# Patient Record
Sex: MALE | Race: WHITE | NOT HISPANIC OR LATINO | Employment: OTHER | URBAN - METROPOLITAN AREA
[De-identification: names, ages, dates, MRNs, and addresses within clinical notes are randomized per-mention and may not be internally consistent; named-entity substitution may affect disease eponyms.]

---

## 2017-05-29 ENCOUNTER — APPOINTMENT (OUTPATIENT)
Dept: GENERAL RADIOLOGY | Facility: HOSPITAL | Age: 52
End: 2017-05-29

## 2017-05-29 ENCOUNTER — APPOINTMENT (OUTPATIENT)
Dept: CT IMAGING | Facility: HOSPITAL | Age: 52
End: 2017-05-29

## 2017-05-29 ENCOUNTER — HOSPITAL ENCOUNTER (OUTPATIENT)
Facility: HOSPITAL | Age: 52
Setting detail: OBSERVATION
Discharge: HOME | End: 2017-05-31
Attending: EMERGENCY MEDICINE | Admitting: HOSPITALIST

## 2017-05-29 DIAGNOSIS — N39.0 ACUTE UTI: ICD-10-CM

## 2017-05-29 DIAGNOSIS — F31.9 BIPOLAR 1 DISORDER (HCC): ICD-10-CM

## 2017-05-29 DIAGNOSIS — R41.82 ALTERED MENTAL STATUS, UNSPECIFIED: Primary | ICD-10-CM

## 2017-05-29 DIAGNOSIS — I63.9 CEREBROVASCULAR ACCIDENT (CVA), UNSPECIFIED MECHANISM (HCC): ICD-10-CM

## 2017-05-29 LAB
ALBUMIN SERPL-MCNC: 4.3 G/DL (ref 3.5–5.2)
ALBUMIN/GLOB SERPL: 1.5 G/DL
ALP SERPL-CCNC: 75 U/L (ref 40–129)
ALT SERPL W P-5'-P-CCNC: 23 U/L (ref 5–41)
AMPHET+METHAMPHET UR QL: NEGATIVE
AMPHETAMINES UR QL: NEGATIVE
ANION GAP SERPL CALCULATED.3IONS-SCNC: 19.3 MMOL/L
AST SERPL-CCNC: 22 U/L (ref 5–40)
BACTERIA UR QL AUTO: ABNORMAL /HPF
BARBITURATES UR QL SCN: POSITIVE
BASOPHILS # BLD AUTO: 0.07 10*3/MM3 (ref 0–0.2)
BASOPHILS NFR BLD AUTO: 0.6 % (ref 0–2)
BENZODIAZ UR QL SCN: POSITIVE
BILIRUB SERPL-MCNC: 0.6 MG/DL (ref 0.2–1.2)
BILIRUB UR QL STRIP: NEGATIVE
BUN BLD-MCNC: 10 MG/DL (ref 6–20)
BUN/CREAT SERPL: 10 (ref 7–25)
BUPRENORPHINE SERPL-MCNC: NEGATIVE NG/ML
CALCIUM SPEC-SCNC: 9.4 MG/DL (ref 8.6–10.5)
CANNABINOIDS SERPL QL: NEGATIVE
CARBAMAZEPINE SERPL-MCNC: <0.5 MCG/ML (ref 8–12)
CHLORIDE SERPL-SCNC: 94 MMOL/L (ref 98–107)
CLARITY UR: CLEAR
CO2 SERPL-SCNC: 22.7 MMOL/L (ref 22–29)
COCAINE UR QL: NEGATIVE
COLOR UR: YELLOW
CREAT BLD-MCNC: 1 MG/DL (ref 0.76–1.27)
DEPRECATED RDW RBC AUTO: 41.4 FL (ref 37–54)
EOSINOPHIL # BLD AUTO: 0.49 10*3/MM3 (ref 0.1–0.3)
EOSINOPHIL NFR BLD AUTO: 4.5 % (ref 0–4)
ERYTHROCYTE [DISTWIDTH] IN BLOOD BY AUTOMATED COUNT: 12 % (ref 11.5–14.5)
ETHANOL BLD-MCNC: <10 MG/DL
ETHANOL UR QL: <0.01 %
GFR SERPL CREATININE-BSD FRML MDRD: 78 ML/MIN/1.73
GLOBULIN UR ELPH-MCNC: 2.9 GM/DL
GLUCOSE BLD-MCNC: 87 MG/DL (ref 65–99)
GLUCOSE UR STRIP-MCNC: NEGATIVE MG/DL
HCT VFR BLD AUTO: 48.4 % (ref 42–52)
HGB BLD-MCNC: 16.8 G/DL (ref 14–18)
HGB UR QL STRIP.AUTO: ABNORMAL
HYALINE CASTS UR QL AUTO: ABNORMAL /LPF
IMM GRANULOCYTES # BLD: 0.02 10*3/MM3 (ref 0–0.03)
IMM GRANULOCYTES NFR BLD: 0.2 % (ref 0–0.5)
KETONES UR QL STRIP: ABNORMAL
LEUKOCYTE ESTERASE UR QL STRIP.AUTO: NEGATIVE
LITHIUM SERPL-SCNC: 1.1 MMOL/L (ref 0.6–1.2)
LYMPHOCYTES # BLD AUTO: 2.18 10*3/MM3 (ref 0.6–4.8)
LYMPHOCYTES NFR BLD AUTO: 19.9 % (ref 20–45)
MCH RBC QN AUTO: 32.3 PG (ref 27–31)
MCHC RBC AUTO-ENTMCNC: 34.7 G/DL (ref 31–37)
MCV RBC AUTO: 93.1 FL (ref 80–94)
METHADONE UR QL SCN: NEGATIVE
MONOCYTES # BLD AUTO: 1 10*3/MM3 (ref 0–1)
MONOCYTES NFR BLD AUTO: 9.1 % (ref 3–8)
NEUTROPHILS # BLD AUTO: 7.22 10*3/MM3 (ref 1.5–8.3)
NEUTROPHILS NFR BLD AUTO: 65.7 % (ref 45–70)
NITRITE UR QL STRIP: NEGATIVE
NRBC BLD MANUAL-RTO: 0 /100 WBC (ref 0–0)
OPIATES UR QL: NEGATIVE
OXYCODONE UR QL SCN: NEGATIVE
PCP UR QL SCN: NEGATIVE
PH UR STRIP.AUTO: 6 [PH] (ref 4.5–8)
PLATELET # BLD AUTO: 292 10*3/MM3 (ref 140–500)
PMV BLD AUTO: 8.9 FL (ref 7.4–10.4)
POTASSIUM BLD-SCNC: 3.1 MMOL/L (ref 3.5–5.2)
PROPOXYPH UR QL: NEGATIVE
PROT SERPL-MCNC: 7.2 G/DL (ref 6–8.5)
PROT UR QL STRIP: ABNORMAL
RBC # BLD AUTO: 5.2 10*6/MM3 (ref 4.7–6.1)
RBC # UR: ABNORMAL /HPF
REF LAB TEST METHOD: ABNORMAL
SODIUM BLD-SCNC: 136 MMOL/L (ref 136–145)
SP GR UR STRIP: 1.01 (ref 1–1.03)
SQUAMOUS #/AREA URNS HPF: ABNORMAL /HPF
TRICYCLICS UR QL SCN: NEGATIVE
TROPONIN T SERPL-MCNC: <0.01 NG/ML (ref 0–0.03)
TSH SERPL DL<=0.05 MIU/L-ACNC: 1.29 MIU/ML (ref 0.27–4.2)
UROBILINOGEN UR QL STRIP: ABNORMAL
WBC NRBC COR # BLD: 10.98 10*3/MM3 (ref 4.8–10.8)
WBC UR QL AUTO: ABNORMAL /HPF

## 2017-05-29 PROCEDURE — 99285 EMERGENCY DEPT VISIT HI MDM: CPT | Performed by: EMERGENCY MEDICINE

## 2017-05-29 PROCEDURE — 80307 DRUG TEST PRSMV CHEM ANLYZR: CPT | Performed by: EMERGENCY MEDICINE

## 2017-05-29 PROCEDURE — 73562 X-RAY EXAM OF KNEE 3: CPT

## 2017-05-29 PROCEDURE — 93005 ELECTROCARDIOGRAM TRACING: CPT | Performed by: EMERGENCY MEDICINE

## 2017-05-29 PROCEDURE — 81001 URINALYSIS AUTO W/SCOPE: CPT | Performed by: EMERGENCY MEDICINE

## 2017-05-29 PROCEDURE — 99284 EMERGENCY DEPT VISIT MOD MDM: CPT

## 2017-05-29 PROCEDURE — 70450 CT HEAD/BRAIN W/O DYE: CPT

## 2017-05-29 PROCEDURE — 80178 ASSAY OF LITHIUM: CPT | Performed by: EMERGENCY MEDICINE

## 2017-05-29 PROCEDURE — 93010 ELECTROCARDIOGRAM REPORT: CPT | Performed by: INTERNAL MEDICINE

## 2017-05-29 PROCEDURE — 84443 ASSAY THYROID STIM HORMONE: CPT | Performed by: EMERGENCY MEDICINE

## 2017-05-29 PROCEDURE — 80156 ASSAY CARBAMAZEPINE TOTAL: CPT | Performed by: EMERGENCY MEDICINE

## 2017-05-29 PROCEDURE — 85025 COMPLETE CBC W/AUTO DIFF WBC: CPT | Performed by: EMERGENCY MEDICINE

## 2017-05-29 PROCEDURE — 84484 ASSAY OF TROPONIN QUANT: CPT | Performed by: EMERGENCY MEDICINE

## 2017-05-29 PROCEDURE — 80053 COMPREHEN METABOLIC PANEL: CPT | Performed by: EMERGENCY MEDICINE

## 2017-05-29 PROCEDURE — 71020 HC CHEST PA AND LATERAL: CPT

## 2017-05-29 PROCEDURE — 80306 DRUG TEST PRSMV INSTRMNT: CPT | Performed by: EMERGENCY MEDICINE

## 2017-05-29 RX ORDER — ASPIRIN 300 MG/1
300 SUPPOSITORY RECTAL ONCE
Status: COMPLETED | OUTPATIENT
Start: 2017-05-29 | End: 2017-05-30

## 2017-05-29 RX ORDER — SODIUM CHLORIDE 0.9 % (FLUSH) 0.9 %
10 SYRINGE (ML) INJECTION AS NEEDED
Status: DISCONTINUED | OUTPATIENT
Start: 2017-05-29 | End: 2017-05-31 | Stop reason: HOSPADM

## 2017-05-30 ENCOUNTER — APPOINTMENT (OUTPATIENT)
Dept: MRI IMAGING | Facility: HOSPITAL | Age: 52
End: 2017-05-30

## 2017-05-30 LAB
AMMONIA BLD-SCNC: 69 UMOL/L (ref 16–60)
CHOLEST SERPL-MCNC: 137 MG/DL (ref 0–200)
GLUCOSE BLDC GLUCOMTR-MCNC: 110 MG/DL (ref 70–130)
GLUCOSE BLDC GLUCOMTR-MCNC: 90 MG/DL (ref 70–130)
GLUCOSE BLDC GLUCOMTR-MCNC: 93 MG/DL (ref 70–130)
HBA1C MFR BLD: 4.9 % (ref 4.8–5.6)
HDLC SERPL-MCNC: 34 MG/DL (ref 40–60)
LDLC SERPL CALC-MCNC: 71 MG/DL (ref 0–100)
LDLC/HDLC SERPL: 2.09 {RATIO}
LITHIUM SERPL-SCNC: 1 MMOL/L (ref 0.6–1.2)
TRIGL SERPL-MCNC: 159 MG/DL (ref 0–150)
VLDLC SERPL-MCNC: 31.8 MG/DL (ref 8–32)

## 2017-05-30 PROCEDURE — 80061 LIPID PANEL: CPT | Performed by: HOSPITALIST

## 2017-05-30 PROCEDURE — 80178 ASSAY OF LITHIUM: CPT | Performed by: HOSPITALIST

## 2017-05-30 PROCEDURE — 96365 THER/PROPH/DIAG IV INF INIT: CPT

## 2017-05-30 PROCEDURE — 96372 THER/PROPH/DIAG INJ SC/IM: CPT

## 2017-05-30 PROCEDURE — G0378 HOSPITAL OBSERVATION PER HR: HCPCS

## 2017-05-30 PROCEDURE — 82962 GLUCOSE BLOOD TEST: CPT

## 2017-05-30 PROCEDURE — 82140 ASSAY OF AMMONIA: CPT | Performed by: NURSE PRACTITIONER

## 2017-05-30 PROCEDURE — 25010000002 DIAZEPAM PER 5 MG: Performed by: HOSPITALIST

## 2017-05-30 PROCEDURE — 94799 UNLISTED PULMONARY SVC/PX: CPT

## 2017-05-30 PROCEDURE — 83036 HEMOGLOBIN GLYCOSYLATED A1C: CPT | Performed by: HOSPITALIST

## 2017-05-30 PROCEDURE — 99219 PR INITIAL OBSERVATION CARE/DAY 50 MINUTES: CPT | Performed by: NURSE PRACTITIONER

## 2017-05-30 PROCEDURE — 25010000002 ENOXAPARIN PER 10 MG: Performed by: HOSPITALIST

## 2017-05-30 PROCEDURE — G8996 SWALLOW CURRENT STATUS: HCPCS

## 2017-05-30 PROCEDURE — 92610 EVALUATE SWALLOWING FUNCTION: CPT

## 2017-05-30 PROCEDURE — 70551 MRI BRAIN STEM W/O DYE: CPT

## 2017-05-30 PROCEDURE — G8997 SWALLOW GOAL STATUS: HCPCS

## 2017-05-30 PROCEDURE — G8998 SWALLOW D/C STATUS: HCPCS

## 2017-05-30 PROCEDURE — 25010000002 CEFTRIAXONE PER 250 MG: Performed by: HOSPITALIST

## 2017-05-30 PROCEDURE — 96375 TX/PRO/DX INJ NEW DRUG ADDON: CPT

## 2017-05-30 RX ORDER — ACETAMINOPHEN 650 MG/1
650 SUPPOSITORY RECTAL EVERY 4 HOURS PRN
Status: DISCONTINUED | OUTPATIENT
Start: 2017-05-30 | End: 2017-05-31 | Stop reason: HOSPADM

## 2017-05-30 RX ORDER — ASPIRIN 81 MG/1
81 TABLET, CHEWABLE ORAL DAILY
Status: DISCONTINUED | OUTPATIENT
Start: 2017-05-30 | End: 2017-05-31 | Stop reason: HOSPADM

## 2017-05-30 RX ORDER — SODIUM CHLORIDE 0.9 % (FLUSH) 0.9 %
1-10 SYRINGE (ML) INJECTION AS NEEDED
Status: DISCONTINUED | OUTPATIENT
Start: 2017-05-30 | End: 2017-05-31 | Stop reason: HOSPADM

## 2017-05-30 RX ORDER — METOPROLOL TARTRATE 50 MG/1
100 TABLET, FILM COATED ORAL 2 TIMES DAILY
Status: DISCONTINUED | OUTPATIENT
Start: 2017-05-30 | End: 2017-05-31 | Stop reason: HOSPADM

## 2017-05-30 RX ORDER — LACTULOSE 20 G/30ML
30 SOLUTION ORAL 3 TIMES DAILY
Status: DISCONTINUED | OUTPATIENT
Start: 2017-05-30 | End: 2017-05-31 | Stop reason: HOSPADM

## 2017-05-30 RX ORDER — LITHIUM CARBONATE 300 MG
300 TABLET ORAL 2 TIMES DAILY WITH MEALS
Status: DISCONTINUED | OUTPATIENT
Start: 2017-05-30 | End: 2017-05-31 | Stop reason: HOSPADM

## 2017-05-30 RX ORDER — CEFTRIAXONE 1 G/1
INJECTION, POWDER, FOR SOLUTION INTRAMUSCULAR; INTRAVENOUS
Status: DISPENSED
Start: 2017-05-30 | End: 2017-05-30

## 2017-05-30 RX ORDER — POTASSIUM CHLORIDE 20 MEQ/1
20 TABLET, EXTENDED RELEASE ORAL ONCE
Status: DISCONTINUED | OUTPATIENT
Start: 2017-05-30 | End: 2017-05-31 | Stop reason: HOSPADM

## 2017-05-30 RX ORDER — LOSARTAN POTASSIUM 50 MG/1
100 TABLET ORAL
Status: DISCONTINUED | OUTPATIENT
Start: 2017-05-30 | End: 2017-05-31 | Stop reason: HOSPADM

## 2017-05-30 RX ORDER — ACETAMINOPHEN 325 MG/1
650 TABLET ORAL EVERY 4 HOURS PRN
Status: DISCONTINUED | OUTPATIENT
Start: 2017-05-30 | End: 2017-05-31 | Stop reason: HOSPADM

## 2017-05-30 RX ORDER — DIAZEPAM 5 MG/ML
1 INJECTION, SOLUTION INTRAMUSCULAR; INTRAVENOUS ONCE
Status: COMPLETED | OUTPATIENT
Start: 2017-05-30 | End: 2017-05-30

## 2017-05-30 RX ORDER — ONDANSETRON 2 MG/ML
4 INJECTION INTRAMUSCULAR; INTRAVENOUS EVERY 6 HOURS PRN
Status: DISCONTINUED | OUTPATIENT
Start: 2017-05-30 | End: 2017-05-31 | Stop reason: HOSPADM

## 2017-05-30 RX ORDER — CARBAMAZEPINE 200 MG/1
400 TABLET ORAL 2 TIMES DAILY
Status: DISCONTINUED | OUTPATIENT
Start: 2017-05-30 | End: 2017-05-31 | Stop reason: HOSPADM

## 2017-05-30 RX ORDER — FLUOXETINE HYDROCHLORIDE 20 MG/1
20 CAPSULE ORAL DAILY
Status: DISCONTINUED | OUTPATIENT
Start: 2017-05-30 | End: 2017-05-31 | Stop reason: HOSPADM

## 2017-05-30 RX ORDER — ATORVASTATIN CALCIUM 40 MG/1
80 TABLET, FILM COATED ORAL NIGHTLY
Status: DISCONTINUED | OUTPATIENT
Start: 2017-05-30 | End: 2017-05-31 | Stop reason: HOSPADM

## 2017-05-30 RX ORDER — ASPIRIN 300 MG/1
300 SUPPOSITORY RECTAL DAILY
Status: DISCONTINUED | OUTPATIENT
Start: 2017-05-30 | End: 2017-05-31 | Stop reason: HOSPADM

## 2017-05-30 RX ADMIN — LITHIUM CARBONATE 300 MG: 300 TABLET ORAL at 18:10

## 2017-05-30 RX ADMIN — CARBAMAZEPINE 400 MG: 200 TABLET ORAL at 08:56

## 2017-05-30 RX ADMIN — ASPIRIN 81 MG 81 MG: 81 TABLET ORAL at 15:55

## 2017-05-30 RX ADMIN — LACTULOSE 30 G: 10 SOLUTION ORAL at 20:12

## 2017-05-30 RX ADMIN — METOPROLOL TARTRATE 100 MG: 50 TABLET ORAL at 08:55

## 2017-05-30 RX ADMIN — METOPROLOL TARTRATE 100 MG: 50 TABLET ORAL at 18:10

## 2017-05-30 RX ADMIN — CARBAMAZEPINE 400 MG: 200 TABLET ORAL at 18:10

## 2017-05-30 RX ADMIN — DIAZEPAM 1 MG: 5 INJECTION, SOLUTION INTRAMUSCULAR; INTRAVENOUS at 14:47

## 2017-05-30 RX ADMIN — FLUOXETINE 20 MG: 20 CAPSULE ORAL at 08:54

## 2017-05-30 RX ADMIN — CEFTRIAXONE 1 G: 1 INJECTION, SOLUTION INTRAVENOUS at 02:29

## 2017-05-30 RX ADMIN — LOSARTAN POTASSIUM 100 MG: 50 TABLET ORAL at 08:54

## 2017-05-30 RX ADMIN — LACTULOSE 30 G: 10 SOLUTION ORAL at 15:54

## 2017-05-30 RX ADMIN — ASPIRIN 300 MG: 300 SUPPOSITORY RECTAL at 00:32

## 2017-05-30 RX ADMIN — ENOXAPARIN SODIUM 30 MG: 30 INJECTION SUBCUTANEOUS at 18:11

## 2017-05-30 RX ADMIN — ATORVASTATIN CALCIUM 80 MG: 40 TABLET, FILM COATED ORAL at 20:11

## 2017-05-31 VITALS
BODY MASS INDEX: 38.73 KG/M2 | HEIGHT: 70 IN | DIASTOLIC BLOOD PRESSURE: 74 MMHG | OXYGEN SATURATION: 95 % | HEART RATE: 71 BPM | WEIGHT: 270.56 LBS | TEMPERATURE: 98.3 F | RESPIRATION RATE: 20 BRPM | SYSTOLIC BLOOD PRESSURE: 119 MMHG

## 2017-05-31 LAB
ALBUMIN SERPL-MCNC: 3.8 G/DL (ref 3.5–5.2)
ALBUMIN/GLOB SERPL: 1.4 G/DL
ALP SERPL-CCNC: 68 U/L (ref 40–129)
ALT SERPL W P-5'-P-CCNC: 21 U/L (ref 5–41)
AMMONIA BLD-SCNC: 52 UMOL/L (ref 16–60)
ANION GAP SERPL CALCULATED.3IONS-SCNC: 22 MMOL/L
AST SERPL-CCNC: 18 U/L (ref 5–40)
BASOPHILS # BLD AUTO: 0.03 10*3/MM3 (ref 0–0.2)
BASOPHILS NFR BLD AUTO: 0.4 % (ref 0–2)
BILIRUB SERPL-MCNC: 0.6 MG/DL (ref 0.2–1.2)
BUN BLD-MCNC: 6 MG/DL (ref 6–20)
BUN/CREAT SERPL: 7.4 (ref 7–25)
CALCIUM SPEC-SCNC: 8.8 MG/DL (ref 8.6–10.5)
CHLORIDE SERPL-SCNC: 96 MMOL/L (ref 98–107)
CO2 SERPL-SCNC: 18 MMOL/L (ref 22–29)
CREAT BLD-MCNC: 0.81 MG/DL (ref 0.76–1.27)
DEPRECATED RDW RBC AUTO: 41.4 FL (ref 37–54)
EOSINOPHIL # BLD AUTO: 0.4 10*3/MM3 (ref 0.1–0.3)
EOSINOPHIL NFR BLD AUTO: 5.4 % (ref 0–4)
ERYTHROCYTE [DISTWIDTH] IN BLOOD BY AUTOMATED COUNT: 12 % (ref 11.5–14.5)
GFR SERPL CREATININE-BSD FRML MDRD: 100 ML/MIN/1.73
GLOBULIN UR ELPH-MCNC: 2.8 GM/DL
GLUCOSE BLD-MCNC: 89 MG/DL (ref 65–99)
GLUCOSE BLDC GLUCOMTR-MCNC: 108 MG/DL (ref 70–130)
GLUCOSE BLDC GLUCOMTR-MCNC: 85 MG/DL (ref 70–130)
GLUCOSE BLDC GLUCOMTR-MCNC: 88 MG/DL (ref 70–130)
HCT VFR BLD AUTO: 48.6 % (ref 42–52)
HGB BLD-MCNC: 16.8 G/DL (ref 14–18)
IMM GRANULOCYTES # BLD: 0.02 10*3/MM3 (ref 0–0.03)
IMM GRANULOCYTES NFR BLD: 0.3 % (ref 0–0.5)
LYMPHOCYTES # BLD AUTO: 1.35 10*3/MM3 (ref 0.6–4.8)
LYMPHOCYTES NFR BLD AUTO: 18.2 % (ref 20–45)
MCH RBC QN AUTO: 32.2 PG (ref 27–31)
MCHC RBC AUTO-ENTMCNC: 34.6 G/DL (ref 31–37)
MCV RBC AUTO: 93.3 FL (ref 80–94)
MONOCYTES # BLD AUTO: 0.7 10*3/MM3 (ref 0–1)
MONOCYTES NFR BLD AUTO: 9.5 % (ref 3–8)
NEUTROPHILS # BLD AUTO: 4.9 10*3/MM3 (ref 1.5–8.3)
NEUTROPHILS NFR BLD AUTO: 66.2 % (ref 45–70)
NRBC BLD MANUAL-RTO: 0 /100 WBC (ref 0–0)
PLATELET # BLD AUTO: 279 10*3/MM3 (ref 140–500)
PMV BLD AUTO: 8.8 FL (ref 7.4–10.4)
POTASSIUM BLD-SCNC: 3.2 MMOL/L (ref 3.5–5.2)
PROT SERPL-MCNC: 6.6 G/DL (ref 6–8.5)
RBC # BLD AUTO: 5.21 10*6/MM3 (ref 4.7–6.1)
SODIUM BLD-SCNC: 136 MMOL/L (ref 136–145)
WBC NRBC COR # BLD: 7.4 10*3/MM3 (ref 4.8–10.8)

## 2017-05-31 PROCEDURE — G0378 HOSPITAL OBSERVATION PER HR: HCPCS

## 2017-05-31 PROCEDURE — 85025 COMPLETE CBC W/AUTO DIFF WBC: CPT | Performed by: NURSE PRACTITIONER

## 2017-05-31 PROCEDURE — 92523 SPEECH SOUND LANG COMPREHEN: CPT

## 2017-05-31 PROCEDURE — G9186 MOTOR SPEECH GOAL STATUS: HCPCS

## 2017-05-31 PROCEDURE — 25010000002 ENOXAPARIN PER 10 MG: Performed by: HOSPITALIST

## 2017-05-31 PROCEDURE — 82140 ASSAY OF AMMONIA: CPT | Performed by: NURSE PRACTITIONER

## 2017-05-31 PROCEDURE — G9158 MOTOR SPEECH D/C STATUS: HCPCS

## 2017-05-31 PROCEDURE — 99217 PR OBSERVATION CARE DISCHARGE MANAGEMENT: CPT | Performed by: NURSE PRACTITIONER

## 2017-05-31 PROCEDURE — 80053 COMPREHEN METABOLIC PANEL: CPT | Performed by: NURSE PRACTITIONER

## 2017-05-31 PROCEDURE — G8999 MOTOR SPEECH CURRENT STATUS: HCPCS

## 2017-05-31 PROCEDURE — 82962 GLUCOSE BLOOD TEST: CPT

## 2017-05-31 PROCEDURE — 96372 THER/PROPH/DIAG INJ SC/IM: CPT

## 2017-05-31 RX ORDER — LOSARTAN POTASSIUM 100 MG/1
100 TABLET ORAL
Qty: 30 TABLET | Refills: 0 | Status: SHIPPED | OUTPATIENT
Start: 2017-05-31 | End: 2018-11-25 | Stop reason: HOSPADM

## 2017-05-31 RX ORDER — LACTULOSE 20 G/30ML
20 SOLUTION ORAL 2 TIMES DAILY PRN
Qty: 473 ML | Refills: 0 | Status: SHIPPED | OUTPATIENT
Start: 2017-05-31 | End: 2017-06-02

## 2017-05-31 RX ADMIN — METOPROLOL TARTRATE 100 MG: 50 TABLET ORAL at 08:45

## 2017-05-31 RX ADMIN — LOSARTAN POTASSIUM 100 MG: 50 TABLET ORAL at 08:44

## 2017-05-31 RX ADMIN — LITHIUM CARBONATE 300 MG: 300 TABLET ORAL at 08:44

## 2017-05-31 RX ADMIN — CARBAMAZEPINE 400 MG: 200 TABLET ORAL at 08:45

## 2017-05-31 RX ADMIN — FLUOXETINE 20 MG: 20 CAPSULE ORAL at 08:44

## 2017-05-31 RX ADMIN — ENOXAPARIN SODIUM 30 MG: 30 INJECTION SUBCUTANEOUS at 08:44

## 2017-05-31 RX ADMIN — LACTULOSE 30 G: 10 SOLUTION ORAL at 08:44

## 2017-05-31 RX ADMIN — ASPIRIN 81 MG 81 MG: 81 TABLET ORAL at 08:45

## 2017-07-22 ENCOUNTER — HOSPITAL ENCOUNTER (INPATIENT)
Facility: HOSPITAL | Age: 52
LOS: 11 days | Discharge: NURSING, REHAB OR LONG-TERM CARE | End: 2017-08-02
Attending: EMERGENCY MEDICINE | Admitting: HOSPITALIST

## 2017-07-22 ENCOUNTER — APPOINTMENT (OUTPATIENT)
Dept: GENERAL RADIOLOGY | Facility: HOSPITAL | Age: 52
End: 2017-07-22

## 2017-07-22 ENCOUNTER — APPOINTMENT (OUTPATIENT)
Dept: CT IMAGING | Facility: HOSPITAL | Age: 52
End: 2017-07-22
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
## 2017-07-22 DIAGNOSIS — S92.411A CLOSED DISPLACED FRACTURE OF PROXIMAL PHALANX OF RIGHT GREAT TOE, INITIAL ENCOUNTER: ICD-10-CM

## 2017-07-22 DIAGNOSIS — K76.82 HEPATIC ENCEPHALOPATHY (HCC): ICD-10-CM

## 2017-07-22 DIAGNOSIS — S92.514A CLOSED NONDISPLACED FRACTURE OF PROXIMAL PHALANX OF LESSER TOE OF RIGHT FOOT, INITIAL ENCOUNTER: ICD-10-CM

## 2017-07-22 DIAGNOSIS — R41.82 ALTERED MENTAL STATUS, UNSPECIFIED ALTERED MENTAL STATUS TYPE: Primary | ICD-10-CM

## 2017-07-22 DIAGNOSIS — T07.XXXA MULTIPLE ABRASIONS: ICD-10-CM

## 2017-07-22 DIAGNOSIS — S92.401A: ICD-10-CM

## 2017-07-22 DIAGNOSIS — S92.402A: ICD-10-CM

## 2017-07-22 DIAGNOSIS — I95.9 HYPOTENSION, UNSPECIFIED HYPOTENSION TYPE: ICD-10-CM

## 2017-07-22 DIAGNOSIS — S70.02XA CONTUSION OF LEFT HIP, INITIAL ENCOUNTER: ICD-10-CM

## 2017-07-22 LAB
ALBUMIN SERPL-MCNC: 3.8 G/DL (ref 3.5–5.2)
ALBUMIN/GLOB SERPL: 1.4 G/DL
ALP SERPL-CCNC: 68 U/L (ref 40–129)
ALT SERPL W P-5'-P-CCNC: 14 U/L (ref 5–41)
AMMONIA BLD-SCNC: 95 UMOL/L (ref 16–60)
ANION GAP SERPL CALCULATED.3IONS-SCNC: 13.9 MMOL/L
APTT PPP: 30.6 SECONDS (ref 24.3–38.1)
AST SERPL-CCNC: 16 U/L (ref 5–40)
BACTERIA UR QL AUTO: ABNORMAL /HPF
BASOPHILS # BLD AUTO: 0.08 10*3/MM3 (ref 0–0.2)
BASOPHILS NFR BLD AUTO: 0.7 % (ref 0–2)
BILIRUB SERPL-MCNC: 1 MG/DL (ref 0.2–1.2)
BILIRUB UR QL STRIP: NEGATIVE
BUN BLD-MCNC: 15 MG/DL (ref 6–20)
BUN/CREAT SERPL: 16.1 (ref 7–25)
CALCIUM SPEC-SCNC: 8.8 MG/DL (ref 8.6–10.5)
CARBAMAZEPINE SERPL-MCNC: <0.5 MCG/ML (ref 8–12)
CHLORIDE SERPL-SCNC: 97 MMOL/L (ref 98–107)
CLARITY UR: CLEAR
CO2 SERPL-SCNC: 22.1 MMOL/L (ref 22–29)
COLOR UR: YELLOW
CREAT BLD-MCNC: 0.93 MG/DL (ref 0.76–1.27)
DEPRECATED RDW RBC AUTO: 41.6 FL (ref 37–54)
EOSINOPHIL # BLD AUTO: 0.34 10*3/MM3 (ref 0.1–0.3)
EOSINOPHIL NFR BLD AUTO: 3 % (ref 0–4)
ERYTHROCYTE [DISTWIDTH] IN BLOOD BY AUTOMATED COUNT: 11.9 % (ref 11.5–14.5)
GFR SERPL CREATININE-BSD FRML MDRD: 85 ML/MIN/1.73
GLOBULIN UR ELPH-MCNC: 2.7 GM/DL
GLUCOSE BLD-MCNC: 154 MG/DL (ref 65–99)
GLUCOSE UR STRIP-MCNC: NEGATIVE MG/DL
HCT VFR BLD AUTO: 43.2 % (ref 42–52)
HGB BLD-MCNC: 15 G/DL (ref 14–18)
HGB UR QL STRIP.AUTO: ABNORMAL
HYALINE CASTS UR QL AUTO: ABNORMAL /LPF
IMM GRANULOCYTES # BLD: 0.07 10*3/MM3 (ref 0–0.03)
IMM GRANULOCYTES NFR BLD: 0.6 % (ref 0–0.5)
INR PPP: 1.09 (ref 0.9–1.1)
KETONES UR QL STRIP: NEGATIVE
LEUKOCYTE ESTERASE UR QL STRIP.AUTO: NEGATIVE
LYMPHOCYTES # BLD AUTO: 1.13 10*3/MM3 (ref 0.6–4.8)
LYMPHOCYTES NFR BLD AUTO: 9.9 % (ref 20–45)
MCH RBC QN AUTO: 33 PG (ref 27–31)
MCHC RBC AUTO-ENTMCNC: 34.7 G/DL (ref 31–37)
MCV RBC AUTO: 95.2 FL (ref 80–94)
MONOCYTES # BLD AUTO: 1.32 10*3/MM3 (ref 0–1)
MONOCYTES NFR BLD AUTO: 11.5 % (ref 3–8)
MUCOUS THREADS URNS QL MICRO: ABNORMAL /HPF
NEUTROPHILS # BLD AUTO: 8.52 10*3/MM3 (ref 1.5–8.3)
NEUTROPHILS NFR BLD AUTO: 74.3 % (ref 45–70)
NITRITE UR QL STRIP: NEGATIVE
NRBC BLD MANUAL-RTO: 0 /100 WBC (ref 0–0)
PH UR STRIP.AUTO: 6 [PH] (ref 4.5–8)
PLATELET # BLD AUTO: 229 10*3/MM3 (ref 140–500)
PMV BLD AUTO: 9.6 FL (ref 7.4–10.4)
POTASSIUM BLD-SCNC: 4.5 MMOL/L (ref 3.5–5.2)
PROT SERPL-MCNC: 6.5 G/DL (ref 6–8.5)
PROT UR QL STRIP: ABNORMAL
PROTHROMBIN TIME: 14.1 SECONDS (ref 12.1–15)
RBC # BLD AUTO: 4.54 10*6/MM3 (ref 4.7–6.1)
RBC # UR: ABNORMAL /HPF
REF LAB TEST METHOD: ABNORMAL
SODIUM BLD-SCNC: 133 MMOL/L (ref 136–145)
SP GR UR STRIP: 1.01 (ref 1–1.03)
SQUAMOUS #/AREA URNS HPF: ABNORMAL /HPF
TROPONIN T SERPL-MCNC: <0.01 NG/ML (ref 0–0.03)
UROBILINOGEN UR QL STRIP: ABNORMAL
WBC NRBC COR # BLD: 11.46 10*3/MM3 (ref 4.8–10.8)
WBC UR QL AUTO: ABNORMAL /HPF

## 2017-07-22 PROCEDURE — 25010000002 PIPERACILLIN SOD-TAZOBACTAM PER 1 G: Performed by: INTERNAL MEDICINE

## 2017-07-22 PROCEDURE — 99285 EMERGENCY DEPT VISIT HI MDM: CPT

## 2017-07-22 PROCEDURE — 73502 X-RAY EXAM HIP UNI 2-3 VIEWS: CPT

## 2017-07-22 PROCEDURE — 81001 URINALYSIS AUTO W/SCOPE: CPT | Performed by: EMERGENCY MEDICINE

## 2017-07-22 PROCEDURE — 80178 ASSAY OF LITHIUM: CPT | Performed by: EMERGENCY MEDICINE

## 2017-07-22 PROCEDURE — 85730 THROMBOPLASTIN TIME PARTIAL: CPT | Performed by: EMERGENCY MEDICINE

## 2017-07-22 PROCEDURE — 73630 X-RAY EXAM OF FOOT: CPT

## 2017-07-22 PROCEDURE — 99223 1ST HOSP IP/OBS HIGH 75: CPT | Performed by: INTERNAL MEDICINE

## 2017-07-22 PROCEDURE — 80053 COMPREHEN METABOLIC PANEL: CPT | Performed by: EMERGENCY MEDICINE

## 2017-07-22 PROCEDURE — 80156 ASSAY CARBAMAZEPINE TOTAL: CPT | Performed by: EMERGENCY MEDICINE

## 2017-07-22 PROCEDURE — 84484 ASSAY OF TROPONIN QUANT: CPT | Performed by: EMERGENCY MEDICINE

## 2017-07-22 PROCEDURE — 70450 CT HEAD/BRAIN W/O DYE: CPT

## 2017-07-22 PROCEDURE — 73562 X-RAY EXAM OF KNEE 3: CPT

## 2017-07-22 PROCEDURE — 82140 ASSAY OF AMMONIA: CPT | Performed by: EMERGENCY MEDICINE

## 2017-07-22 PROCEDURE — 93005 ELECTROCARDIOGRAM TRACING: CPT | Performed by: EMERGENCY MEDICINE

## 2017-07-22 PROCEDURE — 99285 EMERGENCY DEPT VISIT HI MDM: CPT | Performed by: EMERGENCY MEDICINE

## 2017-07-22 PROCEDURE — 85610 PROTHROMBIN TIME: CPT | Performed by: EMERGENCY MEDICINE

## 2017-07-22 PROCEDURE — 72125 CT NECK SPINE W/O DYE: CPT

## 2017-07-22 PROCEDURE — 71010 HC CHEST PA OR AP: CPT

## 2017-07-22 PROCEDURE — 93010 ELECTROCARDIOGRAM REPORT: CPT | Performed by: INTERNAL MEDICINE

## 2017-07-22 PROCEDURE — 85025 COMPLETE CBC W/AUTO DIFF WBC: CPT | Performed by: EMERGENCY MEDICINE

## 2017-07-22 RX ORDER — SODIUM CHLORIDE 0.9 % (FLUSH) 0.9 %
1-10 SYRINGE (ML) INJECTION AS NEEDED
Status: DISCONTINUED | OUTPATIENT
Start: 2017-07-22 | End: 2017-08-02 | Stop reason: HOSPADM

## 2017-07-22 RX ORDER — VANCOMYCIN HYDROCHLORIDE
1250 EVERY 12 HOURS
Status: DISCONTINUED | OUTPATIENT
Start: 2017-07-22 | End: 2017-07-23 | Stop reason: CLARIF

## 2017-07-22 RX ORDER — LACTULOSE 20 G/30ML
30 SOLUTION ORAL 3 TIMES DAILY
Status: DISCONTINUED | OUTPATIENT
Start: 2017-07-22 | End: 2017-07-24

## 2017-07-22 RX ORDER — DOPAMINE HYDROCHLORIDE 160 MG/100ML
INJECTION, SOLUTION INTRAVENOUS
Status: COMPLETED
Start: 2017-07-22 | End: 2017-07-23

## 2017-07-22 RX ORDER — DOPAMINE HYDROCHLORIDE 160 MG/100ML
2-20 INJECTION, SOLUTION INTRAVENOUS
Status: DISCONTINUED | OUTPATIENT
Start: 2017-07-22 | End: 2017-07-24

## 2017-07-22 RX ORDER — SODIUM CHLORIDE 9 MG/ML
INJECTION, SOLUTION INTRAVENOUS
Status: COMPLETED
Start: 2017-07-22 | End: 2017-07-23

## 2017-07-22 RX ORDER — TEMAZEPAM 15 MG/1
15 CAPSULE ORAL NIGHTLY PRN
Status: DISCONTINUED | OUTPATIENT
Start: 2017-07-22 | End: 2017-07-25

## 2017-07-22 RX ORDER — VANCOMYCIN HYDROCHLORIDE 500 MG/10ML
INJECTION, POWDER, LYOPHILIZED, FOR SOLUTION INTRAVENOUS
Status: DISPENSED
Start: 2017-07-22 | End: 2017-07-23

## 2017-07-22 RX ORDER — PRIMIDONE 250 MG/1
250 TABLET ORAL 2 TIMES DAILY
Status: DISCONTINUED | OUTPATIENT
Start: 2017-07-23 | End: 2017-08-02 | Stop reason: HOSPADM

## 2017-07-22 RX ORDER — PIPERACILLIN SODIUM, TAZOBACTAM SODIUM 3; .375 G/15ML; G/15ML
INJECTION, POWDER, LYOPHILIZED, FOR SOLUTION INTRAVENOUS
Status: DISPENSED
Start: 2017-07-22 | End: 2017-07-23

## 2017-07-22 RX ORDER — LITHIUM CARBONATE 300 MG
300 TABLET ORAL 2 TIMES DAILY
Status: DISCONTINUED | OUTPATIENT
Start: 2017-07-23 | End: 2017-08-02 | Stop reason: HOSPADM

## 2017-07-22 RX ORDER — SODIUM CHLORIDE 9 MG/ML
100 INJECTION, SOLUTION INTRAVENOUS CONTINUOUS
Status: DISCONTINUED | OUTPATIENT
Start: 2017-07-22 | End: 2017-07-25

## 2017-07-22 RX ORDER — DOXEPIN HYDROCHLORIDE 25 MG/1
75 CAPSULE ORAL NIGHTLY
Status: DISCONTINUED | OUTPATIENT
Start: 2017-07-23 | End: 2017-08-02 | Stop reason: HOSPADM

## 2017-07-22 RX ORDER — CARBAMAZEPINE 200 MG/1
400 TABLET ORAL 2 TIMES DAILY
Status: DISCONTINUED | OUTPATIENT
Start: 2017-07-23 | End: 2017-08-02 | Stop reason: HOSPADM

## 2017-07-22 RX ORDER — GABAPENTIN 400 MG/1
800 CAPSULE ORAL EVERY 8 HOURS SCHEDULED
Status: DISCONTINUED | OUTPATIENT
Start: 2017-07-23 | End: 2017-08-02 | Stop reason: HOSPADM

## 2017-07-22 RX ADMIN — SODIUM CHLORIDE 1000 ML: 9 INJECTION, SOLUTION INTRAVENOUS at 16:10

## 2017-07-22 RX ADMIN — PIPERACILLIN SODIUM,TAZOBACTAM SODIUM 3.38 G: 3; .375 INJECTION, POWDER, FOR SOLUTION INTRAVENOUS at 23:54

## 2017-07-23 ENCOUNTER — APPOINTMENT (OUTPATIENT)
Dept: GENERAL RADIOLOGY | Facility: HOSPITAL | Age: 52
End: 2017-07-23

## 2017-07-23 ENCOUNTER — APPOINTMENT (OUTPATIENT)
Dept: CT IMAGING | Facility: HOSPITAL | Age: 52
End: 2017-07-23

## 2017-07-23 LAB
ANION GAP SERPL CALCULATED.3IONS-SCNC: 12.5 MMOL/L
ANION GAP SERPL CALCULATED.3IONS-SCNC: 16 MMOL/L
BASOPHILS # BLD AUTO: 0.04 10*3/MM3 (ref 0–0.2)
BASOPHILS # BLD AUTO: 0.07 10*3/MM3 (ref 0–0.2)
BASOPHILS NFR BLD AUTO: 0.4 % (ref 0–2)
BASOPHILS NFR BLD AUTO: 0.6 % (ref 0–2)
BUN BLD-MCNC: 12 MG/DL (ref 6–20)
BUN BLD-MCNC: 15 MG/DL (ref 6–20)
BUN/CREAT SERPL: 17.1 (ref 7–25)
BUN/CREAT SERPL: 19.5 (ref 7–25)
CALCIUM SPEC-SCNC: 8.3 MG/DL (ref 8.6–10.5)
CALCIUM SPEC-SCNC: 8.6 MG/DL (ref 8.6–10.5)
CHLORIDE SERPL-SCNC: 101 MMOL/L (ref 98–107)
CHLORIDE SERPL-SCNC: 103 MMOL/L (ref 98–107)
CO2 SERPL-SCNC: 18 MMOL/L (ref 22–29)
CO2 SERPL-SCNC: 20.5 MMOL/L (ref 22–29)
CREAT BLD-MCNC: 0.7 MG/DL (ref 0.76–1.27)
CREAT BLD-MCNC: 0.77 MG/DL (ref 0.76–1.27)
D-LACTATE SERPL-SCNC: 1.4 MMOL/L (ref 0.5–2)
DEPRECATED RDW RBC AUTO: 42.6 FL (ref 37–54)
DEPRECATED RDW RBC AUTO: 42.9 FL (ref 37–54)
EOSINOPHIL # BLD AUTO: 0.34 10*3/MM3 (ref 0.1–0.3)
EOSINOPHIL # BLD AUTO: 0.43 10*3/MM3 (ref 0.1–0.3)
EOSINOPHIL NFR BLD AUTO: 3.4 % (ref 0–4)
EOSINOPHIL NFR BLD AUTO: 3.9 % (ref 0–4)
ERYTHROCYTE [DISTWIDTH] IN BLOOD BY AUTOMATED COUNT: 12.1 % (ref 11.5–14.5)
ERYTHROCYTE [DISTWIDTH] IN BLOOD BY AUTOMATED COUNT: 12.1 % (ref 11.5–14.5)
GFR SERPL CREATININE-BSD FRML MDRD: 106 ML/MIN/1.73
GFR SERPL CREATININE-BSD FRML MDRD: 118 ML/MIN/1.73
GLUCOSE BLD-MCNC: 146 MG/DL (ref 65–99)
GLUCOSE BLD-MCNC: 148 MG/DL (ref 65–99)
GLUCOSE BLDC GLUCOMTR-MCNC: 125 MG/DL (ref 70–130)
GLUCOSE BLDC GLUCOMTR-MCNC: 143 MG/DL (ref 70–130)
HCT VFR BLD AUTO: 40 % (ref 42–52)
HCT VFR BLD AUTO: 43.5 % (ref 42–52)
HGB BLD-MCNC: 13.9 G/DL (ref 14–18)
HGB BLD-MCNC: 14.5 G/DL (ref 14–18)
IMM GRANULOCYTES # BLD: 0.05 10*3/MM3 (ref 0–0.03)
IMM GRANULOCYTES # BLD: 0.05 10*3/MM3 (ref 0–0.03)
IMM GRANULOCYTES NFR BLD: 0.4 % (ref 0–0.5)
IMM GRANULOCYTES NFR BLD: 0.5 % (ref 0–0.5)
LITHIUM SERPL-SCNC: 0.7 MMOL/L (ref 0.6–1.2)
LYMPHOCYTES # BLD AUTO: 0.96 10*3/MM3 (ref 0.6–4.8)
LYMPHOCYTES # BLD AUTO: 1.48 10*3/MM3 (ref 0.6–4.8)
LYMPHOCYTES NFR BLD AUTO: 13.3 % (ref 20–45)
LYMPHOCYTES NFR BLD AUTO: 9.5 % (ref 20–45)
MCH RBC QN AUTO: 32.2 PG (ref 27–31)
MCH RBC QN AUTO: 33.3 PG (ref 27–31)
MCHC RBC AUTO-ENTMCNC: 33.3 G/DL (ref 31–37)
MCHC RBC AUTO-ENTMCNC: 34.8 G/DL (ref 31–37)
MCV RBC AUTO: 95.9 FL (ref 80–94)
MCV RBC AUTO: 96.5 FL (ref 80–94)
MONOCYTES # BLD AUTO: 1 10*3/MM3 (ref 0–1)
MONOCYTES # BLD AUTO: 1 10*3/MM3 (ref 0–1)
MONOCYTES NFR BLD AUTO: 9 % (ref 3–8)
MONOCYTES NFR BLD AUTO: 9.9 % (ref 3–8)
NEUTROPHILS # BLD AUTO: 7.72 10*3/MM3 (ref 1.5–8.3)
NEUTROPHILS # BLD AUTO: 8.13 10*3/MM3 (ref 1.5–8.3)
NEUTROPHILS NFR BLD AUTO: 72.8 % (ref 45–70)
NEUTROPHILS NFR BLD AUTO: 76.3 % (ref 45–70)
NRBC BLD MANUAL-RTO: 0 /100 WBC (ref 0–0)
NRBC BLD MANUAL-RTO: 0 /100 WBC (ref 0–0)
PLATELET # BLD AUTO: 206 10*3/MM3 (ref 140–500)
PLATELET # BLD AUTO: 218 10*3/MM3 (ref 140–500)
PMV BLD AUTO: 9.3 FL (ref 7.4–10.4)
PMV BLD AUTO: 9.5 FL (ref 7.4–10.4)
POTASSIUM BLD-SCNC: 3.6 MMOL/L (ref 3.5–5.2)
POTASSIUM BLD-SCNC: 4.1 MMOL/L (ref 3.5–5.2)
RBC # BLD AUTO: 4.17 10*6/MM3 (ref 4.7–6.1)
RBC # BLD AUTO: 4.51 10*6/MM3 (ref 4.7–6.1)
SODIUM BLD-SCNC: 135 MMOL/L (ref 136–145)
SODIUM BLD-SCNC: 136 MMOL/L (ref 136–145)
WBC NRBC COR # BLD: 10.11 10*3/MM3 (ref 4.8–10.8)
WBC NRBC COR # BLD: 11.16 10*3/MM3 (ref 4.8–10.8)

## 2017-07-23 PROCEDURE — 99231 SBSQ HOSP IP/OBS SF/LOW 25: CPT | Performed by: HOSPITALIST

## 2017-07-23 PROCEDURE — 25010000002 VANCOMYCIN 750 MG RECONSTITUTED SOLUTION 1 EACH VIAL: Performed by: INTERNAL MEDICINE

## 2017-07-23 PROCEDURE — 83605 ASSAY OF LACTIC ACID: CPT | Performed by: INTERNAL MEDICINE

## 2017-07-23 PROCEDURE — 94799 UNLISTED PULMONARY SVC/PX: CPT

## 2017-07-23 PROCEDURE — 25010000002 ENOXAPARIN PER 10 MG: Performed by: INTERNAL MEDICINE

## 2017-07-23 PROCEDURE — 25010000002 PIPERACILLIN SOD-TAZOBACTAM PER 1 G: Performed by: INTERNAL MEDICINE

## 2017-07-23 PROCEDURE — 80048 BASIC METABOLIC PNL TOTAL CA: CPT | Performed by: INTERNAL MEDICINE

## 2017-07-23 PROCEDURE — 25010000002 VANCOMYCIN PER 500 MG: Performed by: INTERNAL MEDICINE

## 2017-07-23 PROCEDURE — 82962 GLUCOSE BLOOD TEST: CPT

## 2017-07-23 PROCEDURE — 25010000002 DOPAMINE PER 40 MG

## 2017-07-23 PROCEDURE — 85025 COMPLETE CBC W/AUTO DIFF WBC: CPT | Performed by: INTERNAL MEDICINE

## 2017-07-23 PROCEDURE — 25010000002 VANCOMYCIN HCL IN NACL 1.25-0.9 GM/250ML-% SOLUTION: Performed by: INTERNAL MEDICINE

## 2017-07-23 RX ORDER — HYDROCODONE BITARTRATE AND ACETAMINOPHEN 5; 325 MG/1; MG/1
1 TABLET ORAL EVERY 6 HOURS PRN
Status: DISCONTINUED | OUTPATIENT
Start: 2017-07-23 | End: 2017-07-28

## 2017-07-23 RX ORDER — PIPERACILLIN SODIUM, TAZOBACTAM SODIUM 3; .375 G/15ML; G/15ML
INJECTION, POWDER, LYOPHILIZED, FOR SOLUTION INTRAVENOUS
Status: DISPENSED
Start: 2017-07-23 | End: 2017-07-23

## 2017-07-23 RX ORDER — HYDROCODONE BITARTRATE AND ACETAMINOPHEN 5; 325 MG/1; MG/1
TABLET ORAL
Status: COMPLETED
Start: 2017-07-23 | End: 2017-07-23

## 2017-07-23 RX ADMIN — CARBAMAZEPINE 400 MG: 200 TABLET ORAL at 08:34

## 2017-07-23 RX ADMIN — CARBAMAZEPINE 400 MG: 200 TABLET ORAL at 18:22

## 2017-07-23 RX ADMIN — DOXEPIN HYDROCHLORIDE 75 MG: 25 CAPSULE ORAL at 20:27

## 2017-07-23 RX ADMIN — DOPAMINE HYDROCHLORIDE 2 MCG/KG/MIN: 160 INJECTION, SOLUTION INTRAVENOUS at 00:15

## 2017-07-23 RX ADMIN — LITHIUM CARBONATE 300 MG: 300 TABLET ORAL at 08:35

## 2017-07-23 RX ADMIN — LACTULOSE 30 G: 10 SOLUTION ORAL at 16:40

## 2017-07-23 RX ADMIN — TEMAZEPAM 15 MG: 15 CAPSULE ORAL at 20:27

## 2017-07-23 RX ADMIN — LITHIUM CARBONATE 300 MG: 300 TABLET ORAL at 18:24

## 2017-07-23 RX ADMIN — GABAPENTIN 800 MG: 400 CAPSULE ORAL at 21:22

## 2017-07-23 RX ADMIN — VANCOMYCIN HYDROCHLORIDE 1250 MG: 500 INJECTION, POWDER, LYOPHILIZED, FOR SOLUTION INTRAVENOUS at 13:08

## 2017-07-23 RX ADMIN — GABAPENTIN 800 MG: 400 CAPSULE ORAL at 14:36

## 2017-07-23 RX ADMIN — PIPERACILLIN SODIUM,TAZOBACTAM SODIUM 3.38 G: 3; .375 INJECTION, POWDER, FOR SOLUTION INTRAVENOUS at 05:18

## 2017-07-23 RX ADMIN — SODIUM CHLORIDE 100 ML/HR: 9 INJECTION, SOLUTION INTRAVENOUS at 00:05

## 2017-07-23 RX ADMIN — GABAPENTIN 800 MG: 400 CAPSULE ORAL at 07:42

## 2017-07-23 RX ADMIN — SODIUM CHLORIDE 100 ML/HR: 9 INJECTION, SOLUTION INTRAVENOUS at 11:28

## 2017-07-23 RX ADMIN — PIPERACILLIN SODIUM,TAZOBACTAM SODIUM 3.38 G: 3; .375 INJECTION, POWDER, FOR SOLUTION INTRAVENOUS at 23:41

## 2017-07-23 RX ADMIN — LACTULOSE 30 G: 10 SOLUTION ORAL at 08:32

## 2017-07-23 RX ADMIN — VANCOMYCIN HYDROCHLORIDE 1250 MG: 1 INJECTION, POWDER, LYOPHILIZED, FOR SOLUTION INTRAVENOUS at 00:55

## 2017-07-23 RX ADMIN — HYDROCODONE BITARTRATE AND ACETAMINOPHEN 1 TABLET: 5; 325 TABLET ORAL at 14:27

## 2017-07-23 RX ADMIN — PIPERACILLIN SODIUM,TAZOBACTAM SODIUM 3.38 G: 3; .375 INJECTION, POWDER, FOR SOLUTION INTRAVENOUS at 18:24

## 2017-07-23 RX ADMIN — PRIMIDONE 250 MG: 250 TABLET ORAL at 08:34

## 2017-07-23 RX ADMIN — LACTULOSE 30 G: 10 SOLUTION ORAL at 00:09

## 2017-07-23 RX ADMIN — GABAPENTIN 800 MG: 400 CAPSULE ORAL at 00:09

## 2017-07-23 RX ADMIN — HYDROCODONE BITARTRATE AND ACETAMINOPHEN 1 TABLET: 5; 325 TABLET ORAL at 20:27

## 2017-07-23 RX ADMIN — PRIMIDONE 250 MG: 250 TABLET ORAL at 18:23

## 2017-07-23 RX ADMIN — ENOXAPARIN SODIUM 40 MG: 40 INJECTION SUBCUTANEOUS at 08:33

## 2017-07-23 RX ADMIN — PIPERACILLIN SODIUM,TAZOBACTAM SODIUM 3.38 G: 3; .375 INJECTION, POWDER, FOR SOLUTION INTRAVENOUS at 14:36

## 2017-07-24 ENCOUNTER — APPOINTMENT (OUTPATIENT)
Dept: GENERAL RADIOLOGY | Facility: HOSPITAL | Age: 52
End: 2017-07-24

## 2017-07-24 ENCOUNTER — APPOINTMENT (OUTPATIENT)
Dept: ULTRASOUND IMAGING | Facility: HOSPITAL | Age: 52
End: 2017-07-24

## 2017-07-24 ENCOUNTER — APPOINTMENT (OUTPATIENT)
Dept: CT IMAGING | Facility: HOSPITAL | Age: 52
End: 2017-07-24
Attending: PODIATRIST

## 2017-07-24 ENCOUNTER — APPOINTMENT (OUTPATIENT)
Dept: CT IMAGING | Facility: HOSPITAL | Age: 52
End: 2017-07-24
Attending: HOSPITALIST

## 2017-07-24 LAB
AMMONIA BLD-SCNC: 51 UMOL/L (ref 16–60)
ANION GAP SERPL CALCULATED.3IONS-SCNC: 12 MMOL/L
BASOPHILS # BLD AUTO: 0.03 10*3/MM3 (ref 0–0.2)
BASOPHILS NFR BLD AUTO: 0.5 % (ref 0–2)
BUN BLD-MCNC: 7 MG/DL (ref 6–20)
BUN/CREAT SERPL: 13.5 (ref 7–25)
CALCIUM SPEC-SCNC: 8 MG/DL (ref 8.6–10.5)
CHLORIDE SERPL-SCNC: 109 MMOL/L (ref 98–107)
CO2 SERPL-SCNC: 19 MMOL/L (ref 22–29)
CREAT BLD-MCNC: 0.52 MG/DL (ref 0.76–1.27)
CRP SERPL-MCNC: 11.63 MG/DL (ref 0–0.5)
DEPRECATED RDW RBC AUTO: 43.7 FL (ref 37–54)
EOSINOPHIL # BLD AUTO: 0.32 10*3/MM3 (ref 0.1–0.3)
EOSINOPHIL NFR BLD AUTO: 5.7 % (ref 0–4)
ERYTHROCYTE [DISTWIDTH] IN BLOOD BY AUTOMATED COUNT: 12.3 % (ref 11.5–14.5)
ERYTHROCYTE [SEDIMENTATION RATE] IN BLOOD: 30 MM/HR (ref 0–20)
FOLATE SERPL-MCNC: 14.61 NG/ML (ref 4.78–24.2)
GFR SERPL CREATININE-BSD FRML MDRD: >150 ML/MIN/1.73
GLUCOSE BLD-MCNC: 90 MG/DL (ref 65–99)
GLUCOSE BLDC GLUCOMTR-MCNC: 138 MG/DL (ref 70–130)
GLUCOSE BLDC GLUCOMTR-MCNC: 73 MG/DL (ref 70–130)
GLUCOSE BLDC GLUCOMTR-MCNC: 88 MG/DL (ref 70–130)
HCT VFR BLD AUTO: 37.7 % (ref 42–52)
HGB BLD-MCNC: 12.9 G/DL (ref 14–18)
IMM GRANULOCYTES # BLD: 0.05 10*3/MM3 (ref 0–0.03)
IMM GRANULOCYTES NFR BLD: 0.9 % (ref 0–0.5)
LYMPHOCYTES # BLD AUTO: 1.16 10*3/MM3 (ref 0.6–4.8)
LYMPHOCYTES NFR BLD AUTO: 20.8 % (ref 20–45)
MCH RBC QN AUTO: 33.2 PG (ref 27–31)
MCHC RBC AUTO-ENTMCNC: 34.2 G/DL (ref 31–37)
MCV RBC AUTO: 96.9 FL (ref 80–94)
MONOCYTES # BLD AUTO: 0.56 10*3/MM3 (ref 0–1)
MONOCYTES NFR BLD AUTO: 10.1 % (ref 3–8)
NEUTROPHILS # BLD AUTO: 3.45 10*3/MM3 (ref 1.5–8.3)
NEUTROPHILS NFR BLD AUTO: 62 % (ref 45–70)
NRBC BLD MANUAL-RTO: 0 /100 WBC (ref 0–0)
PLATELET # BLD AUTO: 181 10*3/MM3 (ref 140–500)
PMV BLD AUTO: 9.5 FL (ref 7.4–10.4)
POTASSIUM BLD-SCNC: 4 MMOL/L (ref 3.5–5.2)
PROCALCITONIN SERPL-MCNC: 0.07 NG/ML (ref 0.1–0.25)
RBC # BLD AUTO: 3.89 10*6/MM3 (ref 4.7–6.1)
SODIUM BLD-SCNC: 140 MMOL/L (ref 136–145)
URATE SERPL-MCNC: 4 MG/DL (ref 3.4–7)
VANCOMYCIN SERPL-MCNC: 9.6 MCG/ML
VIT B12 BLD-MCNC: 355 PG/ML (ref 211–946)
WBC NRBC COR # BLD: 5.57 10*3/MM3 (ref 4.8–10.8)

## 2017-07-24 PROCEDURE — 74177 CT ABD & PELVIS W/CONTRAST: CPT

## 2017-07-24 PROCEDURE — 93971 EXTREMITY STUDY: CPT

## 2017-07-24 PROCEDURE — 84550 ASSAY OF BLOOD/URIC ACID: CPT | Performed by: NURSE PRACTITIONER

## 2017-07-24 PROCEDURE — 25010000002 PIPERACILLIN SOD-TAZOBACTAM PER 1 G: Performed by: INTERNAL MEDICINE

## 2017-07-24 PROCEDURE — 73700 CT LOWER EXTREMITY W/O DYE: CPT

## 2017-07-24 PROCEDURE — 99232 SBSQ HOSP IP/OBS MODERATE 35: CPT | Performed by: NURSE PRACTITIONER

## 2017-07-24 PROCEDURE — 25010000002 ENOXAPARIN PER 10 MG: Performed by: INTERNAL MEDICINE

## 2017-07-24 PROCEDURE — 82140 ASSAY OF AMMONIA: CPT | Performed by: NURSE PRACTITIONER

## 2017-07-24 PROCEDURE — 85652 RBC SED RATE AUTOMATED: CPT | Performed by: NURSE PRACTITIONER

## 2017-07-24 PROCEDURE — 82962 GLUCOSE BLOOD TEST: CPT

## 2017-07-24 PROCEDURE — 82746 ASSAY OF FOLIC ACID SERUM: CPT | Performed by: NURSE PRACTITIONER

## 2017-07-24 PROCEDURE — 86140 C-REACTIVE PROTEIN: CPT | Performed by: NURSE PRACTITIONER

## 2017-07-24 PROCEDURE — 72100 X-RAY EXAM L-S SPINE 2/3 VWS: CPT

## 2017-07-24 PROCEDURE — 25010000002 VANCOMYCIN PER 500 MG: Performed by: INTERNAL MEDICINE

## 2017-07-24 PROCEDURE — 25010000002 VANCOMYCIN 750 MG RECONSTITUTED SOLUTION 1 EACH VIAL: Performed by: INTERNAL MEDICINE

## 2017-07-24 PROCEDURE — 73502 X-RAY EXAM HIP UNI 2-3 VIEWS: CPT

## 2017-07-24 PROCEDURE — 82607 VITAMIN B-12: CPT | Performed by: NURSE PRACTITIONER

## 2017-07-24 PROCEDURE — 80048 BASIC METABOLIC PNL TOTAL CA: CPT | Performed by: INTERNAL MEDICINE

## 2017-07-24 PROCEDURE — 0 IOPAMIDOL PER 1 ML: Performed by: INTERNAL MEDICINE

## 2017-07-24 PROCEDURE — 85025 COMPLETE CBC W/AUTO DIFF WBC: CPT | Performed by: INTERNAL MEDICINE

## 2017-07-24 PROCEDURE — 84145 PROCALCITONIN (PCT): CPT | Performed by: NURSE PRACTITIONER

## 2017-07-24 PROCEDURE — 80202 ASSAY OF VANCOMYCIN: CPT | Performed by: INTERNAL MEDICINE

## 2017-07-24 RX ORDER — METOPROLOL TARTRATE 50 MG/1
50 TABLET, FILM COATED ORAL EVERY 12 HOURS SCHEDULED
Status: DISCONTINUED | OUTPATIENT
Start: 2017-07-24 | End: 2017-07-25

## 2017-07-24 RX ORDER — LACTULOSE 20 G/30ML
30 SOLUTION ORAL 3 TIMES DAILY PRN
Status: DISCONTINUED | OUTPATIENT
Start: 2017-07-24 | End: 2017-07-29

## 2017-07-24 RX ORDER — LOSARTAN POTASSIUM 50 MG/1
50 TABLET ORAL
Status: DISCONTINUED | OUTPATIENT
Start: 2017-07-24 | End: 2017-07-25

## 2017-07-24 RX ORDER — FLUOXETINE HYDROCHLORIDE 20 MG/1
40 CAPSULE ORAL EVERY MORNING
COMMUNITY
End: 2023-02-06 | Stop reason: DRUGHIGH

## 2017-07-24 RX ADMIN — TEMAZEPAM 15 MG: 15 CAPSULE ORAL at 22:14

## 2017-07-24 RX ADMIN — METOPROLOL TARTRATE 50 MG: 50 TABLET, FILM COATED ORAL at 22:13

## 2017-07-24 RX ADMIN — PRIMIDONE 250 MG: 250 TABLET ORAL at 08:45

## 2017-07-24 RX ADMIN — GABAPENTIN 800 MG: 400 CAPSULE ORAL at 14:16

## 2017-07-24 RX ADMIN — DOXEPIN HYDROCHLORIDE 75 MG: 25 CAPSULE ORAL at 22:10

## 2017-07-24 RX ADMIN — VANCOMYCIN HYDROCHLORIDE 1250 MG: 500 INJECTION, POWDER, LYOPHILIZED, FOR SOLUTION INTRAVENOUS at 14:09

## 2017-07-24 RX ADMIN — PRIMIDONE 250 MG: 250 TABLET ORAL at 17:33

## 2017-07-24 RX ADMIN — LITHIUM CARBONATE 300 MG: 300 TABLET ORAL at 17:33

## 2017-07-24 RX ADMIN — ENOXAPARIN SODIUM 40 MG: 40 INJECTION SUBCUTANEOUS at 08:45

## 2017-07-24 RX ADMIN — SODIUM CHLORIDE 100 ML/HR: 9 INJECTION, SOLUTION INTRAVENOUS at 10:00

## 2017-07-24 RX ADMIN — PIPERACILLIN SODIUM,TAZOBACTAM SODIUM 3.38 G: 3; .375 INJECTION, POWDER, FOR SOLUTION INTRAVENOUS at 05:55

## 2017-07-24 RX ADMIN — SODIUM CHLORIDE 100 ML/HR: 9 INJECTION, SOLUTION INTRAVENOUS at 22:16

## 2017-07-24 RX ADMIN — IOPAMIDOL 100 ML: 755 INJECTION, SOLUTION INTRAVENOUS at 11:45

## 2017-07-24 RX ADMIN — PIPERACILLIN SODIUM,TAZOBACTAM SODIUM 3.38 G: 3; .375 INJECTION, POWDER, FOR SOLUTION INTRAVENOUS at 13:01

## 2017-07-24 RX ADMIN — GABAPENTIN 800 MG: 400 CAPSULE ORAL at 05:55

## 2017-07-24 RX ADMIN — VANCOMYCIN HYDROCHLORIDE 1250 MG: 500 INJECTION, POWDER, LYOPHILIZED, FOR SOLUTION INTRAVENOUS at 00:12

## 2017-07-24 RX ADMIN — HYDROCODONE BITARTRATE AND ACETAMINOPHEN 1 TABLET: 5; 325 TABLET ORAL at 08:56

## 2017-07-24 RX ADMIN — PIPERACILLIN SODIUM,TAZOBACTAM SODIUM 3.38 G: 3; .375 INJECTION, POWDER, FOR SOLUTION INTRAVENOUS at 17:32

## 2017-07-24 RX ADMIN — CARBAMAZEPINE 400 MG: 200 TABLET ORAL at 17:33

## 2017-07-24 RX ADMIN — LITHIUM CARBONATE 300 MG: 300 TABLET ORAL at 08:45

## 2017-07-24 RX ADMIN — PIPERACILLIN SODIUM,TAZOBACTAM SODIUM 3.38 G: 3; .375 INJECTION, POWDER, FOR SOLUTION INTRAVENOUS at 22:54

## 2017-07-24 RX ADMIN — HYDROCODONE BITARTRATE AND ACETAMINOPHEN 1 TABLET: 5; 325 TABLET ORAL at 22:12

## 2017-07-24 RX ADMIN — LOSARTAN POTASSIUM 50 MG: 50 TABLET ORAL at 16:22

## 2017-07-24 RX ADMIN — GABAPENTIN 800 MG: 400 CAPSULE ORAL at 22:11

## 2017-07-24 RX ADMIN — CARBAMAZEPINE 400 MG: 200 TABLET ORAL at 08:45

## 2017-07-24 RX ADMIN — HYDROCODONE BITARTRATE AND ACETAMINOPHEN 1 TABLET: 5; 325 TABLET ORAL at 02:38

## 2017-07-24 RX ADMIN — HYDROCODONE BITARTRATE AND ACETAMINOPHEN 1 TABLET: 5; 325 TABLET ORAL at 16:21

## 2017-07-24 RX ADMIN — SODIUM CHLORIDE 100 ML/HR: 9 INJECTION, SOLUTION INTRAVENOUS at 00:13

## 2017-07-25 LAB
ALBUMIN SERPL-MCNC: 3.1 G/DL (ref 3.5–5.2)
ALBUMIN/GLOB SERPL: 1.2 G/DL
ALP SERPL-CCNC: 59 U/L (ref 40–129)
ALT SERPL W P-5'-P-CCNC: 21 U/L (ref 5–41)
AMYLASE SERPL-CCNC: 45 U/L (ref 28–100)
ANION GAP SERPL CALCULATED.3IONS-SCNC: 10.4 MMOL/L
AST SERPL-CCNC: 19 U/L (ref 5–40)
BASOPHILS # BLD AUTO: 0.04 10*3/MM3 (ref 0–0.2)
BASOPHILS NFR BLD AUTO: 0.9 % (ref 0–2)
BILIRUB SERPL-MCNC: 0.4 MG/DL (ref 0.2–1.2)
BUN BLD-MCNC: 6 MG/DL (ref 6–20)
BUN/CREAT SERPL: 11.3 (ref 7–25)
CALCIUM SPEC-SCNC: 7.8 MG/DL (ref 8.6–10.5)
CHLORIDE SERPL-SCNC: 112 MMOL/L (ref 98–107)
CO2 SERPL-SCNC: 19.6 MMOL/L (ref 22–29)
CREAT BLD-MCNC: 0.53 MG/DL (ref 0.76–1.27)
DEPRECATED RDW RBC AUTO: 44.4 FL (ref 37–54)
EOSINOPHIL # BLD AUTO: 0.36 10*3/MM3 (ref 0.1–0.3)
EOSINOPHIL NFR BLD AUTO: 8 % (ref 0–4)
ERYTHROCYTE [DISTWIDTH] IN BLOOD BY AUTOMATED COUNT: 12.2 % (ref 11.5–14.5)
GFR SERPL CREATININE-BSD FRML MDRD: >150 ML/MIN/1.73
GLOBULIN UR ELPH-MCNC: 2.5 GM/DL
GLUCOSE BLD-MCNC: 85 MG/DL (ref 65–99)
HCT VFR BLD AUTO: 36.1 % (ref 42–52)
HGB BLD-MCNC: 12.1 G/DL (ref 14–18)
IMM GRANULOCYTES # BLD: 0.03 10*3/MM3 (ref 0–0.03)
IMM GRANULOCYTES NFR BLD: 0.7 % (ref 0–0.5)
LDH SERPL-CCNC: 220 U/L (ref 135–225)
LIPASE SERPL-CCNC: 38 U/L (ref 13–60)
LYMPHOCYTES # BLD AUTO: 0.96 10*3/MM3 (ref 0.6–4.8)
LYMPHOCYTES NFR BLD AUTO: 21.3 % (ref 20–45)
MCH RBC QN AUTO: 32.9 PG (ref 27–31)
MCHC RBC AUTO-ENTMCNC: 33.5 G/DL (ref 31–37)
MCV RBC AUTO: 98.1 FL (ref 80–94)
MONOCYTES # BLD AUTO: 0.38 10*3/MM3 (ref 0–1)
MONOCYTES NFR BLD AUTO: 8.4 % (ref 3–8)
NEUTROPHILS # BLD AUTO: 2.73 10*3/MM3 (ref 1.5–8.3)
NEUTROPHILS NFR BLD AUTO: 60.7 % (ref 45–70)
NRBC BLD MANUAL-RTO: 0 /100 WBC (ref 0–0)
PLATELET # BLD AUTO: 171 10*3/MM3 (ref 140–500)
PMV BLD AUTO: 9.4 FL (ref 7.4–10.4)
POTASSIUM BLD-SCNC: 4.1 MMOL/L (ref 3.5–5.2)
PROT SERPL-MCNC: 5.6 G/DL (ref 6–8.5)
RBC # BLD AUTO: 3.68 10*6/MM3 (ref 4.7–6.1)
SODIUM BLD-SCNC: 142 MMOL/L (ref 136–145)
WBC NRBC COR # BLD: 4.5 10*3/MM3 (ref 4.8–10.8)

## 2017-07-25 PROCEDURE — 83615 LACTATE (LD) (LDH) ENZYME: CPT | Performed by: NURSE PRACTITIONER

## 2017-07-25 PROCEDURE — 85025 COMPLETE CBC W/AUTO DIFF WBC: CPT | Performed by: INTERNAL MEDICINE

## 2017-07-25 PROCEDURE — 25010000002 ENOXAPARIN PER 10 MG: Performed by: INTERNAL MEDICINE

## 2017-07-25 PROCEDURE — 25010000002 VANCOMYCIN 1500 MG/250ML SOLUTION: Performed by: INTERNAL MEDICINE

## 2017-07-25 PROCEDURE — 25010000002 PIPERACILLIN SOD-TAZOBACTAM PER 1 G: Performed by: INTERNAL MEDICINE

## 2017-07-25 PROCEDURE — 80053 COMPREHEN METABOLIC PANEL: CPT | Performed by: NURSE PRACTITIONER

## 2017-07-25 PROCEDURE — 83690 ASSAY OF LIPASE: CPT | Performed by: NURSE PRACTITIONER

## 2017-07-25 PROCEDURE — 25010000002 VANCOMYCIN PER 500 MG: Performed by: INTERNAL MEDICINE

## 2017-07-25 PROCEDURE — 25010000002 VANCOMYCIN 750 MG RECONSTITUTED SOLUTION 1 EACH VIAL: Performed by: INTERNAL MEDICINE

## 2017-07-25 PROCEDURE — 82150 ASSAY OF AMYLASE: CPT | Performed by: NURSE PRACTITIONER

## 2017-07-25 PROCEDURE — 99232 SBSQ HOSP IP/OBS MODERATE 35: CPT | Performed by: NURSE PRACTITIONER

## 2017-07-25 RX ORDER — TEMAZEPAM 15 MG/1
30 CAPSULE ORAL NIGHTLY PRN
Status: DISCONTINUED | OUTPATIENT
Start: 2017-07-25 | End: 2017-08-02 | Stop reason: HOSPADM

## 2017-07-25 RX ORDER — LOSARTAN POTASSIUM 50 MG/1
100 TABLET ORAL
Status: DISCONTINUED | OUTPATIENT
Start: 2017-07-26 | End: 2017-08-02 | Stop reason: HOSPADM

## 2017-07-25 RX ORDER — FLUOXETINE HYDROCHLORIDE 20 MG/1
20 CAPSULE ORAL DAILY
Status: DISCONTINUED | OUTPATIENT
Start: 2017-07-25 | End: 2017-07-26

## 2017-07-25 RX ORDER — METOPROLOL TARTRATE 50 MG/1
100 TABLET, FILM COATED ORAL EVERY 12 HOURS SCHEDULED
Status: DISCONTINUED | OUTPATIENT
Start: 2017-07-25 | End: 2017-08-02 | Stop reason: HOSPADM

## 2017-07-25 RX ORDER — VANCOMYCIN HCL-SODIUM CHLORIDE IV SOLN 1.5 GM/250ML-0.9% 1.5-0.9/25 GM/ML-%
1500 SOLUTION INTRAVENOUS EVERY 8 HOURS
Status: DISCONTINUED | OUTPATIENT
Start: 2017-07-25 | End: 2017-07-27 | Stop reason: DRUGHIGH

## 2017-07-25 RX ADMIN — LITHIUM CARBONATE 300 MG: 300 TABLET ORAL at 17:06

## 2017-07-25 RX ADMIN — METOPROLOL TARTRATE 50 MG: 50 TABLET, FILM COATED ORAL at 08:53

## 2017-07-25 RX ADMIN — VANCOMYCIN HCL-SODIUM CHLORIDE IV SOLN 1.5 GM/250ML-0.9% 1500 MG: 1.5-0.9/25 SOLUTION at 10:31

## 2017-07-25 RX ADMIN — HYDROCODONE BITARTRATE AND ACETAMINOPHEN 1 TABLET: 5; 325 TABLET ORAL at 21:39

## 2017-07-25 RX ADMIN — LOSARTAN POTASSIUM 50 MG: 50 TABLET ORAL at 08:52

## 2017-07-25 RX ADMIN — GABAPENTIN 800 MG: 400 CAPSULE ORAL at 21:39

## 2017-07-25 RX ADMIN — GABAPENTIN 800 MG: 400 CAPSULE ORAL at 15:00

## 2017-07-25 RX ADMIN — PRIMIDONE 250 MG: 250 TABLET ORAL at 17:07

## 2017-07-25 RX ADMIN — HYDROCODONE BITARTRATE AND ACETAMINOPHEN 1 TABLET: 5; 325 TABLET ORAL at 05:28

## 2017-07-25 RX ADMIN — ENOXAPARIN SODIUM 40 MG: 40 INJECTION SUBCUTANEOUS at 08:55

## 2017-07-25 RX ADMIN — CARBAMAZEPINE 400 MG: 200 TABLET ORAL at 08:52

## 2017-07-25 RX ADMIN — LITHIUM CARBONATE 300 MG: 300 TABLET ORAL at 08:53

## 2017-07-25 RX ADMIN — METOPROLOL TARTRATE 100 MG: 50 TABLET, FILM COATED ORAL at 21:36

## 2017-07-25 RX ADMIN — PIPERACILLIN SODIUM,TAZOBACTAM SODIUM 3.38 G: 3; .375 INJECTION, POWDER, FOR SOLUTION INTRAVENOUS at 17:02

## 2017-07-25 RX ADMIN — TEMAZEPAM 30 MG: 15 CAPSULE ORAL at 21:40

## 2017-07-25 RX ADMIN — VANCOMYCIN HYDROCHLORIDE 1250 MG: 500 INJECTION, POWDER, LYOPHILIZED, FOR SOLUTION INTRAVENOUS at 00:27

## 2017-07-25 RX ADMIN — PIPERACILLIN SODIUM,TAZOBACTAM SODIUM 3.38 G: 3; .375 INJECTION, POWDER, FOR SOLUTION INTRAVENOUS at 23:55

## 2017-07-25 RX ADMIN — PIPERACILLIN SODIUM,TAZOBACTAM SODIUM 3.38 G: 3; .375 INJECTION, POWDER, FOR SOLUTION INTRAVENOUS at 13:48

## 2017-07-25 RX ADMIN — PRIMIDONE 250 MG: 250 TABLET ORAL at 08:53

## 2017-07-25 RX ADMIN — DOXEPIN HYDROCHLORIDE 75 MG: 25 CAPSULE ORAL at 21:38

## 2017-07-25 RX ADMIN — FLUOXETINE 20 MG: 20 CAPSULE ORAL at 15:50

## 2017-07-25 RX ADMIN — VANCOMYCIN HCL-SODIUM CHLORIDE IV SOLN 1.5 GM/250ML-0.9% 1500 MG: 1.5-0.9/25 SOLUTION at 16:16

## 2017-07-25 RX ADMIN — HYDROCODONE BITARTRATE AND ACETAMINOPHEN 1 TABLET: 5; 325 TABLET ORAL at 15:03

## 2017-07-25 RX ADMIN — PIPERACILLIN SODIUM,TAZOBACTAM SODIUM 3.38 G: 3; .375 INJECTION, POWDER, FOR SOLUTION INTRAVENOUS at 05:15

## 2017-07-25 RX ADMIN — CARBAMAZEPINE 400 MG: 200 TABLET ORAL at 17:06

## 2017-07-25 RX ADMIN — GABAPENTIN 800 MG: 400 CAPSULE ORAL at 05:29

## 2017-07-26 LAB
ANION GAP SERPL CALCULATED.3IONS-SCNC: 11.5 MMOL/L
BASOPHILS # BLD AUTO: 0.03 10*3/MM3 (ref 0–0.2)
BASOPHILS NFR BLD AUTO: 0.5 % (ref 0–2)
BUN BLD-MCNC: 7 MG/DL (ref 6–20)
BUN/CREAT SERPL: 10.8 (ref 7–25)
CALCIUM SPEC-SCNC: 8.2 MG/DL (ref 8.6–10.5)
CHLORIDE SERPL-SCNC: 106 MMOL/L (ref 98–107)
CO2 SERPL-SCNC: 22.5 MMOL/L (ref 22–29)
CREAT BLD-MCNC: 0.65 MG/DL (ref 0.76–1.27)
DEPRECATED RDW RBC AUTO: 43 FL (ref 37–54)
EOSINOPHIL # BLD AUTO: 0.36 10*3/MM3 (ref 0.1–0.3)
EOSINOPHIL NFR BLD AUTO: 5.7 % (ref 0–4)
ERYTHROCYTE [DISTWIDTH] IN BLOOD BY AUTOMATED COUNT: 11.9 % (ref 11.5–14.5)
GFR SERPL CREATININE-BSD FRML MDRD: 129 ML/MIN/1.73
GLUCOSE BLD-MCNC: 105 MG/DL (ref 65–99)
HCT VFR BLD AUTO: 37.2 % (ref 42–52)
HGB BLD-MCNC: 12.3 G/DL (ref 14–18)
IMM GRANULOCYTES # BLD: 0.02 10*3/MM3 (ref 0–0.03)
IMM GRANULOCYTES NFR BLD: 0.3 % (ref 0–0.5)
LYMPHOCYTES # BLD AUTO: 1.06 10*3/MM3 (ref 0.6–4.8)
LYMPHOCYTES NFR BLD AUTO: 16.9 % (ref 20–45)
MCH RBC QN AUTO: 32.3 PG (ref 27–31)
MCHC RBC AUTO-ENTMCNC: 33.1 G/DL (ref 31–37)
MCV RBC AUTO: 97.6 FL (ref 80–94)
MONOCYTES # BLD AUTO: 0.63 10*3/MM3 (ref 0–1)
MONOCYTES NFR BLD AUTO: 10 % (ref 3–8)
NEUTROPHILS # BLD AUTO: 4.17 10*3/MM3 (ref 1.5–8.3)
NEUTROPHILS NFR BLD AUTO: 66.6 % (ref 45–70)
NRBC BLD MANUAL-RTO: 0 /100 WBC (ref 0–0)
PLATELET # BLD AUTO: 237 10*3/MM3 (ref 140–500)
PMV BLD AUTO: 8.9 FL (ref 7.4–10.4)
POTASSIUM BLD-SCNC: 4 MMOL/L (ref 3.5–5.2)
RBC # BLD AUTO: 3.81 10*6/MM3 (ref 4.7–6.1)
SODIUM BLD-SCNC: 140 MMOL/L (ref 136–145)
VANCOMYCIN SERPL-MCNC: 13.9 MCG/ML
WBC NRBC COR # BLD: 6.27 10*3/MM3 (ref 4.8–10.8)

## 2017-07-26 PROCEDURE — 80048 BASIC METABOLIC PNL TOTAL CA: CPT | Performed by: INTERNAL MEDICINE

## 2017-07-26 PROCEDURE — 80202 ASSAY OF VANCOMYCIN: CPT | Performed by: INTERNAL MEDICINE

## 2017-07-26 PROCEDURE — 85025 COMPLETE CBC W/AUTO DIFF WBC: CPT | Performed by: INTERNAL MEDICINE

## 2017-07-26 PROCEDURE — 93005 ELECTROCARDIOGRAM TRACING: CPT | Performed by: NURSE PRACTITIONER

## 2017-07-26 PROCEDURE — 93010 ELECTROCARDIOGRAM REPORT: CPT | Performed by: INTERNAL MEDICINE

## 2017-07-26 PROCEDURE — 25010000002 PIPERACILLIN SOD-TAZOBACTAM PER 1 G: Performed by: INTERNAL MEDICINE

## 2017-07-26 PROCEDURE — 94799 UNLISTED PULMONARY SVC/PX: CPT

## 2017-07-26 PROCEDURE — 99231 SBSQ HOSP IP/OBS SF/LOW 25: CPT | Performed by: HOSPITALIST

## 2017-07-26 PROCEDURE — 25010000002 ENOXAPARIN PER 10 MG: Performed by: INTERNAL MEDICINE

## 2017-07-26 PROCEDURE — 25010000002 VANCOMYCIN 1500 MG/250ML SOLUTION: Performed by: INTERNAL MEDICINE

## 2017-07-26 RX ORDER — FLUOXETINE HYDROCHLORIDE 20 MG/1
40 CAPSULE ORAL DAILY
Status: DISCONTINUED | OUTPATIENT
Start: 2017-07-27 | End: 2017-07-26

## 2017-07-26 RX ORDER — FLUOXETINE HYDROCHLORIDE 20 MG/1
40 CAPSULE ORAL DAILY
Status: DISCONTINUED | OUTPATIENT
Start: 2017-07-26 | End: 2017-08-02 | Stop reason: HOSPADM

## 2017-07-26 RX ADMIN — METOPROLOL TARTRATE 100 MG: 50 TABLET, FILM COATED ORAL at 08:50

## 2017-07-26 RX ADMIN — HYDROCODONE BITARTRATE AND ACETAMINOPHEN 1 TABLET: 5; 325 TABLET ORAL at 07:42

## 2017-07-26 RX ADMIN — LOSARTAN POTASSIUM 100 MG: 50 TABLET ORAL at 08:49

## 2017-07-26 RX ADMIN — PRIMIDONE 250 MG: 250 TABLET ORAL at 17:27

## 2017-07-26 RX ADMIN — LITHIUM CARBONATE 300 MG: 300 TABLET ORAL at 08:50

## 2017-07-26 RX ADMIN — FLUOXETINE 20 MG: 20 CAPSULE ORAL at 14:45

## 2017-07-26 RX ADMIN — VANCOMYCIN HCL-SODIUM CHLORIDE IV SOLN 1.5 GM/250ML-0.9% 1500 MG: 1.5-0.9/25 SOLUTION at 01:48

## 2017-07-26 RX ADMIN — GABAPENTIN 800 MG: 400 CAPSULE ORAL at 14:36

## 2017-07-26 RX ADMIN — PIPERACILLIN SODIUM,TAZOBACTAM SODIUM 3.38 G: 3; .375 INJECTION, POWDER, FOR SOLUTION INTRAVENOUS at 06:41

## 2017-07-26 RX ADMIN — FLUOXETINE 20 MG: 20 CAPSULE ORAL at 08:50

## 2017-07-26 RX ADMIN — VANCOMYCIN HCL-SODIUM CHLORIDE IV SOLN 1.5 GM/250ML-0.9% 1500 MG: 1.5-0.9/25 SOLUTION at 08:48

## 2017-07-26 RX ADMIN — LITHIUM CARBONATE 300 MG: 300 TABLET ORAL at 17:27

## 2017-07-26 RX ADMIN — PIPERACILLIN SODIUM,TAZOBACTAM SODIUM 3.38 G: 3; .375 INJECTION, POWDER, FOR SOLUTION INTRAVENOUS at 22:47

## 2017-07-26 RX ADMIN — ENOXAPARIN SODIUM 40 MG: 40 INJECTION SUBCUTANEOUS at 08:50

## 2017-07-26 RX ADMIN — DOXEPIN HYDROCHLORIDE 75 MG: 25 CAPSULE ORAL at 20:32

## 2017-07-26 RX ADMIN — CARBAMAZEPINE 400 MG: 200 TABLET ORAL at 17:26

## 2017-07-26 RX ADMIN — CARBAMAZEPINE 400 MG: 200 TABLET ORAL at 08:49

## 2017-07-26 RX ADMIN — VANCOMYCIN HCL-SODIUM CHLORIDE IV SOLN 1.5 GM/250ML-0.9% 1500 MG: 1.5-0.9/25 SOLUTION at 16:49

## 2017-07-26 RX ADMIN — GABAPENTIN 800 MG: 400 CAPSULE ORAL at 21:08

## 2017-07-26 RX ADMIN — GABAPENTIN 800 MG: 400 CAPSULE ORAL at 06:41

## 2017-07-26 RX ADMIN — HYDROCODONE BITARTRATE AND ACETAMINOPHEN 1 TABLET: 5; 325 TABLET ORAL at 14:36

## 2017-07-26 RX ADMIN — PIPERACILLIN SODIUM,TAZOBACTAM SODIUM 3.38 G: 3; .375 INJECTION, POWDER, FOR SOLUTION INTRAVENOUS at 12:09

## 2017-07-26 RX ADMIN — HYDROCODONE BITARTRATE AND ACETAMINOPHEN 1 TABLET: 5; 325 TABLET ORAL at 20:37

## 2017-07-26 RX ADMIN — METOPROLOL TARTRATE 100 MG: 50 TABLET, FILM COATED ORAL at 20:32

## 2017-07-26 RX ADMIN — PIPERACILLIN SODIUM,TAZOBACTAM SODIUM 3.38 G: 3; .375 INJECTION, POWDER, FOR SOLUTION INTRAVENOUS at 17:25

## 2017-07-26 RX ADMIN — PRIMIDONE 250 MG: 250 TABLET ORAL at 08:50

## 2017-07-27 ENCOUNTER — APPOINTMENT (OUTPATIENT)
Dept: GENERAL RADIOLOGY | Facility: HOSPITAL | Age: 52
End: 2017-07-27

## 2017-07-27 LAB
ANION GAP SERPL CALCULATED.3IONS-SCNC: 10.2 MMOL/L
BASOPHILS # BLD AUTO: 0.03 10*3/MM3 (ref 0–0.2)
BASOPHILS NFR BLD AUTO: 0.6 % (ref 0–2)
BUN BLD-MCNC: 7 MG/DL (ref 6–20)
BUN/CREAT SERPL: 13.2 (ref 7–25)
CALCIUM SPEC-SCNC: 8.2 MG/DL (ref 8.6–10.5)
CHLORIDE SERPL-SCNC: 106 MMOL/L (ref 98–107)
CO2 SERPL-SCNC: 24.8 MMOL/L (ref 22–29)
CREAT BLD-MCNC: 0.53 MG/DL (ref 0.76–1.27)
DEPRECATED RDW RBC AUTO: 43.8 FL (ref 37–54)
EOSINOPHIL # BLD AUTO: 0.36 10*3/MM3 (ref 0.1–0.3)
EOSINOPHIL NFR BLD AUTO: 6.9 % (ref 0–4)
ERYTHROCYTE [DISTWIDTH] IN BLOOD BY AUTOMATED COUNT: 11.9 % (ref 11.5–14.5)
GFR SERPL CREATININE-BSD FRML MDRD: >150 ML/MIN/1.73
GLUCOSE BLD-MCNC: 137 MG/DL (ref 65–99)
HCT VFR BLD AUTO: 36.3 % (ref 42–52)
HGB BLD-MCNC: 11.9 G/DL (ref 14–18)
IMM GRANULOCYTES # BLD: 0.02 10*3/MM3 (ref 0–0.03)
IMM GRANULOCYTES NFR BLD: 0.4 % (ref 0–0.5)
LYMPHOCYTES # BLD AUTO: 1.01 10*3/MM3 (ref 0.6–4.8)
LYMPHOCYTES NFR BLD AUTO: 19.5 % (ref 20–45)
MCH RBC QN AUTO: 32.6 PG (ref 27–31)
MCHC RBC AUTO-ENTMCNC: 32.8 G/DL (ref 31–37)
MCV RBC AUTO: 99.5 FL (ref 80–94)
MONOCYTES # BLD AUTO: 0.49 10*3/MM3 (ref 0–1)
MONOCYTES NFR BLD AUTO: 9.4 % (ref 3–8)
NEUTROPHILS # BLD AUTO: 3.28 10*3/MM3 (ref 1.5–8.3)
NEUTROPHILS NFR BLD AUTO: 63.2 % (ref 45–70)
NRBC BLD MANUAL-RTO: 0 /100 WBC (ref 0–0)
PLATELET # BLD AUTO: 196 10*3/MM3 (ref 140–500)
PMV BLD AUTO: 9.7 FL (ref 7.4–10.4)
POTASSIUM BLD-SCNC: 3.6 MMOL/L (ref 3.5–5.2)
RBC # BLD AUTO: 3.65 10*6/MM3 (ref 4.7–6.1)
SODIUM BLD-SCNC: 141 MMOL/L (ref 136–145)
VANCOMYCIN SERPL-MCNC: 12 MCG/ML
WBC NRBC COR # BLD: 5.19 10*3/MM3 (ref 4.8–10.8)

## 2017-07-27 PROCEDURE — 85025 COMPLETE CBC W/AUTO DIFF WBC: CPT | Performed by: INTERNAL MEDICINE

## 2017-07-27 PROCEDURE — 94799 UNLISTED PULMONARY SVC/PX: CPT

## 2017-07-27 PROCEDURE — 80048 BASIC METABOLIC PNL TOTAL CA: CPT | Performed by: INTERNAL MEDICINE

## 2017-07-27 PROCEDURE — C1894 INTRO/SHEATH, NON-LASER: HCPCS

## 2017-07-27 PROCEDURE — 25010000002 VANCOMYCIN 1500 MG/250ML SOLUTION: Performed by: INTERNAL MEDICINE

## 2017-07-27 PROCEDURE — 25010000002 VANCOMYCIN PER 500 MG: Performed by: INTERNAL MEDICINE

## 2017-07-27 PROCEDURE — 25010000002 VANCOMYCIN 750 MG RECONSTITUTED SOLUTION 1 EACH VIAL: Performed by: INTERNAL MEDICINE

## 2017-07-27 PROCEDURE — 25010000002 PIPERACILLIN SOD-TAZOBACTAM PER 1 G: Performed by: INTERNAL MEDICINE

## 2017-07-27 PROCEDURE — 80202 ASSAY OF VANCOMYCIN: CPT | Performed by: INTERNAL MEDICINE

## 2017-07-27 PROCEDURE — 71010 HC CHEST PA OR AP: CPT

## 2017-07-27 PROCEDURE — 25010000002 ENOXAPARIN PER 10 MG: Performed by: INTERNAL MEDICINE

## 2017-07-27 PROCEDURE — 99231 SBSQ HOSP IP/OBS SF/LOW 25: CPT | Performed by: HOSPITALIST

## 2017-07-27 RX ORDER — LORAZEPAM 0.5 MG/1
0.5 TABLET ORAL 3 TIMES DAILY PRN
Status: DISCONTINUED | OUTPATIENT
Start: 2017-07-27 | End: 2017-08-02 | Stop reason: HOSPADM

## 2017-07-27 RX ORDER — SODIUM CHLORIDE 0.9 % (FLUSH) 0.9 %
10 SYRINGE (ML) INJECTION EVERY 12 HOURS SCHEDULED
Status: DISCONTINUED | OUTPATIENT
Start: 2017-07-27 | End: 2017-08-02 | Stop reason: HOSPADM

## 2017-07-27 RX ORDER — SODIUM CHLORIDE 0.9 % (FLUSH) 0.9 %
10 SYRINGE (ML) INJECTION AS NEEDED
Status: DISCONTINUED | OUTPATIENT
Start: 2017-07-27 | End: 2017-08-02 | Stop reason: HOSPADM

## 2017-07-27 RX ADMIN — PIPERACILLIN SODIUM,TAZOBACTAM SODIUM 3.38 G: 3; .375 INJECTION, POWDER, FOR SOLUTION INTRAVENOUS at 11:50

## 2017-07-27 RX ADMIN — FLUOXETINE 40 MG: 20 CAPSULE ORAL at 08:33

## 2017-07-27 RX ADMIN — Medication 10 ML: at 21:41

## 2017-07-27 RX ADMIN — LITHIUM CARBONATE 300 MG: 300 TABLET ORAL at 19:20

## 2017-07-27 RX ADMIN — PRIMIDONE 250 MG: 250 TABLET ORAL at 08:33

## 2017-07-27 RX ADMIN — HYDROCODONE BITARTRATE AND ACETAMINOPHEN 1 TABLET: 5; 325 TABLET ORAL at 12:42

## 2017-07-27 RX ADMIN — CARBAMAZEPINE 400 MG: 200 TABLET ORAL at 19:20

## 2017-07-27 RX ADMIN — GABAPENTIN 800 MG: 400 CAPSULE ORAL at 06:25

## 2017-07-27 RX ADMIN — CARBAMAZEPINE 400 MG: 200 TABLET ORAL at 08:33

## 2017-07-27 RX ADMIN — METOPROLOL TARTRATE 100 MG: 50 TABLET, FILM COATED ORAL at 21:40

## 2017-07-27 RX ADMIN — PRIMIDONE 250 MG: 250 TABLET ORAL at 19:20

## 2017-07-27 RX ADMIN — GABAPENTIN 800 MG: 400 CAPSULE ORAL at 21:40

## 2017-07-27 RX ADMIN — LITHIUM CARBONATE 300 MG: 300 TABLET ORAL at 08:42

## 2017-07-27 RX ADMIN — PIPERACILLIN SODIUM,TAZOBACTAM SODIUM 3.38 G: 3; .375 INJECTION, POWDER, FOR SOLUTION INTRAVENOUS at 06:25

## 2017-07-27 RX ADMIN — LORAZEPAM 0.5 MG: 0.5 TABLET ORAL at 16:24

## 2017-07-27 RX ADMIN — GABAPENTIN 800 MG: 400 CAPSULE ORAL at 14:05

## 2017-07-27 RX ADMIN — DOXEPIN HYDROCHLORIDE 75 MG: 25 CAPSULE ORAL at 21:40

## 2017-07-27 RX ADMIN — METOPROLOL TARTRATE 100 MG: 50 TABLET, FILM COATED ORAL at 08:33

## 2017-07-27 RX ADMIN — Medication 10 ML: at 11:51

## 2017-07-27 RX ADMIN — PIPERACILLIN SODIUM,TAZOBACTAM SODIUM 3.38 G: 3; .375 INJECTION, POWDER, FOR SOLUTION INTRAVENOUS at 19:18

## 2017-07-27 RX ADMIN — VANCOMYCIN HCL-SODIUM CHLORIDE IV SOLN 1.5 GM/250ML-0.9% 1500 MG: 1.5-0.9/25 SOLUTION at 01:05

## 2017-07-27 RX ADMIN — HYDROCODONE BITARTRATE AND ACETAMINOPHEN 1 TABLET: 5; 325 TABLET ORAL at 22:11

## 2017-07-27 RX ADMIN — LOSARTAN POTASSIUM 100 MG: 50 TABLET ORAL at 08:33

## 2017-07-27 RX ADMIN — VANCOMYCIN HYDROCHLORIDE 1750 MG: 1 INJECTION, POWDER, LYOPHILIZED, FOR SOLUTION INTRAVENOUS at 19:18

## 2017-07-27 RX ADMIN — TEMAZEPAM 30 MG: 15 CAPSULE ORAL at 21:40

## 2017-07-27 RX ADMIN — HYDROCODONE BITARTRATE AND ACETAMINOPHEN 1 TABLET: 5; 325 TABLET ORAL at 06:25

## 2017-07-27 RX ADMIN — VANCOMYCIN HYDROCHLORIDE 1750 MG: 1 INJECTION, POWDER, LYOPHILIZED, FOR SOLUTION INTRAVENOUS at 11:51

## 2017-07-27 RX ADMIN — ENOXAPARIN SODIUM 40 MG: 40 INJECTION SUBCUTANEOUS at 08:33

## 2017-07-28 ENCOUNTER — ANESTHESIA EVENT (OUTPATIENT)
Dept: PERIOP | Facility: HOSPITAL | Age: 52
End: 2017-07-28

## 2017-07-28 LAB
ANION GAP SERPL CALCULATED.3IONS-SCNC: 10.3 MMOL/L
BASOPHILS # BLD AUTO: 0.04 10*3/MM3 (ref 0–0.2)
BASOPHILS NFR BLD AUTO: 0.7 % (ref 0–2)
BUN BLD-MCNC: 8 MG/DL (ref 6–20)
BUN/CREAT SERPL: 14.5 (ref 7–25)
CALCIUM SPEC-SCNC: 8 MG/DL (ref 8.6–10.5)
CHLORIDE SERPL-SCNC: 106 MMOL/L (ref 98–107)
CO2 SERPL-SCNC: 24.7 MMOL/L (ref 22–29)
CREAT BLD-MCNC: 0.55 MG/DL (ref 0.76–1.27)
DEPRECATED RDW RBC AUTO: 43.6 FL (ref 37–54)
EOSINOPHIL # BLD AUTO: 0.36 10*3/MM3 (ref 0.1–0.3)
EOSINOPHIL NFR BLD AUTO: 6.7 % (ref 0–4)
ERYTHROCYTE [DISTWIDTH] IN BLOOD BY AUTOMATED COUNT: 11.9 % (ref 11.5–14.5)
GFR SERPL CREATININE-BSD FRML MDRD: >150 ML/MIN/1.73
GLUCOSE BLD-MCNC: 136 MG/DL (ref 65–99)
HCT VFR BLD AUTO: 36.4 % (ref 42–52)
HGB BLD-MCNC: 11.9 G/DL (ref 14–18)
IMM GRANULOCYTES # BLD: 0.02 10*3/MM3 (ref 0–0.03)
IMM GRANULOCYTES NFR BLD: 0.4 % (ref 0–0.5)
LYMPHOCYTES # BLD AUTO: 0.97 10*3/MM3 (ref 0.6–4.8)
LYMPHOCYTES NFR BLD AUTO: 18.2 % (ref 20–45)
MCH RBC QN AUTO: 32.7 PG (ref 27–31)
MCHC RBC AUTO-ENTMCNC: 32.7 G/DL (ref 31–37)
MCV RBC AUTO: 100 FL (ref 80–94)
MONOCYTES # BLD AUTO: 0.47 10*3/MM3 (ref 0–1)
MONOCYTES NFR BLD AUTO: 8.8 % (ref 3–8)
NEUTROPHILS # BLD AUTO: 3.48 10*3/MM3 (ref 1.5–8.3)
NEUTROPHILS NFR BLD AUTO: 65.2 % (ref 45–70)
NRBC BLD MANUAL-RTO: 0 /100 WBC (ref 0–0)
PLATELET # BLD AUTO: 177 10*3/MM3 (ref 140–500)
PMV BLD AUTO: 9.2 FL (ref 7.4–10.4)
POTASSIUM BLD-SCNC: 3.7 MMOL/L (ref 3.5–5.2)
RBC # BLD AUTO: 3.64 10*6/MM3 (ref 4.7–6.1)
SODIUM BLD-SCNC: 141 MMOL/L (ref 136–145)
VANCOMYCIN SERPL-MCNC: 16.2 MCG/ML
WBC NRBC COR # BLD: 5.34 10*3/MM3 (ref 4.8–10.8)

## 2017-07-28 PROCEDURE — 25010000002 VANCOMYCIN 750 MG RECONSTITUTED SOLUTION 1 EACH VIAL: Performed by: INTERNAL MEDICINE

## 2017-07-28 PROCEDURE — 80048 BASIC METABOLIC PNL TOTAL CA: CPT | Performed by: INTERNAL MEDICINE

## 2017-07-28 PROCEDURE — 85025 COMPLETE CBC W/AUTO DIFF WBC: CPT | Performed by: INTERNAL MEDICINE

## 2017-07-28 PROCEDURE — 94799 UNLISTED PULMONARY SVC/PX: CPT

## 2017-07-28 PROCEDURE — 80202 ASSAY OF VANCOMYCIN: CPT | Performed by: INTERNAL MEDICINE

## 2017-07-28 PROCEDURE — 99232 SBSQ HOSP IP/OBS MODERATE 35: CPT | Performed by: NURSE PRACTITIONER

## 2017-07-28 PROCEDURE — 25010000002 VANCOMYCIN PER 500 MG: Performed by: INTERNAL MEDICINE

## 2017-07-28 PROCEDURE — 25010000002 ENOXAPARIN PER 10 MG: Performed by: INTERNAL MEDICINE

## 2017-07-28 PROCEDURE — 25010000002 PIPERACILLIN SOD-TAZOBACTAM PER 1 G: Performed by: INTERNAL MEDICINE

## 2017-07-28 RX ORDER — HYDROCODONE BITARTRATE AND ACETAMINOPHEN 5; 325 MG/1; MG/1
1 TABLET ORAL EVERY 4 HOURS PRN
Status: DISCONTINUED | OUTPATIENT
Start: 2017-07-28 | End: 2017-07-30

## 2017-07-28 RX ORDER — HYDROCODONE BITARTRATE AND ACETAMINOPHEN 5; 325 MG/1; MG/1
2 TABLET ORAL EVERY 4 HOURS PRN
Status: DISCONTINUED | OUTPATIENT
Start: 2017-08-07 | End: 2017-07-30

## 2017-07-28 RX ADMIN — VANCOMYCIN HYDROCHLORIDE 1750 MG: 1 INJECTION, POWDER, LYOPHILIZED, FOR SOLUTION INTRAVENOUS at 09:26

## 2017-07-28 RX ADMIN — HYDROCODONE BITARTRATE AND ACETAMINOPHEN 1 TABLET: 5; 325 TABLET ORAL at 04:48

## 2017-07-28 RX ADMIN — HYDROCODONE BITARTRATE AND ACETAMINOPHEN 1 TABLET: 5; 325 TABLET ORAL at 22:22

## 2017-07-28 RX ADMIN — CARBAMAZEPINE 400 MG: 200 TABLET ORAL at 19:01

## 2017-07-28 RX ADMIN — LORAZEPAM 0.5 MG: 0.5 TABLET ORAL at 03:56

## 2017-07-28 RX ADMIN — HYDROCODONE BITARTRATE AND ACETAMINOPHEN 2 TABLET: 5; 325 TABLET ORAL at 16:50

## 2017-07-28 RX ADMIN — PIPERACILLIN SODIUM,TAZOBACTAM SODIUM 3.38 G: 3; .375 INJECTION, POWDER, FOR SOLUTION INTRAVENOUS at 18:58

## 2017-07-28 RX ADMIN — LITHIUM CARBONATE 300 MG: 300 TABLET ORAL at 19:01

## 2017-07-28 RX ADMIN — HYDROCODONE BITARTRATE AND ACETAMINOPHEN 1 TABLET: 5; 325 TABLET ORAL at 11:00

## 2017-07-28 RX ADMIN — PRIMIDONE 250 MG: 250 TABLET ORAL at 09:19

## 2017-07-28 RX ADMIN — ENOXAPARIN SODIUM 40 MG: 40 INJECTION SUBCUTANEOUS at 09:19

## 2017-07-28 RX ADMIN — Medication 10 ML: at 09:26

## 2017-07-28 RX ADMIN — TEMAZEPAM 30 MG: 15 CAPSULE ORAL at 23:15

## 2017-07-28 RX ADMIN — METOPROLOL TARTRATE 100 MG: 50 TABLET, FILM COATED ORAL at 21:20

## 2017-07-28 RX ADMIN — HYDROCODONE BITARTRATE AND ACETAMINOPHEN 1 TABLET: 5; 325 TABLET ORAL at 12:23

## 2017-07-28 RX ADMIN — GABAPENTIN 800 MG: 400 CAPSULE ORAL at 05:30

## 2017-07-28 RX ADMIN — VANCOMYCIN HYDROCHLORIDE 1750 MG: 1 INJECTION, POWDER, LYOPHILIZED, FOR SOLUTION INTRAVENOUS at 00:44

## 2017-07-28 RX ADMIN — LORAZEPAM 0.5 MG: 0.5 TABLET ORAL at 15:07

## 2017-07-28 RX ADMIN — CARBAMAZEPINE 400 MG: 200 TABLET ORAL at 09:19

## 2017-07-28 RX ADMIN — Medication 10 ML: at 21:20

## 2017-07-28 RX ADMIN — PIPERACILLIN SODIUM,TAZOBACTAM SODIUM 3.38 G: 3; .375 INJECTION, POWDER, FOR SOLUTION INTRAVENOUS at 05:23

## 2017-07-28 RX ADMIN — PRIMIDONE 250 MG: 250 TABLET ORAL at 19:01

## 2017-07-28 RX ADMIN — LITHIUM CARBONATE 300 MG: 300 TABLET ORAL at 09:19

## 2017-07-28 RX ADMIN — GABAPENTIN 800 MG: 400 CAPSULE ORAL at 15:07

## 2017-07-28 RX ADMIN — GABAPENTIN 800 MG: 400 CAPSULE ORAL at 22:23

## 2017-07-28 RX ADMIN — PIPERACILLIN SODIUM,TAZOBACTAM SODIUM 3.38 G: 3; .375 INJECTION, POWDER, FOR SOLUTION INTRAVENOUS at 00:44

## 2017-07-28 RX ADMIN — LOSARTAN POTASSIUM 100 MG: 50 TABLET ORAL at 09:19

## 2017-07-28 RX ADMIN — PIPERACILLIN SODIUM,TAZOBACTAM SODIUM 3.38 G: 3; .375 INJECTION, POWDER, FOR SOLUTION INTRAVENOUS at 23:15

## 2017-07-28 RX ADMIN — DOXEPIN HYDROCHLORIDE 75 MG: 25 CAPSULE ORAL at 21:20

## 2017-07-28 RX ADMIN — VANCOMYCIN HYDROCHLORIDE 1750 MG: 1 INJECTION, POWDER, LYOPHILIZED, FOR SOLUTION INTRAVENOUS at 18:59

## 2017-07-28 RX ADMIN — PIPERACILLIN SODIUM,TAZOBACTAM SODIUM 3.38 G: 3; .375 INJECTION, POWDER, FOR SOLUTION INTRAVENOUS at 11:42

## 2017-07-28 RX ADMIN — LORAZEPAM 0.5 MG: 0.5 TABLET ORAL at 23:50

## 2017-07-28 RX ADMIN — FLUOXETINE 40 MG: 20 CAPSULE ORAL at 09:19

## 2017-07-28 RX ADMIN — METOPROLOL TARTRATE 100 MG: 50 TABLET, FILM COATED ORAL at 09:19

## 2017-07-29 ENCOUNTER — APPOINTMENT (OUTPATIENT)
Dept: GENERAL RADIOLOGY | Facility: HOSPITAL | Age: 52
End: 2017-07-29

## 2017-07-29 ENCOUNTER — ANESTHESIA (OUTPATIENT)
Dept: PERIOP | Facility: HOSPITAL | Age: 52
End: 2017-07-29

## 2017-07-29 LAB
ALBUMIN SERPL-MCNC: 2.9 G/DL (ref 3.5–5.2)
ALBUMIN/GLOB SERPL: 1.1 G/DL
ALP SERPL-CCNC: 57 U/L (ref 40–129)
ALT SERPL W P-5'-P-CCNC: 16 U/L (ref 5–41)
ANION GAP SERPL CALCULATED.3IONS-SCNC: 9.9 MMOL/L
AST SERPL-CCNC: 14 U/L (ref 5–40)
BILIRUB SERPL-MCNC: 0.3 MG/DL (ref 0.2–1.2)
BUN BLD-MCNC: 8 MG/DL (ref 6–20)
BUN/CREAT SERPL: 17.8 (ref 7–25)
CALCIUM SPEC-SCNC: 8.1 MG/DL (ref 8.6–10.5)
CHLORIDE SERPL-SCNC: 108 MMOL/L (ref 98–107)
CO2 SERPL-SCNC: 25.1 MMOL/L (ref 22–29)
CREAT BLD-MCNC: 0.45 MG/DL (ref 0.76–1.27)
CRP SERPL-MCNC: 1.36 MG/DL (ref 0–0.5)
ERYTHROCYTE [SEDIMENTATION RATE] IN BLOOD: 32 MM/HR (ref 0–20)
GFR SERPL CREATININE-BSD FRML MDRD: >150 ML/MIN/1.73
GLOBULIN UR ELPH-MCNC: 2.6 GM/DL
GLUCOSE BLD-MCNC: 83 MG/DL (ref 65–99)
INR PPP: 1.07 (ref 0.9–1.1)
POTASSIUM BLD-SCNC: 3.8 MMOL/L (ref 3.5–5.2)
PROT SERPL-MCNC: 5.5 G/DL (ref 6–8.5)
PROTHROMBIN TIME: 13.9 SECONDS (ref 12.1–15)
SODIUM BLD-SCNC: 143 MMOL/L (ref 136–145)

## 2017-07-29 PROCEDURE — C1713 ANCHOR/SCREW BN/BN,TIS/BN: HCPCS | Performed by: PODIATRIST

## 2017-07-29 PROCEDURE — 25010000002 ONDANSETRON PER 1 MG: Performed by: NURSE ANESTHETIST, CERTIFIED REGISTERED

## 2017-07-29 PROCEDURE — 80053 COMPREHEN METABOLIC PANEL: CPT | Performed by: NURSE PRACTITIONER

## 2017-07-29 PROCEDURE — 25010000002 FENTANYL CITRATE (PF) 100 MCG/2ML SOLUTION: Performed by: NURSE ANESTHETIST, CERTIFIED REGISTERED

## 2017-07-29 PROCEDURE — 99231 SBSQ HOSP IP/OBS SF/LOW 25: CPT | Performed by: HOSPITALIST

## 2017-07-29 PROCEDURE — 0QSL04Z REPOSITION RIGHT TARSAL WITH INTERNAL FIXATION DEVICE, OPEN APPROACH: ICD-10-PCS | Performed by: PODIATRIST

## 2017-07-29 PROCEDURE — 86140 C-REACTIVE PROTEIN: CPT | Performed by: NURSE PRACTITIONER

## 2017-07-29 PROCEDURE — 25010000002 VANCOMYCIN PER 500 MG: Performed by: INTERNAL MEDICINE

## 2017-07-29 PROCEDURE — 25010000002 ONDANSETRON PER 1 MG

## 2017-07-29 PROCEDURE — 85652 RBC SED RATE AUTOMATED: CPT | Performed by: NURSE PRACTITIONER

## 2017-07-29 PROCEDURE — 25010000002 VANCOMYCIN 750 MG RECONSTITUTED SOLUTION 1 EACH VIAL: Performed by: INTERNAL MEDICINE

## 2017-07-29 PROCEDURE — 76000 FLUOROSCOPY <1 HR PHYS/QHP: CPT

## 2017-07-29 PROCEDURE — 94799 UNLISTED PULMONARY SVC/PX: CPT

## 2017-07-29 PROCEDURE — 25010000002 PIPERACILLIN SOD-TAZOBACTAM PER 1 G: Performed by: INTERNAL MEDICINE

## 2017-07-29 PROCEDURE — 85610 PROTHROMBIN TIME: CPT | Performed by: NURSE PRACTITIONER

## 2017-07-29 PROCEDURE — 73630 X-RAY EXAM OF FOOT: CPT

## 2017-07-29 PROCEDURE — 25010000002 HYDROMORPHONE PER 4 MG: Performed by: PODIATRIST

## 2017-07-29 DEVICE — TUBE TO ROD COUPLING COMPACT  15MM,5MM
Type: IMPLANTABLE DEVICE | Status: FUNCTIONAL
Brand: HOFFMANN

## 2017-07-29 DEVICE — ANGLED POST COMPACT  5MM
Type: IMPLANTABLE DEVICE | Status: FUNCTIONAL
Brand: HOFFMANN

## 2017-07-29 DEVICE — SELF-DRILLING HALF PIN
Type: IMPLANTABLE DEVICE | Site: FOOT | Status: FUNCTIONAL
Brand: APEX

## 2017-07-29 RX ORDER — LIDOCAINE HYDROCHLORIDE 20 MG/ML
INJECTION, SOLUTION INFILTRATION; PERINEURAL AS NEEDED
Status: DISCONTINUED | OUTPATIENT
Start: 2017-07-29 | End: 2017-07-29 | Stop reason: SURG

## 2017-07-29 RX ORDER — SODIUM CHLORIDE, SODIUM LACTATE, POTASSIUM CHLORIDE, CALCIUM CHLORIDE 600; 310; 30; 20 MG/100ML; MG/100ML; MG/100ML; MG/100ML
INJECTION, SOLUTION INTRAVENOUS CONTINUOUS PRN
Status: DISCONTINUED | OUTPATIENT
Start: 2017-07-29 | End: 2017-07-29 | Stop reason: SURG

## 2017-07-29 RX ORDER — ONDANSETRON 2 MG/ML
4 INJECTION INTRAMUSCULAR; INTRAVENOUS ONCE AS NEEDED
Status: DISCONTINUED | OUTPATIENT
Start: 2017-07-29 | End: 2017-07-29

## 2017-07-29 RX ORDER — ONDANSETRON 2 MG/ML
INJECTION INTRAMUSCULAR; INTRAVENOUS
Status: COMPLETED
Start: 2017-07-29 | End: 2017-07-29

## 2017-07-29 RX ORDER — SODIUM CHLORIDE, SODIUM LACTATE, POTASSIUM CHLORIDE, CALCIUM CHLORIDE 600; 310; 30; 20 MG/100ML; MG/100ML; MG/100ML; MG/100ML
9 INJECTION, SOLUTION INTRAVENOUS CONTINUOUS
Status: DISCONTINUED | OUTPATIENT
Start: 2017-07-29 | End: 2017-07-29

## 2017-07-29 RX ORDER — FAMOTIDINE 10 MG/ML
20 INJECTION, SOLUTION INTRAVENOUS
Status: DISCONTINUED | OUTPATIENT
Start: 2017-07-29 | End: 2017-07-29

## 2017-07-29 RX ORDER — FENTANYL CITRATE 50 UG/ML
INJECTION, SOLUTION INTRAMUSCULAR; INTRAVENOUS AS NEEDED
Status: DISCONTINUED | OUTPATIENT
Start: 2017-07-29 | End: 2017-07-29 | Stop reason: SURG

## 2017-07-29 RX ORDER — ONDANSETRON 2 MG/ML
4 INJECTION INTRAMUSCULAR; INTRAVENOUS ONCE AS NEEDED
Status: COMPLETED | OUTPATIENT
Start: 2017-07-29 | End: 2017-07-29

## 2017-07-29 RX ORDER — MAGNESIUM HYDROXIDE 1200 MG/15ML
LIQUID ORAL AS NEEDED
Status: DISCONTINUED | OUTPATIENT
Start: 2017-07-29 | End: 2017-07-29 | Stop reason: HOSPADM

## 2017-07-29 RX ORDER — ONDANSETRON 2 MG/ML
4 INJECTION INTRAMUSCULAR; INTRAVENOUS EVERY 6 HOURS PRN
Status: DISCONTINUED | OUTPATIENT
Start: 2017-07-29 | End: 2017-08-02 | Stop reason: HOSPADM

## 2017-07-29 RX ADMIN — FENTANYL CITRATE 50 MCG: 50 INJECTION, SOLUTION INTRAMUSCULAR; INTRAVENOUS at 10:55

## 2017-07-29 RX ADMIN — TEMAZEPAM 30 MG: 15 CAPSULE ORAL at 21:24

## 2017-07-29 RX ADMIN — HYDROMORPHONE HYDROCHLORIDE 1 MG: 1 INJECTION, SOLUTION INTRAMUSCULAR; INTRAVENOUS; SUBCUTANEOUS at 14:07

## 2017-07-29 RX ADMIN — Medication 10 ML: at 14:12

## 2017-07-29 RX ADMIN — DOXEPIN HYDROCHLORIDE 75 MG: 25 CAPSULE ORAL at 20:52

## 2017-07-29 RX ADMIN — GABAPENTIN 800 MG: 400 CAPSULE ORAL at 21:23

## 2017-07-29 RX ADMIN — LORAZEPAM 0.5 MG: 0.5 TABLET ORAL at 14:07

## 2017-07-29 RX ADMIN — LIDOCAINE HYDROCHLORIDE 100 MG: 20 INJECTION, SOLUTION INFILTRATION; PERINEURAL at 09:12

## 2017-07-29 RX ADMIN — ONDANSETRON 4 MG: 2 INJECTION, SOLUTION INTRAMUSCULAR; INTRAVENOUS at 15:32

## 2017-07-29 RX ADMIN — HYDROMORPHONE HYDROCHLORIDE 1 MG: 1 INJECTION, SOLUTION INTRAMUSCULAR; INTRAVENOUS; SUBCUTANEOUS at 17:36

## 2017-07-29 RX ADMIN — VANCOMYCIN HYDROCHLORIDE 1750 MG: 1 INJECTION, POWDER, LYOPHILIZED, FOR SOLUTION INTRAVENOUS at 10:35

## 2017-07-29 RX ADMIN — GABAPENTIN 800 MG: 400 CAPSULE ORAL at 14:10

## 2017-07-29 RX ADMIN — PRIMIDONE 250 MG: 250 TABLET ORAL at 19:21

## 2017-07-29 RX ADMIN — CARBAMAZEPINE 400 MG: 200 TABLET ORAL at 13:57

## 2017-07-29 RX ADMIN — HYDROCODONE BITARTRATE AND ACETAMINOPHEN 2 TABLET: 5; 325 TABLET ORAL at 15:29

## 2017-07-29 RX ADMIN — HYDROMORPHONE HYDROCHLORIDE 1 MG: 1 INJECTION, SOLUTION INTRAMUSCULAR; INTRAVENOUS; SUBCUTANEOUS at 22:19

## 2017-07-29 RX ADMIN — METOPROLOL TARTRATE 100 MG: 50 TABLET, FILM COATED ORAL at 20:52

## 2017-07-29 RX ADMIN — VANCOMYCIN HYDROCHLORIDE 1750 MG: 1 INJECTION, POWDER, LYOPHILIZED, FOR SOLUTION INTRAVENOUS at 18:28

## 2017-07-29 RX ADMIN — LIDOCAINE HYDROCHLORIDE 100 MG: 20 INJECTION, SOLUTION INFILTRATION; PERINEURAL at 12:53

## 2017-07-29 RX ADMIN — PIPERACILLIN SODIUM,TAZOBACTAM SODIUM 3.38 G: 3; .375 INJECTION, POWDER, FOR SOLUTION INTRAVENOUS at 16:37

## 2017-07-29 RX ADMIN — FAMOTIDINE 20 MG: 10 INJECTION INTRAVENOUS at 08:51

## 2017-07-29 RX ADMIN — LORAZEPAM 0.5 MG: 0.5 TABLET ORAL at 23:34

## 2017-07-29 RX ADMIN — SODIUM CHLORIDE, POTASSIUM CHLORIDE, SODIUM LACTATE AND CALCIUM CHLORIDE: 600; 310; 30; 20 INJECTION, SOLUTION INTRAVENOUS at 09:10

## 2017-07-29 RX ADMIN — METOPROLOL TARTRATE 100 MG: 50 TABLET, FILM COATED ORAL at 13:56

## 2017-07-29 RX ADMIN — HYDROCODONE BITARTRATE AND ACETAMINOPHEN 2 TABLET: 5; 325 TABLET ORAL at 20:53

## 2017-07-29 RX ADMIN — LOSARTAN POTASSIUM 100 MG: 50 TABLET ORAL at 13:58

## 2017-07-29 RX ADMIN — PIPERACILLIN SODIUM,TAZOBACTAM SODIUM 3.38 G: 3; .375 INJECTION, POWDER, FOR SOLUTION INTRAVENOUS at 23:34

## 2017-07-29 RX ADMIN — LITHIUM CARBONATE 300 MG: 300 TABLET ORAL at 17:40

## 2017-07-29 RX ADMIN — CARBAMAZEPINE 400 MG: 200 TABLET ORAL at 19:20

## 2017-07-29 RX ADMIN — PIPERACILLIN SODIUM,TAZOBACTAM SODIUM 3.38 G: 3; .375 INJECTION, POWDER, FOR SOLUTION INTRAVENOUS at 06:45

## 2017-07-29 RX ADMIN — ONDANSETRON 4 MG: 2 INJECTION, SOLUTION INTRAMUSCULAR; INTRAVENOUS at 08:51

## 2017-07-29 RX ADMIN — PRIMIDONE 250 MG: 250 TABLET ORAL at 14:02

## 2017-07-29 RX ADMIN — VANCOMYCIN HYDROCHLORIDE 1750 MG: 1 INJECTION, POWDER, LYOPHILIZED, FOR SOLUTION INTRAVENOUS at 01:06

## 2017-07-29 RX ADMIN — GABAPENTIN 800 MG: 400 CAPSULE ORAL at 06:58

## 2017-07-29 RX ADMIN — Medication 10 ML: at 20:55

## 2017-07-29 RX ADMIN — FENTANYL CITRATE 50 MCG: 50 INJECTION, SOLUTION INTRAMUSCULAR; INTRAVENOUS at 13:06

## 2017-07-29 NOTE — ANESTHESIA PREPROCEDURE EVALUATION
Anesthesia Evaluation     Patient summary reviewed and Nursing notes reviewed   no history of anesthetic complications:  NPO Solid Status: > 8 hours  NPO Liquid Status: > 8 hours     Airway   Mallampati: III  TM distance: >3 FB  Neck ROM: full  possible difficult intubation  Dental      Pulmonary     breath sounds clear to auscultation  (+) sleep apnea on CPAP,     ROS comment: EXAM: AP portable chest.      DATE:: 07/27/2017.      HISTORY: History of multiple recent falls with chronic back pain and  left hip pain. HISTORY of splenic vein thrombosis and gastric varices.  Hypertension. Diabetes. Hyperlipidemia. Coronary disease. Thyroid  disease. PICC line placement.      COMPARISON: AP portable chest 07/22/2017.      FINDINGS: No acute airspace disease is identified. Previously described  opacity in the left upper lobe is no longer evident. There is stable  mild asymmetric elevation right hemidiaphragm. Heart size is upper  limits normal but appears stable. Right arm approach PICC has been  placed with the tip extending to the lower SVC level. No pneumothorax is  visible. Right shoulder replacement changes.      IMPRESSION:  1. No acute chest findings  2. Right arm approach PICC tip extends to the lower SVC level. No  visible pneumothorax.      This report was finalized on 7/27/2017 11:01 AM by Dr. Mindy Schulz MD.  Cardiovascular   Exercise tolerance: poor (<4 METS)    ECG reviewed  Rhythm: regular  Rate: normal    (+) hypertension well controlled, past MI (?) , CAD, NOVA, DVT resolved, hyperlipidemia    ROS comment: RR Interval= 938 ms  HI Interval= 188 ms  QRSD Interval= 96 ms  QT Interval= 424 ms  QTc Interval= 438 ms  Heart Rate= 64 ms  P Axis= 41 deg  QRS Axis= 51 deg  T Wave Axis= 37 deg  I: 40 Axis= 41 deg  T: 40 Axis= 84 deg  ST Axis= 62 deg  SINUS RHYTHM  NO SIGNIFICANT CHANGE FROM PREVIOUS ECG  Electronically Signed by:  Jb Cerda 26-Jul-2017 10:47:23  Date and Time of Study: 2017-07-26 06:33:19     Neuro/Psych  (+) dizziness/light headedness, tremors, weakness, numbness (feet and hands), psychiatric history Bipolar,    GI/Hepatic/Renal/Endo    (+) obesity,  diabetes mellitus type 2 well controlled,     ROS Comment: Thyroid nodule    Musculoskeletal     (+) back pain, chronic pain, neck pain, neck stiffness,   Abdominal   (+) obese,    Substance History      OB/GYN          Other   (+) arthritis (RA, OA)                                     Anesthesia Plan    ASA 3     general     intravenous induction   Anesthetic plan and risks discussed with patient.  Use of blood products discussed with patient  Consented to blood products.

## 2017-07-29 NOTE — ANESTHESIA POSTPROCEDURE EVALUATION
Patient: Eliseo Price    Procedure Summary     Date Anesthesia Start Anesthesia Stop Room / Location    07/29/17 0910 1307 [facility] OR 3 / [facility]       Procedure Diagnosis Surgeon Provider    open reduction with percutaneous pinning of midfoot dislocation fracture (Right Foot) Lisfranc dislocation, right, initial encounter; Subluxation of tarsal joint of right foot, initial encounter  (Lisfranc dislocation, right, initial encounter [S93.324A]; Subluxation of tarsal joint of right foot, initial encounter [S93.311A]) ARTURO Daniel CRNA          Anesthesia Type: general  Last vitals  BP        Temp        Pulse       Resp        SpO2          Post Anesthesia Care and Evaluation    Patient location during evaluation: bedside  Patient participation: complete - patient participated  Level of consciousness: awake and alert  Pain management: adequate  Airway patency: patent  Anesthetic complications: No anesthetic complications  PONV Status: none  Cardiovascular status: acceptable  Respiratory status: acceptable  Hydration status: acceptable

## 2017-07-29 NOTE — ANESTHESIA PROCEDURE NOTES
Airway  Urgency: elective    Date/Time: 7/29/2017 9:15 AM  End Time:7/29/2017 9:15 AM  Airway not difficult    General Information and Staff    Patient location during procedure: OR  CRNA: JUDITH SAVAGE    Indications and Patient Condition    Preoxygenated: yes  MILS maintained throughout  Mask difficulty assessment: 0 - not attempted    Final Airway Details  Final airway type: supraglottic airway      Successful airway: unique  Size 4    Number of attempts at approach: 1    Additional Comments  BEBS, +ETCO2, VSS. TEETH AND GUMS AS PRE-OP.

## 2017-07-30 LAB
ANION GAP SERPL CALCULATED.3IONS-SCNC: 10 MMOL/L
BASOPHILS # BLD AUTO: 0.04 10*3/MM3 (ref 0–0.2)
BASOPHILS NFR BLD AUTO: 0.6 % (ref 0–2)
BUN BLD-MCNC: 12 MG/DL (ref 6–20)
BUN/CREAT SERPL: 12.9 (ref 7–25)
CALCIUM SPEC-SCNC: 7.9 MG/DL (ref 8.6–10.5)
CHLORIDE SERPL-SCNC: 105 MMOL/L (ref 98–107)
CO2 SERPL-SCNC: 25 MMOL/L (ref 22–29)
CREAT BLD-MCNC: 0.93 MG/DL (ref 0.76–1.27)
DEPRECATED RDW RBC AUTO: 44 FL (ref 37–54)
EOSINOPHIL # BLD AUTO: 0.37 10*3/MM3 (ref 0.1–0.3)
EOSINOPHIL NFR BLD AUTO: 5.2 % (ref 0–4)
ERYTHROCYTE [DISTWIDTH] IN BLOOD BY AUTOMATED COUNT: 11.9 % (ref 11.5–14.5)
GFR SERPL CREATININE-BSD FRML MDRD: 85 ML/MIN/1.73
GLUCOSE BLD-MCNC: 99 MG/DL (ref 65–99)
HCT VFR BLD AUTO: 34.7 % (ref 42–52)
HGB BLD-MCNC: 11.4 G/DL (ref 14–18)
IMM GRANULOCYTES # BLD: 0.03 10*3/MM3 (ref 0–0.03)
IMM GRANULOCYTES NFR BLD: 0.4 % (ref 0–0.5)
LYMPHOCYTES # BLD AUTO: 0.9 10*3/MM3 (ref 0.6–4.8)
LYMPHOCYTES NFR BLD AUTO: 12.7 % (ref 20–45)
MCH RBC QN AUTO: 33.1 PG (ref 27–31)
MCHC RBC AUTO-ENTMCNC: 32.9 G/DL (ref 31–37)
MCV RBC AUTO: 100.9 FL (ref 80–94)
MONOCYTES # BLD AUTO: 0.89 10*3/MM3 (ref 0–1)
MONOCYTES NFR BLD AUTO: 12.5 % (ref 3–8)
NEUTROPHILS # BLD AUTO: 4.87 10*3/MM3 (ref 1.5–8.3)
NEUTROPHILS NFR BLD AUTO: 68.6 % (ref 45–70)
NRBC BLD MANUAL-RTO: 0 /100 WBC (ref 0–0)
PLATELET # BLD AUTO: 191 10*3/MM3 (ref 140–500)
PMV BLD AUTO: 9 FL (ref 7.4–10.4)
POTASSIUM BLD-SCNC: 4.1 MMOL/L (ref 3.5–5.2)
RBC # BLD AUTO: 3.44 10*6/MM3 (ref 4.7–6.1)
SODIUM BLD-SCNC: 140 MMOL/L (ref 136–145)
WBC NRBC COR # BLD: 7.1 10*3/MM3 (ref 4.8–10.8)

## 2017-07-30 PROCEDURE — 25010000002 HYDROMORPHONE PER 4 MG: Performed by: PODIATRIST

## 2017-07-30 PROCEDURE — 80048 BASIC METABOLIC PNL TOTAL CA: CPT | Performed by: INTERNAL MEDICINE

## 2017-07-30 PROCEDURE — 85025 COMPLETE CBC W/AUTO DIFF WBC: CPT | Performed by: INTERNAL MEDICINE

## 2017-07-30 PROCEDURE — 97162 PT EVAL MOD COMPLEX 30 MIN: CPT

## 2017-07-30 PROCEDURE — 25010000002 PIPERACILLIN SOD-TAZOBACTAM PER 1 G: Performed by: INTERNAL MEDICINE

## 2017-07-30 PROCEDURE — 97110 THERAPEUTIC EXERCISES: CPT

## 2017-07-30 PROCEDURE — 25010000002 VANCOMYCIN 750 MG RECONSTITUTED SOLUTION 1 EACH VIAL: Performed by: INTERNAL MEDICINE

## 2017-07-30 PROCEDURE — 25010000002 VANCOMYCIN PER 500 MG: Performed by: INTERNAL MEDICINE

## 2017-07-30 PROCEDURE — 99232 SBSQ HOSP IP/OBS MODERATE 35: CPT | Performed by: INTERNAL MEDICINE

## 2017-07-30 RX ORDER — HYDROCODONE BITARTRATE AND ACETAMINOPHEN 5; 325 MG/1; MG/1
2 TABLET ORAL EVERY 4 HOURS PRN
Status: DISCONTINUED | OUTPATIENT
Start: 2017-07-30 | End: 2017-08-01

## 2017-07-30 RX ORDER — HYDROCODONE BITARTRATE AND ACETAMINOPHEN 5; 325 MG/1; MG/1
1 TABLET ORAL EVERY 4 HOURS PRN
Status: DISCONTINUED | OUTPATIENT
Start: 2017-07-30 | End: 2017-08-01

## 2017-07-30 RX ORDER — SULFAMETHOXAZOLE AND TRIMETHOPRIM 800; 160 MG/1; MG/1
1 TABLET ORAL EVERY 12 HOURS SCHEDULED
Status: DISCONTINUED | OUTPATIENT
Start: 2017-07-30 | End: 2017-08-02 | Stop reason: HOSPADM

## 2017-07-30 RX ADMIN — GABAPENTIN 800 MG: 400 CAPSULE ORAL at 06:23

## 2017-07-30 RX ADMIN — METOPROLOL TARTRATE 100 MG: 50 TABLET, FILM COATED ORAL at 09:35

## 2017-07-30 RX ADMIN — TEMAZEPAM 30 MG: 15 CAPSULE ORAL at 21:50

## 2017-07-30 RX ADMIN — HYDROCODONE BITARTRATE AND ACETAMINOPHEN 2 TABLET: 5; 325 TABLET ORAL at 09:35

## 2017-07-30 RX ADMIN — PIPERACILLIN SODIUM,TAZOBACTAM SODIUM 3.38 G: 3; .375 INJECTION, POWDER, FOR SOLUTION INTRAVENOUS at 06:23

## 2017-07-30 RX ADMIN — PRIMIDONE 250 MG: 250 TABLET ORAL at 09:35

## 2017-07-30 RX ADMIN — HYDROMORPHONE HYDROCHLORIDE 1 MG: 1 INJECTION, SOLUTION INTRAMUSCULAR; INTRAVENOUS; SUBCUTANEOUS at 11:48

## 2017-07-30 RX ADMIN — METOPROLOL TARTRATE 100 MG: 50 TABLET, FILM COATED ORAL at 21:00

## 2017-07-30 RX ADMIN — HYDROMORPHONE HYDROCHLORIDE 1 MG: 1 INJECTION, SOLUTION INTRAMUSCULAR; INTRAVENOUS; SUBCUTANEOUS at 01:56

## 2017-07-30 RX ADMIN — LITHIUM CARBONATE 300 MG: 300 TABLET ORAL at 09:35

## 2017-07-30 RX ADMIN — VANCOMYCIN HYDROCHLORIDE 1750 MG: 1 INJECTION, POWDER, LYOPHILIZED, FOR SOLUTION INTRAVENOUS at 01:56

## 2017-07-30 RX ADMIN — Medication 10 ML: at 09:37

## 2017-07-30 RX ADMIN — GABAPENTIN 800 MG: 400 CAPSULE ORAL at 15:23

## 2017-07-30 RX ADMIN — LORAZEPAM 0.5 MG: 0.5 TABLET ORAL at 11:40

## 2017-07-30 RX ADMIN — PIPERACILLIN SODIUM,TAZOBACTAM SODIUM 3.38 G: 3; .375 INJECTION, POWDER, FOR SOLUTION INTRAVENOUS at 11:40

## 2017-07-30 RX ADMIN — DOXEPIN HYDROCHLORIDE 75 MG: 25 CAPSULE ORAL at 21:00

## 2017-07-30 RX ADMIN — PRIMIDONE 250 MG: 250 TABLET ORAL at 17:12

## 2017-07-30 RX ADMIN — HYDROCODONE BITARTRATE AND ACETAMINOPHEN 2 TABLET: 5; 325 TABLET ORAL at 04:13

## 2017-07-30 RX ADMIN — GABAPENTIN 800 MG: 400 CAPSULE ORAL at 21:50

## 2017-07-30 RX ADMIN — Medication 10 ML: at 21:02

## 2017-07-30 RX ADMIN — CARBAMAZEPINE 400 MG: 200 TABLET ORAL at 17:12

## 2017-07-30 RX ADMIN — HYDROCODONE BITARTRATE AND ACETAMINOPHEN 2 TABLET: 5; 325 TABLET ORAL at 15:24

## 2017-07-30 RX ADMIN — HYDROCODONE BITARTRATE AND ACETAMINOPHEN 2 TABLET: 5; 325 TABLET ORAL at 20:57

## 2017-07-30 RX ADMIN — HYDROMORPHONE HYDROCHLORIDE 1 MG: 1 INJECTION, SOLUTION INTRAMUSCULAR; INTRAVENOUS; SUBCUTANEOUS at 06:23

## 2017-07-30 RX ADMIN — HYDROMORPHONE HYDROCHLORIDE 1 MG: 1 INJECTION, SOLUTION INTRAMUSCULAR; INTRAVENOUS; SUBCUTANEOUS at 17:12

## 2017-07-30 RX ADMIN — LORAZEPAM 0.5 MG: 0.5 TABLET ORAL at 23:20

## 2017-07-30 RX ADMIN — FLUOXETINE 40 MG: 20 CAPSULE ORAL at 09:35

## 2017-07-30 RX ADMIN — SULFAMETHOXAZOLE AND TRIMETHOPRIM 160 MG: 800; 160 TABLET ORAL at 21:50

## 2017-07-30 RX ADMIN — CARBAMAZEPINE 400 MG: 200 TABLET ORAL at 09:35

## 2017-07-30 RX ADMIN — VANCOMYCIN HYDROCHLORIDE 1750 MG: 1 INJECTION, POWDER, LYOPHILIZED, FOR SOLUTION INTRAVENOUS at 09:40

## 2017-07-30 RX ADMIN — LOSARTAN POTASSIUM 100 MG: 50 TABLET ORAL at 11:39

## 2017-07-30 RX ADMIN — HYDROMORPHONE HYDROCHLORIDE 1 MG: 1 INJECTION, SOLUTION INTRAMUSCULAR; INTRAVENOUS; SUBCUTANEOUS at 23:20

## 2017-07-30 RX ADMIN — LITHIUM CARBONATE 300 MG: 300 TABLET ORAL at 17:12

## 2017-07-31 PROCEDURE — 97110 THERAPEUTIC EXERCISES: CPT

## 2017-07-31 PROCEDURE — 25010000002 HYDROMORPHONE PER 4 MG: Performed by: PODIATRIST

## 2017-07-31 PROCEDURE — 99232 SBSQ HOSP IP/OBS MODERATE 35: CPT | Performed by: NURSE PRACTITIONER

## 2017-07-31 PROCEDURE — 97165 OT EVAL LOW COMPLEX 30 MIN: CPT

## 2017-07-31 PROCEDURE — 94799 UNLISTED PULMONARY SVC/PX: CPT

## 2017-07-31 RX ADMIN — CARBAMAZEPINE 400 MG: 200 TABLET ORAL at 08:05

## 2017-07-31 RX ADMIN — SULFAMETHOXAZOLE AND TRIMETHOPRIM 160 MG: 800; 160 TABLET ORAL at 21:14

## 2017-07-31 RX ADMIN — FLUOXETINE 40 MG: 20 CAPSULE ORAL at 08:03

## 2017-07-31 RX ADMIN — LORAZEPAM 0.5 MG: 0.5 TABLET ORAL at 10:18

## 2017-07-31 RX ADMIN — Medication 10 ML: at 08:06

## 2017-07-31 RX ADMIN — PRIMIDONE 250 MG: 250 TABLET ORAL at 17:03

## 2017-07-31 RX ADMIN — HYDROCODONE BITARTRATE AND ACETAMINOPHEN 2 TABLET: 5; 325 TABLET ORAL at 17:02

## 2017-07-31 RX ADMIN — Medication 10 ML: at 10:19

## 2017-07-31 RX ADMIN — Medication 10 ML: at 21:15

## 2017-07-31 RX ADMIN — GABAPENTIN 800 MG: 400 CAPSULE ORAL at 13:45

## 2017-07-31 RX ADMIN — LOSARTAN POTASSIUM 100 MG: 50 TABLET ORAL at 08:04

## 2017-07-31 RX ADMIN — HYDROCODONE BITARTRATE AND ACETAMINOPHEN 2 TABLET: 5; 325 TABLET ORAL at 11:55

## 2017-07-31 RX ADMIN — GABAPENTIN 800 MG: 400 CAPSULE ORAL at 06:59

## 2017-07-31 RX ADMIN — DOXEPIN HYDROCHLORIDE 75 MG: 25 CAPSULE ORAL at 21:14

## 2017-07-31 RX ADMIN — SULFAMETHOXAZOLE AND TRIMETHOPRIM 160 MG: 800; 160 TABLET ORAL at 10:18

## 2017-07-31 RX ADMIN — HYDROCODONE BITARTRATE AND ACETAMINOPHEN 2 TABLET: 5; 325 TABLET ORAL at 07:42

## 2017-07-31 RX ADMIN — LITHIUM CARBONATE 300 MG: 300 TABLET ORAL at 17:03

## 2017-07-31 RX ADMIN — METOPROLOL TARTRATE 100 MG: 50 TABLET, FILM COATED ORAL at 08:04

## 2017-07-31 RX ADMIN — HYDROCODONE BITARTRATE AND ACETAMINOPHEN 2 TABLET: 5; 325 TABLET ORAL at 21:14

## 2017-07-31 RX ADMIN — HYDROMORPHONE HYDROCHLORIDE 1 MG: 1 INJECTION, SOLUTION INTRAMUSCULAR; INTRAVENOUS; SUBCUTANEOUS at 10:19

## 2017-07-31 RX ADMIN — TEMAZEPAM 30 MG: 15 CAPSULE ORAL at 21:14

## 2017-07-31 RX ADMIN — CARBAMAZEPINE 400 MG: 200 TABLET ORAL at 17:03

## 2017-07-31 RX ADMIN — PRIMIDONE 250 MG: 250 TABLET ORAL at 08:04

## 2017-07-31 RX ADMIN — GABAPENTIN 800 MG: 400 CAPSULE ORAL at 21:18

## 2017-07-31 RX ADMIN — METOPROLOL TARTRATE 100 MG: 50 TABLET, FILM COATED ORAL at 21:14

## 2017-07-31 RX ADMIN — LITHIUM CARBONATE 300 MG: 300 TABLET ORAL at 08:05

## 2017-07-31 RX ADMIN — LORAZEPAM 0.5 MG: 0.5 TABLET ORAL at 15:19

## 2017-08-01 LAB
ALBUMIN SERPL-MCNC: 3 G/DL (ref 3.5–5.2)
ALBUMIN/GLOB SERPL: 1 G/DL
ALP SERPL-CCNC: 52 U/L (ref 40–129)
ALT SERPL W P-5'-P-CCNC: 13 U/L (ref 5–41)
ANION GAP SERPL CALCULATED.3IONS-SCNC: 11.6 MMOL/L
AST SERPL-CCNC: 18 U/L (ref 5–40)
BASOPHILS # BLD AUTO: 0.04 10*3/MM3 (ref 0–0.2)
BASOPHILS NFR BLD AUTO: 0.6 % (ref 0–2)
BILIRUB SERPL-MCNC: 0.3 MG/DL (ref 0.2–1.2)
BUN BLD-MCNC: 16 MG/DL (ref 6–20)
BUN/CREAT SERPL: 14 (ref 7–25)
CALCIUM SPEC-SCNC: 9.1 MG/DL (ref 8.6–10.5)
CHLORIDE SERPL-SCNC: 108 MMOL/L (ref 98–107)
CO2 SERPL-SCNC: 23.4 MMOL/L (ref 22–29)
CREAT BLD-MCNC: 1.14 MG/DL (ref 0.76–1.27)
DEPRECATED RDW RBC AUTO: 43.1 FL (ref 37–54)
EOSINOPHIL # BLD AUTO: 0.34 10*3/MM3 (ref 0.1–0.3)
EOSINOPHIL NFR BLD AUTO: 5.1 % (ref 0–4)
ERYTHROCYTE [DISTWIDTH] IN BLOOD BY AUTOMATED COUNT: 11.8 % (ref 11.5–14.5)
GFR SERPL CREATININE-BSD FRML MDRD: 67 ML/MIN/1.73
GLOBULIN UR ELPH-MCNC: 2.9 GM/DL
GLUCOSE BLD-MCNC: 135 MG/DL (ref 65–99)
HCT VFR BLD AUTO: 34.5 % (ref 42–52)
HGB BLD-MCNC: 11 G/DL (ref 14–18)
IMM GRANULOCYTES # BLD: 0.03 10*3/MM3 (ref 0–0.03)
IMM GRANULOCYTES NFR BLD: 0.4 % (ref 0–0.5)
LYMPHOCYTES # BLD AUTO: 0.87 10*3/MM3 (ref 0.6–4.8)
LYMPHOCYTES NFR BLD AUTO: 13 % (ref 20–45)
MCH RBC QN AUTO: 32.1 PG (ref 27–31)
MCHC RBC AUTO-ENTMCNC: 31.9 G/DL (ref 31–37)
MCV RBC AUTO: 100.6 FL (ref 80–94)
MONOCYTES # BLD AUTO: 0.71 10*3/MM3 (ref 0–1)
MONOCYTES NFR BLD AUTO: 10.6 % (ref 3–8)
NEUTROPHILS # BLD AUTO: 4.68 10*3/MM3 (ref 1.5–8.3)
NEUTROPHILS NFR BLD AUTO: 70.3 % (ref 45–70)
NRBC BLD MANUAL-RTO: 0 /100 WBC (ref 0–0)
PLATELET # BLD AUTO: 175 10*3/MM3 (ref 140–500)
PMV BLD AUTO: 9.6 FL (ref 7.4–10.4)
POTASSIUM BLD-SCNC: 4.1 MMOL/L (ref 3.5–5.2)
PROT SERPL-MCNC: 5.9 G/DL (ref 6–8.5)
RBC # BLD AUTO: 3.43 10*6/MM3 (ref 4.7–6.1)
SODIUM BLD-SCNC: 143 MMOL/L (ref 136–145)
WBC NRBC COR # BLD: 6.67 10*3/MM3 (ref 4.8–10.8)

## 2017-08-01 PROCEDURE — 80053 COMPREHEN METABOLIC PANEL: CPT | Performed by: INTERNAL MEDICINE

## 2017-08-01 PROCEDURE — 99231 SBSQ HOSP IP/OBS SF/LOW 25: CPT | Performed by: HOSPITALIST

## 2017-08-01 PROCEDURE — 97535 SELF CARE MNGMENT TRAINING: CPT

## 2017-08-01 PROCEDURE — 94799 UNLISTED PULMONARY SVC/PX: CPT

## 2017-08-01 PROCEDURE — 85025 COMPLETE CBC W/AUTO DIFF WBC: CPT | Performed by: INTERNAL MEDICINE

## 2017-08-01 PROCEDURE — 97110 THERAPEUTIC EXERCISES: CPT

## 2017-08-01 RX ORDER — HYDROCODONE BITARTRATE AND ACETAMINOPHEN 7.5; 325 MG/1; MG/1
1 TABLET ORAL EVERY 4 HOURS PRN
Status: DISCONTINUED | OUTPATIENT
Start: 2017-08-01 | End: 2017-08-01

## 2017-08-01 RX ORDER — OXYCODONE HYDROCHLORIDE AND ACETAMINOPHEN 5; 325 MG/1; MG/1
1 TABLET ORAL EVERY 4 HOURS PRN
Status: DISCONTINUED | OUTPATIENT
Start: 2017-08-01 | End: 2017-08-01 | Stop reason: CLARIF

## 2017-08-01 RX ORDER — OXYCODONE HYDROCHLORIDE AND ACETAMINOPHEN 5; 325 MG/1; MG/1
2 TABLET ORAL EVERY 4 HOURS PRN
Status: DISCONTINUED | OUTPATIENT
Start: 2017-08-01 | End: 2017-08-02 | Stop reason: HOSPADM

## 2017-08-01 RX ORDER — OXYCODONE HYDROCHLORIDE AND ACETAMINOPHEN 5; 325 MG/1; MG/1
1 TABLET ORAL EVERY 4 HOURS PRN
Status: DISCONTINUED | OUTPATIENT
Start: 2017-08-01 | End: 2017-08-02 | Stop reason: HOSPADM

## 2017-08-01 RX ORDER — HYDROCODONE BITARTRATE AND ACETAMINOPHEN 7.5; 325 MG/1; MG/1
2 TABLET ORAL EVERY 4 HOURS PRN
Status: DISCONTINUED | OUTPATIENT
Start: 2017-08-01 | End: 2017-08-01

## 2017-08-01 RX ORDER — OXYCODONE HYDROCHLORIDE AND ACETAMINOPHEN 5; 325 MG/1; MG/1
2 TABLET ORAL EVERY 4 HOURS PRN
Status: DISCONTINUED | OUTPATIENT
Start: 2017-08-01 | End: 2017-08-01 | Stop reason: CLARIF

## 2017-08-01 RX ORDER — HYDROCODONE BITARTRATE AND ACETAMINOPHEN 5; 325 MG/1; MG/1
1 TABLET ORAL ONCE AS NEEDED
Status: COMPLETED | OUTPATIENT
Start: 2017-08-01 | End: 2017-08-01

## 2017-08-01 RX ADMIN — PRIMIDONE 250 MG: 250 TABLET ORAL at 09:18

## 2017-08-01 RX ADMIN — Medication 10 ML: at 09:17

## 2017-08-01 RX ADMIN — OXYCODONE HYDROCHLORIDE AND ACETAMINOPHEN 2 TABLET: 5; 325 TABLET ORAL at 20:01

## 2017-08-01 RX ADMIN — GABAPENTIN 800 MG: 400 CAPSULE ORAL at 21:25

## 2017-08-01 RX ADMIN — SULFAMETHOXAZOLE AND TRIMETHOPRIM 160 MG: 800; 160 TABLET ORAL at 09:16

## 2017-08-01 RX ADMIN — Medication 10 ML: at 21:49

## 2017-08-01 RX ADMIN — PRIMIDONE 250 MG: 250 TABLET ORAL at 17:00

## 2017-08-01 RX ADMIN — HYDROCODONE BITARTRATE AND ACETAMINOPHEN 2 TABLET: 7.5; 325 TABLET ORAL at 16:52

## 2017-08-01 RX ADMIN — LITHIUM CARBONATE 300 MG: 300 TABLET ORAL at 17:00

## 2017-08-01 RX ADMIN — LITHIUM CARBONATE 300 MG: 300 TABLET ORAL at 09:18

## 2017-08-01 RX ADMIN — LOSARTAN POTASSIUM 100 MG: 50 TABLET ORAL at 09:14

## 2017-08-01 RX ADMIN — HYDROCODONE BITARTRATE AND ACETAMINOPHEN 1 TABLET: 5; 325 TABLET ORAL at 04:26

## 2017-08-01 RX ADMIN — HYDROCODONE BITARTRATE AND ACETAMINOPHEN 2 TABLET: 5; 325 TABLET ORAL at 03:10

## 2017-08-01 RX ADMIN — METOPROLOL TARTRATE 100 MG: 50 TABLET, FILM COATED ORAL at 09:15

## 2017-08-01 RX ADMIN — DOXEPIN HYDROCHLORIDE 75 MG: 25 CAPSULE ORAL at 21:25

## 2017-08-01 RX ADMIN — GABAPENTIN 800 MG: 400 CAPSULE ORAL at 13:05

## 2017-08-01 RX ADMIN — METOPROLOL TARTRATE 100 MG: 50 TABLET, FILM COATED ORAL at 21:25

## 2017-08-01 RX ADMIN — LORAZEPAM 0.5 MG: 0.5 TABLET ORAL at 03:13

## 2017-08-01 RX ADMIN — GABAPENTIN 800 MG: 400 CAPSULE ORAL at 06:36

## 2017-08-01 RX ADMIN — CARBAMAZEPINE 400 MG: 200 TABLET ORAL at 17:00

## 2017-08-01 RX ADMIN — LORAZEPAM 0.5 MG: 0.5 TABLET ORAL at 16:52

## 2017-08-01 RX ADMIN — LORAZEPAM 0.5 MG: 0.5 TABLET ORAL at 09:13

## 2017-08-01 RX ADMIN — CARBAMAZEPINE 400 MG: 200 TABLET ORAL at 09:17

## 2017-08-01 RX ADMIN — HYDROCODONE BITARTRATE AND ACETAMINOPHEN 2 TABLET: 7.5; 325 TABLET ORAL at 13:03

## 2017-08-01 RX ADMIN — SULFAMETHOXAZOLE AND TRIMETHOPRIM 160 MG: 800; 160 TABLET ORAL at 21:49

## 2017-08-01 RX ADMIN — FLUOXETINE 40 MG: 20 CAPSULE ORAL at 09:14

## 2017-08-01 RX ADMIN — TEMAZEPAM 30 MG: 15 CAPSULE ORAL at 21:25

## 2017-08-01 RX ADMIN — HYDROCODONE BITARTRATE AND ACETAMINOPHEN 2 TABLET: 7.5; 325 TABLET ORAL at 09:14

## 2017-08-02 ENCOUNTER — HOSPITAL ENCOUNTER (INPATIENT)
Facility: HOSPITAL | Age: 52
LOS: 16 days | End: 2017-08-18
Attending: HOSPITALIST | Admitting: HOSPITALIST

## 2017-08-02 ENCOUNTER — APPOINTMENT (OUTPATIENT)
Dept: GENERAL RADIOLOGY | Facility: HOSPITAL | Age: 52
End: 2017-08-02

## 2017-08-02 VITALS
SYSTOLIC BLOOD PRESSURE: 131 MMHG | RESPIRATION RATE: 18 BRPM | DIASTOLIC BLOOD PRESSURE: 81 MMHG | OXYGEN SATURATION: 96 % | HEIGHT: 71 IN | BODY MASS INDEX: 37.38 KG/M2 | WEIGHT: 267 LBS | HEART RATE: 78 BPM | TEMPERATURE: 98.4 F

## 2017-08-02 PROBLEM — Z74.09 IMMOBILITY: Status: ACTIVE | Noted: 2017-08-02

## 2017-08-02 LAB
ANION GAP SERPL CALCULATED.3IONS-SCNC: 11.3 MMOL/L
BASOPHILS # BLD AUTO: 0.05 10*3/MM3 (ref 0–0.2)
BASOPHILS NFR BLD AUTO: 0.7 % (ref 0–2)
BUN BLD-MCNC: 16 MG/DL (ref 6–20)
BUN/CREAT SERPL: 14.5 (ref 7–25)
CALCIUM SPEC-SCNC: 8.9 MG/DL (ref 8.6–10.5)
CHLORIDE SERPL-SCNC: 104 MMOL/L (ref 98–107)
CO2 SERPL-SCNC: 24.7 MMOL/L (ref 22–29)
CREAT BLD-MCNC: 1.1 MG/DL (ref 0.76–1.27)
DEPRECATED RDW RBC AUTO: 41.6 FL (ref 37–54)
EOSINOPHIL # BLD AUTO: 0.39 10*3/MM3 (ref 0.1–0.3)
EOSINOPHIL NFR BLD AUTO: 5.6 % (ref 0–4)
ERYTHROCYTE [DISTWIDTH] IN BLOOD BY AUTOMATED COUNT: 11.4 % (ref 11.5–14.5)
GFR SERPL CREATININE-BSD FRML MDRD: 70 ML/MIN/1.73
GLUCOSE BLD-MCNC: 102 MG/DL (ref 65–99)
HCT VFR BLD AUTO: 33.5 % (ref 42–52)
HGB BLD-MCNC: 10.9 G/DL (ref 14–18)
IMM GRANULOCYTES # BLD: 0.03 10*3/MM3 (ref 0–0.03)
IMM GRANULOCYTES NFR BLD: 0.4 % (ref 0–0.5)
LYMPHOCYTES # BLD AUTO: 1.07 10*3/MM3 (ref 0.6–4.8)
LYMPHOCYTES NFR BLD AUTO: 15.4 % (ref 20–45)
MCH RBC QN AUTO: 32.4 PG (ref 27–31)
MCHC RBC AUTO-ENTMCNC: 32.5 G/DL (ref 31–37)
MCV RBC AUTO: 99.7 FL (ref 80–94)
MONOCYTES # BLD AUTO: 0.81 10*3/MM3 (ref 0–1)
MONOCYTES NFR BLD AUTO: 11.6 % (ref 3–8)
NEUTROPHILS # BLD AUTO: 4.62 10*3/MM3 (ref 1.5–8.3)
NEUTROPHILS NFR BLD AUTO: 66.3 % (ref 45–70)
NRBC BLD MANUAL-RTO: 0 /100 WBC (ref 0–0)
PLATELET # BLD AUTO: 263 10*3/MM3 (ref 140–500)
PMV BLD AUTO: 9.3 FL (ref 7.4–10.4)
POTASSIUM BLD-SCNC: 4.1 MMOL/L (ref 3.5–5.2)
RBC # BLD AUTO: 3.36 10*6/MM3 (ref 4.7–6.1)
SODIUM BLD-SCNC: 140 MMOL/L (ref 136–145)
WBC NRBC COR # BLD: 6.97 10*3/MM3 (ref 4.8–10.8)

## 2017-08-02 PROCEDURE — 85025 COMPLETE CBC W/AUTO DIFF WBC: CPT | Performed by: INTERNAL MEDICINE

## 2017-08-02 PROCEDURE — 73630 X-RAY EXAM OF FOOT: CPT

## 2017-08-02 PROCEDURE — 80048 BASIC METABOLIC PNL TOTAL CA: CPT | Performed by: INTERNAL MEDICINE

## 2017-08-02 PROCEDURE — 97110 THERAPEUTIC EXERCISES: CPT

## 2017-08-02 PROCEDURE — 99239 HOSP IP/OBS DSCHRG MGMT >30: CPT | Performed by: HOSPITALIST

## 2017-08-02 PROCEDURE — 25010000002 ENOXAPARIN PER 10 MG: Performed by: HOSPITALIST

## 2017-08-02 PROCEDURE — 97535 SELF CARE MNGMENT TRAINING: CPT

## 2017-08-02 RX ORDER — LOSARTAN POTASSIUM 50 MG/1
100 TABLET ORAL
Status: CANCELLED | OUTPATIENT
Start: 2017-08-03

## 2017-08-02 RX ORDER — LORAZEPAM 0.5 MG/1
0.5 TABLET ORAL 3 TIMES DAILY PRN
Status: DISCONTINUED | OUTPATIENT
Start: 2017-08-02 | End: 2017-08-18 | Stop reason: HOSPADM

## 2017-08-02 RX ORDER — METOPROLOL TARTRATE 50 MG/1
100 TABLET, FILM COATED ORAL EVERY 12 HOURS SCHEDULED
Status: CANCELLED | OUTPATIENT
Start: 2017-08-02

## 2017-08-02 RX ORDER — CARBAMAZEPINE 200 MG/1
400 TABLET ORAL 2 TIMES DAILY
Status: DISCONTINUED | OUTPATIENT
Start: 2017-08-02 | End: 2017-08-18 | Stop reason: HOSPADM

## 2017-08-02 RX ORDER — LORAZEPAM 0.5 MG/1
0.5 TABLET ORAL 3 TIMES DAILY PRN
Status: CANCELLED | OUTPATIENT
Start: 2017-08-02 | End: 2017-08-06

## 2017-08-02 RX ORDER — DOXEPIN HYDROCHLORIDE 25 MG/1
75 CAPSULE ORAL NIGHTLY
Status: CANCELLED | OUTPATIENT
Start: 2017-08-02

## 2017-08-02 RX ORDER — PRIMIDONE 250 MG/1
250 TABLET ORAL 2 TIMES DAILY
Status: CANCELLED | OUTPATIENT
Start: 2017-08-02

## 2017-08-02 RX ORDER — SULFAMETHOXAZOLE AND TRIMETHOPRIM 800; 160 MG/1; MG/1
1 TABLET ORAL EVERY 12 HOURS SCHEDULED
Status: COMPLETED | OUTPATIENT
Start: 2017-08-02 | End: 2017-08-07

## 2017-08-02 RX ORDER — ONDANSETRON 2 MG/ML
4 INJECTION INTRAMUSCULAR; INTRAVENOUS EVERY 6 HOURS PRN
Status: DISCONTINUED | OUTPATIENT
Start: 2017-08-02 | End: 2017-08-18 | Stop reason: HOSPADM

## 2017-08-02 RX ORDER — OXYCODONE HYDROCHLORIDE AND ACETAMINOPHEN 5; 325 MG/1; MG/1
2 TABLET ORAL EVERY 4 HOURS PRN
Status: DISCONTINUED | OUTPATIENT
Start: 2017-08-02 | End: 2017-08-02 | Stop reason: DRUGHIGH

## 2017-08-02 RX ORDER — OXYCODONE HYDROCHLORIDE AND ACETAMINOPHEN 5; 325 MG/1; MG/1
1 TABLET ORAL EVERY 4 HOURS PRN
Status: DISCONTINUED | OUTPATIENT
Start: 2017-08-02 | End: 2017-08-03

## 2017-08-02 RX ORDER — OXYCODONE HYDROCHLORIDE AND ACETAMINOPHEN 5; 325 MG/1; MG/1
2 TABLET ORAL EVERY 4 HOURS PRN
Status: ON HOLD | COMMUNITY
End: 2017-09-05

## 2017-08-02 RX ORDER — FLUOXETINE HYDROCHLORIDE 20 MG/1
40 CAPSULE ORAL DAILY
Status: DISCONTINUED | OUTPATIENT
Start: 2017-08-03 | End: 2017-08-18 | Stop reason: HOSPADM

## 2017-08-02 RX ORDER — SULFAMETHOXAZOLE AND TRIMETHOPRIM 800; 160 MG/1; MG/1
1 TABLET ORAL EVERY 12 HOURS SCHEDULED
Status: CANCELLED | OUTPATIENT
Start: 2017-08-02 | End: 2017-08-07

## 2017-08-02 RX ORDER — OXYCODONE HYDROCHLORIDE AND ACETAMINOPHEN 5; 325 MG/1; MG/1
1 TABLET ORAL EVERY 4 HOURS PRN
Status: DISCONTINUED | OUTPATIENT
Start: 2017-08-02 | End: 2017-08-02 | Stop reason: DRUGHIGH

## 2017-08-02 RX ORDER — ONDANSETRON 2 MG/ML
4 INJECTION INTRAMUSCULAR; INTRAVENOUS EVERY 6 HOURS PRN
Status: CANCELLED | OUTPATIENT
Start: 2017-08-02

## 2017-08-02 RX ORDER — OXYCODONE HYDROCHLORIDE AND ACETAMINOPHEN 5; 325 MG/1; MG/1
1 TABLET ORAL EVERY 4 HOURS PRN
Status: CANCELLED | OUTPATIENT
Start: 2017-08-02 | End: 2017-08-11

## 2017-08-02 RX ORDER — FLUOXETINE HYDROCHLORIDE 20 MG/1
40 CAPSULE ORAL DAILY
Status: CANCELLED | OUTPATIENT
Start: 2017-08-03

## 2017-08-02 RX ORDER — PRIMIDONE 250 MG/1
250 TABLET ORAL 2 TIMES DAILY
Status: DISCONTINUED | OUTPATIENT
Start: 2017-08-02 | End: 2017-08-18 | Stop reason: HOSPADM

## 2017-08-02 RX ORDER — GABAPENTIN 400 MG/1
800 CAPSULE ORAL EVERY 8 HOURS SCHEDULED
Status: CANCELLED | OUTPATIENT
Start: 2017-08-02

## 2017-08-02 RX ORDER — OXYCODONE HYDROCHLORIDE AND ACETAMINOPHEN 5; 325 MG/1; MG/1
2 TABLET ORAL EVERY 4 HOURS PRN
Status: CANCELLED | OUTPATIENT
Start: 2017-08-02 | End: 2017-08-11

## 2017-08-02 RX ORDER — TEMAZEPAM 15 MG/1
30 CAPSULE ORAL NIGHTLY PRN
Status: DISCONTINUED | OUTPATIENT
Start: 2017-08-02 | End: 2017-08-18 | Stop reason: HOSPADM

## 2017-08-02 RX ORDER — LITHIUM CARBONATE 300 MG
300 TABLET ORAL 2 TIMES DAILY
Status: CANCELLED | OUTPATIENT
Start: 2017-08-02

## 2017-08-02 RX ORDER — CARBAMAZEPINE 200 MG/1
400 TABLET ORAL 2 TIMES DAILY
Status: CANCELLED | OUTPATIENT
Start: 2017-08-02

## 2017-08-02 RX ORDER — LORAZEPAM 0.5 MG/1
0.5 TABLET ORAL DAILY PRN
Status: ON HOLD | COMMUNITY
End: 2018-06-10 | Stop reason: ALTCHOICE

## 2017-08-02 RX ORDER — TEMAZEPAM 15 MG/1
30 CAPSULE ORAL NIGHTLY PRN
Status: CANCELLED | OUTPATIENT
Start: 2017-08-02

## 2017-08-02 RX ORDER — SULFAMETHOXAZOLE AND TRIMETHOPRIM 800; 160 MG/1; MG/1
1 TABLET ORAL 2 TIMES DAILY
COMMUNITY
End: 2017-08-22 | Stop reason: HOSPADM

## 2017-08-02 RX ORDER — LOSARTAN POTASSIUM 50 MG/1
100 TABLET ORAL
Status: DISCONTINUED | OUTPATIENT
Start: 2017-08-03 | End: 2017-08-18 | Stop reason: HOSPADM

## 2017-08-02 RX ORDER — METOPROLOL TARTRATE 50 MG/1
100 TABLET, FILM COATED ORAL EVERY 12 HOURS SCHEDULED
Status: DISCONTINUED | OUTPATIENT
Start: 2017-08-02 | End: 2017-08-18 | Stop reason: HOSPADM

## 2017-08-02 RX ORDER — LITHIUM CARBONATE 300 MG
300 TABLET ORAL 2 TIMES DAILY
Status: DISCONTINUED | OUTPATIENT
Start: 2017-08-02 | End: 2017-08-18 | Stop reason: HOSPADM

## 2017-08-02 RX ORDER — DOXEPIN HYDROCHLORIDE 25 MG/1
75 CAPSULE ORAL NIGHTLY
Status: DISCONTINUED | OUTPATIENT
Start: 2017-08-02 | End: 2017-08-18 | Stop reason: HOSPADM

## 2017-08-02 RX ORDER — GABAPENTIN 400 MG/1
800 CAPSULE ORAL EVERY 8 HOURS SCHEDULED
Status: DISCONTINUED | OUTPATIENT
Start: 2017-08-02 | End: 2017-08-18 | Stop reason: HOSPADM

## 2017-08-02 RX ADMIN — SULFAMETHOXAZOLE AND TRIMETHOPRIM 160 MG: 800; 160 TABLET ORAL at 21:51

## 2017-08-02 RX ADMIN — PRIMIDONE 250 MG: 250 TABLET ORAL at 09:15

## 2017-08-02 RX ADMIN — FLUOXETINE 40 MG: 20 CAPSULE ORAL at 09:14

## 2017-08-02 RX ADMIN — LITHIUM CARBONATE 300 MG: 300 TABLET ORAL at 19:04

## 2017-08-02 RX ADMIN — SULFAMETHOXAZOLE AND TRIMETHOPRIM 160 MG: 800; 160 TABLET ORAL at 09:15

## 2017-08-02 RX ADMIN — OXYCODONE HYDROCHLORIDE AND ACETAMINOPHEN 2 TABLET: 5; 325 TABLET ORAL at 15:01

## 2017-08-02 RX ADMIN — LORAZEPAM 0.5 MG: 0.5 TABLET ORAL at 15:02

## 2017-08-02 RX ADMIN — PRIMIDONE 250 MG: 250 TABLET ORAL at 19:04

## 2017-08-02 RX ADMIN — METOPROLOL TARTRATE 100 MG: 50 TABLET, FILM COATED ORAL at 09:15

## 2017-08-02 RX ADMIN — METOPROLOL TARTRATE 100 MG: 50 TABLET, FILM COATED ORAL at 21:51

## 2017-08-02 RX ADMIN — ENOXAPARIN SODIUM 40 MG: 40 INJECTION SUBCUTANEOUS at 19:04

## 2017-08-02 RX ADMIN — CARBAMAZEPINE 400 MG: 200 TABLET ORAL at 09:14

## 2017-08-02 RX ADMIN — OXYCODONE HYDROCHLORIDE AND ACETAMINOPHEN 2 TABLET: 5; 325 TABLET ORAL at 06:45

## 2017-08-02 RX ADMIN — OXYCODONE HYDROCHLORIDE AND ACETAMINOPHEN 2 TABLET: 5; 325 TABLET ORAL at 02:40

## 2017-08-02 RX ADMIN — OXYCODONE HYDROCHLORIDE AND ACETAMINOPHEN 1 TABLET: 5; 325 TABLET ORAL at 18:53

## 2017-08-02 RX ADMIN — CARBAMAZEPINE 400 MG: 200 TABLET ORAL at 19:04

## 2017-08-02 RX ADMIN — LITHIUM CARBONATE 300 MG: 300 TABLET ORAL at 09:15

## 2017-08-02 RX ADMIN — GABAPENTIN 800 MG: 400 CAPSULE ORAL at 06:46

## 2017-08-02 RX ADMIN — TUBERCULIN PURIFIED PROTEIN DERIVATIVE 5 UNITS: 5 INJECTION INTRADERMAL at 19:00

## 2017-08-02 RX ADMIN — LOSARTAN POTASSIUM 100 MG: 50 TABLET ORAL at 09:15

## 2017-08-02 RX ADMIN — OXYCODONE HYDROCHLORIDE AND ACETAMINOPHEN 2 TABLET: 5; 325 TABLET ORAL at 11:13

## 2017-08-02 RX ADMIN — GABAPENTIN 800 MG: 400 CAPSULE ORAL at 15:02

## 2017-08-02 RX ADMIN — TEMAZEPAM 30 MG: 15 CAPSULE ORAL at 23:21

## 2017-08-02 RX ADMIN — LORAZEPAM 0.5 MG: 0.5 TABLET ORAL at 23:21

## 2017-08-02 RX ADMIN — GABAPENTIN 800 MG: 400 CAPSULE ORAL at 21:55

## 2017-08-02 RX ADMIN — OXYCODONE HYDROCHLORIDE AND ACETAMINOPHEN 1 TABLET: 5; 325 TABLET ORAL at 18:49

## 2017-08-02 RX ADMIN — Medication 10 ML: at 09:15

## 2017-08-02 RX ADMIN — OXYCODONE HYDROCHLORIDE AND ACETAMINOPHEN 1 TABLET: 5; 325 TABLET ORAL at 23:21

## 2017-08-02 RX ADMIN — DOXEPIN HYDROCHLORIDE 75 MG: 25 CAPSULE ORAL at 21:52

## 2017-08-02 RX ADMIN — LORAZEPAM 0.5 MG: 0.5 TABLET ORAL at 06:46

## 2017-08-03 PROCEDURE — 25010000002 ENOXAPARIN PER 10 MG: Performed by: HOSPITALIST

## 2017-08-03 PROCEDURE — 97110 THERAPEUTIC EXERCISES: CPT

## 2017-08-03 PROCEDURE — 97530 THERAPEUTIC ACTIVITIES: CPT

## 2017-08-03 PROCEDURE — 97162 PT EVAL MOD COMPLEX 30 MIN: CPT

## 2017-08-03 PROCEDURE — 97165 OT EVAL LOW COMPLEX 30 MIN: CPT

## 2017-08-03 PROCEDURE — 99305 1ST NF CARE MODERATE MDM 35: CPT | Performed by: HOSPITALIST

## 2017-08-03 RX ORDER — AMLODIPINE BESYLATE 2.5 MG/1
2.5 TABLET ORAL
Status: DISCONTINUED | OUTPATIENT
Start: 2017-08-03 | End: 2017-08-18 | Stop reason: HOSPADM

## 2017-08-03 RX ORDER — PETROLATUM 42 G/100G
OINTMENT TOPICAL EVERY 12 HOURS SCHEDULED
Status: DISCONTINUED | OUTPATIENT
Start: 2017-08-03 | End: 2017-08-18 | Stop reason: HOSPADM

## 2017-08-03 RX ORDER — OXYCODONE HYDROCHLORIDE AND ACETAMINOPHEN 5; 325 MG/1; MG/1
2 TABLET ORAL EVERY 4 HOURS PRN
Status: DISCONTINUED | OUTPATIENT
Start: 2017-08-03 | End: 2017-08-15

## 2017-08-03 RX ADMIN — CARBAMAZEPINE 400 MG: 200 TABLET ORAL at 16:48

## 2017-08-03 RX ADMIN — METOPROLOL TARTRATE 100 MG: 50 TABLET, FILM COATED ORAL at 21:01

## 2017-08-03 RX ADMIN — GABAPENTIN 800 MG: 400 CAPSULE ORAL at 21:01

## 2017-08-03 RX ADMIN — PRIMIDONE 250 MG: 250 TABLET ORAL at 16:47

## 2017-08-03 RX ADMIN — LORAZEPAM 0.5 MG: 0.5 TABLET ORAL at 07:53

## 2017-08-03 RX ADMIN — FLUOXETINE 40 MG: 20 CAPSULE ORAL at 07:52

## 2017-08-03 RX ADMIN — PRIMIDONE 250 MG: 250 TABLET ORAL at 07:53

## 2017-08-03 RX ADMIN — SULFAMETHOXAZOLE AND TRIMETHOPRIM 160 MG: 800; 160 TABLET ORAL at 21:01

## 2017-08-03 RX ADMIN — LITHIUM CARBONATE 300 MG: 300 TABLET ORAL at 07:53

## 2017-08-03 RX ADMIN — TEMAZEPAM 30 MG: 15 CAPSULE ORAL at 21:08

## 2017-08-03 RX ADMIN — DOXEPIN HYDROCHLORIDE 75 MG: 25 CAPSULE ORAL at 21:01

## 2017-08-03 RX ADMIN — GABAPENTIN 800 MG: 400 CAPSULE ORAL at 14:11

## 2017-08-03 RX ADMIN — METOPROLOL TARTRATE 100 MG: 50 TABLET, FILM COATED ORAL at 07:53

## 2017-08-03 RX ADMIN — OXYCODONE HYDROCHLORIDE AND ACETAMINOPHEN 2 TABLET: 5; 325 TABLET ORAL at 16:48

## 2017-08-03 RX ADMIN — ENOXAPARIN SODIUM 40 MG: 40 INJECTION SUBCUTANEOUS at 07:54

## 2017-08-03 RX ADMIN — OXYCODONE HYDROCHLORIDE AND ACETAMINOPHEN 1 TABLET: 5; 325 TABLET ORAL at 04:24

## 2017-08-03 RX ADMIN — GABAPENTIN 800 MG: 400 CAPSULE ORAL at 06:58

## 2017-08-03 RX ADMIN — PETROLATUM: 42 OINTMENT TOPICAL at 14:26

## 2017-08-03 RX ADMIN — PETROLATUM: 42 OINTMENT TOPICAL at 21:02

## 2017-08-03 RX ADMIN — CARBAMAZEPINE 400 MG: 200 TABLET ORAL at 07:54

## 2017-08-03 RX ADMIN — OXYCODONE HYDROCHLORIDE AND ACETAMINOPHEN 1 TABLET: 5; 325 TABLET ORAL at 10:14

## 2017-08-03 RX ADMIN — LORAZEPAM 0.5 MG: 0.5 TABLET ORAL at 16:50

## 2017-08-03 RX ADMIN — AMLODIPINE BESYLATE 2.5 MG: 2.5 TABLET ORAL at 14:26

## 2017-08-03 RX ADMIN — SULFAMETHOXAZOLE AND TRIMETHOPRIM 160 MG: 800; 160 TABLET ORAL at 07:52

## 2017-08-03 NOTE — H&P
[facility] HOSPITALIST     Killian Scales MD    CHIEF COMPLAINT: Immobility secondary to Lis Franc fracture right foot, s/p external fixator placement     HISTORY OF PRESENT ILLNESS:    Patient is a 50 y/o male with Bipolar d/o, DM-2, hypertension, h/o hyperlipidemia, Obesity, Chronic macrocytic anemia, h/o DVT and peripheral neuropathy was admitted to skilled care and rehab secondary to immobility secondary to Lis Franc fracture right foot, s/p external fixator placement.  The patient was initially hospitalized for septic shock from a RLE cellulitis and TME which resolved with Abx's and IVFs.  He underwent surgery by Dr. Farah for his fracture and with the external fixator now in place is NWB to RLE.  Dr. Farah continues to follow and the patient will likely undergo definitive surgical repair in about 2 weeks once the swelling has improved. The patient notes some increased pain with movement.  He notes because of his neuropathy and inability to feel his feet that when he moves around especially at night the foot is getting banged around in the bed.  The fixator loosened and Dr. Farah tightened things yesterday.  Patient denies any SOA, CP, N/V/D.  He is eating well.  He is had a low grade fever this AM.      Past Medical History:   Diagnosis Date   • Arthritis    • Bipolar disorder    • Coronary artery disease    • Diabetes mellitus    • Disease of thyroid gland     nodule on thyroid   • DVT (deep venous thrombosis)    • Hyperlipidemia    • Hypertension    • Pseudogout      Past Surgical History:   Procedure Laterality Date   • JOINT REPLACEMENT     • KNEE SURGERY     • TOTAL HIP ARTHROPLASTY       Family History   Problem Relation Age of Onset   • Arthritis Mother    • Cancer Mother    • Hyperlipidemia Mother    • Arthritis Father    • Cancer Father    • Depression Father    • Hypertension Father    • Mental illness Father    • Cancer Sister    • Arthritis Sister    • Hyperlipidemia Sister     • Cancer Paternal Aunt    • Hypertension Daughter    • Depression Son    • Hyperlipidemia Paternal Grandmother    • Hearing loss Paternal Grandfather    • Hyperlipidemia Paternal Grandfather    • Hypertension Paternal Grandfather      Social History   Substance Use Topics   • Smoking status: Never Smoker   • Smokeless tobacco: Not on file   • Alcohol use No     Prescriptions Prior to Admission   Medication Sig Dispense Refill Last Dose   • carBAMazepine (TEGretol) 200 MG tablet Take 400 mg by mouth 2 (two) times a day.   8/2/2017 at 0904   • doxepin (SINEquan) 75 MG capsule Take 75 mg by mouth every night.   8/1/2017 at 2125   • enoxaparin (LOVENOX) 40 MG/0.4ML solution syringe Inject  under the skin Daily.      • FLUoxetine (PROzac) 20 MG capsule Take 40 mg by mouth Daily.   8/2/2017 at 0914   • HYDROcodone-acetaminophen (NORCO) 5-325 MG per tablet Take 1 tablet by mouth every 6 (six) hours as needed.   8/2/2017 at 0646   • lithium 300 MG tablet Take 300 mg by mouth 2 (two) times a day.   8/2/2017 at 0915   • LORazepam (ATIVAN) 0.5 MG tablet Take 0.5 mg by mouth Daily As Needed for Anxiety.      • losartan (COZAAR) 100 MG tablet Take 1 tablet by mouth Daily. 30 tablet 0 8/2/2017 at 0915   • metoprolol tartrate (LOPRESSOR) 100 MG tablet Take 100 mg by mouth 2 (two) times a day.   8/2/2017 at 0915   • oxyCODONE-acetaminophen (PERCOCET) 5-325 MG per tablet Take 2 tablets by mouth Every 4 (Four) Hours As Needed.      • primidone (MYSOLINE) 250 MG tablet Take 250 mg by mouth 2 (two) times a day.   8/2/2017 at 0915   • sulfamethoxazole-trimethoprim (BACTRIM DS,SEPTRA DS) 800-160 MG per tablet Take 1 tablet by mouth 2 (Two) Times a Day.      • temazepam (RESTORIL) 30 MG capsule Take 30 mg by mouth at night as needed.   8/1/2017 at 2125   • FLUoxetine HCl, PMDD, 20 MG capsule Take 1 tablet by mouth daily.   7/22/2017 at 0700   • gabapentin (NEURONTIN) 800 MG tablet Take 800 mg by mouth 3 (three) times a day.   7/22/2017  at 0700   • metFORMIN (GLUCOPHAGE) 500 MG tablet Take 500 mg by mouth 2 (Two) Times a Day With Meals.   7/21/2017 at Unknown time     Allergies:  Lisinopril    REVIEW OF SYSTEMS:  Please see the above history of present illness for pertinent positives and negatives.  The remainder of the patient's systems have been reviewed and are negative.     Vital Signs  Temp:  [98.4 °F (36.9 °C)-100.2 °F (37.9 °C)] 100.2 °F (37.9 °C)  Heart Rate:  [73-93] 93  Resp:  [16-18] 16  BP: (131-150)/(81-95) 150/95  Oxygen Therapy  SpO2: 98 %  Pulse Oximetry Type: Intermittent  O2 Device: room air  Body mass index is 39.75 kg/(m^2).     Flowsheet Rows         First Filed Value    Admission Height      Admission Weight  285 lb (129 kg) Documented at 08/02/2017 1718             Physical Exam:  Physical Exam   Constitutional: Patient appears well-developed, Obese and in no acute distress   HEENT:   Head: Normocephalic and atraumatic.   Eyes:  Pupils are equal, round, and reactive to light. EOM are intact. Sclera are anicteric and non-injected.  Mouth and Throat: Patient has moist mucous membranes. Oropharynx is clear of any erythema or exudate.     Neck: Neck supple. No JVD noted. No thyromegaly present. No lymphadenopathy present.  Cardiovascular: Regular rate, regular rhythm, S1 normal and S2 normal.  Exam reveals no gallop and no friction rub.  No murmur heard.  Pulmonary/Chest: Lungs are clear to auscultation bilaterally. No respiratory distress. No wheezes. No rhonchi. No rales.   Abdominal: Obese. Soft. Bowel sounds are normal. There is no tenderness.   Musculoskeletal: Normal Muscle tone  Extremities: Pulses diminished in the feet L>R. Good cap refill. 1+ edema of hands.  1+ LLE edema and 2+ RLE edema. ACE wrap and external fixator to right foot.  Neurological: Patient is alert and oriented to person, place, and time. Cranial nerves II-XII are grossly intact with no focal deficits.  Skin: Skin is warm. Nails show no clubbing.  No  cyanosis or erythema.     Results Review:    I reviewed the patient's new clinical results.  Lab Results (most recent)     None          Imaging Results (most recent)     None            ECG/EMG Results (most recent)     None            Assessment/Plan      1. Immobility secondary to Lis Franc fracture right foot, s/p external fixator placement: POD#5, podiatry following  Wound nurse following, Remains NWB RLE, pain controlled on oral percocet. Check BMP in AM to assure Bactrim is not affecting renal function.    2. Recent Cellulitis RLE: Was on Vanc/Zosyn in hospital but was de-escalated to oral Bactrim DS (total 14 days of Abx) to complete course. WBC WNL and patient with low grade fever but very immobile, will start IS and monitor. Check CBC in AM.    3. Chronic splenic vein thrombosis: Seen on recent CT abdomen, patient will need GI F/U at discharge.  No acute issues currently.      4. H/O Bipolar disorder:   No acute issues on sinequan, prozac, lithium, and tegretol and PRN  Ativan and restoril.  Last QTc normal      5. DM2: last A1C 4.9% 5/30/2017  Glucose at goal off home metformin  Continue diet control.  No further medication treatment recommended at this time      6. Hypertension: BP persistently elevated, continue home losartan 100 mg daily and metoprolol tartrate 100 mg every 12 hours. Will add low dose of norvasc. Monitor.      7. H/O Hyperlipidemia: no acute issues  On no home medications for this  F/U Killian Scales MD after discharge      8. Obesity: Body mass index is 37.24 kg/(m^2).  Nutrition monitoring      9. Chronic macrocytic anemia: B12 and folate are normal  LDH was normal, suspect anemia of chronic disease. Slightly worse post-op but overall fairly stable.      10. H/O DVT: On lovenox for DVT prophy. Pre-operative Venous duplex RLE negative for DVT      11. Peripheral neuropathy: no acute issues on neurontin  No sensation to feet. PT/OT following.      12. Benign essential tremor: On  home primidone, no acute issues here.    I discussed the patients findings and my recommendations with patient.     Jennifer Paez MD  08/03/17  12:00 PM

## 2017-08-03 NOTE — NURSING NOTE
Seen for initial WOCN skin assessment on Rehab Unit, have been following patient's (L) foot #5 toe prior laceration wound, now on Skilled Unit. All visible bony prominences intact/clear. Cannot see (R) ankle/foot as with dressing/ACE bandage/external fixator pins that are padded with foam tape to prevent trauma to LLE (has small linear scratch, dry, open to air there).     See today's photo of #5 (L) toe, remains clean, healing well, no s/s infection. Will continue daily cleansing with NS and application of Hydrogel-impregnated gauze with short gauze wrap dressing (per ongoing order Dr. Farah, had spoken with him yesterday).     See orders per provider: will add cotton stockinette 'sleeve' to LLE to add further protection to limb from fixator pins. Patient has diabetic neuropathy to lower extremities, had failed 5.07 monofilament test for loss of protective sensation (LOPS), as felt none during testing. Has literature given to him while on acute care re: skin/foot care, precautions/prevention measures. Waffle chair cushion, encourage frequent offloading of sacrum (has new thick mattress and states is very comfortable). Aquaphor ointment to relieve very dry skin areas.    Will follow weekly on Rehab.

## 2017-08-03 NOTE — PLAN OF CARE
Problem: Wound, Traumatic, Nonburn (Adult)  Goal: Signs and Symptoms of Listed Potential Problems Will be Absent or Manageable (Wound, Traumatic, Nonburn)  Outcome: Ongoing (interventions implemented as appropriate)    08/03/17 4848   Wound, Traumatic, Nonburn   Problems Assessed (Wound) all   Problems Present (Wound) other (see comments)  (healing toe laceration; surgical R foot wound)

## 2017-08-03 NOTE — PLAN OF CARE
Problem: Pain, Acute (Adult)  Goal: Acceptable Pain Control/Comfort Level  Outcome: Ongoing (interventions implemented as appropriate)    08/03/17 0323   Pain, Acute (Adult)   Acceptable Pain Control/Comfort Level making progress toward outcome

## 2017-08-03 NOTE — THERAPY EVALUATION
SNF - Physical Therapy Initial Evaluation   [place]     Patient Name: Eliseo Price  : 1965  MRN: [id number]  Today's Date: 8/3/2017   Onset of Illness/Injury or Date of Surgery Date: 17  Date of Referral to PT: 17  Referring Physician: Dr. Nj      Admit Date: 2017     Visit Dx:  No diagnosis found.  Patient Active Problem List   Diagnosis   • Disease of thyroid gland   • Cellulitis of right lower extremity   • Cellulitis   • Infected epidermoid cyst   • Altered mental status, unspecified   • Altered mental status   • Immobility     Past Medical History:   Diagnosis Date   • Arthritis    • Bipolar disorder    • Coronary artery disease    • Diabetes mellitus    • Disease of thyroid gland     nodule on thyroid   • DVT (deep venous thrombosis)    • Hyperlipidemia    • Hypertension    • Pseudogout      Past Surgical History:   Procedure Laterality Date   • JOINT REPLACEMENT     • KNEE SURGERY     • TOTAL HIP ARTHROPLASTY            PT ASSESSMENT (last 72 hours)      PT Evaluation       17 [id number] 17 2351    Rehab Evaluation    Document Type evaluation  -BP     Subjective Information agree to therapy;complains of;pain  -BP     Patient Effort, Rehab Treatment adequate  -BP     Symptoms Noted During/After Treatment increased pain  -BP     General Information    Patient Profile Review yes  -BP     Onset of Illness/Injury or Date of Surgery Date 17  -BP     Referring Physician Dr. Nj  -BP     General Observations Patient supine in bed with HOB elevated. External fixator resting on the foot rest of bed. Not offloaded.   -BP     Pertinent History Of Current Problem Patient presented to the ED with increased falls, injury to R foot and altered mental status. Patient found to have complete lisfranc joint fracture/dislocation with subacute fracture of right lateral mallelous. Patient underwent surgery with external fixator placed. Pt will have subsequent surgery in  approximately 2 weeks to internally fixate. Patient is NWB and presents to SNF for continued rehab. Per RN, MD states patient is able to participate with therapy however otherwise is to be on bed rest or chair rest with R LE elevated in attempts to minimize possibility of de stabilizing external fixator.   -BP     Precautions/Limitations non-weight bearing status   R LE, external fixator  -BP     Prior Level of Function independent:;gait;transfer;bed mobility;ADL's   without device, frequent falls   -BP     Equipment Currently Used at Home none   owns a walker, unsure if standard or rolling. BSC  -BP     Plans/Goals Discussed With patient;agreed upon  -BP     Risks Reviewed patient:;LOB;increased discomfort  -BP     Benefits Reviewed patient:;improve function;increase independence;increase strength  -BP     Barriers to Rehab medically complex   B peripheral neuropathy with ex fixator and NWB  -BP     Living Environment    Lives With alone  -BP     Living Arrangements house  -BP     Home Accessibility bed and bath on same level;stairs to enter home  -BP     Number of Stairs to Enter Home 3  -BP     Stair Railings at Home outside, present at both sides  -BP     Clinical Impression    Date of Referral to PT 08/02/17  -BP     PT Diagnosis Decreased functional mobilityh  -BP     Criteria for Skilled Therapeutic Interventions Met yes;treatment indicated  -BP     Rehab Potential good, to achieve stated therapy goals  -BP     Pain Assessment    Pain Assessment 0-10  -BP     Pain Score 8  -BP     Pain Type Acute pain;Surgical pain  -BP     Pain Location Foot  -BP     Pain Orientation Right  -BP     Pain Intervention(s) Repositioned   nursing gave pain meds  -BP     Response to Interventions unchanged  -BP     Cognitive Assessment/Intervention    Current Cognitive/Communication Assessment impaired  -BP     Orientation Status oriented to;person;place;time  -BP     Follows Commands/Answers Questions able to follow single-step  instructions;needs cueing  -BP     Personal Safety decreased insight to deficits;impulsive  -BP     Personal Safety Interventions gait belt;nonskid shoes/slippers when out of bed  -BP     ROM (Range of Motion)    General ROM Detail No UE AROM deficits noted, No L LE AROM deficits noted. R knee and hip AROM WFL. Ankle ROM deferred  -BP     MMT (Manual Muscle Testing)    General MMT Assessment Detail Bilateral UE strength WFL. L LE strength functional. R LE strength NT  -BP     Muscle Tone Assessment    Muscle Tone Assessment      Bilateral Lower Extremities Muscle Tone Assessment      Bed Mobility, Assessment/Treatment    Bed Mobility, Scoot/Bridge, Castro minimum assist (75% patient effort);verbal cues required  -BP     Bed Mob, Supine to Sit, Castro minimum assist (75% patient effort);verbal cues required  -BP     Bed Mobility, Comment Min A to manage R LE to avoid trauma to external fixator. Patient unaware of positioning of ex fix during bed mobility  -BP     Transfer Assessment/Treatment    Transfers, Sit-Stand Castro contact guard assist;verbal cues required  -BP     Transfers, Stand-Sit Castro moderate assist (50% patient effort);verbal cues required  -BP     Transfers, Sit-Stand-Sit, Assist Device standard walker  -BP     Transfer, Comment Patient requires verbal cues for hand placement. Patient required mod A x 2 to safely manage stand to sit as patient demonstrated no eccentric control to sit and did not demonstrate proper hand placement.   -BP     Gait Assessment/Treatment    Gait, Castro Level minimum assist (75% patient effort);verbal cues required  -BP     Gait, Assistive Device standard walker  -BP     Gait, Distance (Feet) --   4  -BP     Gait, Gait Deviations forward flexed posture  -BP     Gait, Maintain Weight Bearing Status able to maintain weight bearing status  -BP     Gait, Comment Patient hopped forward x 4 feet with directional change to sit in chair. Patient  requires min A for management of AD. Patient requires verbal cues for R foot positioning in space as he continues to hit external fixator on L LE. Notified nursing.   -BP     Sensory Assessment/Intervention    Sensory Impairment --   peripheral neuropathy in B feet  -BP     Positioning and Restraints    Pre-Treatment Position in bed  -BP     Post Treatment Position chair  -BP     In Chair reclined;call light within reach;encouraged to call for assist;notified nsg;with family/caregiver   R LE elevated 3 pillows with ex fix off loaded  -BP     Initials Name Provider Type    EN Reina Calderón, OTR Occupational Therapist    DENNYS Farooq, OTR Occupational Therapist    EI Umu Teresa, DAVE Registered Nurse    TL Evelyn Romano, RN Registered Nurse    BP Lois Duke, PT Physical Therapist    CR Deann Pantoja, RN Registered Nurse    ELENA Wilson, RN Registered Nurse    TOM Acosta, PT Physical Therapist    EE Eleschia Eldridge, LPN Licensed Nurse    EDWARD Dove, RN Registered Nurse          Physical Therapy Education     Title: PT OT SLP Therapies (Active)     Topic: Physical Therapy (Done)     Point: Mobility training (Done)    Learning Progress Summary    Learner Readiness Method Response Comment Documented by Status   Patient Acceptance E [facility] 08/03/17 1143 Done               Point: Precautions (Done)    Learning Progress Summary    Learner Readiness Method Response Comment Documented by Status   Patient Acceptance E [facility] 08/03/17 1143 Done                      User Key     Initials Effective Dates Name Provider Type Discipline     12/01/15 -  Lois Duke, PT Physical Therapist PT                PT Recommendation and Plan  Anticipated Equipment Needs At Discharge: walker, wheelchair  Anticipated Discharge Disposition: extended care facility (Pt will require extended stay until WB status changes)  Planned Therapy Interventions: bed mobility training, transfer training,  gait training  PT Frequency: daily, 5 times/wk  Plan of Care Review  Plan Of Care Reviewed With: patient  Outcome Summary/Follow up Plan: PT Evaluation Complete: Patient performs supine to sit transfer with min A, sit to stand transfer with CGA , stand to sit with mod A x 2 and gait x 4 feet NWB with standard walker with min A. Patient requires assist and verbal cues throughout to avoid hitting external fixator during functional mobility. Notified nursing. Nurse to speak with MD regarding continued participation in therapy. Patient would benefit from skilled PT services to address deficits in functional mobility/transfers. Plan to see patient daily 5x/week x 1 week.  Recommend extended care facility at discharge from PT until weight bearing status is changed.           IP PT Goals       08/03/17 1146 08/02/17 1425 07/30/17 1208    Bed Mobility PT STG    Bed Mobility PT STG, Date Established 08/03/17  -BP      Bed Mobility PT STG, Time to Achieve 1 wk  -BP      Bed Mobility PT STG, Activity Type supine to sit/sit to supine  -BP      Bed Mobility PT STG, Day Level supervision required  -BP      Bed Mobility PT STG, Date Goal Reviewed       Bed Mobility PT STG, Outcome       Bed Mobility PT STG, Reason Goal Not Met       Transfer Training PT STG    Transfer Training PT STG, Date Established 08/03/17  -BP      Transfer Training PT STG, Time to Achieve 1 wk  -BP      Transfer Training PT STG, Activity Type sit to stand/stand to sit  -BP      Transfer Training PT STG, Day Level supervision required  -BP      Transfer Training PT STG, Assist Device walker, standard  -BP      Transfer Training PT STG, Additional Goal while maintaining NWB   -BP      Transfer Training PT STG, Date Goal Reviewed       Transfer Training PT STG, Outcome       Transfer Training PT STG, Reason Goal Not Met       Transfer Training 2 PT STG    Transfer Training PT 2 STG, Date Established       Transfer Training PT 2 STG, Time to  Achieve 1 wk  -BP      Transfer Training PT 2 STG, Activity Type bed to chair /chair to bed  -BP      Transfer Training PT 2 STG, [place] Level supervision required  -BP      Transfer Training PT 2 STG, Assist Device walker, rolling;walker, standard  -BP      Transfer Training PT 2 STG, Additional Goal while maintaining NWB  -BP      Transfer Training PT 2 STG, Date Goal Reviewed       Transfer Training PT 2 STG, Outcome       Transfer Training 2 PT STG, Reason Goal Not Met       Gait Training PT STG    Gait Training Goal PT STG, Date Established 08/03/17  -BP      Gait Training Goal PT STG, Time to Achieve 1 wk  -BP      Gait Training Goal PT STG, [place] Level contact guard assist  -BP      Gait Training Goal PT STG, Assist Device walker, standard  -BP      Gait Training Goal PT STG, Distance to Achieve 10 feet  -BP      Gait Training Goal PT STG, Additional Goal while maintaining NWB  -BP      Gait Training Goal PT STG, Date Goal Reviewed       Gait Training Goal PT STG, Outcome       Gait Training Goal PT STG, Reason Goal Not Met       Patient Education PT STG    Patient Education PT STG, Date Established       Patient Education PT STG, Time to Achieve       Patient Education PT STG, Education Type       Patient Education PT STG, Education Understanding       Patient Education PT STG, Date Goal Reviewed       Patient Education PT STG Outcome       Patient Education PT STG, Reason Goal Not Met         User Key  (r) = Recorded By, (t) = Taken By, (c) = Cosigned By    Initials Name Provider Type    LN Concepción Estes, PT Physical Therapist    BP Lois Duke, PT Physical Therapist                Outcome Measures       08/02/17 0915 08/02/17 0902 08/01/17 0931    How much difficulty does the patient currently have...    Turning from your back to your side while in flat bed without using bedrails?  3  -JW     Standing up from a chair using your arms (e.g., wheelchair, bedside chair)?  3  -JW      Moving from lying on back to sitting on the side of a flat bed without bedrails?  3  -JW     How much help from another person do you currently need...    Moving to and from a bed to a chair (including a wheelchair)?  3  -JW     Climbing 3-5 steps with a railing?  1  -JW     To walk in hospital room?  2  -JW     AM-PAC 6 Clicks Score  15  -JW     How much help from another is currently needed...    Putting on and taking off regular lower body clothing? 3  -SD  3  -EN    Bathing (including washing, rinsing, and drying) 3  -SD  3  -EN    Toileting (which includes using toilet bed pan or urinal) 3  -SD  3  -EN    Putting on and taking off regular upper body clothing 4  -SD  4  -EN    Taking care of personal grooming (such as brushing teeth) 4  -SD  4  -EN    Eating meals 4  -SD  4  -EN    Score 21  -SD  21  -EN    Functional Assessment    Outcome Measure Options AM-PAC 6 Clicks Daily Activity (OT)  -[facility] 6 Clicks Basic Mobility (PT)  -       08/01/17 0930          How much difficulty does the patient currently have...    Turning from your back to your side while in flat bed without using bedrails? 3  -JW      Standing up from a chair using your arms (e.g., wheelchair, bedside chair)? 3  -JW      Moving from lying on back to sitting on the side of a flat bed without bedrails? 3  -JW      How much help from another person do you currently need...    Moving to and from a bed to a chair (including a wheelchair)? 3  -JW      Climbing 3-5 steps with a railing? 1  -JW      To walk in hospital room? 1  -JW      AM-PAC 6 Clicks Score 14  -      Functional Assessment    Outcome Measure Options AM-PAC 6 Clicks Basic Mobility (PT)  -        User Key  (r) = Recorded By, (t) = Taken By, (c) = Cosigned By    Initials Name Provider Type    SAIGE Calderón, OTR Occupational Therapist    DENNYS Farooq, OTR Occupational Therapist    TOM Acosta, PT Physical Therapist           Time Calculation:         PT  Charges       08/03/17 1147          Time Calculation    Start Time 0956  -BP      Stop Time 1026  -BP      Time Calculation (min) 30 min  -BP      PT Non-Billable Time (min) 15 min  -BP      PT Received On 08/03/17  -BP      PT - Next Appointment 08/04/17  -BP      Time Calculation- PT    TCU Minutes- PT 15 min  -BP        User Key  (r) = Recorded By, (t) = Taken By, (c) = Cosigned By    Initials Name Provider Type    BP Lois Duke, PT Physical Therapist          Therapy Charges for Today     Code Description Service Date Service Provider Modifiers Qty    09594915455 HC PT EVAL MOD COMPLEXITY 1 8/3/2017 Lois Duke, PT GP 1    69895239618 HC PT THER PROC EA 15 MIN 8/3/2017 Lois Duke, PT GP 1                 Lois Duke, PT  8/3/2017

## 2017-08-03 NOTE — THERAPY EVALUATION
SNF - Occupational Therapy Initial Evaluation   Rachel Barr     Patient Name: Eliseo Price  : 1965  MRN: [id number]  Today's Date: 8/3/2017  Onset of Illness/Injury or Date of Surgery Date: 17  Date of Referral to OT: 17  Referring Physician: Dr. Nj    Admit Date: 2017     No diagnosis found.  Patient Active Problem List   Diagnosis   • Disease of thyroid gland   • Cellulitis of right lower extremity   • Cellulitis   • Infected epidermoid cyst   • Altered mental status, unspecified   • Altered mental status   • Immobility     Past Medical History:   Diagnosis Date   • Arthritis    • Bipolar disorder    • Coronary artery disease    • Diabetes mellitus    • Disease of thyroid gland     nodule on thyroid   • DVT (deep venous thrombosis)    • Hyperlipidemia    • Hypertension    • Pseudogout      Past Surgical History:   Procedure Laterality Date   • JOINT REPLACEMENT     • KNEE SURGERY     • TOTAL HIP ARTHROPLASTY            OT ASSESSMENT FLOWSHEET (last 72 hours)      OT Evaluation       17 1556 17 1045 17 0900         Document Type  therapy note (daily note)  -SD evaluation  -SD    Subjective Information  agree to therapy;no complaints  -SD agree to therapy;complains of;pain  -SD    Patient Effort, Rehab Treatment  good  -SD adequate  -SD    Symptoms Noted During/After Treatment          Patient Profile Review   yes  -SD    Onset of Illness/Injury or Date of Surgery Date   17  -SD    Referring Physician   [place]  -SD    General Observations   Pt supine in bed with RLE elevated on pillows  -SD    Pertinent History Of Current Problem   Patient presented to the ED with increased falls, injury to R foot and altered mental status. Patient found to have complete lisfranc joint fracture/dislocation with subacute fracture of right lateral mallelous. Patient underwent surgery with external fixator placed. Pt will have subsequent surgery in approximately 2 weeks to  internally fixate. Patient is NWB and presents to SNF for continued rehab. Per RN, MD states patient is able to participate with therapy however otherwise is to be on bed rest or chair rest with R LE elevated in attempts to minimize possibility of de stabilizing external fixator.   Pt was independent with self care activity prior to his hospitalization.    -SD    Precautions/Limitations   non-weight bearing status;fall precautions   RLE, neuropathy in BLE's  -SD    Prior Level of Function   independent:;ADL's;home management  -SD    Equipment Currently Used at Home   --   pt has a walker and BSC at home  -SD    Plans/Goals Discussed With   patient;agreed upon  -SD    Risks Reviewed   patient:;LOB  -SD    Benefits Reviewed   patient:;increase independence  -SD    Barriers to Rehab   medically complex   NWB status of RLE and neuropathy of BLE  -SD       Lives With   alone  -SD    Living Arrangements   house  -SD    Home Accessibility   bed and bath on same level;stairs to enter home  -SD    Number of Stairs to Enter Home   3  -SD    Stair Railings at Home   outside, present at both sides  -SD       Date of Referral to OT   08/02/17  -SD    OT Diagnosis   immobility due ot RLE surgery  -SD    Prognosis   good  -SD    Functional Level At Time Of Evaluation   Pt is able to manage bed mobility with conditional independence (using  bed rails).  Pt is able to manage adl activity from bed with supervision after set up assistance (with exception of assistance for Right foot due to external fixator).  Pt is strict non-weight bearing on RLE which has been difficult to manage per patient due to his BLE neuropathy.  Therefore, the recommendation is to continue with adl performance from bed level to decrease risk of non-compliance with the NWB status.  Pt can benefit from education with use of a wheelchair to increae independence with mobility within his room.  Rec OT services 3x/week x 1 week for education with w/c  "safety/mobility and for education with BUE strengthening program.   -SD    Impairments Found (describe specific impairments)   gait, locomotion, and balance  -SD    Patient/Family Goals Statement   go home when possible.  \"keep weight off my right foot to let it heal.\"  -SD    Criteria for Skilled Therapeutic Interventions Met   yes;treatment indicated  -SD    Rehab Potential   good, to achieve stated therapy goals  -SD    Therapy Frequency   3 times/wk  -SD    Predicted Duration of Therapy Intervention (days/wks)   1 week.    -SD    Anticipated Discharge Disposition other (see comments)   initially discharge to acute care setting for another surgery.  Pt's goal is to return home after all surgeries  -SD  other (see comments)   pt to have another surgery to remove fixator  -SD       Pain Assessment  No/denies pain  -SD 0-10  -SD    Pain Score   5  -SD    Post Pain Score   5  -SD    Pain Type   Acute pain;Surgical pain  -SD    Pain Location   Foot  -SD    Pain Orientation   Right  -SD    Pain Intervention(s)   Repositioned   elevated RLE on a 3rd pillow (was under 2)  -SD    Response to Interventions          Current Cognitive/Communication Assessment       Orientation Status   oriented x 4  -SD    Follows Commands/Answers Questions   100% of the time  -SD    Personal Safety   decreased insight to deficits;decreased awareness, need for safety;decreased awareness, need for assist   Pt felt he could manage at home with NWB status  -SD    Personal Safety Interventions          General ROM Detail   BUE wfl  -SD       General MMT Assessment Detail   BUE strength functional  -SD       Muscle Tone Assessment       Bilateral Lower Extremities Muscle Tone Assessment          Bed Mobility, Assistive Device   bed rails  -SD    Bed Mobility, Scoot/Bridge, [place]       Bed Mob, Supine to Sit, [place]   conditional independence  -SD    Bed Mobility, Comment   Rec supervision when sitting up to EOB to support RLE due to " exteranal fixatory  -SD       Transfers, Sit-Stand [place]   contact guard assist   from EOB with elevated surface  -SD    Transfers, Stand-Sit [place]       Transfers, Sit-Stand-Sit, Assist Device       Transfer, Comment          Functional Mobility- Comment   Rec education with w/c safety and mobility to increase patient's functional independence within his room or on the unit while adhering to NWB status   -SD       UB Bathing Assess/Train, Position   long sitting   in bed  -SD    UB Bathing Assess/Train, [place] Level   independent;set up required  -SD    UB Bathing Assess/Train, Comment   in bed  -SD       LB Bathing Assess/Train, Position   long sitting   in bed  -SD    LB Bathing Assess/Train, [place] Level   set up required;minimum assist (75% patient effort)   superivision with exception of min assist for RLE  -SD    LB Bathing Assess/Train, Comment   in bed, assist for RLE due to external fixator  -SD       UB Dressing Assess/Train, Position   long sitting;other (see comments)   in bed  -SD    UB Dressing Assess/Train, [place]   set up required;independent  -SD       LB Dressing Assess/Train, Clothing Type   shorts  -SD    LB Dressing Assess/Train, Position   long sitting   in bed  -SD    LB Dressing Assess/Train, [place]   supervision required;set up required  -SD       Grooming Assess/Train, Position   long sitting   in bed  -SD    Grooming Assess/Train, [place] Level   independent;set up required  -SD       Bilateral Upper Extremity  15 reps;elbow flexion/extension;shoulder horizontal abd/add   3 UE exercises using theraband   -SD     BUE Resistance  theraband   blue  -SD        Sensory Impairment          Planned Therapy Interventions   home exercise program;other (see comments)   education with wheelchair safety and mobility  -SD       Pre-Treatment Position  sitting in chair/recliner  -SD in bed  -SD    Post Treatment Position  chair  -SD bed  -SD    In Bed   supine;call  light within reach;encouraged to call for assist;RLE elevated  -SD    In Chair  reclined;call light within reach;encouraged to call for assist;with nsg;RLE elevated   wound care nurse in room  -SD       User Key  (r) = Recorded By, (t) = Taken By, (c) = Cosigned By    Initials Name Effective Dates    EN Reina Calderón, OTR 06/22/16 -     SD Vamshi Farooq, OTR 06/22/16 -     EI Umu Teresa, RN 06/16/16 -     TL Evelyn Romano, RN 06/16/16 -     BP Lois Duke, PT 12/01/15 -     CR Deann Pantoja, RN 06/16/16 -     AW Harleen Wilson RN 06/16/16 -     JW Viviane Acosta, PT 12/01/15 -     EE Eleschia Eldridge, LPN 07/25/17 -     KM Shannon Dove, RN 07/25/17 -            Occupational Therapy Education     Title: PT OT SLP Therapies (Active)     Topic: Occupational Therapy (Done)     Point: ADL training (Done)    Description: Instruct learner(s) on proper safety adaptation and remediation techniques during self care or transfers.   Instruct in proper use of assistive devices.    Learning Progress Summary    Learner Readiness Method Response Comment Documented by Status   Patient Acceptance E VU education regarding OT services.  Rec continue with adl performance from bed to minimize risk of non-compliance with NWB status of RLE. SD 08/03/17 1541 Done               Point: Home exercise program (Done)    Description: Instruct learner(s) on appropriate technique for monitoring, assisting and/or progressing therapeutic exercises/activities.    Learning Progress Summary    Learner Readiness Method Response Comment Documented by Status   Patient Acceptance E,TB,D VU,DU education with BUE strengthening program with theraband (blue).  Pt demonstrated the program independently. SD 08/03/17 1547 Done                      User Key     Initials Effective Dates Name Provider Type Discipline    SD 06/22/16 -  Vamshi Farooq, OTR Occupational Therapist OT                  OT Recommendation and  Plan  Anticipated Discharge Disposition: other (see comments) (initially discharge to acute care setting for another surgery.  Pt's goal is to return home after all surgeries)  Planned Therapy Interventions: home exercise program, other (see comments) (education with wheelchair safety and mobility)  Therapy Frequency: 3 times/wk x 1 week  Plan of Care Review  Plan Of Care Reviewed With: patient  Outcome Summary/Follow up Plan: OT evaluation completed.  Pt is able to manage bed mobility with conditional independence (using  bed rails).  Pt is able to manage adl activity from bed with supervision after set up assistance (with exception of assistance for Right foot due to external fixator).  Pt is strict non-weight bearing on RLE which has been difficult to manage per patient due to his BLE neuropathy.  Therefore, the recommendation is to continue with adl performance from bed level to decrease risk of non-compliance with the NWB status.  Pt can benefit from education with use of a wheelchair to increase independence with mobility within his room and on the unit.  Rec OT services 3x/week x 1 week for education with w/c safety/mobility and for education with BUE strengthening program.           OT Goals       08/03/17 0930          Patient Education OT STG    Patient Education OT STG, Date Established 08/03/17  -SD      Patient Education OT STG, Time to Achieve 1 wk  -SD      Patient Education OT STG, Education Type HEP   theraband   -SD      Patient Education OT STG, Education Understanding demonstrates adequately;independent  -SD      Functional Mobility OT STG    Functional Mobility Goal OT STG, Date Established 08/03/17  -SD      Functional Mobility Goal OT STG, Time to Achieve 1 wk  -SD      Functional Mobility Goal OT STG, [place] Level supervision  -SD      Functional Mobility Goal OT STG, Assist Device --   wheelchair  -SD      Functional Mobility Goal OT STG, Additional Goal Pt to verbalize w/c safety and  demonstrate ability to manuever w/c within his room and on the unit.  -SD        User Key  (r) = Recorded By, (t) = Taken By, (c) = Cosigned By    Initials Name Provider Type    SD REINA Navarrete Occupational Therapist                  Time Calculation:   OT Start Time: 1027  OT Stop Time: 1051  OT Time Calculation (min): 24 min  OT Non-Billable Time (min): 26 min (evaluation)    Therapy Charges for Today     Code Description Service Date Service Provider Modifiers Qty    98147574680  OT EVAL LOW COMPLEXITY 2 8/3/2017 Vamshi Farooq OTR GO 1    [id number]  OT THERAPEUTIC ACT EA 15 MIN 8/3/2017 Vamshi Farooq OTR GO 2               Vamshi Farooq OTR  8/3/2017 and SNF - Occupational Therapy Treatment Note  EVONNE Epstein     Patient Name: Eliseo Price  : 1965  MRN: [id number]  Today's Date: 8/3/2017  Onset of Illness/Injury or Date of Surgery Date: 17  Date of Referral to OT: 17  Referring Physician: Dr. Nj      Admit Date: 2017    Visit Dx:   No diagnosis found.  Patient Active Problem List   Diagnosis   • Disease of thyroid gland   • Cellulitis of right lower extremity   • Cellulitis   • Infected epidermoid cyst   • Altered mental status, unspecified   • Altered mental status   • Immobility             Adult Rehabilitation Note       17 1045         Discipline occupational therapist  -SD    Document Type therapy note (daily note)  -SD    Subjective Information agree to therapy;no complaints  -SD    Patient Effort, Rehab Treatment good  -SD    Symptoms Noted During/After Treatment     Symptoms Noted Comment     Precautions/Limitations     Specific Treatment Considerations education with BUE strengthening program to allow patient to increase UE strength needed for functional transfers and to maintain NWB status (using walker)  -SD    Patient Response to Treatment Pt motivated to participate in BUE strengthening program.  Pt was able to independently  demonstrate the strengthening program using blue theraband   -SD    Recorded by [SD] REINA Navarrete       Pain Assessment No/denies pain  -SD    Pain Score     Post Pain Score     Pain Type     Pain Location     Pain Orientation     Pain Intervention(s)     Response to Interventions     Recorded by [SD] REINA Navarrete    Bilateral Upper Extremity 15 reps;elbow flexion/extension;shoulder horizontal abd/add   3 UE exercises using theraband   -SD    BUE Resistance theraband   blue  -SD    Recorded by [SD] Vamshi Farooq OTR       Pre-Treatment Position sitting in chair/recliner  -SD    Post Treatment Position chair  -SD    In Chair reclined;call light within reach;encouraged to call for assist;with nsg;RLE elevated   wound care nurse in room  -SD    Recorded by [SD] Vamshi Farooq OTR      User Key  (r) = Recorded By, (t) = Taken By, (c) = Cosigned By    Initials Name Effective Dates    EN Reina Calderón, OTR 06/22/16 -     SD Vamshi Farooq, OTR 06/22/16 -     JW Viviane Acosta, PT 12/01/15 -                 OT Goals       08/03/17 0930          Patient Education OT STG    Patient Education OT STG, Date Established 08/03/17  -SD      Patient Education OT STG, Time to Achieve 1 wk  -SD      Patient Education OT STG, Education Type HEP   theraband   -SD      Patient Education OT STG, Education Understanding demonstrates adequately;independent  -SD      Functional Mobility OT STG    Functional Mobility Goal OT STG, Date Established 08/03/17  -SD      Functional Mobility Goal OT STG, Time to Achieve 1 wk  -SD      Functional Mobility Goal OT STG, [place] Level supervision  -SD      Functional Mobility Goal OT STG, Assist Device --   wheelchair  -SD      Functional Mobility Goal OT STG, Additional Goal Pt to verbalize w/c safety and demonstrate ability to manuever w/c within his room and on the unit.  -SD        User Key  (r) = Recorded By, (t) = Taken By, (c) = Cosigned By    Initials Name  Provider Type    SD Vamshi Farooq OTR Occupational Therapist          Occupational Therapy Education     Title: PT OT SLP Therapies (Active)     Topic: Occupational Therapy (Done)     Point: ADL training (Done)    Description: Instruct learner(s) on proper safety adaptation and remediation techniques during self care or transfers.   Instruct in proper use of assistive devices.    Learning Progress Summary    Learner Readiness Method Response Comment Documented by Status   Patient Acceptance E MARIE education regarding OT services.  Rec continue with adl performance from bed to minimize risk of non-compliance with NWB status of RLE. SD 08/03/17 1541 Done               Point: Home exercise program (Done)    Description: Instruct learner(s) on appropriate technique for monitoring, assisting and/or progressing therapeutic exercises/activities.    Learning Progress Summary    Learner Readiness Method Response Comment Documented by Status   Patient Acceptance E,TB,D MARIE,YESICA education with BUE strengthening program with theraband (blue).  Pt demonstrated the program independently. SD 08/03/17 1547 Done                      User Key     Initials Effective Dates Name Provider Type Discipline    SD 06/22/16 -  Vamshi Farooq OTLEISA Occupational Therapist OT                  OT Recommendation and Plan  Anticipated Discharge Disposition: other (see comments) (initially discharge to acute care setting for another surgery.  Pt's goal is to return home after all surgeries)  Planned Therapy Interventions: home exercise program, other (see comments) (education with wheelchair safety and mobility)  Therapy Frequency: 3 times/wk x 1 week  Plan of Care Review  Plan Of Care Reviewed With: patient  Outcome Summary/Follow up Plan:   Education with BUE strengthening program.              Time Calculation:         Time Calculation- OT       08/03/17 1605 08/03/17 1557       Time Calculation- OT    OT Start Time 1027  -SD 0855  -SD     OT  Stop Time 1051  -SD 0921  -SD     OT Time Calculation (min) 24 min  -SD 26 min  -SD     Total Timed Code Minutes- OT 24 minute(s)  -SD      OT Non-Billable Time (min)  26 min   evaluation  -SD     TCU Minutes- OT 24 min  -SD        User Key  (r) = Recorded By, (t) = Taken By, (c) = Cosigned By    Initials Name Provider Type    SD REINA Navarrete Occupational Therapist           Therapy Charges for Today     Code Description Service Date Service Provider Modifiers Qty    55880096626  OT EVAL LOW COMPLEXITY 2 8/3/2017 REINA Navarrete GO 1    [id number]  OT THERAPEUTIC ACT EA 15 MIN 8/3/2017 REINA Navarrete GO 2               REINA Landers  8/3/2017

## 2017-08-03 NOTE — PLAN OF CARE
Problem: Inpatient Physical Therapy  Goal: Bed Mobility Goal STG- PT    08/03/17 1146   Bed Mobility PT STG   Bed Mobility PT STG, Date Established 08/03/17   Bed Mobility PT STG, Time to Achieve 1 wk   Bed Mobility PT STG, Activity Type supine to sit/sit to supine   Bed Mobility PT STG, [place] Level supervision required       Goal: Transfer Training Goal 1 STG- PT    08/03/17 1146   Transfer Training PT STG   Transfer Training PT STG, Date Established 08/03/17   Transfer Training PT STG, Time to Achieve 1 wk   Transfer Training PT STG, Activity Type sit to stand/stand to sit   Transfer Training PT STG, [place] Level supervision required   Transfer Training PT STG, Assist Device walker, standard   Transfer Training PT STG, Additional Goal while maintaining NWB        Goal: Transfer Training Goal 2 STG- PT    08/03/17 1146   Transfer Training 2 PT STG   Transfer Training PT 2 STG, Time to Achieve 1 wk   Transfer Training PT 2 STG, Activity Type bed to chair /chair to bed   Transfer Training PT 2 STG, [place] Level supervision required   Transfer Training PT 2 STG, Assist Device walker, rolling;walker, standard   Transfer Training PT 2 STG, Additional Goal while maintaining NWB       Goal: Gait Training Goal STG- PT    08/03/17 1146   Gait Training PT STG   Gait Training Goal PT STG, Date Established 08/03/17   Gait Training Goal PT STG, Time to Achieve 1 wk   Gait Training Goal PT STG, [place] Level contact guard assist   Gait Training Goal PT STG, Assist Device walker, standard   Gait Training Goal PT STG, Distance to Achieve 10 feet   Gait Training Goal PT STG, Additional Goal while maintaining NWB

## 2017-08-03 NOTE — PLAN OF CARE
Problem: Patient Care Overview (Adult)  Goal: Plan of Care Review    08/03/17 1143   Coping/Psychosocial Response Interventions   Plan Of Care Reviewed With patient   Outcome Evaluation   Outcome Summary/Follow up Plan PT Evaluation Complete: Patient performs supine to sit transfer with min A, sit to stand transfer with CGA , stand to sit with mod A x 2 and gait x 4 feet NWB with standard walker with min A. Patient requires assist and verbal cues throughout to avoid hitting external fixator during functional mobility. Notified nursing. Nurse to speak with MD regarding continued participation in therapy. Patient would benefit from skilled PT services to address deficits in functional mobility/transfers. Plan to see patient daily 5x/week x 1 week. Recommend extended care facility at discharge from PT until weight bearing status is changed.

## 2017-08-03 NOTE — PLAN OF CARE
Problem: Patient Care Overview (Adult)  Goal: Plan of Care Review  Outcome: Ongoing (interventions implemented as appropriate)    08/03/17 1139   Coping/Psychosocial Response Interventions   Plan Of Care Reviewed With patient   Patient Care Overview   Progress (healing (L) toe; new to Rehab/skin care)   Outcome Evaluation   Outcome Summary/Follow up Plan Have been following patient's (L) foot #5 toe prior laceration wound, now on Skilled Unit. All visible bony prominences intact/clear. Cannot see (R) ankle/foot as with dressing/ACE bandage/external fixator pins that are padded with foam tape to prevent trauma to LLE (has small linear scratch, dry, open to air there).

## 2017-08-04 LAB
ANION GAP SERPL CALCULATED.3IONS-SCNC: 8.7 MMOL/L
BASOPHILS # BLD AUTO: 0.05 10*3/MM3 (ref 0–0.2)
BASOPHILS NFR BLD AUTO: 0.8 % (ref 0–2)
BUN BLD-MCNC: 17 MG/DL (ref 6–20)
BUN/CREAT SERPL: 19.5 (ref 7–25)
CALCIUM SPEC-SCNC: 7.5 MG/DL (ref 8.6–10.5)
CHLORIDE SERPL-SCNC: 109 MMOL/L (ref 98–107)
CO2 SERPL-SCNC: 25.3 MMOL/L (ref 22–29)
CREAT BLD-MCNC: 0.87 MG/DL (ref 0.76–1.27)
DEPRECATED RDW RBC AUTO: 41.7 FL (ref 37–54)
EOSINOPHIL # BLD AUTO: 0.33 10*3/MM3 (ref 0.1–0.3)
EOSINOPHIL NFR BLD AUTO: 5.2 % (ref 0–4)
ERYTHROCYTE [DISTWIDTH] IN BLOOD BY AUTOMATED COUNT: 11.3 % (ref 11.5–14.5)
GFR SERPL CREATININE-BSD FRML MDRD: 92 ML/MIN/1.73
GLUCOSE BLD-MCNC: 82 MG/DL (ref 65–99)
HCT VFR BLD AUTO: 30.8 % (ref 42–52)
HGB BLD-MCNC: 10.1 G/DL (ref 14–18)
IMM GRANULOCYTES # BLD: 0.03 10*3/MM3 (ref 0–0.03)
IMM GRANULOCYTES NFR BLD: 0.5 % (ref 0–0.5)
LYMPHOCYTES # BLD AUTO: 1.13 10*3/MM3 (ref 0.6–4.8)
LYMPHOCYTES NFR BLD AUTO: 17.7 % (ref 20–45)
MCH RBC QN AUTO: 32.7 PG (ref 27–31)
MCHC RBC AUTO-ENTMCNC: 32.8 G/DL (ref 31–37)
MCV RBC AUTO: 99.7 FL (ref 80–94)
MONOCYTES # BLD AUTO: 0.71 10*3/MM3 (ref 0–1)
MONOCYTES NFR BLD AUTO: 11.1 % (ref 3–8)
NEUTROPHILS # BLD AUTO: 4.14 10*3/MM3 (ref 1.5–8.3)
NEUTROPHILS NFR BLD AUTO: 64.7 % (ref 45–70)
NRBC BLD MANUAL-RTO: 0 /100 WBC (ref 0–0)
PLATELET # BLD AUTO: 241 10*3/MM3 (ref 140–500)
PMV BLD AUTO: 9 FL (ref 7.4–10.4)
POTASSIUM BLD-SCNC: 3.9 MMOL/L (ref 3.5–5.2)
RBC # BLD AUTO: 3.09 10*6/MM3 (ref 4.7–6.1)
SODIUM BLD-SCNC: 143 MMOL/L (ref 136–145)
WBC NRBC COR # BLD: 6.39 10*3/MM3 (ref 4.8–10.8)

## 2017-08-04 PROCEDURE — 25010000002 ENOXAPARIN PER 10 MG: Performed by: HOSPITALIST

## 2017-08-04 PROCEDURE — 85025 COMPLETE CBC W/AUTO DIFF WBC: CPT | Performed by: HOSPITALIST

## 2017-08-04 PROCEDURE — 80048 BASIC METABOLIC PNL TOTAL CA: CPT | Performed by: HOSPITALIST

## 2017-08-04 PROCEDURE — 97530 THERAPEUTIC ACTIVITIES: CPT

## 2017-08-04 RX ADMIN — LORAZEPAM 0.5 MG: 0.5 TABLET ORAL at 11:31

## 2017-08-04 RX ADMIN — DOXEPIN HYDROCHLORIDE 75 MG: 25 CAPSULE ORAL at 20:54

## 2017-08-04 RX ADMIN — LITHIUM CARBONATE 300 MG: 300 TABLET ORAL at 09:05

## 2017-08-04 RX ADMIN — GABAPENTIN 800 MG: 400 CAPSULE ORAL at 13:48

## 2017-08-04 RX ADMIN — AMLODIPINE BESYLATE 2.5 MG: 2.5 TABLET ORAL at 09:03

## 2017-08-04 RX ADMIN — TEMAZEPAM 30 MG: 15 CAPSULE ORAL at 20:46

## 2017-08-04 RX ADMIN — LORAZEPAM 0.5 MG: 0.5 TABLET ORAL at 19:47

## 2017-08-04 RX ADMIN — SULFAMETHOXAZOLE AND TRIMETHOPRIM 160 MG: 800; 160 TABLET ORAL at 20:54

## 2017-08-04 RX ADMIN — METOPROLOL TARTRATE 100 MG: 50 TABLET, FILM COATED ORAL at 09:04

## 2017-08-04 RX ADMIN — CARBAMAZEPINE 400 MG: 200 TABLET ORAL at 09:06

## 2017-08-04 RX ADMIN — OXYCODONE HYDROCHLORIDE AND ACETAMINOPHEN 2 TABLET: 5; 325 TABLET ORAL at 17:34

## 2017-08-04 RX ADMIN — LITHIUM CARBONATE 300 MG: 300 TABLET ORAL at 17:08

## 2017-08-04 RX ADMIN — GABAPENTIN 800 MG: 400 CAPSULE ORAL at 06:39

## 2017-08-04 RX ADMIN — OXYCODONE HYDROCHLORIDE AND ACETAMINOPHEN 2 TABLET: 5; 325 TABLET ORAL at 02:59

## 2017-08-04 RX ADMIN — CARBAMAZEPINE 400 MG: 200 TABLET ORAL at 17:08

## 2017-08-04 RX ADMIN — ENOXAPARIN SODIUM 40 MG: 40 INJECTION SUBCUTANEOUS at 09:03

## 2017-08-04 RX ADMIN — PETROLATUM: 42 OINTMENT TOPICAL at 20:45

## 2017-08-04 RX ADMIN — LORAZEPAM 0.5 MG: 0.5 TABLET ORAL at 02:59

## 2017-08-04 RX ADMIN — SULFAMETHOXAZOLE AND TRIMETHOPRIM 160 MG: 800; 160 TABLET ORAL at 09:04

## 2017-08-04 RX ADMIN — OXYCODONE HYDROCHLORIDE AND ACETAMINOPHEN 2 TABLET: 5; 325 TABLET ORAL at 09:18

## 2017-08-04 RX ADMIN — PRIMIDONE 250 MG: 250 TABLET ORAL at 17:08

## 2017-08-04 RX ADMIN — PETROLATUM: 42 OINTMENT TOPICAL at 09:07

## 2017-08-04 RX ADMIN — OXYCODONE HYDROCHLORIDE AND ACETAMINOPHEN 2 TABLET: 5; 325 TABLET ORAL at 13:53

## 2017-08-04 RX ADMIN — FLUOXETINE 40 MG: 20 CAPSULE ORAL at 09:05

## 2017-08-04 RX ADMIN — METOPROLOL TARTRATE 100 MG: 50 TABLET, FILM COATED ORAL at 20:54

## 2017-08-04 RX ADMIN — GABAPENTIN 800 MG: 400 CAPSULE ORAL at 21:01

## 2017-08-04 RX ADMIN — LOSARTAN POTASSIUM 100 MG: 50 TABLET ORAL at 09:04

## 2017-08-04 RX ADMIN — PRIMIDONE 250 MG: 250 TABLET ORAL at 09:03

## 2017-08-04 NOTE — THERAPY TREATMENT NOTE
SNF - Occupational Therapy Treatment Note   Rachel Barr     Patient Name: Eliseo Price  : 1965  MRN: [id number]  Today's Date: 2017  Onset of Illness/Injury or Date of Surgery Date: 17  Date of Referral to OT: 17  Referring Physician: Dr. Nj      Admit Date: 2017    Visit Dx:   No diagnosis found.  Patient Active Problem List   Diagnosis   • Disease of thyroid gland   • Cellulitis of right lower extremity   • Cellulitis   • Infected epidermoid cyst   • Altered mental status, unspecified   • Altered mental status   • Immobility             Adult Rehabilitation Note       17 0915 17 1045 17 0915    Rehab Assessment/Intervention    Discipline occupational therapist  -JJ occupational therapist  -SD occupational therapist  -SD    Document Type therapy note (daily note)  -JJ therapy note (daily note)  -SD therapy note (daily note)  -SD    Subjective Information agree to therapy;no complaints  -JJ agree to therapy;no complaints  -SD agree to therapy;complains of;pain  -SD    Patient Effort, Rehab Treatment good  -JJ good  -SD good  -SD    Symptoms Noted During/After Treatment   fatigue  -SD    Symptoms Noted Comment   pt reported fatigue after tranfer/mobility  -SD    Precautions/Limitations   non-weight bearing status  -SD    Specific Treatment Considerations  education with BUE strengthening program to allow patient to increase UE strength needed for functional transfers and to maintain NWB status (using walker)  -SD     Patient Response to Treatment  Pt motivated to participate in BUE strengthening program.  Pt was able to independently demonstrate the strengthening program using blue theraband   -SD     Recorded by [JJ] REINA Ugarte [SD] REINA Navarrete [SD] Vamshi Farooq OTR    Pain Assessment    Pain Assessment No/denies pain  -JJ No/denies pain  -SD 0-10  -SD    Pain Score   7  -SD    Post Pain Score   7  -SD    Pain Type   Acute  pain;Surgical pain  -SD    Pain Location   Foot  -SD    Pain Orientation   Right  -SD    Pain Intervention(s)   Repositioned  -SD    Recorded by [JJ] Tracey Looney OTR [SD] REINA Navarrete [SD] REINA Navarrete    Cognitive Assessment/Intervention    Personal Safety Interventions   gait belt;fall prevention program maintained;nonskid shoes/slippers when out of bed;supervised activity  -SD    Recorded by   [SD] Vamshi Faroqo OTR    Bed Mobility, Assessment/Treatment    Bed Mob, Supine to Sit, [place]   supervision required  -SD    Recorded by   [SD] Vamshi Farooq OTR    Transfer Assessment/Treatment    Transfers, Sit-Stand [place]   minimum assist (75% patient effort)  -SD    Transfers, Stand-Sit [place]   contact guard assist  -SD    Transfers, Sit-Stand-Sit, Assist Device   standard walker  -SD    Transfer, Maintain Weight Bearing Status   able to maintain weight bearing status  -SD    Recorded by   [SD] REINA Navarrete    Functional Mobility    Functional Mobility- Ind. Level   minimum assist (75% patient effort)  -SD    Functional Mobility- Device   standard walker  -SD    Functional Mobility-Distance (Feet)   5  -SD    Functional Mobility-Maintain WBing   able to maintain weight bearing status  -SD    Functional Mobility- Safety Issues   balance decreased during turns  -SD    Recorded by   [SD] REINA Navarrete    Lower Body Dressing Assessment/Training    LB Dressing Assess/Train, Clothing Type   donning:   left shoe  -SD    LB Dressing Assess/Train, Position   sitting;edge of bed  -SD    LB Dressing Assess/Train, [place]   independent  -SD    Recorded by   [SD] REINA Navarrete    Therapy Exercises    Bilateral Upper Extremity AROM:;elbow flexion/extension;shoulder abduction/adduction;15 reps   provided pt w pictures of theraband ex and compl 15 rep each  -JJ 15 reps;elbow flexion/extension;shoulder horizontal abd/add   3 UE exercises using  theraband   -SD     BUE Resistance theraband   blue  -JJ theraband   blue  -SD     Recorded by [JJ] Tracey Looney, OTR [SD] Vamshi Farooq, OTR     Positioning and Restraints    Pre-Treatment Position in bed  -JJ sitting in chair/recliner  -SD in bed  -SD    Post Treatment Position bed  -JJ chair  -SD chair  -SD    In Bed supine;call light within reach;encouraged to call for assist  -JJ      In Chair  reclined;call light within reach;encouraged to call for assist;with nsg;RLE elevated   wound care nurse in room  -SD reclined;call light within reach;encouraged to call for assist;with nsg  -SD    Recorded by [JJ] Tracey Looney, OTR [SD] Vamshi Farooq, OTR [SD] Vamshi Farooq, BERNARDR      08/02/17 0902          Rehab Assessment/Intervention    Discipline physical therapist  -JW      Document Type therapy note (daily note)  -JW      Subjective Information agree to therapy;complains of;pain  -JW      Patient Effort, Rehab Treatment good  -JW      Symptoms Noted During/After Treatment fatigue  -JW      Precautions/Limitations non-weight bearing status  -JW      Recorded by [JW] Viviane Acosta PT      Pain Assessment    Pain Assessment 0-10  -JW      Pain Score 7  -JW      Post Pain Score 7  -JW      Pain Type Acute pain  -JW      Pain Location Foot  -JW      Pain Orientation Right  -JW      Pain Intervention(s) Repositioned  -JW      Response to Interventions tolerated  -JW      Recorded by [JW] Viviane Acosta PT      Bed Mobility, Assessment/Treatment    Bed Mob, Supine to Sit, [place] supervision required;verbal cues required  -JW      Recorded by [JW] Viviane Acosta PT      Transfer Assessment/Treatment    Transfers, Sit-Stand [place] minimum assist (75% patient effort);verbal cues required  -JW      Transfers, Stand-Sit [place] contact guard assist;verbal cues required  -JW      Transfers, Sit-Stand-Sit, Assist Device standard walker  -JW      Transfer, Maintain Weight Bearing  Status able to maintain weight bearing status   tennis shoe on left LE  -JW      Transfer, Comment pt requires cues for proper hand placement  -JW      Recorded by [JW] Viviane Acosta, FIFI      Gait Assessment/Treatment    Gait, [place] Level minimum assist (75% patient effort);verbal cues required  -JW      Gait, Assistive Device standard walker  -JW      Gait, Distance (Feet) 5  -JW      Gait, Maintain Weight Bearing Status able to maintain weight bearing status  -JW      Gait, Safety Issues balance decreased during turns;sequencing ability decreased  -JW      Gait, Comment pt requires assistance for proper management of walker and sequencing during gait.  patient able to use tennis shoe on left LE to allow for maintaining of weight bearing status.  -JW      Recorded by [TOM] Viviane Acosta, FIFI      Positioning and Restraints    Pre-Treatment Position in bed  -JW      Post Treatment Position chair  -JW      In Chair reclined;call light within reach;encouraged to call for assist;with nsg  -JW      Recorded by [JW] Viviane Acosta PT        User Key  (r) = Recorded By, (t) = Taken By, (c) = Cosigned By    Initials Name Effective Dates    SD Vamshi Farooq, OTR 06/22/16 -     JCE Looney, OTR 06/22/16 -     TOM Acosta, PT 12/01/15 -                 OT Goals       08/04/17 1059 08/03/17 0930       Patient Education OT STG    Patient Education OT STG, Date Established  08/03/17  -SD     Patient Education OT STG, Time to Achieve  1 wk  -SD     Patient Education OT STG, Education Type  HEP   theraband   -SD     Patient Education OT STG, Education Understanding  demonstrates adequately;independent  -SD     Patient Education OT STG, Date Goal Reviewed 08/04/17  -      Patient Education OT STG Outcome goal met  -      Functional Mobility OT STG    Functional Mobility Goal OT STG, Date Established  08/03/17  -SD     Functional Mobility Goal OT STG, Time to Achieve  1 wk  -SD     Functional  Mobility Goal OT STG, [place] Level  supervision  -SD     Functional Mobility Goal OT STG, Assist Device  --   wheelchair  -SD     Functional Mobility Goal OT STG, Additional Goal  Pt to verbalize w/c safety and demonstrate ability to manuever w/c within his room and on the unit.  -SD       User Key  (r) = Recorded By, (t) = Taken By, (c) = Cosigned By    Initials Name Provider Type    SD Vamshi Farooq OTR Occupational Therapist    DANIELITO Looney, OTR Occupational Therapist          Occupational Therapy Education     Title: PT OT SLP Therapies (Active)     Topic: Occupational Therapy (Done)     Point: ADL training (Done)    Description: Instruct learner(s) on proper safety adaptation and remediation techniques during self care or transfers.   Instruct in proper use of assistive devices.    Learning Progress Summary    Learner Readiness Method Response Comment Documented by Status   Patient Acceptance E MARIE education regarding OT services.  Rec continue with adl performance from bed to minimize risk of non-compliance with NWB status of RLE. SD 08/03/17 1541 Done               Point: Home exercise program (Done)    Description: Instruct learner(s) on appropriate technique for monitoring, assisting and/or progressing therapeutic exercises/activities.    Learning Progress Summary    Learner Readiness Method Response Comment Documented by Status   Patient Acceptance E,D,H VU pt educated on B UE HEP with theraband, provided with handout/pictures  08/04/17 1058 Done    Acceptance E,TB,D VU,DU education with BUE strengthening program with theraband (blue).  Pt demonstrated the program independently. SD 08/03/17 1547 Done                      User Key     Initials Effective Dates Name Provider Type Discipline    SD 06/22/16 -  REINA Navarrete Occupational Therapist OT    CE 06/22/16 -  Tracey Looney OTLEISA Occupational Therapist OT                  OT Recommendation and Plan  Anticipated  Discharge Disposition: other (see comments) (initially discharge to acute care setting for another surgery.  Pt's goal is to return home after all surgeries)  Planned Therapy Interventions: home exercise program, other (see comments) (education with wheelchair safety and mobility)  Therapy Frequency: 3 times/wk  Plan of Care Review  Plan Of Care Reviewed With: patient  Outcome Summary/Follow up Plan: OT: pt  educated on B UE theraband HEP(blue therabnd) and provided with writen handout/ pictures. pt completed 15 reps of each exercises and states understanding.         Outcome Measures       08/02/17 0915 08/02/17 0902       How much difficulty does the patient currently have...    Turning from your back to your side while in flat bed without using bedrails?  3  -JW     Standing up from a chair using your arms (e.g., wheelchair, bedside chair)?  3  -JW     Moving from lying on back to sitting on the side of a flat bed without bedrails?  3  -JW     How much help from another person do you currently need...    Moving to and from a bed to a chair (including a wheelchair)?  3  -JW     Climbing 3-5 steps with a railing?  1  -JW     To walk in hospital room?  2  -JW     AM-PAC 6 Clicks Score  15  -JW     How much help from another is currently needed...    Putting on and taking off regular lower body clothing? 3  -SD      Bathing (including washing, rinsing, and drying) 3  -SD      Toileting (which includes using toilet bed pan or urinal) 3  -SD      Putting on and taking off regular upper body clothing 4  -SD      Taking care of personal grooming (such as brushing teeth) 4  -SD      Eating meals 4  -SD      Score 21  -SD      Functional Assessment    Outcome Measure Options AM-PAC 6 Clicks Daily Activity (OT)  -SD AM-PAC 6 Clicks Basic Mobility (PT)  -TOM       User Key  (r) = Recorded By, (t) = Taken By, (c) = Cosigned By    Initials Name Provider Type    DENNYS Farooq, OTR Occupational Therapist    TOM Pan  FIFI Acosta Physical Therapist           Time Calculation:         Time Calculation- OT       08/04/17 1100          Time Calculation- OT    OT Start Time 0915  -J      OT Stop Time 0936  -J      OT Time Calculation (min) 21 min  -DANIELITO      TCU Minutes- OT 21 min  -DANIELITO        User Key  (r) = Recorded By, (t) = Taken By, (c) = Cosigned By    Initials Name Provider Type    CE Tracey Looney, OTR Occupational Therapist           Therapy Charges for Today     Code Description Service Date Service Provider Modifiers Qty    [id number]  OT THERAPEUTIC ACT EA 15 MIN 8/4/2017 Tracey Looney OTLEISA GO 1               Tracey Looney OTR  8/4/2017

## 2017-08-04 NOTE — CONSULTS
"Adult Nutrition  Assessment/PES    Patient Name:  Eliseo Price  YOB: 1965  MRN: [id number]  Admit Date:  8/2/2017    Assessment Date:  8/4/2017        Reason for Assessment       08/04/17 1314    Reason for Assessment    Reason For Assessment/Visit admission assessment    Cardiac HTN    Endocrine DM    Neurological Other (comment)   bipolar    Ortho Fracture;Other (comment)   R foot fx with external fixator    Skin Cellulitis            Data Eval/ Notes       08/04/17 1315     Notes    Note Pt reports was able to bring HgA1c down from 11 to 4.9 in a few months. Incorporates quinoa & spinach in [employer] cooking for himself, been off metformin.             Nutrition/Diet History       08/04/17 1316    Nutrition/Diet History    Patient Reported Diet/Restrictions/Preferences regular            Anthropometrics       08/04/17 1316    Anthropometrics    Height Method Actual   paolo arm span    Height 182.9 cm (72\")    RD Documented Current Weight  129 kg (285 lb)    Anthropometrics (Special Considerations)    RD Calculated BMI (kg/m2) 39.7    Ideal Body Weight (IBW)    Ideal Body Weight (IBW), Male (kg) 82.07    Body Mass Index (BMI)    BMI Grade 35 - 39.9 - obesity - grade II            Labs/Tests/Procedures/Meds       08/04/17 1317    Labs/Tests/Procedures/Meds    Labs/Tests Review Reviewed    Medication Review Reviewed, pertinent            Physical Findings       08/04/17 1317    Physical Appearance    Overall Physical Appearance obese    Skin cellulitis;other (see comments)   external fixator            Estimated/Assessed Needs       08/04/17 1318    Calculation Measurements    Weight Used For Calculations 129 kg (284 lb 6.3 oz)    Height Used for Calculations 1.829 m (6')    Estimated/Assessed Energy Needs    Energy Need Method Kcal/kg    kcal/kg 14    14 Kcal/Kg (kcal) 1806    Estimated Kcal Range  1806 kcal (203 gm CHO, 45% kcal )    Estimated/Assessed Protein Needs    " Weight Used for Protein Calculation 129 kg (284 lb 6.3 oz)    Protein (gm/kg) 0.8    0.8 Gm Protein (gm) 103.2    Estimated Protein Range 103 gm pro    Estimated/Assessed Fluid Needs    Fluid Need Method RDA method    RDA Method (mL)  1800            Nutrition Prescription Ordered       08/04/17 1320    Nutrition Prescription PO    Common Modifiers Cardiac;Consistent Carbohydrate            Evaluation of Received Nutrient/Fluid Intake       08/04/17 1320    Fluid Intake Evaluation    Oral Fluid (mL) 840    Total Fluid Intake (mL) 840    % Fluid Needs 47    PO Evaluation    Number of Meals 9    % PO Intake 97              Problem/Interventions:        Problem 1       08/04/17 1321    Nutrition Diagnoses Problem 1    Problem 1 Overweight/Obesity    Etiology (related to) Functional Diagnosis    Functional Diagnosis Mobility limitation    Signs/Symptoms (evidenced by) BMI    BMI 35 - 39.9                    Intervention Goal       08/04/17 1321    Intervention Goal    General Meet nutritional needs for age/condition    PO Maintain intake;PO intake (%)    PO Intake % 80 %   pt will consume at least 80% of meals/fluids            Nutrition Intervention       08/04/17 1322    Nutrition Intervention    RD/Tech Action Follow Tx progress              Education/Evaluation       08/04/17 1322    Education    Education Education offered and refused;Education topics   Pt reports he sylvia his HgA1c from 11 to 4.9, has changed his eating using spinach & quinoa & oatmeal alot. [facility] edu.    Education Topics Cardiac diabetic    Monitor/Evaluation    Monitor Per protocol;I&O;PO intake;Pertinent labs;Weight;Skin status        Comments:    Pt with good po intake.     Will cont to follow and remain available.     Electronically signed by:  Aleksandra Rowe RD  08/04/17 1:23 PM

## 2017-08-04 NOTE — PLAN OF CARE
Problem: Pain, Acute (Adult)  Goal: Identify Related Risk Factors and Signs and Symptoms  Outcome: Ongoing (interventions implemented as appropriate)  Goal: Acceptable Pain Control/Comfort Level  Outcome: Ongoing (interventions implemented as appropriate)    08/03/17 0935   Pain, Acute (Adult)   Acceptable Pain Control/Comfort Level making progress toward outcome         Problem: Wound, Traumatic, Nonburn (Adult)  Goal: Signs and Symptoms of Listed Potential Problems Will be Absent or Manageable (Wound, Traumatic, Nonburn)  Outcome: Ongoing (interventions implemented as appropriate)

## 2017-08-04 NOTE — PLAN OF CARE
Problem: Patient Care Overview (Adult)  Goal: Plan of Care Review    08/04/17 1059   Coping/Psychosocial Response Interventions   Plan Of Care Reviewed With patient   Outcome Evaluation   Outcome Summary/Follow up Plan OT: pt educated on B UE theraband HEP(blue therabnd) and provided with writen handout/ pictures. pt completed 15 reps of each exercises and states understanding.          Problem: Inpatient Occupational Therapy  Goal: Patient Education Goal STG- OT  Outcome: Outcome(s) achieved Date Met:  08/04/17 08/03/17 0930 08/04/17 1059   Patient Education OT STG   Patient Education OT STG, Date Established 08/03/17 --    Patient Education OT STG, Time to Achieve 1 wk --    Patient Education OT STG, Education Type HEP  (theraband ) --    Patient Education OT STG, Education Understanding demonstrates adequately;independent --    Patient Education OT STG, Date Goal Reviewed --  08/04/17   Patient Education OT STG Outcome --  goal met

## 2017-08-04 NOTE — SIGNIFICANT NOTE
"   08/04/17 1145   Rehab Treatment   Discipline physical therapist   Treatment Not Performed patient/family declined treatment  (Patient refused stating \"i have not had washed up in 2 days and I feel like I'm coming down with something.\" Patient pleasantly declines PT treatment today. Will continue to follow)   Recommendation   PT - Next Appointment 08/05/17     "

## 2017-08-05 PROCEDURE — 25010000002 ENOXAPARIN PER 10 MG: Performed by: HOSPITALIST

## 2017-08-05 RX ADMIN — PETROLATUM: 42 OINTMENT TOPICAL at 09:46

## 2017-08-05 RX ADMIN — METOPROLOL TARTRATE 100 MG: 50 TABLET, FILM COATED ORAL at 20:05

## 2017-08-05 RX ADMIN — OXYCODONE HYDROCHLORIDE AND ACETAMINOPHEN 2 TABLET: 5; 325 TABLET ORAL at 09:42

## 2017-08-05 RX ADMIN — GABAPENTIN 800 MG: 400 CAPSULE ORAL at 16:28

## 2017-08-05 RX ADMIN — PRIMIDONE 250 MG: 250 TABLET ORAL at 09:28

## 2017-08-05 RX ADMIN — GABAPENTIN 800 MG: 400 CAPSULE ORAL at 05:49

## 2017-08-05 RX ADMIN — CARBAMAZEPINE 400 MG: 200 TABLET ORAL at 17:57

## 2017-08-05 RX ADMIN — SULFAMETHOXAZOLE AND TRIMETHOPRIM 160 MG: 800; 160 TABLET ORAL at 09:28

## 2017-08-05 RX ADMIN — OXYCODONE HYDROCHLORIDE AND ACETAMINOPHEN 2 TABLET: 5; 325 TABLET ORAL at 16:28

## 2017-08-05 RX ADMIN — LORAZEPAM 0.5 MG: 0.5 TABLET ORAL at 05:49

## 2017-08-05 RX ADMIN — CARBAMAZEPINE 400 MG: 200 TABLET ORAL at 09:27

## 2017-08-05 RX ADMIN — OXYCODONE HYDROCHLORIDE AND ACETAMINOPHEN 2 TABLET: 5; 325 TABLET ORAL at 01:57

## 2017-08-05 RX ADMIN — LITHIUM CARBONATE 300 MG: 300 TABLET ORAL at 09:28

## 2017-08-05 RX ADMIN — AMLODIPINE BESYLATE 2.5 MG: 2.5 TABLET ORAL at 09:28

## 2017-08-05 RX ADMIN — PRIMIDONE 250 MG: 250 TABLET ORAL at 17:57

## 2017-08-05 RX ADMIN — GABAPENTIN 800 MG: 400 CAPSULE ORAL at 21:55

## 2017-08-05 RX ADMIN — OXYCODONE HYDROCHLORIDE AND ACETAMINOPHEN 2 TABLET: 5; 325 TABLET ORAL at 21:55

## 2017-08-05 RX ADMIN — ENOXAPARIN SODIUM 40 MG: 40 INJECTION SUBCUTANEOUS at 09:27

## 2017-08-05 RX ADMIN — PETROLATUM: 42 OINTMENT TOPICAL at 20:06

## 2017-08-05 RX ADMIN — DOXEPIN HYDROCHLORIDE 75 MG: 25 CAPSULE ORAL at 20:05

## 2017-08-05 RX ADMIN — TEMAZEPAM 30 MG: 15 CAPSULE ORAL at 21:54

## 2017-08-05 RX ADMIN — LORAZEPAM 0.5 MG: 0.5 TABLET ORAL at 20:05

## 2017-08-05 RX ADMIN — LITHIUM CARBONATE 300 MG: 300 TABLET ORAL at 17:57

## 2017-08-05 RX ADMIN — FLUOXETINE 40 MG: 20 CAPSULE ORAL at 09:28

## 2017-08-05 RX ADMIN — METOPROLOL TARTRATE 100 MG: 50 TABLET, FILM COATED ORAL at 09:27

## 2017-08-05 RX ADMIN — SULFAMETHOXAZOLE AND TRIMETHOPRIM 160 MG: 800; 160 TABLET ORAL at 20:05

## 2017-08-05 RX ADMIN — LOSARTAN POTASSIUM 100 MG: 50 TABLET ORAL at 09:28

## 2017-08-05 RX ADMIN — OXYCODONE HYDROCHLORIDE AND ACETAMINOPHEN 2 TABLET: 5; 325 TABLET ORAL at 05:49

## 2017-08-05 NOTE — PLAN OF CARE
Problem: Inpatient Physical Therapy  Goal: Bed Mobility Goal STG- PT  Outcome: Unable to achieve outcome(s) by discharge Date Met:  08/05/17 08/03/17 1146 08/05/17 0831   Bed Mobility PT STG   Bed Mobility PT STG, Date Established 08/03/17 --    Bed Mobility PT STG, Time to Achieve 1 wk --    Bed Mobility PT STG, Activity Type supine to sit/sit to supine --    Bed Mobility PT STG, [place] Level supervision required --    Bed Mobility PT STG, Date Goal Reviewed --  08/05/17   Bed Mobility PT STG, Outcome --  goal not met  (seen for initial evaluation only. )   Bed Mobility PT STG, Reason Goal Not Met --  (Discharge per MD)       Goal: Transfer Training Goal 1 STG- PT  Outcome: Unable to achieve outcome(s) by discharge Date Met:  08/05/17 08/03/17 1146 08/05/17 0831   Transfer Training PT STG   Transfer Training PT STG, Date Established 08/03/17 --    Transfer Training PT STG, Time to Achieve 1 wk --    Transfer Training PT STG, Activity Type sit to stand/stand to sit --    Transfer Training PT STG, [place] Level supervision required --    Transfer Training PT STG, Assist Device walker, standard --    Transfer Training PT STG, Additional Goal while maintaining NWB  --    Transfer Training PT STG, Date Goal Reviewed --  08/05/17   Transfer Training PT STG, Outcome --  (seen for initial evaluation only.)   Transfer Training PT STG, Reason Goal Not Met --  (Discharge per MD)       Goal: Transfer Training Goal 2 STG- PT  Outcome: Unable to achieve outcome(s) by discharge Date Met:  08/05/17 08/03/17 1146 08/05/17 0831   Transfer Training 2 PT STG   Transfer Training PT 2 STG, Time to Achieve 1 wk --    Transfer Training PT 2 STG, Activity Type bed to chair /chair to bed --    Transfer Training PT 2 STG, [place] Level supervision required --    Transfer Training PT 2 STG, Assist Device walker, rolling;walker, standard --    Transfer Training PT 2 STG, Additional Goal while maintaining NWB --     Transfer Training PT 2 STG, Date Goal Reviewed --  08/05/17   Transfer Training PT 2 STG, Outcome --  goal not met  (seen for initial evaluation only)   Transfer Training 2 PT STG, Reason Goal Not Met --  (discharge per MD)       Goal: Gait Training Goal STG- PT  Outcome: Unable to achieve outcome(s) by discharge Date Met:  08/05/17 08/03/17 1146 08/05/17 0831   Gait Training PT STG   Gait Training Goal PT STG, Date Established 08/03/17 --    Gait Training Goal PT STG, Time to Achieve 1 wk --    Gait Training Goal PT STG, [place] Level contact guard assist --    Gait Training Goal PT STG, Assist Device walker, standard --    Gait Training Goal PT STG, Distance to Achieve 10 feet --    Gait Training Goal PT STG, Additional Goal while maintaining NWB --    Gait Training Goal PT STG, Date Goal Reviewed --  08/05/17   Gait Training Goal PT STG, Outcome --  goal not met  (seen for initial evaluation only.)   Gait Training Goal PT STG, Reason Goal Not Met --  (discharge per MD)

## 2017-08-05 NOTE — THERAPY DISCHARGE NOTE
SNF - Physical Therapy Discharge Summary   Rachel Barr       Patient Name: Eliseo Price  : 1965  MRN: [id number]    Today's Date: 2017  Onset of Illness/Injury or Date of Surgery Date: 17    Date of Referral to PT: 17  Referring Physician: Dr. Nj      Admit Date: 2017      PT Recommendation and Plan    Visit Dx:  No diagnosis found.          Outcome Measures       17 0915 17 0902       How much difficulty does the patient currently have...    Turning from your back to your side while in flat bed without using bedrails?  3  -JW     Standing up from a chair using your arms (e.g., wheelchair, bedside chair)?  3  -JW     Moving from lying on back to sitting on the side of a flat bed without bedrails?  3  -JW     How much help from another person do you currently need...    Moving to and from a bed to a chair (including a wheelchair)?  3  -JW     Climbing 3-5 steps with a railing?  1  -JW     To walk in hospital room?  2  -JW     AM-PAC 6 Clicks Score  15  -JW     How much help from another is currently needed...    Putting on and taking off regular lower body clothing? 3  -SD      Bathing (including washing, rinsing, and drying) 3  -SD      Toileting (which includes using toilet bed pan or urinal) 3  -SD      Putting on and taking off regular upper body clothing 4  -SD      Taking care of personal grooming (such as brushing teeth) 4  -SD      Eating meals 4  -SD      Score 21  -SD      Functional Assessment    Outcome Measure Options AM-PAC 6 Clicks Daily Activity (OT)  -SD AM-PAC 6 Clicks Basic Mobility (PT)  -       User Key  (r) = Recorded By, (t) = Taken By, (c) = Cosigned By    Initials Name Provider Type    DENNYS Farooq, OTR Occupational Therapist    TOM Acosta, PT Physical Therapist                      IP PT Goals       17 0831 17 1146 17 1425    Bed Mobility PT STG    Bed Mobility PT STG, Date Established  17  -BP     Bed  Mobility PT STG, Time to Achieve  1 wk  -BP     Bed Mobility PT STG, Activity Type  supine to sit/sit to supine  -BP     Bed Mobility PT STG, [place] Level  supervision required  -BP     Bed Mobility PT STG, Date Goal Reviewed 08/05/17  -BP      Bed Mobility PT STG, Outcome goal not met   seen for initial evaluation only.   -BP      Bed Mobility PT STG, Reason Goal Not Met --   Discharge per MD  -BP      Transfer Training PT STG    Transfer Training PT STG, Date Established  08/03/17  -BP     Transfer Training PT STG, Time to Achieve  1 wk  -BP     Transfer Training PT STG, Activity Type  sit to stand/stand to sit  -BP     Transfer Training PT STG, [place] Level  supervision required  -BP     Transfer Training PT STG, Assist Device  walker, standard  -BP     Transfer Training PT STG, Additional Goal  while maintaining NWB   -BP     Transfer Training PT STG, Date Goal Reviewed 08/05/17  -BP      Transfer Training PT STG, Outcome --   seen for initial evaluation only.  -BP      Transfer Training PT STG, Reason Goal Not Met --   Discharge per MD  -BP      Transfer Training 2 PT STG    Transfer Training PT 2 STG, Time to Achieve  1 wk  -BP     Transfer Training PT 2 STG, Activity Type  bed to chair /chair to bed  -BP     Transfer Training PT 2 STG, [place] Level  supervision required  -BP     Transfer Training PT 2 STG, Assist Device  ;walker, standard  -BP     Transfer Training PT 2 STG, Additional Goal  while maintaining NWB  -BP     Transfer Training PT 2 STG, Date Goal Reviewed 08/05/17  -BP      Transfer Training PT 2 STG, Outcome goal not met   seen for initial evaluation only  -BP      Transfer Training 2 PT STG, Reason Goal Not Met --   discharge per MD  -BP      Gait Training PT STG    Gait Training Goal PT STG, Date Established  08/03/17  -BP     Gait Training Goal PT STG, Time to Achieve  1 wk  -BP     Gait Training Goal PT STG, [place] Level  contact guard assist  -BP     Gait Training  Goal PT STG, Assist Device  walker, standard  -BP     Gait Training Goal PT STG, Distance to Achieve  10 feet  -BP     Gait Training Goal PT STG, Additional Goal  while maintaining NWB  -BP     Gait Training Goal PT STG, Date Goal Reviewed 08/05/17  -BP      Gait Training Goal PT STG, Outcome goal not met   seen for initial evaluation only.  -BP      Gait Training Goal PT STG, Reason Goal Not Met --   discharge per MD  -BP                                       User Key  (r) = Recorded By, (t) = Taken By, (c) = Cosigned By    Initials Name Provider Type    LN Concepción Estes, PT Physical Therapist    BP Lois Duke, PT Physical Therapist              PT Discharge Summary  Anticipated Discharge Disposition: extended care facility  Reason for Discharge: Per MD order  Outcomes Achieved:  (seen for initial evaluation only)  Discharge Destination: Extended care facility - LTC  Per MD order, discharge PT at this time. Patient seen for initial evaluation only. No goals met or addressed. Will await re consult from MD following ORIF.     Lois Duke, PT   8/5/2017

## 2017-08-05 NOTE — SIGNIFICANT NOTE
08/05/17 0827   Rehab Treatment   Discipline physical therapist   Treatment Not Performed unable to treat, medical status change  (Spoke with Dr. Farah via phone on 8/4/17 at 530pm. Per Dr. Farah discharge therapy at this time in order to protect external fixator and alignment. Per Dr. Farah he will re order PT following ORIF. Notified Admissions/discharge planner JUICE Smith RN. Will discharge PT at this time, will await re-consultation following next surgery. )

## 2017-08-06 PROCEDURE — 25010000002 ENOXAPARIN PER 10 MG: Performed by: HOSPITALIST

## 2017-08-06 RX ORDER — GABAPENTIN 300 MG/1
300 CAPSULE ORAL ONCE
Status: COMPLETED | OUTPATIENT
Start: 2017-08-06 | End: 2017-08-06

## 2017-08-06 RX ADMIN — LOSARTAN POTASSIUM 100 MG: 50 TABLET ORAL at 08:28

## 2017-08-06 RX ADMIN — GABAPENTIN 800 MG: 400 CAPSULE ORAL at 05:55

## 2017-08-06 RX ADMIN — PETROLATUM: 42 OINTMENT TOPICAL at 08:37

## 2017-08-06 RX ADMIN — LORAZEPAM 0.5 MG: 0.5 TABLET ORAL at 23:17

## 2017-08-06 RX ADMIN — OXYCODONE HYDROCHLORIDE AND ACETAMINOPHEN 2 TABLET: 5; 325 TABLET ORAL at 10:56

## 2017-08-06 RX ADMIN — OXYCODONE HYDROCHLORIDE AND ACETAMINOPHEN 2 TABLET: 5; 325 TABLET ORAL at 20:02

## 2017-08-06 RX ADMIN — TEMAZEPAM 30 MG: 15 CAPSULE ORAL at 22:11

## 2017-08-06 RX ADMIN — PRIMIDONE 250 MG: 250 TABLET ORAL at 08:28

## 2017-08-06 RX ADMIN — METOPROLOL TARTRATE 100 MG: 50 TABLET, FILM COATED ORAL at 22:03

## 2017-08-06 RX ADMIN — CARBAMAZEPINE 400 MG: 200 TABLET ORAL at 17:57

## 2017-08-06 RX ADMIN — AMLODIPINE BESYLATE 2.5 MG: 2.5 TABLET ORAL at 08:28

## 2017-08-06 RX ADMIN — OXYCODONE HYDROCHLORIDE AND ACETAMINOPHEN 2 TABLET: 5; 325 TABLET ORAL at 05:55

## 2017-08-06 RX ADMIN — PETROLATUM: 42 OINTMENT TOPICAL at 22:05

## 2017-08-06 RX ADMIN — LITHIUM CARBONATE 300 MG: 300 TABLET ORAL at 08:28

## 2017-08-06 RX ADMIN — LITHIUM CARBONATE 300 MG: 300 TABLET ORAL at 17:57

## 2017-08-06 RX ADMIN — GABAPENTIN 800 MG: 400 CAPSULE ORAL at 22:03

## 2017-08-06 RX ADMIN — ENOXAPARIN SODIUM 40 MG: 40 INJECTION SUBCUTANEOUS at 08:29

## 2017-08-06 RX ADMIN — LORAZEPAM 0.5 MG: 0.5 TABLET ORAL at 15:13

## 2017-08-06 RX ADMIN — LORAZEPAM 0.5 MG: 0.5 TABLET ORAL at 05:55

## 2017-08-06 RX ADMIN — GABAPENTIN 300 MG: 300 CAPSULE ORAL at 22:05

## 2017-08-06 RX ADMIN — DOXEPIN HYDROCHLORIDE 75 MG: 25 CAPSULE ORAL at 22:04

## 2017-08-06 RX ADMIN — SULFAMETHOXAZOLE AND TRIMETHOPRIM 160 MG: 800; 160 TABLET ORAL at 22:04

## 2017-08-06 RX ADMIN — SULFAMETHOXAZOLE AND TRIMETHOPRIM 160 MG: 800; 160 TABLET ORAL at 08:28

## 2017-08-06 RX ADMIN — FLUOXETINE 40 MG: 20 CAPSULE ORAL at 08:28

## 2017-08-06 RX ADMIN — PRIMIDONE 250 MG: 250 TABLET ORAL at 17:57

## 2017-08-06 RX ADMIN — CARBAMAZEPINE 400 MG: 200 TABLET ORAL at 08:28

## 2017-08-06 RX ADMIN — OXYCODONE HYDROCHLORIDE AND ACETAMINOPHEN 2 TABLET: 5; 325 TABLET ORAL at 15:12

## 2017-08-06 RX ADMIN — GABAPENTIN 800 MG: 400 CAPSULE ORAL at 15:12

## 2017-08-06 RX ADMIN — OXYCODONE HYDROCHLORIDE AND ACETAMINOPHEN 2 TABLET: 5; 325 TABLET ORAL at 23:42

## 2017-08-06 RX ADMIN — METOPROLOL TARTRATE 100 MG: 50 TABLET, FILM COATED ORAL at 08:28

## 2017-08-06 NOTE — PLAN OF CARE
Problem: Pain, Acute (Adult)  Goal: Identify Related Risk Factors and Signs and Symptoms  Outcome: Ongoing (interventions implemented as appropriate)  Goal: Acceptable Pain Control/Comfort Level  Outcome: Ongoing (interventions implemented as appropriate)    Problem: Wound, Traumatic, Nonburn (Adult)  Goal: Signs and Symptoms of Listed Potential Problems Will be Absent or Manageable (Wound, Traumatic, Nonburn)  Outcome: Ongoing (interventions implemented as appropriate)    Problem: Patient Care Overview (Adult)  Goal: Plan of Care Review  Outcome: Ongoing (interventions implemented as appropriate)

## 2017-08-07 PROCEDURE — 25010000002 ENOXAPARIN PER 10 MG: Performed by: HOSPITALIST

## 2017-08-07 PROCEDURE — 97530 THERAPEUTIC ACTIVITIES: CPT

## 2017-08-07 RX ORDER — L.ACID,PARA/B.BIFIDUM/S.THERM 8B CELL
1 CAPSULE ORAL DAILY
Status: DISCONTINUED | OUTPATIENT
Start: 2017-08-07 | End: 2017-08-18 | Stop reason: HOSPADM

## 2017-08-07 RX ADMIN — OXYCODONE HYDROCHLORIDE AND ACETAMINOPHEN 2 TABLET: 5; 325 TABLET ORAL at 22:49

## 2017-08-07 RX ADMIN — PRIMIDONE 250 MG: 250 TABLET ORAL at 18:04

## 2017-08-07 RX ADMIN — SULFAMETHOXAZOLE AND TRIMETHOPRIM 160 MG: 800; 160 TABLET ORAL at 09:41

## 2017-08-07 RX ADMIN — OXYCODONE HYDROCHLORIDE AND ACETAMINOPHEN 2 TABLET: 5; 325 TABLET ORAL at 18:04

## 2017-08-07 RX ADMIN — METOPROLOL TARTRATE 100 MG: 50 TABLET, FILM COATED ORAL at 09:41

## 2017-08-07 RX ADMIN — PRIMIDONE 250 MG: 250 TABLET ORAL at 09:41

## 2017-08-07 RX ADMIN — LORAZEPAM 0.5 MG: 0.5 TABLET ORAL at 13:32

## 2017-08-07 RX ADMIN — DOXEPIN HYDROCHLORIDE 75 MG: 25 CAPSULE ORAL at 20:28

## 2017-08-07 RX ADMIN — LITHIUM CARBONATE 300 MG: 300 TABLET ORAL at 18:04

## 2017-08-07 RX ADMIN — CARBAMAZEPINE 400 MG: 200 TABLET ORAL at 09:41

## 2017-08-07 RX ADMIN — LORAZEPAM 0.5 MG: 0.5 TABLET ORAL at 05:18

## 2017-08-07 RX ADMIN — PETROLATUM: 42 OINTMENT TOPICAL at 20:29

## 2017-08-07 RX ADMIN — TEMAZEPAM 30 MG: 15 CAPSULE ORAL at 22:40

## 2017-08-07 RX ADMIN — GABAPENTIN 800 MG: 400 CAPSULE ORAL at 22:40

## 2017-08-07 RX ADMIN — OXYCODONE HYDROCHLORIDE AND ACETAMINOPHEN 2 TABLET: 5; 325 TABLET ORAL at 09:41

## 2017-08-07 RX ADMIN — FLUOXETINE 40 MG: 20 CAPSULE ORAL at 09:41

## 2017-08-07 RX ADMIN — ENOXAPARIN SODIUM 40 MG: 40 INJECTION SUBCUTANEOUS at 09:41

## 2017-08-07 RX ADMIN — GABAPENTIN 800 MG: 400 CAPSULE ORAL at 13:32

## 2017-08-07 RX ADMIN — LITHIUM CARBONATE 300 MG: 300 TABLET ORAL at 09:41

## 2017-08-07 RX ADMIN — Medication 1 CAPSULE: at 13:32

## 2017-08-07 RX ADMIN — LOSARTAN POTASSIUM 100 MG: 50 TABLET ORAL at 09:41

## 2017-08-07 RX ADMIN — MICONAZOLE NITRATE: 2 POWDER TOPICAL at 13:32

## 2017-08-07 RX ADMIN — GABAPENTIN 800 MG: 400 CAPSULE ORAL at 05:17

## 2017-08-07 RX ADMIN — OXYCODONE HYDROCHLORIDE AND ACETAMINOPHEN 2 TABLET: 5; 325 TABLET ORAL at 05:17

## 2017-08-07 RX ADMIN — OXYCODONE HYDROCHLORIDE AND ACETAMINOPHEN 2 TABLET: 5; 325 TABLET ORAL at 13:32

## 2017-08-07 RX ADMIN — LORAZEPAM 0.5 MG: 0.5 TABLET ORAL at 18:04

## 2017-08-07 RX ADMIN — AMLODIPINE BESYLATE 2.5 MG: 2.5 TABLET ORAL at 09:41

## 2017-08-07 RX ADMIN — CARBAMAZEPINE 400 MG: 200 TABLET ORAL at 18:04

## 2017-08-07 RX ADMIN — PETROLATUM: 42 OINTMENT TOPICAL at 09:42

## 2017-08-07 RX ADMIN — METOPROLOL TARTRATE 100 MG: 50 TABLET, FILM COATED ORAL at 20:28

## 2017-08-07 RX ADMIN — MICONAZOLE NITRATE: 2 POWDER TOPICAL at 20:29

## 2017-08-07 NOTE — PROGRESS NOTES
Added miconazole 2% powder per recommendation Corey RN with wound management  Probiotic added as well with antibiotic use

## 2017-08-07 NOTE — PLAN OF CARE
Problem: Pain, Acute (Adult)  Goal: Acceptable Pain Control/Comfort Level  Outcome: Ongoing (interventions implemented as appropriate)    08/07/17 0131   Pain, Acute (Adult)   Acceptable Pain Control/Comfort Level making progress toward outcome         Problem: Wound, Traumatic, Nonburn (Adult)  Goal: Signs and Symptoms of Listed Potential Problems Will be Absent or Manageable (Wound, Traumatic, Nonburn)  Outcome: Ongoing (interventions implemented as appropriate)    08/03/17 1138 08/07/17 0131   Wound, Traumatic, Nonburn   Problems Assessed (Wound) --  all   Problems Present (Wound) other (see comments)  (healing toe laceration; surgical R foot wound) --          Problem: Patient Care Overview (Adult)  Goal: Plan of Care Review  Outcome: Ongoing (interventions implemented as appropriate)    08/07/17 0131   Coping/Psychosocial Response Interventions   Plan Of Care Reviewed With patient   Patient Care Overview   Progress no change

## 2017-08-07 NOTE — PLAN OF CARE
Problem: Patient Care Overview (Adult)  Goal: Plan of Care Review  Outcome: Ongoing (interventions implemented as appropriate)    08/07/17 0940   Coping/Psychosocial Response Interventions   Plan Of Care Reviewed With patient   Patient Care Overview   Progress no change   Outcome Evaluation   Outcome Summary/Follow up Plan OT: Pt is independent with BUE strengthening program using theraband. Pt's activity is limited per physician. Rec pt continue with adl activity from bed level and request assist from staff as needed. Discharge OT services at this time. Anticipate OT referral after his surgery to remove the external fixator.          Problem: Inpatient Occupational Therapy  Goal: Functional Mobility Goal STG- OT  Outcome: Unable to achieve outcome(s) by discharge Date Met:  08/07/17 08/07/17 0940   Functional Mobility OT STG   Functional Mobility Goal OT STG, Date Goal Reviewed 08/07/17   Functional Mobility Goal OT STG, Outcome goal no longer appropriate  (activity limited per physician.)

## 2017-08-07 NOTE — NURSING NOTE
Seen for weekly WOCN skin assessment on Rehab Unit today. Except for (R) foot which has dressing only changed by Dr. MELVA Farah, all bony prominences remain intact but noted a maculo-papular rash at lower buttocks/perineum, new onset. Patient states itches slightly. Nursing has placed a waffle mattress on bed to allow increased air flow to buttocks, patient likes it (has waffle cushion in use to chair). Dr. Farah was here earlier & changed (R) foot dressing but not seen by nursing.    Dr. Farah informed me to discontinue order for treatment to (L) foot #5 toe as now healed. Spoke with ALANA NINA re: rash. See orders. Will continue to follow weekly on Rehab unit.

## 2017-08-07 NOTE — THERAPY DISCHARGE NOTE
SNF - Occupational Therapy Treatment Note/Discharge   Rachel Barr     Patient Name: Eliseo Price  : 1965  MRN: [id number]  Today's Date: 2017  Onset of Illness/Injury or Date of Surgery Date: 17  Date of Referral to OT: 17  Referring Physician: Dr. Nj      Admit Date: 2017    Visit Dx:   No diagnosis found.  Patient Active Problem List   Diagnosis   • Disease of thyroid gland   • Cellulitis of right lower extremity   • Cellulitis   • Infected epidermoid cyst   • Altered mental status, unspecified   • Altered mental status   • Immobility             Adult Rehabilitation Note       17 0900          Rehab Assessment/Intervention    Discipline occupational therapist  -SD      Document Type therapy note (daily note);discharge summary  -SD      Subjective Information agree to therapy;no complaints  -SD      Specific Treatment Considerations education with UE strengthening program.  Pt demonstrated program independently (using blue theraband).  Pt stated the doctor does not want his doing any therapy other than arm exercises in order to protect the surgical site.  discussed with patient daily HEP.  Pt does not have questions or concerns regarding the program.  Discharged the goal regarding w/c use due to doctors orders to limit activity.  will discharge in OT at this time.    -SD      Patient Response to Treatment Pt receptive to participation in BUE strengthening program using theraband.  Pt stated he will perfrom the program daily.    -SD      Recorded by [SD] REINA Navarrete      Pain Assessment    Pain Assessment No/denies pain  -SD      Recorded by [SD] Vamshi Farooq OTR      Positioning and Restraints    Pre-Treatment Position in bed  -SD      Post Treatment Position bed  -SD      In Bed RLE elevated  -SD      Recorded by [SD] Vamshi Farooq OTR        User Key  (r) = Recorded By, (t) = Taken By, (c) = Cosigned By    Initials Name Effective Dates    SD Vamshi DELAROSA  REINA Farooq 06/22/16 -                 OT Goals       08/07/17 0940 08/04/17 1059 08/03/17 0930    Patient Education OT STG    Patient Education OT STG, Date Established   08/03/17  -SD    Patient Education OT STG, Time to Achieve   1 wk  -SD    Patient Education OT STG, Education Type   HEP   theraband   -SD    Patient Education OT STG, Education Understanding   demonstrates adequately;independent  -SD    Patient Education OT STG, Date Goal Reviewed  08/04/17  -     Patient Education OT STG Outcome  goal met  -     Functional Mobility OT STG    Functional Mobility Goal OT STG, Date Established   08/03/17  -SD    Functional Mobility Goal OT STG, Time to Achieve   1 wk  -SD    Functional Mobility Goal OT STG, [place] Level   supervision  -SD    Functional Mobility Goal OT STG, Assist Device   --   wheelchair  -SD    Functional Mobility Goal OT STG, Additional Goal   Pt to verbalize w/c safety and demonstrate ability to manuever w/c within his room and on the unit.  -SD    Functional Mobility Goal OT STG, Date Goal Reviewed 08/07/17  -SD      Functional Mobility Goal OT STG, Outcome goal no longer appropriate   activity limited per physician.  -SD        User Key  (r) = Recorded By, (t) = Taken By, (c) = Cosigned By    Initials Name Provider Type    DENNYS Farooq, OTR Occupational Therapist    DANIELITO Looney, OTR Occupational Therapist          Occupational Therapy Education     Title: PT OT SLP Therapies (Active)     Topic: Occupational Therapy (Resolved)     Point: ADL training (Resolved)    Description: Instruct learner(s) on proper safety adaptation and remediation techniques during self care or transfers.   Instruct in proper use of assistive devices.    Learning Progress Summary    Learner Readiness Method Response Comment Documented by Status   Patient Acceptance E VU education regarding OT services.  Rec continue with adl performance from bed to minimize risk of non-compliance with  "NWB status of RLE. SD 08/03/17 1541 Done               Point: Home exercise program (Resolved)    Description: Instruct learner(s) on appropriate technique for monitoring, assisting and/or progressing therapeutic exercises/activities.    Learning Progress Summary    Learner Readiness Method Response Comment Documented by Status   Patient Acceptance E,TB,D,H VU,DU Pt independently performed UE strengthening program using blue theraband.  Pt has a handout to refer to as needed. Rec daily performance of HEP.  Pt verbalilzes understanding. SD 08/07/17 0938 Done    Acceptance E,D,H VU pt educated on B UE HEP with theraband, provided with handout/pictures J 08/04/17 1058 Done    Acceptance E,TB,D VU,DU education with BUE strengthening program with theraband (blue).  Pt demonstrated the program independently. SD 08/03/17 1547 Done                      User Key     Initials Effective Dates Name Provider Type Discipline    SD 06/22/16 -  Vamshi Farooq, OTR Occupational Therapist OT     06/22/16 -  Tracye Looney, OTR Occupational Therapist OT            S:  \"The doctor said I shouldn't be doing much until my next surgery to protect my foot.  He wants to make sure I don't put any weight on my foot.\"  Pt stated he has no concerns regarding performance of UE strengthening program.  O:  Pt has participated in OT 3 days for education with UE strengthening program using theraband.  Pt is able to perform the program indpendently.  Pt provided with written handout as a resources as needed.   Goal for education regarding w/c use was discontinued due to activity restrictions.  No functional change in adl status since the evaluation.    A/P:  OT Recommendation and Plan  Anticipated Discharge Disposition: skilled nursing facility  Planned Therapy Interventions: home exercise program  Therapy Frequency: 3 times/wk.  Discharged from OT services.  Rec pt continue with daily performance of UE strengthening program.  Rec pt " continue with adl performance from bed level to protect his surgical site.    Plan of Care Review  Plan Of Care Reviewed With: patient  Progress: no change  Outcome Summary/Follow up Plan: OT:    Pt is independent with BUE strengthening program using theraband.  Pt's activity is limited per physician.  Rec pt continue with adl activity from bed level and request assist from staff as needed.  Discharge OT services at this time.  Anticipate OT referral after his surgery to remove the external fixator.           Time Calculation:          Time Calculation- OT       08/07/17 0930          Time Calculation- OT    OT Start Time 0910  -SD      OT Stop Time 0931  -SD      OT Time Calculation (min) 21 min  -SD      Total Timed Code Minutes- OT 21 minute(s)  -SD      TCU Minutes- OT 21 min  -SD        User Key  (r) = Recorded By, (t) = Taken By, (c) = Cosigned By    Initials Name Provider Type    SD Vamshi Farooq, OTR Occupational Therapist          Therapy Charges for Today     Code Description Service Date Service Provider Modifiers Qty    [id number]  OT THERAPEUTIC ACT EA 15 MIN 8/7/2017 REINA Navarrete GO 1               OT Discharge Summary  Anticipated Discharge Disposition: skilled nursing facility  Reason for Discharge: Maximum functional potential achieved (Pt's activity is limited to bed level at this time.)  Outcomes Achieved: Patient able to partially acheive established goals  Discharge Destination: other (comment) (pt to be discharge to acute care for another surgery.)    REINA Landers  8/7/2017

## 2017-08-07 NOTE — PLAN OF CARE
Problem: Wound, Traumatic, Nonburn (Adult)  Goal: Signs and Symptoms of Listed Potential Problems Will be Absent or Manageable (Wound, Traumatic, Nonburn)  Outcome: Ongoing (interventions implemented as appropriate)    Problem: Patient Care Overview (Adult)  Goal: Plan of Care Review  Outcome: Ongoing (interventions implemented as appropriate)

## 2017-08-08 PROCEDURE — 25010000002 ENOXAPARIN PER 10 MG: Performed by: HOSPITALIST

## 2017-08-08 PROCEDURE — 99308 SBSQ NF CARE LOW MDM 20: CPT | Performed by: NURSE PRACTITIONER

## 2017-08-08 RX ADMIN — CARBAMAZEPINE 400 MG: 200 TABLET ORAL at 08:23

## 2017-08-08 RX ADMIN — PETROLATUM: 42 OINTMENT TOPICAL at 08:26

## 2017-08-08 RX ADMIN — OXYCODONE HYDROCHLORIDE AND ACETAMINOPHEN 2 TABLET: 5; 325 TABLET ORAL at 21:52

## 2017-08-08 RX ADMIN — DOXEPIN HYDROCHLORIDE 75 MG: 25 CAPSULE ORAL at 21:17

## 2017-08-08 RX ADMIN — PETROLATUM: 42 OINTMENT TOPICAL at 21:18

## 2017-08-08 RX ADMIN — GABAPENTIN 800 MG: 400 CAPSULE ORAL at 06:01

## 2017-08-08 RX ADMIN — GABAPENTIN 800 MG: 400 CAPSULE ORAL at 13:13

## 2017-08-08 RX ADMIN — LITHIUM CARBONATE 300 MG: 300 TABLET ORAL at 17:34

## 2017-08-08 RX ADMIN — ENOXAPARIN SODIUM 40 MG: 40 INJECTION SUBCUTANEOUS at 08:24

## 2017-08-08 RX ADMIN — LORAZEPAM 0.5 MG: 0.5 TABLET ORAL at 08:23

## 2017-08-08 RX ADMIN — AMLODIPINE BESYLATE 2.5 MG: 2.5 TABLET ORAL at 08:23

## 2017-08-08 RX ADMIN — LORAZEPAM 0.5 MG: 0.5 TABLET ORAL at 02:54

## 2017-08-08 RX ADMIN — MICONAZOLE NITRATE: 2 POWDER TOPICAL at 21:18

## 2017-08-08 RX ADMIN — OXYCODONE HYDROCHLORIDE AND ACETAMINOPHEN 2 TABLET: 5; 325 TABLET ORAL at 17:34

## 2017-08-08 RX ADMIN — CARBAMAZEPINE 400 MG: 200 TABLET ORAL at 17:34

## 2017-08-08 RX ADMIN — Medication 1 CAPSULE: at 08:23

## 2017-08-08 RX ADMIN — PRIMIDONE 250 MG: 250 TABLET ORAL at 08:23

## 2017-08-08 RX ADMIN — METOPROLOL TARTRATE 100 MG: 50 TABLET, FILM COATED ORAL at 21:17

## 2017-08-08 RX ADMIN — PRIMIDONE 250 MG: 250 TABLET ORAL at 17:34

## 2017-08-08 RX ADMIN — OXYCODONE HYDROCHLORIDE AND ACETAMINOPHEN 2 TABLET: 5; 325 TABLET ORAL at 08:23

## 2017-08-08 RX ADMIN — LOSARTAN POTASSIUM 100 MG: 50 TABLET ORAL at 08:23

## 2017-08-08 RX ADMIN — FLUOXETINE 40 MG: 20 CAPSULE ORAL at 08:23

## 2017-08-08 RX ADMIN — TEMAZEPAM 30 MG: 15 CAPSULE ORAL at 21:52

## 2017-08-08 RX ADMIN — LORAZEPAM 0.5 MG: 0.5 TABLET ORAL at 14:45

## 2017-08-08 RX ADMIN — GABAPENTIN 800 MG: 400 CAPSULE ORAL at 21:17

## 2017-08-08 RX ADMIN — OXYCODONE HYDROCHLORIDE AND ACETAMINOPHEN 2 TABLET: 5; 325 TABLET ORAL at 13:13

## 2017-08-08 RX ADMIN — LITHIUM CARBONATE 300 MG: 300 TABLET ORAL at 08:23

## 2017-08-08 RX ADMIN — MICONAZOLE NITRATE: 2 POWDER TOPICAL at 08:24

## 2017-08-08 RX ADMIN — METOPROLOL TARTRATE 100 MG: 50 TABLET, FILM COATED ORAL at 08:23

## 2017-08-08 NOTE — PROGRESS NOTES
"SERVICE: [facility] HOSPITALIST    CONSULTANTS: Podiatry    CHIEF COMPLAINT: f/u right foot lis franc fracture, s/p fixator placement, RLE cellulitis    SUBJECTIVE: The patient reports he is feeling well and \"ready for surgery\". He is anxious to go home. He reports therapies have been discontinued and that he is compliant with NWB to RLE. He denies f/c/cough/soa/chest pain/n/v/d/abdominal pain or other new concerns.    OBJECTIVE:    /70 (BP Location: Left arm, Patient Position: Sitting)  Pulse 66  Temp 99.4 °F (37.4 °C) (Oral)   Resp 18  Ht 72\" (182.9 cm)  Wt 285 lb (129 kg)  SpO2 98%  BMI 39.75 kg/m2    MEDS/LABS REVIEWED AND ORDERED    amLODIPine 2.5 mg Oral Q24H   carBAMazepine 400 mg Oral BID   doxepin 75 mg Oral Nightly   enoxaparin 40 mg Subcutaneous Daily   FLUoxetine 40 mg Oral Daily   gabapentin 800 mg Oral Q8H   hydrophor  Topical Q12H   lactobacillus acidophilus 1 capsule Oral Daily   lithium 300 mg Oral BID   losartan 100 mg Oral Q24H   metoprolol tartrate 100 mg Oral Q12H   miconazole  Topical Q12H   primidone 250 mg Oral BID     Physical Exam   Constitutional: He is oriented to person, place, and time. He appears well-developed and well-nourished.   HENT:   Head: Normocephalic and atraumatic.   Eyes: Pupils are equal, round, and reactive to light.   Cardiovascular: Normal rate, regular rhythm and normal heart sounds.    Pulmonary/Chest: Effort normal and breath sounds normal. No respiratory distress. He has no wheezes. He has no rales.   Abdominal: Soft. Bowel sounds are normal. He exhibits no distension. There is no tenderness.   obese   Musculoskeletal:   Right forefoot with mild edema, RLE otherwise without edema noted.   Neurological: He is alert and oriented to person, place, and time.   Skin: Skin is warm and dry. No erythema.   Psychiatric: He has a normal mood and affect. His behavior is normal. Judgment and thought content normal.   Vitals " reviewed.    LAB/DIAGNOSTICS:    Lab Results (last 24 hours)     ** No results found for the last 24 hours. **        ASSESSMENT/PLAN:  1. Immobility secondary to Lis Franc fracture right foot, s/p external fixator placement: Podiatry following/wound RN following  Possible second surgery 8/11/17 pending per Dr. Farah  Oral bactrim course completed, Temp 99.4 today  PT/OT discharged patient recently  NWB RLE  Renal function stable last check on 8/4/17  Recheck 8/11/17 am     2. Recent Cellulitis RLE:   As above, bactrim complete  Lab pending 8/11/17  Low grade fevers continue, IS continues     3. Chronic splenic vein thrombosis:   Noted on CT abdomen during inpatient stay, no acute issues  Plan f/u GI after d/c      4. H/O Bipolar disorder: no changes to regimen  No acute issues on sinequan, prozac, lithium, and tegretol and PRN  Ativan and restoril.  Last QTc normal      5. DM2: last A1C 4.9% 5/30/2017, recheck with lab 8/11/17  Glucose at goal off home metformin  Continue diet control.  No further medication treatment recommended at this time      6. Hypertension:   BP at goal on losartan 100 mg daily/metoprolol tartrate 100 mg every 12 hours/amlodipine 5. Daily    7. H/O Hyperlipidemia: no acute issues, diet control   On no home medications for this  F/U Killian Scales MD after discharge      8. Obesity: Body mass index is 39.75 kg/(m^2).  Nutrition continues  monitoring      9. Chronic macrocytic anemia: stable, recheck CBC pending 8/11/17  B12 and folate are normal  LDH was normal, suspect anemia of chronic disease.       10. H/O DVT: Continuing lovenox for DVT prophy.   Pre-operative Venous duplex RLE negative for DVT      11. Peripheral neuropathy: no acute issues on neurontin  No sensation to feet. PT/OT following.       12. Benign essential tremor: On home primidone, no acute issues here.

## 2017-08-08 NOTE — PLAN OF CARE
Problem: Pain, Acute (Adult)  Goal: Identify Related Risk Factors and Signs and Symptoms  Outcome: Ongoing (interventions implemented as appropriate)  Goal: Acceptable Pain Control/Comfort Level  Outcome: Ongoing (interventions implemented as appropriate)    Problem: Wound, Traumatic, Nonburn (Adult)  Goal: Signs and Symptoms of Listed Potential Problems Will be Absent or Manageable (Wound, Traumatic, Nonburn)  Outcome: Ongoing (interventions implemented as appropriate)    Problem: Patient Care Overview (Adult)  Goal: Plan of Care Review  Outcome: Ongoing (interventions implemented as appropriate)    08/08/17 1207   Outcome Evaluation   Outcome Summary/Follow up Plan Alert and oriented. External fixator to Right foot still intact. Pt. takes pills whole with water. Pain meds given as ordred. No complaints of pain at this time.        Goal: Adult Individualization and Mutuality  Outcome: Ongoing (interventions implemented as appropriate)  Goal: Discharge Needs Assessment  Outcome: Ongoing (interventions implemented as appropriate)

## 2017-08-09 PROCEDURE — 25010000002 ENOXAPARIN PER 10 MG: Performed by: HOSPITALIST

## 2017-08-09 RX ADMIN — PETROLATUM: 42 OINTMENT TOPICAL at 20:57

## 2017-08-09 RX ADMIN — OXYCODONE HYDROCHLORIDE AND ACETAMINOPHEN 2 TABLET: 5; 325 TABLET ORAL at 06:39

## 2017-08-09 RX ADMIN — DOXEPIN HYDROCHLORIDE 75 MG: 25 CAPSULE ORAL at 20:53

## 2017-08-09 RX ADMIN — LITHIUM CARBONATE 300 MG: 300 TABLET ORAL at 18:06

## 2017-08-09 RX ADMIN — ENOXAPARIN SODIUM 40 MG: 40 INJECTION SUBCUTANEOUS at 09:45

## 2017-08-09 RX ADMIN — MICONAZOLE NITRATE: 2 POWDER TOPICAL at 09:47

## 2017-08-09 RX ADMIN — LORAZEPAM 0.5 MG: 0.5 TABLET ORAL at 02:00

## 2017-08-09 RX ADMIN — FLUOXETINE 40 MG: 20 CAPSULE ORAL at 09:46

## 2017-08-09 RX ADMIN — LOSARTAN POTASSIUM 100 MG: 50 TABLET ORAL at 09:46

## 2017-08-09 RX ADMIN — OXYCODONE HYDROCHLORIDE AND ACETAMINOPHEN 2 TABLET: 5; 325 TABLET ORAL at 20:53

## 2017-08-09 RX ADMIN — GABAPENTIN 800 MG: 400 CAPSULE ORAL at 15:24

## 2017-08-09 RX ADMIN — TEMAZEPAM 30 MG: 15 CAPSULE ORAL at 21:29

## 2017-08-09 RX ADMIN — CARBAMAZEPINE 400 MG: 200 TABLET ORAL at 09:46

## 2017-08-09 RX ADMIN — OXYCODONE HYDROCHLORIDE AND ACETAMINOPHEN 2 TABLET: 5; 325 TABLET ORAL at 02:05

## 2017-08-09 RX ADMIN — MICONAZOLE NITRATE: 2 POWDER TOPICAL at 20:56

## 2017-08-09 RX ADMIN — LITHIUM CARBONATE 300 MG: 300 TABLET ORAL at 09:46

## 2017-08-09 RX ADMIN — GABAPENTIN 800 MG: 400 CAPSULE ORAL at 06:39

## 2017-08-09 RX ADMIN — PRIMIDONE 250 MG: 250 TABLET ORAL at 18:06

## 2017-08-09 RX ADMIN — LORAZEPAM 0.5 MG: 0.5 TABLET ORAL at 09:46

## 2017-08-09 RX ADMIN — Medication 1 CAPSULE: at 09:45

## 2017-08-09 RX ADMIN — CARBAMAZEPINE 400 MG: 200 TABLET ORAL at 18:06

## 2017-08-09 RX ADMIN — LORAZEPAM 0.5 MG: 0.5 TABLET ORAL at 20:52

## 2017-08-09 RX ADMIN — GABAPENTIN 800 MG: 400 CAPSULE ORAL at 20:55

## 2017-08-09 RX ADMIN — AMLODIPINE BESYLATE 2.5 MG: 2.5 TABLET ORAL at 09:45

## 2017-08-09 RX ADMIN — PRIMIDONE 250 MG: 250 TABLET ORAL at 09:46

## 2017-08-09 RX ADMIN — OXYCODONE HYDROCHLORIDE AND ACETAMINOPHEN 2 TABLET: 5; 325 TABLET ORAL at 11:26

## 2017-08-09 RX ADMIN — PETROLATUM: 42 OINTMENT TOPICAL at 09:47

## 2017-08-09 RX ADMIN — OXYCODONE HYDROCHLORIDE AND ACETAMINOPHEN 2 TABLET: 5; 325 TABLET ORAL at 15:24

## 2017-08-09 RX ADMIN — METOPROLOL TARTRATE 100 MG: 50 TABLET, FILM COATED ORAL at 20:53

## 2017-08-09 RX ADMIN — METOPROLOL TARTRATE 100 MG: 50 TABLET, FILM COATED ORAL at 09:46

## 2017-08-10 PROCEDURE — 25010000002 ENOXAPARIN PER 10 MG: Performed by: HOSPITALIST

## 2017-08-10 RX ADMIN — OXYCODONE HYDROCHLORIDE AND ACETAMINOPHEN 2 TABLET: 5; 325 TABLET ORAL at 13:37

## 2017-08-10 RX ADMIN — LOSARTAN POTASSIUM 100 MG: 50 TABLET ORAL at 09:05

## 2017-08-10 RX ADMIN — OXYCODONE HYDROCHLORIDE AND ACETAMINOPHEN 2 TABLET: 5; 325 TABLET ORAL at 09:02

## 2017-08-10 RX ADMIN — LITHIUM CARBONATE 300 MG: 300 TABLET ORAL at 09:07

## 2017-08-10 RX ADMIN — AMLODIPINE BESYLATE 2.5 MG: 2.5 TABLET ORAL at 08:59

## 2017-08-10 RX ADMIN — MICONAZOLE NITRATE: 2 POWDER TOPICAL at 09:02

## 2017-08-10 RX ADMIN — Medication 1 CAPSULE: at 09:07

## 2017-08-10 RX ADMIN — OXYCODONE HYDROCHLORIDE AND ACETAMINOPHEN 2 TABLET: 5; 325 TABLET ORAL at 02:38

## 2017-08-10 RX ADMIN — TEMAZEPAM 30 MG: 15 CAPSULE ORAL at 22:17

## 2017-08-10 RX ADMIN — GABAPENTIN 800 MG: 400 CAPSULE ORAL at 06:05

## 2017-08-10 RX ADMIN — PRIMIDONE 250 MG: 250 TABLET ORAL at 16:56

## 2017-08-10 RX ADMIN — METOPROLOL TARTRATE 100 MG: 50 TABLET, FILM COATED ORAL at 09:03

## 2017-08-10 RX ADMIN — DOXEPIN HYDROCHLORIDE 75 MG: 25 CAPSULE ORAL at 22:17

## 2017-08-10 RX ADMIN — CARBAMAZEPINE 400 MG: 200 TABLET ORAL at 16:56

## 2017-08-10 RX ADMIN — GABAPENTIN 800 MG: 400 CAPSULE ORAL at 13:36

## 2017-08-10 RX ADMIN — LITHIUM CARBONATE 300 MG: 300 TABLET ORAL at 16:56

## 2017-08-10 RX ADMIN — OXYCODONE HYDROCHLORIDE AND ACETAMINOPHEN 2 TABLET: 5; 325 TABLET ORAL at 08:58

## 2017-08-10 RX ADMIN — PETROLATUM: 42 OINTMENT TOPICAL at 09:02

## 2017-08-10 RX ADMIN — PETROLATUM 1 APPLICATION: 42 OINTMENT TOPICAL at 20:16

## 2017-08-10 RX ADMIN — GABAPENTIN 800 MG: 400 CAPSULE ORAL at 22:17

## 2017-08-10 RX ADMIN — CARBAMAZEPINE 400 MG: 200 TABLET ORAL at 09:08

## 2017-08-10 RX ADMIN — METOPROLOL TARTRATE 100 MG: 50 TABLET, FILM COATED ORAL at 22:18

## 2017-08-10 RX ADMIN — LORAZEPAM 0.5 MG: 0.5 TABLET ORAL at 09:02

## 2017-08-10 RX ADMIN — LORAZEPAM 0.5 MG: 0.5 TABLET ORAL at 08:58

## 2017-08-10 RX ADMIN — OXYCODONE HYDROCHLORIDE AND ACETAMINOPHEN 2 TABLET: 5; 325 TABLET ORAL at 20:15

## 2017-08-10 RX ADMIN — LORAZEPAM 0.5 MG: 0.5 TABLET ORAL at 20:15

## 2017-08-10 RX ADMIN — MICONAZOLE NITRATE 1 APPLICATION: 2 POWDER TOPICAL at 20:16

## 2017-08-10 RX ADMIN — PRIMIDONE 250 MG: 250 TABLET ORAL at 09:04

## 2017-08-10 RX ADMIN — FLUOXETINE 40 MG: 20 CAPSULE ORAL at 09:06

## 2017-08-10 RX ADMIN — ENOXAPARIN SODIUM 40 MG: 40 INJECTION SUBCUTANEOUS at 09:06

## 2017-08-10 NOTE — PROGRESS NOTES
Adult Nutrition  Assessment/PES    Patient Name:  Eliseo Price  YOB: 1965  MRN: [id number]  Admit Date:  8/2/2017    Assessment Date:  8/10/2017        Reason for Assessment       08/10/17 0904    Reason for Assessment    Reason For Assessment/Visit follow up protocol    Cardiac HTN    Endocrine DM    Neurological --   bipolar    Ortho Fracture    Skin Cellulitis            Data Eval/ Notes       08/10/17 0904     Notes    Note pt reports eating well  and loved roast beef.               Anthropometrics       08/10/17 0905    Anthropometrics    RD Documented Current Weight  --   no new wt noted NWB            Labs/Tests/Procedures/Meds       08/10/17 0905    Labs/Tests/Procedures/Meds    Labs/Tests Review Reviewed    Medication Review Reviewed, pertinent            Physical Findings       08/10/17 0905    Physical Appearance    Overall Physical Appearance obese    Skin other (see comments)   rash, abrasion, incision/external fixator              Nutrition Prescription Ordered       08/10/17 0906    Nutrition Prescription PO    Common Modifiers Cardiac;Consistent Carbohydrate            Evaluation of Received Nutrient/Fluid Intake       08/10/17 0906    Fluid Intake Evaluation    Oral Fluid (mL) 1560    Total Fluid Intake (mL) 1560    % Fluid Needs 87    PO Evaluation    Number of Meals 9    % PO Intake 100              Problem/Interventions:        Problem 1       08/10/17 0906    Nutrition Diagnoses Problem 1    Problem 1 Overweight/Obesity    Functional Diagnosis Mobility limitation    Signs/Symptoms (evidenced by) BMI    BMI 35 - 39.9                    Intervention Goal       08/10/17 0907    Intervention Goal    General Maintain nutrition    PO Maintain intake;PO intake (%)    PO Intake % 100 %            Nutrition Intervention       08/10/17 0907    Nutrition Intervention    RD/Tech Action Follow Tx progress              Education/Evaluation       08/10/17 0907     Education    Education Other (comment)   No needs, declines edu. Will follow.     Monitor/Evaluation    Monitor Per protocol;I&O;PO intake;Pertinent labs;Weight;Symptoms            Electronically signed by:  Aleksandra Rowe RD  08/10/17 9:07 AM

## 2017-08-11 LAB
ANION GAP SERPL CALCULATED.3IONS-SCNC: 13.4 MMOL/L
BASOPHILS # BLD AUTO: 0.1 10*3/MM3 (ref 0–0.2)
BASOPHILS NFR BLD AUTO: 1.1 % (ref 0–2)
BUN BLD-MCNC: 25 MG/DL (ref 6–20)
BUN/CREAT SERPL: 26.9 (ref 7–25)
CALCIUM SPEC-SCNC: 9 MG/DL (ref 8.6–10.5)
CHLORIDE SERPL-SCNC: 102 MMOL/L (ref 98–107)
CO2 SERPL-SCNC: 23.6 MMOL/L (ref 22–29)
CREAT BLD-MCNC: 0.93 MG/DL (ref 0.76–1.27)
DEPRECATED RDW RBC AUTO: 39.9 FL (ref 37–54)
EOSINOPHIL # BLD AUTO: 0.48 10*3/MM3 (ref 0.1–0.3)
EOSINOPHIL NFR BLD AUTO: 5.5 % (ref 0–4)
ERYTHROCYTE [DISTWIDTH] IN BLOOD BY AUTOMATED COUNT: 11 % (ref 11.5–14.5)
GFR SERPL CREATININE-BSD FRML MDRD: 85 ML/MIN/1.73
GLUCOSE BLD-MCNC: 138 MG/DL (ref 65–99)
HBA1C MFR BLD: 4.6 % (ref 4.8–5.6)
HCT VFR BLD AUTO: 38.7 % (ref 42–52)
HGB BLD-MCNC: 12.7 G/DL (ref 14–18)
IMM GRANULOCYTES # BLD: 0.09 10*3/MM3 (ref 0–0.03)
IMM GRANULOCYTES NFR BLD: 1 % (ref 0–0.5)
LYMPHOCYTES # BLD AUTO: 1.72 10*3/MM3 (ref 0.6–4.8)
LYMPHOCYTES NFR BLD AUTO: 19.6 % (ref 20–45)
MCH RBC QN AUTO: 32.1 PG (ref 27–31)
MCHC RBC AUTO-ENTMCNC: 32.8 G/DL (ref 31–37)
MCV RBC AUTO: 97.7 FL (ref 80–94)
MONOCYTES # BLD AUTO: 1 10*3/MM3 (ref 0–1)
MONOCYTES NFR BLD AUTO: 11.4 % (ref 3–8)
NEUTROPHILS # BLD AUTO: 5.4 10*3/MM3 (ref 1.5–8.3)
NEUTROPHILS NFR BLD AUTO: 61.4 % (ref 45–70)
NRBC BLD MANUAL-RTO: 0 /100 WBC (ref 0–0)
PLATELET # BLD AUTO: 360 10*3/MM3 (ref 140–500)
PMV BLD AUTO: 8.9 FL (ref 7.4–10.4)
POTASSIUM BLD-SCNC: 4.1 MMOL/L (ref 3.5–5.2)
RBC # BLD AUTO: 3.96 10*6/MM3 (ref 4.7–6.1)
SODIUM BLD-SCNC: 139 MMOL/L (ref 136–145)
WBC NRBC COR # BLD: 8.79 10*3/MM3 (ref 4.8–10.8)

## 2017-08-11 PROCEDURE — 80048 BASIC METABOLIC PNL TOTAL CA: CPT | Performed by: NURSE PRACTITIONER

## 2017-08-11 PROCEDURE — 85025 COMPLETE CBC W/AUTO DIFF WBC: CPT | Performed by: NURSE PRACTITIONER

## 2017-08-11 PROCEDURE — 83036 HEMOGLOBIN GLYCOSYLATED A1C: CPT | Performed by: NURSE PRACTITIONER

## 2017-08-11 PROCEDURE — 25010000002 ENOXAPARIN PER 10 MG: Performed by: HOSPITALIST

## 2017-08-11 RX ADMIN — OXYCODONE HYDROCHLORIDE AND ACETAMINOPHEN 2 TABLET: 5; 325 TABLET ORAL at 18:26

## 2017-08-11 RX ADMIN — LOSARTAN POTASSIUM 100 MG: 50 TABLET ORAL at 10:02

## 2017-08-11 RX ADMIN — LITHIUM CARBONATE 300 MG: 300 TABLET ORAL at 10:01

## 2017-08-11 RX ADMIN — OXYCODONE HYDROCHLORIDE AND ACETAMINOPHEN 2 TABLET: 5; 325 TABLET ORAL at 14:12

## 2017-08-11 RX ADMIN — AMLODIPINE BESYLATE 2.5 MG: 2.5 TABLET ORAL at 10:05

## 2017-08-11 RX ADMIN — MICONAZOLE NITRATE: 2 POWDER TOPICAL at 10:15

## 2017-08-11 RX ADMIN — CARBAMAZEPINE 400 MG: 200 TABLET ORAL at 10:02

## 2017-08-11 RX ADMIN — LORAZEPAM 0.5 MG: 0.5 TABLET ORAL at 14:12

## 2017-08-11 RX ADMIN — OXYCODONE HYDROCHLORIDE AND ACETAMINOPHEN 2 TABLET: 5; 325 TABLET ORAL at 04:41

## 2017-08-11 RX ADMIN — OXYCODONE HYDROCHLORIDE AND ACETAMINOPHEN 2 TABLET: 5; 325 TABLET ORAL at 22:16

## 2017-08-11 RX ADMIN — LORAZEPAM 0.5 MG: 0.5 TABLET ORAL at 04:41

## 2017-08-11 RX ADMIN — MICONAZOLE NITRATE: 2 POWDER TOPICAL at 22:00

## 2017-08-11 RX ADMIN — TEMAZEPAM 30 MG: 15 CAPSULE ORAL at 22:17

## 2017-08-11 RX ADMIN — Medication 1 CAPSULE: at 10:02

## 2017-08-11 RX ADMIN — FLUOXETINE 40 MG: 20 CAPSULE ORAL at 10:01

## 2017-08-11 RX ADMIN — PETROLATUM: 42 OINTMENT TOPICAL at 10:15

## 2017-08-11 RX ADMIN — PETROLATUM: 42 OINTMENT TOPICAL at 22:00

## 2017-08-11 RX ADMIN — GABAPENTIN 800 MG: 400 CAPSULE ORAL at 14:12

## 2017-08-11 RX ADMIN — LITHIUM CARBONATE 300 MG: 300 TABLET ORAL at 17:39

## 2017-08-11 RX ADMIN — METOPROLOL TARTRATE 100 MG: 50 TABLET, FILM COATED ORAL at 10:02

## 2017-08-11 RX ADMIN — LORAZEPAM 0.5 MG: 0.5 TABLET ORAL at 22:17

## 2017-08-11 RX ADMIN — CARBAMAZEPINE 400 MG: 200 TABLET ORAL at 17:39

## 2017-08-11 RX ADMIN — GABAPENTIN 800 MG: 400 CAPSULE ORAL at 05:04

## 2017-08-11 RX ADMIN — PRIMIDONE 250 MG: 250 TABLET ORAL at 10:01

## 2017-08-11 RX ADMIN — PRIMIDONE 250 MG: 250 TABLET ORAL at 17:39

## 2017-08-11 RX ADMIN — ENOXAPARIN SODIUM 40 MG: 40 INJECTION SUBCUTANEOUS at 10:04

## 2017-08-11 RX ADMIN — DOXEPIN HYDROCHLORIDE 75 MG: 25 CAPSULE ORAL at 22:18

## 2017-08-11 RX ADMIN — METOPROLOL TARTRATE 100 MG: 50 TABLET, FILM COATED ORAL at 22:18

## 2017-08-11 RX ADMIN — GABAPENTIN 800 MG: 400 CAPSULE ORAL at 22:17

## 2017-08-11 NOTE — PLAN OF CARE
Problem: Pain, Acute (Adult)  Goal: Acceptable Pain Control/Comfort Level  Outcome: Ongoing (interventions implemented as appropriate)    08/10/17 2352   Pain, Acute (Adult)   Acceptable Pain Control/Comfort Level making progress toward outcome         Problem: Wound, Traumatic, Nonburn (Adult)  Goal: Signs and Symptoms of Listed Potential Problems Will be Absent or Manageable (Wound, Traumatic, Nonburn)  Outcome: Ongoing (interventions implemented as appropriate)    08/10/17 2352   Wound, Traumatic, Nonburn   Problems Assessed (Wound) all   Problems Present (Wound) pain         Problem: Patient Care Overview (Adult)  Goal: Plan of Care Review  Outcome: Ongoing (interventions implemented as appropriate)    08/10/17 2352   Coping/Psychosocial Response Interventions   Plan Of Care Reviewed With patient   Patient Care Overview   Progress progress toward functional goals as expected

## 2017-08-11 NOTE — PLAN OF CARE
Problem: Pain, Acute (Adult)  Goal: Identify Related Risk Factors and Signs and Symptoms  Outcome: Ongoing (interventions implemented as appropriate)  Goal: Acceptable Pain Control/Comfort Level  Outcome: Ongoing (interventions implemented as appropriate)    Problem: Wound, Traumatic, Nonburn (Adult)  Goal: Signs and Symptoms of Listed Potential Problems Will be Absent or Manageable (Wound, Traumatic, Nonburn)  Outcome: Ongoing (interventions implemented as appropriate)    Problem: Patient Care Overview (Adult)  Goal: Plan of Care Review  Outcome: Ongoing (interventions implemented as appropriate)  Goal: Adult Individualization and Mutuality  Outcome: Ongoing (interventions implemented as appropriate)  Goal: Discharge Needs Assessment  Outcome: Ongoing (interventions implemented as appropriate)

## 2017-08-12 PROCEDURE — 25010000002 ENOXAPARIN PER 10 MG: Performed by: HOSPITALIST

## 2017-08-12 RX ADMIN — LITHIUM CARBONATE 300 MG: 300 TABLET ORAL at 17:13

## 2017-08-12 RX ADMIN — DOXEPIN HYDROCHLORIDE 75 MG: 25 CAPSULE ORAL at 20:32

## 2017-08-12 RX ADMIN — MICONAZOLE NITRATE: 2 POWDER TOPICAL at 08:38

## 2017-08-12 RX ADMIN — METOPROLOL TARTRATE 100 MG: 50 TABLET, FILM COATED ORAL at 20:32

## 2017-08-12 RX ADMIN — PETROLATUM: 42 OINTMENT TOPICAL at 20:31

## 2017-08-12 RX ADMIN — PRIMIDONE 250 MG: 250 TABLET ORAL at 17:13

## 2017-08-12 RX ADMIN — TEMAZEPAM 30 MG: 15 CAPSULE ORAL at 20:32

## 2017-08-12 RX ADMIN — GABAPENTIN 800 MG: 400 CAPSULE ORAL at 22:46

## 2017-08-12 RX ADMIN — OXYCODONE HYDROCHLORIDE AND ACETAMINOPHEN 2 TABLET: 5; 325 TABLET ORAL at 14:55

## 2017-08-12 RX ADMIN — AMLODIPINE BESYLATE 2.5 MG: 2.5 TABLET ORAL at 08:37

## 2017-08-12 RX ADMIN — Medication 1 CAPSULE: at 08:37

## 2017-08-12 RX ADMIN — METOPROLOL TARTRATE 100 MG: 50 TABLET, FILM COATED ORAL at 08:37

## 2017-08-12 RX ADMIN — CARBAMAZEPINE 400 MG: 200 TABLET ORAL at 17:13

## 2017-08-12 RX ADMIN — OXYCODONE HYDROCHLORIDE AND ACETAMINOPHEN 2 TABLET: 5; 325 TABLET ORAL at 20:32

## 2017-08-12 RX ADMIN — OXYCODONE HYDROCHLORIDE AND ACETAMINOPHEN 2 TABLET: 5; 325 TABLET ORAL at 11:13

## 2017-08-12 RX ADMIN — GABAPENTIN 800 MG: 400 CAPSULE ORAL at 06:11

## 2017-08-12 RX ADMIN — GABAPENTIN 800 MG: 400 CAPSULE ORAL at 14:55

## 2017-08-12 RX ADMIN — PRIMIDONE 250 MG: 250 TABLET ORAL at 08:38

## 2017-08-12 RX ADMIN — PETROLATUM: 42 OINTMENT TOPICAL at 08:37

## 2017-08-12 RX ADMIN — LOSARTAN POTASSIUM 100 MG: 50 TABLET ORAL at 08:36

## 2017-08-12 RX ADMIN — LORAZEPAM 0.5 MG: 0.5 TABLET ORAL at 06:11

## 2017-08-12 RX ADMIN — OXYCODONE HYDROCHLORIDE AND ACETAMINOPHEN 2 TABLET: 5; 325 TABLET ORAL at 06:10

## 2017-08-12 RX ADMIN — LORAZEPAM 0.5 MG: 0.5 TABLET ORAL at 22:46

## 2017-08-12 RX ADMIN — LORAZEPAM 0.5 MG: 0.5 TABLET ORAL at 14:55

## 2017-08-12 RX ADMIN — LITHIUM CARBONATE 300 MG: 300 TABLET ORAL at 08:37

## 2017-08-12 RX ADMIN — FLUOXETINE 40 MG: 20 CAPSULE ORAL at 08:37

## 2017-08-12 RX ADMIN — MICONAZOLE NITRATE: 2 POWDER TOPICAL at 20:31

## 2017-08-12 RX ADMIN — ENOXAPARIN SODIUM 40 MG: 40 INJECTION SUBCUTANEOUS at 08:37

## 2017-08-12 RX ADMIN — CARBAMAZEPINE 400 MG: 200 TABLET ORAL at 08:37

## 2017-08-12 NOTE — PLAN OF CARE
Problem: Patient Care Overview (Adult)  Goal: Plan of Care Review  Outcome: Ongoing (interventions implemented as appropriate)    08/12/17 0210   Coping/Psychosocial Response Interventions   Plan Of Care Reviewed With patient   Patient Care Overview   Progress progress toward functional goals as expected   Outcome Evaluation   Outcome Summary/Follow up Plan Patient cooperative. Pain med given related to R foot pain. Safety measures in check.

## 2017-08-12 NOTE — PLAN OF CARE
Problem: Pain, Acute (Adult)  Goal: Identify Related Risk Factors and Signs and Symptoms  Outcome: Ongoing (interventions implemented as appropriate)  Goal: Acceptable Pain Control/Comfort Level  Outcome: Ongoing (interventions implemented as appropriate)    Problem: Wound, Traumatic, Nonburn (Adult)  Goal: Signs and Symptoms of Listed Potential Problems Will be Absent or Manageable (Wound, Traumatic, Nonburn)  Outcome: Ongoing (interventions implemented as appropriate)    Problem: Patient Care Overview (Adult)  Goal: Plan of Care Review  Outcome: Ongoing (interventions implemented as appropriate)    08/12/17 0210 08/12/17 1041   Coping/Psychosocial Response Interventions   Plan Of Care Reviewed With --  patient   Patient Care Overview   Progress --  no change   Outcome Evaluation   Outcome Summary/Follow up Plan Patient cooperative. Pain med given related to R foot pain. Safety measures in check. --        Goal: Adult Individualization and Mutuality  Outcome: Ongoing (interventions implemented as appropriate)  Goal: Discharge Needs Assessment  Outcome: Ongoing (interventions implemented as appropriate)

## 2017-08-13 PROCEDURE — 25010000002 ENOXAPARIN PER 10 MG: Performed by: HOSPITALIST

## 2017-08-13 RX ADMIN — CARBAMAZEPINE 400 MG: 200 TABLET ORAL at 17:12

## 2017-08-13 RX ADMIN — GABAPENTIN 800 MG: 400 CAPSULE ORAL at 21:07

## 2017-08-13 RX ADMIN — Medication 1 CAPSULE: at 08:18

## 2017-08-13 RX ADMIN — LITHIUM CARBONATE 300 MG: 300 TABLET ORAL at 17:12

## 2017-08-13 RX ADMIN — LOSARTAN POTASSIUM 100 MG: 50 TABLET ORAL at 08:18

## 2017-08-13 RX ADMIN — OXYCODONE HYDROCHLORIDE AND ACETAMINOPHEN 2 TABLET: 5; 325 TABLET ORAL at 21:04

## 2017-08-13 RX ADMIN — LORAZEPAM 0.5 MG: 0.5 TABLET ORAL at 21:04

## 2017-08-13 RX ADMIN — LORAZEPAM 0.5 MG: 0.5 TABLET ORAL at 08:19

## 2017-08-13 RX ADMIN — FLUOXETINE 40 MG: 20 CAPSULE ORAL at 08:18

## 2017-08-13 RX ADMIN — OXYCODONE HYDROCHLORIDE AND ACETAMINOPHEN 2 TABLET: 5; 325 TABLET ORAL at 08:19

## 2017-08-13 RX ADMIN — PETROLATUM: 42 OINTMENT TOPICAL at 08:19

## 2017-08-13 RX ADMIN — CARBAMAZEPINE 400 MG: 200 TABLET ORAL at 08:18

## 2017-08-13 RX ADMIN — ENOXAPARIN SODIUM 40 MG: 40 INJECTION SUBCUTANEOUS at 08:19

## 2017-08-13 RX ADMIN — AMLODIPINE BESYLATE 2.5 MG: 2.5 TABLET ORAL at 08:18

## 2017-08-13 RX ADMIN — METOPROLOL TARTRATE 100 MG: 50 TABLET, FILM COATED ORAL at 21:00

## 2017-08-13 RX ADMIN — OXYCODONE HYDROCHLORIDE AND ACETAMINOPHEN 2 TABLET: 5; 325 TABLET ORAL at 13:50

## 2017-08-13 RX ADMIN — LITHIUM CARBONATE 300 MG: 300 TABLET ORAL at 08:18

## 2017-08-13 RX ADMIN — GABAPENTIN 800 MG: 400 CAPSULE ORAL at 13:52

## 2017-08-13 RX ADMIN — METOPROLOL TARTRATE 100 MG: 50 TABLET, FILM COATED ORAL at 08:18

## 2017-08-13 RX ADMIN — DOXEPIN HYDROCHLORIDE 75 MG: 25 CAPSULE ORAL at 20:59

## 2017-08-13 RX ADMIN — OXYCODONE HYDROCHLORIDE AND ACETAMINOPHEN 2 TABLET: 5; 325 TABLET ORAL at 02:55

## 2017-08-13 RX ADMIN — PRIMIDONE 250 MG: 250 TABLET ORAL at 08:18

## 2017-08-13 RX ADMIN — MICONAZOLE NITRATE: 2 POWDER TOPICAL at 21:01

## 2017-08-13 RX ADMIN — GABAPENTIN 800 MG: 400 CAPSULE ORAL at 06:13

## 2017-08-13 RX ADMIN — MICONAZOLE NITRATE: 2 POWDER TOPICAL at 08:19

## 2017-08-13 RX ADMIN — PRIMIDONE 250 MG: 250 TABLET ORAL at 17:12

## 2017-08-13 RX ADMIN — PETROLATUM: 42 OINTMENT TOPICAL at 21:01

## 2017-08-13 RX ADMIN — TEMAZEPAM 30 MG: 15 CAPSULE ORAL at 22:12

## 2017-08-13 NOTE — PLAN OF CARE
Problem: Pain, Acute (Adult)  Goal: Identify Related Risk Factors and Signs and Symptoms  Outcome: Ongoing (interventions implemented as appropriate)  Goal: Acceptable Pain Control/Comfort Level  Outcome: Ongoing (interventions implemented as appropriate)    Problem: Wound, Traumatic, Nonburn (Adult)  Goal: Signs and Symptoms of Listed Potential Problems Will be Absent or Manageable (Wound, Traumatic, Nonburn)  Outcome: Ongoing (interventions implemented as appropriate)    Problem: Patient Care Overview (Adult)  Goal: Plan of Care Review  Outcome: Ongoing (interventions implemented as appropriate)    08/13/17 1044   Coping/Psychosocial Response Interventions   Plan Of Care Reviewed With patient   Patient Care Overview   Progress no change   Outcome Evaluation   Outcome Summary/Follow up Plan Pt alert and oriented X4. Pt is concerned about when surgery will be; this nurse told this patient she would check on it. Pt was medicated this am wiht his pain pill and ativan. Pt takes pills whole with water.       Goal: Adult Individualization and Mutuality  Outcome: Ongoing (interventions implemented as appropriate)  Goal: Discharge Needs Assessment  Outcome: Ongoing (interventions implemented as appropriate)

## 2017-08-14 PROCEDURE — 25010000002 ENOXAPARIN PER 10 MG: Performed by: HOSPITALIST

## 2017-08-14 RX ADMIN — OXYCODONE HYDROCHLORIDE AND ACETAMINOPHEN 2 TABLET: 5; 325 TABLET ORAL at 17:40

## 2017-08-14 RX ADMIN — LITHIUM CARBONATE 300 MG: 300 TABLET ORAL at 08:07

## 2017-08-14 RX ADMIN — LITHIUM CARBONATE 300 MG: 300 TABLET ORAL at 17:40

## 2017-08-14 RX ADMIN — OXYCODONE HYDROCHLORIDE AND ACETAMINOPHEN 2 TABLET: 5; 325 TABLET ORAL at 02:58

## 2017-08-14 RX ADMIN — AMLODIPINE BESYLATE 2.5 MG: 2.5 TABLET ORAL at 08:07

## 2017-08-14 RX ADMIN — OXYCODONE HYDROCHLORIDE AND ACETAMINOPHEN 2 TABLET: 5; 325 TABLET ORAL at 22:40

## 2017-08-14 RX ADMIN — MICONAZOLE NITRATE: 2 POWDER TOPICAL at 08:11

## 2017-08-14 RX ADMIN — DOXEPIN HYDROCHLORIDE 75 MG: 25 CAPSULE ORAL at 21:23

## 2017-08-14 RX ADMIN — TEMAZEPAM 30 MG: 15 CAPSULE ORAL at 22:40

## 2017-08-14 RX ADMIN — METOPROLOL TARTRATE 100 MG: 50 TABLET, FILM COATED ORAL at 08:08

## 2017-08-14 RX ADMIN — Medication 1 CAPSULE: at 08:07

## 2017-08-14 RX ADMIN — FLUOXETINE 40 MG: 20 CAPSULE ORAL at 08:07

## 2017-08-14 RX ADMIN — LORAZEPAM 0.5 MG: 0.5 TABLET ORAL at 13:30

## 2017-08-14 RX ADMIN — GABAPENTIN 800 MG: 400 CAPSULE ORAL at 21:23

## 2017-08-14 RX ADMIN — PRIMIDONE 250 MG: 250 TABLET ORAL at 17:40

## 2017-08-14 RX ADMIN — PETROLATUM: 42 OINTMENT TOPICAL at 08:11

## 2017-08-14 RX ADMIN — ENOXAPARIN SODIUM 40 MG: 40 INJECTION SUBCUTANEOUS at 08:06

## 2017-08-14 RX ADMIN — LORAZEPAM 0.5 MG: 0.5 TABLET ORAL at 22:40

## 2017-08-14 RX ADMIN — CARBAMAZEPINE 400 MG: 200 TABLET ORAL at 08:07

## 2017-08-14 RX ADMIN — CARBAMAZEPINE 400 MG: 200 TABLET ORAL at 17:40

## 2017-08-14 RX ADMIN — GABAPENTIN 800 MG: 400 CAPSULE ORAL at 13:29

## 2017-08-14 RX ADMIN — OXYCODONE HYDROCHLORIDE AND ACETAMINOPHEN 2 TABLET: 5; 325 TABLET ORAL at 12:16

## 2017-08-14 RX ADMIN — LOSARTAN POTASSIUM 100 MG: 50 TABLET ORAL at 08:07

## 2017-08-14 RX ADMIN — LORAZEPAM 0.5 MG: 0.5 TABLET ORAL at 05:33

## 2017-08-14 RX ADMIN — OXYCODONE HYDROCHLORIDE AND ACETAMINOPHEN 2 TABLET: 5; 325 TABLET ORAL at 07:57

## 2017-08-14 RX ADMIN — PRIMIDONE 250 MG: 250 TABLET ORAL at 08:07

## 2017-08-14 RX ADMIN — METOPROLOL TARTRATE 100 MG: 50 TABLET, FILM COATED ORAL at 21:22

## 2017-08-14 RX ADMIN — GABAPENTIN 800 MG: 400 CAPSULE ORAL at 05:32

## 2017-08-14 NOTE — PLAN OF CARE
Problem: Pain, Acute (Adult)  Goal: Acceptable Pain Control/Comfort Level  Outcome: Ongoing (interventions implemented as appropriate)    Problem: Wound, Traumatic, Nonburn (Adult)  Goal: Signs and Symptoms of Listed Potential Problems Will be Absent or Manageable (Wound, Traumatic, Nonburn)  Outcome: Ongoing (interventions implemented as appropriate)    Problem: Patient Care Overview (Adult)  Goal: Plan of Care Review  Outcome: Ongoing (interventions implemented as appropriate)

## 2017-08-14 NOTE — PROGRESS NOTES
Received a call from [employer] making sure I received the notice of non-coverage with the last skilled day being August 13, 2017. Spoke with  who states he will be taking patient to surgery on Friday August 18, 2017. Spoke with patient and mother about his last covered day by [employer]. Mom states she will pay privately if she has to and patient states he can't afford to pay privately. Gave him the necessary information to appeal decision. States he will work on this today. On complete bedrest and NWB to right foot. Requires assistance with all ADL's and lives alone.

## 2017-08-14 NOTE — NURSING NOTE
Seen for weekly WOCN skin assessment on Rehab Unit today. RLE remains wrapped/covered & external fixator points padded, only Dr. Farah doing dressing changes. LLE with double layer cotton stockinette from toes to knee, to protect from fixator. Old linear scratch marks noted on (L) foot. All visible bony prominences intact/clear and much improved rash at lower buttocks/perineum from antifungal powder usage. (L) foot #5 toe remains healed and open to air.

## 2017-08-14 NOTE — PLAN OF CARE
Problem: Pain, Acute (Adult)  Goal: Acceptable Pain Control/Comfort Level  Outcome: Ongoing (interventions implemented as appropriate)    08/13/17 2313   Pain, Acute (Adult)   Acceptable Pain Control/Comfort Level making progress toward outcome         Problem: Patient Care Overview (Adult)  Goal: Plan of Care Review  Outcome: Ongoing (interventions implemented as appropriate)    08/13/17 2313   Coping/Psychosocial Response Interventions   Plan Of Care Reviewed With patient   Patient Care Overview   Progress no change

## 2017-08-15 PROCEDURE — 99307 SBSQ NF CARE SF MDM 10: CPT | Performed by: NURSE PRACTITIONER

## 2017-08-15 PROCEDURE — 93005 ELECTROCARDIOGRAM TRACING: CPT | Performed by: NURSE PRACTITIONER

## 2017-08-15 PROCEDURE — 25010000002 ENOXAPARIN PER 10 MG: Performed by: HOSPITALIST

## 2017-08-15 PROCEDURE — 93010 ELECTROCARDIOGRAM REPORT: CPT | Performed by: INTERNAL MEDICINE

## 2017-08-15 RX ORDER — OXYCODONE HYDROCHLORIDE AND ACETAMINOPHEN 5; 325 MG/1; MG/1
2 TABLET ORAL EVERY 4 HOURS PRN
Status: DISCONTINUED | OUTPATIENT
Start: 2017-08-15 | End: 2017-08-15

## 2017-08-15 RX ORDER — OXYCODONE HYDROCHLORIDE AND ACETAMINOPHEN 5; 325 MG/1; MG/1
2 TABLET ORAL EVERY 4 HOURS PRN
Status: DISCONTINUED | OUTPATIENT
Start: 2017-08-15 | End: 2017-08-18 | Stop reason: HOSPADM

## 2017-08-15 RX ADMIN — LITHIUM CARBONATE 300 MG: 300 TABLET ORAL at 17:26

## 2017-08-15 RX ADMIN — PRIMIDONE 250 MG: 250 TABLET ORAL at 17:26

## 2017-08-15 RX ADMIN — OXYCODONE HYDROCHLORIDE AND ACETAMINOPHEN 2 TABLET: 5; 325 TABLET ORAL at 05:47

## 2017-08-15 RX ADMIN — METOPROLOL TARTRATE 100 MG: 50 TABLET, FILM COATED ORAL at 09:54

## 2017-08-15 RX ADMIN — DOXEPIN HYDROCHLORIDE 75 MG: 25 CAPSULE ORAL at 21:21

## 2017-08-15 RX ADMIN — LORAZEPAM 0.5 MG: 0.5 TABLET ORAL at 15:04

## 2017-08-15 RX ADMIN — CARBAMAZEPINE 400 MG: 200 TABLET ORAL at 09:54

## 2017-08-15 RX ADMIN — PETROLATUM: 42 OINTMENT TOPICAL at 21:27

## 2017-08-15 RX ADMIN — GABAPENTIN 800 MG: 400 CAPSULE ORAL at 15:03

## 2017-08-15 RX ADMIN — LORAZEPAM 0.5 MG: 0.5 TABLET ORAL at 05:52

## 2017-08-15 RX ADMIN — GABAPENTIN 800 MG: 400 CAPSULE ORAL at 21:21

## 2017-08-15 RX ADMIN — OXYCODONE HYDROCHLORIDE AND ACETAMINOPHEN 2 TABLET: 5; 325 TABLET ORAL at 09:56

## 2017-08-15 RX ADMIN — PRIMIDONE 250 MG: 250 TABLET ORAL at 09:55

## 2017-08-15 RX ADMIN — MICONAZOLE NITRATE: 2 POWDER TOPICAL at 22:11

## 2017-08-15 RX ADMIN — OXYCODONE HYDROCHLORIDE AND ACETAMINOPHEN 2 TABLET: 5; 325 TABLET ORAL at 21:21

## 2017-08-15 RX ADMIN — METOPROLOL TARTRATE 100 MG: 50 TABLET, FILM COATED ORAL at 21:21

## 2017-08-15 RX ADMIN — GABAPENTIN 800 MG: 400 CAPSULE ORAL at 05:47

## 2017-08-15 RX ADMIN — FLUOXETINE 40 MG: 20 CAPSULE ORAL at 09:54

## 2017-08-15 RX ADMIN — OXYCODONE HYDROCHLORIDE AND ACETAMINOPHEN 2 TABLET: 5; 325 TABLET ORAL at 15:04

## 2017-08-15 RX ADMIN — LITHIUM CARBONATE 300 MG: 300 TABLET ORAL at 09:55

## 2017-08-15 RX ADMIN — MICONAZOLE NITRATE: 2 POWDER TOPICAL at 10:03

## 2017-08-15 RX ADMIN — TEMAZEPAM 30 MG: 15 CAPSULE ORAL at 21:21

## 2017-08-15 RX ADMIN — LOSARTAN POTASSIUM 100 MG: 50 TABLET ORAL at 09:54

## 2017-08-15 RX ADMIN — CARBAMAZEPINE 400 MG: 200 TABLET ORAL at 17:26

## 2017-08-15 RX ADMIN — Medication 1 CAPSULE: at 09:55

## 2017-08-15 RX ADMIN — PETROLATUM: 42 OINTMENT TOPICAL at 10:03

## 2017-08-15 RX ADMIN — LORAZEPAM 0.5 MG: 0.5 TABLET ORAL at 21:21

## 2017-08-15 RX ADMIN — ENOXAPARIN SODIUM 40 MG: 40 INJECTION SUBCUTANEOUS at 09:56

## 2017-08-15 RX ADMIN — AMLODIPINE BESYLATE 2.5 MG: 2.5 TABLET ORAL at 09:54

## 2017-08-15 NOTE — PROGRESS NOTES
"SERVICE: [facility] HOSPITALIST    CONSULTANTS: Podiatry     CHIEF COMPLAINT: f/u s/p fixator placement, RLE cellulitis, right foot lis franc fracture    SUBJECTIVE: The patient reports feeling well and is without new concerns. He is ready for surgery Friday. He denies f/c/cough/soa/chest pain/n/v/d/abdominal pain or other new concerns.    OBJECTIVE:    /80  Pulse 81  Temp 99.5 °F (37.5 °C) (Oral)   Resp 20  Ht 72\" (182.9 cm)  Wt 285 lb (129 kg)  SpO2 96%  BMI 39.75 kg/m2    MEDS/LABS REVIEWED AND ORDERED    amLODIPine 2.5 mg Oral Q24H   carBAMazepine 400 mg Oral BID   doxepin 75 mg Oral Nightly   enoxaparin 40 mg Subcutaneous Daily   FLUoxetine 40 mg Oral Daily   gabapentin 800 mg Oral Q8H   hydrophor  Topical Q12H   lactobacillus acidophilus 1 capsule Oral Daily   lithium 300 mg Oral BID   losartan 100 mg Oral Q24H   metoprolol tartrate 100 mg Oral Q12H   miconazole  Topical Q12H   primidone 250 mg Oral BID     Physical Exam   Constitutional: He is oriented to person, place, and time. He appears well-developed and well-nourished.   HENT:   Head: Normocephalic and atraumatic.   Eyes: EOM are normal. Pupils are equal, round, and reactive to light.   Cardiovascular: Normal rate, regular rhythm and normal heart sounds.    Pulmonary/Chest: Effort normal and breath sounds normal. No respiratory distress. He has no wheezes. He has no rales.   Abdominal: Soft. Bowel sounds are normal. He exhibits no distension. There is no tenderness.   Musculoskeletal: He exhibits no edema.   Neurological: He is alert and oriented to person, place, and time.   Skin: Skin is warm and dry. No erythema.   Psychiatric: He has a normal mood and affect. His behavior is normal. Judgment and thought content normal.   Vitals reviewed.    LAB/DIAGNOSTICS:    Lab Results (last 24 hours)     ** No results found for the last 24 hours. **        ASSESSMENT/PLAN:  1. Immobility secondary to Lis Franc fracture right foot, " s/p external fixator placement: Podiatry following/wound RN following  Oral bactrim course completed, Temps continue low grade  PT/OT discharged patient  Continues NWB RLE, anticipated surgery 8/18/17 per Dr. Farah     2. Recent Cellulitis RLE: resolved  As above, bactrim complete  WBCC normal 8/11/17  Low grade fevers continue, IS continues      3. Chronic splenic vein thrombosis:   Noted on CT abdomen during inpatient stay, no acute issues  Plan f/u GI after d/c      4. H/O Bipolar disorder: no changes to regimen  No acute issues on sinequan, prozac, lithium, and tegretol and PRN  Ativan and restoril.  Last QTc normal EKG recheck today      5. DM2: A1C 4.6%  No further treatment indicated, no metformin  No further dietary restriction at this time, glucose at goal  Monitor for need to resume restrictions      6. Hypertension:   BP at goal on losartan 100 mg daily/metoprolol tartrate 100 mg every 12 hours/amlodipine 5 mg daily  Monitor with diet change     7. H/O Hyperlipidemia: no acute issues, diet control   On no home medications for this  F/U Killian Scales MD after discharge      8. Obesity:  Body mass index is 39.75 kg/(m^2).  Nutrition continues  monitoring      9. Chronic macrocytic anemia: stable  Hemoglobin 12.7, no active blood loss, no acute issues  B12 and folate are normal      10. H/O DVT: Continuing lovenox for DVT prophy.   Pre-operative Venous duplex RLE negative for DVT      11. Peripheral neuropathy: no acute issues on neurontin  No sensation to feet.  Wound RN monitoring        12. Benign essential tremor: On home primidone, no acute issues here.

## 2017-08-16 PROCEDURE — 25010000002 ENOXAPARIN PER 10 MG: Performed by: HOSPITALIST

## 2017-08-16 RX ADMIN — METOPROLOL TARTRATE 100 MG: 50 TABLET, FILM COATED ORAL at 22:32

## 2017-08-16 RX ADMIN — OXYCODONE HYDROCHLORIDE AND ACETAMINOPHEN 2 TABLET: 5; 325 TABLET ORAL at 05:30

## 2017-08-16 RX ADMIN — LOSARTAN POTASSIUM 100 MG: 50 TABLET ORAL at 09:04

## 2017-08-16 RX ADMIN — CARBAMAZEPINE 400 MG: 200 TABLET ORAL at 17:31

## 2017-08-16 RX ADMIN — GABAPENTIN 800 MG: 400 CAPSULE ORAL at 05:33

## 2017-08-16 RX ADMIN — CARBAMAZEPINE 400 MG: 200 TABLET ORAL at 09:04

## 2017-08-16 RX ADMIN — Medication 1 CAPSULE: at 09:04

## 2017-08-16 RX ADMIN — PETROLATUM: 42 OINTMENT TOPICAL at 09:06

## 2017-08-16 RX ADMIN — OXYCODONE HYDROCHLORIDE AND ACETAMINOPHEN 2 TABLET: 5; 325 TABLET ORAL at 15:18

## 2017-08-16 RX ADMIN — METOPROLOL TARTRATE 100 MG: 50 TABLET, FILM COATED ORAL at 09:04

## 2017-08-16 RX ADMIN — LORAZEPAM 0.5 MG: 0.5 TABLET ORAL at 23:14

## 2017-08-16 RX ADMIN — DOXEPIN HYDROCHLORIDE 75 MG: 25 CAPSULE ORAL at 22:33

## 2017-08-16 RX ADMIN — ENOXAPARIN SODIUM 40 MG: 40 INJECTION SUBCUTANEOUS at 10:26

## 2017-08-16 RX ADMIN — OXYCODONE HYDROCHLORIDE AND ACETAMINOPHEN 2 TABLET: 5; 325 TABLET ORAL at 10:27

## 2017-08-16 RX ADMIN — MICONAZOLE NITRATE: 2 POWDER TOPICAL at 09:06

## 2017-08-16 RX ADMIN — LORAZEPAM 0.5 MG: 0.5 TABLET ORAL at 15:17

## 2017-08-16 RX ADMIN — OXYCODONE HYDROCHLORIDE AND ACETAMINOPHEN 2 TABLET: 5; 325 TABLET ORAL at 22:33

## 2017-08-16 RX ADMIN — GABAPENTIN 800 MG: 400 CAPSULE ORAL at 15:17

## 2017-08-16 RX ADMIN — PRIMIDONE 250 MG: 250 TABLET ORAL at 17:31

## 2017-08-16 RX ADMIN — GABAPENTIN 800 MG: 400 CAPSULE ORAL at 22:33

## 2017-08-16 RX ADMIN — FLUOXETINE 40 MG: 20 CAPSULE ORAL at 09:04

## 2017-08-16 RX ADMIN — LORAZEPAM 0.5 MG: 0.5 TABLET ORAL at 05:30

## 2017-08-16 RX ADMIN — PRIMIDONE 250 MG: 250 TABLET ORAL at 10:27

## 2017-08-16 RX ADMIN — LITHIUM CARBONATE 300 MG: 300 TABLET ORAL at 17:31

## 2017-08-16 RX ADMIN — AMLODIPINE BESYLATE 2.5 MG: 2.5 TABLET ORAL at 09:04

## 2017-08-16 RX ADMIN — TEMAZEPAM 30 MG: 15 CAPSULE ORAL at 22:33

## 2017-08-16 RX ADMIN — LITHIUM CARBONATE 300 MG: 300 TABLET ORAL at 09:04

## 2017-08-16 NOTE — PLAN OF CARE
Problem: Pain, Acute (Adult)  Goal: Identify Related Risk Factors and Signs and Symptoms  Outcome: Ongoing (interventions implemented as appropriate)  Goal: Acceptable Pain Control/Comfort Level  Outcome: Ongoing (interventions implemented as appropriate)    Problem: Wound, Traumatic, Nonburn (Adult)  Goal: Signs and Symptoms of Listed Potential Problems Will be Absent or Manageable (Wound, Traumatic, Nonburn)  Outcome: Ongoing (interventions implemented as appropriate)    Problem: Patient Care Overview (Adult)  Goal: Plan of Care Review  Outcome: Ongoing (interventions implemented as appropriate)    08/13/17 1044   Outcome Evaluation   Outcome Summary/Follow up Plan Pt alert and oriented X4. Pt is concerned about when surgery will be; this nurse told this patient she would check on it. Pt was medicated this am wiht his pain pill and ativan. Pt takes pills whole with water.       Goal: Adult Individualization and Mutuality  Outcome: Ongoing (interventions implemented as appropriate)  Goal: Discharge Needs Assessment  Outcome: Ongoing (interventions implemented as appropriate)

## 2017-08-17 PROCEDURE — 25010000002 ENOXAPARIN PER 10 MG: Performed by: HOSPITALIST

## 2017-08-17 RX ADMIN — MICONAZOLE NITRATE: 2 POWDER TOPICAL at 20:55

## 2017-08-17 RX ADMIN — Medication 1 CAPSULE: at 09:40

## 2017-08-17 RX ADMIN — LORAZEPAM 0.5 MG: 0.5 TABLET ORAL at 09:40

## 2017-08-17 RX ADMIN — MICONAZOLE NITRATE: 2 POWDER TOPICAL at 09:39

## 2017-08-17 RX ADMIN — METOPROLOL TARTRATE 100 MG: 50 TABLET, FILM COATED ORAL at 09:40

## 2017-08-17 RX ADMIN — LITHIUM CARBONATE 300 MG: 300 TABLET ORAL at 17:34

## 2017-08-17 RX ADMIN — LOSARTAN POTASSIUM 100 MG: 50 TABLET ORAL at 09:40

## 2017-08-17 RX ADMIN — OXYCODONE HYDROCHLORIDE AND ACETAMINOPHEN 2 TABLET: 5; 325 TABLET ORAL at 05:12

## 2017-08-17 RX ADMIN — TEMAZEPAM 30 MG: 15 CAPSULE ORAL at 21:42

## 2017-08-17 RX ADMIN — FLUOXETINE 40 MG: 20 CAPSULE ORAL at 09:40

## 2017-08-17 RX ADMIN — GABAPENTIN 800 MG: 400 CAPSULE ORAL at 21:42

## 2017-08-17 RX ADMIN — OXYCODONE HYDROCHLORIDE AND ACETAMINOPHEN 2 TABLET: 5; 325 TABLET ORAL at 09:40

## 2017-08-17 RX ADMIN — OXYCODONE HYDROCHLORIDE AND ACETAMINOPHEN 2 TABLET: 5; 325 TABLET ORAL at 17:35

## 2017-08-17 RX ADMIN — CARBAMAZEPINE 400 MG: 200 TABLET ORAL at 09:40

## 2017-08-17 RX ADMIN — PETROLATUM: 42 OINTMENT TOPICAL at 09:39

## 2017-08-17 RX ADMIN — AMLODIPINE BESYLATE 2.5 MG: 2.5 TABLET ORAL at 09:40

## 2017-08-17 RX ADMIN — PRIMIDONE 250 MG: 250 TABLET ORAL at 09:40

## 2017-08-17 RX ADMIN — GABAPENTIN 800 MG: 400 CAPSULE ORAL at 05:12

## 2017-08-17 RX ADMIN — PRIMIDONE 250 MG: 250 TABLET ORAL at 17:35

## 2017-08-17 RX ADMIN — OXYCODONE HYDROCHLORIDE AND ACETAMINOPHEN 2 TABLET: 5; 325 TABLET ORAL at 21:42

## 2017-08-17 RX ADMIN — GABAPENTIN 800 MG: 400 CAPSULE ORAL at 13:39

## 2017-08-17 RX ADMIN — LITHIUM CARBONATE 300 MG: 300 TABLET ORAL at 09:40

## 2017-08-17 RX ADMIN — CARBAMAZEPINE 400 MG: 200 TABLET ORAL at 17:34

## 2017-08-17 RX ADMIN — PETROLATUM: 42 OINTMENT TOPICAL at 20:55

## 2017-08-17 RX ADMIN — METOPROLOL TARTRATE 100 MG: 50 TABLET, FILM COATED ORAL at 20:58

## 2017-08-17 RX ADMIN — LORAZEPAM 0.5 MG: 0.5 TABLET ORAL at 17:35

## 2017-08-17 RX ADMIN — OXYCODONE HYDROCHLORIDE AND ACETAMINOPHEN 2 TABLET: 5; 325 TABLET ORAL at 13:39

## 2017-08-17 RX ADMIN — ENOXAPARIN SODIUM 40 MG: 40 INJECTION SUBCUTANEOUS at 09:39

## 2017-08-17 RX ADMIN — DOXEPIN HYDROCHLORIDE 75 MG: 25 CAPSULE ORAL at 20:59

## 2017-08-18 ENCOUNTER — HOSPITAL ENCOUNTER (OUTPATIENT)
Facility: HOSPITAL | Age: 52
Discharge: HOME | End: 2017-08-22
Attending: PODIATRIST | Admitting: HOSPITALIST

## 2017-08-18 ENCOUNTER — APPOINTMENT (OUTPATIENT)
Dept: GENERAL RADIOLOGY | Facility: HOSPITAL | Age: 52
End: 2017-08-18

## 2017-08-18 ENCOUNTER — ANESTHESIA EVENT (OUTPATIENT)
Dept: PERIOP | Facility: HOSPITAL | Age: 52
End: 2017-08-18

## 2017-08-18 ENCOUNTER — ANESTHESIA (OUTPATIENT)
Dept: PERIOP | Facility: HOSPITAL | Age: 52
End: 2017-08-18

## 2017-08-18 VITALS
BODY MASS INDEX: 38.6 KG/M2 | TEMPERATURE: 98 F | WEIGHT: 285 LBS | HEART RATE: 60 BPM | RESPIRATION RATE: 18 BRPM | DIASTOLIC BLOOD PRESSURE: 77 MMHG | HEIGHT: 72 IN | SYSTOLIC BLOOD PRESSURE: 123 MMHG | OXYGEN SATURATION: 99 %

## 2017-08-18 LAB — GLUCOSE BLDC GLUCOMTR-MCNC: 129 MG/DL (ref 70–130)

## 2017-08-18 PROCEDURE — C1713 ANCHOR/SCREW BN/BN,TIS/BN: HCPCS | Performed by: PODIATRIST

## 2017-08-18 PROCEDURE — 25010000002 ONDANSETRON PER 1 MG

## 2017-08-18 PROCEDURE — 73630 X-RAY EXAM OF FOOT: CPT

## 2017-08-18 PROCEDURE — 25010000002 MIDAZOLAM PER 1 MG: Performed by: NURSE ANESTHETIST, CERTIFIED REGISTERED

## 2017-08-18 PROCEDURE — 25010000002 SUCCINYLCHOLINE PER 20 MG: Performed by: NURSE ANESTHETIST, CERTIFIED REGISTERED

## 2017-08-18 PROCEDURE — 25010000002 PROPOFOL 10 MG/ML EMULSION: Performed by: NURSE ANESTHETIST, CERTIFIED REGISTERED

## 2017-08-18 PROCEDURE — 25010000002 FENTANYL CITRATE (PF) 100 MCG/2ML SOLUTION: Performed by: NURSE ANESTHETIST, CERTIFIED REGISTERED

## 2017-08-18 PROCEDURE — 25010000003 CEFAZOLIN PER 500 MG

## 2017-08-18 PROCEDURE — 82962 GLUCOSE BLOOD TEST: CPT

## 2017-08-18 PROCEDURE — 25010000002 MIDAZOLAM PER 1 MG

## 2017-08-18 PROCEDURE — 25010000002 HYDROMORPHONE PER 4 MG: Performed by: PODIATRIST

## 2017-08-18 PROCEDURE — 94799 UNLISTED PULMONARY SVC/PX: CPT

## 2017-08-18 DEVICE — LOCKING SCREW
Type: IMPLANTABLE DEVICE | Site: FOOT | Status: FUNCTIONAL
Brand: VARIAX

## 2017-08-18 DEVICE — POLYAXIAL LOCKING PLATE -  UNIVERSAL CROSS-PLATE (T8)
Type: IMPLANTABLE DEVICE | Site: FOOT | Status: FUNCTIONAL
Brand: ANCHORAGE

## 2017-08-18 DEVICE — CP LAG SCREW (T8)
Type: IMPLANTABLE DEVICE | Site: FOOT | Status: FUNCTIONAL
Brand: ANCHORAGE

## 2017-08-18 DEVICE — BONE SCREW, FULLY THREADED, T8
Type: IMPLANTABLE DEVICE | Site: FOOT | Status: FUNCTIONAL
Brand: VARIAX

## 2017-08-18 DEVICE — BONE SCREW
Type: IMPLANTABLE DEVICE | Site: FOOT | Status: FUNCTIONAL
Brand: VARIAX

## 2017-08-18 DEVICE — LOCKING SCREW, FULLY THREADED,T8
Type: IMPLANTABLE DEVICE | Site: FOOT | Status: FUNCTIONAL
Brand: VARIAX

## 2017-08-18 DEVICE — POLYAXIAL LOCKING PLATE MEDIAL COLUMN FUSION, RIGHT, LONG
Type: IMPLANTABLE DEVICE | Site: FOOT | Status: FUNCTIONAL
Brand: VARIAX

## 2017-08-18 DEVICE — BIOACTIVE BONE GRAFT SUBSTITUTE, FOAM PACK; BETA-TRICALCIUM PHOSPHATE, TYPE I BOVINE COLLAGEN, AND BIOACTIVE GLASS
Type: IMPLANTABLE DEVICE | Site: FOOT | Status: FUNCTIONAL
Brand: VITOSS BBTRAUMA

## 2017-08-18 RX ORDER — L.ACID,CASEI/B.ANIMAL/S.THERMO 16B CELL
1 CAPSULE ORAL DAILY
COMMUNITY
End: 2018-03-04

## 2017-08-18 RX ORDER — TEMAZEPAM 15 MG/1
30 CAPSULE ORAL NIGHTLY PRN
Status: DISCONTINUED | OUTPATIENT
Start: 2017-08-18 | End: 2017-08-22 | Stop reason: HOSPADM

## 2017-08-18 RX ORDER — DOXEPIN HYDROCHLORIDE 25 MG/1
75 CAPSULE ORAL NIGHTLY
Status: DISCONTINUED | OUTPATIENT
Start: 2017-08-18 | End: 2017-08-22 | Stop reason: HOSPADM

## 2017-08-18 RX ORDER — MAGNESIUM HYDROXIDE 1200 MG/15ML
LIQUID ORAL AS NEEDED
Status: DISCONTINUED | OUTPATIENT
Start: 2017-08-18 | End: 2017-08-18 | Stop reason: HOSPADM

## 2017-08-18 RX ORDER — FLUOXETINE HYDROCHLORIDE 20 MG/1
40 CAPSULE ORAL DAILY
Status: DISCONTINUED | OUTPATIENT
Start: 2017-08-19 | End: 2017-08-22 | Stop reason: HOSPADM

## 2017-08-18 RX ORDER — OXYCODONE HYDROCHLORIDE AND ACETAMINOPHEN 5; 325 MG/1; MG/1
1 TABLET ORAL EVERY 4 HOURS PRN
Status: DISCONTINUED | OUTPATIENT
Start: 2017-08-18 | End: 2017-08-22 | Stop reason: HOSPADM

## 2017-08-18 RX ORDER — FAMOTIDINE 20 MG/1
20 TABLET, FILM COATED ORAL EVERY 12 HOURS SCHEDULED
Status: DISCONTINUED | OUTPATIENT
Start: 2017-08-18 | End: 2017-08-18 | Stop reason: HOSPADM

## 2017-08-18 RX ORDER — SODIUM CHLORIDE, SODIUM LACTATE, POTASSIUM CHLORIDE, CALCIUM CHLORIDE 600; 310; 30; 20 MG/100ML; MG/100ML; MG/100ML; MG/100ML
9 INJECTION, SOLUTION INTRAVENOUS CONTINUOUS
Status: DISCONTINUED | OUTPATIENT
Start: 2017-08-18 | End: 2017-08-18

## 2017-08-18 RX ORDER — LOSARTAN POTASSIUM 50 MG/1
100 TABLET ORAL
Status: DISCONTINUED | OUTPATIENT
Start: 2017-08-18 | End: 2017-08-22 | Stop reason: HOSPADM

## 2017-08-18 RX ORDER — IPRATROPIUM BROMIDE AND ALBUTEROL SULFATE 2.5; .5 MG/3ML; MG/3ML
3 SOLUTION RESPIRATORY (INHALATION) ONCE AS NEEDED
Status: DISCONTINUED | OUTPATIENT
Start: 2017-08-18 | End: 2017-08-18 | Stop reason: HOSPADM

## 2017-08-18 RX ORDER — SODIUM CHLORIDE 0.9 % (FLUSH) 0.9 %
1-10 SYRINGE (ML) INJECTION AS NEEDED
Status: DISCONTINUED | OUTPATIENT
Start: 2017-08-18 | End: 2017-08-18 | Stop reason: HOSPADM

## 2017-08-18 RX ORDER — AMLODIPINE BESYLATE 2.5 MG/1
2.5 TABLET ORAL DAILY
Status: DISCONTINUED | OUTPATIENT
Start: 2017-08-19 | End: 2017-08-22 | Stop reason: HOSPADM

## 2017-08-18 RX ORDER — METOPROLOL TARTRATE 50 MG/1
100 TABLET, FILM COATED ORAL 2 TIMES DAILY
Status: DISCONTINUED | OUTPATIENT
Start: 2017-08-18 | End: 2017-08-22 | Stop reason: HOSPADM

## 2017-08-18 RX ORDER — LIDOCAINE HYDROCHLORIDE 20 MG/ML
INJECTION, SOLUTION INFILTRATION; PERINEURAL AS NEEDED
Status: DISCONTINUED | OUTPATIENT
Start: 2017-08-18 | End: 2017-08-18 | Stop reason: SURG

## 2017-08-18 RX ORDER — HYDROMORPHONE HCL 110MG/55ML
1 PATIENT CONTROLLED ANALGESIA SYRINGE INTRAVENOUS
Status: DISCONTINUED | OUTPATIENT
Start: 2017-08-18 | End: 2017-08-18 | Stop reason: HOSPADM

## 2017-08-18 RX ORDER — PETROLATUM 42 G/100G
1 OINTMENT TOPICAL
Status: ON HOLD | COMMUNITY
End: 2018-06-10

## 2017-08-18 RX ORDER — HYDROMORPHONE HCL 110MG/55ML
0.5 PATIENT CONTROLLED ANALGESIA SYRINGE INTRAVENOUS
Status: DISCONTINUED | OUTPATIENT
Start: 2017-08-18 | End: 2017-08-18 | Stop reason: HOSPADM

## 2017-08-18 RX ORDER — OXYCODONE HYDROCHLORIDE AND ACETAMINOPHEN 5; 325 MG/1; MG/1
1 TABLET ORAL ONCE AS NEEDED
Status: DISCONTINUED | OUTPATIENT
Start: 2017-08-18 | End: 2017-08-18 | Stop reason: HOSPADM

## 2017-08-18 RX ORDER — MIDAZOLAM HYDROCHLORIDE 1 MG/ML
2 INJECTION INTRAMUSCULAR; INTRAVENOUS
Status: DISCONTINUED | OUTPATIENT
Start: 2017-08-18 | End: 2017-08-18 | Stop reason: HOSPADM

## 2017-08-18 RX ORDER — GABAPENTIN 400 MG/1
800 CAPSULE ORAL 3 TIMES DAILY
Status: DISCONTINUED | OUTPATIENT
Start: 2017-08-18 | End: 2017-08-18

## 2017-08-18 RX ORDER — ONDANSETRON 2 MG/ML
INJECTION INTRAMUSCULAR; INTRAVENOUS
Status: COMPLETED
Start: 2017-08-18 | End: 2017-08-18

## 2017-08-18 RX ORDER — ONDANSETRON 2 MG/ML
4 INJECTION INTRAMUSCULAR; INTRAVENOUS ONCE AS NEEDED
Status: COMPLETED | OUTPATIENT
Start: 2017-08-18 | End: 2017-08-18

## 2017-08-18 RX ORDER — FENTANYL CITRATE 50 UG/ML
INJECTION, SOLUTION INTRAMUSCULAR; INTRAVENOUS AS NEEDED
Status: DISCONTINUED | OUTPATIENT
Start: 2017-08-18 | End: 2017-08-18 | Stop reason: SURG

## 2017-08-18 RX ORDER — PRIMIDONE 250 MG/1
250 TABLET ORAL 2 TIMES DAILY
Status: DISCONTINUED | OUTPATIENT
Start: 2017-08-18 | End: 2017-08-22 | Stop reason: HOSPADM

## 2017-08-18 RX ORDER — DIPHENHYDRAMINE HYDROCHLORIDE 50 MG/ML
12.5 INJECTION INTRAMUSCULAR; INTRAVENOUS
Status: DISCONTINUED | OUTPATIENT
Start: 2017-08-18 | End: 2017-08-18 | Stop reason: HOSPADM

## 2017-08-18 RX ORDER — PROPOFOL 10 MG/ML
VIAL (ML) INTRAVENOUS AS NEEDED
Status: DISCONTINUED | OUTPATIENT
Start: 2017-08-18 | End: 2017-08-18 | Stop reason: SURG

## 2017-08-18 RX ORDER — GLYCOPYRROLATE 0.2 MG/ML
INJECTION INTRAMUSCULAR; INTRAVENOUS AS NEEDED
Status: DISCONTINUED | OUTPATIENT
Start: 2017-08-18 | End: 2017-08-18 | Stop reason: SURG

## 2017-08-18 RX ORDER — CARBAMAZEPINE 200 MG/1
400 TABLET ORAL 2 TIMES DAILY
Status: DISCONTINUED | OUTPATIENT
Start: 2017-08-18 | End: 2017-08-22 | Stop reason: HOSPADM

## 2017-08-18 RX ORDER — LABETALOL HYDROCHLORIDE 5 MG/ML
INJECTION, SOLUTION INTRAVENOUS AS NEEDED
Status: DISCONTINUED | OUTPATIENT
Start: 2017-08-18 | End: 2017-08-18 | Stop reason: SURG

## 2017-08-18 RX ORDER — CEFAZOLIN SODIUM 1 G/3ML
INJECTION, POWDER, FOR SOLUTION INTRAMUSCULAR; INTRAVENOUS
Status: COMPLETED
Start: 2017-08-18 | End: 2017-08-18

## 2017-08-18 RX ORDER — MIDAZOLAM HYDROCHLORIDE 1 MG/ML
1 INJECTION INTRAMUSCULAR; INTRAVENOUS
Status: DISCONTINUED | OUTPATIENT
Start: 2017-08-18 | End: 2017-08-18 | Stop reason: HOSPADM

## 2017-08-18 RX ORDER — FAMOTIDINE 10 MG/ML
20 INJECTION, SOLUTION INTRAVENOUS EVERY 12 HOURS SCHEDULED
Status: DISCONTINUED | OUTPATIENT
Start: 2017-08-18 | End: 2017-08-18 | Stop reason: HOSPADM

## 2017-08-18 RX ORDER — L.ACID,PARA/B.BIFIDUM/S.THERM 8B CELL
1 CAPSULE ORAL DAILY
Status: DISCONTINUED | OUTPATIENT
Start: 2017-08-19 | End: 2017-08-22 | Stop reason: HOSPADM

## 2017-08-18 RX ORDER — MEPERIDINE HYDROCHLORIDE 25 MG/ML
12.5 INJECTION INTRAMUSCULAR; INTRAVENOUS; SUBCUTANEOUS
Status: DISCONTINUED | OUTPATIENT
Start: 2017-08-18 | End: 2017-08-18 | Stop reason: HOSPADM

## 2017-08-18 RX ORDER — AMLODIPINE BESYLATE 2.5 MG/1
2.5 TABLET ORAL DAILY
Status: ON HOLD | COMMUNITY
End: 2018-06-10

## 2017-08-18 RX ORDER — PETROLATUM 42 G/100G
1 OINTMENT TOPICAL
Status: DISCONTINUED | OUTPATIENT
Start: 2017-08-19 | End: 2017-08-22 | Stop reason: HOSPADM

## 2017-08-18 RX ORDER — LITHIUM CARBONATE 300 MG
300 TABLET ORAL 2 TIMES DAILY
Status: DISCONTINUED | OUTPATIENT
Start: 2017-08-18 | End: 2017-08-22 | Stop reason: HOSPADM

## 2017-08-18 RX ORDER — SODIUM CHLORIDE 9 MG/ML
INJECTION, SOLUTION INTRAVENOUS
Status: DISPENSED
Start: 2017-08-18 | End: 2017-08-19

## 2017-08-18 RX ORDER — LIDOCAINE HYDROCHLORIDE 10 MG/ML
0.5 INJECTION, SOLUTION EPIDURAL; INFILTRATION; INTRACAUDAL; PERINEURAL ONCE AS NEEDED
Status: DISCONTINUED | OUTPATIENT
Start: 2017-08-18 | End: 2017-08-18 | Stop reason: HOSPADM

## 2017-08-18 RX ORDER — ROCURONIUM BROMIDE 10 MG/ML
INJECTION, SOLUTION INTRAVENOUS AS NEEDED
Status: DISCONTINUED | OUTPATIENT
Start: 2017-08-18 | End: 2017-08-18 | Stop reason: SURG

## 2017-08-18 RX ORDER — BUPIVACAINE HYDROCHLORIDE 2.5 MG/ML
INJECTION, SOLUTION INFILTRATION; PERINEURAL AS NEEDED
Status: DISCONTINUED | OUTPATIENT
Start: 2017-08-18 | End: 2017-08-18 | Stop reason: HOSPADM

## 2017-08-18 RX ORDER — SUCCINYLCHOLINE CHLORIDE 20 MG/ML
INJECTION INTRAMUSCULAR; INTRAVENOUS AS NEEDED
Status: DISCONTINUED | OUTPATIENT
Start: 2017-08-18 | End: 2017-08-18 | Stop reason: SURG

## 2017-08-18 RX ORDER — NALOXONE HCL 0.4 MG/ML
0.4 VIAL (ML) INJECTION AS NEEDED
Status: DISCONTINUED | OUTPATIENT
Start: 2017-08-18 | End: 2017-08-18 | Stop reason: HOSPADM

## 2017-08-18 RX ORDER — FAMOTIDINE 10 MG/ML
INJECTION, SOLUTION INTRAVENOUS
Status: COMPLETED
Start: 2017-08-18 | End: 2017-08-18

## 2017-08-18 RX ORDER — LORAZEPAM 0.5 MG/1
0.5 TABLET ORAL DAILY PRN
Status: DISCONTINUED | OUTPATIENT
Start: 2017-08-18 | End: 2017-08-22 | Stop reason: HOSPADM

## 2017-08-18 RX ORDER — MIDAZOLAM HYDROCHLORIDE 1 MG/ML
INJECTION INTRAMUSCULAR; INTRAVENOUS
Status: COMPLETED
Start: 2017-08-18 | End: 2017-08-18

## 2017-08-18 RX ADMIN — MIDAZOLAM HYDROCHLORIDE 1 MG: 1 INJECTION, SOLUTION INTRAMUSCULAR; INTRAVENOUS at 13:20

## 2017-08-18 RX ADMIN — FENTANYL CITRATE 25 MCG: 50 INJECTION, SOLUTION INTRAMUSCULAR; INTRAVENOUS at 15:40

## 2017-08-18 RX ADMIN — CARBAMAZEPINE 400 MG: 200 TABLET ORAL at 08:40

## 2017-08-18 RX ADMIN — LABETALOL HYDROCHLORIDE 10 MG: 5 INJECTION, SOLUTION INTRAVENOUS at 15:54

## 2017-08-18 RX ADMIN — PRIMIDONE 250 MG: 250 TABLET ORAL at 08:40

## 2017-08-18 RX ADMIN — SODIUM CHLORIDE, POTASSIUM CHLORIDE, SODIUM LACTATE AND CALCIUM CHLORIDE: 600; 310; 30; 20 INJECTION, SOLUTION INTRAVENOUS at 13:23

## 2017-08-18 RX ADMIN — FENTANYL CITRATE 25 MCG: 50 INJECTION, SOLUTION INTRAMUSCULAR; INTRAVENOUS at 17:51

## 2017-08-18 RX ADMIN — ONDANSETRON 4 MG: 2 INJECTION, SOLUTION INTRAMUSCULAR; INTRAVENOUS at 19:12

## 2017-08-18 RX ADMIN — ONDANSETRON 4 MG: 2 INJECTION, SOLUTION INTRAMUSCULAR; INTRAVENOUS at 13:06

## 2017-08-18 RX ADMIN — FENTANYL CITRATE 25 MCG: 50 INJECTION, SOLUTION INTRAMUSCULAR; INTRAVENOUS at 16:40

## 2017-08-18 RX ADMIN — SUCCINYLCHOLINE CHLORIDE 160 MG: 20 INJECTION, SOLUTION INTRAMUSCULAR; INTRAVENOUS at 13:32

## 2017-08-18 RX ADMIN — LORAZEPAM 0.5 MG: 0.5 TABLET ORAL at 03:39

## 2017-08-18 RX ADMIN — LOSARTAN POTASSIUM 100 MG: 50 TABLET ORAL at 08:40

## 2017-08-18 RX ADMIN — TEMAZEPAM 30 MG: 15 CAPSULE ORAL at 21:42

## 2017-08-18 RX ADMIN — FENTANYL CITRATE 25 MCG: 50 INJECTION, SOLUTION INTRAMUSCULAR; INTRAVENOUS at 16:59

## 2017-08-18 RX ADMIN — SODIUM CHLORIDE, POTASSIUM CHLORIDE, SODIUM LACTATE AND CALCIUM CHLORIDE: 600; 310; 30; 20 INJECTION, SOLUTION INTRAVENOUS at 17:41

## 2017-08-18 RX ADMIN — ROCURONIUM BROMIDE 5 MG: 10 INJECTION INTRAVENOUS at 13:30

## 2017-08-18 RX ADMIN — FENTANYL CITRATE 25 MCG: 50 INJECTION, SOLUTION INTRAMUSCULAR; INTRAVENOUS at 15:47

## 2017-08-18 RX ADMIN — PROPOFOL 150 MG: 10 INJECTION, EMULSION INTRAVENOUS at 13:32

## 2017-08-18 RX ADMIN — PROPOFOL 30 MG: 10 INJECTION, EMULSION INTRAVENOUS at 13:33

## 2017-08-18 RX ADMIN — FLUOXETINE 40 MG: 20 CAPSULE ORAL at 08:40

## 2017-08-18 RX ADMIN — FAMOTIDINE 20 MG: 10 INJECTION, SOLUTION INTRAVENOUS at 13:07

## 2017-08-18 RX ADMIN — SUCCINYLCHOLINE CHLORIDE 40 MG: 20 INJECTION, SOLUTION INTRAMUSCULAR; INTRAVENOUS at 13:36

## 2017-08-18 RX ADMIN — FENTANYL CITRATE 25 MCG: 50 INJECTION, SOLUTION INTRAMUSCULAR; INTRAVENOUS at 15:15

## 2017-08-18 RX ADMIN — GLYCOPYRROLATE 0.2 MG: 0.2 INJECTION INTRAMUSCULAR; INTRAVENOUS at 13:55

## 2017-08-18 RX ADMIN — AMLODIPINE BESYLATE 2.5 MG: 2.5 TABLET ORAL at 08:40

## 2017-08-18 RX ADMIN — Medication 1 CAPSULE: at 08:40

## 2017-08-18 RX ADMIN — METFORMIN HYDROCHLORIDE 500 MG: 500 TABLET ORAL at 21:46

## 2017-08-18 RX ADMIN — ONDANSETRON 4 MG: 2 INJECTION INTRAMUSCULAR; INTRAVENOUS at 13:06

## 2017-08-18 RX ADMIN — LABETALOL HYDROCHLORIDE 10 MG: 5 INJECTION, SOLUTION INTRAVENOUS at 16:17

## 2017-08-18 RX ADMIN — LITHIUM CARBONATE 300 MG: 300 TABLET ORAL at 08:40

## 2017-08-18 RX ADMIN — PROPOFOL 20 MG: 10 INJECTION, EMULSION INTRAVENOUS at 13:36

## 2017-08-18 RX ADMIN — METOPROLOL TARTRATE 100 MG: 50 TABLET, FILM COATED ORAL at 08:41

## 2017-08-18 RX ADMIN — FENTANYL CITRATE 100 MCG: 50 INJECTION, SOLUTION INTRAMUSCULAR; INTRAVENOUS at 16:10

## 2017-08-18 RX ADMIN — FENTANYL CITRATE 75 MCG: 50 INJECTION, SOLUTION INTRAMUSCULAR; INTRAVENOUS at 13:31

## 2017-08-18 RX ADMIN — HYDROMORPHONE HYDROCHLORIDE 1 MG: 1 INJECTION, SOLUTION INTRAMUSCULAR; INTRAVENOUS; SUBCUTANEOUS at 21:37

## 2017-08-18 RX ADMIN — LIDOCAINE HYDROCHLORIDE 100 MG: 20 INJECTION, SOLUTION INFILTRATION; PERINEURAL at 13:32

## 2017-08-18 RX ADMIN — FENTANYL CITRATE 25 MCG: 50 INJECTION, SOLUTION INTRAMUSCULAR; INTRAVENOUS at 13:36

## 2017-08-18 RX ADMIN — FENTANYL CITRATE 25 MCG: 50 INJECTION, SOLUTION INTRAMUSCULAR; INTRAVENOUS at 16:00

## 2017-08-18 RX ADMIN — MIDAZOLAM HYDROCHLORIDE 1 MG: 1 INJECTION, SOLUTION INTRAMUSCULAR; INTRAVENOUS at 13:24

## 2017-08-18 RX ADMIN — OXYCODONE HYDROCHLORIDE AND ACETAMINOPHEN 2 TABLET: 5; 325 TABLET ORAL at 03:39

## 2017-08-18 RX ADMIN — FENTANYL CITRATE 25 MCG: 50 INJECTION, SOLUTION INTRAMUSCULAR; INTRAVENOUS at 18:01

## 2017-08-18 RX ADMIN — CEFAZOLIN 3000 MG: 1 INJECTION, POWDER, FOR SOLUTION INTRAVENOUS at 22:38

## 2017-08-18 RX ADMIN — OXYCODONE HYDROCHLORIDE AND ACETAMINOPHEN 2 TABLET: 5; 325 TABLET ORAL at 08:40

## 2017-08-18 RX ADMIN — ONDANSETRON 4 MG: 2 INJECTION INTRAMUSCULAR; INTRAVENOUS at 19:12

## 2017-08-18 NOTE — PLAN OF CARE
Problem: Pain, Acute (Adult)  Goal: Identify Related Risk Factors and Signs and Symptoms  Outcome: Ongoing (interventions implemented as appropriate)    08/17/17 1546   Pain, Acute   Related Risk Factors (Acute Pain) knowledge deficit       Goal: Acceptable Pain Control/Comfort Level  Outcome: Ongoing (interventions implemented as appropriate)    08/17/17 1546   Pain, Acute (Adult)   Acceptable Pain Control/Comfort Level making progress toward outcome         Problem: Wound, Traumatic, Nonburn (Adult)  Goal: Signs and Symptoms of Listed Potential Problems Will be Absent or Manageable (Wound, Traumatic, Nonburn)  Outcome: Ongoing (interventions implemented as appropriate)    08/17/17 1546   Wound, Traumatic, Nonburn   Problems Assessed (Wound) all   Problems Present (Wound) none         Problem: Patient Care Overview (Adult)  Goal: Plan of Care Review  Outcome: Ongoing (interventions implemented as appropriate)    08/13/17 1044 08/17/17 1546   Coping/Psychosocial Response Interventions   Plan Of Care Reviewed With --  patient   Patient Care Overview   Progress --  improving   Outcome Evaluation   Outcome Summary/Follow up Plan Pt alert and oriented X4. Pt is concerned about when surgery will be; this nurse told this patient she would check on it. Pt was medicated this am wiht his pain pill and ativan. Pt takes pills whole with water. --

## 2017-08-18 NOTE — INTERVAL H&P NOTE
Update:  I have reviewed the patient's H&P from 8/3/17 and I have examined the patient.  The following are changes in the patient's physical exam compared to that date, the patient's hand edema has resolved and LE edema also appears resolved, the remainder of patient's physical exam is unchanged. The patient has completed his course of po Bactrim DS. Vital signs have been reviewed and BP, HR, temp and RR in pre-op are WNL.  Patient is undergoing removal of right external fixation today with right medial column and tarsal metatarsal arthrodesis and possible calcaneal cuboid arthrodesis by Dr. Farah. The remainder of the patient's findings (SH, FH, ROS, PMH, SurgH) are all unchanged from previous H&P). Patient denies any CP, SOA or N/V.

## 2017-08-18 NOTE — PLAN OF CARE
Problem: Patient Care Overview (Adult)  Goal: Plan of Care Review  Outcome: Ongoing (interventions implemented as appropriate)    08/18/17 0849   Coping/Psychosocial Response Interventions   Plan Of Care Reviewed With patient   Patient Care Overview   Progress (for discharge today to surgery)   Outcome Evaluation   Outcome Summary/Follow up Plan All visible bony areas intact. Rash almost resolved at perineum (healed at buttocks). For surgery today. Scratch marks on LLE from prior RLE external fixator trauma healing well/scabbed, no new areas. Has doubled cotton stockinette on LLE to protect.         Problem: Skin Integrity Impairment, Risk/Actual (Adult)  Goal: Identify Related Risk Factors and Signs and Symptoms  Outcome: Ongoing (interventions implemented as appropriate)    08/18/17 0849   Skin Integrity Impairment, Risk/Actual   Skin Integrity Impairment, Risk/Actual: Related Risk Factors immobility;skin disorders;surgery/procedure   Signs and Symptoms (Skin Integrity Impairment) rash  (healing rash perineum; scratches LLE from fixator RLE)       Goal: Skin Integrity/Wound Healing  Outcome: Ongoing (interventions implemented as appropriate)    08/18/17 0849   Skin Integrity Impairment, Risk/Actual (Adult)   Skin Integrity/Wound Healing making progress toward outcome

## 2017-08-18 NOTE — PROGRESS NOTES
Foot/Ankle Progress Note    Subjective     Post-Operative Day: 19. Status post-R tarsal-metatarsal, inter-cuneiform and calcaneal cuboid joint stabilization with Ex Fix application.    Systemic or Specific Complaints: No fever, chills or nite sweats. No SOB. No chest pain. No GI/ issues. R foot pain associated with LiFrance dislocation and Ex fix. Multiple abrasions from ExFix on LLE. No local signs of infection R foot.    Objective     Vital signs in last 24 hours:  Temp:  [96.7 °F (35.9 °C)-98.1 °F (36.7 °C)] 96.7 °F (35.9 °C)  Heart Rate:  [60-75] 75  Resp:  [18-20] 18  BP: (107-118)/(71-80) 107/71     General: The patient is alert, pleasant and comfortable in appearance.   Neurovascular: Pedal sensation is absent due to peripheral neuropathy.  Capillary refill is brisk to the toes. Decreased pedal edema with return of relaxed skin tension lines.    Wound: Fracture blisters have healed fully. There is dry scab and superficial dehisence at the dorsal midfoot incision. No signs of drainage or infection at the pin sites at the heel or midfoot.   Range of Motion: Pedal joint ROM is limited due to ExFix stabilization of medial and lateral columns. Patient has active dorsiflexion and plantarflexion of ankle and toes intact.   DVT Exam: No calf pain or swelling     Data Review  CBC:  Results from last 7 days  Lab Units 08/11/17  0325   WBC 10*3/mm3 8.79   RBC 10*6/mm3 3.96*   HEMOGLOBIN g/dL 12.7*   HEMATOCRIT % 38.7*   PLATELETS 10*3/mm3 360       Assessment/Plan     Status post- R tarsal-metatarsal, inter-cuneiform and calcaneal cuboid joint stabilization with Ex Fix application; POD #19.    Pain Relief: Percocet 5/325mg.    Activity: R foot dressing change performed.    Weight Bearing: NWB R foot.    Plan: To OR tomorrow for ExFix removal and medial column, 2nd/3rd tarsal-metatarsal arthrodesis.       LOS: 15 days     Anish Farah DPM    Date: 8/17/2017  Time: 11:31 PM

## 2017-08-18 NOTE — NURSING NOTE
WOCN skin recheck as to be discharged from Rehab today for foot surgery. All visible bony areas remain intact. Small amount dried blood drainage noted on stockinette that covers (R) foot; fixator pins padded, all kept covered to prevent trauma to LLE. Rash almost resolved at perineum (Buttocks areas resolved). Likes the powder as it soothes skin.  Note LLE scratches much improved, none new: has doubled cotton stockinette on lower limb to protect from fixators RLE.    Recommend post-operatively: continue the antifungal powder to perineal skin folds healing rash, & cotton stockinette to LLE to protect limb (discontinue if no new fixators after this surgery), waffle support surfaces bed & chair, Aquaphor for moisturizing dry skin areas, & Z-guard as barrier to buttocks.

## 2017-08-19 PROBLEM — S92.901A FOOT FRACTURE, RIGHT: Status: ACTIVE | Noted: 2017-08-19

## 2017-08-19 LAB
GLUCOSE BLDC GLUCOMTR-MCNC: 94 MG/DL (ref 70–130)
GLUCOSE BLDC GLUCOMTR-MCNC: 97 MG/DL (ref 70–130)

## 2017-08-19 PROCEDURE — 25010000002 HYDROMORPHONE PER 4 MG: Performed by: PODIATRIST

## 2017-08-19 PROCEDURE — 25010000003 CEFAZOLIN PER 500 MG

## 2017-08-19 PROCEDURE — 94799 UNLISTED PULMONARY SVC/PX: CPT

## 2017-08-19 PROCEDURE — 99224 PR SBSQ OBSERVATION CARE/DAY 15 MINUTES: CPT | Performed by: HOSPITALIST

## 2017-08-19 PROCEDURE — 94770: CPT

## 2017-08-19 PROCEDURE — 25010000003 CEFAZOLIN PER 500 MG: Performed by: PODIATRIST

## 2017-08-19 PROCEDURE — G0378 HOSPITAL OBSERVATION PER HR: HCPCS

## 2017-08-19 PROCEDURE — 82962 GLUCOSE BLOOD TEST: CPT

## 2017-08-19 PROCEDURE — 25010000002 ENOXAPARIN PER 10 MG: Performed by: PODIATRIST

## 2017-08-19 RX ORDER — NICOTINE POLACRILEX 4 MG
15 LOZENGE BUCCAL
Status: DISCONTINUED | OUTPATIENT
Start: 2017-08-19 | End: 2017-08-22 | Stop reason: HOSPADM

## 2017-08-19 RX ORDER — ONDANSETRON 2 MG/ML
4 INJECTION INTRAMUSCULAR; INTRAVENOUS EVERY 6 HOURS PRN
Status: DISCONTINUED | OUTPATIENT
Start: 2017-08-19 | End: 2017-08-22 | Stop reason: HOSPADM

## 2017-08-19 RX ORDER — SODIUM CHLORIDE 9 MG/ML
INJECTION, SOLUTION INTRAVENOUS
Status: COMPLETED
Start: 2017-08-19 | End: 2017-08-19

## 2017-08-19 RX ORDER — DEXTROSE MONOHYDRATE 25 G/50ML
25 INJECTION, SOLUTION INTRAVENOUS
Status: DISCONTINUED | OUTPATIENT
Start: 2017-08-19 | End: 2017-08-22 | Stop reason: HOSPADM

## 2017-08-19 RX ORDER — GABAPENTIN 400 MG/1
800 CAPSULE ORAL EVERY 8 HOURS SCHEDULED
Status: DISCONTINUED | OUTPATIENT
Start: 2017-08-19 | End: 2017-08-22 | Stop reason: HOSPADM

## 2017-08-19 RX ORDER — CEFAZOLIN SODIUM 1 G/3ML
INJECTION, POWDER, FOR SOLUTION INTRAMUSCULAR; INTRAVENOUS
Status: COMPLETED
Start: 2017-08-19 | End: 2017-08-19

## 2017-08-19 RX ADMIN — AMLODIPINE BESYLATE 2.5 MG: 2.5 TABLET ORAL at 09:04

## 2017-08-19 RX ADMIN — LITHIUM CARBONATE 300 MG: 300 TABLET ORAL at 18:16

## 2017-08-19 RX ADMIN — HYDROMORPHONE HYDROCHLORIDE 1 MG: 1 INJECTION, SOLUTION INTRAMUSCULAR; INTRAVENOUS; SUBCUTANEOUS at 19:46

## 2017-08-19 RX ADMIN — CEFAZOLIN 3 G: 1 INJECTION, POWDER, FOR SOLUTION INTRAMUSCULAR; INTRAVENOUS at 14:09

## 2017-08-19 RX ADMIN — HYDROMORPHONE HYDROCHLORIDE 1 MG: 1 INJECTION, SOLUTION INTRAMUSCULAR; INTRAVENOUS; SUBCUTANEOUS at 12:19

## 2017-08-19 RX ADMIN — CARBAMAZEPINE 400 MG: 200 TABLET ORAL at 18:16

## 2017-08-19 RX ADMIN — Medication 1 CAPSULE: at 09:04

## 2017-08-19 RX ADMIN — LOSARTAN POTASSIUM 100 MG: 50 TABLET ORAL at 09:04

## 2017-08-19 RX ADMIN — CEFAZOLIN 3000 MG: 1 INJECTION, POWDER, FOR SOLUTION INTRAMUSCULAR; INTRAVENOUS; PARENTERAL at 21:14

## 2017-08-19 RX ADMIN — ENOXAPARIN SODIUM 40 MG: 40 INJECTION SUBCUTANEOUS at 09:03

## 2017-08-19 RX ADMIN — HYDROMORPHONE HYDROCHLORIDE 1 MG: 1 INJECTION, SOLUTION INTRAMUSCULAR; INTRAVENOUS; SUBCUTANEOUS at 08:58

## 2017-08-19 RX ADMIN — CARBAMAZEPINE 400 MG: 200 TABLET ORAL at 09:04

## 2017-08-19 RX ADMIN — OXYCODONE HYDROCHLORIDE AND ACETAMINOPHEN 1 TABLET: 5; 325 TABLET ORAL at 02:32

## 2017-08-19 RX ADMIN — PETROLATUM 1 APPLICATION: 42 OINTMENT TOPICAL at 09:04

## 2017-08-19 RX ADMIN — TEMAZEPAM 30 MG: 15 CAPSULE ORAL at 21:49

## 2017-08-19 RX ADMIN — GABAPENTIN 800 MG: 400 CAPSULE ORAL at 16:05

## 2017-08-19 RX ADMIN — HYDROMORPHONE HYDROCHLORIDE 1 MG: 1 INJECTION, SOLUTION INTRAMUSCULAR; INTRAVENOUS; SUBCUTANEOUS at 16:02

## 2017-08-19 RX ADMIN — LITHIUM CARBONATE 300 MG: 300 TABLET ORAL at 09:04

## 2017-08-19 RX ADMIN — HYDROMORPHONE HYDROCHLORIDE 1 MG: 1 INJECTION, SOLUTION INTRAMUSCULAR; INTRAVENOUS; SUBCUTANEOUS at 04:44

## 2017-08-19 RX ADMIN — MICONAZOLE NITRATE: 20 POWDER TOPICAL at 09:04

## 2017-08-19 RX ADMIN — SODIUM CHLORIDE 100 ML: 9 INJECTION, SOLUTION INTRAVENOUS at 21:15

## 2017-08-19 RX ADMIN — GABAPENTIN 800 MG: 400 CAPSULE ORAL at 21:14

## 2017-08-19 RX ADMIN — PRIMIDONE 250 MG: 250 TABLET ORAL at 18:16

## 2017-08-19 RX ADMIN — PRIMIDONE 250 MG: 250 TABLET ORAL at 09:04

## 2017-08-19 RX ADMIN — HYDROMORPHONE HYDROCHLORIDE 1 MG: 1 INJECTION, SOLUTION INTRAMUSCULAR; INTRAVENOUS; SUBCUTANEOUS at 01:06

## 2017-08-19 RX ADMIN — METOPROLOL TARTRATE 100 MG: 50 TABLET, FILM COATED ORAL at 18:17

## 2017-08-19 RX ADMIN — CEFAZOLIN 3 G: 1 INJECTION, POWDER, FOR SOLUTION INTRAMUSCULAR; INTRAVENOUS at 21:17

## 2017-08-19 RX ADMIN — CEFAZOLIN 3 G: 1 INJECTION, POWDER, FOR SOLUTION INTRAMUSCULAR; INTRAVENOUS at 08:53

## 2017-08-19 RX ADMIN — METFORMIN HYDROCHLORIDE 500 MG: 500 TABLET ORAL at 18:16

## 2017-08-19 RX ADMIN — METOPROLOL TARTRATE 100 MG: 50 TABLET, FILM COATED ORAL at 09:05

## 2017-08-19 RX ADMIN — METFORMIN HYDROCHLORIDE 500 MG: 500 TABLET ORAL at 09:04

## 2017-08-19 RX ADMIN — MICONAZOLE NITRATE: 20 POWDER TOPICAL at 21:17

## 2017-08-19 RX ADMIN — DOXEPIN HYDROCHLORIDE 75 MG: 25 CAPSULE ORAL at 21:18

## 2017-08-19 RX ADMIN — OXYCODONE HYDROCHLORIDE AND ACETAMINOPHEN 1 TABLET: 5; 325 TABLET ORAL at 21:14

## 2017-08-19 RX ADMIN — FLUOXETINE 40 MG: 20 CAPSULE ORAL at 09:04

## 2017-08-20 LAB
GLUCOSE BLDC GLUCOMTR-MCNC: 112 MG/DL (ref 70–130)
GLUCOSE BLDC GLUCOMTR-MCNC: 114 MG/DL (ref 70–130)
GLUCOSE BLDC GLUCOMTR-MCNC: 87 MG/DL (ref 70–130)
GLUCOSE BLDC GLUCOMTR-MCNC: 96 MG/DL (ref 70–130)

## 2017-08-20 PROCEDURE — 25010000002 HYDROMORPHONE PER 4 MG: Performed by: PODIATRIST

## 2017-08-20 PROCEDURE — 25010000002 ENOXAPARIN PER 10 MG: Performed by: PODIATRIST

## 2017-08-20 PROCEDURE — 25010000003 CEFAZOLIN PER 500 MG: Performed by: PODIATRIST

## 2017-08-20 PROCEDURE — 99224 PR SBSQ OBSERVATION CARE/DAY 15 MINUTES: CPT | Performed by: HOSPITALIST

## 2017-08-20 PROCEDURE — 94799 UNLISTED PULMONARY SVC/PX: CPT

## 2017-08-20 PROCEDURE — G0378 HOSPITAL OBSERVATION PER HR: HCPCS

## 2017-08-20 PROCEDURE — 82962 GLUCOSE BLOOD TEST: CPT

## 2017-08-20 RX ORDER — ACETAMINOPHEN 325 MG/1
650 TABLET ORAL EVERY 4 HOURS PRN
Status: DISCONTINUED | OUTPATIENT
Start: 2017-08-20 | End: 2017-08-22 | Stop reason: HOSPADM

## 2017-08-20 RX ADMIN — PRIMIDONE 250 MG: 250 TABLET ORAL at 08:38

## 2017-08-20 RX ADMIN — MICONAZOLE NITRATE: 20 POWDER TOPICAL at 08:39

## 2017-08-20 RX ADMIN — LOSARTAN POTASSIUM 100 MG: 50 TABLET ORAL at 08:38

## 2017-08-20 RX ADMIN — GABAPENTIN 800 MG: 400 CAPSULE ORAL at 05:06

## 2017-08-20 RX ADMIN — LITHIUM CARBONATE 300 MG: 300 TABLET ORAL at 08:38

## 2017-08-20 RX ADMIN — CEFAZOLIN 3 G: 1 INJECTION, POWDER, FOR SOLUTION INTRAMUSCULAR; INTRAVENOUS at 20:56

## 2017-08-20 RX ADMIN — GABAPENTIN 800 MG: 400 CAPSULE ORAL at 21:01

## 2017-08-20 RX ADMIN — PETROLATUM 1 APPLICATION: 42 OINTMENT TOPICAL at 08:39

## 2017-08-20 RX ADMIN — OXYCODONE HYDROCHLORIDE AND ACETAMINOPHEN 1 TABLET: 5; 325 TABLET ORAL at 20:57

## 2017-08-20 RX ADMIN — OXYCODONE HYDROCHLORIDE AND ACETAMINOPHEN 1 TABLET: 5; 325 TABLET ORAL at 11:30

## 2017-08-20 RX ADMIN — HYDROMORPHONE HYDROCHLORIDE 1 MG: 1 INJECTION, SOLUTION INTRAMUSCULAR; INTRAVENOUS; SUBCUTANEOUS at 13:48

## 2017-08-20 RX ADMIN — HYDROMORPHONE HYDROCHLORIDE 1 MG: 1 INJECTION, SOLUTION INTRAMUSCULAR; INTRAVENOUS; SUBCUTANEOUS at 00:36

## 2017-08-20 RX ADMIN — METFORMIN HYDROCHLORIDE 500 MG: 500 TABLET ORAL at 08:38

## 2017-08-20 RX ADMIN — GABAPENTIN 800 MG: 400 CAPSULE ORAL at 13:49

## 2017-08-20 RX ADMIN — HYDROMORPHONE HYDROCHLORIDE 1 MG: 1 INJECTION, SOLUTION INTRAMUSCULAR; INTRAVENOUS; SUBCUTANEOUS at 08:42

## 2017-08-20 RX ADMIN — CEFAZOLIN 3 G: 1 INJECTION, POWDER, FOR SOLUTION INTRAMUSCULAR; INTRAVENOUS at 05:06

## 2017-08-20 RX ADMIN — OXYCODONE HYDROCHLORIDE AND ACETAMINOPHEN 1 TABLET: 5; 325 TABLET ORAL at 16:22

## 2017-08-20 RX ADMIN — METOPROLOL TARTRATE 100 MG: 50 TABLET, FILM COATED ORAL at 17:32

## 2017-08-20 RX ADMIN — Medication 1 CAPSULE: at 08:38

## 2017-08-20 RX ADMIN — DOXEPIN HYDROCHLORIDE 75 MG: 25 CAPSULE ORAL at 20:56

## 2017-08-20 RX ADMIN — CARBAMAZEPINE 400 MG: 200 TABLET ORAL at 17:32

## 2017-08-20 RX ADMIN — OXYCODONE HYDROCHLORIDE AND ACETAMINOPHEN 1 TABLET: 5; 325 TABLET ORAL at 02:04

## 2017-08-20 RX ADMIN — CEFAZOLIN 3 G: 1 INJECTION, POWDER, FOR SOLUTION INTRAMUSCULAR; INTRAVENOUS at 12:47

## 2017-08-20 RX ADMIN — OXYCODONE HYDROCHLORIDE AND ACETAMINOPHEN 1 TABLET: 5; 325 TABLET ORAL at 05:43

## 2017-08-20 RX ADMIN — PRIMIDONE 250 MG: 250 TABLET ORAL at 17:32

## 2017-08-20 RX ADMIN — ENOXAPARIN SODIUM 40 MG: 40 INJECTION SUBCUTANEOUS at 08:38

## 2017-08-20 RX ADMIN — HYDROMORPHONE HYDROCHLORIDE 1 MG: 1 INJECTION, SOLUTION INTRAMUSCULAR; INTRAVENOUS; SUBCUTANEOUS at 05:06

## 2017-08-20 RX ADMIN — METOPROLOL TARTRATE 100 MG: 50 TABLET, FILM COATED ORAL at 08:38

## 2017-08-20 RX ADMIN — LITHIUM CARBONATE 300 MG: 300 TABLET ORAL at 17:32

## 2017-08-20 RX ADMIN — CARBAMAZEPINE 400 MG: 200 TABLET ORAL at 08:38

## 2017-08-20 RX ADMIN — FLUOXETINE 40 MG: 20 CAPSULE ORAL at 08:38

## 2017-08-20 RX ADMIN — AMLODIPINE BESYLATE 2.5 MG: 2.5 TABLET ORAL at 08:38

## 2017-08-20 RX ADMIN — METFORMIN HYDROCHLORIDE 500 MG: 500 TABLET ORAL at 17:32

## 2017-08-21 LAB — GLUCOSE BLDC GLUCOMTR-MCNC: 71 MG/DL (ref 70–130)

## 2017-08-21 PROCEDURE — 99224 PR SBSQ OBSERVATION CARE/DAY 15 MINUTES: CPT | Performed by: HOSPITALIST

## 2017-08-21 PROCEDURE — 25010000002 HYDROMORPHONE PER 4 MG: Performed by: PODIATRIST

## 2017-08-21 PROCEDURE — G0378 HOSPITAL OBSERVATION PER HR: HCPCS

## 2017-08-21 PROCEDURE — 82962 GLUCOSE BLOOD TEST: CPT

## 2017-08-21 PROCEDURE — 25010000002 ENOXAPARIN PER 10 MG: Performed by: PODIATRIST

## 2017-08-21 PROCEDURE — 25010000003 CEFAZOLIN PER 500 MG: Performed by: PODIATRIST

## 2017-08-21 RX ADMIN — FLUOXETINE 40 MG: 20 CAPSULE ORAL at 09:10

## 2017-08-21 RX ADMIN — DOXEPIN HYDROCHLORIDE 75 MG: 25 CAPSULE ORAL at 21:03

## 2017-08-21 RX ADMIN — Medication 1 CAPSULE: at 09:09

## 2017-08-21 RX ADMIN — METFORMIN HYDROCHLORIDE 500 MG: 500 TABLET ORAL at 17:38

## 2017-08-21 RX ADMIN — GABAPENTIN 800 MG: 400 CAPSULE ORAL at 05:46

## 2017-08-21 RX ADMIN — MICONAZOLE NITRATE: 20 POWDER TOPICAL at 09:11

## 2017-08-21 RX ADMIN — LITHIUM CARBONATE 300 MG: 300 TABLET ORAL at 09:10

## 2017-08-21 RX ADMIN — PETROLATUM 1 APPLICATION: 42 OINTMENT TOPICAL at 09:11

## 2017-08-21 RX ADMIN — METFORMIN HYDROCHLORIDE 500 MG: 500 TABLET ORAL at 09:09

## 2017-08-21 RX ADMIN — CEFAZOLIN 3 G: 1 INJECTION, POWDER, FOR SOLUTION INTRAMUSCULAR; INTRAVENOUS at 04:44

## 2017-08-21 RX ADMIN — ENOXAPARIN SODIUM 40 MG: 40 INJECTION SUBCUTANEOUS at 09:11

## 2017-08-21 RX ADMIN — ACETAMINOPHEN 650 MG: 325 TABLET, FILM COATED ORAL at 00:06

## 2017-08-21 RX ADMIN — CARBAMAZEPINE 400 MG: 200 TABLET ORAL at 17:38

## 2017-08-21 RX ADMIN — OXYCODONE HYDROCHLORIDE AND ACETAMINOPHEN 1 TABLET: 5; 325 TABLET ORAL at 05:45

## 2017-08-21 RX ADMIN — OXYCODONE HYDROCHLORIDE AND ACETAMINOPHEN 1 TABLET: 5; 325 TABLET ORAL at 11:31

## 2017-08-21 RX ADMIN — HYDROMORPHONE HYDROCHLORIDE 1 MG: 1 INJECTION, SOLUTION INTRAMUSCULAR; INTRAVENOUS; SUBCUTANEOUS at 00:05

## 2017-08-21 RX ADMIN — CARBAMAZEPINE 400 MG: 200 TABLET ORAL at 09:10

## 2017-08-21 RX ADMIN — PRIMIDONE 250 MG: 250 TABLET ORAL at 09:10

## 2017-08-21 RX ADMIN — CEFAZOLIN 3 G: 1 INJECTION, POWDER, FOR SOLUTION INTRAMUSCULAR; INTRAVENOUS at 13:34

## 2017-08-21 RX ADMIN — GABAPENTIN 800 MG: 400 CAPSULE ORAL at 13:34

## 2017-08-21 RX ADMIN — CEFAZOLIN 3 G: 1 INJECTION, POWDER, FOR SOLUTION INTRAMUSCULAR; INTRAVENOUS at 21:04

## 2017-08-21 RX ADMIN — TEMAZEPAM 30 MG: 15 CAPSULE ORAL at 21:03

## 2017-08-21 RX ADMIN — METOPROLOL TARTRATE 100 MG: 50 TABLET, FILM COATED ORAL at 17:38

## 2017-08-21 RX ADMIN — PRIMIDONE 250 MG: 250 TABLET ORAL at 17:38

## 2017-08-21 RX ADMIN — METOPROLOL TARTRATE 100 MG: 50 TABLET, FILM COATED ORAL at 09:09

## 2017-08-21 RX ADMIN — TEMAZEPAM 30 MG: 15 CAPSULE ORAL at 00:05

## 2017-08-21 RX ADMIN — LITHIUM CARBONATE 300 MG: 300 TABLET ORAL at 17:38

## 2017-08-21 RX ADMIN — LOSARTAN POTASSIUM 100 MG: 50 TABLET ORAL at 09:10

## 2017-08-21 RX ADMIN — OXYCODONE HYDROCHLORIDE AND ACETAMINOPHEN 1 TABLET: 5; 325 TABLET ORAL at 17:38

## 2017-08-21 RX ADMIN — GABAPENTIN 800 MG: 400 CAPSULE ORAL at 21:03

## 2017-08-21 RX ADMIN — AMLODIPINE BESYLATE 2.5 MG: 2.5 TABLET ORAL at 09:10

## 2017-08-22 VITALS
RESPIRATION RATE: 16 BRPM | DIASTOLIC BLOOD PRESSURE: 70 MMHG | HEART RATE: 71 BPM | BODY MASS INDEX: 38.25 KG/M2 | TEMPERATURE: 98.9 F | SYSTOLIC BLOOD PRESSURE: 116 MMHG | WEIGHT: 282.4 LBS | HEIGHT: 72 IN | OXYGEN SATURATION: 99 %

## 2017-08-22 PROCEDURE — G8979 MOBILITY GOAL STATUS: HCPCS

## 2017-08-22 PROCEDURE — 97162 PT EVAL MOD COMPLEX 30 MIN: CPT

## 2017-08-22 PROCEDURE — 99217 PR OBSERVATION CARE DISCHARGE MANAGEMENT: CPT | Performed by: HOSPITALIST

## 2017-08-22 PROCEDURE — 25010000002 ENOXAPARIN PER 10 MG: Performed by: PODIATRIST

## 2017-08-22 PROCEDURE — G0378 HOSPITAL OBSERVATION PER HR: HCPCS

## 2017-08-22 PROCEDURE — G8978 MOBILITY CURRENT STATUS: HCPCS

## 2017-08-22 PROCEDURE — 25010000003 CEFAZOLIN PER 500 MG: Performed by: PODIATRIST

## 2017-08-22 RX ADMIN — PRIMIDONE 250 MG: 250 TABLET ORAL at 09:20

## 2017-08-22 RX ADMIN — CARBAMAZEPINE 400 MG: 200 TABLET ORAL at 09:20

## 2017-08-22 RX ADMIN — METFORMIN HYDROCHLORIDE 500 MG: 500 TABLET ORAL at 08:11

## 2017-08-22 RX ADMIN — Medication 1 CAPSULE: at 09:20

## 2017-08-22 RX ADMIN — OXYCODONE HYDROCHLORIDE AND ACETAMINOPHEN 1 TABLET: 5; 325 TABLET ORAL at 05:30

## 2017-08-22 RX ADMIN — AMLODIPINE BESYLATE 2.5 MG: 2.5 TABLET ORAL at 09:19

## 2017-08-22 RX ADMIN — CEFAZOLIN 3 G: 1 INJECTION, POWDER, FOR SOLUTION INTRAMUSCULAR; INTRAVENOUS at 13:10

## 2017-08-22 RX ADMIN — METOPROLOL TARTRATE 100 MG: 50 TABLET, FILM COATED ORAL at 09:21

## 2017-08-22 RX ADMIN — FLUOXETINE 40 MG: 20 CAPSULE ORAL at 09:20

## 2017-08-22 RX ADMIN — OXYCODONE HYDROCHLORIDE AND ACETAMINOPHEN 1 TABLET: 5; 325 TABLET ORAL at 09:34

## 2017-08-22 RX ADMIN — LITHIUM CARBONATE 300 MG: 300 TABLET ORAL at 09:19

## 2017-08-22 RX ADMIN — CEFAZOLIN 3 G: 1 INJECTION, POWDER, FOR SOLUTION INTRAMUSCULAR; INTRAVENOUS at 05:30

## 2017-08-22 RX ADMIN — GABAPENTIN 800 MG: 400 CAPSULE ORAL at 14:29

## 2017-08-22 RX ADMIN — GABAPENTIN 800 MG: 400 CAPSULE ORAL at 05:30

## 2017-08-22 RX ADMIN — OXYCODONE HYDROCHLORIDE AND ACETAMINOPHEN 1 TABLET: 5; 325 TABLET ORAL at 14:29

## 2017-08-22 RX ADMIN — ENOXAPARIN SODIUM 40 MG: 40 INJECTION SUBCUTANEOUS at 09:21

## 2017-08-22 RX ADMIN — PETROLATUM 1 APPLICATION: 42 OINTMENT TOPICAL at 09:21

## 2017-08-22 RX ADMIN — LOSARTAN POTASSIUM 100 MG: 50 TABLET ORAL at 09:20

## 2017-08-24 NOTE — DISCHARGE SUMMARY
Eliseo Price  1965  [id number]        Hospitalists Discharge Summary    Date of Admission: 8/2/2017  Date of Discharge:  8/18/2017    Primary Discharge Diagnoses:   1. Immobility secondary to Lis Franc fracture right foot, s/p external fixator placement    Secondary Discharge Diagnoses:   2. Recent Cellulitis RLE    3. Chronic splenic vein thrombosis    4. H/O Bipolar disorder    5. DM2    6. Hypertension    7. H/O Hyperlipidemia    8. Obesity    9. Chronic macrocytic anemia    10. H/O DVT    11. Peripheral neuropathy    12. Benign essential tremor    PCP  Patient Care Team:  Killian Scales MD as PCP - General  Killian Scales MD as PCP - Family Medicine    Consults:   Consults     Date and Time Order Name Status Description    7/23/2017 1418 Inpatient Consult to Podiatry Completed     7/22/2017 2307 Inpatient Consult to Neurology Completed           Operations and Procedures Performed:       Xr Foot 3+ View Right    Result Date: 8/19/2017  Narrative: EXAM: Right foot 08/18/2017  HISTORY: Postop ORIF  TECHNIQUE: Single view in plaster  COMPARISON: None  FINDINGS: AP view foot following midfoot ORIF.      Impression: Status post ORIF in plaster  This report was finalized on 8/19/2017 7:15 AM by Dr. Quinton De Jesus MD.      Xr Foot 3+ View Right    Result Date: 8/19/2017  Narrative: EXAM: Right foot intraoperative views 08/18/2017  HISTORY: ORIF right foot  TECHNIQUE: 3 intraoperative spot views  COMPARISON: None  FINDINGS: 3 intraoperative spot views obtained during ORIF performed by Dr. Farah      Impression: Intraoperative views  This report was finalized on 8/19/2017 7:08 AM by Dr. Quinton De Jesus MD.      Xr Foot 3+ View Right    Result Date: 8/2/2017  Narrative: RIGHT FOOT, 08/02/2017:  HISTORY: 51-year-old male with history of Lisfranc midfoot fracture-dislocation injury. External fixator in place.  TECHNIQUE: Three-view right foot series.  FINDINGS: The examination is unchanged since 07/29/2017.  Lateral fracture dislocation injuries throughout the mid foot. No change in the position of external fixation hardware.  This report was finalized on 8/2/2017 3:47 PM by Dr. Bigg Del Angel MD.      Xr Foot 3+ View Right    Result Date: 7/30/2017  Narrative: Right foot, 2 views  INDICATION: Postop surgery for Lisfranc dislocation, external fixator placement. Compared with 07/22/2017.  FINDINGS: Status post placement of a external fixator. Stable alignment. Stable Lisfranc fracture dislocation. Calcaneal spurring. Stable soft tissue swelling.  This report was finalized on 7/30/2017 8:14 AM by Dr. Alex Hansen MD.      Xr Chest 1 View    Result Date: 7/27/2017  Narrative: EXAM: AP portable chest.  DATE:: 07/27/2017.  HISTORY: History of multiple recent falls with chronic back pain and left hip pain. HISTORY of splenic vein thrombosis and gastric varices. Hypertension. Diabetes. Hyperlipidemia. Coronary disease. Thyroid disease. PICC line placement.  COMPARISON: AP portable chest 07/22/2017.  FINDINGS: No acute airspace disease is identified. Previously described opacity in the left upper lobe is no longer evident. There is stable mild asymmetric elevation right hemidiaphragm. Heart size is upper limits normal but appears stable. Right arm approach PICC has been placed with the tip extending to the lower SVC level. No pneumothorax is visible. Right shoulder replacement changes.      Impression: 1. No acute chest findings 2. Right arm approach PICC tip extends to the lower SVC level. No visible pneumothorax.  This report was finalized on 7/27/2017 11:01 AM by Dr. Mindy Schulz MD.        Allergies:  is allergic to lisinopril.    Liborio  not reviewed    Discharge Medications:   See transfer/discharge Med/Rec    History of Present Illness: (from H&P)  Patient is a 52 y/o male with Bipolar d/o, DM-2, hypertension, h/o hyperlipidemia, Obesity, Chronic macrocytic anemia, h/o DVT and peripheral neuropathy was admitted to  skilled care and rehab secondary to immobility secondary to Lis Franc fracture right foot, s/p external fixator placement.  The patient was initially hospitalized for septic shock from a RLE cellulitis and TME which resolved with Abx's and IVFs.  He underwent surgery by Dr. Farah for his fracture and with the external fixator now in place is NWB to RLE.  Dr. Farah continues to follow and the patient will likely undergo definitive surgical repair in about 2 weeks once the swelling has improved. The patient notes some increased pain with movement.  He notes because of his neuropathy and inability to feel his feet that when he moves around especially at night the foot is getting banged around in the bed.  The fixator loosened and Dr. Farah tightened things yesterday.  Patient denies any SOA, CP, N/V/D.  He is eating well.  He is had a low grade fever this AM.    Hospital Course  1. Immobility secondary to Lis Franc fracture right foot, s/p external fixator placement: Podiatry following and wound RN following. Oral bactrim course completed. PT/OT discharged patient (he was not able to participate) Continues NWB RLE, anticipated surgery 8/18/17 (today) per Dr. Farah for repair and removal of fixator.      2. Recent Cellulitis RLE: resolved  As above, bactrim complete  WBCC normal       3. Chronic splenic vein thrombosis:   Noted on CT abdomen during inpatient stay, no acute issues  Plan f/u GI after d/c      4. H/O Bipolar disorder: no changes to home regimen  No acute issues on sinequan, prozac, lithium, and tegretol and PRN Ativan and restoril.      5. DM2: A1C 4.6%  No further treatment indicated, no metformin      6. Hypertension:   BP at goal on losartan 100 mg daily/metoprolol tartrate 100 mg every 12 hours/amlodipine 5 mg daily      7. H/O Hyperlipidemia: no acute issues, diet control   On no home medications for this  F/U Killian Scales MD after discharge      8. Obesity:  Body mass index is 39.75  kg/(m^2).  Nutrition continues  monitoring      9. Chronic macrocytic anemia: stable  Hemoglobin 12.7, no active blood loss, no acute issues  B12 and folate are normal      10. H/O DVT: Continuing lovenox for DVT prophy.   Pre-operative Venous duplex RLE negative for DVT      11. Peripheral neuropathy: no acute issues on neurontin  No sensation to feet.  Wound RN monitoring        12. Benign essential tremor: On home primidone, no acute issues here      Last Lab Results:   Lab Results (most recent)     Procedure Component Value Units Date/Time    CBC & Differential [[id number]] Collected:  08/04/17 0614    Specimen:  Blood Updated:  08/04/17 0650    Narrative:       The following orders were created for panel order CBC & Differential.  Procedure                               Abnormality         Status                     ---------                               -----------         ------                     CBC Auto Differential[[id number]]        Abnormal            Final result                 Please view results for these tests on the individual orders.    CBC Auto Differential [[id number]]  (Abnormal) Collected:  08/04/17 0614    Specimen:  Blood Updated:  08/04/17 0650     WBC 6.39 10*3/mm3      RBC 3.09 (L) 10*6/mm3      Hemoglobin 10.1 (L) g/dL      Hematocrit 30.8 (L) %      MCV 99.7 (H) fL      MCH 32.7 (H) pg      MCHC 32.8 g/dL      RDW 11.3 (L) %      RDW-SD 41.7 fl      MPV 9.0 fL      Platelets 241 10*3/mm3      Neutrophil % 64.7 %      Lymphocyte % 17.7 (L) %      Monocyte % 11.1 (H) %      Eosinophil % 5.2 (H) %      Basophil % 0.8 %      Immature Grans % 0.5 %      Neutrophils, Absolute 4.14 10*3/mm3      Lymphocytes, Absolute 1.13 10*3/mm3      Monocytes, Absolute 0.71 10*3/mm3      Eosinophils, Absolute 0.33 (H) 10*3/mm3      Basophils, Absolute 0.05 10*3/mm3      Immature Grans, Absolute 0.03 10*3/mm3      nRBC 0.0 /100 WBC     Basic Metabolic Panel [[id number]]  (Abnormal) Collected:  08/04/17 0614     Specimen:  Blood Updated:  08/04/17 0709     Glucose 82 mg/dL      BUN 17 mg/dL      Creatinine 0.87 mg/dL      Sodium 143 mmol/L      Potassium 3.9 mmol/L      Chloride 109 (H) mmol/L      CO2 25.3 mmol/L      Calcium 7.5 (L) mg/dL      eGFR Non African Amer 92 mL/min/1.73      BUN/Creatinine Ratio 19.5     Anion Gap 8.7 mmol/L     Narrative:       GFR Normal >60  Chronic Kidney Disease <60  Kidney Failure <15    Basic Metabolic Panel [[id number]]  (Abnormal) Collected:  08/11/17 0325    Specimen:  Blood Updated:  08/11/17 0414     Glucose 138 (H) mg/dL      BUN 25 (H) mg/dL      Creatinine 0.93 mg/dL      Sodium 139 mmol/L      Potassium 4.1 mmol/L      Chloride 102 mmol/L      CO2 23.6 mmol/L      Calcium 9.0 mg/dL      eGFR Non African Amer 85 mL/min/1.73      BUN/Creatinine Ratio 26.9 (H)     Anion Gap 13.4 mmol/L     Narrative:       GFR Normal >60  Chronic Kidney Disease <60  Kidney Failure <15    CBC & Differential [[id number]] Collected:  08/11/17 0325    Specimen:  Blood Updated:  08/11/17 0439    Narrative:       The following orders were created for panel order CBC & Differential.  Procedure                               Abnormality         Status                     ---------                               -----------         ------                     CBC Auto Differential[[id number]]        Abnormal            Final result                 Please view results for these tests on the individual orders.    CBC Auto Differential [[id number]]  (Abnormal) Collected:  08/11/17 0325    Specimen:  Blood Updated:  08/11/17 0439     WBC 8.79 10*3/mm3      RBC 3.96 (L) 10*6/mm3      Hemoglobin 12.7 (L) g/dL      Hematocrit 38.7 (L) %      MCV 97.7 (H) fL      MCH 32.1 (H) pg      MCHC 32.8 g/dL      RDW 11.0 (L) %      RDW-SD 39.9 fl      MPV 8.9 fL      Platelets 360 10*3/mm3      Neutrophil % 61.4 %      Lymphocyte % 19.6 (L) %      Monocyte % 11.4 (H) %      Eosinophil % 5.5 (H) %      Basophil % 1.1 %       Immature Grans % 1.0 (H) %      Neutrophils, Absolute 5.40 10*3/mm3      Lymphocytes, Absolute 1.72 10*3/mm3      Monocytes, Absolute 1.00 10*3/mm3      Eosinophils, Absolute 0.48 (H) 10*3/mm3      Basophils, Absolute 0.10 10*3/mm3      Immature Grans, Absolute 0.09 (H) 10*3/mm3      nRBC 0.0 /100 WBC     Hemoglobin A1c [[id number]]  (Abnormal) Collected:  08/11/17 0325    Specimen:  Blood Updated:  08/11/17 0505     Hemoglobin A1C 4.60 (L) %     Narrative:       Hemoglobin A1C Ranges:    Increased Risk for Diabetes  5.7% to 6.4%  Diabetes                     >= 6.5%  Diabetic Goal                < 7.0%        Imaging Results (most recent)     None          PROCEDURES      Condition on Discharge:  Stable     Discharge Disposition  To the OR at [facility] for planned surgical repair of right foot and removal of external fixator.  Planned observation stay on Med/Surg following surgery.      Discharge Diet:    Cardiac, consistent carb (following surgery)    Activity at Discharge:  NWB to RLE    Pre-discharge education  None    Follow-up Appointments  No future appointments.         Jennifer Paez MD  08/23/17  8:45 PM

## 2017-09-01 ENCOUNTER — APPOINTMENT (OUTPATIENT)
Dept: CT IMAGING | Facility: HOSPITAL | Age: 52
End: 2017-09-01

## 2017-09-01 ENCOUNTER — HOSPITAL ENCOUNTER (INPATIENT)
Facility: HOSPITAL | Age: 52
LOS: 4 days | Discharge: HOME HEALTH | End: 2017-09-05
Attending: PODIATRIST | Admitting: HOSPITALIST

## 2017-09-01 DIAGNOSIS — T50.904A OVERDOSE, UNDETERMINED INTENT, INITIAL ENCOUNTER: ICD-10-CM

## 2017-09-01 DIAGNOSIS — L08.9 LEFT FOOT INFECTION: ICD-10-CM

## 2017-09-01 DIAGNOSIS — R41.82 ALTERED MENTAL STATUS, UNSPECIFIED ALTERED MENTAL STATUS TYPE: Primary | ICD-10-CM

## 2017-09-01 LAB
ALBUMIN SERPL-MCNC: 4 G/DL (ref 3.5–5.2)
ALBUMIN/GLOB SERPL: 1 G/DL
ALP SERPL-CCNC: 86 U/L (ref 40–129)
ALT SERPL W P-5'-P-CCNC: 14 U/L (ref 5–41)
AMMONIA BLD-SCNC: 35 UMOL/L (ref 16–60)
AMPHET+METHAMPHET UR QL: NEGATIVE
AMPHETAMINES UR QL: NEGATIVE
ANION GAP SERPL CALCULATED.3IONS-SCNC: 11.2 MMOL/L
AST SERPL-CCNC: 15 U/L (ref 5–40)
BACTERIA UR QL AUTO: ABNORMAL /HPF
BARBITURATES UR QL SCN: POSITIVE
BASOPHILS # BLD AUTO: 0.03 10*3/MM3 (ref 0–0.2)
BASOPHILS NFR BLD AUTO: 0.3 % (ref 0–2)
BENZODIAZ UR QL SCN: POSITIVE
BILIRUB SERPL-MCNC: 0.3 MG/DL (ref 0.2–1.2)
BILIRUB UR QL STRIP: NEGATIVE
BUN BLD-MCNC: 15 MG/DL (ref 6–20)
BUN/CREAT SERPL: 20.8 (ref 7–25)
BUPRENORPHINE SERPL-MCNC: NEGATIVE NG/ML
CALCIUM SPEC-SCNC: 9.4 MG/DL (ref 8.6–10.5)
CANNABINOIDS SERPL QL: NEGATIVE
CHLORIDE SERPL-SCNC: 96 MMOL/L (ref 98–107)
CLARITY UR: CLEAR
CO2 SERPL-SCNC: 28.8 MMOL/L (ref 22–29)
COCAINE UR QL: NEGATIVE
COLOR UR: YELLOW
CREAT BLD-MCNC: 0.72 MG/DL (ref 0.76–1.27)
DEPRECATED RDW RBC AUTO: 42 FL (ref 37–54)
EOSINOPHIL # BLD AUTO: 0.43 10*3/MM3 (ref 0.1–0.3)
EOSINOPHIL NFR BLD AUTO: 3.7 % (ref 0–4)
ERYTHROCYTE [DISTWIDTH] IN BLOOD BY AUTOMATED COUNT: 11.8 % (ref 11.5–14.5)
GFR SERPL CREATININE-BSD FRML MDRD: 114 ML/MIN/1.73
GLOBULIN UR ELPH-MCNC: 3.9 GM/DL
GLUCOSE BLD-MCNC: 120 MG/DL (ref 65–99)
GLUCOSE BLDC GLUCOMTR-MCNC: 101 MG/DL (ref 70–130)
GLUCOSE UR STRIP-MCNC: NEGATIVE MG/DL
HCT VFR BLD AUTO: 38.5 % (ref 42–52)
HGB BLD-MCNC: 12.9 G/DL (ref 14–18)
HGB UR QL STRIP.AUTO: ABNORMAL
HYALINE CASTS UR QL AUTO: ABNORMAL /LPF
IMM GRANULOCYTES # BLD: 0.06 10*3/MM3 (ref 0–0.03)
IMM GRANULOCYTES NFR BLD: 0.5 % (ref 0–0.5)
INR PPP: 1.1 (ref 0.9–1.1)
KETONES UR QL STRIP: NEGATIVE
LEUKOCYTE ESTERASE UR QL STRIP.AUTO: NEGATIVE
LYMPHOCYTES # BLD AUTO: 1.34 10*3/MM3 (ref 0.6–4.8)
LYMPHOCYTES NFR BLD AUTO: 11.6 % (ref 20–45)
MCH RBC QN AUTO: 32.5 PG (ref 27–31)
MCHC RBC AUTO-ENTMCNC: 33.5 G/DL (ref 31–37)
MCV RBC AUTO: 97 FL (ref 80–94)
METHADONE UR QL SCN: NEGATIVE
MONOCYTES # BLD AUTO: 1.05 10*3/MM3 (ref 0–1)
MONOCYTES NFR BLD AUTO: 9.1 % (ref 3–8)
NEUTROPHILS # BLD AUTO: 8.63 10*3/MM3 (ref 1.5–8.3)
NEUTROPHILS NFR BLD AUTO: 74.8 % (ref 45–70)
NITRITE UR QL STRIP: NEGATIVE
NRBC BLD MANUAL-RTO: 0 /100 WBC (ref 0–0)
OPIATES UR QL: NEGATIVE
OXYCODONE UR QL SCN: NEGATIVE
PCP UR QL SCN: NEGATIVE
PH UR STRIP.AUTO: 7 [PH] (ref 4.5–8)
PLATELET # BLD AUTO: 319 10*3/MM3 (ref 140–500)
PMV BLD AUTO: 8.6 FL (ref 7.4–10.4)
POTASSIUM BLD-SCNC: 5.3 MMOL/L (ref 3.5–5.2)
PROPOXYPH UR QL: NEGATIVE
PROT SERPL-MCNC: 7.9 G/DL (ref 6–8.5)
PROT UR QL STRIP: NEGATIVE
PROTHROMBIN TIME: 14.2 SECONDS (ref 12.1–15)
RBC # BLD AUTO: 3.97 10*6/MM3 (ref 4.7–6.1)
RBC # UR: ABNORMAL /HPF
REF LAB TEST METHOD: ABNORMAL
SODIUM BLD-SCNC: 136 MMOL/L (ref 136–145)
SP GR UR STRIP: 1.02 (ref 1–1.03)
SQUAMOUS #/AREA URNS HPF: ABNORMAL /HPF
TRICYCLICS UR QL SCN: NEGATIVE
UROBILINOGEN UR QL STRIP: ABNORMAL
WBC NRBC COR # BLD: 11.54 10*3/MM3 (ref 4.8–10.8)
WBC UR QL AUTO: ABNORMAL /HPF

## 2017-09-01 PROCEDURE — 36556 INSERT NON-TUNNEL CV CATH: CPT | Performed by: EMERGENCY MEDICINE

## 2017-09-01 PROCEDURE — 82140 ASSAY OF AMMONIA: CPT | Performed by: EMERGENCY MEDICINE

## 2017-09-01 PROCEDURE — 82962 GLUCOSE BLOOD TEST: CPT

## 2017-09-01 PROCEDURE — 80306 DRUG TEST PRSMV INSTRMNT: CPT | Performed by: EMERGENCY MEDICINE

## 2017-09-01 PROCEDURE — 81001 URINALYSIS AUTO W/SCOPE: CPT | Performed by: PODIATRIST

## 2017-09-01 PROCEDURE — 80053 COMPREHEN METABOLIC PANEL: CPT | Performed by: PODIATRIST

## 2017-09-01 PROCEDURE — 25010000002 PIPERACILLIN-TAZOBACTAM: Performed by: EMERGENCY MEDICINE

## 2017-09-01 PROCEDURE — 70450 CT HEAD/BRAIN W/O DYE: CPT

## 2017-09-01 PROCEDURE — 85610 PROTHROMBIN TIME: CPT | Performed by: PODIATRIST

## 2017-09-01 PROCEDURE — 85025 COMPLETE CBC W/AUTO DIFF WBC: CPT | Performed by: PODIATRIST

## 2017-09-01 PROCEDURE — 99284 EMERGENCY DEPT VISIT MOD MDM: CPT

## 2017-09-01 PROCEDURE — 25010000002 VANCOMYCIN PER 500 MG: Performed by: HOSPITALIST

## 2017-09-01 RX ORDER — LITHIUM CARBONATE 300 MG
300 TABLET ORAL 2 TIMES DAILY
Status: DISCONTINUED | OUTPATIENT
Start: 2017-09-02 | End: 2017-09-05 | Stop reason: HOSPADM

## 2017-09-01 RX ORDER — METOPROLOL TARTRATE 50 MG/1
100 TABLET, FILM COATED ORAL 2 TIMES DAILY
Status: DISCONTINUED | OUTPATIENT
Start: 2017-09-02 | End: 2017-09-05 | Stop reason: HOSPADM

## 2017-09-01 RX ORDER — VANCOMYCIN HYDROCHLORIDE 500 MG/10ML
INJECTION, POWDER, LYOPHILIZED, FOR SOLUTION INTRAVENOUS
Status: DISPENSED
Start: 2017-09-01 | End: 2017-09-02

## 2017-09-01 RX ORDER — SODIUM CHLORIDE 9 MG/ML
100 INJECTION, SOLUTION INTRAVENOUS CONTINUOUS
Status: DISCONTINUED | OUTPATIENT
Start: 2017-09-01 | End: 2017-09-04

## 2017-09-01 RX ORDER — PETROLATUM 42 G/100G
1 OINTMENT TOPICAL
Status: DISCONTINUED | OUTPATIENT
Start: 2017-09-02 | End: 2017-09-05 | Stop reason: HOSPADM

## 2017-09-01 RX ORDER — L.ACID,PARA/B.BIFIDUM/S.THERM 8B CELL
1 CAPSULE ORAL DAILY
Status: DISCONTINUED | OUTPATIENT
Start: 2017-09-02 | End: 2017-09-05 | Stop reason: HOSPADM

## 2017-09-01 RX ORDER — OXYCODONE HYDROCHLORIDE AND ACETAMINOPHEN 5; 325 MG/1; MG/1
1 TABLET ORAL EVERY 4 HOURS PRN
Status: DISCONTINUED | OUTPATIENT
Start: 2017-09-01 | End: 2017-09-05 | Stop reason: HOSPADM

## 2017-09-01 RX ORDER — FLUOXETINE HYDROCHLORIDE 20 MG/1
40 CAPSULE ORAL DAILY
Status: DISCONTINUED | OUTPATIENT
Start: 2017-09-02 | End: 2017-09-05 | Stop reason: HOSPADM

## 2017-09-01 RX ORDER — ACETAMINOPHEN 325 MG/1
650 TABLET ORAL EVERY 4 HOURS PRN
Status: DISCONTINUED | OUTPATIENT
Start: 2017-09-01 | End: 2017-09-05 | Stop reason: HOSPADM

## 2017-09-01 RX ORDER — ONDANSETRON 2 MG/ML
4 INJECTION INTRAMUSCULAR; INTRAVENOUS EVERY 6 HOURS PRN
Status: DISCONTINUED | OUTPATIENT
Start: 2017-09-01 | End: 2017-09-05 | Stop reason: HOSPADM

## 2017-09-01 RX ORDER — LOSARTAN POTASSIUM 50 MG/1
100 TABLET ORAL
Status: DISCONTINUED | OUTPATIENT
Start: 2017-09-02 | End: 2017-09-05 | Stop reason: HOSPADM

## 2017-09-01 RX ORDER — ACETAMINOPHEN 650 MG/1
650 SUPPOSITORY RECTAL EVERY 4 HOURS PRN
Status: DISCONTINUED | OUTPATIENT
Start: 2017-09-01 | End: 2017-09-05 | Stop reason: HOSPADM

## 2017-09-01 RX ORDER — SODIUM CHLORIDE 0.9 % (FLUSH) 0.9 %
1-10 SYRINGE (ML) INJECTION AS NEEDED
Status: DISCONTINUED | OUTPATIENT
Start: 2017-09-01 | End: 2017-09-05 | Stop reason: HOSPADM

## 2017-09-01 RX ORDER — FAMOTIDINE 10 MG/ML
20 INJECTION, SOLUTION INTRAVENOUS EVERY 12 HOURS SCHEDULED
Status: DISCONTINUED | OUTPATIENT
Start: 2017-09-01 | End: 2017-09-05

## 2017-09-01 RX ORDER — SODIUM CHLORIDE 9 MG/ML
INJECTION, SOLUTION INTRAVENOUS
Status: COMPLETED
Start: 2017-09-01 | End: 2017-09-01

## 2017-09-01 RX ORDER — ONDANSETRON 4 MG/1
4 TABLET, ORALLY DISINTEGRATING ORAL EVERY 6 HOURS PRN
Status: DISCONTINUED | OUTPATIENT
Start: 2017-09-01 | End: 2017-09-05 | Stop reason: HOSPADM

## 2017-09-01 RX ORDER — PRIMIDONE 250 MG/1
250 TABLET ORAL 2 TIMES DAILY
Status: DISCONTINUED | OUTPATIENT
Start: 2017-09-02 | End: 2017-09-05 | Stop reason: HOSPADM

## 2017-09-01 RX ORDER — ONDANSETRON 4 MG/1
4 TABLET, FILM COATED ORAL EVERY 6 HOURS PRN
Status: DISCONTINUED | OUTPATIENT
Start: 2017-09-01 | End: 2017-09-05 | Stop reason: HOSPADM

## 2017-09-01 RX ORDER — GABAPENTIN 400 MG/1
800 CAPSULE ORAL EVERY 8 HOURS SCHEDULED
Status: DISCONTINUED | OUTPATIENT
Start: 2017-09-01 | End: 2017-09-02

## 2017-09-01 RX ADMIN — GABAPENTIN 800 MG: 400 CAPSULE ORAL at 22:54

## 2017-09-01 RX ADMIN — OXYCODONE HYDROCHLORIDE AND ACETAMINOPHEN 1 TABLET: 5; 325 TABLET ORAL at 22:55

## 2017-09-01 RX ADMIN — FAMOTIDINE 20 MG: 10 INJECTION, SOLUTION INTRAVENOUS at 22:45

## 2017-09-01 RX ADMIN — PIPERACILLIN SODIUM,TAZOBACTAM SODIUM 3.38 G: 3; .375 INJECTION, POWDER, FOR SOLUTION INTRAVENOUS at 22:19

## 2017-09-01 RX ADMIN — SODIUM CHLORIDE 100 ML/HR: 9 INJECTION, SOLUTION INTRAVENOUS at 22:45

## 2017-09-01 RX ADMIN — METFORMIN HYDROCHLORIDE 500 MG: 500 TABLET ORAL at 22:54

## 2017-09-01 RX ADMIN — VANCOMYCIN HYDROCHLORIDE 2500 MG: 1 INJECTION, POWDER, LYOPHILIZED, FOR SOLUTION INTRAVENOUS at 23:20

## 2017-09-01 NOTE — OP NOTE
FOOT OPEN REDUCTION INTERNAL FIXATION  Procedure Note  Patient Name: Eliseo Price     Surgery Date:  7/29/2017     Pre-op Diagnosis:   Lisfranc dislocation, right, initial encounter [S93.324A]  Subluxation of tarsal joint of right foot, initial encounter [S93.311A]     Post-op Diagnosis:     Post-Op Diagnosis Codes:     * Lisfranc dislocation, right, initial encounter [S93.324A]     * Subluxation of tarsal joint of right foot, initial encounter [S93.311A]     Procedure/CPT® Codes:  14597-RP. 74107-MH-76.     Procedure(s):  External fixator application, R foot.  Open reduction with percutaneous pinning of medial cuneiform and intermediate cuneiform     Surgeon(s):  Anish Farah DPM     Anesthesia: General w/ ankle block using 20ccs of a 1:1 mix of 1% Lidocaine and 0.25% Marcaine.     Staff:   Circulator: Graciela Kenney RN; Shante Carrillo RN  Radiology Technologist: Kade Lopez  Scrub Person: Nia Ortega     Estimated Blood Loss: < 30ccs     Findings: Dorsal-lateral dislocation of 1st, 2nd, 3rd metatarsal-cuneiform joints. Subluxation of medial cuneiform joint.     Complications: None     Sponge/Sharps Count: Correct.    Operative Narrative: The patient is brought to the operating room and placed in a supine position on the OR table. He secured with a safety strap. He is sedated by the anesthesia team, Intubated and general anesthesia is attained. An ankle block is performed. A tourniquet is applied at the level of his thigh over stockinette padding. The foot/ankle is prepped and draped in a standard manner. Presurgical timeout is conducted confirming that the correct patient is receiving the correct procedure on the correct extremity. All parties are in agreement. The foot/ankle/lower leg are exsanguinated with an Esmarch bandage and the tourniquet is inflated. Surgery then commenced.    Attention is directed to the tarsal metatarsal joint. Multiple manual reduction attempts at the tarsal  metatarsal dislocation were made. I was unsuccessful in reducing the dislocation. Application of half pins through the first metatarsal base and medial cuneiform are placed. The cuneiform was identified on C-arm to be unstable at the 1st metatarsal and intracuneiform joint but also at the navicular cuneiform joint. The pins were removed from the cuneiform and applied to the navicular tuberosity. A [employer] John-II distractor tube is connected and multiple attempts to reduce the 1st metatarsal cuneiform with distraction and manual pressure were made. I was unable to reduce the 1st metatarsal cuneiform joint as the 1st metatarsal base was laterally dislocated and articulated with the second cuneiform.     An incision was created along lateral margin of the 1st metatarsal. Blunt dissection was performed down to bone. Instrumentation was applied for retraction purposes and I utilized a Key elevator in an attempt to relocate the 1st metatarsal cuneiform joint. This too proved unsuccessful. After multiple attempts were made to reduce the joint I decided to stabilize the tarsal metatarsal joint and the medial cuneiform. 1/2 pins in the 1st metatarsal were left in place and I removed the pins from the navicular. A transfixion pin is placed across the calcaneus in a John-II distraction tube is applied from the medial calcaneus to the 1st metatarsal base. Note that prior to application of the fixator I placed a Steinmann pin across the medial cuneiform into the intermediate and lateral cuneiforms for stabilization. Half pins were also applied in the 5th metatarsal base extending into the 4th metatarsal base. The John-II distraction tube was placed here as well. Distraction of both medial and lateral distraction tubes was performed in an effort to distract the tarsal metatarsal joints as well as take pressure off the navicular cuneiform and calcaneocuboid joint. Imaging confirmed placement of the pins. The fixator was  stable. The calcaneal pin was cut medially and laterally as the pin was lengthy. The superior midfoot incision was closed with 3-0 Vicryl and #3-0 Prolene in a simple interrupted manner. Xeroform was placed about the skin-pin interfaces and over the incision. A dressing of 4x4's, Kerlix and Ace bandage was then secured. Tourniquet was deflated and removed.    Patient with reversed and extubated by the anesthesia team and was transferred to hospital bed and taken to the PACU for further recovery. He arrived there in satisfactory condition with stable vital signs and intact circulatory status to the foot. Following an uneventful recovery he was taken to the MedSurg unit for continued monitoring and medical management. He tolerated procedure and anesthesia well.     Anish Farah DPM      Date: 7/29/2017  Time: 1:24 PM

## 2017-09-02 ENCOUNTER — APPOINTMENT (OUTPATIENT)
Dept: GENERAL RADIOLOGY | Facility: HOSPITAL | Age: 52
End: 2017-09-02

## 2017-09-02 LAB
ANION GAP SERPL CALCULATED.3IONS-SCNC: 10.3 MMOL/L
BASOPHILS # BLD AUTO: 0.03 10*3/MM3 (ref 0–0.2)
BASOPHILS NFR BLD AUTO: 0.4 % (ref 0–2)
BUN BLD-MCNC: 10 MG/DL (ref 6–20)
BUN/CREAT SERPL: 18.2 (ref 7–25)
CALCIUM SPEC-SCNC: 8.8 MG/DL (ref 8.6–10.5)
CHLORIDE SERPL-SCNC: 101 MMOL/L (ref 98–107)
CO2 SERPL-SCNC: 25.7 MMOL/L (ref 22–29)
CREAT BLD-MCNC: 0.55 MG/DL (ref 0.76–1.27)
D-LACTATE SERPL-SCNC: 0.9 MMOL/L (ref 0.5–2)
DEPRECATED RDW RBC AUTO: 43.1 FL (ref 37–54)
EOSINOPHIL # BLD AUTO: 0.43 10*3/MM3 (ref 0.1–0.3)
EOSINOPHIL NFR BLD AUTO: 5.6 % (ref 0–4)
ERYTHROCYTE [DISTWIDTH] IN BLOOD BY AUTOMATED COUNT: 11.9 % (ref 11.5–14.5)
GFR SERPL CREATININE-BSD FRML MDRD: >150 ML/MIN/1.73
GLUCOSE BLD-MCNC: 89 MG/DL (ref 65–99)
GLUCOSE BLDC GLUCOMTR-MCNC: 89 MG/DL (ref 70–130)
GLUCOSE BLDC GLUCOMTR-MCNC: 90 MG/DL (ref 70–130)
GLUCOSE BLDC GLUCOMTR-MCNC: 94 MG/DL (ref 70–130)
GLUCOSE BLDC GLUCOMTR-MCNC: 96 MG/DL (ref 70–130)
GLUCOSE BLDC GLUCOMTR-MCNC: 97 MG/DL (ref 70–130)
HCT VFR BLD AUTO: 35.2 % (ref 42–52)
HGB BLD-MCNC: 11.5 G/DL (ref 14–18)
IMM GRANULOCYTES # BLD: 0.04 10*3/MM3 (ref 0–0.03)
IMM GRANULOCYTES NFR BLD: 0.5 % (ref 0–0.5)
LYMPHOCYTES # BLD AUTO: 1.07 10*3/MM3 (ref 0.6–4.8)
LYMPHOCYTES NFR BLD AUTO: 13.8 % (ref 20–45)
MAGNESIUM SERPL-MCNC: 1.5 MG/DL (ref 1.7–2.5)
MCH RBC QN AUTO: 32.1 PG (ref 27–31)
MCHC RBC AUTO-ENTMCNC: 32.7 G/DL (ref 31–37)
MCV RBC AUTO: 98.3 FL (ref 80–94)
MONOCYTES # BLD AUTO: 0.78 10*3/MM3 (ref 0–1)
MONOCYTES NFR BLD AUTO: 10.1 % (ref 3–8)
NEUTROPHILS # BLD AUTO: 5.38 10*3/MM3 (ref 1.5–8.3)
NEUTROPHILS NFR BLD AUTO: 69.6 % (ref 45–70)
NRBC BLD MANUAL-RTO: 0 /100 WBC (ref 0–0)
PHOSPHATE SERPL-MCNC: 4 MG/DL (ref 2.7–4.5)
PLATELET # BLD AUTO: 248 10*3/MM3 (ref 140–500)
PMV BLD AUTO: 8.5 FL (ref 7.4–10.4)
POTASSIUM BLD-SCNC: 4.4 MMOL/L (ref 3.5–5.2)
RBC # BLD AUTO: 3.58 10*6/MM3 (ref 4.7–6.1)
SODIUM BLD-SCNC: 137 MMOL/L (ref 136–145)
TROPONIN T SERPL-MCNC: <0.01 NG/ML (ref 0–0.03)
TROPONIN T SERPL-MCNC: <0.01 NG/ML (ref 0–0.03)
WBC NRBC COR # BLD: 7.73 10*3/MM3 (ref 4.8–10.8)

## 2017-09-02 PROCEDURE — 99292 CRITICAL CARE ADDL 30 MIN: CPT | Performed by: EMERGENCY MEDICINE

## 2017-09-02 PROCEDURE — 25010000002 PIPERACILLIN-TAZOBACTAM: Performed by: HOSPITALIST

## 2017-09-02 PROCEDURE — 73630 X-RAY EXAM OF FOOT: CPT

## 2017-09-02 PROCEDURE — 93005 ELECTROCARDIOGRAM TRACING: CPT | Performed by: INTERNAL MEDICINE

## 2017-09-02 PROCEDURE — 73660 X-RAY EXAM OF TOE(S): CPT

## 2017-09-02 PROCEDURE — 99222 1ST HOSP IP/OBS MODERATE 55: CPT | Performed by: INTERNAL MEDICINE

## 2017-09-02 PROCEDURE — 93010 ELECTROCARDIOGRAM REPORT: CPT | Performed by: INTERNAL MEDICINE

## 2017-09-02 PROCEDURE — 99291 CRITICAL CARE FIRST HOUR: CPT | Performed by: EMERGENCY MEDICINE

## 2017-09-02 PROCEDURE — 84100 ASSAY OF PHOSPHORUS: CPT | Performed by: HOSPITALIST

## 2017-09-02 PROCEDURE — 83605 ASSAY OF LACTIC ACID: CPT | Performed by: HOSPITALIST

## 2017-09-02 PROCEDURE — 84484 ASSAY OF TROPONIN QUANT: CPT | Performed by: INTERNAL MEDICINE

## 2017-09-02 PROCEDURE — 25010000002 VANCOMYCIN PER 500 MG: Performed by: HOSPITALIST

## 2017-09-02 PROCEDURE — 85025 COMPLETE CBC W/AUTO DIFF WBC: CPT | Performed by: HOSPITALIST

## 2017-09-02 PROCEDURE — 82962 GLUCOSE BLOOD TEST: CPT

## 2017-09-02 PROCEDURE — 83735 ASSAY OF MAGNESIUM: CPT | Performed by: HOSPITALIST

## 2017-09-02 PROCEDURE — 80048 BASIC METABOLIC PNL TOTAL CA: CPT | Performed by: HOSPITALIST

## 2017-09-02 PROCEDURE — 94799 UNLISTED PULMONARY SVC/PX: CPT

## 2017-09-02 PROCEDURE — 25010000002 PIPERACILLIN SOD-TAZOBACTAM PER 1 G

## 2017-09-02 PROCEDURE — 06HY33Z INSERTION OF INFUSION DEVICE INTO LOWER VEIN, PERCUTANEOUS APPROACH: ICD-10-PCS | Performed by: EMERGENCY MEDICINE

## 2017-09-02 PROCEDURE — 25010000002 ENOXAPARIN PER 10 MG: Performed by: HOSPITALIST

## 2017-09-02 RX ORDER — DOCUSATE SODIUM 100 MG/1
100 CAPSULE, LIQUID FILLED ORAL 2 TIMES DAILY
Status: DISCONTINUED | OUTPATIENT
Start: 2017-09-02 | End: 2017-09-05 | Stop reason: HOSPADM

## 2017-09-02 RX ORDER — PIPERACILLIN SODIUM, TAZOBACTAM SODIUM 3; .375 G/15ML; G/15ML
INJECTION, POWDER, LYOPHILIZED, FOR SOLUTION INTRAVENOUS
Status: COMPLETED
Start: 2017-09-02 | End: 2017-09-02

## 2017-09-02 RX ORDER — NICOTINE POLACRILEX 4 MG
15 LOZENGE BUCCAL
Status: DISCONTINUED | OUTPATIENT
Start: 2017-09-02 | End: 2017-09-05 | Stop reason: HOSPADM

## 2017-09-02 RX ORDER — DEXTROSE MONOHYDRATE 25 G/50ML
25 INJECTION, SOLUTION INTRAVENOUS
Status: DISCONTINUED | OUTPATIENT
Start: 2017-09-02 | End: 2017-09-05 | Stop reason: HOSPADM

## 2017-09-02 RX ORDER — GABAPENTIN 300 MG/1
600 CAPSULE ORAL EVERY 8 HOURS SCHEDULED
Status: DISCONTINUED | OUTPATIENT
Start: 2017-09-02 | End: 2017-09-05 | Stop reason: HOSPADM

## 2017-09-02 RX ADMIN — PETROLATUM 1 APPLICATION: 42 OINTMENT TOPICAL at 10:19

## 2017-09-02 RX ADMIN — METFORMIN HYDROCHLORIDE 500 MG: 500 TABLET ORAL at 17:14

## 2017-09-02 RX ADMIN — PIPERACILLIN SODIUM,TAZOBACTAM SODIUM 3375 MG: 3; .375 INJECTION, POWDER, FOR SOLUTION INTRAVENOUS at 05:09

## 2017-09-02 RX ADMIN — DOCUSATE SODIUM 100 MG: 100 CAPSULE, LIQUID FILLED ORAL at 10:20

## 2017-09-02 RX ADMIN — LOSARTAN POTASSIUM 100 MG: 50 TABLET ORAL at 09:31

## 2017-09-02 RX ADMIN — FLUOXETINE 40 MG: 20 CAPSULE ORAL at 09:31

## 2017-09-02 RX ADMIN — GABAPENTIN 600 MG: 300 CAPSULE ORAL at 16:34

## 2017-09-02 RX ADMIN — PRIMIDONE 250 MG: 250 TABLET ORAL at 10:21

## 2017-09-02 RX ADMIN — PRIMIDONE 250 MG: 250 TABLET ORAL at 17:14

## 2017-09-02 RX ADMIN — METOPROLOL TARTRATE 100 MG: 50 TABLET ORAL at 17:14

## 2017-09-02 RX ADMIN — GABAPENTIN 800 MG: 400 CAPSULE ORAL at 05:09

## 2017-09-02 RX ADMIN — DOCUSATE SODIUM 100 MG: 100 CAPSULE, LIQUID FILLED ORAL at 17:14

## 2017-09-02 RX ADMIN — PIPERACILLIN SODIUM,TAZOBACTAM SODIUM 3.38 G: 3; .375 INJECTION, POWDER, FOR SOLUTION INTRAVENOUS at 05:11

## 2017-09-02 RX ADMIN — VANCOMYCIN HYDROCHLORIDE 2000 MG: 1 INJECTION, POWDER, LYOPHILIZED, FOR SOLUTION INTRAVENOUS at 20:18

## 2017-09-02 RX ADMIN — ENOXAPARIN SODIUM 30 MG: 40 INJECTION SUBCUTANEOUS at 09:31

## 2017-09-02 RX ADMIN — OXYCODONE HYDROCHLORIDE AND ACETAMINOPHEN 1 TABLET: 5; 325 TABLET ORAL at 21:41

## 2017-09-02 RX ADMIN — GABAPENTIN 600 MG: 300 CAPSULE ORAL at 21:41

## 2017-09-02 RX ADMIN — OXYCODONE HYDROCHLORIDE AND ACETAMINOPHEN 1 TABLET: 5; 325 TABLET ORAL at 12:29

## 2017-09-02 RX ADMIN — METOPROLOL TARTRATE 100 MG: 50 TABLET ORAL at 09:31

## 2017-09-02 RX ADMIN — LITHIUM CARBONATE 300 MG: 300 TABLET ORAL at 09:31

## 2017-09-02 RX ADMIN — SODIUM CHLORIDE 100 ML/HR: 9 INJECTION, SOLUTION INTRAVENOUS at 10:19

## 2017-09-02 RX ADMIN — PIPERACILLIN SODIUM,TAZOBACTAM SODIUM 3.38 G: 3; .375 INJECTION, POWDER, FOR SOLUTION INTRAVENOUS at 10:20

## 2017-09-02 RX ADMIN — MICONAZOLE NITRATE 1 APPLICATION: 2 POWDER TOPICAL at 10:20

## 2017-09-02 RX ADMIN — METFORMIN HYDROCHLORIDE 500 MG: 500 TABLET ORAL at 09:32

## 2017-09-02 RX ADMIN — LITHIUM CARBONATE 300 MG: 300 TABLET ORAL at 17:14

## 2017-09-02 RX ADMIN — FAMOTIDINE 20 MG: 10 INJECTION, SOLUTION INTRAVENOUS at 10:19

## 2017-09-02 RX ADMIN — VANCOMYCIN HYDROCHLORIDE 2000 MG: 1 INJECTION, POWDER, LYOPHILIZED, FOR SOLUTION INTRAVENOUS at 12:20

## 2017-09-02 RX ADMIN — Medication 1 CAPSULE: at 09:32

## 2017-09-02 RX ADMIN — ACETAMINOPHEN 650 MG: 325 TABLET, FILM COATED ORAL at 10:31

## 2017-09-02 RX ADMIN — FAMOTIDINE 20 MG: 10 INJECTION, SOLUTION INTRAVENOUS at 21:41

## 2017-09-02 NOTE — CONSULTS
LOS: 1 day   Patient Care Team:  Killian Scales MD as PCP - General  Killian Scales MD as PCP - Family Medicine        Subjective       Attending MD : Karen Lizama DO    Patient Complaints: confused  Fell few days ago L foot pain         History of Present Illness  :     Patient Denies:  NVD    PMH : Active Problems:    Altered mental status      Review of Systems:    12 point ROS done now AAO3    Objective     Vital Signs  Temp:  [97.8 °F (36.6 °C)-98.2 °F (36.8 °C)] 98.1 °F (36.7 °C)  Heart Rate:  [69-85] 70  Resp:  [16-18] 18  BP: (104-131)/(62-88) 115/62    Physical Exam:     General Appearance:    Alert, cooperative, in no acute distress   Head:    Normocephalic, without obvious abnormality, atraumatic   Eyes:            Lids and lashes normal, conjunctivae and sclerae normal, no   icterus, no pallor, corneas clear, PERRLA   Ears:    Ears appear intact with no abnormalities noted   Throat:   No oral lesions, no thrush, oral mucosa moist   Neck:   No adenopathy, supple, trachea midline, no thyromegaly, no   carotid bruit, no JVD   Back:     No kyphosis present, no scoliosis present, no skin lesions,      erythema or scars, no tenderness to percussion or                   palpation,   range of motion normal   Lungs:     Clear to auscultation,respirations regular, even and                  unlabored    Heart:    Regular rhythm and normal rate, normal S1 and S2, no            murmur, no gallop, no rub, no click   Chest Wall:    No abnormalities observed   Abdomen:     Normal bowel sounds, no masses, no organomegaly, soft        non-tender, non-distended, no guarding, no rebound                tenderness   Rectal:     Deferred   Extremities:  L foot cellulitis open wound  R calf splint   Pulses:   Pulses palpable and equal bilaterally   Skin:   No bleeding, bruising or rash   Lymph nodes:   No palpable adenopathy   Neurologic:   Cranial nerves 2 - 12 grossly intact, sensation intact, DTR        present and equal bilaterally            Results Review:    Lab Results (last 72 hours)     Procedure Component Value Units Date/Time    POC Glucose Fingerstick [[id number]]  (Normal) Collected:  09/01/17 1315    Specimen:  Blood Updated:  09/01/17 1323     Glucose 101 mg/dL     Narrative:       Meter: [id number] : [id number] Reij Lemus RN    CBC & Differential [[id number]] Collected:  09/01/17 1344    Specimen:  Blood Updated:  09/01/17 1358    Narrative:       The following orders were created for panel order CBC & Differential.  Procedure                               Abnormality         Status                     ---------                               -----------         ------                     CBC Auto Differential[[id number]]        Abnormal            Final result                 Please view results for these tests on the individual orders.    CBC Auto Differential [[id number]]  (Abnormal) Collected:  09/01/17 1344    Specimen:  Blood Updated:  09/01/17 1358     WBC 11.54 (H) 10*3/mm3      RBC 3.97 (L) 10*6/mm3      Hemoglobin 12.9 (L) g/dL      Hematocrit 38.5 (L) %      MCV 97.0 (H) fL      MCH 32.5 (H) pg      MCHC 33.5 g/dL      RDW 11.8 %      RDW-SD 42.0 fl      MPV 8.6 fL      Platelets 319 10*3/mm3      Neutrophil % 74.8 (H) %      Lymphocyte % 11.6 (L) %      Monocyte % 9.1 (H) %      Eosinophil % 3.7 %      Basophil % 0.3 %      Immature Grans % 0.5 %      Neutrophils, Absolute 8.63 (H) 10*3/mm3      Lymphocytes, Absolute 1.34 10*3/mm3      Monocytes, Absolute 1.05 (H) 10*3/mm3      Eosinophils, Absolute 0.43 (H) 10*3/mm3      Basophils, Absolute 0.03 10*3/mm3      Immature Grans, Absolute 0.06 (H) 10*3/mm3      nRBC 0.0 /100 WBC     Protime-INR [[id number]]  (Normal) Collected:  09/01/17 1344    Specimen:  Blood Updated:  09/01/17 1408     Protime 14.2 Seconds      INR 1.10    Narrative:       Therapeutic Ranges for INR: 2.0-3.0 (PT 20-30)                              2.5-3.5 (PT 25-34)     Comprehensive Metabolic Panel [[id number]]  (Abnormal) Collected:  09/01/17 1344    Specimen:  Blood Updated:  09/01/17 1421     Glucose 120 (H) mg/dL      BUN 15 mg/dL      Creatinine 0.72 (L) mg/dL      Sodium 136 mmol/L      Potassium 5.3 (H) mmol/L      Chloride 96 (L) mmol/L      CO2 28.8 mmol/L      Calcium 9.4 mg/dL      Total Protein 7.9 g/dL      Albumin 4.00 g/dL      ALT (SGPT) 14 U/L      AST (SGOT) 15 U/L      Alkaline Phosphatase 86 U/L      Total Bilirubin 0.3 mg/dL      eGFR Non African Amer 114 mL/min/1.73      Globulin 3.9 gm/dL      A/G Ratio 1.0 g/dL      BUN/Creatinine Ratio 20.8     Anion Gap 11.2 mmol/L     Urinalysis With / Culture If Indicated [[id number]]  (Abnormal) Collected:  09/01/17 1558    Specimen:  Urine from Urine, Clean Catch Updated:  09/01/17 1648     Color, UA Yellow     Appearance, UA Clear     pH, UA 7.0     Specific Gravity, UA 1.020     Glucose, UA Negative     Ketones, UA Negative     Bilirubin, UA Negative     Blood, UA Small (1+) (A)     Protein, UA Negative     Leuk Esterase, UA Negative     Nitrite, UA Negative     Urobilinogen, UA 0.2 E.U./dL    Urine Drug Screen [[id number]]  (Abnormal) Collected:  09/01/17 1558    Specimen:  Urine from Urine, Clean Catch Updated:  09/01/17 1715     THC, Screen, Urine Negative     Phencyclidine (PCP), Urine Negative     Cocaine Screen, Urine Negative     Methamphetamine, Urine Negative     Opiate Screen Negative     Amphetamine Screen, Urine Negative     Benzodiazepine Screen, Urine Positive (A)     Tricyclic Antidepressants Screen Negative     Methadone Screen, Urine Negative     Barbiturates Screen, Urine Positive (A)     Oxycodone Screen, Urine Negative     Propoxyphene Screen Negative     Buprenorphine, Screen, Urine Negative    Narrative:       Urine drug screen results are to be used for medical purposes only.  They are not to be used for legal purposes such as employment testing.  Negative results do not necessarily mean the  complete absence of a subtance, but rather that the result is less than the cutoff for that substance.  Positive results are unconfirmed and considered Preliminary Positive.  [facility] does not automatically confirm Postitive Unconfirmed results.  The physician may request (order) an Unconfirmed Positive result to be sent out for confirmation.      Negative Thresholds for Drugs Screened:    THC screen, urine                          50 ng/ml  Phenycyclidine (PCP), urine                25 ng/ml  Cocaine screen, urine                     150 ng/ml  Methamphetamine, urine                    500 ng/ml  Opiate screen, urine                      100 ng/ml  Amphetamine screen, urine                 500 ng/ml  Benzodiazepine screen, urine              150 ng/ml  Tricyclic Antidepressants screen, urine   300 ng/ml  Methadone screen, urine                   200 ng/ml  Barbiturates screen, urine                200 ng/ml  Oxycodone screen, urine                   100 ng/ml  Propoxyphene screen, urine                300 ng/ml  Buprenorphine screen, urine                10 ng/ml    Urinalysis, Microscopic Only [[id number]]  (Abnormal) Collected:  09/01/17 1558    Specimen:  Urine from Urine, Clean Catch Updated:  09/01/17 1729     RBC, UA 0-2 (A) /HPF      WBC, UA None Seen /HPF      Bacteria, UA None Seen /HPF      Squamous Epithelial Cells, UA 0-2 /HPF      Hyaline Casts, UA None Seen /LPF      Methodology Manual Light Microscopy    Ammonia [[id number]]  (Normal) Collected:  09/01/17 1719    Specimen:  Blood Updated:  09/01/17 1810     Ammonia 35 umol/L     Lactic Acid, Plasma [[id number]]  (Normal) Collected:  09/02/17 0011    Specimen:  Blood Updated:  09/02/17 0031     Lactate 0.9 mmol/L     POC Glucose Fingerstick [[id number]]  (Normal) Collected:  09/02/17 0142    Specimen:  Blood Updated:  09/02/17 0159     Glucose 96 mg/dL     Narrative:       Meter: [id number] : [id number] [facility] [place]  ASSISTANT    CBC Auto Differential [[id number]]  (Abnormal) Collected:  09/02/17 0513    Specimen:  Blood Updated:  09/02/17 0535     WBC 7.73 10*3/mm3      RBC 3.58 (L) 10*6/mm3      Hemoglobin 11.5 (L) g/dL      Hematocrit 35.2 (L) %      MCV 98.3 (H) fL      MCH 32.1 (H) pg      MCHC 32.7 g/dL      RDW 11.9 %      RDW-SD 43.1 fl      MPV 8.5 fL      Platelets 248 10*3/mm3      Neutrophil % 69.6 %      Lymphocyte % 13.8 (L) %      Monocyte % 10.1 (H) %      Eosinophil % 5.6 (H) %      Basophil % 0.4 %      Immature Grans % 0.5 %      Neutrophils, Absolute 5.38 10*3/mm3      Lymphocytes, Absolute 1.07 10*3/mm3      Monocytes, Absolute 0.78 10*3/mm3      Eosinophils, Absolute 0.43 (H) 10*3/mm3      Basophils, Absolute 0.03 10*3/mm3      Immature Grans, Absolute 0.04 (H) 10*3/mm3      nRBC 0.0 /100 WBC     Basic Metabolic Panel [[id number]]  (Abnormal) Collected:  09/02/17 0513    Specimen:  Blood Updated:  09/02/17 0553     Glucose 89 mg/dL      BUN 10 mg/dL      Creatinine 0.55 (L) mg/dL      Sodium 137 mmol/L      Potassium 4.4 mmol/L      Chloride 101 mmol/L      CO2 25.7 mmol/L      Calcium 8.8 mg/dL      eGFR Non African Amer >150 mL/min/1.73      BUN/Creatinine Ratio 18.2     Anion Gap 10.3 mmol/L     Narrative:       GFR Normal >60  Chronic Kidney Disease <60  Kidney Failure <15    Magnesium [[id number]]  (Abnormal) Collected:  09/02/17 0513    Specimen:  Blood Updated:  09/02/17 0553     Magnesium 1.5 (L) mg/dL     Phosphorus [[id number]]  (Normal) Collected:  09/02/17 0513    Specimen:  Blood Updated:  09/02/17 0553     Phosphorus 4.0 mg/dL     POC Glucose Fingerstick [[id number]]  (Normal) Collected:  09/02/17 0736    Specimen:  Blood Updated:  09/02/17 0748     Glucose 89 mg/dL     Narrative:       Meter: [id number] : [id number] Nancy RANDOLPH.    POC Glucose Fingerstick [[id number]]  (Normal) Collected:  09/02/17 1114    Specimen:  Blood Updated:  09/02/17 1134     Glucose 90 mg/dL      Narrative:       Meter: [id number] : [id number] Nancy DICKSON CERT.    Troponin [[id number]]  (Normal) Collected:  09/02/17 1227    Specimen:  Blood Updated:  09/02/17 1250     Troponin T <0.010 ng/mL     Narrative:       Troponin T Reference Ranges:  Less than 0.03 ng/mL:    Negative for AMI  0.03 to 0.09 ng/mL:      Indeterminant for AMI  Greater than 0.09 ng/mL: Positive for AMI              Imaging Results (last 72 hours)     Procedure Component Value Units Date/Time    CT Head Without Contrast [[id number]] Collected:  09/01/17 1626     Updated:  09/01/17 1632    Narrative:       CT HEAD, NONCONTRAST, 09/01/2017:     HISTORY:  51-year-old male brought to the ED after home health nurse noted acute  mental status changes today.     TECHNIQUE:  CT examination of the head without IV contrast. Radiation dose reduction  techniques were utilized, including automated exposure control and  exposure modulation based on body size.     FINDINGS:  No acute intracranial abnormality is identified.     Mild generalized cerebral atrophy. Mild diffuse low-attenuation white  matter changes, nonspecific but likely related to chronic microvascular  disease. These findings are stable since 07/22/2017.     No evidence of intracranial hemorrhage, mass, mass effect, cerebral  edema or hydrocephalus.       Impression:       1. No acute intracranial abnormality.  2. Stable mild diffuse chronic changes as noted above.  3. No change since 07/22/2017.     This report was finalized on 9/1/2017 4:30 PM by Dr. Bigg Del Angel MD.       XR Toe 2+ View Left [[id number]] Updated:  09/02/17 1525    XR Foot 3+ View Right [[id number]] Updated:  09/02/17 1527            Medication Review:      Hospital Medications (active)       Dose Frequency Start End    acetaminophen (TYLENOL) suppository 650 mg 650 mg Every 4 Hours PRN 9/1/2017     Sig - Route: Insert 1 suppository into the rectum Every 4 (Four) Hours As Needed for Fever (temperature  "greater than 101.5 F or headache). - Rectal    Linked Group 1:  \"Or\" Linked Group Details        acetaminophen (TYLENOL) tablet 650 mg 650 mg Every 4 Hours PRN 9/1/2017     Sig - Route: Take 2 tablets by mouth Every 4 (Four) Hours As Needed for Headache or Fever (fever greater than 101.5 F). - Oral    Linked Group 1:  \"Or\" Linked Group Details        dextrose (D50W) solution 25 g 25 g Every 15 Minutes PRN 9/2/2017     Sig - Route: Infuse 50 mL into a venous catheter Every 15 (Fifteen) Minutes As Needed for Low Blood Sugar (Blood Sugar Less Than 70, Patient Has IV Access - Unresponsive, NPO or Unable To Safely Swallow). - Intravenous    dextrose (GLUTOSE) oral gel 15 g 15 g Every 15 Minutes PRN 9/2/2017     Sig - Route: Take 15 g by mouth Every 15 (Fifteen) Minutes As Needed for Low Blood Sugar (Blood Sugar Less Than 70, Patient Alert, Is Not NPO & Can Safely Swallow). - Oral    docusate sodium (COLACE) capsule 100 mg 100 mg 2 Times Daily 9/2/2017     Sig - Route: Take 1 capsule by mouth 2 (Two) Times a Day. - Oral    enoxaparin (LOVENOX) syringe 30 mg 30 mg Daily 9/2/2017     Sig - Route: Inject 0.3 mL under the skin Daily. - Subcutaneous    famotidine (PEPCID) injection 20 mg 20 mg Every 12 Hours Scheduled 9/1/2017     Sig - Route: Infuse 2 mL into a venous catheter Every 12 (Twelve) Hours. - Intravenous    FLUoxetine (PROzac) capsule 40 mg 40 mg Daily 9/2/2017     Sig - Route: Take 2 capsules by mouth Daily. - Oral    gabapentin (NEURONTIN) capsule 600 mg 600 mg Every 8 Hours Scheduled 9/2/2017     Sig - Route: Take 2 capsules by mouth Every 8 (Eight) Hours. - Oral    glucagon (GLUCAGEN) injection 1 mg 1 mg Every 15 Minutes PRN 9/2/2017     Sig - Route: Inject 1 mg under the skin Every 15 (Fifteen) Minutes As Needed (Blood Glucose Less Than 70 - Patient Without IV Access - Unresponsive, NPO or Unable To Safely Swallow). - Subcutaneous    hydrophor (AQUAPHOR) ointment 1 application 1 application Every 24 Hours " "Scheduled 9/2/2017     Sig - Route: Apply 1 application topically Daily. - Topical    Influenza Vac Subunit Quad (FLUCELVAX) injection 0.5 mL 0.5 mL During Hospitalization 9/1/2017     Sig - Route: Inject 0.5 mL into the shoulder, thigh, or buttocks During Hospitalization for Immunization. - Intramuscular    insulin aspart (novoLOG) injection 0-7 Units 0-7 Units 4 Times Daily Before Meals & Nightly 9/2/2017     Sig - Route: Inject 0-7 Units under the skin 4 (Four) Times a Day Before Meals & at Bedtime. - Subcutaneous    lactobacillus acidophilus (RISAQUAD) capsule 1 capsule 1 capsule Daily 9/2/2017     Sig - Route: Take 1 capsule by mouth Daily. - Oral    lithium tablet 300 mg 300 mg 2 Times Daily 9/2/2017     Sig - Route: Take 1 tablet by mouth 2 (Two) Times a Day. - Oral    losartan (COZAAR) tablet 100 mg 100 mg Every 24 Hours Scheduled 9/2/2017     Sig - Route: Take 2 tablets by mouth Daily. - Oral    metFORMIN (GLUCOPHAGE) tablet 500 mg 500 mg 2 Times Daily With Meals 9/1/2017     Sig - Route: Take 1 tablet by mouth 2 (Two) Times a Day With Meals. - Oral    metoprolol tartrate (LOPRESSOR) tablet 100 mg 100 mg 2 Times Daily 9/2/2017     Sig - Route: Take 2 tablets by mouth 2 (Two) Times a Day. - Oral    miconazole (MICOTIN) 2 % powder 1 application 1 application Every 12 Hours 9/1/2017     Sig - Route: Apply 1 application topically Every 12 (Twelve) Hours. - Topical    ondansetron (ZOFRAN) injection 4 mg 4 mg Every 6 Hours PRN 9/1/2017     Sig - Route: Infuse 2 mL into a venous catheter Every 6 (Six) Hours As Needed for Nausea or Vomiting. - Intravenous    Linked Group 2:  \"Or\" Linked Group Details        ondansetron (ZOFRAN) tablet 4 mg 4 mg Every 6 Hours PRN 9/1/2017     Sig - Route: Take 1 tablet by mouth Every 6 (Six) Hours As Needed for Nausea or Vomiting. - Oral    Linked Group 2:  \"Or\" Linked Group Details        ondansetron ODT (ZOFRAN-ODT) disintegrating tablet 4 mg 4 mg Every 6 Hours PRN 9/1/2017     " "Sig - Route: Take 1 tablet by mouth Every 6 (Six) Hours As Needed for Nausea or Vomiting. - Oral    Linked Group 2:  \"Or\" Linked Group Details        oxyCODONE-acetaminophen (PERCOCET) 5-325 MG per tablet 1 tablet 1 tablet Every 4 Hours PRN 9/1/2017     Sig - Route: Take 1 tablet by mouth Every 4 (Four) Hours As Needed for Moderate Pain . - Oral    Pharmacy to dose vancomycin  Continuous PRN 9/1/2017     Sig - Route: Continuous As Needed for Consult. - Does not apply    Linked Group 3:  \"And\" Linked Group Details        piperacillin-tazobactam (ZOSYN) 3.375 (3-0.375) g injection  - ADS Override Pull   9/2/2017 9/2/2017    Notes to Pharmacy: Created by cabinet override    piperacillin-tazobactam (ZOSYN) 3.375 g/100 mL 0.9% NS IVPB (mbp) 3.375 g Once 9/1/2017 9/1/2017    Sig - Route: Infuse 100 mL into a venous catheter 1 (One) Time. - Intravenous    primidone (MYSOLINE) tablet 250 mg 250 mg 2 Times Daily 9/2/2017     Sig - Route: Take 1 tablet by mouth 2 (Two) Times a Day. - Oral    sodium chloride 0.9 % bolus 3,630 mL 30 mL/kg × 121 kg As Needed 9/1/2017     Sig - Route: Infuse 3,630 mL into a venous catheter As Needed (MAP Less Than 65 or Lactic Acid Level 4 or Higher). - Intravenous    sodium chloride 0.9 % flush 1-10 mL 1-10 mL As Needed 9/1/2017     Sig - Route: Infuse 1-10 mL into a venous catheter As Needed for Line Care. - Intravenous    sodium chloride 0.9 % infusion 100 mL/hr Continuous 9/1/2017     Sig - Route: Infuse 100 mL/hr into a venous catheter Continuous. - Intravenous    vancomycin (VANCOCIN) 1000 MG injection  - ADS Override Pull   9/1/2017 9/2/2017    Notes to Pharmacy: Created by cabinet override    vancomycin (VANCOCIN) 2,000 mg in sodium chloride 0.9 % 500 mL IVPB 2,000 mg Every 8 Hours 9/2/2017     Sig - Route: Infuse 2,000 mg into a venous catheter Every 8 (Eight) Hours. - Intravenous    vancomycin (VANCOCIN) 2,500 mg in sodium chloride 0.9 % 500 mL IVPB 20 mg/kg × 121 kg Once 9/1/2017 " "9/1/2017    Sig - Route: Infuse 2,500 mg into a venous catheter 1 (One) Time. - Intravenous    Linked Group 3:  \"And\" Linked Group Details        vancomycin (VANCOCIN) 500 MG injection  - ADS Override Pull   9/1/2017 9/2/2017    Notes to Pharmacy: Created by cabinet override    gabapentin (NEURONTIN) capsule 800 mg (Discontinued) 800 mg Every 8 Hours Scheduled 9/1/2017 9/2/2017    Sig - Route: Take 2 capsules by mouth Every 8 (Eight) Hours. - Oral    piperacillin-tazobactam (ZOSYN) 3.375 g/100 mL 0.9% NS IVPB (mbp) (Discontinued) 3.375 g Every 6 Hours 9/1/2017 9/2/2017    Sig - Route: Infuse 100 mL into a venous catheter Every 6 (Six) Hours. - Intravenous    Linked Group 3:  \"And\" Linked Group Details        vancomycin 1 g/250 mL 0.9% NS (vial-mate) (Discontinued) 1 g Once 9/1/2017 9/1/2017    Sig - Route: Infuse 250 mL into a venous catheter 1 (One) Time. - Intravenous    Reason for Discontinue: Duplicate order        Assessment/Plan       Active Problems:    Altered mental status  DOD  L foot cellulitis  Hx R ankle Hx        Plan :    IV vanc  DC zosyn  Local care  C&S  Change to po minocyclin 100mg PO BID in 2-3 days for 10 days    Thank you      Delfino Arce MD  09/02/17  4:39 PM        "

## 2017-09-02 NOTE — CONSULTS
CC: Laceration of left fourth and fifth toes.    HPI: The patient is a 51-year-old  male who I saw in the private office on 8-31-17 for postoperative assessment of his right foot. He underwent arthrodesis of the 1st tarsal-metatarsal and cuneiform-navicular arthrodesis in addition to 2nd, 3rd tarsal metatarsal joint arthrodesis on 8/18/2017. The patient was identified to be doing well regarding the surgical site and was placed back in a bulky Thorpe compression dressing and posterior splint. He was given instruction to remain nonweightbearing. At that visit it was  identified that he sustained lacerations to the plantar aspect of the fourth and fifth toes the preceding day. The patient does not knowhow the injury occurred. He states that he noticed blood on the foot and on closer inspection by his family there were open wounds on the plantar aspect of the digits. I cleansed both wounds and applied Xeroform, gauze, Kerlix and an Ace bandage. I scheduled the patient to undergo closure of the wounds in the OR on 9-1-17.     When the patient's mother went to his house to check on him this morning she found him on the floor beside his wheelchair and he was clearly confused with some decreased responsiveness.  She and her family activated 911 at that time and he is transported via ambulance to this facility. He arrived at the facility with signs of confusion and somnolence.  It is uncertain whether the symptoms are medication related. He is subsequently admitted to the ICU for further monitoring workup. Surgery on the left foot is canceled due to the aforementioned.    I am consulted to follow up with regards to the patient's toe lacerations.    Past Medical History:   Diagnosis Date   • Arthritis    • Bipolar disorder    • Cellulitis of lower extremity     RIGHT   • Coronary artery disease    • Diabetes mellitus    • Disease of thyroid gland     nodule on thyroid   • DVT (deep venous thrombosis)    • Fracture of  right foot    • Hyperlipidemia    • Hypertension    • Pseudogout    • Septic shock 07/2017    H/O   • Toxic metabolic encephalopathy 07/2017    H/O     Past Surgical History:   Procedure Laterality Date   • JOINT REPLACEMENT     • KNEE SURGERY     • ORIF FOOT FRACTURE Right 7/29/2017    Procedure: open reduction with percutaneous pinning of midfoot dislocation fracture;  Surgeon: Anish Farah DPM;  Location: [facility];  Service:    • TOTAL HIP ARTHROPLASTY       Medications: The MAR is reviewed.    Allergies   Allergen Reactions   • Lisinopril      Social History   Substance Use Topics   • Smoking status: Never Smoker   • Smokeless tobacco: Not on file   • Alcohol use No     Family History   Problem Relation Age of Onset   • Arthritis Mother    • Cancer Mother    • Hyperlipidemia Mother    • Arthritis Father    • Cancer Father    • Depression Father    • Hypertension Father    • Mental illness Father    • Cancer Sister    • Arthritis Sister    • Hyperlipidemia Sister    • Cancer Paternal Aunt    • Hypertension Daughter    • Depression Son    • Hyperlipidemia Paternal Grandmother    • Hearing loss Paternal Grandfather    • Hyperlipidemia Paternal Grandfather    • Hypertension Paternal Grandfather      ROS:   Constitutional: No changes in body weight, no constitutional signs of infection, no generalized weakness/fatigue  HEENT: No vision or eye problems, no hearing deficit, no head or ENT complaints  Respiratory: No SOB or breathing difficulty  Cardiac: No chest pain or palpitations  GI: No abdominal pain, no acid reflux, no nausea or changes in bowel habits  : No kidney, bladder or prostate complaints  Hematologic: No bruising or bleeding tendency, no lymphadenopathy  Immunologic: No recurrent fevers or infections, no immunocompromised status  Endocrine: No polydipsia/polyphagia/polyuria, no heat/cold intolerance  Musculoskeletal: Mild right foot pain associated with recent midfoot arthrodesis  Integument: Full  thickness laceration to plantar left fourth and fifth toes  Neurologic: Pedal numbness associated diabetic neuropathy  Psychiatric: No anxiety or depression    Physical Examination  General: The patient appears awake, obese, well-developed and nourished, alert and oriented and in no acute distress. The patient is examined while he is resting in a semi-reclinined position on his hospital bed.  Vital signs: VSS. AF.  Lower extremity examination: Bilateral lower extremity exam is performed and findings are as follows.  Right: The right lower extremity is immobilized in a Thorpe compression dressing and posterior splint. The dressing is clean, dry and intact. The toes are warm and capillary refill is brisk. Pedal sensation is markedly diminished.  Left: Pedal pulses are palpable. Capillary refill is brisk in the toes. The foot is warm to palpation. Pedal sensation is markedly diminished due to diabetic neuropathy. The toe webspaces appear clear. The cellulitis present on the dorsal aspect of the foot and the anterior aspect of the lower leg appears resolved. No hyperkeratotic lesions or hotspots are identified. There is a transversely oriented 1 cm full thickness laceration to the plantar aspect of the left 4th toe. A transversely oriented 3.0 cm laceration to the 5th toe is noted at the toes sulcus. The intact FDL tendon is visible in the wound. No sign of wound infection is noted. There slight periwound maceration present at both lacerations. The toes can be actively dorsiflexed and plantarflexed. Slight 5th toe swelling is noted. No obvious digital deformity is present.    Podiatric Assessment  1. Status post right 1st metatarsal-cuneiform, navicular-cuneiform, 2nd-3rd tarsal metatarsal joint arthrodesis, POD #15..  2. Full thickness laceration to plantar left fourth, fifth toes. Date of injury = 8-30-17.  3. Diabetes w/ neuropathy.    Treatment Plan:  1. The patient is examined and the clinical findings are  reviewed.  2. The toe wounds are painted with Betadine solution followed by application of a dry gauze dressing.  3. The patient's lacerations will require primary closure. I will return in the next 1-2 days to do so at bedside.  4. Radiographs of the right foot are ordered to assess arthrodesis alignment and healing. Radiographs of the left toes are ordered to insure that no digital fracture has occurred.

## 2017-09-02 NOTE — ED PROVIDER NOTES
Subjective   History of Present Illness  History of Present Illness    Chief complaint: Altered mental status, fall, weakness, foot laceration    Location: Left foot    Quality/Severity:  Severe    Timing/Duration: Unknown onset    Modifying Factors: None known    Narrative: This patient arrives via EMS for evaluation of altered mental status after he was found down in his home.  Apparently he was scheduled to have a surgical repair of some lacerations to his left foot this afternoon by Dr. Farah.  He was seen in Dr. Farah's office yesterday for evaluation of these lacerations.  Apparently the patient himself could not remember why or how he sustained the lacerations to his foot.  Nevertheless, Dr. Farah had planned to take him to the operating room to repair these lacerations today.  When the patient's mother went to his house to check on him today she found him on the floor beside his wheelchair and he was clearly confused with some decreased responsiveness.  It looked like some furniture turned over.  She presumed that he had fallen out of his chair and hit the furniture.  He had also urinated on himself.  It is unclear exactly how long he had been on the floor.  She and her family called 911 at that time and had them pick him up to bring to our hospital.  The patient arrived at our facility with some clear signs of confusion and somnolence.  The remainder of the history is really quite limited due to his confusion.  He lives alone so there were no witnesses to what happened earlier today at home.  The patient is able to wake up and answer a few questions.  He says he has no specific areas of pain right now.  He does say that he took some medications earlier this morning and indicates that some of them were for pain.  He does not make any complaints about any particular symptoms at this time, however, again his ability to convey clear information is quite compromised.    Associated Symptoms: As above    Review of  Systems   Unable to perform ROS: Mental status change   HENT: Negative.    Skin: Positive for wound.   Psychiatric/Behavioral: Positive for confusion and decreased concentration.   All other systems reviewed and are negative.      Past Medical History:   Diagnosis Date   • Arthritis    • Bipolar disorder    • Cellulitis of lower extremity     RIGHT   • Coronary artery disease    • Diabetes mellitus    • Disease of thyroid gland     nodule on thyroid   • DVT (deep venous thrombosis)    • Fracture of right foot    • Hyperlipidemia    • Hypertension    • Pseudogout    • Septic shock 07/2017    H/O   • Toxic metabolic encephalopathy 07/2017    H/O       Allergies   Allergen Reactions   • Lisinopril        Past Surgical History:   Procedure Laterality Date   • JOINT REPLACEMENT     • KNEE SURGERY     • ORIF FOOT FRACTURE Right 7/29/2017    Procedure: open reduction with percutaneous pinning of midfoot dislocation fracture;  Surgeon: Anish Farah DPM;  Location: [facility];  Service:    • TOTAL HIP ARTHROPLASTY         Family History   Problem Relation Age of Onset   • Arthritis Mother    • Cancer Mother    • Hyperlipidemia Mother    • Arthritis Father    • Cancer Father    • Depression Father    • Hypertension Father    • Mental illness Father    • Cancer Sister    • Arthritis Sister    • Hyperlipidemia Sister    • Cancer Paternal Aunt    • Hypertension Daughter    • Depression Son    • Hyperlipidemia Paternal Grandmother    • Hearing loss Paternal Grandfather    • Hyperlipidemia Paternal Grandfather    • Hypertension Paternal Grandfather        Social History     Social History   • Marital status:      Spouse name: N/A   • Number of children: N/A   • Years of education: N/A     Social History Main Topics   • Smoking status: Never Smoker   • Smokeless tobacco: None   • Alcohol use No   • Drug use: No   • Sexual activity: Defer     Other Topics Concern   • None     Social History Narrative       ED Triage  Vitals   Temp Heart Rate Resp BP SpO2   09/01/17 1307 09/01/17 1307 09/01/17 1307 09/01/17 1307 09/01/17 1307   97.7 °F (36.5 °C) 80 18 145/130 97 %      Temp src Heart Rate Source Patient Position BP Location FiO2 (%)   09/01/17 2134 09/01/17 1307 09/01/17 1307 09/01/17 1307 --   Oral Monitor Lying Right arm          Objective   Physical Exam   Constitutional: He appears well-developed and well-nourished. He appears distressed.   HENT:   Head: Normocephalic and atraumatic.   Nose: Nose normal.   Mouth/Throat: Oropharynx is clear and moist.   Eyes: EOM are normal. Pupils are equal, round, and reactive to light. Right eye exhibits no discharge. Left eye exhibits no discharge.   Neck: Normal range of motion. Neck supple. No tracheal deviation present.   Cardiovascular: Normal rate, regular rhythm, normal heart sounds and intact distal pulses.  Exam reveals no gallop and no friction rub.    No murmur heard.  Pulmonary/Chest: Effort normal. No respiratory distress. He has no wheezes. He has no rales. He exhibits no tenderness.   Abdominal: Soft. He exhibits no mass. There is no tenderness. There is no rebound and no guarding. No hernia.   obese   Musculoskeletal: Normal range of motion. He exhibits tenderness. He exhibits no edema or deformity.   The left foot shows a few non-tender lacerations along the 3rd, 4th, & 5th metatarsals.  Moderate erythema and induration noted along the wound margins.   Neurological: He exhibits normal muscle tone.   Pt is quite somnolent but will arouse to vocal stimulation quickly and follow commands well.  He is oriented to self only.  He exhibits clear signs of confusion and difficulties in holding a normal conversation with me.    Skin: Skin is warm. No rash noted. He is not diaphoretic. No erythema. No pallor.   Nursing note and vitals reviewed.    Results for orders placed or performed during the hospital encounter of 09/01/17   Comprehensive Metabolic Panel   Result Value Ref Range     Glucose 120 (H) 65 - 99 mg/dL    BUN 15 6 - 20 mg/dL    Creatinine 0.72 (L) 0.76 - 1.27 mg/dL    Sodium 136 136 - 145 mmol/L    Potassium 5.3 (H) 3.5 - 5.2 mmol/L    Chloride 96 (L) 98 - 107 mmol/L    CO2 28.8 22.0 - 29.0 mmol/L    Calcium 9.4 8.6 - 10.5 mg/dL    Total Protein 7.9 6.0 - 8.5 g/dL    Albumin 4.00 3.50 - 5.20 g/dL    ALT (SGPT) 14 5 - 41 U/L    AST (SGOT) 15 5 - 40 U/L    Alkaline Phosphatase 86 40 - 129 U/L    Total Bilirubin 0.3 0.2 - 1.2 mg/dL    eGFR Non African Amer 114 >60 mL/min/1.73    Globulin 3.9 gm/dL    A/G Ratio 1.0 g/dL    BUN/Creatinine Ratio 20.8 7.0 - 25.0    Anion Gap 11.2 mmol/L   Protime-INR   Result Value Ref Range    Protime 14.2 12.1 - 15.0 Seconds    INR 1.10 0.90 - 1.10   Urinalysis With / Culture If Indicated   Result Value Ref Range    Color, UA Yellow Yellow, Straw    Appearance, UA Clear Clear    pH, UA 7.0 4.5 - 8.0    Specific Gravity, UA 1.020 1.003 - 1.030    Glucose, UA Negative Negative    Ketones, UA Negative Negative, 80 mg/dL (3+), >=160 mg/dL (4+)    Bilirubin, UA Negative Negative    Blood, UA Small (1+) (A) Negative    Protein, UA Negative Negative    Leuk Esterase, UA Negative Negative    Nitrite, UA Negative Negative    Urobilinogen, UA 0.2 E.U./dL 0.2 - 1.0 E.U./dL   CBC Auto Differential   Result Value Ref Range    WBC 11.54 (H) 4.80 - 10.80 10*3/mm3    RBC 3.97 (L) 4.70 - 6.10 10*6/mm3    Hemoglobin 12.9 (L) 14.0 - 18.0 g/dL    Hematocrit 38.5 (L) 42.0 - 52.0 %    MCV 97.0 (H) 80.0 - 94.0 fL    MCH 32.5 (H) 27.0 - 31.0 pg    MCHC 33.5 31.0 - 37.0 g/dL    RDW 11.8 11.5 - 14.5 %    RDW-SD 42.0 37.0 - 54.0 fl    MPV 8.6 7.4 - 10.4 fL    Platelets 319 140 - 500 10*3/mm3    Neutrophil % 74.8 (H) 45.0 - 70.0 %    Lymphocyte % 11.6 (L) 20.0 - 45.0 %    Monocyte % 9.1 (H) 3.0 - 8.0 %    Eosinophil % 3.7 0.0 - 4.0 %    Basophil % 0.3 0.0 - 2.0 %    Immature Grans % 0.5 0.0 - 0.5 %    Neutrophils, Absolute 8.63 (H) 1.50 - 8.30 10*3/mm3    Lymphocytes, Absolute  1.34 0.60 - 4.80 10*3/mm3    Monocytes, Absolute 1.05 (H) 0.00 - 1.00 10*3/mm3    Eosinophils, Absolute 0.43 (H) 0.10 - 0.30 10*3/mm3    Basophils, Absolute 0.03 0.00 - 0.20 10*3/mm3    Immature Grans, Absolute 0.06 (H) 0.00 - 0.03 10*3/mm3    nRBC 0.0 0.0 - 0.0 /100 WBC   Ammonia   Result Value Ref Range    Ammonia 35 16 - 60 umol/L   Urine Drug Screen   Result Value Ref Range    THC, Screen, Urine Negative Negative    Phencyclidine (PCP), Urine Negative Negative    Cocaine Screen, Urine Negative Negative    Methamphetamine, Urine Negative Negative    Opiate Screen Negative Negative    Amphetamine Screen, Urine Negative Negative    Benzodiazepine Screen, Urine Positive (A) Negative    Tricyclic Antidepressants Screen Negative Negative    Methadone Screen, Urine Negative Negative    Barbiturates Screen, Urine Positive (A) Negative    Oxycodone Screen, Urine Negative Negative    Propoxyphene Screen Negative Negative    Buprenorphine, Screen, Urine Negative Negative   Urinalysis, Microscopic Only   Result Value Ref Range    RBC, UA 0-2 (A) None Seen /HPF    WBC, UA None Seen None Seen /HPF    Bacteria, UA None Seen None Seen /HPF    Squamous Epithelial Cells, UA 0-2 None Seen, 0-2 /HPF    Hyaline Casts, UA None Seen None Seen /LPF    Methodology Manual Light Microscopy    Lactic Acid, Plasma   Result Value Ref Range    Lactate 0.9 0.5 - 2.0 mmol/L   POC Glucose Fingerstick   Result Value Ref Range    Glucose 101 70 - 130 mg/dL       RADIOLOGY        Study: CT head without contrast    Findings: IMPRESSION:   1. No acute intracranial abnormality.  2. Stable mild diffuse chronic changes as noted above.  3. No change since 07/22/2017.    This report was finalized on 9/1/2017 4:30 PM by Dr. Bgig Del Angel MD.     Interpreted contemporaneously with treatment by Dr. Davidson, independently viewed by me        Insert Central Line At Bedside  Date/Time: 9/1/2017 8:30 PM  Performed by: CRYSTAL LANDEROS  Authorized by:  "CARY CHARLES   Consent: Verbal consent obtained.  Risks and benefits: risks, benefits and alternatives were discussed  Consent given by: patient  Patient understanding: patient states understanding of the procedure being performed  Patient consent: the patient's understanding of the procedure matches consent given  Procedure consent: procedure consent matches procedure scheduled  Required items: required blood products, implants, devices, and special equipment available  Patient identity confirmed: verbally with patient  Time out: Immediately prior to procedure a \"time out\" was called to verify the correct patient, procedure, equipment, support staff and site/side marked as required.  Indications: vascular access  Anesthesia: local infiltration    Anesthesia:  Local Anesthetic: lidocaine 1% without epinephrine  Anesthetic total: 5 mL    Sedation:  Patient sedated: no  Preparation: skin prepped with 2% chlorhexidine  Skin prep agent dried: skin prep agent completely dried prior to procedure  Sterile barriers: all five maximum sterile barriers used - cap, mask, sterile gown, sterile gloves, and large sterile sheet  Hand hygiene: hand hygiene performed prior to central venous catheter insertion  Location details: right femoral  Site selection rationale: lowest risk for procedural complication  Patient position: flat  Catheter type: triple lumen  Pre-procedure: landmarks identified  Ultrasound guidance: no  Number of attempts: 1  Successful placement: yes  Post-procedure: line sutured  Assessment: blood return through all ports and free fluid flow  Patient tolerance: Patient tolerated the procedure well with no immediate complications               ED Course  ED Course   Comment By Time   I have reviewed the patient's record, his current labs and his head CT report.  He arrived with clear signs of altered mentation.  We did a CT scan which was reassuring for no acute intracranial findings.  His urinalysis did not " indicate a UTI.  I am suspecting that a lot of his somnolence and confusion are likely due to polypharmacy and possible overdose of pain medications earlier today while at home.  Unfortunately, he is in no condition to have his elective surgery done that was scheduled for today.  Dr. Farah did come to the emergency department to assess him today.  He put a dressing on the patient's foot wounds.  He asked me to start IV vancomycin and Zosyn for the foot since it was showing signs of infection already especially in comparison to its appearance in his office yesterday.  We had great difficulties establishing IV access in this patient, however.  Multiple nurses tried to get a peripheral IV and I tried using ultrasound guidance to secure a deep brachial vein IV.  However all these attempts were unsuccessful.  Given the fact that he remained confused and needed IV antibiotics, I elected to place a right femoral vein triple-lumen catheter in him under sterile conditions.  This procedure note was documented.  He tolerated it very well.  Once we finally established IV access I was able to administer the broad-spectrum antibiotics as Dr. Farah intended.  Will admit the patient to the ICU.  He remains hemodynamically stable and otherwise comfortable appearing when resting in the bed. Bhavesh Villalta MD 09/02 0144                  MDM  Number of Diagnoses or Management Options  Altered mental status, unspecified altered mental status type:   Left foot infection:   Overdose, undetermined intent, initial encounter:      Amount and/or Complexity of Data Reviewed  Clinical lab tests: reviewed and ordered  Tests in the radiology section of CPT®: ordered and reviewed  Decide to obtain previous medical records or to obtain history from someone other than the patient: yes  Obtain history from someone other than the patient: yes (Patient's mother provided most of the history)  Review and summarize past medical records: yes  Discuss the  patient with other providers: yes (I spoke with Dr. Farah and also Dr. hussein)  Independent visualization of images, tracings, or specimens: yes    Risk of Complications, Morbidity, and/or Mortality  Presenting problems: high  Diagnostic procedures: moderate  Management options: high    Critical Care  Total time providing critical care:  minutes      Final diagnoses:   Altered mental status, unspecified altered mental status type   Left foot infection   Overdose, undetermined intent, initial encounter            Bhavesh Villalta MD  09/02/17 0145

## 2017-09-02 NOTE — PLAN OF CARE
Problem: Patient Care Overview (Adult)  Goal: Discharge Needs Assessment  Outcome: Ongoing (interventions implemented as appropriate)    09/02/17 1258   Discharge Needs Assessment   Concerns To Be Addressed (continue to assess)   Readmission Within The Last 30 Days (inpt 8/18-22/17)   Equipment Needed After Discharge (continue to assess)   Discharge Planning Comments Spoke with patient at bedside, permission to speak with mother present given. IMM explained and signed by patients mother who states she is POA, copy provided. Patient states he is independent of ADLs and lives alone. He states he drove until recently and now his daughter, Ann-Marie , or other family provides transportation as needed. He states he has a ramp to enter his home and uses a walker, wc & scooter. He also uses cpap at night. He does not use home 02.He states his daughter is available to help as needed at home. He is current with [facility] and states they are aware he is in the hospiital. He uses [facility] and denies issues obtaining medications. He has a living will. He says he plans to return home at [place]. Will follow for [place] planning needs.    Living Environment   Transportation Available car;family or friend will provide   Self-Care   Equipment Currently Used at Home walker, standard;grab bar;ramp;raised toilet;wheelchair;other (see comments)  (scooter)

## 2017-09-02 NOTE — H&P
HOSPITALIST SERVICES     @ [facility], [place]                [facility] Hospitalist Team    ADMISSION HISTORY AND PHYSICAL    PATIENT:      Patient Care Team:  Killian Scales MD as PCP - General  Killian Scales MD as PCP - Family Medicine    CHIEF COMPLAINT:     Altered mental status, fall, weakness and lt foot laceration    HISTORY OF PRESENT ILLNESS:    As per Dr Villalta's note:  This patient arrives via EMS for evaluation of altered mental status after he was found down in his home.  Apparently he was scheduled to have a surgical repair of some lacerations to his left foot this afternoon by Dr. Farah.  He was seen in Dr. Farah's office yesterday for evaluation of these lacerations.  Apparently the patient himself could not remember why or how he sustained the lacerations to his foot.  Nevertheless, Dr. Farah had planned to take him to the operating room to repair these lacerations today.  When the patient's mother went to his house to check on him today she found him on the floor beside his wheelchair and he was clearly confused with some decreased responsiveness.  It looked like some furniture turned over.  She presumed that he had fallen out of his chair and hit the furniture.  He had also urinated on himself.  It is unclear exactly how long he had been on the floor.  She and her family called 911 at that time and had them pick him up to bring to our hospital.  The patient arrived at our facility with some clear signs of confusion and somnolence.  The remainder of the history is really quite limited due to his confusion.  He lives alone so there were no witnesses to what happened earlier today at home.  The patient is able to wake up and answer a few questions.  He says he has no specific areas of pain right now.  He does say that he took some medications earlier this morning and indicates that some of them were for pain.  He does not make any complaints about any particular  symptoms at this time, however, again his ability to convey clear information is quite compromised.    Patient is a readmission.  He was here for a Lis Franc fracture and has multiple medical problems.  He was supposed to have surgery today for lacerations. Dr. Farah saw him yesterday.  He did not show up and have surgery.  He was found by his family in the floor.  It looked as if he spilled out of his wheel chair.  In the ER it was possible to awaken him and he would follow commands.  He was very confused however.  He took extra gabapentin today. It is not known how much he took.  His drug screen came back beos and barbituates.  He has a history of metabolic encephalopathy.  CT scan of the head was done and was unremarkable .  Dr. Farah recommended that the ER put the patient on Vancomycin and Zosyn for the patient's severe cellulitis.  It is unknown how he got the lacerations.  There is a culture that can be acquired.  Ammonia was normal.  It is unknown how long he was in the floor.     Patient is alert now and denies any sx of headache, chest pain, shortness of breath, abdominal pain, n/v, dysuria or recent hx of blood in stool.              Past Medical History:   Diagnosis Date   • Arthritis    • Bipolar disorder    • Cellulitis of lower extremity     RIGHT   • Coronary artery disease    • Diabetes mellitus    • Disease of thyroid gland     nodule on thyroid   • DVT (deep venous thrombosis)    • Fracture of right foot    • Hyperlipidemia    • Hypertension    • Pseudogout    • Septic shock 07/2017    H/O   • Toxic metabolic encephalopathy 07/2017    H/O     Past Surgical History:   Procedure Laterality Date   • JOINT REPLACEMENT     • KNEE SURGERY     • ORIF FOOT FRACTURE Right 7/29/2017    Procedure: open reduction with percutaneous pinning of midfoot dislocation fracture;  Surgeon: Anish Farah DPM;  Location: [facility];  Service:    • TOTAL HIP ARTHROPLASTY       Family History   Problem Relation Age of  Onset   • Arthritis Mother    • Cancer Mother    • Hyperlipidemia Mother    • Arthritis Father    • Cancer Father    • Depression Father    • Hypertension Father    • Mental illness Father    • Cancer Sister    • Arthritis Sister    • Hyperlipidemia Sister    • Cancer Paternal Aunt    • Hypertension Daughter    • Depression Son    • Hyperlipidemia Paternal Grandmother    • Hearing loss Paternal Grandfather    • Hyperlipidemia Paternal Grandfather    • Hypertension Paternal Grandfather      Social History   Substance Use Topics   • Smoking status: Never Smoker   • Smokeless tobacco: None   • Alcohol use No     Prescriptions Prior to Admission   Medication Sig Dispense Refill Last Dose   • FLUoxetine (PROzac) 20 MG capsule Take 40 mg by mouth Daily.   9/1/2017 at Unknown time   • lithium 300 MG tablet Take 300 mg by mouth 2 (two) times a day.   9/1/2017 at Unknown time   • amLODIPine (NORVASC) 2.5 MG tablet Take 2.5 mg by mouth Daily.   8/18/2017 at 0840   • carBAMazepine (TEGretol) 200 MG tablet Take 400 mg by mouth 2 (two) times a day.   8/18/2017 at 0840   • doxepin (SINEquan) 75 MG capsule Take 75 mg by mouth every night.      • gabapentin (NEURONTIN) 800 MG tablet Take 800 mg by mouth 3 (three) times a day.   8/17/2017 at 2142   • hydrophor (AQUAPHOR) ointment ointment Apply 1 application topically Daily.   8/17/2017 at 2055   • LORazepam (ATIVAN) 0.5 MG tablet Take 0.5 mg by mouth Daily As Needed for Anxiety.   8/18/2017 at 0339   • losartan (COZAAR) 100 MG tablet Take 1 tablet by mouth Daily. 30 tablet 0 8/18/2017 at 0840   • metFORMIN (GLUCOPHAGE) 500 MG tablet Take 500 mg by mouth 2 (Two) Times a Day With Meals.   Unknown at Unknown time   • metoprolol tartrate (LOPRESSOR) 100 MG tablet Take 100 mg by mouth 2 (two) times a day.   8/18/2017 at 0841   • miconazole (MICOTIN) 2 % powder Apply 1 application topically Every 12 (Twelve) Hours.   8/17/2017 at 2055   • oxyCODONE-acetaminophen (PERCOCET) 5-325 MG per  "tablet Take 2 tablets by mouth Every 4 (Four) Hours As Needed.   8/18/2017 at 0840   • primidone (MYSOLINE) 250 MG tablet Take 250 mg by mouth 2 (two) times a day.   8/18/2017 at 0840   • Probiotic Product (RISAQUAD-2) capsule capsule Take 1 capsule by mouth Daily.   8/18/2017 at 0840     Allergies:  Lisinopril    REVIEW OF SYSTEMS:  Please see the above history of present illness for pertinent positives and negatives.  The remainder of the patient's systems have been reviewed and are negative.     Vital Signs  Temp:  [97.7 °F (36.5 °C)-98.1 °F (36.7 °C)] 98.1 °F (36.7 °C)  Heart Rate:  [80-85] 83  Resp:  [16-18] 18  BP: (108-145)/() 118/67    Flowsheet Rows         First Filed Value    Admission Height  71.5\" (181.6 cm) Documented at 09/01/2017 1307    Admission Weight  276 lb (125 kg) Documented at 09/01/2017 1307           Physical Exam:    PHYSICAL EXAMINATION:    VITAL SIGNS: As per Nurse's notes    GENERAL APPEARANCE: The patient is a well developed, well nourished, , in no acute distress without complaints of shortness of breath, dyspnea or acute pain. Lips and nail beds are pink.    HEENT: Normocephalic, atraumatic. PERRL. The sclerae anicteric and conjunctivae pink and moist. Extraocular muscle movements intact. The oral mucosa, hard and soft palate, tongue and posterior pharynx were normal.     NECK: Supple and symmetric. No masses. No thyromegaly. No tenderness. Trachea central. No carotid bruits. No evidence of JVD.    LYMPH NODES: No palpable lymphadenopathy.    SKIN: Warm and dry. Lt foot wound. No rash or patechiae.    CHEST: Normal AP diameter and normal contour without any kyphoscoliosis.    LUNGS: Clear to auscultation bilaterally. Respiratory expansion equal bilaterally. No wheezes/rales/crackles/rubs.    CARDIOVASCULAR: RRR. S1, S2 normal without murmur/gallop/rub. No S3, S4. The carotid pulses were normal and 2+ bilaterally without bruits. Peripheral pulses were 2+ and symmetric. " Capillary refill less than 3 seconds.    ABDOMEN: Soft, non-tender, non-distended. No masses. No rebound/guarding. No hepatosplenomegaly. Normal bowel sounds.     RECTAL: Not performed.     EXTREMITIES:  The left foot shows a few non-tender lacerations along the 3rd, 4th, & 5th metatarsals.  Moderate erythema and induration noted along the wound margins. No evidence of cyanosis, clubbing, but some edema present. No rash or lesions. + pedal pulses. Negative Homans sign bilaterally.     MUSCULOSKELETAL: The left foot shows a few non-tender lacerations along the 3rd, 4th, & 5th metatarsals.  Moderate erythema and induration noted along the wound margins. Muscle strength and tone normal.    PSYCHIATRY: Responds appropriately to questions. No evidence of acute anxiety, depression, panic attacks or hallucinations.    MENTAL STATUS EXAMINATION: Neatly dressed, conscious and alert. Speech normal in tone. Pleasant and cooperative. Orientation x3. Thought process, content and insight are normal. Memory without deficits.    NEUROLOGIC: Examination is grossly intact globally with no focal deficits. Cranial nerves II through XII are grossly intact. No motor weakness. No decrease in sensation. No facial asymmetry.            Results Review:    I reviewed the patient's new clinical results.  Lab Results (most recent)     Procedure Component Value Units Date/Time    POC Glucose Fingerstick [[id number]]  (Normal) Collected:  09/01/17 1315    Specimen:  Blood Updated:  09/01/17 1323     Glucose 101 mg/dL     Narrative:       Meter: [id number] : [id number] Reji Lemus RN    CBC & Differential [[id number]] Collected:  09/01/17 1344    Specimen:  Blood Updated:  09/01/17 1358    Narrative:       The following orders were created for panel order CBC & Differential.  Procedure                               Abnormality         Status                     ---------                               -----------         ------                      CBC Auto Differential[[id number]]        Abnormal            Final result                 Please view results for these tests on the individual orders.    CBC Auto Differential [[id number]]  (Abnormal) Collected:  09/01/17 1344    Specimen:  Blood Updated:  09/01/17 1358     WBC 11.54 (H) 10*3/mm3      RBC 3.97 (L) 10*6/mm3      Hemoglobin 12.9 (L) g/dL      Hematocrit 38.5 (L) %      MCV 97.0 (H) fL      MCH 32.5 (H) pg      MCHC 33.5 g/dL      RDW 11.8 %      RDW-SD 42.0 fl      MPV 8.6 fL      Platelets 319 10*3/mm3      Neutrophil % 74.8 (H) %      Lymphocyte % 11.6 (L) %      Monocyte % 9.1 (H) %      Eosinophil % 3.7 %      Basophil % 0.3 %      Immature Grans % 0.5 %      Neutrophils, Absolute 8.63 (H) 10*3/mm3      Lymphocytes, Absolute 1.34 10*3/mm3      Monocytes, Absolute 1.05 (H) 10*3/mm3      Eosinophils, Absolute 0.43 (H) 10*3/mm3      Basophils, Absolute 0.03 10*3/mm3      Immature Grans, Absolute 0.06 (H) 10*3/mm3      nRBC 0.0 /100 WBC     Protime-INR [[id number]]  (Normal) Collected:  09/01/17 1344    Specimen:  Blood Updated:  09/01/17 1408     Protime 14.2 Seconds      INR 1.10    Narrative:       Therapeutic Ranges for INR: 2.0-3.0 (PT 20-30)                              2.5-3.5 (PT 25-34)    Comprehensive Metabolic Panel [[id number]]  (Abnormal) Collected:  09/01/17 1344    Specimen:  Blood Updated:  09/01/17 1421     Glucose 120 (H) mg/dL      BUN 15 mg/dL      Creatinine 0.72 (L) mg/dL      Sodium 136 mmol/L      Potassium 5.3 (H) mmol/L      Chloride 96 (L) mmol/L      CO2 28.8 mmol/L      Calcium 9.4 mg/dL      Total Protein 7.9 g/dL      Albumin 4.00 g/dL      ALT (SGPT) 14 U/L      AST (SGOT) 15 U/L      Alkaline Phosphatase 86 U/L      Total Bilirubin 0.3 mg/dL      eGFR Non African Amer 114 mL/min/1.73      Globulin 3.9 gm/dL      A/G Ratio 1.0 g/dL      BUN/Creatinine Ratio 20.8     Anion Gap 11.2 mmol/L     Urinalysis With / Culture If Indicated [[id number]]  (Abnormal) Collected:   09/01/17 1558    Specimen:  Urine from Urine, Clean Catch Updated:  09/01/17 1648     Color, UA Yellow     Appearance, UA Clear     pH, UA 7.0     Specific Gravity, UA 1.020     Glucose, UA Negative     Ketones, UA Negative     Bilirubin, UA Negative     Blood, UA Small (1+) (A)     Protein, UA Negative     Leuk Esterase, UA Negative     Nitrite, UA Negative     Urobilinogen, UA 0.2 E.U./dL    Urine Drug Screen [[id number]]  (Abnormal) Collected:  09/01/17 1558    Specimen:  Urine from Urine, Clean Catch Updated:  09/01/17 1715     THC, Screen, Urine Negative     Phencyclidine (PCP), Urine Negative     Cocaine Screen, Urine Negative     Methamphetamine, Urine Negative     Opiate Screen Negative     Amphetamine Screen, Urine Negative     Benzodiazepine Screen, Urine Positive (A)     Tricyclic Antidepressants Screen Negative     Methadone Screen, Urine Negative     Barbiturates Screen, Urine Positive (A)     Oxycodone Screen, Urine Negative     Propoxyphene Screen Negative     Buprenorphine, Screen, Urine Negative    Narrative:       Urine drug screen results are to be used for medical purposes only.  They are not to be used for legal purposes such as employment testing.  Negative results do not necessarily mean the complete absence of a subtance, but rather that the result is less than the cutoff for that substance.  Positive results are unconfirmed and considered Preliminary Positive.  [facility] does not automatically confirm Postitive Unconfirmed results.  The physician may request (order) an Unconfirmed Positive result to be sent out for confirmation.      Negative Thresholds for Drugs Screened:    THC screen, urine                          50 ng/ml  Phenycyclidine (PCP), urine                25 ng/ml  Cocaine screen, urine                     150 ng/ml  Methamphetamine, urine                    500 ng/ml  Opiate screen, urine                      100 ng/ml  Amphetamine screen, urine                  500 ng/ml  Benzodiazepine screen, urine              150 ng/ml  Tricyclic Antidepressants screen, urine   300 ng/ml  Methadone screen, urine                   200 ng/ml  Barbiturates screen, urine                200 ng/ml  Oxycodone screen, urine                   100 ng/ml  Propoxyphene screen, urine                300 ng/ml  Buprenorphine screen, urine                10 ng/ml    Urinalysis, Microscopic Only [[id number]]  (Abnormal) Collected:  09/01/17 1558    Specimen:  Urine from Urine, Clean Catch Updated:  09/01/17 1729     RBC, UA 0-2 (A) /HPF      WBC, UA None Seen /HPF      Bacteria, UA None Seen /HPF      Squamous Epithelial Cells, UA 0-2 /HPF      Hyaline Casts, UA None Seen /LPF      Methodology Manual Light Microscopy    Ammonia [[id number]]  (Normal) Collected:  09/01/17 1719    Specimen:  Blood Updated:  09/01/17 1810     Ammonia 35 umol/L     Lactic Acid, Plasma [[id number]]  (Normal) Collected:  09/02/17 0011    Specimen:  Blood Updated:  09/02/17 0031     Lactate 0.9 mmol/L     POC Glucose Fingerstick [[id number]]  (Normal) Collected:  09/02/17 0142    Specimen:  Blood Updated:  09/02/17 0159     Glucose 96 mg/dL     Narrative:       Meter: [id number] : [id number] Ben Corbin NURSING ASSISTANT    CBC Auto Differential [[id number]]  (Abnormal) Collected:  09/02/17 0513    Specimen:  Blood Updated:  09/02/17 0535     WBC 7.73 10*3/mm3      RBC 3.58 (L) 10*6/mm3      Hemoglobin 11.5 (L) g/dL      Hematocrit 35.2 (L) %      MCV 98.3 (H) fL      MCH 32.1 (H) pg      MCHC 32.7 g/dL      RDW 11.9 %      RDW-SD 43.1 fl      MPV 8.5 fL      Platelets 248 10*3/mm3      Neutrophil % 69.6 %      Lymphocyte % 13.8 (L) %      Monocyte % 10.1 (H) %      Eosinophil % 5.6 (H) %      Basophil % 0.4 %      Immature Grans % 0.5 %      Neutrophils, Absolute 5.38 10*3/mm3      Lymphocytes, Absolute 1.07 10*3/mm3      Monocytes, Absolute 0.78 10*3/mm3      Eosinophils, Absolute 0.43 (H) 10*3/mm3      Basophils,  Absolute 0.03 10*3/mm3      Immature Grans, Absolute 0.04 (H) 10*3/mm3      nRBC 0.0 /100 WBC     Basic Metabolic Panel [[id number]]  (Abnormal) Collected:  09/02/17 0513    Specimen:  Blood Updated:  09/02/17 0553     Glucose 89 mg/dL      BUN 10 mg/dL      Creatinine 0.55 (L) mg/dL      Sodium 137 mmol/L      Potassium 4.4 mmol/L      Chloride 101 mmol/L      CO2 25.7 mmol/L      Calcium 8.8 mg/dL      eGFR Non African Amer >150 mL/min/1.73      BUN/Creatinine Ratio 18.2     Anion Gap 10.3 mmol/L     Narrative:       GFR Normal >60  Chronic Kidney Disease <60  Kidney Failure <15    Magnesium [[id number]]  (Abnormal) Collected:  09/02/17 0513    Specimen:  Blood Updated:  09/02/17 0553     Magnesium 1.5 (L) mg/dL     Phosphorus [[id number]]  (Normal) Collected:  09/02/17 0513    Specimen:  Blood Updated:  09/02/17 0553     Phosphorus 4.0 mg/dL     POC Glucose Fingerstick [[id number]]  (Normal) Collected:  09/02/17 0736    Specimen:  Blood Updated:  09/02/17 0748     Glucose 89 mg/dL     Narrative:       Meter: [id number] : [id number] Nancy RUIZ          Imaging Results (most recent)     Procedure Component Value Units Date/Time    CT Head Without Contrast [[id number]] Collected:  09/01/17 1626     Updated:  09/01/17 1632    Narrative:       CT HEAD, NONCONTRAST, 09/01/2017:     HISTORY:  51-year-old male brought to the ED after home health nurse noted acute  mental status changes today.     TECHNIQUE:  CT examination of the head without IV contrast. Radiation dose reduction  techniques were utilized, including automated exposure control and  exposure modulation based on body size.     FINDINGS:  No acute intracranial abnormality is identified.     Mild generalized cerebral atrophy. Mild diffuse low-attenuation white  matter changes, nonspecific but likely related to chronic microvascular  disease. These findings are stable since 07/22/2017.     No evidence of intracranial hemorrhage, mass, mass  effect, cerebral  edema or hydrocephalus.       Impression:       1. No acute intracranial abnormality.  2. Stable mild diffuse chronic changes as noted above.  3. No change since 07/22/2017.     This report was finalized on 9/1/2017 4:30 PM by Dr. Bigg Del Angel MD.           not reviewed    ECG/EMG Results (most recent)     None        reviewed    Assessment/Plan       DIFFERENTIAL DIAGNOSES CONSIDERED FOR CHIEF COMPLAINT:        The following clinical entities were considered in differential diagnosis. The list includes commonly encountered practical probabilities and rare esoteric diagnoses worked out through systemic diagnostic methods used to identify the presence of a disease entity where multiple diagnoses are possible. The decision is reached through either of the following methods: 1) direct confirmatory testing, or 2) a process of elimination, or 3) at least a process of obtaining information that shrinks the “probabilities” of candidate conditions to negligible levels, by using clinical evidence and thus implementing aspects of the hypothetico-deductive method.        DIFFERENTIAL DIAGNOSES OF ALTERED MENTAL STATUS:    DIFFERENTIAL DIAGNOSIS: Two categories:  1) Metabolic causes: hyper/hyponatremia; hyper/hypoglycemia; hypercalcemia ; hyper/hypothyroidism; hypoxia/hyperpnea; hepatic encephalopathy; uremic encephalopathy; drug intoxication/withdrawal; Wernicke encephalopathy  2) Structural lesions: primary or metastatic tumor; intracranial hemorrhage; infection; cerebrovascular accident; transient ischemic attack; meningitis; encephalitis; seizures; postictal state; hypertensive encephalopathy; vasculitis; arrhythmias; heart failure; endocarditis        ASSESSMENT AND PLAN:       SUMMARY:    ?   PROPHYLAXIS:   -Oxygen Saturation: As per Nurses' notes.   -DVT Prophylaxis: On Inj. Lovenox   -Gastritis Prophylaxis: Famotidine    -Constipation Prophylaxis: colace 100 mg po BID   -Immunizations:  N/A  -Intake and Output Orders: As per order sheet   -[place] Catheter: Not indicated at this time  -Smoking/ Nicotine issues: N/A.     ACTIVITIES:  -Bathroom Privileges: May use bathroom   -Activity: Encouraged to sit up in side chair for 2-4 hours BID   -PT/OT: n/A      NUTRITION AND FLUIDS:  -Diet/ Nutrition: Regular, cardiac, consistent carbohydrate diet   -Fluid Status/Electrolytes: Saline Lock       SOCIAL ISSUES:   -MRSA SCREEN: As per institutional protocol   -Behavioral/ Agitation Issues: NONE   -Social Issues: Lives at home with his family.   -Occupational Issues: Patient is Unemployed at this time.   -Code Status: FULL CODE    -Disposition: Should be able to go home once ready for discharge     THERAPEUTIC:   ALLERGIES: Lisinopril   ANTIBIOTICS: Inj Zosyn and Inj Vanco   PAIN MANAGEMENT: Percocet 5-325 mg   ANTIPYRETIC: Tylenol 650 mg 1 po q4-6 hours for headache or   temp >100 degrees   INSULIN THERAPY: Low dose insulin coverage as ordered   CHEST PAIN: N/A    NEBULIZER TREATMENT: N/A    ANXIETY: N/A    DEPRESSION: Lithium    INSOMNIA: N/A                PLAN:    Labs and diagnostic tests reviewed: Gluc 89, Na 137, K 4.4, Creat 0.55, Venous Lactate 0.9, WBC 7.73, Hb 11.5, Plt 248    Diagnostic tests reviewed:    CT SCan Head:  IMPRESSION:  1. No acute intracranial abnormality.  2. Stable mild diffuse chronic changes as noted above.  3. No change since 07/22/2017.      This report was finalized on 9/1/2017 4:30 PM by Dr. Bigg Del Angel MD.      Patient is clinically and hemodynamically stable    Will be seen by Dr Farah (Pod) and Dr Arce ID consultant this morning    Any new recommendations: As per Dr Farah and Dr Arce    New Labs ordered: CBC and CMP and Lactic acid in AM    New diagnostic tests ordered: NONE    Any changes in medications: N/A    To continue current management and supportive care    Pain management issues: On Percocet 5-325 mg    Discharge planning issues: NONE    Will follow patient  closely    Nothing new to add for right now            DIAGNOSES:      PRIMARY DIAGNOSES:      Altered Mental status: secondary to drugs including benzos and barbiturates and gabapentin    Lt foot infection: Is being followed by Dr Farah (Pod). On Inj Zosyn, Inj Vanco    Drug overdose: Benzos and Barbiturates    Bipolar disorder: On Lithium    Anxiety: On Ativan as outpatient    CAD: No issues with chest pains    HTN: Last /67. On Norvasc, Losartan and Metoprolol tartrate    HLD: Not on any statins    Hx of DVT: Hx noted    Type II DM: On Metformin and insulin coverage    DJD: On Oxycodone-acetaminophen 5-325 mg    Hx of Thyroid nodule: Hx noted    Hx of septic shock and toxic metabolic encephalopathy: Hsx noted    DVT Prophylaxis: On Inj Lovenox and SCDs    ?     SECONDARY DIAGNOSES:  ?     As above        SURGICAL DIAGNOSES:      As per Problem List            I discussed the patients findings and my recommendations with patient and patient is agreeable to current treatment and management plan.     Art Aleman MD  09/02/17  8:58 AM          Art Aleman M.D., FACP  Internal Medicine/ Hospitalist        Time:

## 2017-09-02 NOTE — NURSING NOTE
Discharge Planning Assessment  EVONNE Epstein     Patient Name: Eliseo Price  MRN: [id number]  Today's Date: 9/2/2017    Admit Date: 9/1/2017          Discharge Needs Assessment       09/02/17 1258    Living Environment    Lives With alone    Living Arrangements house    Home Accessibility ramps present at home;bed and bath on same level    Transportation Available car;family or friend will provide    Living Environment    Provides Primary Care For no one    Quality Of Family Relationships supportive    Able to Return to Prior Living Arrangements yes    Discharge Needs Assessment    Concerns To Be Addressed --   continue to assess    Readmission Within The Last 30 Days --   inpt 8/18-22/17    Equipment Currently Used at Home walker, standard;grab bar;ramp;raised toilet;wheelchair;other (see comments)   scooter    Equipment Needed After Discharge --   continue to assess    Discharge Planning Comments Spoke with patient at bedside, permission to speak with mother present given. IMM explained and signed by patients mother who states she is POA, copy provided. Patient states he is independent of ADLs and lives alone. He states he drove until  recently and now his daughter, Ann-Marie , or other family provides transportation as needed. He states he has a ramp to enter his home and uses a walker, wc & scooter. He also uses cpap at night. He does not use home 02.He states his daughter is available to help as needed at home. He is current with [facility] and states they are aware he is in the hospiital. He uses [facility] and denies issues obtaining medications. He has a living will. He says he plans to return home at [place]. Will follow for dc planning needs.             Discharge Plan       09/02/17 1308    Case Management/Social Work Plan    Plan --   plan home/ continue to assess needs    Patient/Family In Agreement With Plan yes    Additional Comments Spoke with patient at bedside, permission to speak with  mother present given. IMM explained and signed by patients mother who states she is POA, copy provided. Patient states he is independent of ADLs and lives alone. He states he drove until  recently and now his daughter, Ann-Marie , or other family provides transportation as needed. He states he has a ramp to enter his home and uses a walker, wc & scooter. He also uses cpap at night. He does not use home 02.He states his daughter is available to help as needed at home. He is current with [facility] and states they are aware he is in the hospiital. He uses [facility] and denies issues obtaining medications. He has a living will. He says he plans to return home at [place]. Will follow for dc planning needs        Discharge Placement     No information found                Demographic Summary       09/02/17 1257    Referral Information    Admission Type inpatient    Referral Source admission list    Reason For Consult discharge planning    Record Reviewed medical record    Contact Information    Permission Granted to Share Information With ;family/designee    Primary Care Physician Information    Name Killian Scales MD            Functional Status     None            Psychosocial     None            Abuse/Neglect     None            Legal     None            Substance Abuse     None            Patient Forms     None          Vance Johnson RN

## 2017-09-02 NOTE — PLAN OF CARE
Problem: Patient Care Overview (Adult)  Goal: Plan of Care Review  Outcome: Ongoing (interventions implemented as appropriate)    09/02/17 0544   Coping/Psychosocial Response Interventions   Plan Of Care Reviewed With patient   Patient Care Overview   Progress no change       Goal: Adult Individualization and Mutuality  Outcome: Ongoing (interventions implemented as appropriate)    Problem: Confusion, Acute (Adult)  Goal: Identify Related Risk Factors and Signs and Symptoms  Outcome: Outcome(s) achieved Date Met:  09/02/17  Goal: Cognitive/Functional Impairments Minimized  Outcome: Ongoing (interventions implemented as appropriate)  Goal: Safety  Outcome: Ongoing (interventions implemented as appropriate)

## 2017-09-03 LAB
ALBUMIN SERPL-MCNC: 3.1 G/DL (ref 3.5–5.2)
ALBUMIN/GLOB SERPL: 1 G/DL
ALP SERPL-CCNC: 66 U/L (ref 40–129)
ALT SERPL W P-5'-P-CCNC: 13 U/L (ref 5–41)
ANION GAP SERPL CALCULATED.3IONS-SCNC: 11.3 MMOL/L
AST SERPL-CCNC: 12 U/L (ref 5–40)
BASOPHILS # BLD AUTO: 0.04 10*3/MM3 (ref 0–0.2)
BASOPHILS NFR BLD AUTO: 0.8 % (ref 0–2)
BILIRUB SERPL-MCNC: 0.2 MG/DL (ref 0.2–1.2)
BUN BLD-MCNC: 9 MG/DL (ref 6–20)
BUN/CREAT SERPL: 17.3 (ref 7–25)
CALCIUM SPEC-SCNC: 8.2 MG/DL (ref 8.6–10.5)
CHLORIDE SERPL-SCNC: 103 MMOL/L (ref 98–107)
CO2 SERPL-SCNC: 22.7 MMOL/L (ref 22–29)
CREAT BLD-MCNC: 0.52 MG/DL (ref 0.76–1.27)
D-LACTATE SERPL-SCNC: 0.6 MMOL/L (ref 0.5–2)
DEPRECATED RDW RBC AUTO: 42.1 FL (ref 37–54)
EOSINOPHIL # BLD AUTO: 0.42 10*3/MM3 (ref 0.1–0.3)
EOSINOPHIL NFR BLD AUTO: 8 % (ref 0–4)
ERYTHROCYTE [DISTWIDTH] IN BLOOD BY AUTOMATED COUNT: 11.6 % (ref 11.5–14.5)
GFR SERPL CREATININE-BSD FRML MDRD: >150 ML/MIN/1.73
GLOBULIN UR ELPH-MCNC: 3.1 GM/DL
GLUCOSE BLD-MCNC: 89 MG/DL (ref 65–99)
GLUCOSE BLDC GLUCOMTR-MCNC: 103 MG/DL (ref 70–130)
GLUCOSE BLDC GLUCOMTR-MCNC: 107 MG/DL (ref 70–130)
GLUCOSE BLDC GLUCOMTR-MCNC: 88 MG/DL (ref 70–130)
GLUCOSE BLDC GLUCOMTR-MCNC: 93 MG/DL (ref 70–130)
HCT VFR BLD AUTO: 33.4 % (ref 42–52)
HGB BLD-MCNC: 10.7 G/DL (ref 14–18)
IMM GRANULOCYTES # BLD: 0.03 10*3/MM3 (ref 0–0.03)
IMM GRANULOCYTES NFR BLD: 0.6 % (ref 0–0.5)
LYMPHOCYTES # BLD AUTO: 1.07 10*3/MM3 (ref 0.6–4.8)
LYMPHOCYTES NFR BLD AUTO: 20.4 % (ref 20–45)
MCH RBC QN AUTO: 31.5 PG (ref 27–31)
MCHC RBC AUTO-ENTMCNC: 32 G/DL (ref 31–37)
MCV RBC AUTO: 98.2 FL (ref 80–94)
MONOCYTES # BLD AUTO: 0.58 10*3/MM3 (ref 0–1)
MONOCYTES NFR BLD AUTO: 11 % (ref 3–8)
NEUTROPHILS # BLD AUTO: 3.11 10*3/MM3 (ref 1.5–8.3)
NEUTROPHILS NFR BLD AUTO: 59.2 % (ref 45–70)
NRBC BLD MANUAL-RTO: 0 /100 WBC (ref 0–0)
PLATELET # BLD AUTO: 205 10*3/MM3 (ref 140–500)
PMV BLD AUTO: 8.5 FL (ref 7.4–10.4)
POTASSIUM BLD-SCNC: 4 MMOL/L (ref 3.5–5.2)
PROT SERPL-MCNC: 6.2 G/DL (ref 6–8.5)
RBC # BLD AUTO: 3.4 10*6/MM3 (ref 4.7–6.1)
SODIUM BLD-SCNC: 137 MMOL/L (ref 136–145)
TROPONIN T SERPL-MCNC: <0.01 NG/ML (ref 0–0.03)
VANCOMYCIN SERPL-MCNC: 20.9 MCG/ML
WBC NRBC COR # BLD: 5.25 10*3/MM3 (ref 4.8–10.8)

## 2017-09-03 PROCEDURE — 94799 UNLISTED PULMONARY SVC/PX: CPT

## 2017-09-03 PROCEDURE — 82962 GLUCOSE BLOOD TEST: CPT

## 2017-09-03 PROCEDURE — 80202 ASSAY OF VANCOMYCIN: CPT | Performed by: HOSPITALIST

## 2017-09-03 PROCEDURE — 80053 COMPREHEN METABOLIC PANEL: CPT | Performed by: INTERNAL MEDICINE

## 2017-09-03 PROCEDURE — 25010000002 ENOXAPARIN PER 10 MG: Performed by: HOSPITALIST

## 2017-09-03 PROCEDURE — 85025 COMPLETE CBC W/AUTO DIFF WBC: CPT | Performed by: INTERNAL MEDICINE

## 2017-09-03 PROCEDURE — 99232 SBSQ HOSP IP/OBS MODERATE 35: CPT | Performed by: INTERNAL MEDICINE

## 2017-09-03 PROCEDURE — 25010000002 VANCOMYCIN PER 500 MG: Performed by: HOSPITALIST

## 2017-09-03 PROCEDURE — 83735 ASSAY OF MAGNESIUM: CPT | Performed by: HOSPITALIST

## 2017-09-03 PROCEDURE — 83605 ASSAY OF LACTIC ACID: CPT | Performed by: INTERNAL MEDICINE

## 2017-09-03 RX ADMIN — GABAPENTIN 600 MG: 300 CAPSULE ORAL at 06:25

## 2017-09-03 RX ADMIN — METOPROLOL TARTRATE 100 MG: 50 TABLET ORAL at 17:06

## 2017-09-03 RX ADMIN — FAMOTIDINE 20 MG: 10 INJECTION, SOLUTION INTRAVENOUS at 08:59

## 2017-09-03 RX ADMIN — VANCOMYCIN HYDROCHLORIDE 2000 MG: 1 INJECTION, POWDER, LYOPHILIZED, FOR SOLUTION INTRAVENOUS at 20:45

## 2017-09-03 RX ADMIN — DOCUSATE SODIUM 100 MG: 100 CAPSULE, LIQUID FILLED ORAL at 17:06

## 2017-09-03 RX ADMIN — FAMOTIDINE 20 MG: 10 INJECTION, SOLUTION INTRAVENOUS at 20:45

## 2017-09-03 RX ADMIN — Medication 1 CAPSULE: at 08:52

## 2017-09-03 RX ADMIN — LITHIUM CARBONATE 300 MG: 300 TABLET ORAL at 08:52

## 2017-09-03 RX ADMIN — OXYCODONE HYDROCHLORIDE AND ACETAMINOPHEN 1 TABLET: 5; 325 TABLET ORAL at 21:00

## 2017-09-03 RX ADMIN — PETROLATUM 1 APPLICATION: 42 OINTMENT TOPICAL at 14:17

## 2017-09-03 RX ADMIN — VANCOMYCIN HYDROCHLORIDE 2000 MG: 1 INJECTION, POWDER, LYOPHILIZED, FOR SOLUTION INTRAVENOUS at 12:22

## 2017-09-03 RX ADMIN — VANCOMYCIN HYDROCHLORIDE 2000 MG: 1 INJECTION, POWDER, LYOPHILIZED, FOR SOLUTION INTRAVENOUS at 05:11

## 2017-09-03 RX ADMIN — METOPROLOL TARTRATE 100 MG: 50 TABLET ORAL at 08:53

## 2017-09-03 RX ADMIN — METFORMIN HYDROCHLORIDE 500 MG: 500 TABLET ORAL at 17:06

## 2017-09-03 RX ADMIN — SODIUM CHLORIDE 100 ML/HR: 9 INJECTION, SOLUTION INTRAVENOUS at 20:44

## 2017-09-03 RX ADMIN — FLUOXETINE 40 MG: 20 CAPSULE ORAL at 08:52

## 2017-09-03 RX ADMIN — SODIUM CHLORIDE 100 ML/HR: 9 INJECTION, SOLUTION INTRAVENOUS at 03:06

## 2017-09-03 RX ADMIN — PRIMIDONE 250 MG: 250 TABLET ORAL at 08:52

## 2017-09-03 RX ADMIN — DOCUSATE SODIUM 100 MG: 100 CAPSULE, LIQUID FILLED ORAL at 14:25

## 2017-09-03 RX ADMIN — GABAPENTIN 600 MG: 300 CAPSULE ORAL at 21:00

## 2017-09-03 RX ADMIN — GABAPENTIN 600 MG: 300 CAPSULE ORAL at 14:25

## 2017-09-03 RX ADMIN — LITHIUM CARBONATE 300 MG: 300 TABLET ORAL at 17:06

## 2017-09-03 RX ADMIN — PRIMIDONE 250 MG: 250 TABLET ORAL at 17:06

## 2017-09-03 RX ADMIN — LOSARTAN POTASSIUM 100 MG: 50 TABLET ORAL at 08:52

## 2017-09-03 RX ADMIN — METFORMIN HYDROCHLORIDE 500 MG: 500 TABLET ORAL at 08:52

## 2017-09-03 RX ADMIN — ENOXAPARIN SODIUM 30 MG: 40 INJECTION SUBCUTANEOUS at 08:56

## 2017-09-03 NOTE — PLAN OF CARE
Problem: Confusion, Acute (Adult)  Goal: Safety  Outcome: Outcome(s) achieved Date Met:  09/03/17 09/03/17 0549   Confusion, Acute (Adult)   Safety achieves outcome

## 2017-09-03 NOTE — PROGRESS NOTES
Hospitalist Team    Patient Care Team:  Killian Scales MD as PCP - General  Killian Scales MD as PCP - Family Medicine      CONSULTATION: ID/podiatry    CHIEF COMPLAINT: found down/AMS    ADMITTING DIAGNOSES: metabolic enceph    SUBJECTIVE:seen today, AMS improved. No f/c/s/n/v/d/cp/soa. Wants to go home.     REVIEW OF SYSTEMS: Some 14-point review of systems is negative except what is mentioned in the HPI relative to the current complaint.     PAST MEDICAL HISTORY:   Past Medical History:   Diagnosis Date   • Arthritis    • Bipolar disorder    • Cellulitis of lower extremity     RIGHT   • Coronary artery disease    • Diabetes mellitus    • Disease of thyroid gland     nodule on thyroid   • DVT (deep venous thrombosis)    • Fracture of right foot    • Hyperlipidemia    • Hypertension    • Pseudogout    • Septic shock 07/2017    H/O   • Toxic metabolic encephalopathy 07/2017    H/O       ALLERGIES:   Allergies   Allergen Reactions   • Lisinopril          PAST SURGICAL HISTORY:   Past Surgical History:   Procedure Laterality Date   • JOINT REPLACEMENT     • KNEE SURGERY     • ORIF FOOT FRACTURE Right 7/29/2017    Procedure: open reduction with percutaneous pinning of midfoot dislocation fracture;  Surgeon: Anish Farah DPM;  [facility];  Service:    • TOTAL HIP ARTHROPLASTY            FAMILY HISTORY:   Family History   Problem Relation Age of Onset   • Arthritis Mother    • Cancer Mother    • Hyperlipidemia Mother    • Arthritis Father    • Cancer Father    • Depression Father    • Hypertension Father    • Mental illness Father    • Cancer Sister    • Arthritis Sister    • Hyperlipidemia Sister    • Cancer Paternal Aunt    • Hypertension Daughter    • Depression Son    • Hyperlipidemia Paternal Grandmother    • Hearing loss Paternal Grandfather    • Hyperlipidemia Paternal Grandfather    • Hypertension Paternal Grandfather          SOCIAL HISTORY:   Social History     Social History   • Marital  status:      Spouse name: N/A   • Number of children: N/A   • Years of education: N/A     Occupational History   • Not on file.     Social History Main Topics   • Smoking status: Never Smoker   • Smokeless tobacco: Not on file   • Alcohol use No   • Drug use: No   • Sexual activity: Defer     Other Topics Concern   • Not on file     Social History Narrative       CURRENT MEDICATIONS:     Current Facility-Administered Medications:   •  acetaminophen (TYLENOL) tablet 650 mg, 650 mg, Oral, Q4H PRN, 650 mg at 09/02/17 1031 **OR** acetaminophen (TYLENOL) suppository 650 mg, 650 mg, Rectal, Q4H PRN, Karen Lizama DO  •  dextrose (D50W) solution 25 g, 25 g, Intravenous, Q15 Min PRN, Art Aleman MD  •  dextrose (GLUTOSE) oral gel 15 g, 15 g, Oral, Q15 Min PRN, Art Aleman MD  •  docusate sodium (COLACE) capsule 100 mg, 100 mg, Oral, BID, Art Aleman MD, 100 mg at 09/02/17 1714  •  enoxaparin (LOVENOX) syringe 30 mg, 30 mg, Subcutaneous, Daily, Karen Lizama DO, 30 mg at 09/03/17 0856  •  famotidine (PEPCID) injection 20 mg, 20 mg, Intravenous, Q12H, Karen Lizama DO, 20 mg at 09/03/17 0859  •  FLUoxetine (PROzac) capsule 40 mg, 40 mg, Oral, Daily, Karen Lizama DO, 40 mg at 09/03/17 0852  •  gabapentin (NEURONTIN) capsule 600 mg, 600 mg, Oral, Q8H, Art Aleman MD, 600 mg at 09/03/17 0625  •  glucagon (GLUCAGEN) injection 1 mg, 1 mg, Subcutaneous, Q15 Min PRN, Art Aleman MD  •  hydrophor (AQUAPHOR) ointment 1 application, 1 application, Topical, Q24H, Karen Lizama DO, 1 application at 09/02/17 1019  •  Influenza Vac Subunit Quad (FLUCELVAX) injection 0.5 mL, 0.5 mL, Intramuscular, During Hospitalization, Karen Lizama DO  •  insulin aspart (novoLOG) injection 0-7 Units, 0-7 Units, Subcutaneous, 4x Daily AC & at Bedtime, Art Aleman MD  •  lactobacillus acidophilus (RISAQUAD) capsule 1 capsule, 1 capsule, Oral, Daily, Karen Lizama DO, 1 capsule at 09/03/17  0852  •  lithium tablet 300 mg, 300 mg, Oral, BID, Karen Lizama, DO, 300 mg at 09/03/17 0852  •  losartan (COZAAR) tablet 100 mg, 100 mg, Oral, Q24H, Karen Lizama, DO, 100 mg at 09/03/17 0852  •  metFORMIN (GLUCOPHAGE) tablet 500 mg, 500 mg, Oral, BID With Meals, Karen Lizama, DO, 500 mg at 09/03/17 0852  •  metoprolol tartrate (LOPRESSOR) tablet 100 mg, 100 mg, Oral, BID, Karen Lizama, DO, 100 mg at 09/03/17 0853  •  miconazole (MICOTIN) 2 % powder 1 application, 1 application, Topical, Q12H, Karen Lizama DO, 1 application at 09/02/17 1020  •  ondansetron (ZOFRAN) tablet 4 mg, 4 mg, Oral, Q6H PRN **OR** ondansetron ODT (ZOFRAN-ODT) disintegrating tablet 4 mg, 4 mg, Oral, Q6H PRN **OR** ondansetron (ZOFRAN) injection 4 mg, 4 mg, Intravenous, Q6H PRN, Karen Lizama, DO  •  oxyCODONE-acetaminophen (PERCOCET) 5-325 MG per tablet 1 tablet, 1 tablet, Oral, Q4H PRN, Karen Lizama DO, 1 tablet at 09/02/17 2141  •  [DISCONTINUED] piperacillin-tazobactam (ZOSYN) 3.375 g/100 mL 0.9% NS IVPB (mbp), 3.375 g, Intravenous, Q6H, 3.375 g at 09/02/17 1020 **AND** [COMPLETED] vancomycin (VANCOCIN) 2,500 mg in sodium chloride 0.9 % 500 mL IVPB, 20 mg/kg, Intravenous, Once, 2,500 mg at 09/01/17 2320 **AND** Pharmacy to dose vancomycin, , Does not apply, Continuous PRN, Karen Lizama, DO  •  primidone (MYSOLINE) tablet 250 mg, 250 mg, Oral, BID, Karen Lizama, DO, 250 mg at 09/03/17 0852  •  sodium chloride 0.9 % bolus 3,630 mL, 30 mL/kg, Intravenous, PRN, Karen Lizama,   •  sodium chloride 0.9 % flush 1-10 mL, 1-10 mL, Intravenous, PRN, Karen Lizama,   •  sodium chloride 0.9 % infusion, 100 mL/hr, Intravenous, Continuous, Karen Lizama DO, Last Rate: 100 mL/hr at 09/03/17 0306, 100 mL/hr at 09/03/17 0306  •  vancomycin (VANCOCIN) 2,000 mg in sodium chloride 0.9 % 500 mL IVPB, 2,000 mg, Intravenous, Q8H, Karen Lizama DO, Last Rate: 0 mL/hr at 09/02/17 1530, 2,000 mg at  09/03/17 1222      DIAGNOSTIC DATA:     I reviewed the patient's new clinical results.    Lab Results (last 24 hours)     Procedure Component Value Units Date/Time    POC Glucose Fingerstick [[id number]]  (Normal) Collected:  09/02/17 1654    Specimen:  Blood Updated:  09/02/17 1701     Glucose 94 mg/dL     Narrative:       Meter: [id number] : [id number] [facility] ASSISTANT-POOL    Troponin [[id number]]  (Normal) Collected:  09/02/17 1802    Specimen:  Blood Updated:  09/02/17 1831     Troponin T <0.010 ng/mL     Narrative:       Troponin T Reference Ranges:  Less than 0.03 ng/mL:    Negative for AMI  0.03 to 0.09 ng/mL:      Indeterminant for AMI  Greater than 0.09 ng/mL: Positive for AMI    POC Glucose Fingerstick [[id number]]  (Normal) Collected:  09/02/17 2052    Specimen:  Blood Updated:  09/02/17 2058     Glucose 97 mg/dL     Narrative:       Meter: [id number] : [id number] Josh Ruiz RN Validator    Troponin [[id number]]  (Normal) Collected:  09/02/17 2359    Specimen:  Blood Updated:  09/03/17 0048     Troponin T <0.010 ng/mL     Narrative:       Troponin T Reference Ranges:  Less than 0.03 ng/mL:    Negative for AMI  0.03 to 0.09 ng/mL:      Indeterminant for AMI  Greater than 0.09 ng/mL: Positive for AMI    Vancomycin, Random [[id number]] Collected:  09/03/17 0259    Specimen:  Blood Updated:  09/03/17 0452     Vancomycin Random 20.90 mcg/mL     CBC & Differential [[id number]] Collected:  09/03/17 0623    Specimen:  Blood Updated:  09/03/17 0631    Narrative:       The following orders were created for panel order CBC & Differential.  Procedure                               Abnormality         Status                     ---------                               -----------         ------                     CBC Auto Differential[[id number]]        Abnormal            Final result                 Please view results for these tests on the individual orders.    CBC Auto Differential  [[id number]]  (Abnormal) Collected:  09/03/17 0623    Specimen:  Blood Updated:  09/03/17 0631     WBC 5.25 10*3/mm3      RBC 3.40 (L) 10*6/mm3      Hemoglobin 10.7 (L) g/dL      Hematocrit 33.4 (L) %      MCV 98.2 (H) fL      MCH 31.5 (H) pg      MCHC 32.0 g/dL      RDW 11.6 %      RDW-SD 42.1 fl      MPV 8.5 fL      Platelets 205 10*3/mm3      Neutrophil % 59.2 %      Lymphocyte % 20.4 %      Monocyte % 11.0 (H) %      Eosinophil % 8.0 (H) %      Basophil % 0.8 %      Immature Grans % 0.6 (H) %      Neutrophils, Absolute 3.11 10*3/mm3      Lymphocytes, Absolute 1.07 10*3/mm3      Monocytes, Absolute 0.58 10*3/mm3      Eosinophils, Absolute 0.42 (H) 10*3/mm3      Basophils, Absolute 0.04 10*3/mm3      Immature Grans, Absolute 0.03 10*3/mm3      nRBC 0.0 /100 WBC     Lactic Acid, Plasma [[id number]]  (Normal) Collected:  09/03/17 0623    Specimen:  Blood Updated:  09/03/17 0644     Lactate 0.6 mmol/L     Comprehensive Metabolic Panel [[id number]]  (Abnormal) Collected:  09/03/17 0623    Specimen:  Blood Updated:  09/03/17 0651     Glucose 89 mg/dL      BUN 9 mg/dL      Creatinine 0.52 (L) mg/dL      Sodium 137 mmol/L      Potassium 4.0 mmol/L      Chloride 103 mmol/L      CO2 22.7 mmol/L      Calcium 8.2 (L) mg/dL      Total Protein 6.2 g/dL      Albumin 3.10 (L) g/dL      ALT (SGPT) 13 U/L      AST (SGOT) 12 U/L      Alkaline Phosphatase 66 U/L      Total Bilirubin 0.2 mg/dL      eGFR Non African Amer >150 mL/min/1.73      Globulin 3.1 gm/dL      A/G Ratio 1.0 g/dL      BUN/Creatinine Ratio 17.3     Anion Gap 11.3 mmol/L     POC Glucose Fingerstick [[id number]]  (Normal) Collected:  09/03/17 0714    Specimen:  Blood Updated:  09/03/17 0720     Glucose 88 mg/dL     Narrative:       Meter: [id number] : [id number] [facility] NURSING ASSISTANT-POOL    POC Glucose Fingerstick [[id number]]  (Normal) Collected:  09/03/17 1110    Specimen:  Blood Updated:  09/03/17 1121     Glucose 103 mg/dL     Narrative:        Meter: [id number] : [id number] Liz Canales RN          Imaging Results (last 24 hours)     Procedure Component Value Units Date/Time    XR Foot 3+ View Right [[id number]] Collected:  09/03/17 0819     Updated:  09/03/17 0823    Narrative:       Right foot, 3 views     INDICATION: Follow-up right foot fracture. Compared with 08/18/2017.     FINDINGS:  Stable extensive plate and screw fixation involving the first through  third tarsals and metatarsals. Casting material somewhat obscures  osseous detail. The alignment appears stable. Stable soft tissue  swelling. No definite fracture.     This report was finalized on 9/3/2017 8:21 AM by Dr. Alex Hansen MD.       XR Toe 2+ View Left [[id number]] Collected:  09/03/17 0821     Updated:  09/03/17 0825    Narrative:       Left foot, 3 views     INDICATION: Left foot pain and laceration today. No comparisons.     FINDINGS:  There is a tiny osseous fragment adjacent to the distal end of the  proximal phalanx of the fifth toe which may represent a tiny avulsion  fracture. No other definite fracture. The alignment appears normal.  There are degenerative changes involving the midfoot. Calcaneal  spurring.     This report was finalized on 9/3/2017 8:23 AM by Dr. Alex Hansen MD.             Xray not reviewed personally by physician.      ECG reviewed personally by physician  ECG/EMG Results (most recent)     Procedure Component Value Units Date/Time    ECG 12 Lead [[id number]] Collected:  09/02/17 1255     Updated:  09/02/17 1807    Narrative:       RR Interval= 845 ms  MA Interval= 180 ms  QRSD Interval= 96 ms  QT Interval= 404 ms  QTc Interval= 439 ms  Heart Rate= 71 ms  P Axis= 45 deg  QRS Axis= 39 deg  T Wave Axis= 30 deg  I: 40 Axis= 6 deg  T: 40 Axis= 71 deg  ST Axis= 55 deg  SINUS RHYTHM  NO SIGNIFICANT CHANGE FROM PREVIOUS ECG  [facility]ron[facility]  [facility] 02-Sep-2017 18:04:42  Date and Time of Study: 2017-09-02 12:55:44     "        PHYSICAL EXAMINATION:  VITAL SIGNS: Temp:  [98.1 °F (36.7 °C)-99.1 °F (37.3 °C)] 98.5 °F (36.9 °C)  Heart Rate:  [69-80] 69  Resp:  [16-18] 16  BP: (101-132)/(56-81) 124/81   Flowsheet Rows         First Filed Value    Admission Height  71.5\" (181.6 cm) Documented at 09/01/2017 1307    Admission Weight  276 lb (125 kg) Documented at 09/01/2017 1307        GENERAL: Alert and oriented x3, afebrile. Vital signs stable, appeared comfortable, nondistressed, and appears stated age. Generalized weakness. Responds to questions appropriately.   HEENT: Normocephalic, atraumatic. Pupils equal, round and reactive to light. Extraocular movements intact bilaterally. Trachea midline.  Mucous membranes are moist.   NECK: Supple. No JVD. Trachea midline, no LAD  CHEST: Clear to auscultation bilaterally anteriorly; no rale, rhonchi, or wheezes  HEART: Regular rate and rhythm. No rubs, murmurs or gallops.   ABDOMEN: Soft, nontender, nondistended. Bowel sounds are otherwise positive.   EXTREMITIES: No clubbing, cyanosis, or edema. Moves all extremities  SKIN: Warm and dry. No ecchymoses, petechiae or rashes.   MUSCULOSKELETAL: No bony abnormalities. Range of motion normal. No crepitus. Feet exam deffered to podiatry.   NEUROLOGIC: Cranial nerves II-XII intact bilaterally. No focal neurologic deficits. Mini-Mental status exam was deferred.   LYMPHATICS: No lymphadenopathy.     ASSESSMENT AND PLAN:        Altered Mental status: secondary to drugs including benzos and barbiturates and gabapentin. Improved with time and IVF And abx for infection.     Lt foot infection: Is being followed by Dr Farah (Pod). On  Inj Vanco,   - per ID:IV vanc  DC zosyn  Local care  C&S  Change to po minocyclin 100mg PO BID in 2-3 days for 10 days        Drug overdose/intox: Benzos and Barbiturates, held, counseled today on dangers.      Bipolar disorder: On Lithium, cont     Anxiety: On Ativan as outpatient     CAD: No issues with chest pains     HTN: " Last /67. On Norvasc, Losartan and Metoprolol tartrate, controlled, cont     HLD: Not on any statins. Defer to outpt.     Hx of DVT: Hx noted     Type II DM: On Metformin and insulin coverage     DJD: On Oxycodone-acetaminophen 5-325 mg     Hx of Thyroid nodule: Hx noted     Hx of septic shock and toxic metabolic encephalopathy: Hsx noted. Resolved presently     DVT Prophylaxis: On Inj Lovenox and SCDs    Home vs rehab when able from ID standpoint with abx recs.        Olivier Diaz MD  09/03/17  2:17 PM      Time:30 min

## 2017-09-04 LAB
GLUCOSE BLDC GLUCOMTR-MCNC: 105 MG/DL (ref 70–130)
GLUCOSE BLDC GLUCOMTR-MCNC: 79 MG/DL (ref 70–130)
GLUCOSE BLDC GLUCOMTR-MCNC: 87 MG/DL (ref 70–130)
GLUCOSE BLDC GLUCOMTR-MCNC: 94 MG/DL (ref 70–130)
MAGNESIUM SERPL-MCNC: 1.6 MG/DL (ref 1.7–2.5)

## 2017-09-04 PROCEDURE — 82962 GLUCOSE BLOOD TEST: CPT

## 2017-09-04 PROCEDURE — 25010000002 VANCOMYCIN PER 500 MG: Performed by: HOSPITALIST

## 2017-09-04 PROCEDURE — 99232 SBSQ HOSP IP/OBS MODERATE 35: CPT | Performed by: HOSPITALIST

## 2017-09-04 PROCEDURE — 25010000002 ENOXAPARIN PER 10 MG: Performed by: HOSPITALIST

## 2017-09-04 RX ORDER — LIDOCAINE HYDROCHLORIDE 10 MG/ML
INJECTION, SOLUTION INFILTRATION; PERINEURAL
Status: COMPLETED
Start: 2017-09-04 | End: 2017-09-04

## 2017-09-04 RX ORDER — MINOCYCLINE HYDROCHLORIDE 50 MG/1
100 CAPSULE ORAL EVERY 12 HOURS SCHEDULED
Status: DISCONTINUED | OUTPATIENT
Start: 2017-09-04 | End: 2017-09-05 | Stop reason: HOSPADM

## 2017-09-04 RX ADMIN — LITHIUM CARBONATE 300 MG: 300 TABLET ORAL at 08:14

## 2017-09-04 RX ADMIN — GABAPENTIN 600 MG: 300 CAPSULE ORAL at 14:19

## 2017-09-04 RX ADMIN — METOPROLOL TARTRATE 100 MG: 50 TABLET ORAL at 17:34

## 2017-09-04 RX ADMIN — FLUOXETINE 40 MG: 20 CAPSULE ORAL at 08:12

## 2017-09-04 RX ADMIN — LOSARTAN POTASSIUM 100 MG: 50 TABLET ORAL at 08:13

## 2017-09-04 RX ADMIN — FAMOTIDINE 20 MG: 10 INJECTION, SOLUTION INTRAVENOUS at 08:21

## 2017-09-04 RX ADMIN — VANCOMYCIN HYDROCHLORIDE 2000 MG: 1 INJECTION, POWDER, LYOPHILIZED, FOR SOLUTION INTRAVENOUS at 03:54

## 2017-09-04 RX ADMIN — PETROLATUM 1 APPLICATION: 42 OINTMENT TOPICAL at 08:14

## 2017-09-04 RX ADMIN — METFORMIN HYDROCHLORIDE 500 MG: 500 TABLET ORAL at 08:12

## 2017-09-04 RX ADMIN — PRIMIDONE 250 MG: 250 TABLET ORAL at 17:34

## 2017-09-04 RX ADMIN — MINOCYCLINE HYDROCHLORIDE 100 MG: 50 CAPSULE ORAL at 22:13

## 2017-09-04 RX ADMIN — OXYCODONE HYDROCHLORIDE AND ACETAMINOPHEN 1 TABLET: 5; 325 TABLET ORAL at 01:04

## 2017-09-04 RX ADMIN — MINOCYCLINE HYDROCHLORIDE 100 MG: 50 CAPSULE ORAL at 11:30

## 2017-09-04 RX ADMIN — METFORMIN HYDROCHLORIDE 500 MG: 500 TABLET ORAL at 17:33

## 2017-09-04 RX ADMIN — MICONAZOLE NITRATE 1 APPLICATION: 2 POWDER TOPICAL at 22:11

## 2017-09-04 RX ADMIN — LIDOCAINE HYDROCHLORIDE: 10 INJECTION, SOLUTION INFILTRATION; PERINEURAL at 14:20

## 2017-09-04 RX ADMIN — METOPROLOL TARTRATE 100 MG: 50 TABLET ORAL at 08:15

## 2017-09-04 RX ADMIN — DOCUSATE SODIUM 100 MG: 100 CAPSULE, LIQUID FILLED ORAL at 08:13

## 2017-09-04 RX ADMIN — LITHIUM CARBONATE 300 MG: 300 TABLET ORAL at 17:33

## 2017-09-04 RX ADMIN — ENOXAPARIN SODIUM 30 MG: 40 INJECTION SUBCUTANEOUS at 08:14

## 2017-09-04 RX ADMIN — FAMOTIDINE 20 MG: 10 INJECTION, SOLUTION INTRAVENOUS at 22:17

## 2017-09-04 RX ADMIN — GABAPENTIN 600 MG: 300 CAPSULE ORAL at 22:17

## 2017-09-04 RX ADMIN — GABAPENTIN 600 MG: 300 CAPSULE ORAL at 05:58

## 2017-09-04 RX ADMIN — DOCUSATE SODIUM 100 MG: 100 CAPSULE, LIQUID FILLED ORAL at 17:33

## 2017-09-04 RX ADMIN — PRIMIDONE 250 MG: 250 TABLET ORAL at 08:13

## 2017-09-04 RX ADMIN — Medication 1 CAPSULE: at 08:13

## 2017-09-04 NOTE — PLAN OF CARE
Problem: Fall Risk (Adult)  Goal: Identify Related Risk Factors and Signs and Symptoms  Outcome: Outcome(s) achieved Date Met:  09/04/17 09/04/17 0327   Fall Risk   Fall Risk: Related Risk Factors culprit medication(s);gait/mobility problems;history of falls;environment unfamiliar   Fall Risk: Signs and Symptoms presence of risk factors       Goal: Absence of Falls  Outcome: Ongoing (interventions implemented as appropriate)    09/04/17 0327   Fall Risk (Adult)   Absence of Falls making progress toward outcome

## 2017-09-04 NOTE — NURSING NOTE
9/4/17 @ 1345- I SPOKE WITH PATIENT AT BEDSIDE- HE WAS SITTING UP IN CHAIR- HE SAID HE LIVES ALONE AND HAS WC,WALKER,BSC,AND RAMP INTO HIS HOME- HE SAID HE NORMALLY DOES DRIVE- HE PLANS ON RETURNING HOME AT DISCHARGE , AND STATES HE IS SUPPOSE TO GET A CAST TO HIS RIGHT FOOT/LEG SO HE CAN WALK AFTER THAT - HE HAS A RIDE AT DISCHARGE-HIS MOM- DENIES NEED FOR ANY PLACEMENT AT THIS TIME-

## 2017-09-04 NOTE — PLAN OF CARE
Problem: Patient Care Overview (Adult)  Goal: Plan of Care Review  Outcome: Ongoing (interventions implemented as appropriate)    09/04/17 0324   Coping/Psychosocial Response Interventions   Plan Of Care Reviewed With patient   Patient Care Overview   Progress progress toward functional goals as expected   Outcome Evaluation   Outcome Summary/Follow up Plan pt remains on IV antibiotics; VSS; pt A&O; continued tx with Dr. Farah       Goal: Adult Individualization and Mutuality  Outcome: Ongoing (interventions implemented as appropriate)  Goal: Discharge Needs Assessment  Outcome: Ongoing (interventions implemented as appropriate)    Problem: Infection, Risk/Actual (Adult)  Goal: Identify Related Risk Factors and Signs and Symptoms  Outcome: Outcome(s) achieved Date Met:  09/04/17 09/04/17 0324   Infection, Risk/Actual   Infection, Risk/Actual: Related Risk Factors skin integrity impairment   Signs and Symptoms (Infection, Risk/Actual) pain;weakness;mental/behavioral changes       Goal: Infection Prevention/Resolution  Outcome: Ongoing (interventions implemented as appropriate)    09/04/17 0324   Infection, Risk/Actual (Adult)   Infection Prevention/Resolution making progress toward outcome

## 2017-09-04 NOTE — PLAN OF CARE
Problem: Patient Care Overview (Adult)  Goal: Plan of Care Review  Outcome: Ongoing (interventions implemented as appropriate)    09/04/17 8540   Coping/Psychosocial Response Interventions   Plan Of Care Reviewed With patient;family   Patient Care Overview   Progress progress toward functional goals as expected   Outcome Evaluation   Outcome Summary/Follow up Plan feeling better, left foot toe lacerations stitched and dressed per Dr. Farah , oral antibiotics given, Non weight bearing RT. Foot        Goal: Adult Individualization and Mutuality  Outcome: Ongoing (interventions implemented as appropriate)  Goal: Discharge Needs Assessment  Outcome: Ongoing (interventions implemented as appropriate)    Problem: Infection, Risk/Actual (Adult)  Goal: Infection Prevention/Resolution  Outcome: Ongoing (interventions implemented as appropriate)    Problem: Fall Risk (Adult)  Goal: Absence of Falls  Outcome: Ongoing (interventions implemented as appropriate)

## 2017-09-05 VITALS
HEART RATE: 67 BPM | OXYGEN SATURATION: 97 % | SYSTOLIC BLOOD PRESSURE: 140 MMHG | DIASTOLIC BLOOD PRESSURE: 86 MMHG | RESPIRATION RATE: 20 BRPM | WEIGHT: 275 LBS | BODY MASS INDEX: 37.25 KG/M2 | HEIGHT: 72 IN | TEMPERATURE: 98.3 F

## 2017-09-05 LAB
GLUCOSE BLDC GLUCOMTR-MCNC: 101 MG/DL (ref 70–130)
GLUCOSE BLDC GLUCOMTR-MCNC: 118 MG/DL (ref 70–130)

## 2017-09-05 PROCEDURE — 82962 GLUCOSE BLOOD TEST: CPT

## 2017-09-05 PROCEDURE — 99239 HOSP IP/OBS DSCHRG MGMT >30: CPT | Performed by: NURSE PRACTITIONER

## 2017-09-05 PROCEDURE — 25010000002 ENOXAPARIN PER 10 MG: Performed by: HOSPITALIST

## 2017-09-05 PROCEDURE — 97161 PT EVAL LOW COMPLEX 20 MIN: CPT

## 2017-09-05 PROCEDURE — 97165 OT EVAL LOW COMPLEX 30 MIN: CPT

## 2017-09-05 RX ORDER — DOXEPIN HYDROCHLORIDE 25 MG/1
75 CAPSULE ORAL NIGHTLY
Status: DISCONTINUED | OUTPATIENT
Start: 2017-09-05 | End: 2017-09-05 | Stop reason: HOSPADM

## 2017-09-05 RX ORDER — L.ACID,PARA/B.BIFIDUM/S.THERM 8B CELL
1 CAPSULE ORAL DAILY
Qty: 30 CAPSULE | Refills: 0 | Status: ON HOLD | OUTPATIENT
Start: 2017-09-05 | End: 2018-03-04

## 2017-09-05 RX ORDER — OXYCODONE HYDROCHLORIDE AND ACETAMINOPHEN 5; 325 MG/1; MG/1
1 TABLET ORAL EVERY 6 HOURS PRN
Qty: 12 TABLET | Refills: 0 | Status: SHIPPED | OUTPATIENT
Start: 2017-09-05 | End: 2018-03-04

## 2017-09-05 RX ORDER — CARBAMAZEPINE 200 MG/1
400 TABLET ORAL EVERY 12 HOURS
Status: DISCONTINUED | OUTPATIENT
Start: 2017-09-05 | End: 2017-09-05 | Stop reason: HOSPADM

## 2017-09-05 RX ORDER — MINOCYCLINE HYDROCHLORIDE 100 MG/1
100 CAPSULE ORAL EVERY 12 HOURS SCHEDULED
Qty: 14 CAPSULE | Refills: 0 | Status: SHIPPED | OUTPATIENT
Start: 2017-09-05 | End: 2017-09-12

## 2017-09-05 RX ORDER — AMLODIPINE BESYLATE 2.5 MG/1
2.5 TABLET ORAL
Status: DISCONTINUED | OUTPATIENT
Start: 2017-09-05 | End: 2017-09-05 | Stop reason: HOSPADM

## 2017-09-05 RX ADMIN — Medication 1 CAPSULE: at 08:15

## 2017-09-05 RX ADMIN — ENOXAPARIN SODIUM 30 MG: 40 INJECTION SUBCUTANEOUS at 08:13

## 2017-09-05 RX ADMIN — GABAPENTIN 600 MG: 300 CAPSULE ORAL at 13:26

## 2017-09-05 RX ADMIN — MICONAZOLE NITRATE 1 APPLICATION: 2 POWDER TOPICAL at 13:20

## 2017-09-05 RX ADMIN — FLUOXETINE 40 MG: 20 CAPSULE ORAL at 08:14

## 2017-09-05 RX ADMIN — PETROLATUM 1 APPLICATION: 42 OINTMENT TOPICAL at 08:14

## 2017-09-05 RX ADMIN — CARBAMAZEPINE 400 MG: 200 TABLET ORAL at 13:29

## 2017-09-05 RX ADMIN — METFORMIN HYDROCHLORIDE 500 MG: 500 TABLET ORAL at 08:11

## 2017-09-05 RX ADMIN — MINOCYCLINE HYDROCHLORIDE 100 MG: 50 CAPSULE ORAL at 08:09

## 2017-09-05 RX ADMIN — METOPROLOL TARTRATE 100 MG: 50 TABLET ORAL at 08:16

## 2017-09-05 RX ADMIN — LOSARTAN POTASSIUM 100 MG: 50 TABLET ORAL at 08:09

## 2017-09-05 RX ADMIN — GABAPENTIN 600 MG: 300 CAPSULE ORAL at 06:23

## 2017-09-05 RX ADMIN — PRIMIDONE 250 MG: 250 TABLET ORAL at 08:12

## 2017-09-05 RX ADMIN — DOCUSATE SODIUM 100 MG: 100 CAPSULE, LIQUID FILLED ORAL at 08:12

## 2017-09-05 RX ADMIN — AMLODIPINE BESYLATE 2.5 MG: 2.5 TABLET ORAL at 13:29

## 2017-09-05 RX ADMIN — LITHIUM CARBONATE 300 MG: 300 TABLET ORAL at 08:15

## 2017-09-05 RX ADMIN — FAMOTIDINE 20 MG: 10 INJECTION, SOLUTION INTRAVENOUS at 08:20

## 2017-09-05 NOTE — NURSING NOTE
Received call back from [facility] and she will have a  assigned. It will be a telephonic case manager first then if meets criteria then a nurse will visit in person.

## 2017-09-05 NOTE — THERAPY DISCHARGE NOTE
Acute Care - Physical Therapy Discharge Summary   Rachel Barr       Patient Name: Eliseo Price  : 1965  MRN: [id number]    Today's Date: 2017  Onset of Illness/Injury or Date of Surgery Date: 17    Date of Referral to PT: 17  Referring Physician: Dr Paez      Admit Date: 2017      PT Recommendation and Plan    Visit Dx:    ICD-10-CM ICD-9-CM   1. Altered mental status, unspecified altered mental status type R41.82 780.97   2. Left foot infection L08.9 686.9   3. Overdose, undetermined intent, initial encounter T50.904A 977.9     E980.5                 PT Charges       17 1114          Time Calculation    Start Time 919  -      PT Received On 17  -      PT - Next Appointment 17  -        User Key  (r) = Recorded By, (t) = Taken By, (c) = Cosigned By    Initials Name Provider Type    TOM Acosta, PT Physical Therapist                  IP PT Goals       17 1453 17 1110       Bed Mobility PT STG    Bed Mobility PT STG, Date Established  17  -     Bed Mobility PT STG, Time to Achieve  3 days  -     Bed Mobility PT STG, Activity Type  all bed mobility  -     Bed Mobility PT STG, [place] Level  conditional independence  -     Bed Mobility PT STG, Date Goal Reviewed 17  -      Bed Mobility PT STG, Outcome goal not met  -      Bed Mobility PT STG, Reason Goal Not Met discharged from facility  -      Transfer Training PT STG    Transfer Training PT STG, Date Established  17  -     Transfer Training PT STG, Time to Achieve  3 days  -     Transfer Training PT STG, Activity Type  all transfers  -     Transfer Training PT STG, [place] Level  supervision required  -     Transfer Training PT STG, Assist Device  --   with appropriate device  -     Transfer Training PT STG, Date Goal Reviewed 17  -      Transfer Training PT STG, Outcome goal not met  -      Transfer Training PT STG, Reason Goal  Not Met discharged from facility  -JW      Gait Training PT STG    Gait Training Goal PT STG, Date Established  09/05/17  -     Gait Training Goal PT STG, Time to Achieve  3 days  -JW     Gait Training Goal PT STG, [place] Level  supervision required  -     Gait Training Goal PT STG, Assist Device  --   with appropriate device  -JW     Gait Training Goal PT STG, Distance to Achieve  10  -JW     Gait Training Goal PT STG, Date Goal Reviewed 09/05/17  -      Gait Training Goal PT STG, Outcome goal not met  -JW      Gait Training Goal PT STG, Reason Goal Not Met discharged from facility  -JW      Patient Education PT STG    Patient Education PT STG, Date Established  09/05/17  -JW     Patient Education PT STG, Time to Achieve  3 days  -JW     Patient Education PT STG, Education Type  precaution per surgeon  -JW     Patient Education PT STG, Education Understanding  demonstrate adequately;verbalize understanding  -JW     Patient Education PT STG, Date Goal Reviewed 09/05/17  -JW      Patient Education PT STG Outcome goal not met  -JW      Patient Education PT STG, Reason Goal Not Met discharged from facility  -        User Key  (r) = Recorded By, (t) = Taken By, (c) = Cosigned By    Initials Name Provider Type    TOM Acosta PT Physical Therapist          Therapy Charges for Today     Code Description Service Date Service Provider Modifiers Qty    78998222975 HC PT EVAL LOW COMPLEXITY 3 9/5/2017 Viviane Acosta, PT GP 1          PT Discharge Summary  Anticipated Discharge Disposition: skilled nursing facility (facility where pt can remain until WBAT)  Reason for Discharge: Discharge from facility  Outcomes Achieved: Discharge from facility occurred on same date as evaluation  Discharge Destination: Home with home health      Viviaen Acosta PT   9/5/2017

## 2017-09-05 NOTE — PLAN OF CARE
Problem: Inpatient Occupational Therapy  Goal: Transfer Training Goal 1 STG- OT  Outcome: Unable to achieve outcome(s) by discharge Date Met:  09/05/17 09/05/17 1508   Transfer Training OT STG   Transfer Training OT STG, Date Goal Reviewed 09/05/17   Transfer Training OT STG, Reason Goal Not Met discharged from facility       Goal: Dymanic Standing Balance Goal STG- OT  Outcome: Unable to achieve outcome(s) by discharge Date Met:  09/05/17 09/05/17 1508   Dynamic Standing Balance OT STG   Dynamic Standing Balance OT STG, Date Goal Reviewed 09/05/17   Dynamic Standing Balance OT STG, Reason Goal Not Met discharged from facility

## 2017-09-05 NOTE — DISCHARGE SUMMARY
Eliseomaciel Price  1965  [id number]    Hospitalists Discharge Summary    Date of Admission: 9/1/2017  Date of Discharge:  9/5/2017    Primary Discharge Diagnoses:   1. Metabolic encephalopathy   2. LLE cellulitis  Secondary Discharge Diagnoses:    3. Hypertension    4. DMII    5. Bipolar D/O   6. Recent right foot lis franc fracture with surgery x 2  PCP  Patient Care Team:  Killian Scales MD as PCP - General  Killian Scales MD as PCP - Family Medicine    Consults:   Consults     Date and Time Order Name Status Description    9/2/2017 0943 Inpatient Consult to Podiatry Completed     9/1/2017 2221 Inpatient Consult to Infectious Diseases Completed         Operations and Procedures Performed:  Procedure(s):  LACERATION REPAIR OF LEFT FOURTH & FIFTH TOES     Xr Foot 3+ View Right    Result Date: 9/3/2017  Narrative: Right foot, 3 views  INDICATION: Follow-up right foot fracture. Compared with 08/18/2017.  FINDINGS: Stable extensive plate and screw fixation involving the first through third tarsals and metatarsals. Casting material somewhat obscures osseous detail. The alignment appears stable. Stable soft tissue swelling. No definite fracture.  This report was finalized on 9/3/2017 8:21 AM by Dr. Alex Hansen MD.      Xr Foot 3+ View Right    Result Date: 8/19/2017  Narrative: EXAM: Right foot 08/18/2017  HISTORY: Postop ORIF  TECHNIQUE: Single view in plaster  COMPARISON: None  FINDINGS: AP view foot following midfoot ORIF.      Impression: Status post ORIF in plaster  This report was finalized on 8/19/2017 7:15 AM by Dr. Quinton De Jesus MD.      Xr Foot 3+ View Right    Result Date: 8/19/2017  Narrative: EXAM: Right foot intraoperative views 08/18/2017  HISTORY: ORIF right foot  TECHNIQUE: 3 intraoperative spot views  COMPARISON: None  FINDINGS: 3 intraoperative spot views obtained during ORIF performed by Dr. Farah      Impression: Intraoperative views  This report was finalized on 8/19/2017 7:08 AM by   Quinton De Jesus MD.      Ct Head Without Contrast    Result Date: 9/1/2017  Narrative: CT HEAD, NONCONTRAST, 09/01/2017:  HISTORY: 51-year-old male brought to the ED after home health nurse noted acute mental status changes today.  TECHNIQUE: CT examination of the head without IV contrast. Radiation dose reduction techniques were utilized, including automated exposure control and exposure modulation based on body size.  FINDINGS: No acute intracranial abnormality is identified.  Mild generalized cerebral atrophy. Mild diffuse low-attenuation white matter changes, nonspecific but likely related to chronic microvascular disease. These findings are stable since 07/22/2017.  No evidence of intracranial hemorrhage, mass, mass effect, cerebral edema or hydrocephalus.      Impression: 1. No acute intracranial abnormality. 2. Stable mild diffuse chronic changes as noted above. 3. No change since 07/22/2017.  This report was finalized on 9/1/2017 4:30 PM by Dr. Bigg Del Angel MD.      Xr Toe 2+ View Left    Result Date: 9/3/2017  Narrative: Left foot, 3 views  INDICATION: Left foot pain and laceration today. No comparisons.  FINDINGS: There is a tiny osseous fragment adjacent to the distal end of the proximal phalanx of the fifth toe which may represent a tiny avulsion fracture. No other definite fracture. The alignment appears normal. There are degenerative changes involving the midfoot. Calcaneal spurring.  This report was finalized on 9/3/2017 8:23 AM by Dr. Alex Hansen MD.      Allergies:  is allergic to lisinopril.    Liborio  Hydrocodone 8/24/17 per report of 9/2/17    Discharge Medications:   Eliseo Price   Home Medication Instructions LUZ MARIA:[id number]    Printed on:09/05/17 1141   Medication Information                      amLODIPine (NORVASC) 2.5 MG tablet  Take 2.5 mg by mouth Daily.             carBAMazepine (TEGretol) 200 MG tablet  Take 400 mg by mouth 2 (two) times a day.             doxepin (SINEquan)  75 MG capsule  Take 75 mg by mouth every night.             FLUoxetine (PROzac) 20 MG capsule  Take 40 mg by mouth Daily.             gabapentin (NEURONTIN) 800 MG tablet  Take 800 mg by mouth 3 (three) times a day.             hydrophor (AQUAPHOR) ointment ointment  Apply 1 application topically Daily.             lactobacillus acidophilus (RISAQUAD) capsule capsule  Take 1 capsule by mouth Daily.             lithium 300 MG tablet  Take 300 mg by mouth 2 (two) times a day.             LORazepam (ATIVAN) 0.5 MG tablet  Take 0.5 mg by mouth Daily As Needed for Anxiety.             losartan (COZAAR) 100 MG tablet  Take 1 tablet by mouth Daily.             metoprolol tartrate (LOPRESSOR) 100 MG tablet  Take 100 mg by mouth 2 (two) times a day.             miconazole (MICOTIN) 2 % powder  Apply 1 application topically Every 12 (Twelve) Hours.             minocycline (MINOCIN,DYNACIN) 100 MG capsule  Take 1 capsule by mouth Every 12 (Twelve) Hours for 7 days. Indications: Skin and Soft Tissue Infection             oxyCODONE-acetaminophen (PERCOCET) 5-325 MG per tablet  Take 1 tablet by mouth Every 6 (Six) Hours As Needed for Moderate Pain  or Severe Pain .             primidone (MYSOLINE) 250 MG tablet  Take 250 mg by mouth 2 (two) times a day.             Probiotic Product (RISAQUAD-2) capsule capsule  Take 1 capsule by mouth Daily.               History of Present Illness: Taken from [facility] on admit:   As per Dr Villalta's note:  This patient arrives via EMS for evaluation of altered mental status after he was found down in his home.  Apparently he was scheduled to have a surgical repair of some lacerations to his left foot this afternoon by Dr. Farah.  He was seen in Dr. Farah's office yesterday for evaluation of these lacerations.  Apparently the patient himself could not remember why or how he sustained the lacerations to his foot.  Nevertheless, Dr. Farah had planned to take him to the operating room to repair these  lacerations today.  When the patient's mother went to his house to check on him today she found him on the floor beside his wheelchair and he was clearly confused with some decreased responsiveness.  It looked like some furniture turned over.  She presumed that he had fallen out of his chair and hit the furniture.  He had also urinated on himself.  It is unclear exactly how long he had been on the floor.  She and her family called 911 at that time and had them pick him up to bring to our hospital.  The patient arrived at our facility with some clear signs of confusion and somnolence.  The remainder of the history is really quite limited due to his confusion.  He lives alone so there were no witnesses to what happened earlier today at home.  The patient is able to wake up and answer a few questions.  He says he has no specific areas of pain right now.  He does say that he took some medications earlier this morning and indicates that some of them were for pain.  He does not make any complaints about any particular symptoms at this time, however, again his ability to convey clear information is quite compromised.     Patient is a readmission.  He was here for a Lis Franc fracture and has multiple medical problems.  He was supposed to have surgery today for lacerations. Dr. Farah saw him yesterday.  He did not show up and have surgery.  He was found by his family in the floor.  It looked as if he spilled out of his wheel chair.  In the ER it was possible to awaken him and he would follow commands.  He was very confused however.  He took extra gabapentin today. It is not known how much he took.  His drug screen came back beos and barbituates.  He has a history of metabolic encephalopathy.  CT scan of the head was done and was unremarkable .  Dr. Farah recommended that the ER put the patient on Vancomycin and Zosyn for the patient's severe cellulitis.  It is unknown how he got the lacerations.  There is a culture that can  be acquired.  Ammonia was normal.  It is unknown how long he was in the floor.      Patient is alert now and denies any sx of headache, chest pain, shortness of breath, abdominal pain, n/v, dysuria or recent hx of blood in stool.     Hospital Course  1. Metabolic encephalopathy: resolved  Unclear whether related to medication/illness  H/O same in past that clears quickly after admission with antibiotic and management of infection. Doubtful over-medication, possible incorrect medication usage, patient unsure.     2. LLE cellulitis: Management per Dr. Farah, Dr. Arce  Previously on IV antibiotics, now near complete resolution  Continue minocycline 100 mg every 12 hours through 9/2/17 to 9/12/17  Continues to improve, erythema nearly gone     3. Hypertension: at goal on losartan 100 mg every 12 hours/metoprolol tartrate 100 mg twice daily       4. DMII: Last A1C 4.6%, glucose at goal, discontinue metformin  Continue accucheck/SSI and plan to d/c if remains at goal      5. Bipolar D/O: no acute issues, resume all home medications     6. Recent right foot lis franc fracture with surgery x 2: management per Dr. Farah  Has f/u scheduled 9/7/16    Last Lab Results:   Lab Results (most recent)     Procedure Component Value Units Date/Time    POC Glucose Fingerstick [[id number]]  (Normal) Collected:  09/01/17 1315    Specimen:  Blood Updated:  09/01/17 1323     Glucose 101 mg/dL     Narrative:       Meter: [id number] : [id number] Reji Lemus RN    CBC & Differential [[id number]] Collected:  09/01/17 1344    Specimen:  Blood Updated:  09/01/17 1358    Narrative:       The following orders were created for panel order CBC & Differential.  Procedure                               Abnormality         Status                     ---------                               -----------         ------                     CBC Auto Differential[[id number]]        Abnormal            Final result                 Please view results for  these tests on the individual orders.    CBC Auto Differential [[id number]]  (Abnormal) Collected:  09/01/17 1344    Specimen:  Blood Updated:  09/01/17 1358     WBC 11.54 (H) 10*3/mm3      RBC 3.97 (L) 10*6/mm3      Hemoglobin 12.9 (L) g/dL      Hematocrit 38.5 (L) %      MCV 97.0 (H) fL      MCH 32.5 (H) pg      MCHC 33.5 g/dL      RDW 11.8 %      RDW-SD 42.0 fl      MPV 8.6 fL      Platelets 319 10*3/mm3      Neutrophil % 74.8 (H) %      Lymphocyte % 11.6 (L) %      Monocyte % 9.1 (H) %      Eosinophil % 3.7 %      Basophil % 0.3 %      Immature Grans % 0.5 %      Neutrophils, Absolute 8.63 (H) 10*3/mm3      Lymphocytes, Absolute 1.34 10*3/mm3      Monocytes, Absolute 1.05 (H) 10*3/mm3      Eosinophils, Absolute 0.43 (H) 10*3/mm3      Basophils, Absolute 0.03 10*3/mm3      Immature Grans, Absolute 0.06 (H) 10*3/mm3      nRBC 0.0 /100 WBC     Protime-INR [[id number]]  (Normal) Collected:  09/01/17 1344    Specimen:  Blood Updated:  09/01/17 1408     Protime 14.2 Seconds      INR 1.10    Narrative:       Therapeutic Ranges for INR: 2.0-3.0 (PT 20-30)                              2.5-3.5 (PT 25-34)    Comprehensive Metabolic Panel [[id number]]  (Abnormal) Collected:  09/01/17 1344    Specimen:  Blood Updated:  09/01/17 1421     Glucose 120 (H) mg/dL      BUN 15 mg/dL      Creatinine 0.72 (L) mg/dL      Sodium 136 mmol/L      Potassium 5.3 (H) mmol/L      Chloride 96 (L) mmol/L      CO2 28.8 mmol/L      Calcium 9.4 mg/dL      Total Protein 7.9 g/dL      Albumin 4.00 g/dL      ALT (SGPT) 14 U/L      AST (SGOT) 15 U/L      Alkaline Phosphatase 86 U/L      Total Bilirubin 0.3 mg/dL      eGFR Non African Amer 114 mL/min/1.73      Globulin 3.9 gm/dL      A/G Ratio 1.0 g/dL      BUN/Creatinine Ratio 20.8     Anion Gap 11.2 mmol/L     Urinalysis With / Culture If Indicated [[id number]]  (Abnormal) Collected:  09/01/17 1558    Specimen:  Urine from Urine, Clean Catch Updated:  09/01/17 1648     Color, UA Yellow     Appearance,  UA Clear     pH, UA 7.0     Specific Gravity, UA 1.020     Glucose, UA Negative     Ketones, UA Negative     Bilirubin, UA Negative     Blood, UA Small (1+) (A)     Protein, UA Negative     Leuk Esterase, UA Negative     Nitrite, UA Negative     Urobilinogen, UA 0.2 E.U./dL    Urine Drug Screen [[id number]]  (Abnormal) Collected:  09/01/17 1558    Specimen:  Urine from Urine, Clean Catch Updated:  09/01/17 1715     THC, Screen, Urine Negative     Phencyclidine (PCP), Urine Negative     Cocaine Screen, Urine Negative     Methamphetamine, Urine Negative     Opiate Screen Negative     Amphetamine Screen, Urine Negative     Benzodiazepine Screen, Urine Positive (A)     Tricyclic Antidepressants Screen Negative     Methadone Screen, Urine Negative     Barbiturates Screen, Urine Positive (A)     Oxycodone Screen, Urine Negative     Propoxyphene Screen Negative     Buprenorphine, Screen, Urine Negative    Narrative:       Urine drug screen results are to be used for medical purposes only.  They are not to be used for legal purposes such as employment testing.  Negative results do not necessarily mean the complete absence of a subtance, but rather that the result is less than the cutoff for that substance.  Positive results are unconfirmed and considered Preliminary Positive.  [facility] does not automatically confirm Postitive Unconfirmed results.  The physician may request (order) an Unconfirmed Positive result to be sent out for confirmation.      Negative Thresholds for Drugs Screened:    THC screen, urine                          50 ng/ml  Phenycyclidine (PCP), urine                25 ng/ml  Cocaine screen, urine                     150 ng/ml  Methamphetamine, urine                    500 ng/ml  Opiate screen, urine                      100 ng/ml  Amphetamine screen, urine                 500 ng/ml  Benzodiazepine screen, urine              150 ng/ml  Tricyclic Antidepressants screen, urine   300  ng/ml  Methadone screen, urine                   200 ng/ml  Barbiturates screen, urine                200 ng/ml  Oxycodone screen, urine                   100 ng/ml  Propoxyphene screen, urine                300 ng/ml  Buprenorphine screen, urine                10 ng/ml    Urinalysis, Microscopic Only [[id number]]  (Abnormal) Collected:  09/01/17 1558    Specimen:  Urine from Urine, Clean Catch Updated:  09/01/17 1729     RBC, UA 0-2 (A) /HPF      WBC, UA None Seen /HPF      Bacteria, UA None Seen /HPF      Squamous Epithelial Cells, UA 0-2 /HPF      Hyaline Casts, UA None Seen /LPF      Methodology Manual Light Microscopy    Ammonia [[id number]]  (Normal) Collected:  09/01/17 1719    Specimen:  Blood Updated:  09/01/17 1810     Ammonia 35 umol/L     Lactic Acid, Plasma [[id number]]  (Normal) Collected:  09/02/17 0011    Specimen:  Blood Updated:  09/02/17 0031     Lactate 0.9 mmol/L     POC Glucose Fingerstick [[id number]]  (Normal) Collected:  09/02/17 0142    Specimen:  Blood Updated:  09/02/17 0159     Glucose 96 mg/dL     Narrative:       Meter: [id number] : [id number] Ben Corbin NURSING ASSISTANT    CBC Auto Differential [[id number]]  (Abnormal) Collected:  09/02/17 0513    Specimen:  Blood Updated:  09/02/17 0535     WBC 7.73 10*3/mm3      RBC 3.58 (L) 10*6/mm3      Hemoglobin 11.5 (L) g/dL      Hematocrit 35.2 (L) %      MCV 98.3 (H) fL      MCH 32.1 (H) pg      MCHC 32.7 g/dL      RDW 11.9 %      RDW-SD 43.1 fl      MPV 8.5 fL      Platelets 248 10*3/mm3      Neutrophil % 69.6 %      Lymphocyte % 13.8 (L) %      Monocyte % 10.1 (H) %      Eosinophil % 5.6 (H) %      Basophil % 0.4 %      Immature Grans % 0.5 %      Neutrophils, Absolute 5.38 10*3/mm3      Lymphocytes, Absolute 1.07 10*3/mm3      Monocytes, Absolute 0.78 10*3/mm3      Eosinophils, Absolute 0.43 (H) 10*3/mm3      Basophils, Absolute 0.03 10*3/mm3      Immature Grans, Absolute 0.04 (H) 10*3/mm3      nRBC 0.0 /100 WBC     Basic Metabolic  Panel [[id number]]  (Abnormal) Collected:  09/02/17 0513    Specimen:  Blood Updated:  09/02/17 0553     Glucose 89 mg/dL      BUN 10 mg/dL      Creatinine 0.55 (L) mg/dL      Sodium 137 mmol/L      Potassium 4.4 mmol/L      Chloride 101 mmol/L      CO2 25.7 mmol/L      Calcium 8.8 mg/dL      eGFR Non African Amer >150 mL/min/1.73      BUN/Creatinine Ratio 18.2     Anion Gap 10.3 mmol/L     Narrative:       GFR Normal >60  Chronic Kidney Disease <60  Kidney Failure <15    Magnesium [[id number]]  (Abnormal) Collected:  09/02/17 0513    Specimen:  Blood Updated:  09/02/17 0553     Magnesium 1.5 (L) mg/dL     Phosphorus [[id number]]  (Normal) Collected:  09/02/17 0513    Specimen:  Blood Updated:  09/02/17 0553     Phosphorus 4.0 mg/dL     POC Glucose Fingerstick [[id number]]  (Normal) Collected:  09/02/17 0736    Specimen:  Blood Updated:  09/02/17 0748     Glucose 89 mg/dL     Narrative:       Meter: [id number] : [id number] Nancy Vivian NA CERT.    POC Glucose Fingerstick [[id number]]  (Normal) Collected:  09/02/17 1114    Specimen:  Blood Updated:  09/02/17 1134     Glucose 90 mg/dL     Narrative:       Meter: [id number] : [id number] Nancy Vivian NA CERT.    Troponin [[id number]]  (Normal) Collected:  09/02/17 1227    Specimen:  Blood Updated:  09/02/17 1250     Troponin T <0.010 ng/mL     Narrative:       Troponin T Reference Ranges:  Less than 0.03 ng/mL:    Negative for AMI  0.03 to 0.09 ng/mL:      Indeterminant for AMI  Greater than 0.09 ng/mL: Positive for AMI    POC Glucose Fingerstick [[id number]]  (Normal) Collected:  09/02/17 1654    Specimen:  Blood Updated:  09/02/17 1701     Glucose 94 mg/dL     Narrative:       Meter: [id number] : [id number] Florian [facility] ASSISTANT-POOL    Troponin [[id number]]  (Normal) Collected:  09/02/17 1802    Specimen:  Blood Updated:  09/02/17 1831     Troponin T <0.010 ng/mL     Narrative:       Troponin T Reference Ranges:  Less than 0.03 ng/mL:     Negative for AMI  0.03 to 0.09 ng/mL:      Indeterminant for AMI  Greater than 0.09 ng/mL: Positive for AMI    POC Glucose Fingerstick [[id number]]  (Normal) Collected:  09/02/17 2052    Specimen:  Blood Updated:  09/02/17 2058     Glucose 97 mg/dL     Narrative:       Meter: [id number] : [id number] Josh Ruiz RN Validator    Troponin [[id number]]  (Normal) Collected:  09/02/17 2359    Specimen:  Blood Updated:  09/03/17 0048     Troponin T <0.010 ng/mL     Narrative:       Troponin T Reference Ranges:  Less than 0.03 ng/mL:    Negative for AMI  0.03 to 0.09 ng/mL:      Indeterminant for AMI  Greater than 0.09 ng/mL: Positive for AMI    Vancomycin, Random [[id number]] Collected:  09/03/17 0259    Specimen:  Blood Updated:  09/03/17 0452     Vancomycin Random 20.90 mcg/mL     CBC & Differential [[id number]] Collected:  09/03/17 0623    Specimen:  Blood Updated:  09/03/17 0631    Narrative:       The following orders were created for panel order CBC & Differential.  Procedure                               Abnormality         Status                     ---------                               -----------         ------                     CBC Auto Differential[[id number]]        Abnormal            Final result                 Please view results for these tests on the individual orders.    CBC Auto Differential [[id number]]  (Abnormal) Collected:  09/03/17 0623    Specimen:  Blood Updated:  09/03/17 0631     WBC 5.25 10*3/mm3      RBC 3.40 (L) 10*6/mm3      Hemoglobin 10.7 (L) g/dL      Hematocrit 33.4 (L) %      MCV 98.2 (H) fL      MCH 31.5 (H) pg      MCHC 32.0 g/dL      RDW 11.6 %      RDW-SD 42.1 fl      MPV 8.5 fL      Platelets 205 10*3/mm3      Neutrophil % 59.2 %      Lymphocyte % 20.4 %      Monocyte % 11.0 (H) %      Eosinophil % 8.0 (H) %      Basophil % 0.8 %      Immature Grans % 0.6 (H) %      Neutrophils, Absolute 3.11 10*3/mm3      Lymphocytes, Absolute 1.07 10*3/mm3      Monocytes, Absolute 0.58  10*3/mm3      Eosinophils, Absolute 0.42 (H) 10*3/mm3      Basophils, Absolute 0.04 10*3/mm3      Immature Grans, Absolute 0.03 10*3/mm3      nRBC 0.0 /100 WBC     Lactic Acid, Plasma [[id number]]  (Normal) Collected:  09/03/17 0623    Specimen:  Blood Updated:  09/03/17 0644     Lactate 0.6 mmol/L     Comprehensive Metabolic Panel [[id number]]  (Abnormal) Collected:  09/03/17 0623    Specimen:  Blood Updated:  09/03/17 0651     Glucose 89 mg/dL      BUN 9 mg/dL      Creatinine 0.52 (L) mg/dL      Sodium 137 mmol/L      Potassium 4.0 mmol/L      Chloride 103 mmol/L      CO2 22.7 mmol/L      Calcium 8.2 (L) mg/dL      Total Protein 6.2 g/dL      Albumin 3.10 (L) g/dL      ALT (SGPT) 13 U/L      AST (SGOT) 12 U/L      Alkaline Phosphatase 66 U/L      Total Bilirubin 0.2 mg/dL      eGFR Non African Amer >150 mL/min/1.73      Globulin 3.1 gm/dL      A/G Ratio 1.0 g/dL      BUN/Creatinine Ratio 17.3     Anion Gap 11.3 mmol/L     POC Glucose Fingerstick [[id number]]  (Normal) Collected:  09/03/17 0714    Specimen:  Blood Updated:  09/03/17 0720     Glucose 88 mg/dL     Narrative:       Meter: [id number] : [id number] Florian [facility] NURSING ASSISTANT-POOL    POC Glucose Fingerstick [[id number]]  (Normal) Collected:  09/03/17 1110    Specimen:  Blood Updated:  09/03/17 1121     Glucose 103 mg/dL     Narrative:       Meter: [id number] : [id number] Liz Canales RN    POC Glucose Fingerstick [[id number]]  (Normal) Collected:  09/03/17 1633    Specimen:  Blood Updated:  09/03/17 1639     Glucose 107 mg/dL     Narrative:       Meter: [id number] : [id number] Florian [facility] NURSING ASSISTANT-POOL    POC Glucose Fingerstick [[id number]]  (Normal) Collected:  09/03/17 2037    Specimen:  Blood Updated:  09/03/17 2044     Glucose 93 mg/dL     Narrative:       Meter: [id number] : [id number] Ben Corbin NURSING ASSISTANT    POC Glucose Fingerstick [[id number]]  (Normal) Collected:  09/04/17 0729    Specimen:  Blood  Updated:  09/04/17 0736     Glucose 87 mg/dL     Narrative:       Meter: [id number] : [id number] Sarah Juddie NURSING ASSISTANT    POC Glucose Fingerstick [[id number]]  (Normal) Collected:  09/04/17 1141    Specimen:  Blood Updated:  09/04/17 1150     Glucose 105 mg/dL     Narrative:       Meter: [id number] : [id number] Sarah Juddie NURSING ASSISTANT    Magnesium [[id number]]  (Abnormal) Collected:  09/03/17 0623    Specimen:  Blood Updated:  09/04/17 1410     Magnesium 1.6 (L) mg/dL     POC Glucose Fingerstick [[id number]]  (Normal) Collected:  09/04/17 1640    Specimen:  Blood Updated:  09/04/17 1646     Glucose 94 mg/dL     Narrative:       Meter: [id number] : [id number] Sarah Juddie NURSING ASSISTANT    POC Glucose Fingerstick [[id number]]  (Normal) Collected:  09/04/17 2205    Specimen:  Blood Updated:  09/04/17 2215     Glucose 79 mg/dL     Narrative:       Meter: [id number] : [id number] Pepe Portillo Staff-Nurse-Pool    POC Glucose Fingerstick [[id number]]  (Normal) Collected:  09/05/17 0722    Specimen:  Blood Updated:  09/05/17 0728     Glucose 101 mg/dL     Narrative:       Meter: [id number] : [id number] Lv Mayen Nursing Assistant        Imaging Results (most recent)     Procedure Component Value Units Date/Time    CT Head Without Contrast [[id number]] Collected:  09/01/17 1626     Updated:  09/01/17 1632    Narrative:       CT HEAD, NONCONTRAST, 09/01/2017:     HISTORY:  51-year-old male brought to the ED after home health nurse noted acute  mental status changes today.     TECHNIQUE:  CT examination of the head without IV contrast. Radiation dose reduction  techniques were utilized, including automated exposure control and  exposure modulation based on body size.     FINDINGS:  No acute intracranial abnormality is identified.     Mild generalized cerebral atrophy. Mild diffuse low-attenuation white  matter changes, nonspecific but likely related to chronic  microvascular  disease. These findings are stable since 07/22/2017.     No evidence of intracranial hemorrhage, mass, mass effect, cerebral  edema or hydrocephalus.       Impression:       1. No acute intracranial abnormality.  2. Stable mild diffuse chronic changes as noted above.  3. No change since 07/22/2017.     This report was finalized on 9/1/2017 4:30 PM by Dr. Bigg Del Angel MD.       XR Foot 3+ View Right [[id number]] Collected:  09/03/17 0819     Updated:  09/03/17 0823    Narrative:       Right foot, 3 views     INDICATION: Follow-up right foot fracture. Compared with 08/18/2017.     FINDINGS:  Stable extensive plate and screw fixation involving the first through  third tarsals and metatarsals. Casting material somewhat obscures  osseous detail. The alignment appears stable. Stable soft tissue  swelling. No definite fracture.     This report was finalized on 9/3/2017 8:21 AM by Dr. Alex Hansen MD.       XR Toe 2+ View Left [[id number]] Collected:  09/03/17 0821     Updated:  09/03/17 0825    Narrative:       Left foot, 3 views     INDICATION: Left foot pain and laceration today. No comparisons.     FINDINGS:  There is a tiny osseous fragment adjacent to the distal end of the  proximal phalanx of the fifth toe which may represent a tiny avulsion  fracture. No other definite fracture. The alignment appears normal.  There are degenerative changes involving the midfoot. Calcaneal  spurring.     This report was finalized on 9/3/2017 8:23 AM by Dr. Alex Hansen MD.           PROCEDURES  Procedure(s):  LACERATION REPAIR OF LEFT FOURTH & FIFTH TOES    Condition on Discharge:  Stable    Physical Exam at Discharge  Vital Signs  Temp:  [98 °F (36.7 °C)-98.5 °F (36.9 °C)] 98.1 °F (36.7 °C)  Heart Rate:  [65-71] 65  Resp:  [18-20] 20  BP: (135-143)/(82-93) 143/90    Physical Exam:  Physical Exam   Constitutional: Patient appears well-developed and well-nourished and in no acute distress, obese  HEENT:   Head:  Normocephalic and atraumatic.   Eyes:  Pupils are equal, round, and reactive to light. EOM are intact. Sclera are anicteric and non-injected.  Mouth and Throat: Patient has moist mucous membranes. Oropharynx is clear of any erythema or exudate.     Neck: Neck supple. No JVD present. No thyromegaly present. No lymphadenopathy present.  Cardiovascular: Regular rate, regular rhythm, S1 normal and S2 normal.  Exam reveals no gallop and no friction rub.  No murmur heard.  Pulmonary/Chest: Lungs are clear to auscultation bilaterally. No respiratory distress. No wheezes. No rhonchi. No rales.   Abdominal: Obese, Soft. Bowel sounds are normal. No distension and no mass. There is no hepatosplenomegaly. There is no tenderness.   Musculoskeletal: Normal Muscle tone  Extremities: No edema. Pulses are palpable in all 4 extremities.  Neurological: Patient is alert and oriented to person, place, and time. Cranial nerves II-XII are grossly intact with no focal deficits.  Skin: No erythema, left foot wrapped in ace-clean, dry and intact. RLE with ace/splint. Bilateral feet with toes skin fungal appearance. Skin is warm. No rash noted. Nails show no clubbing.  No cyanosis or erythema.    Discharge Disposition  Home    Visiting Nurse:    Yes     Home PT/OT:  Yes     Home Safety Evaluation:  Yes     DME  Already has    Discharge Diet:         Dietary Orders            Start     Ordered    09/02/17 0945  Diet Regular; Cardiac, Consistent Carbohydrate  Diet Effective Now     Question Answer Comment   Diet Texture / Consistency Regular    Common Modifiers Cardiac    Common Modifiers Consistent Carbohydrate        09/02/17 0945        Activity at Discharge:  As per Dr. Farah, DARBY RLE    Pre-discharge education  Diabetic, Wound Care, medications, follow up    Follow-up Appointments  No future appointments.  Additional Instructions for the Follow-ups that You Need to Schedule     Discharge Follow-Up With Specified Provider    As directed     To:  Dr. Farah   Follow Up Details:  9/7/17 as scheduled       Discharge Follow-up with PCP    As directed    Follow Up Details:  1 week                 Test Results Pending at Discharge: NONE     Sarah Gómez, MAICO  09/05/17  11:41 AM    Time: Discharge over 30 min (if over 30 minutes give explanation as to why it took greater than 30 minutes)  Secondary to:  D/W patient/case management/PT/OT  Coordination of care at home  Coordination of follow up  Medication reconciliation

## 2017-09-05 NOTE — PLAN OF CARE
Problem: Inpatient Physical Therapy  Goal: Bed Mobility Goal STG- PT  Outcome: Unable to achieve outcome(s) by discharge Date Met:  09/05/17 09/05/17 1110 09/05/17 1453   Bed Mobility PT STG   Bed Mobility PT STG, Date Established 09/05/17 --    Bed Mobility PT STG, Time to Achieve 3 days --    Bed Mobility PT STG, Activity Type all bed mobility --    Bed Mobility PT STG, [place] Level conditional independence --    Bed Mobility PT STG, Date Goal Reviewed --  09/05/17   Bed Mobility PT STG, Outcome --  goal not met   Bed Mobility PT STG, Reason Goal Not Met --  discharged from facility       Goal: Transfer Training Goal 1 STG- PT  Outcome: Unable to achieve outcome(s) by discharge Date Met:  09/05/17 09/05/17 1110 09/05/17 1453   Transfer Training PT STG   Transfer Training PT STG, Date Established 09/05/17 --    Transfer Training PT STG, Time to Achieve 3 days --    Transfer Training PT STG, Activity Type all transfers --    Transfer Training PT STG, [place] Level supervision required --    Transfer Training PT STG, Assist Device (with appropriate device) --    Transfer Training PT STG, Date Goal Reviewed --  09/05/17   Transfer Training PT STG, Outcome --  goal not met   Transfer Training PT STG, Reason Goal Not Met --  discharged from facility       Goal: Gait Training Goal STG- PT  Outcome: Unable to achieve outcome(s) by discharge Date Met:  09/05/17 09/05/17 1110 09/05/17 1453   Gait Training PT STG   Gait Training Goal PT STG, Date Established 09/05/17 --    Gait Training Goal PT STG, Time to Achieve 3 days --    Gait Training Goal PT STG, [place] Level supervision required --    Gait Training Goal PT STG, Assist Device (with appropriate device) --    Gait Training Goal PT STG, Distance to Achieve 10 --    Gait Training Goal PT STG, Date Goal Reviewed --  09/05/17   Gait Training Goal PT STG, Outcome --  goal not met   Gait Training Goal PT STG, Reason Goal Not Met --  discharged from  facility       Goal: Patient Education Goal STG- PT  Outcome: Unable to achieve outcome(s) by discharge Date Met:  09/05/17 09/05/17 1110 09/05/17 1453   Patient Education PT STG   Patient Education PT STG, Date Established 09/05/17 --    Patient Education PT STG, Time to Achieve 3 days --    Patient Education PT STG, Education Type precaution per surgeon --    Patient Education PT STG, Education Understanding demonstrate adequately;verbalize understanding --    Patient Education PT STG, Date Goal Reviewed --  09/05/17   Patient Education PT STG Outcome --  goal not met   Patient Education PT STG, Reason Goal Not Met --  discharged from facility

## 2017-09-05 NOTE — PLAN OF CARE
Problem: Inpatient Physical Therapy  Goal: Bed Mobility Goal STG- PT  Outcome: Ongoing (interventions implemented as appropriate)    09/05/17 1110   Bed Mobility PT STG   Bed Mobility PT STG, Date Established 09/05/17   Bed Mobility PT STG, Time to Achieve 3 days   Bed Mobility PT STG, Activity Type all bed mobility   Bed Mobility PT STG, [place] Level conditional independence       Goal: Transfer Training Goal 1 STG- PT  Outcome: Ongoing (interventions implemented as appropriate)    09/05/17 1110   Transfer Training PT STG   Transfer Training PT STG, Date Established 09/05/17   Transfer Training PT STG, Time to Achieve 3 days   Transfer Training PT STG, Activity Type all transfers   Transfer Training PT STG, [place] Level supervision required   Transfer Training PT STG, Assist Device (with appropriate device)       Goal: Gait Training Goal STG- PT  Outcome: Ongoing (interventions implemented as appropriate)    09/05/17 1110   Gait Training PT STG   Gait Training Goal PT STG, Date Established 09/05/17   Gait Training Goal PT STG, Time to Achieve 3 days   Gait Training Goal PT STG, [place] Level supervision required   Gait Training Goal PT STG, Assist Device (with appropriate device)   Gait Training Goal PT STG, Distance to Achieve 10       Goal: Patient Education Goal STG- PT  Outcome: Ongoing (interventions implemented as appropriate)    09/05/17 1110   Patient Education PT STG   Patient Education PT STG, Date Established 09/05/17   Patient Education PT STG, Time to Achieve 3 days   Patient Education PT STG, Education Type precaution per surgeon   Patient Education PT STG, Education Understanding demonstrate adequately;verbalize understanding

## 2017-09-05 NOTE — PLAN OF CARE
Problem: Patient Care Overview (Adult)  Goal: Plan of Care Review  Outcome: Ongoing (interventions implemented as appropriate)    09/05/17 1112   Coping/Psychosocial Response Interventions   Plan Of Care Reviewed With patient   Outcome Evaluation   Outcome Summary/Follow up Plan Physical therapy evaluation complete. Patient requires SBA for bed mobility and CGA for sit to stand transfers. Patient able to complete stand pivot transfer with CGA with initial assistance/cues to maintain NWB on right LE during mobility. At this time, recommend patient d/c to SNF where patient can remain until WBAT as patient is not safe to consistently manage NWB status at home.

## 2017-09-05 NOTE — PROGRESS NOTES
"Hospitalist Team      Patient Care Team:  Killian Scales MD as PCP - General  Killian Scales MD as PCP - Family Medicine        Chief Complaint:  Follow-up AMS and Cellultitis    Subjective    Patient seen earlier today.  Mentation clear.  Tolerating PO.  Denies chest pain and dyspnea.    Objective    Vital Signs  Temp:  [98 °F (36.7 °C)-98.7 °F (37.1 °C)] 98.5 °F (36.9 °C)  Heart Rate:  [66-71] 71  Resp:  [18-20] 18  BP: (126-142)/(79-89) 137/85  Oxygen Therapy  SpO2: 94 %  Pulse Oximetry Type: Continuous  O2 Device: room air  Flow (L/min): 1  Oximetry Probe Site Changed: Yes}    Flowsheet Rows         First Filed Value    Admission Height  71.5\" (181.6 cm) Documented at 09/01/2017 1307    Admission Weight  276 lb (125 kg) Documented at 09/01/2017 1307            Physical Exam:    General Appearance:    Alert, cooperative, in no acute distress   Lungs:     Clear to auscultation,respirations regular, even and                  unlabored    Heart:    Regular rhythm and normal rate, normal S1 and S2, no            murmur, no gallop, no rub, no click   Abdomen:     Obese, soft and NT w/ active BS.   Extremities:   Moves all extremities well, no edema, no cyanosis, no             redness   Pulses:   Radial Pulses palpable and equal bilaterally   Neurologic:   Cranial nerves 2 - 12 grossly intact         Results Review:     I reviewed the patient's new clinical results.  Lab Results (last 24 hours)     Procedure Component Value Units Date/Time    POC Glucose Fingerstick [[id number]]  (Normal) Collected:  09/04/17 0729    Specimen:  Blood Updated:  09/04/17 0736     Glucose 87 mg/dL     Narrative:       Meter: [id number] : [id number] Sarah Robledo NURSING ASSISTANT    POC Glucose Fingerstick [[id number]]  (Normal) Collected:  09/04/17 1141    Specimen:  Blood Updated:  09/04/17 1150     Glucose 105 mg/dL     Narrative:       Meter: [id number] : [id number] Sarah Robledo NURSING ASSISTANT    Magnesium [[id number]] "  (Abnormal) Collected:  09/03/17 0623    Specimen:  Blood Updated:  09/04/17 1410     Magnesium 1.6 (L) mg/dL     POC Glucose Fingerstick [[id number]]  (Normal) Collected:  09/04/17 1640    Specimen:  Blood Updated:  09/04/17 1646     Glucose 94 mg/dL     Narrative:       Meter: [id number] : [id number] Sarah Meena NURSING ASSISTANT          Imaging Results (last 24 hours)     ** No results found for the last 24 hours. **            ECG/EMG Results (most recent)     Procedure Component Value Units Date/Time    ECG 12 Lead [[id number]] Collected:  09/02/17 1255     Updated:  09/02/17 1807    Narrative:       RR Interval= 845 ms  WV Interval= 180 ms  QRSD Interval= 96 ms  QT Interval= 404 ms  QTc Interval= 439 ms  Heart Rate= 71 ms  P Axis= 45 deg  QRS Axis= 39 deg  T Wave Axis= 30 deg  I: 40 Axis= 6 deg  T: 40 Axis= 71 deg  ST Axis= 55 deg  SINUS RHYTHM  NO SIGNIFICANT CHANGE FROM PREVIOUS ECG  Electronically Signed by:  Anish GreerCRISTOBAL) ([facility]) 02-Sep-2017 18:04:42  Date and Time of Study: 2017-09-02 12:55:44          Medication Review:   I have reviewed the patient's current medication list    Current Facility-Administered Medications:   •  acetaminophen (TYLENOL) tablet 650 mg, 650 mg, Oral, Q4H PRN, 650 mg at 09/02/17 1031 **OR** acetaminophen (TYLENOL) suppository 650 mg, 650 mg, Rectal, Q4H PRN, Karen Lizama DO  •  dextrose (D50W) solution 25 g, 25 g, Intravenous, Q15 Min PRN, Art Aleman MD  •  dextrose (GLUTOSE) oral gel 15 g, 15 g, Oral, Q15 Min PRN, Art Aleman MD  •  docusate sodium (COLACE) capsule 100 mg, 100 mg, Oral, BID, Art Aleman MD, 100 mg at 09/04/17 1733  •  enoxaparin (LOVENOX) syringe 30 mg, 30 mg, Subcutaneous, Daily, Karen Lizama DO, 30 mg at 09/04/17 0814  •  famotidine (PEPCID) injection 20 mg, 20 mg, Intravenous, Q12H, Karen Lizama DO, 20 mg at 09/04/17 0821  •  FLUoxetine (PROzac) capsule 40 mg, 40 mg, Oral, Daily, Karen Lizama DO, 40 mg at  09/04/17 0812  •  gabapentin (NEURONTIN) capsule 600 mg, 600 mg, Oral, Q8H, Art Aleman MD, 600 mg at 09/04/17 1419  •  glucagon (GLUCAGEN) injection 1 mg, 1 mg, Subcutaneous, Q15 Min PRN, Art Aleman MD  •  hydrophor (AQUAPHOR) ointment 1 application, 1 application, Topical, Q24H, Karen Lizama DO, 1 application at 09/04/17 0814  •  Influenza Vac Subunit Quad (FLUCELVAX) injection 0.5 mL, 0.5 mL, Intramuscular, During Hospitalization, Karen Lizama DO  •  insulin aspart (novoLOG) injection 0-7 Units, 0-7 Units, Subcutaneous, 4x Daily AC & at Bedtime, Art Aleman MD  •  lactobacillus acidophilus (RISAQUAD) capsule 1 capsule, 1 capsule, Oral, Daily, Karen Lizama DO, 1 capsule at 09/04/17 0813  •  lithium tablet 300 mg, 300 mg, Oral, BID, Karen Lizama DO, 300 mg at 09/04/17 1733  •  losartan (COZAAR) tablet 100 mg, 100 mg, Oral, Q24H, Karen Lizama DO, 100 mg at 09/04/17 0813  •  metFORMIN (GLUCOPHAGE) tablet 500 mg, 500 mg, Oral, BID With Meals, Karen Lizama, DO, 500 mg at 09/04/17 1733  •  metoprolol tartrate (LOPRESSOR) tablet 100 mg, 100 mg, Oral, BID, Karen Lizama DO, 100 mg at 09/04/17 1734  •  miconazole (MICOTIN) 2 % powder 1 application, 1 application, Topical, Q12H, Karen Lizama DO, 1 application at 09/02/17 1020  •  minocycline (MINOCIN,DYNACIN) capsule 100 mg, 100 mg, Oral, Q12H, Mariano Brown MD, 100 mg at 09/04/17 1130  •  ondansetron (ZOFRAN) tablet 4 mg, 4 mg, Oral, Q6H PRN **OR** ondansetron ODT (ZOFRAN-ODT) disintegrating tablet 4 mg, 4 mg, Oral, Q6H PRN **OR** ondansetron (ZOFRAN) injection 4 mg, 4 mg, Intravenous, Q6H PRN, Karen Lizama DO  •  oxyCODONE-acetaminophen (PERCOCET) 5-325 MG per tablet 1 tablet, 1 tablet, Oral, Q4H PRN, Karen Lizama DO, 1 tablet at 09/04/17 0104  •  [DISCONTINUED] piperacillin-tazobactam (ZOSYN) 3.375 g/100 mL 0.9% NS IVPB (mbp), 3.375 g, Intravenous, Q6H, 3.375 g at 09/02/17 1020 **AND** [COMPLETED]  vancomycin (VANCOCIN) 2,500 mg in sodium chloride 0.9 % 500 mL IVPB, 20 mg/kg, Intravenous, Once, 2,500 mg at 09/01/17 1570 **AND** Pharmacy to dose vancomycin, , Does not apply, Continuous PRN, Madonna Ringswald, DO  •  primidone (MYSOLINE) tablet 250 mg, 250 mg, Oral, BID, Madonna Ringswald, DO, 250 mg at 09/04/17 0747  •  sodium chloride 0.9 % bolus 3,630 mL, 30 mL/kg, Intravenous, PRN, Madonna Luiswald, DO  •  sodium chloride 0.9 % flush 1-10 mL, 1-10 mL, Intravenous, PRN, Madonna Luiswald, DO      Assessment/Plan     1.  AMS:  Resolved.  Patient denies taking extra doses of any of his meds, but this is inconsistent with his prior history and discussions w/ staff.    2.  HTN:  At goal.  No change to current regimen.    3.  Cellulitis:  Skin exam benign.  No leukocytosis or fever.  Changed to po Minocycline.    4.  DMII:  On metformin and SSI.  Nothing acute.    5.  Bipolar D/O:  Nothing acute.  No change.    Plan for disposition: Unknown    Mariano Brown MD  09/04/17  9:36 PM

## 2017-09-05 NOTE — PAYOR COMM NOTE
"Charles Mujica (51 y.o. Male)     ATTN: NURSE REVIEWER  MEMBER ID:[id number]  [facility]. PLEASE REPLY TO GLYNN BOWER AT FAX#[phone] OR PHONE#[phone].       Date of Birth Social Security Number Address Home Phone MRN    1965  [address] [id number] [phone] [id number]    Orthodoxy Marital Status          Yarsanism        Admission Date Admission Type Admitting Provider Attending Provider Department, Room/Bed    9/1/17 Emergency Karen Lizama DO  [facility] ICU, ICU2/1    Discharge Date Discharge Disposition Discharge Destination        9/5/2017 Home or Self Care             Attending Provider: (none)    Allergies:  Lisinopril    Isolation:  None   Infection:  None   Code Status:  FULL    Ht:  72\" (182.9 cm)   Wt:  275 lb (125 kg)    Admission Cmt:  None   Principal Problem:  None                Active Insurance as of 9/1/2017     Primary Coverage     Payor Plan Insurance Group Employer/Plan Group    HUMANA MEDICARE REPLACEMENT HUMANA MEDICARE REPL [id number]     Payor Plan Address Payor Plan Phone Number Effective From Effective To    [address] [address] [phone] 12/1/2010     [place] [address]       Subscriber Name Subscriber Birth Date Member ID       CHARLES MUJICA 1965 [id number]                 Emergency Contacts      (Rel.) Home Phone Work Phone Mobile Phone    Liliane Mujica (Mother) [phone] -- --               History & Physical      Art Aleman MD at 9/2/2017  8:58 AM                 HOSPITALIST SERVICES     @ [facility], [facility] Hospitalist Team    ADMISSION HISTORY AND PHYSICAL    PATIENT:      Patient Care Team:  Killian Scales MD as PCP - General  Killian Scales MD as PCP - Family Medicine    CHIEF COMPLAINT:     Altered mental status, fall, weakness and lt foot laceration    HISTORY OF PRESENT ILLNESS:    As per Dr Villalta's " note:  This patient arrives via EMS for evaluation of altered mental status after he was found down in his home.  Apparently he was scheduled to have a surgical repair of some lacerations to his left foot this afternoon by Dr. Farah.  He was seen in Dr. Farah's office yesterday for evaluation of these lacerations.  Apparently the patient himself could not remember why or how he sustained the lacerations to his foot.  Nevertheless, Dr. Farah had planned to take him to the operating room to repair these lacerations today.  When the patient's mother went to his house to check on him today she found him on the floor beside his wheelchair and he was clearly confused with some decreased responsiveness.  It looked like some furniture turned over.  She presumed that he had fallen out of his chair and hit the furniture.  He had also urinated on himself.  It is unclear exactly how long he had been on the floor.  She and her family called 911 at that time and had them pick him up to bring to our hospital.  The patient arrived at our facility with some clear signs of confusion and somnolence.  The remainder of the history is really quite limited due to his confusion.  He lives alone so there were no witnesses to what happened earlier today at home.  The patient is able to wake up and answer a few questions.  He says he has no specific areas of pain right now.  He does say that he took some medications earlier this morning and indicates that some of them were for pain.  He does not make any complaints about any particular symptoms at this time, however, again his ability to convey clear information is quite compromised.    Patient is a readmission.  He was here for a Lis Franc fracture and has multiple medical problems.  He was supposed to have surgery today for lacerations. Dr. Farah saw him yesterday.  He did not show up and have surgery.  He was found by his family in the floor.  It looked as if he spilled out of his wheel  chair.  In the ER it was possible to awaken him and he would follow commands.  He was very confused however.  He took extra gabapentin today. It is not known how much he took.  His drug screen came back beos and barbituates.  He has a history of metabolic encephalopathy.  CT scan of the head was done and was unremarkable .  Dr. Farah recommended that the ER put the patient on Vancomycin and Zosyn for the patient's severe cellulitis.  It is unknown how he got the lacerations.  There is a culture that can be acquired.  Ammonia was normal.  It is unknown how long he was in the floor.     Patient is alert now and denies any sx of headache, chest pain, shortness of breath, abdominal pain, n/v, dysuria or recent hx of blood in stool.              Past Medical History:   Diagnosis Date   • Arthritis    • Bipolar disorder    • Cellulitis of lower extremity     RIGHT   • Coronary artery disease    • Diabetes mellitus    • Disease of thyroid gland     nodule on thyroid   • DVT (deep venous thrombosis)    • Fracture of right foot    • Hyperlipidemia    • Hypertension    • Pseudogout    • Septic shock 07/2017    H/O   • Toxic metabolic encephalopathy 07/2017    H/O     Past Surgical History:   Procedure Laterality Date   • JOINT REPLACEMENT     • KNEE SURGERY     • ORIF FOOT FRACTURE Right 7/29/2017    Procedure: open reduction with percutaneous pinning of midfoot dislocation fracture;  Surgeon: Anish Farah DPM;  [facility];  Service:    • TOTAL HIP ARTHROPLASTY       Family History   Problem Relation Age of Onset   • Arthritis Mother    • Cancer Mother    • Hyperlipidemia Mother    • Arthritis Father    • Cancer Father    • Depression Father    • Hypertension Father    • Mental illness Father    • Cancer Sister    • Arthritis Sister    • Hyperlipidemia Sister    • Cancer Paternal Aunt    • Hypertension Daughter    • Depression Son    • Hyperlipidemia Paternal Grandmother    • Hearing loss Paternal Grandfather     • Hyperlipidemia Paternal Grandfather    • Hypertension Paternal Grandfather      Social History   Substance Use Topics   • Smoking status: Never Smoker   • Smokeless tobacco: None   • Alcohol use No     Prescriptions Prior to Admission   Medication Sig Dispense Refill Last Dose   • FLUoxetine (PROzac) 20 MG capsule Take 40 mg by mouth Daily.   9/1/2017 at Unknown time   • lithium 300 MG tablet Take 300 mg by mouth 2 (two) times a day.   9/1/2017 at Unknown time   • amLODIPine (NORVASC) 2.5 MG tablet Take 2.5 mg by mouth Daily.   8/18/2017 at 0840   • carBAMazepine (TEGretol) 200 MG tablet Take 400 mg by mouth 2 (two) times a day.   8/18/2017 at 0840   • doxepin (SINEquan) 75 MG capsule Take 75 mg by mouth every night.      • gabapentin (NEURONTIN) 800 MG tablet Take 800 mg by mouth 3 (three) times a day.   8/17/2017 at 2142   • hydrophor (AQUAPHOR) ointment ointment Apply 1 application topically Daily.   8/17/2017 at 2055   • LORazepam (ATIVAN) 0.5 MG tablet Take 0.5 mg by mouth Daily As Needed for Anxiety.   8/18/2017 at 0339   • losartan (COZAAR) 100 MG tablet Take 1 tablet by mouth Daily. 30 tablet 0 8/18/2017 at 0840   • metFORMIN (GLUCOPHAGE) 500 MG tablet Take 500 mg by mouth 2 (Two) Times a Day With Meals.   Unknown at Unknown time   • metoprolol tartrate (LOPRESSOR) 100 MG tablet Take 100 mg by mouth 2 (two) times a day.   8/18/2017 at 0841   • miconazole (MICOTIN) 2 % powder Apply 1 application topically Every 12 (Twelve) Hours.   8/17/2017 at 2055   • oxyCODONE-acetaminophen (PERCOCET) 5-325 MG per tablet Take 2 tablets by mouth Every 4 (Four) Hours As Needed.   8/18/2017 at 0840   • primidone (MYSOLINE) 250 MG tablet Take 250 mg by mouth 2 (two) times a day.   8/18/2017 at 0840   • Probiotic Product (RISAQUAD-2) capsule capsule Take 1 capsule by mouth Daily.   8/18/2017 at 0840     Allergies:  Lisinopril    REVIEW OF SYSTEMS:  Please see the above history of present illness for pertinent positives and  "negatives.  The remainder of the patient's systems have been reviewed and are negative.     Vital Signs  Temp:  [97.7 °F (36.5 °C)-98.1 °F (36.7 °C)] 98.1 °F (36.7 °C)  Heart Rate:  [80-85] 83  Resp:  [16-18] 18  BP: (108-145)/() 118/67    Flowsheet Rows         First Filed Value    Admission Height  71.5\" (181.6 cm) Documented at 09/01/2017 1307    Admission Weight  276 lb (125 kg) Documented at 09/01/2017 1307           Physical Exam:    PHYSICAL EXAMINATION:    VITAL SIGNS: As per Nurse's notes    GENERAL APPEARANCE: The patient is a well developed, well nourished, , in no acute distress without complaints of shortness of breath, dyspnea or acute pain. Lips and nail beds are pink.    HEENT: Normocephalic, atraumatic. PERRL. The sclerae anicteric and conjunctivae pink and moist. Extraocular muscle movements intact. The oral mucosa, hard and soft palate, tongue and posterior pharynx were normal.     NECK: Supple and symmetric. No masses. No thyromegaly. No tenderness. Trachea central. No carotid bruits. No evidence of JVD.    LYMPH NODES: No palpable lymphadenopathy.    SKIN: Warm and dry. Lt foot wound. No rash or patechiae.    CHEST: Normal AP diameter and normal contour without any kyphoscoliosis.    LUNGS: Clear to auscultation bilaterally. Respiratory expansion equal bilaterally. No wheezes/rales/crackles/rubs.    CARDIOVASCULAR: RRR. S1, S2 normal without murmur/gallop/rub. No S3, S4. The carotid pulses were normal and 2+ bilaterally without bruits. Peripheral pulses were 2+ and symmetric. Capillary refill less than 3 seconds.    ABDOMEN: Soft, non-tender, non-distended. No masses. No rebound/guarding. No hepatosplenomegaly. Normal bowel sounds.     RECTAL: Not performed.     EXTREMITIES:  The left foot shows a few non-tender lacerations along the 3rd, 4th, & 5th metatarsals.  Moderate erythema and induration noted along the wound margins. No evidence of cyanosis, clubbing, but some edema " present. No rash or lesions. + pedal pulses. Negative Homans sign bilaterally.     MUSCULOSKELETAL: The left foot shows a few non-tender lacerations along the 3rd, 4th, & 5th metatarsals.  Moderate erythema and induration noted along the wound margins. Muscle strength and tone normal.    PSYCHIATRY: Responds appropriately to questions. No evidence of acute anxiety, depression, panic attacks or hallucinations.    MENTAL STATUS EXAMINATION: Neatly dressed, conscious and alert. Speech normal in tone. Pleasant and cooperative. Orientation x3. Thought process, content and insight are normal. Memory without deficits.    NEUROLOGIC: Examination is grossly intact globally with no focal deficits. Cranial nerves II through XII are grossly intact. No motor weakness. No decrease in sensation. No facial asymmetry.            Results Review:    I reviewed the patient's new clinical results.  Lab Results (most recent)     Procedure Component Value Units Date/Time    POC Glucose Fingerstick [[id number]]  (Normal) Collected:  09/01/17 1315    Specimen:  Blood Updated:  09/01/17 1323     Glucose 101 mg/dL     Narrative:       Meter: [id number] : [id number] Reji Lemus RN    CBC & Differential [[id number]] Collected:  09/01/17 1344    Specimen:  Blood Updated:  09/01/17 1358    Narrative:       The following orders were created for panel order CBC & Differential.  Procedure                               Abnormality         Status                     ---------                               -----------         ------                     CBC Auto Differential[[id number]]        Abnormal            Final result                 Please view results for these tests on the individual orders.    CBC Auto Differential [[id number]]  (Abnormal) Collected:  09/01/17 1344    Specimen:  Blood Updated:  09/01/17 1358     WBC 11.54 (H) 10*3/mm3      RBC 3.97 (L) 10*6/mm3      Hemoglobin 12.9 (L) g/dL      Hematocrit 38.5 (L) %      MCV 97.0 (H) fL       MCH 32.5 (H) pg      MCHC 33.5 g/dL      RDW 11.8 %      RDW-SD 42.0 fl      MPV 8.6 fL      Platelets 319 10*3/mm3      Neutrophil % 74.8 (H) %      Lymphocyte % 11.6 (L) %      Monocyte % 9.1 (H) %      Eosinophil % 3.7 %      Basophil % 0.3 %      Immature Grans % 0.5 %      Neutrophils, Absolute 8.63 (H) 10*3/mm3      Lymphocytes, Absolute 1.34 10*3/mm3      Monocytes, Absolute 1.05 (H) 10*3/mm3      Eosinophils, Absolute 0.43 (H) 10*3/mm3      Basophils, Absolute 0.03 10*3/mm3      Immature Grans, Absolute 0.06 (H) 10*3/mm3      nRBC 0.0 /100 WBC     Protime-INR [[id number]]  (Normal) Collected:  09/01/17 1344    Specimen:  Blood Updated:  09/01/17 1408     Protime 14.2 Seconds      INR 1.10    Narrative:       Therapeutic Ranges for INR: 2.0-3.0 (PT 20-30)                              2.5-3.5 (PT 25-34)    Comprehensive Metabolic Panel [[id number]]  (Abnormal) Collected:  09/01/17 1344    Specimen:  Blood Updated:  09/01/17 1421     Glucose 120 (H) mg/dL      BUN 15 mg/dL      Creatinine 0.72 (L) mg/dL      Sodium 136 mmol/L      Potassium 5.3 (H) mmol/L      Chloride 96 (L) mmol/L      CO2 28.8 mmol/L      Calcium 9.4 mg/dL      Total Protein 7.9 g/dL      Albumin 4.00 g/dL      ALT (SGPT) 14 U/L      AST (SGOT) 15 U/L      Alkaline Phosphatase 86 U/L      Total Bilirubin 0.3 mg/dL      eGFR Non African Amer 114 mL/min/1.73      Globulin 3.9 gm/dL      A/G Ratio 1.0 g/dL      BUN/Creatinine Ratio 20.8     Anion Gap 11.2 mmol/L     Urinalysis With / Culture If Indicated [[id number]]  (Abnormal) Collected:  09/01/17 4683    Specimen:  Urine from Urine, Clean Catch Updated:  09/01/17 1648     Color, UA Yellow     Appearance, UA Clear     pH, UA 7.0     Specific Gravity, UA 1.020     Glucose, UA Negative     Ketones, UA Negative     Bilirubin, UA Negative     Blood, UA Small (1+) (A)     Protein, UA Negative     Leuk Esterase, UA Negative     Nitrite, UA Negative     Urobilinogen, UA 0.2 E.U./dL    Urine Drug  Screen [[id number]]  (Abnormal) Collected:  09/01/17 1558    Specimen:  Urine from Urine, Clean Catch Updated:  09/01/17 1715     THC, Screen, Urine Negative     Phencyclidine (PCP), Urine Negative     Cocaine Screen, Urine Negative     Methamphetamine, Urine Negative     Opiate Screen Negative     Amphetamine Screen, Urine Negative     Benzodiazepine Screen, Urine Positive (A)     Tricyclic Antidepressants Screen Negative     Methadone Screen, Urine Negative     Barbiturates Screen, Urine Positive (A)     Oxycodone Screen, Urine Negative     Propoxyphene Screen Negative     Buprenorphine, Screen, Urine Negative    Narrative:       Urine drug screen results are to be used for medical purposes only.  They are not to be used for legal purposes such as employment testing.  Negative results do not necessarily mean the complete absence of a subtance, but rather that the result is less than the cutoff for that substance.  Positive results are unconfirmed and considered Preliminary Positive.  [facility] does not automatically confirm Postitive Unconfirmed results.  The physician may request (order) an Unconfirmed Positive result to be sent out for confirmation.      Negative Thresholds for Drugs Screened:    THC screen, urine                          50 ng/ml  Phenycyclidine (PCP), urine                25 ng/ml  Cocaine screen, urine                     150 ng/ml  Methamphetamine, urine                    500 ng/ml  Opiate screen, urine                      100 ng/ml  Amphetamine screen, urine                 500 ng/ml  Benzodiazepine screen, urine              150 ng/ml  Tricyclic Antidepressants screen, urine   300 ng/ml  Methadone screen, urine                   200 ng/ml  Barbiturates screen, urine                200 ng/ml  Oxycodone screen, urine                   100 ng/ml  Propoxyphene screen, urine                300 ng/ml  Buprenorphine screen, urine                10 ng/ml    Urinalysis, Microscopic  Only [[id number]]  (Abnormal) Collected:  09/01/17 1558    Specimen:  Urine from Urine, Clean Catch Updated:  09/01/17 1729     RBC, UA 0-2 (A) /HPF      WBC, UA None Seen /HPF      Bacteria, UA None Seen /HPF      Squamous Epithelial Cells, UA 0-2 /HPF      Hyaline Casts, UA None Seen /LPF      Methodology Manual Light Microscopy    Ammonia [[id number]]  (Normal) Collected:  09/01/17 1719    Specimen:  Blood Updated:  09/01/17 1810     Ammonia 35 umol/L     Lactic Acid, Plasma [[id number]]  (Normal) Collected:  09/02/17 0011    Specimen:  Blood Updated:  09/02/17 0031     Lactate 0.9 mmol/L     POC Glucose Fingerstick [[id number]]  (Normal) Collected:  09/02/17 0142    Specimen:  Blood Updated:  09/02/17 0159     Glucose 96 mg/dL     Narrative:       Meter: [id number] : [id number] Ben Corbin NURSING ASSISTANT    CBC Auto Differential [[id number]]  (Abnormal) Collected:  09/02/17 0513    Specimen:  Blood Updated:  09/02/17 0535     WBC 7.73 10*3/mm3      RBC 3.58 (L) 10*6/mm3      Hemoglobin 11.5 (L) g/dL      Hematocrit 35.2 (L) %      MCV 98.3 (H) fL      MCH 32.1 (H) pg      MCHC 32.7 g/dL      RDW 11.9 %      RDW-SD 43.1 fl      MPV 8.5 fL      Platelets 248 10*3/mm3      Neutrophil % 69.6 %      Lymphocyte % 13.8 (L) %      Monocyte % 10.1 (H) %      Eosinophil % 5.6 (H) %      Basophil % 0.4 %      Immature Grans % 0.5 %      Neutrophils, Absolute 5.38 10*3/mm3      Lymphocytes, Absolute 1.07 10*3/mm3      Monocytes, Absolute 0.78 10*3/mm3      Eosinophils, Absolute 0.43 (H) 10*3/mm3      Basophils, Absolute 0.03 10*3/mm3      Immature Grans, Absolute 0.04 (H) 10*3/mm3      nRBC 0.0 /100 WBC     Basic Metabolic Panel [[id number]]  (Abnormal) Collected:  09/02/17 0513    Specimen:  Blood Updated:  09/02/17 0553     Glucose 89 mg/dL      BUN 10 mg/dL      Creatinine 0.55 (L) mg/dL      Sodium 137 mmol/L      Potassium 4.4 mmol/L      Chloride 101 mmol/L      CO2 25.7 mmol/L      Calcium 8.8 mg/dL      eGFR Non   Amer >150 mL/min/1.73      BUN/Creatinine Ratio 18.2     Anion Gap 10.3 mmol/L     Narrative:       GFR Normal >60  Chronic Kidney Disease <60  Kidney Failure <15    Magnesium [[id number]]  (Abnormal) Collected:  09/02/17 0513    Specimen:  Blood Updated:  09/02/17 0553     Magnesium 1.5 (L) mg/dL     Phosphorus [[id number]]  (Normal) Collected:  09/02/17 0513    Specimen:  Blood Updated:  09/02/17 0553     Phosphorus 4.0 mg/dL     POC Glucose Fingerstick [[id number]]  (Normal) Collected:  09/02/17 0736    Specimen:  Blood Updated:  09/02/17 0748     Glucose 89 mg/dL     Narrative:       Meter: [id number] : [id number] Nancy RUIZ          Imaging Results (most recent)     Procedure Component Value Units Date/Time    CT Head Without Contrast [[id number]] Collected:  09/01/17 1626     Updated:  09/01/17 1632    Narrative:       CT HEAD, NONCONTRAST, 09/01/2017:     HISTORY:  51-year-old male brought to the ED after home health nurse noted acute  mental status changes today.     TECHNIQUE:  CT examination of the head without IV contrast. Radiation dose reduction  techniques were utilized, including automated exposure control and  exposure modulation based on body size.     FINDINGS:  No acute intracranial abnormality is identified.     Mild generalized cerebral atrophy. Mild diffuse low-attenuation white  matter changes, nonspecific but likely related to chronic microvascular  disease. These findings are stable since 07/22/2017.     No evidence of intracranial hemorrhage, mass, mass effect, cerebral  edema or hydrocephalus.       Impression:       1. No acute intracranial abnormality.  2. Stable mild diffuse chronic changes as noted above.  3. No change since 07/22/2017.     This report was finalized on 9/1/2017 4:30 PM by Dr. Bigg Del Angel MD.           not reviewed    ECG/EMG Results (most recent)     None        reviewed    Assessment/Plan       DIFFERENTIAL DIAGNOSES CONSIDERED FOR  CHIEF COMPLAINT:        The following clinical entities were considered in differential diagnosis. The list includes commonly encountered practical probabilities and rare esoteric diagnoses worked out through systemic diagnostic methods used to identify the presence of a disease entity where multiple diagnoses are possible. The decision is reached through either of the following methods: 1) direct confirmatory testing, or 2) a process of elimination, or 3) at least a process of obtaining information that shrinks the “probabilities” of candidate conditions to negligible levels, by using clinical evidence and thus implementing aspects of the hypothetico-deductive method.        DIFFERENTIAL DIAGNOSES OF ALTERED MENTAL STATUS:    DIFFERENTIAL DIAGNOSIS: Two categories:  1) Metabolic causes: hyper/hyponatremia; hyper/hypoglycemia; hypercalcemia ; hyper/hypothyroidism; hypoxia/hyperpnea; hepatic encephalopathy; uremic encephalopathy; drug intoxication/withdrawal; Wernicke encephalopathy  2) Structural lesions: primary or metastatic tumor; intracranial hemorrhage; infection; cerebrovascular accident; transient ischemic attack; meningitis; encephalitis; seizures; postictal state; hypertensive encephalopathy; vasculitis; arrhythmias; heart failure; endocarditis        ASSESSMENT AND PLAN:       SUMMARY:    ?   PROPHYLAXIS:   -Oxygen Saturation: As per Nurses' notes.   -DVT Prophylaxis: On Inj. Lovenox   -Gastritis Prophylaxis: Famotidine    -Constipation Prophylaxis: colace 100 mg po BID   -Immunizations: N/A  -Intake and Output Orders: As per order sheet   -[place] Catheter: Not indicated at this time  -Smoking/ Nicotine issues: N/A.     ACTIVITIES:  -Bathroom Privileges: May use bathroom   -Activity: Encouraged to sit up in side chair for 2-4 hours BID   -PT/OT: n/A      NUTRITION AND FLUIDS:  -Diet/ Nutrition: Regular, cardiac, consistent carbohydrate diet   -Fluid Status/Electrolytes: Saline Lock       SOCIAL ISSUES:    -MRSA SCREEN: As per institutional protocol   -Behavioral/ Agitation Issues: NONE   -Social Issues: Lives at home with his family.   -Occupational Issues: Patient is Unemployed at this time.   -Code Status: FULL CODE    -Disposition: Should be able to go home once ready for discharge     THERAPEUTIC:   ALLERGIES: Lisinopril   ANTIBIOTICS: Inj Zosyn and Inj Vanco   PAIN MANAGEMENT: Percocet 5-325 mg   ANTIPYRETIC: Tylenol 650 mg 1 po q4-6 hours for headache or   temp >100 degrees   INSULIN THERAPY: Low dose insulin coverage as ordered   CHEST PAIN: N/A    NEBULIZER TREATMENT: N/A    ANXIETY: N/A    DEPRESSION: Lithium    INSOMNIA: N/A                PLAN:    Labs and diagnostic tests reviewed: Gluc 89, Na 137, K 4.4, Creat 0.55, Venous Lactate 0.9, WBC 7.73, Hb 11.5, Plt 248    Diagnostic tests reviewed:    CT SCan Head:  IMPRESSION:  1. No acute intracranial abnormality.  2. Stable mild diffuse chronic changes as noted above.  3. No change since 07/22/2017.      This report was finalized on 9/1/2017 4:30 PM by Dr. Bigg Del Angel MD.      Patient is clinically and hemodynamically stable    Will be seen by Dr Farah (Pod) and Dr Arce ID consultant this morning    Any new recommendations: As per Dr Farah and Dr Arce    New Labs ordered: CBC and CMP and Lactic acid in AM    New diagnostic tests ordered: NONE    Any changes in medications: N/A    To continue current management and supportive care    Pain management issues: On Percocet 5-325 mg    Discharge planning issues: NONE    Will follow patient closely    Nothing new to add for right now            DIAGNOSES:      PRIMARY DIAGNOSES:      Altered Mental status: secondary to drugs including benzos and barbiturates and gabapentin    Lt foot infection: Is being followed by Dr Farah (Pod). On Inj Zosyn, Inj Vanco    Drug overdose: Benzos and Barbiturates    Bipolar disorder: On Lithium    Anxiety: On Ativan as outpatient    CAD: No issues with chest pains    HTN:  "Last /67. On Norvasc, Losartan and Metoprolol tartrate    HLD: Not on any statins    Hx of DVT: Hx noted    Type II DM: On Metformin and insulin coverage    DJD: On Oxycodone-acetaminophen 5-325 mg    Hx of Thyroid nodule: Hx noted    Hx of septic shock and toxic metabolic encephalopathy: Hsx noted    DVT Prophylaxis: On Inj Lovenox and SCDs    ?     SECONDARY DIAGNOSES:  ?     As above        SURGICAL DIAGNOSES:      As per Problem List            I discussed the patients findings and my recommendations with patient and patient is agreeable to current treatment and management plan.     Art Aleman MD  09/02/17  8:58 AM          Art Aleman M.D., [facility] [facility]  Internal Medicine/ Hospitalist        Time:          Electronically signed by Art Aleman MD at 9/2/2017  9:47 AM        Hospital Medications (all)       Dose Frequency Start End    acetaminophen (TYLENOL) suppository 650 mg 650 mg Every 4 Hours PRN 9/1/2017     Sig - Route: Insert 1 suppository into the rectum Every 4 (Four) Hours As Needed for Fever (temperature greater than 101.5 F or headache). - Rectal    Linked Group 1:  \"Or\" Linked Group Details        acetaminophen (TYLENOL) tablet 650 mg 650 mg Every 4 Hours PRN 9/1/2017     Sig - Route: Take 2 tablets by mouth Every 4 (Four) Hours As Needed for Headache or Fever (fever greater than 101.5 F). - Oral    Linked Group 1:  \"Or\" Linked Group Details        amLODIPine (NORVASC) tablet 2.5 mg 2.5 mg Every 24 Hours Scheduled 9/5/2017     Sig - Route: Take 1 tablet by mouth Daily. - Oral    carBAMazepine (TEGretol) tablet 400 mg 400 mg Every 12 Hours 9/5/2017     Sig - Route: Take 2 tablets by mouth Every 12 (Twelve) Hours. - Oral    dextrose (D50W) solution 25 g 25 g Every 15 Minutes PRN 9/2/2017     Sig - Route: Infuse 50 mL into a venous catheter Every 15 (Fifteen) Minutes As Needed for Low Blood Sugar (Blood Sugar Less Than 70, Patient Has IV Access - Unresponsive, NPO or Unable To Safely " Swallow). - Intravenous    dextrose (GLUTOSE) oral gel 15 g 15 g Every 15 Minutes PRN 9/2/2017     Sig - Route: Take 15 g by mouth Every 15 (Fifteen) Minutes As Needed for Low Blood Sugar (Blood Sugar Less Than 70, Patient Alert, Is Not NPO & Can Safely Swallow). - Oral    docusate sodium (COLACE) capsule 100 mg 100 mg 2 Times Daily 9/2/2017     Sig - Route: Take 1 capsule by mouth 2 (Two) Times a Day. - Oral    doxepin (SINEquan) capsule 75 mg 75 mg Nightly 9/5/2017     Sig - Route: Take 3 capsules by mouth Every Night. - Oral    enoxaparin (LOVENOX) syringe 30 mg 30 mg Daily 9/2/2017     Sig - Route: Inject 0.3 mL under the skin Daily. - Subcutaneous    FLUoxetine (PROzac) capsule 40 mg 40 mg Daily 9/2/2017     Sig - Route: Take 2 capsules by mouth Daily. - Oral    gabapentin (NEURONTIN) capsule 600 mg 600 mg Every 8 Hours Scheduled 9/2/2017     Sig - Route: Take 2 capsules by mouth Every 8 (Eight) Hours. - Oral    glucagon (GLUCAGEN) injection 1 mg 1 mg Every 15 Minutes PRN 9/2/2017     Sig - Route: Inject 1 mg under the skin Every 15 (Fifteen) Minutes As Needed (Blood Glucose Less Than 70 - Patient Without IV Access - Unresponsive, NPO or Unable To Safely Swallow). - Subcutaneous    hydrophor (AQUAPHOR) ointment 1 application 1 application Every 24 Hours Scheduled 9/2/2017     Sig - Route: Apply 1 application topically Daily. - Topical    Influenza Vac Subunit Quad (FLUCELVAX) injection 0.5 mL 0.5 mL During Hospitalization 9/1/2017     Sig - Route: Inject 0.5 mL into the shoulder, thigh, or buttocks During Hospitalization for Immunization. - Intramuscular    insulin aspart (novoLOG) injection 0-7 Units 0-7 Units 4 Times Daily Before Meals & Nightly 9/2/2017     Sig - Route: Inject 0-7 Units under the skin 4 (Four) Times a Day Before Meals & at Bedtime. - Subcutaneous    lactobacillus acidophilus (RISAQUAD) capsule 1 capsule 1 capsule Daily 9/2/2017     Sig - Route: Take 1 capsule by mouth Daily. - Oral     "lidocaine (XYLOCAINE) 1 % injection  - ADS Override Pull   9/4/2017 9/4/2017    Notes to Pharmacy: Linda Morgan: cabinet override    lithium tablet 300 mg 300 mg 2 Times Daily 9/2/2017     Sig - Route: Take 1 tablet by mouth 2 (Two) Times a Day. - Oral    losartan (COZAAR) tablet 100 mg 100 mg Every 24 Hours Scheduled 9/2/2017     Sig - Route: Take 2 tablets by mouth Daily. - Oral    metoprolol tartrate (LOPRESSOR) tablet 100 mg 100 mg 2 Times Daily 9/2/2017     Sig - Route: Take 2 tablets by mouth 2 (Two) Times a Day. - Oral    miconazole (MICOTIN) 2 % powder 1 application 1 application Every 12 Hours 9/1/2017     Sig - Route: Apply 1 application topically Every 12 (Twelve) Hours. - Topical    minocycline (MINOCIN,DYNACIN) capsule 100 mg 100 mg Every 12 Hours Scheduled 9/4/2017     Sig - Route: Take 2 capsules by mouth Every 12 (Twelve) Hours. - Oral    ondansetron (ZOFRAN) injection 4 mg 4 mg Every 6 Hours PRN 9/1/2017     Sig - Route: Infuse 2 mL into a venous catheter Every 6 (Six) Hours As Needed for Nausea or Vomiting. - Intravenous    Linked Group 2:  \"Or\" Linked Group Details        ondansetron (ZOFRAN) tablet 4 mg 4 mg Every 6 Hours PRN 9/1/2017     Sig - Route: Take 1 tablet by mouth Every 6 (Six) Hours As Needed for Nausea or Vomiting. - Oral    Linked Group 2:  \"Or\" Linked Group Details        ondansetron ODT (ZOFRAN-ODT) disintegrating tablet 4 mg 4 mg Every 6 Hours PRN 9/1/2017     Sig - Route: Take 1 tablet by mouth Every 6 (Six) Hours As Needed for Nausea or Vomiting. - Oral    Linked Group 2:  \"Or\" Linked Group Details        oxyCODONE-acetaminophen (PERCOCET) 5-325 MG per tablet 1 tablet 1 tablet Every 4 Hours PRN 9/1/2017     Sig - Route: Take 1 tablet by mouth Every 4 (Four) Hours As Needed for Moderate Pain . - Oral    piperacillin-tazobactam (ZOSYN) 3.375 (3-0.375) g injection  - ADS Override Pull   9/2/2017 9/2/2017    Notes to Pharmacy: Created by cabinet override    piperacillin-tazobactam " "(ZOSYN) 3.375 g/100 mL 0.9% NS IVPB (mbp) 3.375 g Once 9/1/2017 9/1/2017    Sig - Route: Infuse 100 mL into a venous catheter 1 (One) Time. - Intravenous    primidone (MYSOLINE) tablet 250 mg 250 mg 2 Times Daily 9/2/2017     Sig - Route: Take 1 tablet by mouth 2 (Two) Times a Day. - Oral    sodium chloride 0.9 % bolus 3,630 mL 30 mL/kg × 121 kg As Needed 9/1/2017     Sig - Route: Infuse 3,630 mL into a venous catheter As Needed (MAP Less Than 65 or Lactic Acid Level 4 or Higher). - Intravenous    sodium chloride 0.9 % flush 1-10 mL 1-10 mL As Needed 9/1/2017     Sig - Route: Infuse 1-10 mL into a venous catheter As Needed for Line Care. - Intravenous    vancomycin (VANCOCIN) 1000 MG injection  - ADS Override Pull   9/1/2017 9/2/2017    Notes to Pharmacy: Created by cabinet override    vancomycin (VANCOCIN) 2,500 mg in sodium chloride 0.9 % 500 mL IVPB 20 mg/kg × 121 kg Once 9/1/2017 9/1/2017    Sig - Route: Infuse 2,500 mg into a venous catheter 1 (One) Time. - Intravenous    Linked Group 3:  \"And\" Linked Group Details        vancomycin (VANCOCIN) 500 MG injection  - ADS Override Pull   9/1/2017 9/2/2017    Notes to Pharmacy: Created by cabinet override    famotidine (PEPCID) injection 20 mg (Discontinued) 20 mg Every 12 Hours Scheduled 9/1/2017 9/5/2017    Sig - Route: Infuse 2 mL into a venous catheter Every 12 (Twelve) Hours. - Intravenous    gabapentin (NEURONTIN) capsule 800 mg (Discontinued) 800 mg Every 8 Hours Scheduled 9/1/2017 9/2/2017    Sig - Route: Take 2 capsules by mouth Every 8 (Eight) Hours. - Oral    metFORMIN (GLUCOPHAGE) tablet 500 mg (Discontinued) 500 mg 2 Times Daily With Meals 9/1/2017 9/5/2017    Sig - Route: Take 1 tablet by mouth 2 (Two) Times a Day With Meals. - Oral    Pharmacy to dose vancomycin (Discontinued)  Continuous PRN 9/1/2017 9/5/2017    Sig - Route: Continuous As Needed for Consult. - Does not apply    Linked Group 3:  \"And\" Linked Group Details        piperacillin-tazobactam " "(ZOSYN) 3.375 g/100 mL 0.9% NS IVPB (mbp) (Discontinued) 3.375 g Every 6 Hours 9/1/2017 9/2/2017    Sig - Route: Infuse 100 mL into a venous catheter Every 6 (Six) Hours. - Intravenous    Linked Group 3:  \"And\" Linked Group Details        sodium chloride 0.9 % infusion (Discontinued) 100 mL/hr Continuous 9/1/2017 9/4/2017    Sig - Route: Infuse 100 mL/hr into a venous catheter Continuous. - Intravenous    vancomycin (VANCOCIN) 2,000 mg in sodium chloride 0.9 % 500 mL IVPB (Discontinued) 2,000 mg Every 8 Hours 9/2/2017 9/4/2017    Sig - Route: Infuse 2,000 mg into a venous catheter Every 8 (Eight) Hours. - Intravenous    vancomycin 1 g/250 mL 0.9% NS (vial-mate) (Discontinued) 1 g Once 9/1/2017 9/1/2017    Sig - Route: Infuse 250 mL into a venous catheter 1 (One) Time. - Intravenous    Reason for Discontinue: Duplicate order          Lab Results (last 72 hours)     Procedure Component Value Units Date/Time    POC Glucose Fingerstick [[id number]]  (Normal) Collected:  09/02/17 1654    Specimen:  Blood Updated:  09/02/17 1701     Glucose 94 mg/dL     Narrative:       Meter: [id number] : [id number] Florian Tolentino Kamar NURSING ASSISTANT-POOL    Troponin [[id number]]  (Normal) Collected:  09/02/17 1802    Specimen:  Blood Updated:  09/02/17 1831     Troponin T <0.010 ng/mL     Narrative:       Troponin T Reference Ranges:  Less than 0.03 ng/mL:    Negative for AMI  0.03 to 0.09 ng/mL:      Indeterminant for AMI  Greater than 0.09 ng/mL: Positive for AMI    POC Glucose Fingerstick [[id number]]  (Normal) Collected:  09/02/17 2052    Specimen:  Blood Updated:  09/02/17 2058     Glucose 97 mg/dL     Narrative:       Meter: [id number] : [id number] Josh Ruiz RN Validator    Troponin [[id number]]  (Normal) Collected:  09/02/17 2359    Specimen:  Blood Updated:  09/03/17 0048     Troponin T <0.010 ng/mL     Narrative:       Troponin T Reference Ranges:  Less than 0.03 ng/mL:    Negative for AMI  0.03 to 0.09 ng/mL:      " Indeterminant for AMI  Greater than 0.09 ng/mL: Positive for AMI    Vancomycin, Random [[id number]] Collected:  09/03/17 0259    Specimen:  Blood Updated:  09/03/17 0452     Vancomycin Random 20.90 mcg/mL     CBC & Differential [[id number]] Collected:  09/03/17 0623    Specimen:  Blood Updated:  09/03/17 0631    Narrative:       The following orders were created for panel order CBC & Differential.  Procedure                               Abnormality         Status                     ---------                               -----------         ------                     CBC Auto Differential[[id number]]        Abnormal            Final result                 Please view results for these tests on the individual orders.    CBC Auto Differential [[id number]]  (Abnormal) Collected:  09/03/17 0623    Specimen:  Blood Updated:  09/03/17 0631     WBC 5.25 10*3/mm3      RBC 3.40 (L) 10*6/mm3      Hemoglobin 10.7 (L) g/dL      Hematocrit 33.4 (L) %      MCV 98.2 (H) fL      MCH 31.5 (H) pg      MCHC 32.0 g/dL      RDW 11.6 %      RDW-SD 42.1 fl      MPV 8.5 fL      Platelets 205 10*3/mm3      Neutrophil % 59.2 %      Lymphocyte % 20.4 %      Monocyte % 11.0 (H) %      Eosinophil % 8.0 (H) %      Basophil % 0.8 %      Immature Grans % 0.6 (H) %      Neutrophils, Absolute 3.11 10*3/mm3      Lymphocytes, Absolute 1.07 10*3/mm3      Monocytes, Absolute 0.58 10*3/mm3      Eosinophils, Absolute 0.42 (H) 10*3/mm3      Basophils, Absolute 0.04 10*3/mm3      Immature Grans, Absolute 0.03 10*3/mm3      nRBC 0.0 /100 WBC     Lactic Acid, Plasma [[id number]]  (Normal) Collected:  09/03/17 0623    Specimen:  Blood Updated:  09/03/17 0644     Lactate 0.6 mmol/L     Comprehensive Metabolic Panel [[id number]]  (Abnormal) Collected:  09/03/17 0623    Specimen:  Blood Updated:  09/03/17 0651     Glucose 89 mg/dL      BUN 9 mg/dL      Creatinine 0.52 (L) mg/dL      Sodium 137 mmol/L      Potassium 4.0 mmol/L      Chloride 103 mmol/L      CO2 22.7  mmol/L      Calcium 8.2 (L) mg/dL      Total Protein 6.2 g/dL      Albumin 3.10 (L) g/dL      ALT (SGPT) 13 U/L      AST (SGOT) 12 U/L      Alkaline Phosphatase 66 U/L      Total Bilirubin 0.2 mg/dL      eGFR Non African Amer >150 mL/min/1.73      Globulin 3.1 gm/dL      A/G Ratio 1.0 g/dL      BUN/Creatinine Ratio 17.3     Anion Gap 11.3 mmol/L     POC Glucose Fingerstick [[id number]]  (Normal) Collected:  09/03/17 0714    Specimen:  Blood Updated:  09/03/17 0720     Glucose 88 mg/dL     Narrative:       Meter: [id number] : [id number] Florian Galvin NURSING ASSISTANT-POOL    POC Glucose Fingerstick [[id number]]  (Normal) Collected:  09/03/17 1110    Specimen:  Blood Updated:  09/03/17 1121     Glucose 103 mg/dL     Narrative:       Meter: [id number] : [id number] Liz Canales RN    POC Glucose Fingerstick [[id number]]  (Normal) Collected:  09/03/17 1633    Specimen:  Blood Updated:  09/03/17 1639     Glucose 107 mg/dL     Narrative:       Meter: [id number] : [id number] Florian Galvin NURSING ASSISTANT-POOL    POC Glucose Fingerstick [[id number]]  (Normal) Collected:  09/03/17 2037    Specimen:  Blood Updated:  09/03/17 2044     Glucose 93 mg/dL     Narrative:       Meter: [id number] : [id number] Ben Corbin NURSING ASSISTANT    POC Glucose Fingerstick [[id number]]  (Normal) Collected:  09/04/17 0729    Specimen:  Blood Updated:  09/04/17 0736     Glucose 87 mg/dL     Narrative:       Meter: [id number] : [id number] Sarah Robledo NURSING ASSISTANT    POC Glucose Fingerstick [[id number]]  (Normal) Collected:  09/04/17 1141    Specimen:  Blood Updated:  09/04/17 1150     Glucose 105 mg/dL     Narrative:       Meter: [id number] : [id number] Sarah Robledo NURSING ASSISTANT    Magnesium [[id number]]  (Abnormal) Collected:  09/03/17 0623    Specimen:  Blood Updated:  09/04/17 1410     Magnesium 1.6 (L) mg/dL     POC Glucose Fingerstick [[id number]]  (Normal) Collected:  09/04/17 1640    Specimen:   Blood Updated:  09/04/17 1646     Glucose 94 mg/dL     Narrative:       Meter: [id number] : [id number] Sarah Robledo NURSING ASSISTANT    POC Glucose Fingerstick [[id number]]  (Normal) Collected:  09/04/17 2205    Specimen:  Blood Updated:  09/04/17 2215     Glucose 79 mg/dL     Narrative:       Meter: [id number] : [id number] Pepe Portillo Staff-Nurse-Pool    POC Glucose Fingerstick [[id number]]  (Normal) Collected:  09/05/17 0722    Specimen:  Blood Updated:  09/05/17 0728     Glucose 101 mg/dL     Narrative:       Meter: [id number] : [id number] Lv Mayen Nursing Assistant    POC Glucose Fingerstick [[id number]]  (Normal) Collected:  09/05/17 1156    Specimen:  Blood Updated:  09/05/17 1206     Glucose 118 mg/dL     Narrative:       Meter: [id number] : [id number] Lv Mayen Nursing Assistant          Imaging Results (last 72 hours)     Procedure Component Value Units Date/Time    XR Foot 3+ View Right [[id number]] Collected:  09/03/17 0819     Updated:  09/03/17 0823    Narrative:       Right foot, 3 views     INDICATION: Follow-up right foot fracture. Compared with 08/18/2017.     FINDINGS:  Stable extensive plate and screw fixation involving the first through  third tarsals and metatarsals. Casting material somewhat obscures  osseous detail. The alignment appears stable. Stable soft tissue  swelling. No definite fracture.     This report was finalized on 9/3/2017 8:21 AM by Dr. Alex Hansen MD.       XR Toe 2+ View Left [[id number]] Collected:  09/03/17 0821     Updated:  09/03/17 0825    Narrative:       Left foot, 3 views     INDICATION: Left foot pain and laceration today. No comparisons.     FINDINGS:  There is a tiny osseous fragment adjacent to the distal end of the  proximal phalanx of the fifth toe which may represent a tiny avulsion  fracture. No other definite fracture. The alignment appears normal.  There are degenerative changes involving the midfoot.  Calcaneal  spurring.     This report was finalized on 9/3/2017 8:23 AM by Dr. Alex Hansen MD.             ECG/EMG Results (last 72 hours)     Procedure Component Value Units Date/Time    ECG 12 Lead [[id number]] Collected:  09/02/17 1255     Updated:  09/02/17 1807    Narrative:       RR Interval= 845 ms  VT Interval= 180 ms  QRSD Interval= 96 ms  QT Interval= 404 ms  QTc Interval= 439 ms  Heart Rate= 71 ms  P Axis= 45 deg  QRS Axis= 39 deg  T Wave Axis= 30 deg  I: 40 Axis= 6 deg  T: 40 Axis= 71 deg  ST Axis= 55 deg  SINUS RHYTHM  NO SIGNIFICANT CHANGE FROM PREVIOUS ECG  Electronically S[facility]:  [facility] ([facility] 02-Sep-2017 18:04:42  Date and Time of Study: 2017-09-02 12:55:44          Operative/Procedure Notes (last 72 hours) (Notes from 9/2/2017  2:21 PM through 9/5/2017  2:21 PM)     No notes of this type exist for this encounter.           Physician Progress Notes (last 72 hours) (Notes from 9/2/2017  2:21 PM through 9/5/2017  2:21 PM)      Olivier Diaz MD at 9/3/2017  2:16 PM  Version 1 of 1         Hospitalist Team    Patient Care Team:  Killian Scales MD as PCP - General  Killian Scales MD as PCP - Family Medicine      CONSULTATION: ID/podiatry    CHIEF COMPLAINT: found down/AMS    ADMITTING DIAGNOSES: metabolic enceph    SUBJECTIVE:seen today, AMS improved. No f/c/s/n/v/d/cp/soa. Wants to go home.     REVIEW OF SYSTEMS: Some 14-point review of systems is negative except what is mentioned in the HPI relative to the current complaint.     PAST MEDICAL HISTORY:   Past Medical History:   Diagnosis Date   • Arthritis    • Bipolar disorder    • Cellulitis of lower extremity     RIGHT   • Coronary artery disease    • Diabetes mellitus    • Disease of thyroid gland     nodule on thyroid   • DVT (deep venous thrombosis)    • Fracture of right foot    • Hyperlipidemia    • Hypertension    • Pseudogout    • Septic shock 07/2017    H/O   • Toxic metabolic encephalopathy 07/2017    H/O        ALLERGIES:   Allergies   Allergen Reactions   • Lisinopril          PAST SURGICAL HISTORY:   Past Surgical History:   Procedure Laterality Date   • JOINT REPLACEMENT     • KNEE SURGERY     • ORIF FOOT FRACTURE Right 7/29/2017    Procedure: open reduction with percutaneous pinning of midfoot dislocation fracture;  Surgeon: Anish Farah DPM;  Location: [facility];  Service:    • TOTAL HIP ARTHROPLASTY            FAMILY HISTORY:   Family History   Problem Relation Age of Onset   • Arthritis Mother    • Cancer Mother    • Hyperlipidemia Mother    • Arthritis Father    • Cancer Father    • Depression Father    • Hypertension Father    • Mental illness Father    • Cancer Sister    • Arthritis Sister    • Hyperlipidemia Sister    • Cancer Paternal Aunt    • Hypertension Daughter    • Depression Son    • Hyperlipidemia Paternal Grandmother    • Hearing loss Paternal Grandfather    • Hyperlipidemia Paternal Grandfather    • Hypertension Paternal Grandfather          SOCIAL HISTORY:   Social History     Social History   • Marital status:      Spouse name: N/A   • Number of children: N/A   • Years of education: N/A     Occupational History   • Not on file.     Social History Main Topics   • Smoking status: Never Smoker   • Smokeless tobacco: Not on file   • Alcohol use No   • Drug use: No   • Sexual activity: Defer     Other Topics Concern   • Not on file     Social History Narrative       CURRENT MEDICATIONS:     Current Facility-Administered Medications:   •  acetaminophen (TYLENOL) tablet 650 mg, 650 mg, Oral, Q4H PRN, 650 mg at 09/02/17 1031 **OR** acetaminophen (TYLENOL) suppository 650 mg, 650 mg, Rectal, Q4H PRN, Karen Lizama DO  •  dextrose (D50W) solution 25 g, 25 g, Intravenous, Q15 Min PRN, Art Aleman MD  •  dextrose (GLUTOSE) oral gel 15 g, 15 g, Oral, Q15 Min PRN, Art Aleman MD  •  docusate sodium (COLACE) capsule 100 mg, 100 mg, Oral, BID, Art Aleman MD, 100 mg at 09/02/17  1714  •  enoxaparin (LOVENOX) syringe 30 mg, 30 mg, Subcutaneous, Daily, Karen Lizama DO, 30 mg at 09/03/17 0856  •  famotidine (PEPCID) injection 20 mg, 20 mg, Intravenous, Q12H, Karen Lizama DO, 20 mg at 09/03/17 0859  •  FLUoxetine (PROzac) capsule 40 mg, 40 mg, Oral, Daily, Karen Lizama DO, 40 mg at 09/03/17 0852  •  gabapentin (NEURONTIN) capsule 600 mg, 600 mg, Oral, Q8H, Art Aleman MD, 600 mg at 09/03/17 0625  •  glucagon (GLUCAGEN) injection 1 mg, 1 mg, Subcutaneous, Q15 Min PRN, Art Aleman MD  •  hydrophor (AQUAPHOR) ointment 1 application, 1 application, Topical, Q24H, Karen Lizama DO, 1 application at 09/02/17 1019  •  Influenza Vac Subunit Quad (FLUCELVAX) injection 0.5 mL, 0.5 mL, Intramuscular, During Hospitalization, Karen Lizama DO  •  insulin aspart (novoLOG) injection 0-7 Units, 0-7 Units, Subcutaneous, 4x Daily AC & at Bedtime, Art Aleman MD  •  lactobacillus acidophilus (RISAQUAD) capsule 1 capsule, 1 capsule, Oral, Daily, Karen Lizama DO, 1 capsule at 09/03/17 0852  •  lithium tablet 300 mg, 300 mg, Oral, BID, Karen Lizama DO, 300 mg at 09/03/17 0852  •  losartan (COZAAR) tablet 100 mg, 100 mg, Oral, Q24H, Karen Lizama DO, 100 mg at 09/03/17 0852  •  metFORMIN (GLUCOPHAGE) tablet 500 mg, 500 mg, Oral, BID With Meals, Karen Lizama DO, 500 mg at 09/03/17 0852  •  metoprolol tartrate (LOPRESSOR) tablet 100 mg, 100 mg, Oral, BID, Karen Lizama DO, 100 mg at 09/03/17 0853  •  miconazole (MICOTIN) 2 % powder 1 application, 1 application, Topical, Q12H, Karen Lizama DO, 1 application at 09/02/17 1020  •  ondansetron (ZOFRAN) tablet 4 mg, 4 mg, Oral, Q6H PRN **OR** ondansetron ODT (ZOFRAN-ODT) disintegrating tablet 4 mg, 4 mg, Oral, Q6H PRN **OR** ondansetron (ZOFRAN) injection 4 mg, 4 mg, Intravenous, Q6H PRN, Karen Lizama DO  •  oxyCODONE-acetaminophen (PERCOCET) 5-325 MG per tablet 1 tablet, 1 tablet, Oral, Q4H PRN,  Karen Lizama DO, 1 tablet at 09/02/17 2141  •  [DISCONTINUED] piperacillin-tazobactam (ZOSYN) 3.375 g/100 mL 0.9% NS IVPB (mbp), 3.375 g, Intravenous, Q6H, 3.375 g at 09/02/17 1020 **AND** [COMPLETED] vancomycin (VANCOCIN) 2,500 mg in sodium chloride 0.9 % 500 mL IVPB, 20 mg/kg, Intravenous, Once, 2,500 mg at 09/01/17 2320 **AND** Pharmacy to dose vancomycin, , Does not apply, Continuous PRN, Karen Lizama DO  •  primidone (MYSOLINE) tablet 250 mg, 250 mg, Oral, BID, Karen Lizama DO, 250 mg at 09/03/17 0852  •  sodium chloride 0.9 % bolus 3,630 mL, 30 mL/kg, Intravenous, PRN, Karen Lizama, DO  •  sodium chloride 0.9 % flush 1-10 mL, 1-10 mL, Intravenous, PRN, Karen Lizama DO  •  sodium chloride 0.9 % infusion, 100 mL/hr, Intravenous, Continuous, Karen Lizama DO, Last Rate: 100 mL/hr at 09/03/17 0306, 100 mL/hr at 09/03/17 0306  •  vancomycin (VANCOCIN) 2,000 mg in sodium chloride 0.9 % 500 mL IVPB, 2,000 mg, Intravenous, Q8H, Karen Lizama DO, Last Rate: 0 mL/hr at 09/02/17 1530, 2,000 mg at 09/03/17 1222      DIAGNOSTIC DATA:     I reviewed the patient's new clinical results.    Lab Results (last 24 hours)     Procedure Component Value Units Date/Time    POC Glucose Fingerstick [[id number]]  (Normal) Collected:  09/02/17 1654    Specimen:  Blood Updated:  09/02/17 1701     Glucose 94 mg/dL     Narrative:       Meter: [id number] : [id number] Job [facility] ASSISTANT-POOL    Troponin [[id number]]  (Normal) Collected:  09/02/17 1802    Specimen:  Blood Updated:  09/02/17 1831     Troponin T <0.010 ng/mL     Narrative:       Troponin T Reference Ranges:  Less than 0.03 ng/mL:    Negative for AMI  0.03 to 0.09 ng/mL:      Indeterminant for AMI  Greater than 0.09 ng/mL: Positive for AMI    POC Glucose Fingerstick [[id number]]  (Normal) Collected:  09/02/17 2052    Specimen:  Blood Updated:  09/02/17 2058     Glucose 97 mg/dL     Narrative:       Meter: [id number]  : [id number] Josh Ruiz RN Validator    Troponin [[id number]]  (Normal) Collected:  09/02/17 2359    Specimen:  Blood Updated:  09/03/17 0048     Troponin T <0.010 ng/mL     Narrative:       Troponin T Reference Ranges:  Less than 0.03 ng/mL:    Negative for AMI  0.03 to 0.09 ng/mL:      Indeterminant for AMI  Greater than 0.09 ng/mL: Positive for AMI    Vancomycin, Random [[id number]] Collected:  09/03/17 0259    Specimen:  Blood Updated:  09/03/17 0452     Vancomycin Random 20.90 mcg/mL     CBC & Differential [[id number]] Collected:  09/03/17 0623    Specimen:  Blood Updated:  09/03/17 0631    Narrative:       The following orders were created for panel order CBC & Differential.  Procedure                               Abnormality         Status                     ---------                               -----------         ------                     CBC Auto Differential[[id number]]        Abnormal            Final result                 Please view results for these tests on the individual orders.    CBC Auto Differential [[id number]]  (Abnormal) Collected:  09/03/17 0623    Specimen:  Blood Updated:  09/03/17 0631     WBC 5.25 10*3/mm3      RBC 3.40 (L) 10*6/mm3      Hemoglobin 10.7 (L) g/dL      Hematocrit 33.4 (L) %      MCV 98.2 (H) fL      MCH 31.5 (H) pg      MCHC 32.0 g/dL      RDW 11.6 %      RDW-SD 42.1 fl      MPV 8.5 fL      Platelets 205 10*3/mm3      Neutrophil % 59.2 %      Lymphocyte % 20.4 %      Monocyte % 11.0 (H) %      Eosinophil % 8.0 (H) %      Basophil % 0.8 %      Immature Grans % 0.6 (H) %      Neutrophils, Absolute 3.11 10*3/mm3      Lymphocytes, Absolute 1.07 10*3/mm3      Monocytes, Absolute 0.58 10*3/mm3      Eosinophils, Absolute 0.42 (H) 10*3/mm3      Basophils, Absolute 0.04 10*3/mm3      Immature Grans, Absolute 0.03 10*3/mm3      nRBC 0.0 /100 WBC     Lactic Acid, Plasma [[id number]]  (Normal) Collected:  09/03/17 0623    Specimen:  Blood Updated:  09/03/17 0644     Lactate  0.6 mmol/L     Comprehensive Metabolic Panel [[id number]]  (Abnormal) Collected:  09/03/17 0623    Specimen:  Blood Updated:  09/03/17 0651     Glucose 89 mg/dL      BUN 9 mg/dL      Creatinine 0.52 (L) mg/dL      Sodium 137 mmol/L      Potassium 4.0 mmol/L      Chloride 103 mmol/L      CO2 22.7 mmol/L      Calcium 8.2 (L) mg/dL      Total Protein 6.2 g/dL      Albumin 3.10 (L) g/dL      ALT (SGPT) 13 U/L      AST (SGOT) 12 U/L      Alkaline Phosphatase 66 U/L      Total Bilirubin 0.2 mg/dL      eGFR Non African Amer >150 mL/min/1.73      Globulin 3.1 gm/dL      A/G Ratio 1.0 g/dL      BUN/Creatinine Ratio 17.3     Anion Gap 11.3 mmol/L     POC Glucose Fingerstick [[id number]]  (Normal) Collected:  09/03/17 0714    Specimen:  Blood Updated:  09/03/17 0720     Glucose 88 mg/dL     Narrative:       Meter: [id number] : [id number] [facility] ASSISTANT-POOL    POC Glucose Fingerstick [[id number]]  (Normal) Collected:  09/03/17 1110    Specimen:  Blood Updated:  09/03/17 1121     Glucose 103 mg/dL     Narrative:       Meter: [id number] : [id number] Liz Canales RN          Imaging Results (last 24 hours)     Procedure Component Value Units Date/Time    XR Foot 3+ View Right [[id number]] Collected:  09/03/17 0819     Updated:  09/03/17 0823    Narrative:       Right foot, 3 views     INDICATION: Follow-up right foot fracture. Compared with 08/18/2017.     FINDINGS:  Stable extensive plate and screw fixation involving the first through  third tarsals and metatarsals. Casting material somewhat obscures  osseous detail. The alignment appears stable. Stable soft tissue  swelling. No definite fracture.     This report was finalized on 9/3/2017 8:21 AM by Dr. Alex Hansen MD.       XR Toe 2+ View Left [[id number]] Collected:  09/03/17 0821     Updated:  09/03/17 0825    Narrative:       Left foot, 3 views     INDICATION: Left foot pain and laceration today. No comparisons.     FINDINGS:  There is a tiny  "osseous fragment adjacent to the distal end of the  proximal phalanx of the fifth toe which may represent a tiny avulsion  fracture. No other definite fracture. The alignment appears normal.  There are degenerative changes involving the midfoot. Calcaneal  spurring.     This report was finalized on 9/3/2017 8:23 AM by Dr. Alex Hansen MD.             Xray not reviewed personally by physician.      ECG reviewed personally by physician  ECG/EMG Results (most recent)     Procedure Component Value Units Date/Time    ECG 12 Lead [[id number]] Collected:  09/02/17 1255     Updated:  09/02/17 1807    Narrative:       RR Interval= 845 ms  ID Interval= 180 ms  QRSD Interval= 96 ms  QT Interval= 404 ms  QTc Interval= 439 ms  Heart Rate= 71 ms  P Axis= 45 deg  QRS Axis= 39 deg  T Wave Axis= 30 deg  I: 40 Axis= 6 deg  T: 40 Axis= 71 deg  ST Axis= 55 deg  SINUS RHYTHM  NO SIGNIFICANT CHANGE FROM PREVIOUS ECG  Electronically S[facility] by:  [facility] ([facility]) 02-Sep-2017 18:04:42  Date and Time of Study: 2017-09-02 12:55:44            PHYSICAL EXAMINATION:  VITAL SIGNS: Temp:  [98.1 °F (36.7 °C)-99.1 °F (37.3 °C)] 98.5 °F (36.9 °C)  Heart Rate:  [69-80] 69  Resp:  [16-18] 16  BP: (101-132)/(56-81) 124/81   Flowsheet Rows         First Filed Value    Admission Height  71.5\" (181.6 cm) Documented at 09/01/2017 1307    Admission Weight  276 lb (125 kg) Documented at 09/01/2017 1307        GENERAL: Alert and oriented x3, afebrile. Vital signs stable, appeared comfortable, nondistressed, and appears stated age. Generalized weakness. Responds to questions appropriately.   HEENT: Normocephalic, atraumatic. Pupils equal, round and reactive to light. Extraocular movements intact bilaterally. Trachea midline.  Mucous membranes are moist.   NECK: Supple. No JVD. Trachea midline, no LAD  CHEST: Clear to auscultation bilaterally anteriorly; no rale, rhonchi, or wheezes  HEART: Regular rate and rhythm. No rubs, murmurs or gallops. "   ABDOMEN: Soft, nontender, nondistended. Bowel sounds are otherwise positive.   EXTREMITIES: No clubbing, cyanosis, or edema. Moves all extremities  SKIN: Warm and dry. No ecchymoses, petechiae or rashes.   MUSCULOSKELETAL: No bony abnormalities. Range of motion normal. No crepitus. Feet exam deffered to podiatry.   NEUROLOGIC: Cranial nerves II-XII intact bilaterally. No focal neurologic deficits. Mini-Mental status exam was deferred.   LYMPHATICS: No lymphadenopathy.     ASSESSMENT AND PLAN:        Altered Mental status: secondary to drugs including benzos and barbiturates and gabapentin. Improved with time and IVF And abx for infection.     Lt foot infection: Is being followed by Dr Farah (Pod). On  Inj Vanco,   - per ID:IV vanc  DC zosyn  Local care  C&S  Change to po minocyclin 100mg PO BID in 2-3 days for 10 days        Drug overdose/intox: Benzos and Barbiturates, held, counseled today on dangers.      Bipolar disorder: On Lithium, cont     Anxiety: On Ativan as outpatient     CAD: No issues with chest pains     HTN: Last /67. On Norvasc, Losartan and Metoprolol tartrate, controlled, cont     HLD: Not on any statins. Defer to outpt.     Hx of DVT: Hx noted     Type II DM: On Metformin and insulin coverage     DJD: On Oxycodone-acetaminophen 5-325 mg     Hx of Thyroid nodule: Hx noted     Hx of septic shock and toxic metabolic encephalopathy: Hsx noted. Resolved presently     DVT Prophylaxis: On Inj Lovenox and SCDs    Home vs rehab when able from ID standpoint with abx recs.        Olivier Diaz MD  09/03/17  2:17 PM      Time:30 min     Electronically signed by Olivier Diaz MD at 9/3/2017  2:22 PM      Mariano Brown MD at 9/4/2017  9:36 PM  Version 1 of 1         Hospitalist Team      Patient Care Team:  Killian Scales MD as PCP - General  Killian Scales MD as PCP - Family Medicine        Chief Complaint:  Follow-up AMS and Cellultitis    Subjective    Patient seen earlier  "today.  Mentation clear.  Tolerating PO.  Denies chest pain and dyspnea.    Objective    Vital Signs  Temp:  [98 °F (36.7 °C)-98.7 °F (37.1 °C)] 98.5 °F (36.9 °C)  Heart Rate:  [66-71] 71  Resp:  [18-20] 18  BP: (126-142)/(79-89) 137/85  Oxygen Therapy  SpO2: 94 %  Pulse Oximetry Type: Continuous  O2 Device: room air  Flow (L/min): 1  Oximetry Probe Site Changed: Yes}    Flowsheet Rows         First Filed Value    Admission Height  71.5\" (181.6 cm) Documented at 09/01/2017 1307    Admission Weight  276 lb (125 kg) Documented at 09/01/2017 1307            Physical Exam:    General Appearance:    Alert, cooperative, in no acute distress   Lungs:     Clear to auscultation,respirations regular, even and                  unlabored    Heart:    Regular rhythm and normal rate, normal S1 and S2, no            murmur, no gallop, no rub, no click   Abdomen:     Obese, soft and NT w/ active BS.   Extremities:   Moves all extremities well, no edema, no cyanosis, no             redness   Pulses:   Radial Pulses palpable and equal bilaterally   Neurologic:   Cranial nerves 2 - 12 grossly intact         Results Review:     I reviewed the patient's new clinical results.  Lab Results (last 24 hours)     Procedure Component Value Units Date/Time    POC Glucose Fingerstick [[id number]]  (Normal) Collected:  09/04/17 0729    Specimen:  Blood Updated:  09/04/17 0736     Glucose 87 mg/dL     Narrative:       Meter: [id number] : [id number] Sarah Robledo NURSING ASSISTANT    POC Glucose Fingerstick [[id number]]  (Normal) Collected:  09/04/17 1141    Specimen:  Blood Updated:  09/04/17 1150     Glucose 105 mg/dL     Narrative:       Meter: [id number] : [id number] Sarah Robledo NURSING ASSISTANT    Magnesium [[id number]]  (Abnormal) Collected:  09/03/17 0623    Specimen:  Blood Updated:  09/04/17 1410     Magnesium 1.6 (L) mg/dL     POC Glucose Fingerstick [[id number]]  (Normal) Collected:  09/04/17 1640    Specimen:  Blood Updated:  " 09/04/17 1646     Glucose 94 mg/dL     Narrative:       Meter: [id number] : [id number] Sarah Robledo NURSING ASSISTANT          Imaging Results (last 24 hours)     ** No results found for the last 24 hours. **            ECG/EMG Results (most recent)     Procedure Component Value Units Date/Time    ECG 12 Lead [[id number]] Collected:  09/02/17 1255     Updated:  09/02/17 1807    Narrative:       RR Interval= 845 ms  AL Interval= 180 ms  QRSD Interval= 96 ms  QT Interval= 404 ms  QTc Interval= 439 ms  Heart Rate= 71 ms  P Axis= 45 deg  QRS Axis= 39 deg  T Wave Axis= 30 deg  I: 40 Axis= 6 deg  T: 40 Axis= 71 deg  ST Axis= 55 deg  SINUS RHYTHM  NO SIGNIFICANT CHANGE FROM PREVIOUS ECG  Electronically Signed by:  Anish GreerCRISTOBAL) ([facility]) 02-Sep-2017 18:04:42  Date and Time of Study: 2017-09-02 12:55:44          Medication Review:   I have reviewed the patient's current medication list    Current Facility-Administered Medications:   •  acetaminophen (TYLENOL) tablet 650 mg, 650 mg, Oral, Q4H PRN, 650 mg at 09/02/17 1031 **OR** acetaminophen (TYLENOL) suppository 650 mg, 650 mg, Rectal, Q4H PRN, Karen Lizama DO  •  dextrose (D50W) solution 25 g, 25 g, Intravenous, Q15 Min PRN, Art Aleman MD  •  dextrose (GLUTOSE) oral gel 15 g, 15 g, Oral, Q15 Min PRN, Art Aleman MD  •  docusate sodium (COLACE) capsule 100 mg, 100 mg, Oral, BID, Art Aleman MD, 100 mg at 09/04/17 1733  •  enoxaparin (LOVENOX) syringe 30 mg, 30 mg, Subcutaneous, Daily, Karen Lizama DO, 30 mg at 09/04/17 0814  •  famotidine (PEPCID) injection 20 mg, 20 mg, Intravenous, Q12H, Karen Lizama DO, 20 mg at 09/04/17 0821  •  FLUoxetine (PROzac) capsule 40 mg, 40 mg, Oral, Daily, Karen Lizama DO, 40 mg at 09/04/17 0812  •  gabapentin (NEURONTIN) capsule 600 mg, 600 mg, Oral, Q8H, Art Aleman MD, 600 mg at 09/04/17 1419  •  glucagon (GLUCAGEN) injection 1 mg, 1 mg, Subcutaneous, Q15 Min PRN, Art Aleman MD  •   hydrophor (AQUAPHOR) ointment 1 application, 1 application, Topical, Q24H, Karen Lizama, DO, 1 application at 09/04/17 0814  •  Influenza Vac Subunit Quad (FLUCELVAX) injection 0.5 mL, 0.5 mL, Intramuscular, During Hospitalization, Karen Lizama DO  •  insulin aspart (novoLOG) injection 0-7 Units, 0-7 Units, Subcutaneous, 4x Daily AC & at Bedtime, Art Aleman MD  •  lactobacillus acidophilus (RISAQUAD) capsule 1 capsule, 1 capsule, Oral, Daily, Karen Lizama DO, 1 capsule at 09/04/17 0813  •  lithium tablet 300 mg, 300 mg, Oral, BID, Karen Lizama DO, 300 mg at 09/04/17 1733  •  losartan (COZAAR) tablet 100 mg, 100 mg, Oral, Q24H, Karen Lizama DO, 100 mg at 09/04/17 0813  •  metFORMIN (GLUCOPHAGE) tablet 500 mg, 500 mg, Oral, BID With Meals, Karen Lizama DO, 500 mg at 09/04/17 1733  •  metoprolol tartrate (LOPRESSOR) tablet 100 mg, 100 mg, Oral, BID, Karen Lizama DO, 100 mg at 09/04/17 1734  •  miconazole (MICOTIN) 2 % powder 1 application, 1 application, Topical, Q12H, Karen Lizama DO, 1 application at 09/02/17 1020  •  minocycline (MINOCIN,DYNACIN) capsule 100 mg, 100 mg, Oral, Q12H, Mariano Brown MD, 100 mg at 09/04/17 1130  •  ondansetron (ZOFRAN) tablet 4 mg, 4 mg, Oral, Q6H PRN **OR** ondansetron ODT (ZOFRAN-ODT) disintegrating tablet 4 mg, 4 mg, Oral, Q6H PRN **OR** ondansetron (ZOFRAN) injection 4 mg, 4 mg, Intravenous, Q6H PRN, Karen Lizama DO  •  oxyCODONE-acetaminophen (PERCOCET) 5-325 MG per tablet 1 tablet, 1 tablet, Oral, Q4H PRN, Karen Lizama DO, 1 tablet at 09/04/17 0104  •  [DISCONTINUED] piperacillin-tazobactam (ZOSYN) 3.375 g/100 mL 0.9% NS IVPB (mbp), 3.375 g, Intravenous, Q6H, 3.375 g at 09/02/17 1020 **AND** [COMPLETED] vancomycin (VANCOCIN) 2,500 mg in sodium chloride 0.9 % 500 mL IVPB, 20 mg/kg, Intravenous, Once, 2,500 mg at 09/01/17 2320 **AND** Pharmacy to dose vancomycin, , Does not apply, Continuous PRN, Karen Lizama,  DO  •  primidone (MYSOLINE) tablet 250 mg, 250 mg, Oral, BID, Karen Lizama DO, 250 mg at 09/04/17 1734  •  sodium chloride 0.9 % bolus 3,630 mL, 30 mL/kg, Intravenous, PRN, Karen Lizama DO  •  sodium chloride 0.9 % flush 1-10 mL, 1-10 mL, Intravenous, PRN, Karen Lizama DO      Assessment/Plan     1.  AMS:  Resolved.  Patient denies taking extra doses of any of his meds, but this is inconsistent with his prior history and discussions w/ staff.    2.  HTN:  At goal.  No change to current regimen.    3.  Cellulitis:  Skin exam benign.  No leukocytosis or fever.  Changed to po Minocycline.    4.  DMII:  On metformin and SSI.  Nothing acute.    5.  Bipolar D/O:  Nothing acute.  No change.    Plan for disposition: Unknown    Mariano Brown MD  09/04/17  9:36 PM           Electronically signed by Mariano Brown MD at 9/4/2017  9:49 PM           Consult Notes (last 72 hours) (Notes from 9/2/2017  2:21 PM through 9/5/2017  2:21 PM)      Delfino Arce MD at 9/2/2017  4:39 PM  Version 1 of 1     Consult Orders:    1. Inpatient Consult to Infectious Diseases [[id number]] ordered by Karen Lizama DO at 09/01/17 2221                     LOS: 1 day   Patient Care Team:  Killian Scales MD as PCP - General  Killian Scales MD as PCP - Family Medicine        Subjective       Attending MD : Karen Lizama DO    Patient Complaints: confused  Fell few days ago L foot pain         History of Present Illness  :     Patient Denies:  NVD    PMH : Active Problems:    Altered mental status      Review of Systems:    12 point ROS done now AAO3    Objective     Vital Signs  Temp:  [97.8 °F (36.6 °C)-98.2 °F (36.8 °C)] 98.1 °F (36.7 °C)  Heart Rate:  [69-85] 70  Resp:  [16-18] 18  BP: (104-131)/(62-88) 115/62    Physical Exam:     General Appearance:    Alert, cooperative, in no acute distress   Head:    Normocephalic, without obvious abnormality, atraumatic   Eyes:            Lids and lashes normal, conjunctivae  and sclerae normal, no   icterus, no pallor, corneas clear, PERRLA   Ears:    Ears appear intact with no abnormalities noted   Throat:   No oral lesions, no thrush, oral mucosa moist   Neck:   No adenopathy, supple, trachea midline, no thyromegaly, no   carotid bruit, no JVD   Back:     No kyphosis present, no scoliosis present, no skin lesions,      erythema or scars, no tenderness to percussion or                   palpation,   range of motion normal   Lungs:     Clear to auscultation,respirations regular, even and                  unlabored    Heart:    Regular rhythm and normal rate, normal S1 and S2, no            murmur, no gallop, no rub, no click   Chest Wall:    No abnormalities observed   Abdomen:     Normal bowel sounds, no masses, no organomegaly, soft        non-tender, non-distended, no guarding, no rebound                tenderness   Rectal:     Deferred   Extremities:  L foot cellulitis open wound  R calf splint   Pulses:   Pulses palpable and equal bilaterally   Skin:   No bleeding, bruising or rash   Lymph nodes:   No palpable adenopathy   Neurologic:   Cranial nerves 2 - 12 grossly intact, sensation intact, DTR       present and equal bilaterally            Results Review:    Lab Results (last 72 hours)     Procedure Component Value Units Date/Time    POC Glucose Fingerstick [[id number]]  (Normal) Collected:  09/01/17 1315    Specimen:  Blood Updated:  09/01/17 1323     Glucose 101 mg/dL     Narrative:       Meter: [id number] : [id number] Reji Lemus RN    CBC & Differential [[id number]] Collected:  09/01/17 1344    Specimen:  Blood Updated:  09/01/17 1358    Narrative:       The following orders were created for panel order CBC & Differential.  Procedure                               Abnormality         Status                     ---------                               -----------         ------                     CBC Auto Differential[[id number]]        Abnormal            Final result                  Please view results for these tests on the individual orders.    CBC Auto Differential [[id number]]  (Abnormal) Collected:  09/01/17 1344    Specimen:  Blood Updated:  09/01/17 1358     WBC 11.54 (H) 10*3/mm3      RBC 3.97 (L) 10*6/mm3      Hemoglobin 12.9 (L) g/dL      Hematocrit 38.5 (L) %      MCV 97.0 (H) fL      MCH 32.5 (H) pg      MCHC 33.5 g/dL      RDW 11.8 %      RDW-SD 42.0 fl      MPV 8.6 fL      Platelets 319 10*3/mm3      Neutrophil % 74.8 (H) %      Lymphocyte % 11.6 (L) %      Monocyte % 9.1 (H) %      Eosinophil % 3.7 %      Basophil % 0.3 %      Immature Grans % 0.5 %      Neutrophils, Absolute 8.63 (H) 10*3/mm3      Lymphocytes, Absolute 1.34 10*3/mm3      Monocytes, Absolute 1.05 (H) 10*3/mm3      Eosinophils, Absolute 0.43 (H) 10*3/mm3      Basophils, Absolute 0.03 10*3/mm3      Immature Grans, Absolute 0.06 (H) 10*3/mm3      nRBC 0.0 /100 WBC     Protime-INR [[id number]]  (Normal) Collected:  09/01/17 1344    Specimen:  Blood Updated:  09/01/17 1408     Protime 14.2 Seconds      INR 1.10    Narrative:       Therapeutic Ranges for INR: 2.0-3.0 (PT 20-30)                              2.5-3.5 (PT 25-34)    Comprehensive Metabolic Panel [[id number]]  (Abnormal) Collected:  09/01/17 1344    Specimen:  Blood Updated:  09/01/17 1421     Glucose 120 (H) mg/dL      BUN 15 mg/dL      Creatinine 0.72 (L) mg/dL      Sodium 136 mmol/L      Potassium 5.3 (H) mmol/L      Chloride 96 (L) mmol/L      CO2 28.8 mmol/L      Calcium 9.4 mg/dL      Total Protein 7.9 g/dL      Albumin 4.00 g/dL      ALT (SGPT) 14 U/L      AST (SGOT) 15 U/L      Alkaline Phosphatase 86 U/L      Total Bilirubin 0.3 mg/dL      eGFR Non African Amer 114 mL/min/1.73      Globulin 3.9 gm/dL      A/G Ratio 1.0 g/dL      BUN/Creatinine Ratio 20.8     Anion Gap 11.2 mmol/L     Urinalysis With / Culture If Indicated [[id number]]  (Abnormal) Collected:  09/01/17 1551    Specimen:  Urine from Urine, Clean Catch Updated:  09/01/17 8565      Color, UA Yellow     Appearance, UA Clear     pH, UA 7.0     Specific Gravity, UA 1.020     Glucose, UA Negative     Ketones, UA Negative     Bilirubin, UA Negative     Blood, UA Small (1+) (A)     Protein, UA Negative     Leuk Esterase, UA Negative     Nitrite, UA Negative     Urobilinogen, UA 0.2 E.U./dL    Urine Drug Screen [[id number]]  (Abnormal) Collected:  09/01/17 1558    Specimen:  Urine from Urine, Clean Catch Updated:  09/01/17 1715     THC, Screen, Urine Negative     Phencyclidine (PCP), Urine Negative     Cocaine Screen, Urine Negative     Methamphetamine, Urine Negative     Opiate Screen Negative     Amphetamine Screen, Urine Negative     Benzodiazepine Screen, Urine Positive (A)     Tricyclic Antidepressants Screen Negative     Methadone Screen, Urine Negative     Barbiturates Screen, Urine Positive (A)     Oxycodone Screen, Urine Negative     Propoxyphene Screen Negative     Buprenorphine, Screen, Urine Negative    Narrative:       Urine drug screen results are to be used for medical purposes only.  They are not to be used for legal purposes such as employment testing.  Negative results do not necessarily mean the complete absence of a subtance, but rather that the result is less than the cutoff for that substance.  Positive results are unconfirmed and considered Preliminary Positive.  [facility] does not automatically confirm Postitive Unconfirmed results.  The physician may request (order) an Unconfirmed Positive result to be sent out for confirmation.      Negative Thresholds for Drugs Screened:    THC screen, urine                          50 ng/ml  Phenycyclidine (PCP), urine                25 ng/ml  Cocaine screen, urine                     150 ng/ml  Methamphetamine, urine                    500 ng/ml  Opiate screen, urine                      100 ng/ml  Amphetamine screen, urine                 500 ng/ml  Benzodiazepine screen, urine              150 ng/ml  Tricyclic  Antidepressants screen, urine   300 ng/ml  Methadone screen, urine                   200 ng/ml  Barbiturates screen, urine                200 ng/ml  Oxycodone screen, urine                   100 ng/ml  Propoxyphene screen, urine                300 ng/ml  Buprenorphine screen, urine                10 ng/ml    Urinalysis, Microscopic Only [[id number]]  (Abnormal) Collected:  09/01/17 1558    Specimen:  Urine from Urine, Clean Catch Updated:  09/01/17 1729     RBC, UA 0-2 (A) /HPF      WBC, UA None Seen /HPF      Bacteria, UA None Seen /HPF      Squamous Epithelial Cells, UA 0-2 /HPF      Hyaline Casts, UA None Seen /LPF      Methodology Manual Light Microscopy    Ammonia [[id number]]  (Normal) Collected:  09/01/17 1719    Specimen:  Blood Updated:  09/01/17 1810     Ammonia 35 umol/L     Lactic Acid, Plasma [[id number]]  (Normal) Collected:  09/02/17 0011    Specimen:  Blood Updated:  09/02/17 0031     Lactate 0.9 mmol/L     POC Glucose Fingerstick [[id number]]  (Normal) Collected:  09/02/17 0142    Specimen:  Blood Updated:  09/02/17 0159     Glucose 96 mg/dL     Narrative:       Meter: [id number] : [id number] Ben Corbin NURSING ASSISTANT    CBC Auto Differential [[id number]]  (Abnormal) Collected:  09/02/17 0513    Specimen:  Blood Updated:  09/02/17 0535     WBC 7.73 10*3/mm3      RBC 3.58 (L) 10*6/mm3      Hemoglobin 11.5 (L) g/dL      Hematocrit 35.2 (L) %      MCV 98.3 (H) fL      MCH 32.1 (H) pg      MCHC 32.7 g/dL      RDW 11.9 %      RDW-SD 43.1 fl      MPV 8.5 fL      Platelets 248 10*3/mm3      Neutrophil % 69.6 %      Lymphocyte % 13.8 (L) %      Monocyte % 10.1 (H) %      Eosinophil % 5.6 (H) %      Basophil % 0.4 %      Immature Grans % 0.5 %      Neutrophils, Absolute 5.38 10*3/mm3      Lymphocytes, Absolute 1.07 10*3/mm3      Monocytes, Absolute 0.78 10*3/mm3      Eosinophils, Absolute 0.43 (H) 10*3/mm3      Basophils, Absolute 0.03 10*3/mm3      Immature Grans, Absolute 0.04 (H) 10*3/mm3      nRBC  0.0 /100 WBC     Basic Metabolic Panel [[id number]]  (Abnormal) Collected:  09/02/17 0513    Specimen:  Blood Updated:  09/02/17 0553     Glucose 89 mg/dL      BUN 10 mg/dL      Creatinine 0.55 (L) mg/dL      Sodium 137 mmol/L      Potassium 4.4 mmol/L      Chloride 101 mmol/L      CO2 25.7 mmol/L      Calcium 8.8 mg/dL      eGFR Non African Amer >150 mL/min/1.73      BUN/Creatinine Ratio 18.2     Anion Gap 10.3 mmol/L     Narrative:       GFR Normal >60  Chronic Kidney Disease <60  Kidney Failure <15    Magnesium [[id number]]  (Abnormal) Collected:  09/02/17 0513    Specimen:  Blood Updated:  09/02/17 0553     Magnesium 1.5 (L) mg/dL     Phosphorus [[id number]]  (Normal) Collected:  09/02/17 0513    Specimen:  Blood Updated:  09/02/17 0553     Phosphorus 4.0 mg/dL     POC Glucose Fingerstick [[id number]]  (Normal) Collected:  09/02/17 0736    Specimen:  Blood Updated:  09/02/17 0748     Glucose 89 mg/dL     Narrative:       Meter: [id number] : [id number] Nancy Vivian NA CERT.    POC Glucose Fingerstick [[id number]]  (Normal) Collected:  09/02/17 1114    Specimen:  Blood Updated:  09/02/17 1134     Glucose 90 mg/dL     Narrative:       Meter: [id number] : [id number] Nancy Vivian NA CERT.    Troponin [[id number]]  (Normal) Collected:  09/02/17 1227    Specimen:  Blood Updated:  09/02/17 1250     Troponin T <0.010 ng/mL     Narrative:       Troponin T Reference Ranges:  Less than 0.03 ng/mL:    Negative for AMI  0.03 to 0.09 ng/mL:      Indeterminant for AMI  Greater than 0.09 ng/mL: Positive for AMI              Imaging Results (last 72 hours)     Procedure Component Value Units Date/Time    CT Head Without Contrast [[id number]] Collected:  09/01/17 1626     Updated:  09/01/17 1632    Narrative:       CT HEAD, NONCONTRAST, 09/01/2017:     HISTORY:  51-year-old male brought to the ED after home health nurse noted acute  mental status changes today.     TECHNIQUE:  CT examination of the head without IV  "contrast. Radiation dose reduction  techniques were utilized, including automated exposure control and  exposure modulation based on body size.     FINDINGS:  No acute intracranial abnormality is identified.     Mild generalized cerebral atrophy. Mild diffuse low-attenuation white  matter changes, nonspecific but likely related to chronic microvascular  disease. These findings are stable since 07/22/2017.     No evidence of intracranial hemorrhage, mass, mass effect, cerebral  edema or hydrocephalus.       Impression:       1. No acute intracranial abnormality.  2. Stable mild diffuse chronic changes as noted above.  3. No change since 07/22/2017.     This report was finalized on 9/1/2017 4:30 PM by Dr. Bigg Del Angel MD.       XR Toe 2+ View Left [[id number]] Updated:  09/02/17 1525    XR Foot 3+ View Right [[id number]] Updated:  09/02/17 1527            Medication Review:      Hospital Medications (active)       Dose Frequency Start End    acetaminophen (TYLENOL) suppository 650 mg 650 mg Every 4 Hours PRN 9/1/2017     Sig - Route: Insert 1 suppository into the rectum Every 4 (Four) Hours As Needed for Fever (temperature greater than 101.5 F or headache). - Rectal    Linked Group 1:  \"Or\" Linked Group Details        acetaminophen (TYLENOL) tablet 650 mg 650 mg Every 4 Hours PRN 9/1/2017     Sig - Route: Take 2 tablets by mouth Every 4 (Four) Hours As Needed for Headache or Fever (fever greater than 101.5 F). - Oral    Linked Group 1:  \"Or\" Linked Group Details        dextrose (D50W) solution 25 g 25 g Every 15 Minutes PRN 9/2/2017     Sig - Route: Infuse 50 mL into a venous catheter Every 15 (Fifteen) Minutes As Needed for Low Blood Sugar (Blood Sugar Less Than 70, Patient Has IV Access - Unresponsive, NPO or Unable To Safely Swallow). - Intravenous    dextrose (GLUTOSE) oral gel 15 g 15 g Every 15 Minutes PRN 9/2/2017     Sig - Route: Take 15 g by mouth Every 15 (Fifteen) Minutes As Needed for Low Blood Sugar " (Blood Sugar Less Than 70, Patient Alert, Is Not NPO & Can Safely Swallow). - Oral    docusate sodium (COLACE) capsule 100 mg 100 mg 2 Times Daily 9/2/2017     Sig - Route: Take 1 capsule by mouth 2 (Two) Times a Day. - Oral    enoxaparin (LOVENOX) syringe 30 mg 30 mg Daily 9/2/2017     Sig - Route: Inject 0.3 mL under the skin Daily. - Subcutaneous    famotidine (PEPCID) injection 20 mg 20 mg Every 12 Hours Scheduled 9/1/2017     Sig - Route: Infuse 2 mL into a venous catheter Every 12 (Twelve) Hours. - Intravenous    FLUoxetine (PROzac) capsule 40 mg 40 mg Daily 9/2/2017     Sig - Route: Take 2 capsules by mouth Daily. - Oral    gabapentin (NEURONTIN) capsule 600 mg 600 mg Every 8 Hours Scheduled 9/2/2017     Sig - Route: Take 2 capsules by mouth Every 8 (Eight) Hours. - Oral    glucagon (GLUCAGEN) injection 1 mg 1 mg Every 15 Minutes PRN 9/2/2017     Sig - Route: Inject 1 mg under the skin Every 15 (Fifteen) Minutes As Needed (Blood Glucose Less Than 70 - Patient Without IV Access - Unresponsive, NPO or Unable To Safely Swallow). - Subcutaneous    hydrophor (AQUAPHOR) ointment 1 application 1 application Every 24 Hours Scheduled 9/2/2017     Sig - Route: Apply 1 application topically Daily. - Topical    Influenza Vac Subunit Quad (FLUCELVAX) injection 0.5 mL 0.5 mL During Hospitalization 9/1/2017     Sig - Route: Inject 0.5 mL into the shoulder, thigh, or buttocks During Hospitalization for Immunization. - Intramuscular    insulin aspart (novoLOG) injection 0-7 Units 0-7 Units 4 Times Daily Before Meals & Nightly 9/2/2017     Sig - Route: Inject 0-7 Units under the skin 4 (Four) Times a Day Before Meals & at Bedtime. - Subcutaneous    lactobacillus acidophilus (RISAQUAD) capsule 1 capsule 1 capsule Daily 9/2/2017     Sig - Route: Take 1 capsule by mouth Daily. - Oral    lithium tablet 300 mg 300 mg 2 Times Daily 9/2/2017     Sig - Route: Take 1 tablet by mouth 2 (Two) Times a Day. - Oral    losartan (COZAAR)  "tablet 100 mg 100 mg Every 24 Hours Scheduled 9/2/2017     Sig - Route: Take 2 tablets by mouth Daily. - Oral    metFORMIN (GLUCOPHAGE) tablet 500 mg 500 mg 2 Times Daily With Meals 9/1/2017     Sig - Route: Take 1 tablet by mouth 2 (Two) Times a Day With Meals. - Oral    metoprolol tartrate (LOPRESSOR) tablet 100 mg 100 mg 2 Times Daily 9/2/2017     Sig - Route: Take 2 tablets by mouth 2 (Two) Times a Day. - Oral    miconazole (MICOTIN) 2 % powder 1 application 1 application Every 12 Hours 9/1/2017     Sig - Route: Apply 1 application topically Every 12 (Twelve) Hours. - Topical    ondansetron (ZOFRAN) injection 4 mg 4 mg Every 6 Hours PRN 9/1/2017     Sig - Route: Infuse 2 mL into a venous catheter Every 6 (Six) Hours As Needed for Nausea or Vomiting. - Intravenous    Linked Group 2:  \"Or\" Linked Group Details        ondansetron (ZOFRAN) tablet 4 mg 4 mg Every 6 Hours PRN 9/1/2017     Sig - Route: Take 1 tablet by mouth Every 6 (Six) Hours As Needed for Nausea or Vomiting. - Oral    Linked Group 2:  \"Or\" Linked Group Details        ondansetron ODT (ZOFRAN-ODT) disintegrating tablet 4 mg 4 mg Every 6 Hours PRN 9/1/2017     Sig - Route: Take 1 tablet by mouth Every 6 (Six) Hours As Needed for Nausea or Vomiting. - Oral    Linked Group 2:  \"Or\" Linked Group Details        oxyCODONE-acetaminophen (PERCOCET) 5-325 MG per tablet 1 tablet 1 tablet Every 4 Hours PRN 9/1/2017     Sig - Route: Take 1 tablet by mouth Every 4 (Four) Hours As Needed for Moderate Pain . - Oral    Pharmacy to dose vancomycin  Continuous PRN 9/1/2017     Sig - Route: Continuous As Needed for Consult. - Does not apply    Linked Group 3:  \"And\" Linked Group Details        piperacillin-tazobactam (ZOSYN) 3.375 (3-0.375) g injection  - ADS Override Pull   9/2/2017 9/2/2017    Notes to Pharmacy: Created by cabinet override    piperacillin-tazobactam (ZOSYN) 3.375 g/100 mL 0.9% NS IVPB (mbp) 3.375 g Once 9/1/2017 9/1/2017    Sig - Route: Infuse 100 mL " "into a venous catheter 1 (One) Time. - Intravenous    primidone (MYSOLINE) tablet 250 mg 250 mg 2 Times Daily 9/2/2017     Sig - Route: Take 1 tablet by mouth 2 (Two) Times a Day. - Oral    sodium chloride 0.9 % bolus 3,630 mL 30 mL/kg × 121 kg As Needed 9/1/2017     Sig - Route: Infuse 3,630 mL into a venous catheter As Needed (MAP Less Than 65 or Lactic Acid Level 4 or Higher). - Intravenous    sodium chloride 0.9 % flush 1-10 mL 1-10 mL As Needed 9/1/2017     Sig - Route: Infuse 1-10 mL into a venous catheter As Needed for Line Care. - Intravenous    sodium chloride 0.9 % infusion 100 mL/hr Continuous 9/1/2017     Sig - Route: Infuse 100 mL/hr into a venous catheter Continuous. - Intravenous    vancomycin (VANCOCIN) 1000 MG injection  - ADS Override Pull   9/1/2017 9/2/2017    Notes to Pharmacy: Created by cabinet override    vancomycin (VANCOCIN) 2,000 mg in sodium chloride 0.9 % 500 mL IVPB 2,000 mg Every 8 Hours 9/2/2017     Sig - Route: Infuse 2,000 mg into a venous catheter Every 8 (Eight) Hours. - Intravenous    vancomycin (VANCOCIN) 2,500 mg in sodium chloride 0.9 % 500 mL IVPB 20 mg/kg × 121 kg Once 9/1/2017 9/1/2017    Sig - Route: Infuse 2,500 mg into a venous catheter 1 (One) Time. - Intravenous    Linked Group 3:  \"And\" Linked Group Details        vancomycin (VANCOCIN) 500 MG injection  - ADS Override Pull   9/1/2017 9/2/2017    Notes to Pharmacy: Created by cabinet override    gabapentin (NEURONTIN) capsule 800 mg (Discontinued) 800 mg Every 8 Hours Scheduled 9/1/2017 9/2/2017    Sig - Route: Take 2 capsules by mouth Every 8 (Eight) Hours. - Oral    piperacillin-tazobactam (ZOSYN) 3.375 g/100 mL 0.9% NS IVPB (mbp) (Discontinued) 3.375 g Every 6 Hours 9/1/2017 9/2/2017    Sig - Route: Infuse 100 mL into a venous catheter Every 6 (Six) Hours. - Intravenous    Linked Group 3:  \"And\" Linked Group Details        vancomycin 1 g/250 mL 0.9% NS (vial-mate) (Discontinued) 1 g Once 9/1/2017 9/1/2017    Sig - " Route: Infuse 250 mL into a venous catheter 1 (One) Time. - Intravenous    Reason for Discontinue: Duplicate order        Assessment/Plan       Active Problems:    Altered mental status  DOD  L foot cellulitis  Hx R ankle Hx        Plan :    IV vanc  DC zosyn  Local care  C&S  Change to po minocyclin 100mg PO BID in 2-3 days for 10 days    Thank you      Delfino Arce MD  09/02/17  4:39 PM           Electronically signed by Delfino Arce MD at 9/2/2017  4:43 PM

## 2017-09-05 NOTE — THERAPY EVALUATION
Acute Care - Physical Therapy Initial Evaluation   [place]     Patient Name: Eliseo Price  : 1965  MRN: [id number]  Today's Date: 2017   Onset of Illness/Injury or Date of Surgery Date: 17  Date of Referral to PT: 17  Referring Physician: Dr Paez      Admit Date: 2017     Visit Dx:    ICD-10-CM ICD-9-CM   1. Altered mental status, unspecified altered mental status type R41.82 780.97   2. Left foot infection L08.9 686.9   3. Overdose, undetermined intent, initial encounter T50.904A 977.9     E980.5     Patient Active Problem List   Diagnosis   • Disease of thyroid gland   • Cellulitis of right lower extremity   • Cellulitis   • Infected epidermoid cyst   • Altered mental status, unspecified   • Altered mental status   • Immobility   • Foot fracture, right     Past Medical History:   Diagnosis Date   • Arthritis    • Bipolar disorder    • Cellulitis of lower extremity     RIGHT   • Coronary artery disease    • Diabetes mellitus    • Disease of thyroid gland     nodule on thyroid   • DVT (deep venous thrombosis)    • Fracture of right foot    • Hyperlipidemia    • Hypertension    • Pseudogout    • Septic shock 2017    H/O   • Toxic metabolic encephalopathy 2017    H/O     Past Surgical History:   Procedure Laterality Date   • JOINT REPLACEMENT     • KNEE SURGERY     • ORIF FOOT FRACTURE Right 2017    Procedure: open reduction with percutaneous pinning of midfoot dislocation fracture;  Surgeon: Anish Farah DPM;  Location: [facility];  Service:    • TOTAL HIP ARTHROPLASTY            PT ASSESSMENT (last 72 hours)      PT Evaluation       17 0919        Document Type evaluation  -    Subjective Information agree to therapy;complains of;weakness  -    Patient Effort, Rehab Treatment good  -    Symptoms Noted During/After Treatment none  -       Patient Profile Review yes  -JW    Onset of Illness/Injury or Date of Surgery Date 17  -JW    Referring  "Physician Dr Paez  -    General Observations pt supine, agreeable to evaluation  -JW    Pertinent History Of Current Problem pt admitted to hospital after being found down at home by mother.  Family reports increased confusion and difficulty with mobility.  Patient admitted to hospital for further evaluation.  -JW    Precautions/Limitations non-weight bearing status   right LE--previous foot fracture  -JW    Prior Level of Function independent:;all household mobility;ADL's  -JW    Equipment Currently Used at Home walker, rolling;grab bar;commode;wheelchair;shower chair   knee scooter  -JW    Plans/Goals Discussed With patient;agreed upon  -JW    Risks Reviewed patient:;increased discomfort  -JW    Benefits Reviewed patient:;improve function;increase independence;increase strength;increase balance  -JW    Barriers to Rehab previous functional deficit   reported non compliance with NWB status  -JW       Lives With alone  -JW    Living Arrangements house  -JW    Home Accessibility ramps present at home  -JW    Living Environment Comment pt reports family brings him food/groceries as needed.  patient states he spends a majority of the day in recliner and performs stand pivot transfers only with walker from recliner to wheelchair.  patient reports \"sometimes I cheat and put weight down on my right foot, I know I am not supposed to, I just can't help it\"  -       Date of Referral to PT 09/05/17  -JW    Patient/Family Goals Statement \"get better\"  -    Criteria for Skilled Therapeutic Interventions Met yes;treatment indicated  -JW    Rehab Potential good, to achieve stated therapy goals  -    Predicted Duration of Therapy Intervention (days/wks) 3 days  -       Pain Assessment --   reports some pain in foot, does not rate  -JW       Current Cognitive/Communication Assessment functional  -    Orientation Status oriented x 4  -JW    Follows Commands/Answers Questions 100% of the time;able to follow " single-step instructions  -    Personal Safety other (see comments)   difficulty with NWB right LE  -JW    Personal Safety Interventions gait belt;nonskid shoes/slippers when out of bed  -       General ROM no range of motion deficits identified   right ankle not tested  -       General MMT Assessment Detail left LE 5/5, right LE 4/5--right ankle not tested  -       Bilateral Upper Extremities Muscle Tone Assessment     Bilateral Lower Extremities Muscle Tone Assessment        Extremity Weight-Bearing Status right lower extremity  -JW    Left Lower Extremity Weight-Bearing non weight-bearing  -       Bed Mobility, Assistive Device --   head of bed flat, no rails  -    Bed Mob, Supine to Sit, [place] supervision required;verbal cues required  -    Bed Mobility, Comment pt requires no physical assistance for transfer to EOB  -       Transfers, Sit-Stand [place] contact guard assist;verbal cues required  -    Transfers, Stand-Sit [place] contact guard assist;verbal cues required  -    Transfers, Sit-Stand-Sit, Assist Device standard walker  -    Transfer, Maintain Weight Bearing Status assist to maintain weight bearing status;cues to maintain weight bearing status  -JW    Transfer, Comment pt requires verbal cues for proper hand placement and adhering to NWB on right LE  -JW       Gait, Comment pt able to perform stand pivot transfer from bed to recliner 3-4 feet away with CGA.  Patient able to maintain NWB on right LE after initial verbal cues and assist given upon standing.  Patient requires cues for proper sequencing to perform transfer.  -       Sensory Impairment numbness   neuropathy in bilateral feet  -JW       Pre-Treatment Position in bed  -JW    Post Treatment Position chair  -JW    In Chair reclined;call light within reach;encouraged to call for assist  -JW      User Key  (r) = Recorded By, (t) = Taken By, (c) = Cosigned By    Initials Name Provider Type    JA  Vance Johnson, RN Case Manager    CC Ally Hill, RN Registered Nurse    TOM Acosta, PT Physical Therapist    MERA Morgan RN Registered Nurse          Physical Therapy Education     Title: PT OT SLP Therapies (Active)     Topic: Physical Therapy (Active)     Point: Mobility training (Done)    Learning Progress Summary    Learner Readiness Method Response Comment Documented by Status   Patient Acceptance [facility] 09/05/17 1109 Done               Point: Precautions (Done)    Learning Progress Summary    Learner Readiness Method Response Comment Documented by Status   Patient Acceptance [facility] 09/05/17 1109 Done                      User Key     Initials Effective Dates Name Provider Type Discipline     12/01/15 -  Viviane Acosta PT Physical Therapist PT                PT Recommendation and Plan  Anticipated Discharge Disposition: skilled nursing facility (facility where pt can remain until WBAT)  Planned Therapy Interventions: balance training, bed mobility training, gait training, home exercise program, strengthening, transfer training  PT Frequency: daily  Plan of Care Review  Plan Of Care Reviewed With: patient  Outcome Summary/Follow up Plan: Physical therapy evaluation complete.  Patient requires SBA for bed mobility and CGA for sit to stand transfers.  Patient able to complete stand pivot transfer with CGA with initial assistance/cues to maintain NWB on right LE during mobility.  At  this time, recommend patient d/c to SNF where patient can remain until WBAT as patient is not safe to consistently manage NWB status at home.          IP PT Goals       09/05/17 1110          Bed Mobility PT STG    Bed Mobility PT STG, Date Established 09/05/17  -      Bed Mobility PT STG, Time to Achieve 3 days  -      Bed Mobility PT STG, Activity Type all bed mobility  -      Bed Mobility PT STG, [place] Level conditional independence  -      Transfer Training PT STG     Transfer Training PT STG, Date Established 09/05/17  -JW      Transfer Training PT STG, Time to Achieve 3 days  -JW      Transfer Training PT STG, Activity Type all transfers  -JW      Transfer Training PT STG, [place] Level supervision required  -JW      Transfer Training PT STG, Assist Device --   with appropriate device  -JW      Gait Training PT STG    Gait Training Goal PT STG, Date Established 09/05/17  -JW      Gait Training Goal PT STG, Time to Achieve 3 days  -JW      Gait Training Goal PT STG, [place] Level supervision required  -JW      Gait Training Goal PT STG, Assist Device --   with appropriate device  -JW      Gait Training Goal PT STG, Distance to Achieve 10  -JW      Patient Education PT STG    Patient Education PT STG, Date Established 09/05/17  -JW      Patient Education PT STG, Time to Achieve 3 days  -JW      Patient Education PT STG, Education Type precaution per surgeon  -JW      Patient Education PT STG, Education Understanding demonstrate adequately;verbalize understanding  -JW        User Key  (r) = Recorded By, (t) = Taken By, (c) = Cosigned By    Initials Name Provider Type    TOM Acosta, PT Physical Therapist                Outcome Measures       09/05/17 0919          How much help from another person do you currently need...    Turning from your back to your side while in flat bed without using bedrails? 4  -JW      Moving from lying on back to sitting on the side of a flat bed without bedrails? 4  -JW      Moving to and from a bed to a chair (including a wheelchair)? 3  -JW      Standing up from a chair using your arms (e.g., wheelchair, bedside chair)? 3  -JW      Climbing 3-5 steps with a railing? 2  -JW      To walk in hospital room? 3  -JW      AM-PAC 6 Clicks Score 19  -JW      Functional Assessment    Outcome Measure Options AM-PAC 6 Clicks Basic Mobility (PT)  -JW        User Key  (r) = Recorded By, (t) = Taken By, (c) = Cosigned By    Initials Name Provider  Type    TOM Acosta PT Physical Therapist           Time Calculation:         PT Charges       09/05/17 1114          Time Calculation    Start Time 0919  -TOM      PT Received On 09/05/17  -TOM      PT - Next Appointment 09/06/17  -TOM        User Key  (r) = Recorded By, (t) = Taken By, (c) = Cosigned By    Initials Name Provider Type    TOM Acosta PT Physical Therapist          Therapy Charges for Today     Code Description Service Date Service Provider Modifiers Qty    81581373002 HC PT EVAL LOW COMPLEXITY 3 9/5/2017 Viviane Acosta, PT GP 1          PT G-Codes  Outcome Measure Options: AM-PAC 6 Clicks Basic Mobility (PT)      Viviane Acosta PT  9/5/2017

## 2017-09-05 NOTE — NURSING NOTE
Continued Stay Note  EVONNE Epstein     Patient Name: Eliseo Price  MRN: [id number]  Today's Date: 9/5/2017    Admit Date: 9/1/2017          Discharge Plan       09/05/17 1015    Case Management/Social Work Plan    Additional Comments LVM for [facility] r/t need for patient to have a  upon discharge. will continue to follow.               Discharge Codes     None            Melvina Iraheta RN

## 2017-09-05 NOTE — NURSING NOTE
Discharge Planning Assessment   Rachel Barr     Patient Name: Eliseo Price  MRN: [id number]  Today's Date: 9/5/2017    Admit Date: 9/1/2017          Discharge Needs Assessment     None            Discharge Plan       09/05/17 1111    Case Management/Social Work Plan    Plan possible home today to resume HH.    Additional Comments spoke with patient at bedside. he states his plan is to return home and resume HH. he feels he has great support at home including mother, daughter, daughters boyfriend and sister. he voices no new concerns. updated important message from medicare. [facility] in [facility]. will continue tofollow.       09/05/17 1015    Case Management/Social Work Plan    Additional Comments LVM for [facility] r/t need for patient to have a  upon discharge. will continue to follow.         Discharge Placement     Facility/Agency Request Status Selected? Address Phone Number Fax Number    [facility] [place] Accepted    [address] [address] [phone]                Demographic Summary     None            Functional Status     None            Psychosocial     None            Abuse/Neglect     None            Legal     None            Substance Abuse     None            Patient Forms       09/05/17 1114    Patient Forms    Important Message from Medicare (IMM) Delivered    Delivered to Patient    Method of delivery In person          Melvina Iraheta RN

## 2017-09-05 NOTE — THERAPY EVALUATION
Acute Care - Occupational Therapy Initial Evaluation   Rachel Barr     Patient Name: Eliseo Price  : 1965  MRN: [id number]  Today's Date: 2017  Onset of Illness/Injury or Date of Surgery Date: 17  Date of Referral to OT: 17  Referring Physician: Dr. Paez    Admit Date: 2017       ICD-10-CM ICD-9-CM   1. Altered mental status, unspecified altered mental status type R41.82 780.97   2. Left foot infection L08.9 686.9   3. Overdose, undetermined intent, initial encounter T50.904A 977.9     E980.5     Patient Active Problem List   Diagnosis   • Disease of thyroid gland   • Cellulitis of right lower extremity   • Cellulitis   • Infected epidermoid cyst   • Altered mental status, unspecified   • Altered mental status   • Immobility   • Foot fracture, right     Past Medical History:   Diagnosis Date   • Arthritis    • Bipolar disorder    • Cellulitis of lower extremity     RIGHT   • Coronary artery disease    • Diabetes mellitus    • Disease of thyroid gland     nodule on thyroid   • DVT (deep venous thrombosis)    • Fracture of right foot    • Hyperlipidemia    • Hypertension    • Pseudogout    • Septic shock 2017    H/O   • Toxic metabolic encephalopathy 2017    H/O     Past Surgical History:   Procedure Laterality Date   • JOINT REPLACEMENT     • KNEE SURGERY     • ORIF FOOT FRACTURE Right 2017    Procedure: open reduction with percutaneous pinning of midfoot dislocation fracture;  Surgeon: Anish Farah DPM;  Location: [facility];  Service:    • TOTAL HIP ARTHROPLASTY            OT ASSESSMENT FLOWSHEET (last 72 hours)      OT Evaluation       [id number] 17 [id number] 0919 17 0825 17 1639       Document Type evaluation  -EN    Subjective Information agree to therapy;complains of;weakness  -EN    Patient Effort, Rehab Treatment good  -EN    Symptoms Noted During/After Treatment none  -EN       Patient Profile Review yes  -EN    Onset of Illness/Injury  "or Date of Surgery Date 09/01/17  -EN    Referring Physician Dr. Paze  -EN    General Observations Patient supine, agreeable to eval  -EN    Pertinent History Of Current Problem Patient admiited to hospital after being found down at home by his mother.  Family reported increased confusion and difficulty with mobility.  Patient admitted to hospital for further evaluation.l  -EN    Precautions/Limitations non-weight bearing status   R LE previous foot fracture  -EN    Prior Level of Function independent:;all household mobility;ADL's  -EN    Equipment Currently Used at Home commode;shower chair;walker, rolling;wheelchair   knee scooter  -EN    Plans/Goals Discussed With patient;agreed upon  -EN    Risks Reviewed patient:;increased discomfort  -EN    Benefits Reviewed patient:;improve function  -EN    Barriers to Rehab previous functional deficit  -EN       Lives With alone  -EN    Living Arrangements house  -EN    Home Accessibility ramps present at home  -EN    Living Environment Comment Patient stated he has a one level home with ramp.  Stated his family brings food/meals and he spends majority of the day in a recliner.  Patient reported he stand pivots for transfers from wheelchair, \"sometimes I cheat and put weight down on my right foot.  I know i'm not supposed to but I just can't help it.\"  -EN       Date of Referral to OT 09/05/17  -EN    Functional Level At Time Of Evaluation Patient performed bed mobility wth S, stood with CGA for EOB and performed transfer to chair with CGA with standard walker.  Patient CGA for LB ADLS.  -EN    Patient/Family Goals Statement Patient stated he would like to return home at discharge.  -EN    Criteria for Skilled Therapeutic Interventions Met yes;treatment indicated  -EN    Rehab Potential good, to achieve stated therapy goals  -EN    Therapy Frequency 3-5 times/wk  -EN    Anticipated Discharge Disposition skilled nursing facility  -EN       Pain Assessment --   did not " rate, stated a little pain in foot  -EN       Current Cognitive/Communication Assessment functional  -EN    Orientation Status oriented x 4  -EN    Follows Commands/Answers Questions able to follow single-step instructions  -EN    Personal Safety --   difficulty with NWB status, reports no feeling in feet  -EN    Personal Safety Interventions gait belt;nonskid shoes/slippers when out of bed  -EN       General ROM     General ROM Detail B UE AROM WFL  -EN       General MMT Assessment Detail B UE strength 5/5  -EN       Muscle Tone Assessment     Bilateral Upper Extremities Muscle Tone Assessment     Bilateral Lower Extremities Muscle Tone Assessment        Extremity Weight-Bearing Status right lower extremity  -EN    Left Lower Extremity Weight-Bearing non weight-bearing  -EN       Bed Mobility, Assistive Device     Bed Mob, Supine to Sit, [place] supervision required;verbal cues required  -EN    Bed Mobility, Comment        Transfers, Sit-Stand [place] verbal cues required;contact guard assist  -EN    Transfers, Stand-Sit [place] verbal cues required;contact guard assist  -EN    Transfers, Sit-Stand-Sit, Assist Device standard walker  -EN    Transfer, Maintain Weight Bearing Status able to maintain weight bearing status;cues to maintain weight bearing status  -EN    Transfer, Comment Patient required cues for hand placement during tranfers and to adhere to NWB status  -EN       LB Bathing Assess/Train, [place] Level contact guard assist  -EN       LB Dressing Assess/Train, [place] contact guard assist  -EN       Sensory Impairment        Planned Therapy Interventions balance training;transfer training  -EN       Pre-Treatment Position in bed  -EN    Post Treatment Position chair  -EN    In Chair reclined;call light within reach;encouraged to call for assist  -EN      09/04/17 1210 09/04/17 0810 09/03/17 1215 09/03/17 0834         User Key  (r) = Recorded By, (t) = Taken By, (c) = Cosigned By     Initials Name Effective Dates    EN Reina Calderón OTR 06/22/16 -     LB Concepción Belle, DAVE 06/16/16 -     CC Ally Hill, DAVE 06/16/16 -     TOM Acosta, PT 12/01/15 -     MERA Morgan RN 06/16/16 -        Assessment:      OT evaluation completed. Patient performed bed mobility with supervision, ADLs with CGA, and functional tranfers  With CGA with standard walker.  Patient would benefit from STR /ECF until change in weight bearing status due to safety concerns (has difficulty adhering to non-weight bearing status).    Occupational Therapy Education     Title: PT OT SLP Therapies (Resolved)     Topic: Occupational Therapy (Resolved)     Point: ADL training (Resolved)    Description: Instruct learner(s) on proper safety adaptation and remediation techniques during self care or transfers.   Instruct in proper use of assistive devices.    Learning Progress Summary    Learner Readiness Method Response Comment Documented by Status   Patient Acceptance E VU Patient educated on NWB status and safety with functional transfers. EN 09/05/17 0920 Done               Point: Precautions (Resolved)    Description: Instruct learner(s) on prescribed precautions during self-care and functional transfers.    Learning Progress Summary    Learner Readiness Method Response Comment Documented by Status   Patient Acceptance E VU Patient educated on NWB status and safety with functional transfers. EN 09/05/17 0920 Done                      User Key     Initials Effective Dates Name Provider Type Discipline    EN 06/22/16 -  Reina Calderón OTR Occupational Therapist OT                  OT Recommendation and Plan  Anticipated Discharge Disposition: skilled nursing facility  Planned Therapy Interventions: balance training, transfer training  Therapy Frequency: 3-5 times/wk             OT Goals       09/05/17 1508 09/05/17 1507       Transfer Training OT STG    Transfer Training OT STG, Date Established   09/05/17  -EN     Transfer Training OT STG, Time to Achieve  2 - 3 days  -EN     Transfer Training OT STG, Activity Type  toilet;sit to stand/stand to sit  -EN     Transfer Training OT STG, [place] Level  supervision required  -EN     Transfer Training OT STG, Assist Device  walker, standard  -EN     Transfer Training OT STG, Date Goal Reviewed 09/05/17  -EN      Transfer Training OT STG, Reason Goal Not Met discharged from facility  -EN      Dynamic Standing Balance OT STG    Dynamic Standing Balance OT STG, Date Established  09/05/17  -EN     Dynamic Standing Balance OT STG, Time to Achieve  2 - 3 days  -EN     Dynamic Standing Balance OT STG, [place] Level  supervision required   2-3 minutes adhering to NWB status  -EN     Dynamic Standing Balance OT STG, Assist Device  assistive Device  -EN     Dynamic Standing Balance OT STG, Date Goal Reviewed 09/05/17  -EN      Dynamic Standing Balance OT STG, Reason Goal Not Met discharged from facility  -EN        User Key  (r) = Recorded By, (t) = Taken By, (c) = Cosigned By    Initials Name Provider Type    SAIGE Calderón OTR Occupational Therapist                Outcome Measures       09/05/17 1500 09/05/17 0920 09/05/17 0919    How much help from another person do you currently need...    Turning from your back to your side while in flat bed without using bedrails?   4  -JW    Moving from lying on back to sitting on the side of a flat bed without bedrails?   4  -JW    Moving to and from a bed to a chair (including a wheelchair)?   3  -JW    Standing up from a chair using your arms (e.g., wheelchair, bedside chair)?   3  -JW    Climbing 3-5 steps with a railing?   2  -JW    To walk in hospital room?   3  -JW    AM-PAC 6 Clicks Score   19  -JW    How much help from another is currently needed...    Putting on and taking off regular lower body clothing?  3  -EN     Bathing (including washing, rinsing, and drying)  3  -EN     Toileting (which includes  using toilet bed pan or urinal)  3  -EN     Putting on and taking off regular upper body clothing  4  -EN     Taking care of personal grooming (such as brushing teeth)  4  -EN     Eating meals  4  -EN     Score  21  -EN     Functional Assessment    Outcome Measure Options AM-PAC 6 Clicks Daily Activity (OT)  -EN  AM-PAC 6 Clicks Basic Mobility (PT)  -JW      User Key  (r) = Recorded By, (t) = Taken By, (c) = Cosigned By    Initials Name Provider Type    EN REINA Beasley Occupational Therapist    TOM Acosta, FIFI Physical Therapist          Time Calculation:   OT Start Time: 0919    Therapy Charges for Today     Code Description Service Date Service Provider Modifiers Qty    [id number]  OT EVAL LOW COMPLEXITY 3 9/5/2017 REINA Beasley GO 1               REINA Fink  9/5/2017

## 2017-09-05 NOTE — NURSING NOTE
Case Management Discharge Note    Final Note: discharged home with home health.     Discharge Placement     Facility/Agency Request Status Selected? Address Phone Number Fax Number    [facility] [place] Accepted    [address] [address] [phone]             Discharge Codes: 06  Discharged/transferred to home under care of organized home health service organization in anticipation of skilled care

## 2017-09-05 NOTE — DISCHARGE INSTR - LAB
ot has an appt this Thursday with Dr. Farah at 11:15 [facility]. Pt also stated her will call for an appt with Dr. Scales later today and call Home Health.

## 2017-09-05 NOTE — PLAN OF CARE
Problem: Inpatient Occupational Therapy  Goal: Transfer Training Goal 1 STG- OT    09/05/17 1507   Transfer Training OT STG   Transfer Training OT STG, Date Established 09/05/17   Transfer Training OT STG, Time to Achieve 2 - 3 days   Transfer Training OT STG, Activity Type toilet;sit to stand/stand to sit   Transfer Training OT STG, [place] Level supervision required   Transfer Training OT STG, Assist Device walker, standard       Goal: Dymanic Standing Balance Goal STG- OT    09/05/17 1507   Dynamic Standing Balance OT STG   Dynamic Standing Balance OT STG, Date Established 09/05/17   Dynamic Standing Balance OT STG, Time to Achieve 2 - 3 days   Dynamic Standing Balance OT STG, [place] Level SBA  (2-3 minutes adhering to NWB status)   Dynamic Standing Balance OT STG, Assist Device assistive Device

## 2017-09-05 NOTE — NURSING NOTE
Continued Stay Note  EVONNE Epstein     Patient Name: Eliseo Price  MRN: [id number]  Today's Date: 9/5/2017    Admit Date: 9/1/2017          Discharge Plan       09/05/17 1353    Case Management/Social Work Plan    Plan home today with HH.    Additional Comments spoke with patient and mother at bedside. fresh veggies from the [employer] given. mother here to transport home. no needs identified. will continue to follow through discharge.       09/05/17 1111    Case Management/Social Work Plan    Plan possible home today to resume HH.    Additional Comments spoke with patient at bedside. he states his plan is to return home and resume HH. he feels he has great support at home including mother, daughter, daughters boyfriend and sister. he voices no new concerns. updated important message from [employer]. spoke with [facility] and she is aware patient is in hospital. will continue tofollow.               Discharge Codes     None        Expected Discharge Date and Time     Expected Discharge Date Expected Discharge Time    Sep 5, 2017             Melvina Iraheta RN

## 2017-09-08 NOTE — PAYOR COMM NOTE
"MantenoJettic DEBRA (51 y.o. Male)     ATTN: NURSE REVIEWER  AUTH#[id number]  FAXING DISCHARGE ORDER AND SUMMARY.       Date of Birth Social Security Number Address Home Phone MRN    1965  [id number] [facility] [facility] [address] [id number] [phone] [id number]    [employer] Marital Status          [employer]        Admission Date Admission Type Admitting Provider Attending Provider Department, Room/Bed    9/1/17 Emergency Karen Lizama, [facility] ICU, ICU2/1    Discharge Date Discharge Disposition Discharge Destination        9/5/2017 Home or Self Care             Attending Provider: (none)    Allergies:  Lisinopril    Isolation:  None   Infection:  None   Code Status:  Prior    Ht:  72\" (182.9 cm)   Wt:  275 lb (125 kg)    Admission Cmt:  None   Principal Problem:  None                Active Insurance as of 9/1/2017     Primary Coverage     Payor Plan Insurance Group Employer/Plan Group    HUMANA MEDICARE REPLACEMENT HUMANA MEDICARE REPL [id number]     Payor Plan Address Payor Plan Phone Number Effective From Effective To    [address] [address] [phone] 12/1/2010     [place] [address]       Subscriber Name Subscriber Birth Date Member ID       CHARLES MUJICA 1965 [id number]                 Emergency Contacts      (Rel.) Home Phone Work Phone Mobile Phone    Liliane Mujica (Mother) [phone] -- --               Discharge Summary      MAICO Villa at 9/5/2017 10:49 AM     Attestation signed by Jennifer Paez MD at 9/5/2017  1:30 PM        I have personally seen and examined the patient and reviewed nurse practitioners discharge summary and agree with her diagnoses and treatments. Patient home today with home health.                               Charles Mujica  1965  [id number]    Hospitalists Discharge Summary    Date of Admission: 9/1/2017  Date of Discharge:  9/5/2017    Primary Discharge Diagnoses:   1. Metabolic encephalopathy   2. " LLE cellulitis  Secondary Discharge Diagnoses:    3. Hypertension    4. DMII    5. Bipolar D/O   6. Recent right foot lis franc fracture with surgery x 2  PCP  Patient Care Team:  Killian Scales MD as PCP - General  Killian Scales MD as PCP - Family Medicine    Consults:   Consults     Date and Time Order Name Status Description    9/2/2017 0943 Inpatient Consult to Podiatry Completed     9/1/2017 2221 Inpatient Consult to Infectious Diseases Completed         Operations and Procedures Performed:  Procedure(s):  LACERATION REPAIR OF LEFT FOURTH & FIFTH TOES     Xr Foot 3+ View Right    Result Date: 9/3/2017  Narrative: Right foot, 3 views  INDICATION: Follow-up right foot fracture. Compared with 08/18/2017.  FINDINGS: Stable extensive plate and screw fixation involving the first through third tarsals and metatarsals. Casting material somewhat obscures osseous detail. The alignment appears stable. Stable soft tissue swelling. No definite fracture.  This report was finalized on 9/3/2017 8:21 AM by Dr. Alex Hansen MD.      Xr Foot 3+ View Right    Result Date: 8/19/2017  Narrative: EXAM: Right foot 08/18/2017  HISTORY: Postop ORIF  TECHNIQUE: Single view in plaster  COMPARISON: None  FINDINGS: AP view foot following midfoot ORIF.      Impression: Status post ORIF in plaster  This report was finalized on 8/19/2017 7:15 AM by Dr. Quinton De Jesus MD.      Xr Foot 3+ View Right    Result Date: 8/19/2017  Narrative: EXAM: Right foot intraoperative views 08/18/2017  HISTORY: ORIF right foot  TECHNIQUE: 3 intraoperative spot views  COMPARISON: None  FINDINGS: 3 intraoperative spot views obtained during ORIF performed by Dr. Farah      Impression: Intraoperative views  This report was finalized on 8/19/2017 7:08 AM by Dr. Quinton De Jesus MD.      Ct Head Without Contrast    Result Date: 9/1/2017  Narrative: CT HEAD, NONCONTRAST, 09/01/2017:  HISTORY: 51-year-old male brought to the ED after home health nurse noted  acute mental status changes today.  TECHNIQUE: CT examination of the head without IV contrast. Radiation dose reduction techniques were utilized, including automated exposure control and exposure modulation based on body size.  FINDINGS: No acute intracranial abnormality is identified.  Mild generalized cerebral atrophy. Mild diffuse low-attenuation white matter changes, nonspecific but likely related to chronic microvascular disease. These findings are stable since 07/22/2017.  No evidence of intracranial hemorrhage, mass, mass effect, cerebral edema or hydrocephalus.      Impression: 1. No acute intracranial abnormality. 2. Stable mild diffuse chronic changes as noted above. 3. No change since 07/22/2017.  This report was finalized on 9/1/2017 4:30 PM by Dr. Bigg Del Angel MD.      Xr Toe 2+ View Left    Result Date: 9/3/2017  Narrative: Left foot, 3 views  INDICATION: Left foot pain and laceration today. No comparisons.  FINDINGS: There is a tiny osseous fragment adjacent to the distal end of the proximal phalanx of the fifth toe which may represent a tiny avulsion fracture. No other definite fracture. The alignment appears normal. There are degenerative changes involving the midfoot. Calcaneal spurring.  This report was finalized on 9/3/2017 8:23 AM by Dr. Alex Hansen MD.      Allergies:  is allergic to lisinopril.    Liborio  Hydrocodone 8/24/17 per report of 9/2/17    Discharge Medications:   Eliseo Price   Home Medication Instructions LUZ MARIA:[id number]    Printed on:09/05/17 1140   Medication Information                      amLODIPine (NORVASC) 2.5 MG tablet  Take 2.5 mg by mouth Daily.             carBAMazepine (TEGretol) 200 MG tablet  Take 400 mg by mouth 2 (two) times a day.             doxepin (SINEquan) 75 MG capsule  Take 75 mg by mouth every night.             FLUoxetine (PROzac) 20 MG capsule  Take 40 mg by mouth Daily.             gabapentin (NEURONTIN) 800 MG tablet  Take 800 mg by mouth 3  (three) times a day.             hydrophor (AQUAPHOR) ointment ointment  Apply 1 application topically Daily.             lactobacillus acidophilus (RISAQUAD) capsule capsule  Take 1 capsule by mouth Daily.             lithium 300 MG tablet  Take 300 mg by mouth 2 (two) times a day.             LORazepam (ATIVAN) 0.5 MG tablet  Take 0.5 mg by mouth Daily As Needed for Anxiety.             losartan (COZAAR) 100 MG tablet  Take 1 tablet by mouth Daily.             metoprolol tartrate (LOPRESSOR) 100 MG tablet  Take 100 mg by mouth 2 (two) times a day.             miconazole (MICOTIN) 2 % powder  Apply 1 application topically Every 12 (Twelve) Hours.             minocycline (MINOCIN,DYNACIN) 100 MG capsule  Take 1 capsule by mouth Every 12 (Twelve) Hours for 7 days. Indications: Skin and Soft Tissue Infection             oxyCODONE-acetaminophen (PERCOCET) 5-325 MG per tablet  Take 1 tablet by mouth Every 6 (Six) Hours As Needed for Moderate Pain  or Severe Pain .             primidone (MYSOLINE) 250 MG tablet  Take 250 mg by mouth 2 (two) times a day.             Probiotic Product (RISAQUAD-2) capsule capsule  Take 1 capsule by mouth Daily.               History of Present Illness: Taken from [facility] on admit:   As per Dr Villalta's note:  This patient arrives via EMS for evaluation of altered mental status after he was found down in his home.  Apparently he was scheduled to have a surgical repair of some lacerations to his left foot this afternoon by Dr. Farah.  He was seen in Dr. Farah's office yesterday for evaluation of these lacerations.  Apparently the patient himself could not remember why or how he sustained the lacerations to his foot.  Nevertheless, Dr. Farah had planned to take him to the operating room to repair these lacerations today.  When the patient's mother went to his house to check on him today she found him on the floor beside his wheelchair and he was clearly confused with some decreased responsiveness.   It looked like some furniture turned over.  She presumed that he had fallen out of his chair and hit the furniture.  He had also urinated on himself.  It is unclear exactly how long he had been on the floor.  She and her family called 911 at that time and had them pick him up to bring to our hospital.  The patient arrived at our facility with some clear signs of confusion and somnolence.  The remainder of the history is really quite limited due to his confusion.  He lives alone so there were no witnesses to what happened earlier today at home.  The patient is able to wake up and answer a few questions.  He says he has no specific areas of pain right now.  He does say that he took some medications earlier this morning and indicates that some of them were for pain.  He does not make any complaints about any particular symptoms at this time, however, again his ability to convey clear information is quite compromised.     Patient is a readmission.  He was here for a Lis Franc fracture and has multiple medical problems.  He was supposed to have surgery today for lacerations. Dr. Farah saw him yesterday.  He did not show up and have surgery.  He was found by his family in the floor.  It looked as if he spilled out of his wheel chair.  In the ER it was possible to awaken him and he would follow commands.  He was very confused however.  He took extra gabapentin today. It is not known how much he took.  His drug screen came back beos and barbituates.  He has a history of metabolic encephalopathy.  CT scan of the head was done and was unremarkable .  Dr. Farah recommended that the ER put the patient on Vancomycin and Zosyn for the patient's severe cellulitis.  It is unknown how he got the lacerations.  There is a culture that can be acquired.  Ammonia was normal.  It is unknown how long he was in the floor.      Patient is alert now and denies any sx of headache, chest pain, shortness of breath, abdominal pain, n/v, dysuria  or recent hx of blood in stool.     Hospital Course  1. Metabolic encephalopathy: resolved  Unclear whether related to medication/illness  H/O same in past that clears quickly after admission with antibiotic and management of infection. Doubtful over-medication, possible incorrect medication usage, patient unsure.     2. LLE cellulitis: Management per Dr. Farah, Dr. Arce  Previously on IV antibiotics, now near complete resolution  Continue minocycline 100 mg every 12 hours through 9/2/17 to 9/12/17  Continues to improve, erythema nearly gone     3. Hypertension: at goal on losartan 100 mg every 12 hours/metoprolol tartrate 100 mg twice daily       4. DMII: Last A1C 4.6%, glucose at goal, discontinue metformin  Continue accucheck/SSI and plan to d/c if remains at goal      5. Bipolar D/O: no acute issues, resume all home medications     6. Recent right foot lis franc fracture with surgery x 2: management per Dr. Farah  Has f/u scheduled 9/7/16    Last Lab Results:   Lab Results (most recent)     Procedure Component Value Units Date/Time    POC Glucose Fingerstick [[id number]]  (Normal) Collected:  09/01/17 1315    Specimen:  Blood Updated:  09/01/17 1323     Glucose 101 mg/dL     Narrative:       Meter: [id number] : [id number] Reji Lemus RN    CBC & Differential [[id number]] Collected:  09/01/17 1344    Specimen:  Blood Updated:  09/01/17 1358    Narrative:       The following orders were created for panel order CBC & Differential.  Procedure                               Abnormality         Status                     ---------                               -----------         ------                     CBC Auto Differential[[id number]]        Abnormal            Final result                 Please view results for these tests on the individual orders.    CBC Auto Differential [[id number]]  (Abnormal) Collected:  09/01/17 1344    Specimen:  Blood Updated:  09/01/17 1358     WBC 11.54 (H) 10*3/mm3      RBC 3.97  (L) 10*6/mm3      Hemoglobin 12.9 (L) g/dL      Hematocrit 38.5 (L) %      MCV 97.0 (H) fL      MCH 32.5 (H) pg      MCHC 33.5 g/dL      RDW 11.8 %      RDW-SD 42.0 fl      MPV 8.6 fL      Platelets 319 10*3/mm3      Neutrophil % 74.8 (H) %      Lymphocyte % 11.6 (L) %      Monocyte % 9.1 (H) %      Eosinophil % 3.7 %      Basophil % 0.3 %      Immature Grans % 0.5 %      Neutrophils, Absolute 8.63 (H) 10*3/mm3      Lymphocytes, Absolute 1.34 10*3/mm3      Monocytes, Absolute 1.05 (H) 10*3/mm3      Eosinophils, Absolute 0.43 (H) 10*3/mm3      Basophils, Absolute 0.03 10*3/mm3      Immature Grans, Absolute 0.06 (H) 10*3/mm3      nRBC 0.0 /100 WBC     Protime-INR [[id number]]  (Normal) Collected:  09/01/17 1344    Specimen:  Blood Updated:  09/01/17 1408     Protime 14.2 Seconds      INR 1.10    Narrative:       Therapeutic Ranges for INR: 2.0-3.0 (PT 20-30)                              2.5-3.5 (PT 25-34)    Comprehensive Metabolic Panel [[id number]]  (Abnormal) Collected:  09/01/17 1344    Specimen:  Blood Updated:  09/01/17 1421     Glucose 120 (H) mg/dL      BUN 15 mg/dL      Creatinine 0.72 (L) mg/dL      Sodium 136 mmol/L      Potassium 5.3 (H) mmol/L      Chloride 96 (L) mmol/L      CO2 28.8 mmol/L      Calcium 9.4 mg/dL      Total Protein 7.9 g/dL      Albumin 4.00 g/dL      ALT (SGPT) 14 U/L      AST (SGOT) 15 U/L      Alkaline Phosphatase 86 U/L      Total Bilirubin 0.3 mg/dL      eGFR Non African Amer 114 mL/min/1.73      Globulin 3.9 gm/dL      A/G Ratio 1.0 g/dL      BUN/Creatinine Ratio 20.8     Anion Gap 11.2 mmol/L     Urinalysis With / Culture If Indicated [[id number]]  (Abnormal) Collected:  09/01/17 1797    Specimen:  Urine from Urine, Clean Catch Updated:  09/01/17 6868     Color, UA Yellow     Appearance, UA Clear     pH, UA 7.0     Specific Gravity, UA 1.020     Glucose, UA Negative     Ketones, UA Negative     Bilirubin, UA Negative     Blood, UA Small (1+) (A)     Protein, UA Negative     Leuk  Esterase, UA Negative     Nitrite, UA Negative     Urobilinogen, UA 0.2 E.U./dL    Urine Drug Screen [[id number]]  (Abnormal) Collected:  09/01/17 1558    Specimen:  Urine from Urine, Clean Catch Updated:  09/01/17 1715     THC, Screen, Urine Negative     Phencyclidine (PCP), Urine Negative     Cocaine Screen, Urine Negative     Methamphetamine, Urine Negative     Opiate Screen Negative     Amphetamine Screen, Urine Negative     Benzodiazepine Screen, Urine Positive (A)     Tricyclic Antidepressants Screen Negative     Methadone Screen, Urine Negative     Barbiturates Screen, Urine Positive (A)     Oxycodone Screen, Urine Negative     Propoxyphene Screen Negative     Buprenorphine, Screen, Urine Negative    Narrative:       Urine drug screen results are to be used for medical purposes only.  They are not to be used for legal purposes such as employment testing.  Negative results do not necessarily mean the complete absence of a subtance, but rather that the result is less than the cutoff for that substance.  Positive results are unconfirmed and considered Preliminary Positive.  [facility] does not automatically confirm Postitive Unconfirmed results.  The physician may request (order) an Unconfirmed Positive result to be sent out for confirmation.      Negative Thresholds for Drugs Screened:    THC screen, urine                          50 ng/ml  Phenycyclidine (PCP), urine                25 ng/ml  Cocaine screen, urine                     150 ng/ml  Methamphetamine, urine                    500 ng/ml  Opiate screen, urine                      100 ng/ml  Amphetamine screen, urine                 500 ng/ml  Benzodiazepine screen, urine              150 ng/ml  Tricyclic Antidepressants screen, urine   300 ng/ml  Methadone screen, urine                   200 ng/ml  Barbiturates screen, urine                200 ng/ml  Oxycodone screen, urine                   100 ng/ml  Propoxyphene screen, urine                 300 ng/ml  Buprenorphine screen, urine                10 ng/ml    Urinalysis, Microscopic Only [[id number]]  (Abnormal) Collected:  09/01/17 1558    Specimen:  Urine from Urine, Clean Catch Updated:  09/01/17 1729     RBC, UA 0-2 (A) /HPF      WBC, UA None Seen /HPF      Bacteria, UA None Seen /HPF      Squamous Epithelial Cells, UA 0-2 /HPF      Hyaline Casts, UA None Seen /LPF      Methodology Manual Light Microscopy    Ammonia [[id number]]  (Normal) Collected:  09/01/17 1719    Specimen:  Blood Updated:  09/01/17 1810     Ammonia 35 umol/L     Lactic Acid, Plasma [[id number]]  (Normal) Collected:  09/02/17 0011    Specimen:  Blood Updated:  09/02/17 0031     Lactate 0.9 mmol/L     POC Glucose Fingerstick [[id number]]  (Normal) Collected:  09/02/17 0142    Specimen:  Blood Updated:  09/02/17 0159     Glucose 96 mg/dL     Narrative:       Meter: [id number] : [id number] Ben Corbin NURSING ASSISTANT    CBC Auto Differential [[id number]]  (Abnormal) Collected:  09/02/17 0513    Specimen:  Blood Updated:  09/02/17 0535     WBC 7.73 10*3/mm3      RBC 3.58 (L) 10*6/mm3      Hemoglobin 11.5 (L) g/dL      Hematocrit 35.2 (L) %      MCV 98.3 (H) fL      MCH 32.1 (H) pg      MCHC 32.7 g/dL      RDW 11.9 %      RDW-SD 43.1 fl      MPV 8.5 fL      Platelets 248 10*3/mm3      Neutrophil % 69.6 %      Lymphocyte % 13.8 (L) %      Monocyte % 10.1 (H) %      Eosinophil % 5.6 (H) %      Basophil % 0.4 %      Immature Grans % 0.5 %      Neutrophils, Absolute 5.38 10*3/mm3      Lymphocytes, Absolute 1.07 10*3/mm3      Monocytes, Absolute 0.78 10*3/mm3      Eosinophils, Absolute 0.43 (H) 10*3/mm3      Basophils, Absolute 0.03 10*3/mm3      Immature Grans, Absolute 0.04 (H) 10*3/mm3      nRBC 0.0 /100 WBC     Basic Metabolic Panel [[id number]]  (Abnormal) Collected:  09/02/17 0513    Specimen:  Blood Updated:  09/02/17 0553     Glucose 89 mg/dL      BUN 10 mg/dL      Creatinine 0.55 (L) mg/dL      Sodium 137 mmol/L      Potassium  4.4 mmol/L      Chloride 101 mmol/L      CO2 25.7 mmol/L      Calcium 8.8 mg/dL      eGFR Non African Amer >150 mL/min/1.73      BUN/Creatinine Ratio 18.2     Anion Gap 10.3 mmol/L     Narrative:       GFR Normal >60  Chronic Kidney Disease <60  Kidney Failure <15    Magnesium [[id number]]  (Abnormal) Collected:  09/02/17 0513    Specimen:  Blood Updated:  09/02/17 0553     Magnesium 1.5 (L) mg/dL     Phosphorus [[id number]]  (Normal) Collected:  09/02/17 0513    Specimen:  Blood Updated:  09/02/17 0553     Phosphorus 4.0 mg/dL     POC Glucose Fingerstick [[id number]]  (Normal) Collected:  09/02/17 0736    Specimen:  Blood Updated:  09/02/17 0748     Glucose 89 mg/dL     Narrative:       Meter: [id number] : [id number] Nancy Rousseaulupe NA CERT.    POC Glucose Fingerstick [[id number]]  (Normal) Collected:  09/02/17 1114    Specimen:  Blood Updated:  09/02/17 1134     Glucose 90 mg/dL     Narrative:       Meter: [id number] : [id number] Nancy Aponteadalupe NA CERT.    Troponin [[id number]]  (Normal) Collected:  09/02/17 1227    Specimen:  Blood Updated:  09/02/17 1250     Troponin T <0.010 ng/mL     Narrative:       Troponin T Reference Ranges:  Less than 0.03 ng/mL:    Negative for AMI  0.03 to 0.09 ng/mL:      Indeterminant for AMI  Greater than 0.09 ng/mL: Positive for AMI    POC Glucose Fingerstick [[id number]]  (Normal) Collected:  09/02/17 1654    Specimen:  Blood Updated:  09/02/17 1701     Glucose 94 mg/dL     Narrative:       Meter: [id number] : [id number] Florian [facility]    Troponin [[id number]]  (Normal) Collected:  09/02/17 1802    Specimen:  Blood Updated:  09/02/17 1831     Troponin T <0.010 ng/mL     Narrative:       Troponin T Reference Ranges:  Less than 0.03 ng/mL:    Negative for AMI  0.03 to 0.09 ng/mL:      Indeterminant for AMI  Greater than 0.09 ng/mL: Positive for AMI    POC Glucose Fingerstick [[id number]]  (Normal) Collected:  09/02/17 2052    Specimen:  Blood  Updated:  09/02/17 2058     Glucose 97 mg/dL     Narrative:       Meter: [id number] : [id number] Josh Ruiz RN Validator    Troponin [[id number]]  (Normal) Collected:  09/02/17 2359    Specimen:  Blood Updated:  09/03/17 0048     Troponin T <0.010 ng/mL     Narrative:       Troponin T Reference Ranges:  Less than 0.03 ng/mL:    Negative for AMI  0.03 to 0.09 ng/mL:      Indeterminant for AMI  Greater than 0.09 ng/mL: Positive for AMI    Vancomycin, Random [[id number]] Collected:  09/03/17 0259    Specimen:  Blood Updated:  09/03/17 0452     Vancomycin Random 20.90 mcg/mL     CBC & Differential [[id number]] Collected:  09/03/17 0623    Specimen:  Blood Updated:  09/03/17 0631    Narrative:       The following orders were created for panel order CBC & Differential.  Procedure                               Abnormality         Status                     ---------                               -----------         ------                     CBC Auto Differential[[id number]]        Abnormal            Final result                 Please view results for these tests on the individual orders.    CBC Auto Differential [[id number]]  (Abnormal) Collected:  09/03/17 0623    Specimen:  Blood Updated:  09/03/17 0631     WBC 5.25 10*3/mm3      RBC 3.40 (L) 10*6/mm3      Hemoglobin 10.7 (L) g/dL      Hematocrit 33.4 (L) %      MCV 98.2 (H) fL      MCH 31.5 (H) pg      MCHC 32.0 g/dL      RDW 11.6 %      RDW-SD 42.1 fl      MPV 8.5 fL      Platelets 205 10*3/mm3      Neutrophil % 59.2 %      Lymphocyte % 20.4 %      Monocyte % 11.0 (H) %      Eosinophil % 8.0 (H) %      Basophil % 0.8 %      Immature Grans % 0.6 (H) %      Neutrophils, Absolute 3.11 10*3/mm3      Lymphocytes, Absolute 1.07 10*3/mm3      Monocytes, Absolute 0.58 10*3/mm3      Eosinophils, Absolute 0.42 (H) 10*3/mm3      Basophils, Absolute 0.04 10*3/mm3      Immature Grans, Absolute 0.03 10*3/mm3      nRBC 0.0 /100 WBC     Lactic Acid, Plasma [[id number]]  (Normal)  Collected:  09/03/17 0623    Specimen:  Blood Updated:  09/03/17 0644     Lactate 0.6 mmol/L     Comprehensive Metabolic Panel [[id number]]  (Abnormal) Collected:  09/03/17 0623    Specimen:  Blood Updated:  09/03/17 0651     Glucose 89 mg/dL      BUN 9 mg/dL      Creatinine 0.52 (L) mg/dL      Sodium 137 mmol/L      Potassium 4.0 mmol/L      Chloride 103 mmol/L      CO2 22.7 mmol/L      Calcium 8.2 (L) mg/dL      Total Protein 6.2 g/dL      Albumin 3.10 (L) g/dL      ALT (SGPT) 13 U/L      AST (SGOT) 12 U/L      Alkaline Phosphatase 66 U/L      Total Bilirubin 0.2 mg/dL      eGFR Non African Amer >150 mL/min/1.73      Globulin 3.1 gm/dL      A/G Ratio 1.0 g/dL      BUN/Creatinine Ratio 17.3     Anion Gap 11.3 mmol/L     POC Glucose Fingerstick [[id number]]  (Normal) Collected:  09/03/17 0714    Specimen:  Blood Updated:  09/03/17 0720     Glucose 88 mg/dL     Narrative:       Meter: [id number] : [id number] Florian Galvin NURSING ASSISTANT-POOL    POC Glucose Fingerstick [[id number]]  (Normal) Collected:  09/03/17 1110    Specimen:  Blood Updated:  09/03/17 1121     Glucose 103 mg/dL     Narrative:       Meter: [id number] : [id number] Liz Canales RN    POC Glucose Fingerstick [[id number]]  (Normal) Collected:  09/03/17 1633    Specimen:  Blood Updated:  09/03/17 1639     Glucose 107 mg/dL     Narrative:       Meter: [id number] : [id number] Florian Galvin NURSING ASSISTANT-POOL    POC Glucose Fingerstick [[id number]]  (Normal) Collected:  09/03/17 2037    Specimen:  Blood Updated:  09/03/17 2044     Glucose 93 mg/dL     Narrative:       Meter: [id number] : [id number] Ben Corbin NURSING ASSISTANT    POC Glucose Fingerstick [[id number]]  (Normal) Collected:  09/04/17 0729    Specimen:  Blood Updated:  09/04/17 0736     Glucose 87 mg/dL     Narrative:       Meter: [id number] : [id number] Sarah Robledo NURSING ASSISTANT    POC Glucose Fingerstick [[id number]]  (Normal) Collected:  09/04/17 1141     Specimen:  Blood Updated:  09/04/17 1150     Glucose 105 mg/dL     Narrative:       Meter: [id number] : [id number] Sarah Robledo NURSING ASSISTANT    Magnesium [[id number]]  (Abnormal) Collected:  09/03/17 0623    Specimen:  Blood Updated:  09/04/17 1410     Magnesium 1.6 (L) mg/dL     POC Glucose Fingerstick [[id number]]  (Normal) Collected:  09/04/17 1640    Specimen:  Blood Updated:  09/04/17 1646     Glucose 94 mg/dL     Narrative:       Meter: [id number] : [id number] Sarah Robledo NURSING ASSISTANT    POC Glucose Fingerstick [[id number]]  (Normal) Collected:  09/04/17 2205    Specimen:  Blood Updated:  09/04/17 2215     Glucose 79 mg/dL     Narrative:       Meter: [id number] : [id number] Pepe Portillo [facility]    POC Glucose Fingerstick [[id number]]  (Normal) Collected:  09/05/17 0722    Specimen:  Blood Updated:  09/05/17 0728     Glucose 101 mg/dL     Narrative:       Meter: [id number] : [id number] Lv Mayen Nursing Assistant        Imaging Results (most recent)     Procedure Component Value Units Date/Time    CT Head Without Contrast [[id number]] Collected:  09/01/17 1626     Updated:  09/01/17 1632    Narrative:       CT HEAD, NONCONTRAST, 09/01/2017:     HISTORY:  51-year-old male brought to the ED after home health nurse noted acute  mental status changes today.     TECHNIQUE:  CT examination of the head without IV contrast. Radiation dose reduction  techniques were utilized, including automated exposure control and  exposure modulation based on body size.     FINDINGS:  No acute intracranial abnormality is identified.     Mild generalized cerebral atrophy. Mild diffuse low-attenuation white  matter changes, nonspecific but likely related to chronic microvascular  disease. These findings are stable since 07/22/2017.     No evidence of intracranial hemorrhage, mass, mass effect, cerebral  edema or hydrocephalus.       Impression:       1. No acute intracranial  abnormality.  2. Stable mild diffuse chronic changes as noted above.  3. No change since 07/22/2017.     This report was finalized on 9/1/2017 4:30 PM by Dr. Bigg Del Angel MD.       XR Foot 3+ View Right [[id number]] Collected:  09/03/17 0819     Updated:  09/03/17 0823    Narrative:       Right foot, 3 views     INDICATION: Follow-up right foot fracture. Compared with 08/18/2017.     FINDINGS:  Stable extensive plate and screw fixation involving the first through  third tarsals and metatarsals. Casting material somewhat obscures  osseous detail. The alignment appears stable. Stable soft tissue  swelling. No definite fracture.     This report was finalized on 9/3/2017 8:21 AM by Dr. Alex Hansen MD.       XR Toe 2+ View Left [[id number]] Collected:  09/03/17 0821     Updated:  09/03/17 0825    Narrative:       Left foot, 3 views     INDICATION: Left foot pain and laceration today. No comparisons.     FINDINGS:  There is a tiny osseous fragment adjacent to the distal end of the  proximal phalanx of the fifth toe which may represent a tiny avulsion  fracture. No other definite fracture. The alignment appears normal.  There are degenerative changes involving the midfoot. Calcaneal  spurring.     This report was finalized on 9/3/2017 8:23 AM by Dr. Alex Hansen MD.           PROCEDURES  Procedure(s):  LACERATION REPAIR OF LEFT FOURTH & FIFTH TOES    Condition on Discharge:  Stable    Physical Exam at Discharge  Vital Signs  Temp:  [98 °F (36.7 °C)-98.5 °F (36.9 °C)] 98.1 °F (36.7 °C)  Heart Rate:  [65-71] 65  Resp:  [18-20] 20  BP: (135-143)/(82-93) 143/90    Physical Exam:  Physical Exam   Constitutional: Patient appears well-developed and well-nourished and in no acute distress, obese  HEENT:   Head: Normocephalic and atraumatic.   Eyes:  Pupils are equal, round, and reactive to light. EOM are intact. Sclera are anicteric and non-injected.  Mouth and Throat: Patient has moist mucous membranes. Oropharynx is clear  of any erythema or exudate.     Neck: Neck supple. No JVD present. No thyromegaly present. No lymphadenopathy present.  Cardiovascular: Regular rate, regular rhythm, S1 normal and S2 normal.  Exam reveals no gallop and no friction rub.  No murmur heard.  Pulmonary/Chest: Lungs are clear to auscultation bilaterally. No respiratory distress. No wheezes. No rhonchi. No rales.   Abdominal: Obese, Soft. Bowel sounds are normal. No distension and no mass. There is no hepatosplenomegaly. There is no tenderness.   Musculoskeletal: Normal Muscle tone  Extremities: No edema. Pulses are palpable in all 4 extremities.  Neurological: Patient is alert and oriented to person, place, and time. Cranial nerves II-XII are grossly intact with no focal deficits.  Skin: No erythema, left foot wrapped in ace-clean, dry and intact. RLE with ace/splint. Bilateral feet with toes skin fungal appearance. Skin is warm. No rash noted. Nails show no clubbing.  No cyanosis or erythema.    Discharge Disposition  Home    Visiting Nurse:    Yes     Home PT/OT:  Yes     Home Safety Evaluation:  Yes     DME  Already has    Discharge Diet:         Dietary Orders            Start     Ordered    09/02/17 0945  Diet Regular; Cardiac, Consistent Carbohydrate  Diet Effective Now     Question Answer Comment   Diet Texture / Consistency Regular    Common Modifiers Cardiac    Common Modifiers Consistent Carbohydrate        09/02/17 0945        Activity at Discharge:  As per DARBY Ramon RLE    Pre-discharge education  Diabetic, Wound Care, medications, follow up    Follow-up Appointments  No future appointments.  Additional Instructions for the Follow-ups that You Need to Schedule     Discharge Follow-Up With Specified Provider    As directed    To:  Dr. Farah   Follow Up Details:  9/7/17 as scheduled       Discharge Follow-up with PCP    As directed    Follow Up Details:  1 week                 Test Results Pending at Discharge: NONE     Sarha Gómez,  APRN  09/05/17  11:41 AM    Time: Discharge over 30 min (if over 30 minutes give explanation as to why it took greater than 30 minutes)  Secondary to:  D/W patient/case management/PT/OT  Coordination of care at home  Coordination of follow up  Medication reconciliation               Electronically signed by Jennifer Paez MD at 9/5/2017  1:30 PM        Discharge Order     Start     Ordered    09/05/17 1119  Discharge patient  Once     Expected Discharge Date:  09/05/17    Expected Discharge Time:  Morning    Discharge Disposition:  Home or Self Care        09/05/17 1121

## 2017-10-19 ENCOUNTER — HOSPITAL ENCOUNTER (OUTPATIENT)
Dept: GENERAL RADIOLOGY | Facility: HOSPITAL | Age: 52
Discharge: HOME | End: 2017-10-19
Attending: PODIATRIST | Admitting: PODIATRIST

## 2017-10-19 DIAGNOSIS — Z98.1 JOINT ARTHRODESIS STATUS: Primary | ICD-10-CM

## 2017-10-19 PROCEDURE — 73630 X-RAY EXAM OF FOOT: CPT

## 2017-11-09 DIAGNOSIS — Z98.1 JOINT ARTHRODESIS STATUS: ICD-10-CM

## 2017-11-09 DIAGNOSIS — S93.311D SUBLUXATION OF TARSAL JOINT OF RIGHT FOOT, SUBSEQUENT ENCOUNTER: ICD-10-CM

## 2017-11-09 DIAGNOSIS — S93.324D DISLOCATION OF TARSOMETATARSAL JOINT OF RIGHT FOOT, SUBSEQUENT ENCOUNTER: Primary | ICD-10-CM

## 2017-11-12 ENCOUNTER — HOSPITAL ENCOUNTER (OUTPATIENT)
Dept: GENERAL RADIOLOGY | Facility: HOSPITAL | Age: 52
Discharge: HOME | End: 2017-11-12
Attending: PODIATRIST | Admitting: PODIATRIST

## 2017-11-12 PROCEDURE — 73630 X-RAY EXAM OF FOOT: CPT

## 2018-01-15 ENCOUNTER — LAB REQUISITION (OUTPATIENT)
Dept: LAB | Facility: HOSPITAL | Age: 53
End: 2018-01-15

## 2018-01-15 DIAGNOSIS — G93.40 ENCEPHALOPATHY: ICD-10-CM

## 2018-01-15 DIAGNOSIS — Z79.2 LONG TERM CURRENT USE OF ANTIBIOTICS: ICD-10-CM

## 2018-01-15 DIAGNOSIS — M86.172 OTHER ACUTE OSTEOMYELITIS, LEFT ANKLE AND FOOT (HCC): ICD-10-CM

## 2018-01-15 DIAGNOSIS — L03.116 CELLULITIS OF LEFT LOWER EXTREMITY: ICD-10-CM

## 2018-01-15 LAB
ALBUMIN SERPL-MCNC: 4 G/DL (ref 3.5–5.2)
ALBUMIN/GLOB SERPL: 1.4 G/DL
ALP SERPL-CCNC: 112 U/L (ref 40–129)
ALT SERPL W P-5'-P-CCNC: 16 U/L (ref 5–41)
ANION GAP SERPL CALCULATED.3IONS-SCNC: 10.4 MMOL/L
AST SERPL-CCNC: 15 U/L (ref 5–40)
BASOPHILS # BLD AUTO: 0.1 10*3/MM3 (ref 0–0.2)
BASOPHILS NFR BLD AUTO: 1.3 % (ref 0–2)
BILIRUB SERPL-MCNC: 0.2 MG/DL (ref 0.2–1.2)
BUN BLD-MCNC: 16 MG/DL (ref 6–20)
BUN/CREAT SERPL: 27.6 (ref 7–25)
CALCIUM SPEC-SCNC: 8.8 MG/DL (ref 8.6–10.5)
CHLORIDE SERPL-SCNC: 103 MMOL/L (ref 98–107)
CO2 SERPL-SCNC: 25.6 MMOL/L (ref 22–29)
CREAT BLD-MCNC: 0.58 MG/DL (ref 0.76–1.27)
CRP SERPL-MCNC: 0.17 MG/DL (ref 0–0.5)
DEPRECATED RDW RBC AUTO: 43.5 FL (ref 37–54)
EOSINOPHIL # BLD AUTO: 0.6 10*3/MM3 (ref 0.1–0.3)
EOSINOPHIL NFR BLD AUTO: 7.6 % (ref 0–4)
ERYTHROCYTE [DISTWIDTH] IN BLOOD BY AUTOMATED COUNT: 13.2 % (ref 11.5–14.5)
ERYTHROCYTE [SEDIMENTATION RATE] IN BLOOD: 9 MM/HR (ref 0–20)
GFR SERPL CREATININE-BSD FRML MDRD: 146 ML/MIN/1.73
GLOBULIN UR ELPH-MCNC: 2.9 GM/DL
GLUCOSE BLD-MCNC: 84 MG/DL (ref 65–99)
HCT VFR BLD AUTO: 42.4 % (ref 42–52)
HGB BLD-MCNC: 13.9 G/DL (ref 14–18)
IMM GRANULOCYTES # BLD: 0.03 10*3/MM3 (ref 0–0.03)
IMM GRANULOCYTES NFR BLD: 0.4 % (ref 0–0.5)
LYMPHOCYTES # BLD AUTO: 1.57 10*3/MM3 (ref 0.6–4.8)
LYMPHOCYTES NFR BLD AUTO: 20 % (ref 20–45)
MCH RBC QN AUTO: 29.8 PG (ref 27–31)
MCHC RBC AUTO-ENTMCNC: 32.8 G/DL (ref 31–37)
MCV RBC AUTO: 90.8 FL (ref 80–94)
MONOCYTES # BLD AUTO: 0.74 10*3/MM3 (ref 0–1)
MONOCYTES NFR BLD AUTO: 9.4 % (ref 3–8)
NEUTROPHILS # BLD AUTO: 4.82 10*3/MM3 (ref 1.5–8.3)
NEUTROPHILS NFR BLD AUTO: 61.3 % (ref 45–70)
NRBC BLD MANUAL-RTO: 0 /100 WBC (ref 0–0)
PLATELET # BLD AUTO: 259 10*3/MM3 (ref 140–500)
PMV BLD AUTO: 9.9 FL (ref 7.4–10.4)
POTASSIUM BLD-SCNC: 4 MMOL/L (ref 3.5–5.2)
PROT SERPL-MCNC: 6.9 G/DL (ref 6–8.5)
RBC # BLD AUTO: 4.67 10*6/MM3 (ref 4.7–6.1)
SODIUM BLD-SCNC: 139 MMOL/L (ref 136–145)
VANCOMYCIN TROUGH SERPL-MCNC: 17.3 MCG/ML (ref 5–20)
WBC NRBC COR # BLD: 7.86 10*3/MM3 (ref 4.8–10.8)

## 2018-01-15 PROCEDURE — 86140 C-REACTIVE PROTEIN: CPT | Performed by: INTERNAL MEDICINE

## 2018-01-15 PROCEDURE — 85652 RBC SED RATE AUTOMATED: CPT | Performed by: INTERNAL MEDICINE

## 2018-01-15 PROCEDURE — 80202 ASSAY OF VANCOMYCIN: CPT | Performed by: INTERNAL MEDICINE

## 2018-01-15 PROCEDURE — 85025 COMPLETE CBC W/AUTO DIFF WBC: CPT | Performed by: INTERNAL MEDICINE

## 2018-01-15 PROCEDURE — 80053 COMPREHEN METABOLIC PANEL: CPT | Performed by: INTERNAL MEDICINE

## 2018-01-22 ENCOUNTER — LAB REQUISITION (OUTPATIENT)
Dept: LAB | Facility: HOSPITAL | Age: 53
End: 2018-01-22

## 2018-01-22 DIAGNOSIS — Z79.2 LONG TERM CURRENT USE OF ANTIBIOTICS: ICD-10-CM

## 2018-01-22 DIAGNOSIS — M86.172 OTHER ACUTE OSTEOMYELITIS, LEFT ANKLE AND FOOT (HCC): ICD-10-CM

## 2018-01-22 DIAGNOSIS — G93.40 ENCEPHALOPATHY: ICD-10-CM

## 2018-01-22 DIAGNOSIS — L03.116 CELLULITIS OF LEFT LOWER EXTREMITY: ICD-10-CM

## 2018-01-22 LAB
ALBUMIN SERPL-MCNC: 3.9 G/DL (ref 3.5–5.2)
ALBUMIN/GLOB SERPL: 1.4 G/DL
ALP SERPL-CCNC: 122 U/L (ref 40–129)
ALT SERPL W P-5'-P-CCNC: 27 U/L (ref 5–41)
ANION GAP SERPL CALCULATED.3IONS-SCNC: 11.3 MMOL/L
AST SERPL-CCNC: 25 U/L (ref 5–40)
BASOPHILS # BLD AUTO: 0.09 10*3/MM3 (ref 0–0.2)
BASOPHILS NFR BLD AUTO: 1.4 % (ref 0–2)
BILIRUB SERPL-MCNC: 0.3 MG/DL (ref 0.2–1.2)
BUN BLD-MCNC: 20 MG/DL (ref 6–20)
BUN/CREAT SERPL: 27 (ref 7–25)
CALCIUM SPEC-SCNC: 8.7 MG/DL (ref 8.6–10.5)
CHLORIDE SERPL-SCNC: 102 MMOL/L (ref 98–107)
CO2 SERPL-SCNC: 26.7 MMOL/L (ref 22–29)
CREAT BLD-MCNC: 0.74 MG/DL (ref 0.76–1.27)
CRP SERPL-MCNC: 0.77 MG/DL (ref 0–0.5)
DEPRECATED RDW RBC AUTO: 43.6 FL (ref 37–54)
EOSINOPHIL # BLD AUTO: 0.87 10*3/MM3 (ref 0.1–0.3)
EOSINOPHIL NFR BLD AUTO: 13.1 % (ref 0–4)
ERYTHROCYTE [DISTWIDTH] IN BLOOD BY AUTOMATED COUNT: 13.2 % (ref 11.5–14.5)
ERYTHROCYTE [SEDIMENTATION RATE] IN BLOOD: 8 MM/HR (ref 0–20)
GFR SERPL CREATININE-BSD FRML MDRD: 110 ML/MIN/1.73
GLOBULIN UR ELPH-MCNC: 2.8 GM/DL
GLUCOSE BLD-MCNC: 100 MG/DL (ref 65–99)
HCT VFR BLD AUTO: 42.9 % (ref 42–52)
HGB BLD-MCNC: 14.2 G/DL (ref 14–18)
IMM GRANULOCYTES # BLD: 0.02 10*3/MM3 (ref 0–0.03)
IMM GRANULOCYTES NFR BLD: 0.3 % (ref 0–0.5)
LYMPHOCYTES # BLD AUTO: 1.97 10*3/MM3 (ref 0.6–4.8)
LYMPHOCYTES NFR BLD AUTO: 29.6 % (ref 20–45)
MCH RBC QN AUTO: 30.1 PG (ref 27–31)
MCHC RBC AUTO-ENTMCNC: 33.1 G/DL (ref 31–37)
MCV RBC AUTO: 91.1 FL (ref 80–94)
MONOCYTES # BLD AUTO: 1.04 10*3/MM3 (ref 0–1)
MONOCYTES NFR BLD AUTO: 15.6 % (ref 3–8)
NEUTROPHILS # BLD AUTO: 2.66 10*3/MM3 (ref 1.5–8.3)
NEUTROPHILS NFR BLD AUTO: 40 % (ref 45–70)
NRBC BLD MANUAL-RTO: 0 /100 WBC (ref 0–0)
PLATELET # BLD AUTO: 221 10*3/MM3 (ref 140–500)
PMV BLD AUTO: 9.6 FL (ref 7.4–10.4)
POTASSIUM BLD-SCNC: 4.8 MMOL/L (ref 3.5–5.2)
PROT SERPL-MCNC: 6.7 G/DL (ref 6–8.5)
RBC # BLD AUTO: 4.71 10*6/MM3 (ref 4.7–6.1)
SODIUM BLD-SCNC: 140 MMOL/L (ref 136–145)
VANCOMYCIN SERPL-MCNC: 19.1 MCG/ML (ref 5–40)
WBC NRBC COR # BLD: 6.65 10*3/MM3 (ref 4.8–10.8)

## 2018-01-22 PROCEDURE — 80053 COMPREHEN METABOLIC PANEL: CPT | Performed by: INTERNAL MEDICINE

## 2018-01-22 PROCEDURE — 80202 ASSAY OF VANCOMYCIN: CPT | Performed by: INTERNAL MEDICINE

## 2018-01-22 PROCEDURE — 86140 C-REACTIVE PROTEIN: CPT | Performed by: INTERNAL MEDICINE

## 2018-01-22 PROCEDURE — 85652 RBC SED RATE AUTOMATED: CPT | Performed by: INTERNAL MEDICINE

## 2018-01-22 PROCEDURE — 85025 COMPLETE CBC W/AUTO DIFF WBC: CPT | Performed by: INTERNAL MEDICINE

## 2018-01-29 ENCOUNTER — LAB REQUISITION (OUTPATIENT)
Dept: LAB | Facility: HOSPITAL | Age: 53
End: 2018-01-29

## 2018-01-29 DIAGNOSIS — L08.9 LOCAL INFECTION OF SKIN AND SUBCUTANEOUS TISSUE: ICD-10-CM

## 2018-01-29 DIAGNOSIS — L72.0 EPIDERMAL CYST: ICD-10-CM

## 2018-01-29 DIAGNOSIS — Z74.09 OTHER REDUCED MOBILITY: ICD-10-CM

## 2018-01-29 DIAGNOSIS — L03.90 CELLULITIS: ICD-10-CM

## 2018-01-29 DIAGNOSIS — E07.9 DISORDER OF THYROID: ICD-10-CM

## 2018-01-29 DIAGNOSIS — L03.115 CELLULITIS OF RIGHT LOWER EXTREMITY: ICD-10-CM

## 2018-01-29 LAB
ALBUMIN SERPL-MCNC: 4 G/DL (ref 3.5–5.2)
ALBUMIN/GLOB SERPL: 1.3 G/DL
ALP SERPL-CCNC: 106 U/L (ref 40–129)
ALT SERPL W P-5'-P-CCNC: 23 U/L (ref 5–41)
ANION GAP SERPL CALCULATED.3IONS-SCNC: 14.7 MMOL/L
AST SERPL-CCNC: 23 U/L (ref 5–40)
BASOPHILS # BLD AUTO: 0.06 10*3/MM3 (ref 0–0.2)
BASOPHILS NFR BLD AUTO: 1 % (ref 0–2)
BILIRUB SERPL-MCNC: 0.5 MG/DL (ref 0.2–1.2)
BUN BLD-MCNC: 20 MG/DL (ref 6–20)
BUN/CREAT SERPL: 28.6 (ref 7–25)
CALCIUM SPEC-SCNC: 8.7 MG/DL (ref 8.6–10.5)
CHLORIDE SERPL-SCNC: 100 MMOL/L (ref 98–107)
CO2 SERPL-SCNC: 23.3 MMOL/L (ref 22–29)
CREAT BLD-MCNC: 0.7 MG/DL (ref 0.76–1.27)
CRP SERPL-MCNC: 0.23 MG/DL (ref 0–0.5)
DEPRECATED RDW RBC AUTO: 41.8 FL (ref 37–54)
EOSINOPHIL # BLD AUTO: 0.6 10*3/MM3 (ref 0.1–0.3)
EOSINOPHIL NFR BLD AUTO: 9.7 % (ref 0–4)
ERYTHROCYTE [DISTWIDTH] IN BLOOD BY AUTOMATED COUNT: 12.9 % (ref 11.5–14.5)
ERYTHROCYTE [SEDIMENTATION RATE] IN BLOOD: 8 MM/HR (ref 0–20)
GFR SERPL CREATININE-BSD FRML MDRD: 118 ML/MIN/1.73
GLOBULIN UR ELPH-MCNC: 3 GM/DL
GLUCOSE BLD-MCNC: 97 MG/DL (ref 65–99)
HCT VFR BLD AUTO: 43.1 % (ref 42–52)
HGB BLD-MCNC: 14.1 G/DL (ref 14–18)
IMM GRANULOCYTES # BLD: 0.02 10*3/MM3 (ref 0–0.03)
IMM GRANULOCYTES NFR BLD: 0.3 % (ref 0–0.5)
LYMPHOCYTES # BLD AUTO: 1.4 10*3/MM3 (ref 0.6–4.8)
LYMPHOCYTES NFR BLD AUTO: 22.7 % (ref 20–45)
MCH RBC QN AUTO: 29 PG (ref 27–31)
MCHC RBC AUTO-ENTMCNC: 32.7 G/DL (ref 31–37)
MCV RBC AUTO: 88.5 FL (ref 80–94)
MONOCYTES # BLD AUTO: 0.62 10*3/MM3 (ref 0–1)
MONOCYTES NFR BLD AUTO: 10.1 % (ref 3–8)
NEUTROPHILS # BLD AUTO: 3.46 10*3/MM3 (ref 1.5–8.3)
NEUTROPHILS NFR BLD AUTO: 56.2 % (ref 45–70)
NRBC BLD MANUAL-RTO: 0 /100 WBC (ref 0–0)
PLATELET # BLD AUTO: 194 10*3/MM3 (ref 140–500)
PMV BLD AUTO: 9.7 FL (ref 7.4–10.4)
POTASSIUM BLD-SCNC: 3.8 MMOL/L (ref 3.5–5.2)
PROT SERPL-MCNC: 7 G/DL (ref 6–8.5)
RBC # BLD AUTO: 4.87 10*6/MM3 (ref 4.7–6.1)
SODIUM BLD-SCNC: 138 MMOL/L (ref 136–145)
VANCOMYCIN SERPL-MCNC: 17.9 MCG/ML (ref 5–40)
WBC NRBC COR # BLD: 6.16 10*3/MM3 (ref 4.8–10.8)

## 2018-01-29 PROCEDURE — 80053 COMPREHEN METABOLIC PANEL: CPT | Performed by: INTERNAL MEDICINE

## 2018-01-29 PROCEDURE — 80202 ASSAY OF VANCOMYCIN: CPT | Performed by: INTERNAL MEDICINE

## 2018-01-29 PROCEDURE — 85652 RBC SED RATE AUTOMATED: CPT | Performed by: INTERNAL MEDICINE

## 2018-01-29 PROCEDURE — 85025 COMPLETE CBC W/AUTO DIFF WBC: CPT | Performed by: INTERNAL MEDICINE

## 2018-01-29 PROCEDURE — 86140 C-REACTIVE PROTEIN: CPT | Performed by: INTERNAL MEDICINE

## 2018-03-02 DIAGNOSIS — Z98.1 STATUS POST ARTHRODESIS: ICD-10-CM

## 2018-03-02 DIAGNOSIS — M79.671 RIGHT FOOT PAIN: Primary | ICD-10-CM

## 2018-03-04 ENCOUNTER — HOSPITAL ENCOUNTER (OUTPATIENT)
Dept: GENERAL RADIOLOGY | Facility: HOSPITAL | Age: 53
Discharge: HOME | End: 2018-03-04
Attending: PODIATRIST

## 2018-03-04 ENCOUNTER — APPOINTMENT (OUTPATIENT)
Dept: GENERAL RADIOLOGY | Facility: HOSPITAL | Age: 53
End: 2018-03-04

## 2018-03-04 ENCOUNTER — HOSPITAL ENCOUNTER (OUTPATIENT)
Facility: HOSPITAL | Age: 53
Discharge: HOME | End: 2018-03-06
Attending: EMERGENCY MEDICINE | Admitting: HOSPITALIST

## 2018-03-04 DIAGNOSIS — S93.105A: ICD-10-CM

## 2018-03-04 DIAGNOSIS — S91.105A: ICD-10-CM

## 2018-03-04 DIAGNOSIS — S93.105A: Primary | ICD-10-CM

## 2018-03-04 DIAGNOSIS — S91.109A: Primary | ICD-10-CM

## 2018-03-04 PROBLEM — S93.106A: Status: ACTIVE | Noted: 2018-03-04

## 2018-03-04 LAB
ANION GAP SERPL CALCULATED.3IONS-SCNC: 17.8 MMOL/L
BASOPHILS # BLD AUTO: 0.01 10*3/MM3 (ref 0–0.2)
BASOPHILS NFR BLD AUTO: 0.1 % (ref 0–2)
BUN BLD-MCNC: 16 MG/DL (ref 6–20)
BUN/CREAT SERPL: 21.3 (ref 7–25)
CALCIUM SPEC-SCNC: 8.9 MG/DL (ref 8.6–10.5)
CHLORIDE SERPL-SCNC: 97 MMOL/L (ref 98–107)
CO2 SERPL-SCNC: 19.2 MMOL/L (ref 22–29)
CREAT BLD-MCNC: 0.75 MG/DL (ref 0.76–1.27)
DEPRECATED RDW RBC AUTO: 41.9 FL (ref 37–54)
EOSINOPHIL # BLD AUTO: 0.02 10*3/MM3 (ref 0.1–0.3)
EOSINOPHIL NFR BLD AUTO: 0.2 % (ref 0–4)
ERYTHROCYTE [DISTWIDTH] IN BLOOD BY AUTOMATED COUNT: 13.2 % (ref 11.5–14.5)
GFR SERPL CREATININE-BSD FRML MDRD: 109 ML/MIN/1.73
GLUCOSE BLD-MCNC: 110 MG/DL (ref 65–99)
HCT VFR BLD AUTO: 42.7 % (ref 42–52)
HGB BLD-MCNC: 14.6 G/DL (ref 14–18)
IMM GRANULOCYTES # BLD: 0.04 10*3/MM3 (ref 0–0.03)
IMM GRANULOCYTES NFR BLD: 0.4 % (ref 0–0.5)
LYMPHOCYTES # BLD AUTO: 1.49 10*3/MM3 (ref 0.6–4.8)
LYMPHOCYTES NFR BLD AUTO: 16.3 % (ref 20–45)
MCH RBC QN AUTO: 29.4 PG (ref 27–31)
MCHC RBC AUTO-ENTMCNC: 34.2 G/DL (ref 31–37)
MCV RBC AUTO: 86.1 FL (ref 80–94)
MONOCYTES # BLD AUTO: 0.92 10*3/MM3 (ref 0–1)
MONOCYTES NFR BLD AUTO: 10.1 % (ref 3–8)
NEUTROPHILS # BLD AUTO: 6.66 10*3/MM3 (ref 1.5–8.3)
NEUTROPHILS NFR BLD AUTO: 72.9 % (ref 45–70)
NRBC BLD MANUAL-RTO: 0 /100 WBC (ref 0–0)
PLATELET # BLD AUTO: 196 10*3/MM3 (ref 140–500)
PMV BLD AUTO: 8.8 FL (ref 7.4–10.4)
POTASSIUM BLD-SCNC: 3.8 MMOL/L (ref 3.5–5.2)
RBC # BLD AUTO: 4.96 10*6/MM3 (ref 4.7–6.1)
SODIUM BLD-SCNC: 134 MMOL/L (ref 136–145)
WBC NRBC COR # BLD: 9.14 10*3/MM3 (ref 4.8–10.8)

## 2018-03-04 PROCEDURE — 99284 EMERGENCY DEPT VISIT MOD MDM: CPT

## 2018-03-04 PROCEDURE — 99284 EMERGENCY DEPT VISIT MOD MDM: CPT | Performed by: EMERGENCY MEDICINE

## 2018-03-04 PROCEDURE — 96365 THER/PROPH/DIAG IV INF INIT: CPT

## 2018-03-04 PROCEDURE — 85025 COMPLETE CBC W/AUTO DIFF WBC: CPT | Performed by: EMERGENCY MEDICINE

## 2018-03-04 PROCEDURE — 83036 HEMOGLOBIN GLYCOSYLATED A1C: CPT | Performed by: NURSE PRACTITIONER

## 2018-03-04 PROCEDURE — 99219 PR INITIAL OBSERVATION CARE/DAY 50 MINUTES: CPT | Performed by: HOSPITALIST

## 2018-03-04 PROCEDURE — G0378 HOSPITAL OBSERVATION PER HR: HCPCS

## 2018-03-04 PROCEDURE — 87040 BLOOD CULTURE FOR BACTERIA: CPT | Performed by: EMERGENCY MEDICINE

## 2018-03-04 PROCEDURE — 93005 ELECTROCARDIOGRAM TRACING: CPT | Performed by: HOSPITALIST

## 2018-03-04 PROCEDURE — 96366 THER/PROPH/DIAG IV INF ADDON: CPT

## 2018-03-04 PROCEDURE — 96375 TX/PRO/DX INJ NEW DRUG ADDON: CPT

## 2018-03-04 PROCEDURE — 73630 X-RAY EXAM OF FOOT: CPT

## 2018-03-04 PROCEDURE — 25010000002 ONDANSETRON PER 1 MG: Performed by: EMERGENCY MEDICINE

## 2018-03-04 PROCEDURE — 73660 X-RAY EXAM OF TOE(S): CPT

## 2018-03-04 PROCEDURE — 25010000002 MORPHINE SULFATE (PF) 2 MG/ML SOLUTION: Performed by: EMERGENCY MEDICINE

## 2018-03-04 PROCEDURE — 80048 BASIC METABOLIC PNL TOTAL CA: CPT | Performed by: EMERGENCY MEDICINE

## 2018-03-04 PROCEDURE — 93010 ELECTROCARDIOGRAM REPORT: CPT | Performed by: INTERNAL MEDICINE

## 2018-03-04 RX ORDER — OXYCODONE HYDROCHLORIDE AND ACETAMINOPHEN 5; 325 MG/1; MG/1
1 TABLET ORAL EVERY 4 HOURS PRN
Status: DISCONTINUED | OUTPATIENT
Start: 2018-03-04 | End: 2018-03-06 | Stop reason: HOSPADM

## 2018-03-04 RX ORDER — PETROLATUM 42 G/100G
1 OINTMENT TOPICAL
Status: DISCONTINUED | OUTPATIENT
Start: 2018-03-05 | End: 2018-03-06 | Stop reason: HOSPADM

## 2018-03-04 RX ORDER — AMLODIPINE BESYLATE 2.5 MG/1
2.5 TABLET ORAL DAILY
Status: DISCONTINUED | OUTPATIENT
Start: 2018-03-05 | End: 2018-03-06 | Stop reason: HOSPADM

## 2018-03-04 RX ORDER — FLUOXETINE HYDROCHLORIDE 20 MG/1
40 CAPSULE ORAL DAILY
Status: DISCONTINUED | OUTPATIENT
Start: 2018-03-05 | End: 2018-03-06 | Stop reason: HOSPADM

## 2018-03-04 RX ORDER — MORPHINE SULFATE 2 MG/ML
4 INJECTION, SOLUTION INTRAMUSCULAR; INTRAVENOUS ONCE
Status: COMPLETED | OUTPATIENT
Start: 2018-03-04 | End: 2018-03-04

## 2018-03-04 RX ORDER — SODIUM CHLORIDE 9 MG/ML
40 INJECTION, SOLUTION INTRAVENOUS AS NEEDED
Status: DISCONTINUED | OUTPATIENT
Start: 2018-03-04 | End: 2018-03-06 | Stop reason: HOSPADM

## 2018-03-04 RX ORDER — ONDANSETRON 2 MG/ML
4 INJECTION INTRAMUSCULAR; INTRAVENOUS ONCE
Status: COMPLETED | OUTPATIENT
Start: 2018-03-04 | End: 2018-03-04

## 2018-03-04 RX ORDER — PRIMIDONE 250 MG/1
250 TABLET ORAL EVERY 12 HOURS SCHEDULED
Status: DISCONTINUED | OUTPATIENT
Start: 2018-03-05 | End: 2018-03-06 | Stop reason: HOSPADM

## 2018-03-04 RX ORDER — LOSARTAN POTASSIUM 50 MG/1
100 TABLET ORAL
Status: DISCONTINUED | OUTPATIENT
Start: 2018-03-05 | End: 2018-03-06 | Stop reason: HOSPADM

## 2018-03-04 RX ORDER — SODIUM CHLORIDE 0.9 % (FLUSH) 0.9 %
1-10 SYRINGE (ML) INJECTION AS NEEDED
Status: DISCONTINUED | OUTPATIENT
Start: 2018-03-04 | End: 2018-03-06 | Stop reason: HOSPADM

## 2018-03-04 RX ORDER — SODIUM CHLORIDE 0.9 % (FLUSH) 0.9 %
10 SYRINGE (ML) INJECTION AS NEEDED
Status: DISCONTINUED | OUTPATIENT
Start: 2018-03-04 | End: 2018-03-06 | Stop reason: HOSPADM

## 2018-03-04 RX ORDER — GABAPENTIN 400 MG/1
800 CAPSULE ORAL EVERY 8 HOURS SCHEDULED
Status: DISCONTINUED | OUTPATIENT
Start: 2018-03-05 | End: 2018-03-06 | Stop reason: HOSPADM

## 2018-03-04 RX ORDER — TEMAZEPAM 15 MG/1
30 CAPSULE ORAL NIGHTLY PRN
COMMUNITY
End: 2021-07-31 | Stop reason: HOSPADM

## 2018-03-04 RX ORDER — LORAZEPAM 0.5 MG/1
0.5 TABLET ORAL DAILY PRN
Status: DISCONTINUED | OUTPATIENT
Start: 2018-03-04 | End: 2018-03-06 | Stop reason: HOSPADM

## 2018-03-04 RX ORDER — TEMAZEPAM 15 MG/1
15 CAPSULE ORAL NIGHTLY PRN
Status: DISCONTINUED | OUTPATIENT
Start: 2018-03-04 | End: 2018-03-06 | Stop reason: HOSPADM

## 2018-03-04 RX ADMIN — MORPHINE SULFATE 4 MG: 2 INJECTION, SOLUTION INTRAMUSCULAR; INTRAVENOUS at 15:50

## 2018-03-04 RX ADMIN — GABAPENTIN 800 MG: 400 CAPSULE ORAL at 23:58

## 2018-03-04 RX ADMIN — ONDANSETRON 4 MG: 2 INJECTION INTRAMUSCULAR; INTRAVENOUS at 15:50

## 2018-03-04 RX ADMIN — OXYCODONE HYDROCHLORIDE AND ACETAMINOPHEN 1 TABLET: 5; 325 TABLET ORAL at 23:58

## 2018-03-04 RX ADMIN — VANCOMYCIN HYDROCHLORIDE 1 G: 1 INJECTION, POWDER, LYOPHILIZED, FOR SOLUTION INTRAVENOUS at 17:12

## 2018-03-04 RX ADMIN — TEMAZEPAM 15 MG: 15 CAPSULE ORAL at 23:59

## 2018-03-04 RX ADMIN — LORAZEPAM 0.5 MG: 0.5 TABLET ORAL at 23:58

## 2018-03-04 NOTE — ED PROVIDER NOTES
Subjective   Patient is a 52 y.o. male presenting with lower extremity pain.   History provided by:  Patient  Lower Extremity Issue   Location:  Toe  Time since incident: 2-3 days.  Injury: yes    Mechanism of injury: fall    Fall:     Fall occurred:  Walking    Impact surface: interior wall.    Point of impact: left foot.  Toe location:  L fourth toe  Pain details:     Severity:  Moderate  Dislocation: yes    Foreign body present:  No foreign bodies  Tetanus status:  Up to date  Prior injury to area:  Yes (with subsequent amputation of left fifth toe)  Relieved by:  None tried  Ineffective treatments:  None tried  Associated symptoms: decreased ROM and swelling    Associated symptoms: no back pain, no fever and no neck pain    Associated symptoms comment:  Obvious deformity left 4th toe  Risk factors: obesity      HPI Narrative:Mr Eliseo Price is a 53 yo WM reason secondary to left fourth toe injury.  Patient slipped and fell inside his home 2-3 days ago.  Patient's left foot struck an anterior wall in the patient's home.  There was obvious deformity and bleeding.  However patient elected not to seek medical attention until today.  Patient states that his toe needs to be amputated.  Patient presents for evaluation.        Review of Systems   Constitutional: Negative for chills, diaphoresis and fever.   HENT: Negative for congestion, ear pain and sore throat.    Eyes: Negative for pain and discharge.   Respiratory: Negative for chest tightness, shortness of breath, wheezing and stridor.    Cardiovascular: Negative for chest pain, palpitations and leg swelling.   Gastrointestinal: Negative for abdominal pain, diarrhea, nausea and vomiting.   Genitourinary: Negative for dysuria, flank pain, frequency and hematuria.   Musculoskeletal: Negative for back pain, myalgias, neck pain and neck stiffness.   Skin: Negative for color change, pallor and rash.   Neurological: Negative for dizziness, seizures, syncope and headaches.    Psychiatric/Behavioral: Negative for agitation and confusion. The patient is not nervous/anxious.    All other systems reviewed and are negative.      Past Medical History:   Diagnosis Date   • Arthritis    • Bipolar disorder    • Cellulitis of lower extremity     RIGHT   • Coronary artery disease    • Diabetes mellitus    • Disease of thyroid gland     nodule on thyroid   • DVT (deep venous thrombosis)    • Fracture of right foot    • Hyperlipidemia    • Hypertension    • Pseudogout    • Septic shock 07/2017    H/O   • Toxic metabolic encephalopathy 07/2017    H/O       Allergies   Allergen Reactions   • Lisinopril        Past Surgical History:   Procedure Laterality Date   • HARDWARE REMOVAL Right 8/18/2017    Procedure: RIGHT EXTERNAL FIXATOR REMOVAL;  Surgeon: Anish Farah DPM;  Location:  [facility];  Service:    • JOINT REPLACEMENT     • KNEE SURGERY     • ORIF FOOT FRACTURE Right 7/29/2017    Procedure: open reduction with percutaneous pinning of midfoot dislocation fracture;  Surgeon: Anish Farah DPM;  Location:  [facility];  Service:    • TOE FUSION Right 8/18/2017    Procedure: RIGHT  MEDIAL COLUMN ARTHRODESIS, RIGHT TARAL METATARSAL ARTHRODESIS;  Surgeon: Anish Farah DPM;  Location:  [facility];  Service:    • TOTAL HIP ARTHROPLASTY         Family History   Problem Relation Age of Onset   • Arthritis Mother    • Cancer Mother    • Hyperlipidemia Mother    • Arthritis Father    • Cancer Father    • Depression Father    • Hypertension Father    • Mental illness Father    • Cancer Sister    • Arthritis Sister    • Hyperlipidemia Sister    • Cancer Paternal Aunt    • Hypertension Daughter    • Depression Son    • Hyperlipidemia Paternal Grandmother    • Hearing loss Paternal Grandfather    • Hyperlipidemia Paternal Grandfather    • Hypertension Paternal Grandfather        Social History     Social History   • Marital status:      Social History Main Topics   • Smoking status: Never Smoker    • Alcohol use No   • Drug use: No   • Sexual activity: Defer           Objective   Physical Exam   Constitutional: He is oriented to person, place, and time. He appears well-developed and well-nourished. No distress.   51 yo WM lying in bed. Pt appears in fair overall health.    Musculoskeletal:        Left foot: There is decreased range of motion and deformity.        Neurological: He is alert and oriented to person, place, and time.   Skin: Skin is warm and dry. He is not diaphoretic.   Psychiatric: He has a normal mood and affect. His behavior is normal.   Nursing note and vitals reviewed.      Procedures         ED Course  ED Course   Comment By Time   03/04/18  2:36 PM  Patient has open fracture versus open dislocation.  Injury occurred 48-72 hours ago.  Will obtain x-ray.  Patient will likely need surgical evaluation and washing with antibiotics. Romie Kelly MD 03/04 1436   03/04/18  3:23 PM  Patient now requesting pain medicine. We'll give morphine IV. Romie Kelly MD 03/04 1524   03/04/18  4:58 PM  X-ray shows dislocation.  Lab work shows minimal electrolyte abnormalities and none of which are clinically significant this time.  Discussed with patient's podiatrist Dr. Anish Farah.  He agrees patient requires surgical washing the wound and repair of open dislocation.  He requests patient be admitted to the hospitalist.  Will irrigate wound and applied Betadine dressing.  Starting IV vancomycin as discussed.  Calling hospitalist. Romie Kelly MD 03/04 1700   03/04/18  5:07 PM  Discussed with Dr. Karen Stoner.  She is accepted patient for hospitalization. Romie Kelly MD 03/04 1703                  MDM  Number of Diagnoses or Management Options  Open dislocation of phalanx of foot, left, initial encounter: new and requires workup     Amount and/or Complexity of Data Reviewed  Clinical lab tests: reviewed and ordered  Tests in the radiology section of CPT®: ordered and  reviewed  Discuss the patient with other providers: yes (Dr. Karen Farah)    Risk of Complications, Morbidity, and/or Mortality  Presenting problems: moderate  Diagnostic procedures: moderate  Management options: moderate    Patient Progress  Patient progress: stable      Final diagnoses:   Open dislocation of phalanx of foot, left, initial encounter            Romie Kelly MD  03/04/18 6314

## 2018-03-05 ENCOUNTER — APPOINTMENT (OUTPATIENT)
Dept: GENERAL RADIOLOGY | Facility: HOSPITAL | Age: 53
End: 2018-03-05

## 2018-03-05 ENCOUNTER — ANESTHESIA EVENT (OUTPATIENT)
Dept: PERIOP | Facility: HOSPITAL | Age: 53
End: 2018-03-05

## 2018-03-05 ENCOUNTER — ANESTHESIA (OUTPATIENT)
Dept: PERIOP | Facility: HOSPITAL | Age: 53
End: 2018-03-05

## 2018-03-05 PROBLEM — S91.109A: Status: ACTIVE | Noted: 2018-03-05

## 2018-03-05 PROBLEM — S93.106A: Status: ACTIVE | Noted: 2018-03-05

## 2018-03-05 LAB
ANION GAP SERPL CALCULATED.3IONS-SCNC: 9.7 MMOL/L
BUN BLD-MCNC: 14 MG/DL (ref 6–20)
BUN/CREAT SERPL: 23 (ref 7–25)
CALCIUM SPEC-SCNC: 8.7 MG/DL (ref 8.6–10.5)
CHLORIDE SERPL-SCNC: 104 MMOL/L (ref 98–107)
CO2 SERPL-SCNC: 23.3 MMOL/L (ref 22–29)
CREAT BLD-MCNC: 0.61 MG/DL (ref 0.76–1.27)
GFR SERPL CREATININE-BSD FRML MDRD: 138 ML/MIN/1.73
GLUCOSE BLD-MCNC: 103 MG/DL (ref 65–99)
GLUCOSE BLDC GLUCOMTR-MCNC: 89 MG/DL (ref 70–130)
HBA1C MFR BLD: 5.6 % (ref 4.8–5.6)
POTASSIUM BLD-SCNC: 3.7 MMOL/L (ref 3.5–5.2)
SODIUM BLD-SCNC: 137 MMOL/L (ref 136–145)

## 2018-03-05 PROCEDURE — G0378 HOSPITAL OBSERVATION PER HR: HCPCS

## 2018-03-05 PROCEDURE — 63710000001 LORAZEPAM 0.5 MG TABLET: Performed by: HOSPITALIST

## 2018-03-05 PROCEDURE — A9270 NON-COVERED ITEM OR SERVICE: HCPCS | Performed by: HOSPITALIST

## 2018-03-05 PROCEDURE — 25010000002 ONDANSETRON PER 1 MG: Performed by: NURSE ANESTHETIST, CERTIFIED REGISTERED

## 2018-03-05 PROCEDURE — 73660 X-RAY EXAM OF TOE(S): CPT

## 2018-03-05 PROCEDURE — 25010000002 MIDAZOLAM PER 1 MG: Performed by: NURSE ANESTHETIST, CERTIFIED REGISTERED

## 2018-03-05 PROCEDURE — 63710000001 TEMAZEPAM 15 MG CAPSULE: Performed by: HOSPITALIST

## 2018-03-05 PROCEDURE — 63710000001 OXYCODONE-ACETAMINOPHEN 5-325 MG TABLET: Performed by: HOSPITALIST

## 2018-03-05 PROCEDURE — 63710000001 PRIMIDONE 250 MG TABLET: Performed by: HOSPITALIST

## 2018-03-05 PROCEDURE — 71100 X-RAY EXAM RIBS UNI 2 VIEWS: CPT

## 2018-03-05 PROCEDURE — 99225 PR SBSQ OBSERVATION CARE/DAY 25 MINUTES: CPT | Performed by: NURSE PRACTITIONER

## 2018-03-05 PROCEDURE — 63710000001 GABAPENTIN 400 MG CAPSULE: Performed by: HOSPITALIST

## 2018-03-05 PROCEDURE — 25010000002 PROPOFOL 10 MG/ML EMULSION: Performed by: NURSE ANESTHETIST, CERTIFIED REGISTERED

## 2018-03-05 PROCEDURE — 80048 BASIC METABOLIC PNL TOTAL CA: CPT | Performed by: HOSPITALIST

## 2018-03-05 PROCEDURE — 76000 FLUOROSCOPY <1 HR PHYS/QHP: CPT

## 2018-03-05 PROCEDURE — 82962 GLUCOSE BLOOD TEST: CPT

## 2018-03-05 RX ORDER — SODIUM CHLORIDE 0.9 % (FLUSH) 0.9 %
1-10 SYRINGE (ML) INJECTION AS NEEDED
Status: DISCONTINUED | OUTPATIENT
Start: 2018-03-05 | End: 2018-03-05 | Stop reason: HOSPADM

## 2018-03-05 RX ORDER — SODIUM CHLORIDE 9 MG/ML
40 INJECTION, SOLUTION INTRAVENOUS AS NEEDED
Status: DISCONTINUED | OUTPATIENT
Start: 2018-03-05 | End: 2018-03-05 | Stop reason: HOSPADM

## 2018-03-05 RX ORDER — ONDANSETRON 2 MG/ML
4 INJECTION INTRAMUSCULAR; INTRAVENOUS ONCE AS NEEDED
Status: DISCONTINUED | OUTPATIENT
Start: 2018-03-05 | End: 2018-03-05 | Stop reason: HOSPADM

## 2018-03-05 RX ORDER — PROPOFOL 10 MG/ML
VIAL (ML) INTRAVENOUS CONTINUOUS PRN
Status: DISCONTINUED | OUTPATIENT
Start: 2018-03-05 | End: 2018-03-05 | Stop reason: SURG

## 2018-03-05 RX ORDER — MIDAZOLAM HYDROCHLORIDE 1 MG/ML
2 INJECTION INTRAMUSCULAR; INTRAVENOUS
Status: DISCONTINUED | OUTPATIENT
Start: 2018-03-05 | End: 2018-03-05

## 2018-03-05 RX ORDER — GLYCOPYRROLATE 0.2 MG/ML
0.2 INJECTION INTRAMUSCULAR; INTRAVENOUS
Status: DISCONTINUED | OUTPATIENT
Start: 2018-03-05 | End: 2018-03-05 | Stop reason: HOSPADM

## 2018-03-05 RX ORDER — LIDOCAINE HYDROCHLORIDE 10 MG/ML
0.5 INJECTION, SOLUTION EPIDURAL; INFILTRATION; INTRACAUDAL; PERINEURAL ONCE AS NEEDED
Status: DISCONTINUED | OUTPATIENT
Start: 2018-03-05 | End: 2018-03-05 | Stop reason: HOSPADM

## 2018-03-05 RX ORDER — ONDANSETRON 2 MG/ML
4 INJECTION INTRAMUSCULAR; INTRAVENOUS ONCE AS NEEDED
Status: COMPLETED | OUTPATIENT
Start: 2018-03-05 | End: 2018-03-05

## 2018-03-05 RX ORDER — FAMOTIDINE 10 MG/ML
20 INJECTION, SOLUTION INTRAVENOUS
Status: DISCONTINUED | OUTPATIENT
Start: 2018-03-05 | End: 2018-03-05 | Stop reason: HOSPADM

## 2018-03-05 RX ORDER — SODIUM CHLORIDE, SODIUM LACTATE, POTASSIUM CHLORIDE, CALCIUM CHLORIDE 600; 310; 30; 20 MG/100ML; MG/100ML; MG/100ML; MG/100ML
100 INJECTION, SOLUTION INTRAVENOUS CONTINUOUS
Status: DISCONTINUED | OUTPATIENT
Start: 2018-03-05 | End: 2018-03-05

## 2018-03-05 RX ORDER — MIDAZOLAM HYDROCHLORIDE 1 MG/ML
1 INJECTION INTRAMUSCULAR; INTRAVENOUS
Status: DISCONTINUED | OUTPATIENT
Start: 2018-03-05 | End: 2018-03-05

## 2018-03-05 RX ORDER — SODIUM CHLORIDE 9 MG/ML
9 INJECTION, SOLUTION INTRAVENOUS CONTINUOUS PRN
Status: DISCONTINUED | OUTPATIENT
Start: 2018-03-05 | End: 2018-03-05 | Stop reason: HOSPADM

## 2018-03-05 RX ADMIN — OXYCODONE HYDROCHLORIDE AND ACETAMINOPHEN 1 TABLET: 5; 325 TABLET ORAL at 10:53

## 2018-03-05 RX ADMIN — FLUOXETINE 40 MG: 20 CAPSULE ORAL at 10:51

## 2018-03-05 RX ADMIN — GABAPENTIN 800 MG: 400 CAPSULE ORAL at 06:49

## 2018-03-05 RX ADMIN — GABAPENTIN 800 MG: 400 CAPSULE ORAL at 20:19

## 2018-03-05 RX ADMIN — TEMAZEPAM 15 MG: 15 CAPSULE ORAL at 20:27

## 2018-03-05 RX ADMIN — PRIMIDONE 250 MG: 250 TABLET ORAL at 20:19

## 2018-03-05 RX ADMIN — METFORMIN HYDROCHLORIDE 1000 MG: 500 TABLET ORAL at 10:51

## 2018-03-05 RX ADMIN — LORAZEPAM 0.5 MG: 0.5 TABLET ORAL at 21:56

## 2018-03-05 RX ADMIN — FAMOTIDINE 20 MG: 10 INJECTION INTRAVENOUS at 17:06

## 2018-03-05 RX ADMIN — OXYCODONE HYDROCHLORIDE AND ACETAMINOPHEN 1 TABLET: 5; 325 TABLET ORAL at 20:26

## 2018-03-05 RX ADMIN — PRIMIDONE 250 MG: 250 TABLET ORAL at 00:24

## 2018-03-05 RX ADMIN — PROPOFOL 50 MCG/KG/MIN: 10 INJECTION, EMULSION INTRAVENOUS at 18:01

## 2018-03-05 RX ADMIN — SODIUM CHLORIDE 9 ML/HR: 9 INJECTION, SOLUTION INTRAVENOUS at 16:00

## 2018-03-05 RX ADMIN — AMLODIPINE BESYLATE 2.5 MG: 2.5 TABLET ORAL at 10:51

## 2018-03-05 RX ADMIN — ONDANSETRON 4 MG: 2 INJECTION INTRAMUSCULAR; INTRAVENOUS at 17:06

## 2018-03-05 RX ADMIN — LOSARTAN POTASSIUM 100 MG: 50 TABLET ORAL at 10:50

## 2018-03-05 RX ADMIN — PRIMIDONE 250 MG: 250 TABLET ORAL at 10:51

## 2018-03-05 RX ADMIN — MIDAZOLAM HYDROCHLORIDE 1 MG: 1 INJECTION, SOLUTION INTRAMUSCULAR; INTRAVENOUS at 17:48

## 2018-03-05 RX ADMIN — MIDAZOLAM HYDROCHLORIDE 1 MG: 1 INJECTION, SOLUTION INTRAMUSCULAR; INTRAVENOUS at 17:45

## 2018-03-05 RX ADMIN — PETROLATUM 1 APPLICATION: 42 OINTMENT TOPICAL at 09:00

## 2018-03-05 RX ADMIN — GLYCOPYRROLATE 0.2 MG: 0.2 INJECTION INTRAMUSCULAR; INTRAVENOUS at 17:07

## 2018-03-05 NOTE — CONSULTS
Adult Nutrition  Assessment/PES    Patient Name:  Eliseo Price  YOB: 1965  MRN: [id number]  Admit Date:  3/4/2018    Assessment Date:  3/5/2018    Comments:  Advance to Consistent CHO , Cardiac Diet as indicated.           Reason for Assessment       03/05/18 1031    Reason for Assessment    Reason For Assessment/Visit physician consult;education    Cardiac HTN    Endocrine DM    Ortho Other (comment)   dislocated toe     Psychosocial Other (comment)   bipolar d/o            Data Eval/ Notes       03/05/18 1032     Notes    Note Pt awake, hungry hoping Dr Farah comes in sooner so he can eat. NKFA. Reports eating greek yogurt at home to aid with GI be after previous prolonged hospitalization. Denies any edu needs. Feels his Dm under control.             Nutrition/Diet History       03/05/18 1033    Nutrition/Diet History    Meal/Snack Patterns 3 meals day most time             Anthropometrics       03/05/18 1035    Anthropometrics    RD Documented Current Weight  126 kg (277 lb 12.5 oz)   275# 9/2017    Anthropometrics (Special Considerations)    RD Calculated BMI (kg/m2) 37.6    Body Mass Index (BMI)    BMI Grade 35 - 39.9 - obesity - grade II            Labs/Tests/Procedures/Meds       03/05/18 1035    Labs/Tests/Procedures/Meds    Diagnostic Test/Procedure Review reviewed    Labs/Tests Review Reviewed    Medication Review Reviewed, pertinent;Diabetic Oral Agent              Estimated/Assessed Needs       03/05/18 1035    Calculation Measurements    Weight Used For Calculations 126 kg (277 lb 12.5 oz)    Height Used for Calculations 1.829 m (6')    Estimated/Assessed Energy Needs    Energy Need Method [place]or    Age 52    RMR ([place]-St. Jeor Equation) 2138    Activity Factors ([place] St or)  Confined to bed  1.2    Estimated Kcal Range  2065 kcal (mifflin x 1.2 - 500 kcal for obese)    232 gm CHO, 45% kcal     Estimated/Assessed Protein Needs    Weight Used  for Protein Calculation 126 kg (277 lb 12.5 oz)    Protein (gm/kg) 0.8    0.8 Gm Protein (gm) 100.8    Estimated/Assessed Fluid Needs    Fluid Need Method RDA method    RDA Method (mL)  2065            Nutrition Prescription Ordered       03/05/18 1036    Nutrition Prescription PO    Current PO Diet NPO            Evaluation of Received Nutrient/Fluid Intake       03/05/18 1037    Evaluation of Received Nutrient/Fluid Intake    Nutrition Delivered Fluid Evaluation    Fluid Intake Evaluation    % Fluid Needs insufficent data    PO Evaluation    Number of Days PO Intake Evaluated Insufficient Data            Problem/Interventions:        Problem 1       03/05/18 1037    Nutrition Diagnoses Problem 1    Problem 1 Overweight/Obesity    Etiology (related to) Factors Affecting Nutrition    Signs/Symptoms (evidenced by) BMI    BMI 35 - 39.9                    Intervention Goal       03/05/18 1037    Intervention Goal    General Provide information regarding MNT for treatment/condition    PO Establish PO            Nutrition Intervention       03/05/18 1037    Nutrition Intervention    RD/Tech Action Interview for preference;Follow Tx progress            Nutrition Prescription       03/05/18 1037    Nutrition Prescription PO    PO Prescription Begin/change diet    Common Modifiers Cardiac;Consistent Carbohydrate            Education/Evaluation       03/05/18 1037    Education    Education Education offered and refused   Pt denies any edu needs, feels his DM has been controlled. Will remain available.     Monitor/Evaluation    Monitor Per protocol;I&O;PO intake;Pertinent labs;Weight;Symptoms   no d/c needs identified at this time        Electronically signed by:  Aleksandra Rowe RD  03/05/18 10:38 AM

## 2018-03-05 NOTE — PROGRESS NOTES
"SERVICE: [facility] HOSPITALIST    CONSULTANTS: Podiatry    CHIEF COMPLAINT: f/u toe dislocation    SUBJECTIVE: The patient reports no pain/no sensation in feet. He is hoping toe can be saved and reports he falls a lot at home due to lack of sensation in his feet. He is getting a different walker that he hopes will help his balance. He otherwise denies f/c/cough/soa/chest pain/n/v/d/abdominal pain or other new concerns.    OBJECTIVE:    /99 (BP Location: Right arm, Patient Position: Lying)  Pulse 91  Temp 98.9 °F (37.2 °C) (Oral)   Resp 12  Ht 182.9 cm (72\")  Wt 126 kg (277 lb 8 oz)  SpO2 96%  BMI 37.64 kg/m2    MEDS/LABS REVIEWED AND ORDERED    amLODIPine 2.5 mg Oral Daily   [MAR Hold] FLUoxetine 40 mg Oral Daily   gabapentin 800 mg Oral Q8H   [MAR Hold] hydrophor 1 application Topical Q24H   losartan 100 mg Oral Q24H   [MAR Hold] metFORMIN 1,000 mg Oral BID With Meals   primidone 250 mg Oral Q12H     Physical Exam   Constitutional: He is oriented to person, place, and time. He appears well-developed and well-nourished.   HENT:   Head: Normocephalic and atraumatic.   Eyes: EOM are normal. Pupils are equal, round, and reactive to light.   Cardiovascular: Normal rate and regular rhythm.    Pulmonary/Chest: Effort normal and breath sounds normal. No respiratory distress. He has no wheezes. He has no rales.   Abdominal: Soft. Bowel sounds are normal. He exhibits no distension. There is no tenderness.   Musculoskeletal: He exhibits no edema.   Neurological: He is alert and oriented to person, place, and time.   Skin: Skin is warm and dry.   Left foot 4th toe erythematous, obvious deformity noted. Left fifth toe amputation previously. No sensation to bilateral feet   Psychiatric: He has a normal mood and affect. His behavior is normal.   Vitals reviewed.    LAB/DIAGNOSTICS:    Lab Results (last 24 hours)     Procedure Component Value Units Date/Time    Basic Metabolic Panel [[id number]]  " (Abnormal) Collected:  03/05/18 0346    Specimen:  Blood Updated:  03/05/18 0433     Glucose 103 (H) mg/dL      BUN 14 mg/dL      Creatinine 0.61 (L) mg/dL      Sodium 137 mmol/L      Potassium 3.7 mmol/L      Chloride 104 mmol/L      CO2 23.3 mmol/L      Calcium 8.7 mg/dL      eGFR Non African Amer 138 mL/min/1.73      BUN/Creatinine Ratio 23.0     Anion Gap 9.7 mmol/L     Narrative:       GFR Normal >60  Chronic Kidney Disease <60  Kidney Failure <15    Hemoglobin A1c [[id number]]  (Normal) Collected:  03/04/18 1459    Specimen:  Blood Updated:  03/05/18 1341     Hemoglobin A1C 5.60 %     Narrative:       Hemoglobin A1C Ranges:    Increased Risk for Diabetes  5.7% to 6.4%  Diabetes                     >= 6.5%  Diabetic Goal                < 7.0%    Blood Culture - Blood, [[id number]]  (Normal) Collected:  03/04/18 1500    Specimen:  Blood from Arm, Right Updated:  03/05/18 1516     Blood Culture No growth at 24 hours    POC Glucose Once [[id number]]  (Normal) Collected:  03/05/18 1550    Specimen:  Blood Updated:  03/05/18 1557     Glucose 89 mg/dL     Narrative:       Meter: [id number] : [id number] Nabor Carballo RN    Blood Culture - Blood, [[id number]]  (Normal) Collected:  03/04/18 1517    Specimen:  Blood from Blood, Venous Line Updated:  03/05/18 1601     Blood Culture No growth at 24 hours        ASSESSMENT/PLAN:  1. Open dislocation of the left fourth toe: Podiatry consulted   One dose of IV vancomycin given in ER  Suspect will need amputation, await further input     2. Hypertension:   At goal on home amlodipine 2.5 mg daily/losartan 100 mg daily     3. DM-2 with peripheral neuropathy: A1C 5.6%  Glucose at goal, continue metformin 1000 mg twice daily/gabapentin 800 mg every 8 hours  Monitor accuchecks ac/hs with low dose SSI     4. Bipolar disorder: no acute issues on prozac 40 mg daily/restoril 15 mg nightly     5. H/O DVT: no acute issues, resume lovenox prophy dose post operatively as per   Gagan     6. Chronic splenic vein thrombosis: No acute issues here.      7. Obesity: Nutrition following  Body mass index is 37.64 kg/(m^2).     8. H/O Chronic macrocytic anemia: No issues here, labs normal      9. H/O dyslipidemia: continue CCC diet, no home meds, no acute issues      10. Benign essential tremor:   Follows with neurology outpatient, no acute issues on home primidone.      DVT prophy: Will need Lovenox post-op. Continue PPI/SCDs

## 2018-03-05 NOTE — CONSULTS
CC  Open dislocation of left fourth toe.    HPI  The patient is a 53 y/o White male known to me from private office and hospitalizations at this facility. He was admitted through ER yesterday with an open dislocation of the left 4th toe. The injury was sustained when he apparently lost his balance and fell at his home this past Thursday, 3/1/18, and his left foot struck the wall. He was wearing no shoes at the time of the injury despite prior recommendations to not walk barefoot even around the house due to loss of protective sensation in feet and inherent risks. The patient noticed toe deformity present and some bleeding at the time of the injury but elected not to present to the ER to get medical attention until yesterday. Upon questioning he states that he was afraid he might lose his toe. He states he kept the wound clean while at home. I have been consulted to provide surgical management of this foot injury and he is examined in the pre-op holding area this afternoon. I have already reviewed his pedal radiographs and his injury clearly requires reduction and laceration repair.    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   • HARDWARE REMOVAL Right 8/18/2017    Procedure: RIGHT EXTERNAL FIXATOR REMOVAL;  Surgeon: Anish Farah DPM;  Location: [facility];  Service:    • JOINT REPLACEMENT     • KNEE SURGERY     • ORIF FOOT FRACTURE Right 7/29/2017    Procedure: open reduction with percutaneous pinning of midfoot dislocation fracture;   Surgeon: Anish Farah DPM;  Location:  [facility];  Service:    • TOE FUSION Right 8/18/2017    Procedure: RIGHT  MEDIAL COLUMN ARTHRODESIS, RIGHT TARAL METATARSAL ARTHRODESIS;  Surgeon: Anish Farah DPM;  Location:  [facility];  Service:    • TOTAL HIP ARTHROPLASTY         Current Facility-Administered Medications:   •  amLODIPine (NORVASC) tablet 2.5 mg, 2.5 mg, Oral, Daily, Jennifer Paez MD, 2.5 mg at 03/05/18 1051  •  FLUoxetine (PROzac) capsule 40 mg, 40 mg, Oral, Daily, Jennifer Paez MD, 40 mg at 03/05/18 1051  •  gabapentin (NEURONTIN) capsule 800 mg, 800 mg, Oral, Q8H, Jennifer Paez MD, 800 mg at 03/05/18 2019  •  hydrophor (AQUAPHOR) ointment 1 application, 1 application, Topical, Q24H, Jennifer Paez MD, 1 application at 03/05/18 0900  •  LORazepam (ATIVAN) tablet 0.5 mg, 0.5 mg, Oral, Daily PRN, Jennifer Paze MD, 0.5 mg at 03/05/18 2156  •  losartan (COZAAR) tablet 100 mg, 100 mg, Oral, Q24H, Jennifer Paez MD, 100 mg at 03/05/18 1050  •  metFORMIN (GLUCOPHAGE) tablet 1,000 mg, 1,000 mg, Oral, BID With Meals, Jennifer Paez MD, 1,000 mg at 03/05/18 1051  •  oxyCODONE-acetaminophen (PERCOCET) 5-325 MG per tablet 1 tablet, 1 tablet, Oral, Q4H PRN, Jennifer Paez MD, 1 tablet at 03/05/18 2026  •  primidone (MYSOLINE) tablet 250 mg, 250 mg, Oral, Q12H, Jennifer Paez MD, 250 mg at 03/05/18 2019  •  sodium chloride 0.9 % flush 1-10 mL, 1-10 mL, Intravenous, PRN, Jennifer Paez MD  •  Insert peripheral IV, , , Once **AND** sodium chloride 0.9 % flush 10 mL, 10 mL, Intravenous, PRN, Romie Kelly MD  •  sodium chloride 0.9 % infusion 40 mL, 40 mL, Intravenous, PRN, Jennifer Paez MD  •  temazepam (RESTORIL) capsule 15 mg, 15 mg, Oral, Nightly PRN, Jennifer Paez MD, 15 mg at 03/05/18 2027     Allergies   Allergen Reactions   •  "Lisinopril Cough     COUGHING     Social History   Substance Use Topics   • Smoking status: Never Smoker   • Smokeless tobacco: Never Used   • Alcohol use No      Comment: h/o alcohol consumption-quit 10 years 8 months     Family History   Problem Relation Age of Onset   • Arthritis Mother    • Cancer Mother    • Hyperlipidemia Mother    • Arthritis Father    • Cancer Father    • Depression Father    • Hypertension Father    • Mental illness Father    • Cancer Sister    • Arthritis Sister    • Hyperlipidemia Sister    • Cancer Paternal Aunt    • Hypertension Daughter    • Depression Son    • Hyperlipidemia Paternal Grandmother    • Hearing loss Paternal Grandfather    • Hyperlipidemia Paternal Grandfather    • Hypertension Paternal Grandfather      Review of Systems:  Constitutional: No constitutional signs/symptoms of infection.  HEENT: No head or ENT complaints.  Respiratory: No SOB or breathing difficulty.  Cardiovascular: No chest pain/pressure, no palpitations, no lower extremity swelling or intermittent claudication  Gi: No abdominal pain, no acid reflux, no nausea or changes in bowel habits  : No kidney, bladder or prostate complaints.  Hematologic: No bruising or bleeding tendency.  Endocrine: No polydipsia/polyphagia/polyuria, no heat/cold intolerance  Immunologic: No recurrent fevers or infections, no immunocompromised status  Musculoskeletal: Dislocated L 4th toe.  Dermatologic: Plantar-medial L 4th toe laceration. L 4th toe erythema.  Psychiatric: Bipolar    Physical Examination  General: In general the patient is an adult male who appears well developed, adequately nourished, obese, alert, pleasant and comfortable.  Vitals: /99 (BP Location: Right arm, Patient Position: Lying)  Pulse 91  Temp 98.9 °F (37.2 °C) (Oral)   Resp 12  Ht 182.9 cm (72\")  Wt 126 kg (277 lb 8 oz)  SpO2 96%  BMI 37.64 kg/m2  Lower Extremity Examination: Examination is limited to the feet/ankles and findings are as " follows.  Vascular: The feet are warm and appear sufficiently perfused. Pedal pulses are palpable. No significant pedal edema is present.  Neurologic: There is profound pedal numbness affecting both feet consistent with diabetic neuropathy and loss of protective sensation.  Dermatologic: There is erythema of the L 4th toe to the toe base. No calor is present. There is a full thickness laceration present at the L 4th toe beginning medially and extending to the plantar-lateral aspect of the toe. The laceration measures 2.0x0.4cm. The flexor tendons and plantar aspect of the proximal phalangeal head are present in the wound. There is no foul odor, nonviable tissue or purulence present. No active bleeding is present.   Musculoskeletal:  Active ankle ROM is w/o limitation of pain. No instability at either ankle is present. There is reduced motion of the right medial column due to prior arthrodesis but some motion exists which is concerning for hardware loosening or failure.  Orthopedic: The L 4th toe is dorsal-laterally dislocated at the PIPJ.  L 4th toe tenderness to palpation and moderate L 4th toe swelling noted. Mild tenderness to palpation at the plantar-medial right talo-navicular, navicular-cuneiform joints. There is plantar-medial palpable prominence at the talo-navicular joint.     LEFT FOOT RADIOGRAPH RESULTS, 03/04/2018:  HISTORY:   52-year-old male in the ED with foot pain after a fall today.  TECHNIQUE:  Three-view left foot series.  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 Complete lateral dislocation of the fourth PIP joint. No visible fracture.     IMPRESSION: 1. Fourth PIP joint dislocation. 2. Previous fifth digit amputation.    LAB RESULTS    Ref. Range 3/5/2018 03:46   Glucose Latest Ref Range: 65 - 99 mg/dL 103 (H)   Sodium Latest Ref Range: 136 - 145 mmol/L 137   Potassium Latest Ref Range: 3.5 - 5.2 mmol/L 3.7   CO2 Latest Ref Range: 22.0 - 29.0 mmol/L 23.3   Chloride  Latest Ref Range: 98 - 107 mmol/L 104   Anion Gap Latest Units: mmol/L 9.7   Creatinine Latest Ref Range: 0.76 - 1.27 mg/dL 0.61 (L)   BUN Latest Ref Range: 6 - 20 mg/dL 14   BUN/Creatinine Ratio Latest Ref Range: 7.0 - 25.0  23.0   Calcium Latest Ref Range: 8.6 - 10.5 mg/dL 8.7   eGFR Non African Amer Latest Ref Range: >60 mL/min/1.73 138      Ref. Range 3/4/2018 14:59   Hemoglobin A1C Latest Ref Range: 4.80 - 5.60 % 5.60   WBC Latest Ref Range: 4.80 - 10.80 10*3/mm3 9.14   RBC Latest Ref Range: 4.70 - 6.10 10*6/mm3 4.96   Hemoglobin Latest Ref Range: 14.0 - 18.0 g/dL 14.6   Hematocrit Latest Ref Range: 42.0 - 52.0 % 42.7   RDW Latest Ref Range: 11.5 - 14.5 % 13.2   MCV Latest Ref Range: 80.0 - 94.0 fL 86.1   MCH Latest Ref Range: 27.0 - 31.0 pg 29.4   MCHC Latest Ref Range: 31.0 - 37.0 g/dL 34.2   MPV Latest Ref Range: 7.4 - 10.4 fL 8.8   Platelets Latest Ref Range: 140 - 500 10*3/mm3 196   RDW-SD Latest Ref Range: 37.0 - 54.0 fl 41.9   Neutrophil % Latest Ref Range: 45.0 - 70.0 % 72.9 (H)   Lymphocyte % Latest Ref Range: 20.0 - 45.0 % 16.3 (L)   Monocyte % Latest Ref Range: 3.0 - 8.0 % 10.1 (H)   Eosinophil % Latest Ref Range: 0.0 - 4.0 % 0.2   Basophil % Latest Ref Range: 0.0 - 2.0 % 0.1   Immature Grans % Latest Ref Range: 0.0 - 0.5 % 0.4   Neutrophils, Absolute Latest Ref Range: 1.50 - 8.30 10*3/mm3 6.66   Lymphocytes, Absolute Latest Ref Range: 0.60 - 4.80 10*3/mm3 1.49   Monocytes, Absolute Latest Ref Range: 0.00 - 1.00 10*3/mm3 0.92   Eosinophils, Absolute Latest Ref Range: 0.10 - 0.30 10*3/mm3 0.02 (L)   Basophils, Absolute Latest Ref Range: 0.00 - 0.20 10*3/mm3 0.01   Immature Grans, Absolute Latest Ref Range: 0.00 - 0.03 10*3/mm3 0.04 (H)   nRBC Latest Ref Range: 0.0 - 0.0 /100 WBC 0.0     ASSESSMENT  1. Diabetes with peripheral neuropathy.  2. Open dislocation L 4th toe PIPJ with plantar medial L 4th toe laceration.  3. Hypertension   3. Bipolar disorder  4. H/O DVT  5. Chronic splenic vein  thrombosis  6. Obesity  7. H/O Chronic macrocytic anemia  8. H/O dyslipidemia  9. Benign essential tremor:     PLAN  1. The patient is examined and the most recent labs and pedal radiographs are reviewed. I have discussed the nature of the injury and the medically necessary surgery to reduce the dislocation and repair the laceration. No contra-indications are noted to the proposed surgery.  2. I reviewed the procedure in detail and discussed the goals and benefits, risks and possibility of complications. Given the fact that the injury is now 3 days old and there is digital erythema to the toe base I advised that I will not be placing any percutaneous k-wire fixation out of concern for developing infection. I explained that the dislocation will be reduced manually and splinted. If the PIPJ re-dislocates in the future then he will require percutaneous fixation or arthrodesis of the PIPJ. The consent form is completed,read and signed by the patient indicating his understanding of the aforementioned.

## 2018-03-05 NOTE — NURSING NOTE
Discharge Planning Assessment  EVONNE Epstein     Patient Name: Eliseo Pirce  MRN: [id number]  Today's Date: 3/5/2018    Admit Date: 3/4/2018          Discharge Needs Assessment       03/05/18 1239    Living Environment    Lives With alone    Living Arrangements house    Home Accessibility no concerns    Transportation Available car    Living Environment    Provides Primary Care For no one    Able to Return to Prior Living Arrangements yes    Discharge Needs Assessment    Concerns To Be Addressed denies needs/concerns at this time    Readmission Within The Last 30 Days no previous admission in last 30 days    Equipment Currently Used at Home cane, straight;walker, rolling;glucometer    Discharge Planning Comments Spoke with patient at bedside. Face sheet verified. Patient states he is independent of ADLs including driving prior to admission. He says he has a straight cane and rolling walker that he uses as needed. He denies use of additional DME, home 02, cpap/bipap. He has used [facility] in the past and is agreeable to using them if HH is needed at [place]. He has not used inpatient rehab in the past.He uses [facility] [place] and denies issues obtaining medications. He has a living will that is on file. He states he is supposed to have surgery this afternoon and hopes to go home tomorrow. He does not anticipate any needs at [place]. Will continue to follow.            Discharge Plan     None        Discharge Placement     No information found                Demographic Summary       03/05/18 1238    Referral Information    Admission Type inpatient    Arrived From home or self-care    Referral Source admission list    Reason For Consult discharge planning    Contact Information    Permission Granted to Share Information With     Primary Care Physician Information    Name Killian Scales MD            Functional Status     None            Psychosocial     None            Abuse/Neglect     None             Legal     None            Substance Abuse     None            Patient Forms     None          Vance Johnson RN

## 2018-03-05 NOTE — ANESTHESIA PREPROCEDURE EVALUATION
Anesthesia Evaluation     Patient summary reviewed and Nursing notes reviewed   no history of anesthetic complications:  NPO Solid Status: > 8 hours  NPO Liquid Status: > 8 hours           Airway   Mallampati: II  TM distance: >3 FB  Neck ROM: full  No difficulty expected  Dental      Pulmonary     breath sounds clear to auscultation  (+) COPD (pt denies), sleep apnea on CPAP,   Cardiovascular   Exercise tolerance: poor (<4 METS)    ECG reviewed  Rhythm: regular  Rate: normal    (+) hypertension well controlled, past MI (2009? pt states due to running out of HTN meds) , CAD, angina (pt states non for abt 10wks), DVT resolved, hyperlipidemia,     ROS comment: RR Interval= 632 ms  OR Interval= 156 ms  QRSD Interval= 98 ms  QT Interval= 384 ms  QTc Interval= 483 ms  Heart Rate= 95 ms  P Axis= 34 deg  QRS Axis= 60 deg  T Wave Axis= 32 deg  I: 40 Axis= 31 deg  T: 40 Axis= 109 deg  ST Axis= 54 deg  SINUS RHYTHM  EARLY PRECORDIAL R/S TRANSITION  BORDERLINE PROLONGED QT INTERVAL - new  Electronically Signed by:  Jb Cerda ([facility]) 05-Mar-2018 09:31:38  Date and Time of Study: 2018-03-04 22:48:28    Neuro/Psych  (+) CVA, tremors, numbness (hands and feet DM neuropathy ), psychiatric history Depression, Bipolar and Anxiety,     GI/Hepatic/Renal/Endo    (+) obesity,   diabetes mellitus type 2 well controlled,     ROS Comment: Disease of thyroid gland nodule on thyroid     Musculoskeletal     (+) neck pain, neck stiffness,   Abdominal   (+) obese,    Substance History      OB/GYN          Other   (+) arthritis       Other Comment: Toxic metabolic encephalopathy  Pseudogout  Septic shock                  Anesthesia Plan    ASA 3     MAC     intravenous induction   Anesthetic plan and risks discussed with patient.  Use of blood products discussed with patient  Consented to blood products.

## 2018-03-05 NOTE — H&P
"[facility] HOSPITALIST     Killian Scales MD    CHIEF COMPLAINT: Left 4th toe injury    HISTORY OF PRESENT ILLNESS:    51 y/o male with hypertension, DM-2 with peripheral neuropathy, Bipolar d/o and anxiety, h/o DVT, Chronic splenic vein thrombosis, Obesity, Chronic macrocytic anemia, H/O dyslipidemia and benign essential tremor presented to the ER secondary to an injury he obtain to his left fourth toe. The patient notes he lost his balance and fell at his home this past thursday and his left foot struck the wall.  There was an apparent deformity present and some bleeding at that time of the fourth toe but the patient chose not to get medical attention until today. The patient is having \"a little\" pain associated with the injury. He notes the toe is swollen and a laceration is present. He was hoping it would heal. He notes he's felt a little tired this past week but has not been ill. The patient notes because of his previous amputations and foot surgeries and severe neuropathy he has balance issues. He denies any CP, SOA, N/V or heart palpitations. He denies any N/V/D, abd pain, cough or congestion.       Past Medical History:   Diagnosis Date   • Arthritis    • Bipolar disorder    • Cellulitis of lower extremity     RIGHT   • Coronary artery disease    • Diabetes mellitus    • Disease of thyroid gland     nodule on thyroid   • DVT (deep venous thrombosis)    • Fracture of right foot    • Hyperlipidemia    • Hypertension    • Pseudogout    • Septic shock 07/2017    H/O   • Toxic metabolic encephalopathy 07/2017    H/O     Past Surgical History:   Procedure Laterality Date   • HARDWARE REMOVAL Right 8/18/2017    Procedure: RIGHT EXTERNAL FIXATOR REMOVAL;  Surgeon: Anish Farah DPM;  Location: [facility];  Service:    • JOINT REPLACEMENT     • KNEE SURGERY     • ORIF FOOT FRACTURE Right 7/29/2017    Procedure: open reduction with percutaneous pinning of midfoot dislocation fracture;  Surgeon: " Anish Farah DPM;  Location:  [facility];  Service:    • TOE FUSION Right 8/18/2017    Procedure: RIGHT  MEDIAL COLUMN ARTHRODESIS, RIGHT TARAL METATARSAL ARTHRODESIS;  Surgeon: Anish Farah DPM;  Location:  [facility];  Service:    • TOTAL HIP ARTHROPLASTY       Family History   Problem Relation Age of Onset   • Arthritis Mother    • Cancer Mother    • Hyperlipidemia Mother    • Arthritis Father    • Cancer Father    • Depression Father    • Hypertension Father    • Mental illness Father    • Cancer Sister    • Arthritis Sister    • Hyperlipidemia Sister    • Cancer Paternal Aunt    • Hypertension Daughter    • Depression Son    • Hyperlipidemia Paternal Grandmother    • Hearing loss Paternal Grandfather    • Hyperlipidemia Paternal Grandfather    • Hypertension Paternal Grandfather      Social History   Substance Use Topics   • Smoking status: Never Smoker   • Smokeless tobacco: None   • Alcohol use No     Prescriptions Prior to Admission   Medication Sig Dispense Refill Last Dose   • amLODIPine (NORVASC) 2.5 MG tablet Take 2.5 mg by mouth Daily.   3/3/2018 at Unknown time   • FLUoxetine (PROzac) 20 MG capsule Take 40 mg by mouth Daily.   3/3/2018 at Unknown time   • gabapentin (NEURONTIN) 800 MG tablet Take 800 mg by mouth 3 (three) times a day.   3/3/2018 at Unknown time   • hydrophor (AQUAPHOR) ointment ointment Apply 1 application topically Daily.   3/3/2018 at Unknown time   • LORazepam (ATIVAN) 0.5 MG tablet Take 0.5 mg by mouth Daily As Needed for Anxiety.   3/3/2018 at Unknown time   • losartan (COZAAR) 100 MG tablet Take 1 tablet by mouth Daily. 30 tablet 0 3/4/2018 at Unknown time   • metFORMIN (GLUCOPHAGE) 1000 MG tablet Take 1,000 mg by mouth 2 (Two) Times a Day With Meals.   3/4/2018 at Unknown time   • primidone (MYSOLINE) 250 MG tablet Take 250 mg by mouth 2 (two) times a day.   3/3/2018 at Unknown time   • temazepam (RESTORIL) 15 MG capsule Take 15 mg by mouth At Night As Needed for Sleep.    "3/3/2018 at Unknown time     Allergies:  Lisinopril    REVIEW OF SYSTEMS:  Please see the above history of present illness for pertinent positives and negatives.  The remainder of the patient's systems have been reviewed and are negative.     Vital Signs  Temp:  [97.4 °F (36.3 °C)-97.9 °F (36.6 °C)] 97.9 °F (36.6 °C)  Heart Rate:  [90-95] 95  Resp:  [19-20] 19  BP: (135-163)/() 135/82  Oxygen Therapy  SpO2: 98 %  Pulse Oximetry Type: Intermittent  O2 Device: room air  Body mass index is 37.64 kg/(m^2).     Flowsheet Rows         First Filed Value    Admission Height  182 cm (71.65\") Documented at 03/04/2018 1224    Admission Weight  90.7 kg (200 lb) Documented at 03/04/2018 1224             Physical Exam:  Physical Exam   Constitutional: Patient appears, obese and well-nourished and in no acute distress   HEENT:   Head: Normocephalic and atraumatic.   Eyes:  Pupils are equal, round, and reactive to light. EOM are intact. Sclera are anicteric and non-injected.  Mouth and Throat: Patient has moist mucous membranes. Oropharynx is clear of any erythema or exudate.     Neck: Neck supple. No JVD noted. No lymphadenopathy present.  Cardiovascular: Regular rate, regular rhythm, S1 normal and S2 normal.  Exam reveals no gallop and no friction rub.  No murmur heard.  Pulmonary/Chest: Lungs are clear to auscultation bilaterally. No respiratory distress. No wheezes. No rhonchi. No rales.   Abdominal: Obese. Soft. Bowel sounds are normal. No mass. There is no hepatosplenomegaly. There is no tenderness.   Musculoskeletal: Normal Muscle tone  Extremities: Edema of the left 4th toe with obvious deformity and discoloration of the toe and a laceration at it's base. Pulses are palpable but diminished in the lower extremities. Bandage in place to left foot. Previous left 5th toe amputation. Scaring on right foot from previous surgeries.  Neurological: Patient is alert and oriented to person, place, and time. Cranial nerves II-XII " are grossly intact with no focal deficits. Significantly diminished sensation in the patient's feet, nearly no sensation in his soles. Tremor of UEs noted.  Skin: Skin is warm. Nails show no clubbing.  No cyanosis or erythema.     Results Review:    I reviewed the patient's new clinical results.  Lab Results (most recent)     Procedure Component Value Units Date/Time    Blood Culture - Blood, [[id number]] Collected:  03/04/18 1500    Specimen:  Blood from Arm, Right Updated:  03/04/18 1506    CBC & Differential [[id number]] Collected:  03/04/18 1459    Specimen:  Blood Updated:  03/04/18 1509    Narrative:       The following orders were created for panel order CBC & Differential.  Procedure                               Abnormality         Status                     ---------                               -----------         ------                     CBC Auto Differential[[id number]]        Abnormal            Final result                 Please view results for these tests on the individual orders.    CBC Auto Differential [[id number]]  (Abnormal) Collected:  03/04/18 1459    Specimen:  Blood Updated:  03/04/18 1509     WBC 9.14 10*3/mm3      RBC 4.96 10*6/mm3      Hemoglobin 14.6 g/dL      Hematocrit 42.7 %      MCV 86.1 fL      MCH 29.4 pg      MCHC 34.2 g/dL      RDW 13.2 %      RDW-SD 41.9 fl      MPV 8.8 fL      Platelets 196 10*3/mm3      Neutrophil % 72.9 (H) %      Lymphocyte % 16.3 (L) %      Monocyte % 10.1 (H) %      Eosinophil % 0.2 %      Basophil % 0.1 %      Immature Grans % 0.4 %      Neutrophils, Absolute 6.66 10*3/mm3      Lymphocytes, Absolute 1.49 10*3/mm3      Monocytes, Absolute 0.92 10*3/mm3      Eosinophils, Absolute 0.02 (L) 10*3/mm3      Basophils, Absolute 0.01 10*3/mm3      Immature Grans, Absolute 0.04 (H) 10*3/mm3      nRBC 0.0 /100 WBC     Blood Culture - Blood, [[id number]] Collected:  03/04/18 1517    Specimen:  Blood from Blood, Venous Line Updated:  03/04/18 1600    Basic Metabolic  Panel [[id number]]  (Abnormal) Collected:  03/04/18 1550    Specimen:  Blood Updated:  03/04/18 1622     Glucose 110 (H) mg/dL      BUN 16 mg/dL      Creatinine 0.75 (L) mg/dL      Sodium 134 (L) mmol/L      Potassium 3.8 mmol/L      Chloride 97 (L) mmol/L      CO2 19.2 (L) mmol/L      Calcium 8.9 mg/dL      eGFR Non African Amer 109 mL/min/1.73      BUN/Creatinine Ratio 21.3     Anion Gap 17.8 mmol/L     Narrative:       GFR Normal >60  Chronic Kidney Disease <60  Kidney Failure <15          Imaging Results (most recent)     Procedure Component Value Units Date/Time    XR Toe 2+ View Left [[id number]] Updated:  03/04/18 1631            ECG/EMG Results (most recent)     Procedure Component Value Units Date/Time    ECG 12 Lead [[id number]] Collected:  03/04/18 2248     Updated:  03/04/18 2250    Narrative:       RR Interval= 632 ms  TN Interval= 156 ms  QRSD Interval= 98 ms  QT Interval= 384 ms  QTc Interval= 483 ms  Heart Rate= 95 ms  P Axis= 34 deg  QRS Axis= 60 deg  T Wave Axis= 32 deg  I: 40 Axis= 31 deg  T: 40 Axis= 109 deg  ST Axis= 54 deg  SINUS RHYTHM  EARLY PRECORDIAL R/S TRANSITION  BORDERLINE PROLONGED QT INTERVAL  Electronically Signed by:  Date and Time of Study: 2018-03-04 22:48:28            Assessment/Plan     1. Open dislocation of the left fourth toe: Will consult Dr. Farah to see the patient, ER spoke with him and patient will require surgery. Patient received a dose of IV vanco in the ER today per request of Dr. Farah. WBC WNL and patient afebrile. I do not see s/s of obvious infection on exam. Will await further podiatry recs, will hold off on further Abx's for now. Will order percocet PRN for pain.    2. Hypertension: BP is WNL last checked on home Amlodipine and Losartan. Will continue at home doses.    3. DM-2 with peripheral neuropathy: Will check HbA1c. Will start Accu checks with SSI. Will continue home dose metformin and neurontin.    4. Bipolar d/o and anxiety: No acute issues here. Will  continue home dose Prozac.     5. H/o DVT: Will place SCDs tonight and plan lovenox post-op if ok with Dr. Farah.    6. Chronic splenic vein thrombosis: No acute issues here.     7. Obesity: Body mass index is 37.64 kg/(m^2). Will ask nutrition to see.     8. H/O Chronic macrocytic anemia: No issues here. Hb is NL at admission. Will monitor post-op.    9. H/O dyslipidemia: On no meds for this currently. F/U with PCP outpatient.     10. Benign essential tremor: Patient follows with neurology. Continue home dose of Primidone. Patient notes some of his previous meds were stopped and he is getting ready to start a new medication for this. He notes his tremors have improved some.     11. DVT prophy: Will need Lovenox post-op. SCDs tonight.     I discussed the patients findings and my recommendations with patient.     Jennifer Paez MD  03/05/18  1:20 AM

## 2018-03-05 NOTE — ANESTHESIA POSTPROCEDURE EVALUATION
Patient: Eliseo Price    Procedure Summary     Date Anesthesia Start Anesthesia Stop Room / Location    03/05/18 5901 [id number] [facility]       Procedure Diagnosis Surgeon Provider    OPEN REDUCTION WITH IRRIGATION AND WOUND CLOSURE LEFT FOURTH TOE (Left Toe Fourth) No diagnosis on file. ARTURO Daniel CRNA          Anesthesia Type: MAC  Last vitals  BP   133/99 (03/05/18 1559)   Temp   98.9 °F (37.2 °C) (03/05/18 1559)   Pulse   91 (03/05/18 1559)   Resp   12 (03/05/18 1559)     SpO2   96 % (03/05/18 1559)     Post Anesthesia Care and Evaluation    Patient location during evaluation: bedside  Patient participation: complete - patient participated  Level of consciousness: awake and alert  Pain management: adequate  Airway patency: patent  Anesthetic complications: No anesthetic complications  PONV Status: none  Cardiovascular status: acceptable  Respiratory status: acceptable  Hydration status: acceptable

## 2018-03-05 NOTE — PLAN OF CARE
Problem: Patient Care Overview (Adult)  Goal: Plan of Care Review  Outcome: Ongoing (interventions implemented as appropriate)   03/05/18 1622   Coping/Psychosocial Response Interventions   Plan Of Care Reviewed With patient   Patient Care Overview   Progress no change   Outcome Evaluation   Outcome Summary/Follow up Plan Vital signs stable; ready for procedure     Goal: Adult Individualization and Mutuality  Outcome: Ongoing (interventions implemented as appropriate)      Problem: Perioperative Period (Adult)  Goal: Signs and Symptoms of Listed Potential Problems Will be Absent or Manageable (Perioperative Period)  Outcome: Ongoing (interventions implemented as appropriate)

## 2018-03-05 NOTE — PLAN OF CARE
Problem: Patient Care Overview (Adult)  Goal: Discharge Needs Assessment  Outcome: Ongoing (interventions implemented as appropriate)   03/05/18 1239   Discharge Needs Assessment   Concerns To Be Addressed denies needs/concerns at this time   Readmission Within The Last 30 Days no previous admission in last 30 days   Discharge Planning Comments Spoke with patient at bedside. Face sheet verified. Patient states he is independent of ADLs including driving prior to admission. He says he has a straight cane and rolling walker that he uses as needed. He denies use of additional DME, home 02, cpap/bipap. He has used [facility] in the past and is agreeable to using them if HH is needed at [place]. He has not used inpatient rehab in the past.He uses [facility] and denies issues obtaining medications. He has a living will that is on file. He states he is supposed to have surgery this afternoon and hopes to go home tomorrow. He does not anticipate any needs at [place]. Will continue to follow.   Living Environment   Transportation Available car   Self-Care   Equipment Currently Used at Home cane, straight;walker, rolling;glucometer

## 2018-03-06 ENCOUNTER — HOSPITAL ENCOUNTER (OUTPATIENT)
Dept: GENERAL RADIOLOGY | Facility: HOSPITAL | Age: 53
Discharge: HOME | End: 2018-03-06

## 2018-03-06 VITALS
SYSTOLIC BLOOD PRESSURE: 137 MMHG | OXYGEN SATURATION: 93 % | HEART RATE: 86 BPM | RESPIRATION RATE: 18 BRPM | BODY MASS INDEX: 37.59 KG/M2 | DIASTOLIC BLOOD PRESSURE: 80 MMHG | TEMPERATURE: 97.4 F | WEIGHT: 277.5 LBS | HEIGHT: 72 IN

## 2018-03-06 DIAGNOSIS — S93.105A: ICD-10-CM

## 2018-03-06 DIAGNOSIS — S91.105A: ICD-10-CM

## 2018-03-06 LAB
ANION GAP SERPL CALCULATED.3IONS-SCNC: 9.8 MMOL/L
BUN BLD-MCNC: 11 MG/DL (ref 6–20)
BUN/CREAT SERPL: 18.6 (ref 7–25)
CALCIUM SPEC-SCNC: 8.5 MG/DL (ref 8.6–10.5)
CHLORIDE SERPL-SCNC: 105 MMOL/L (ref 98–107)
CO2 SERPL-SCNC: 24.2 MMOL/L (ref 22–29)
CREAT BLD-MCNC: 0.59 MG/DL (ref 0.76–1.27)
GFR SERPL CREATININE-BSD FRML MDRD: 143 ML/MIN/1.73
GLUCOSE BLD-MCNC: 87 MG/DL (ref 65–99)
MAGNESIUM SERPL-MCNC: 1.6 MG/DL (ref 1.7–2.5)
POTASSIUM BLD-SCNC: 3.3 MMOL/L (ref 3.5–5.2)
SODIUM BLD-SCNC: 139 MMOL/L (ref 136–145)

## 2018-03-06 PROCEDURE — 63710000001 AMLODIPINE 2.5 MG TABLET: Performed by: HOSPITALIST

## 2018-03-06 PROCEDURE — A9270 NON-COVERED ITEM OR SERVICE: HCPCS | Performed by: HOSPITALIST

## 2018-03-06 PROCEDURE — 63710000001 GABAPENTIN 400 MG CAPSULE: Performed by: HOSPITALIST

## 2018-03-06 PROCEDURE — 63710000001 OXYCODONE-ACETAMINOPHEN 5-325 MG TABLET: Performed by: HOSPITALIST

## 2018-03-06 PROCEDURE — 83735 ASSAY OF MAGNESIUM: CPT | Performed by: NURSE PRACTITIONER

## 2018-03-06 PROCEDURE — A9270 NON-COVERED ITEM OR SERVICE: HCPCS

## 2018-03-06 PROCEDURE — 63710000001 FLUOXETINE 20 MG CAPSULE: Performed by: HOSPITALIST

## 2018-03-06 PROCEDURE — 63710000001 METFORMIN 500 MG TABLET: Performed by: HOSPITALIST

## 2018-03-06 PROCEDURE — 63710000001 PRIMIDONE 250 MG TABLET: Performed by: HOSPITALIST

## 2018-03-06 PROCEDURE — 99217 PR OBSERVATION CARE DISCHARGE MANAGEMENT: CPT | Performed by: NURSE PRACTITIONER

## 2018-03-06 PROCEDURE — 63710000001 LOSARTAN 50 MG TABLET: Performed by: HOSPITALIST

## 2018-03-06 PROCEDURE — 97161 PT EVAL LOW COMPLEX 20 MIN: CPT

## 2018-03-06 PROCEDURE — G8988 SELF CARE GOAL STATUS: HCPCS

## 2018-03-06 PROCEDURE — G8989 SELF CARE D/C STATUS: HCPCS

## 2018-03-06 PROCEDURE — G0378 HOSPITAL OBSERVATION PER HR: HCPCS

## 2018-03-06 PROCEDURE — G8987 SELF CARE CURRENT STATUS: HCPCS

## 2018-03-06 PROCEDURE — 76000 FLUOROSCOPY <1 HR PHYS/QHP: CPT

## 2018-03-06 PROCEDURE — 63710000001 POTASSIUM CHLORIDE 20 MEQ TABLET CONTROLLED-RELEASE

## 2018-03-06 PROCEDURE — 80048 BASIC METABOLIC PNL TOTAL CA: CPT | Performed by: HOSPITALIST

## 2018-03-06 PROCEDURE — 97165 OT EVAL LOW COMPLEX 30 MIN: CPT

## 2018-03-06 RX ORDER — L.ACID,PARA/B.BIFIDUM/S.THERM 8B CELL
1 CAPSULE ORAL DAILY
Qty: 30 CAPSULE | Refills: 0 | Status: ON HOLD | OUTPATIENT
Start: 2018-03-06 | End: 2018-06-10

## 2018-03-06 RX ORDER — POTASSIUM CHLORIDE 1500 MG/1
40 TABLET, FILM COATED, EXTENDED RELEASE ORAL ONCE
Status: COMPLETED | OUTPATIENT
Start: 2018-03-06 | End: 2018-03-06

## 2018-03-06 RX ORDER — CLINDAMYCIN HYDROCHLORIDE 300 MG/1
300 CAPSULE ORAL 3 TIMES DAILY
Qty: 21 CAPSULE | Refills: 0 | Status: SHIPPED | OUTPATIENT
Start: 2018-03-06 | End: 2018-03-20 | Stop reason: HOSPADM

## 2018-03-06 RX ORDER — POTASSIUM CHLORIDE 20 MEQ/1
TABLET, EXTENDED RELEASE ORAL
Status: COMPLETED
Start: 2018-03-06 | End: 2018-03-06

## 2018-03-06 RX ORDER — OXYCODONE HYDROCHLORIDE AND ACETAMINOPHEN 5; 325 MG/1; MG/1
1 TABLET ORAL EVERY 4 HOURS PRN
Qty: 18 TABLET | Refills: 0 | Status: SHIPPED | OUTPATIENT
Start: 2018-03-06 | End: 2018-03-09

## 2018-03-06 RX ADMIN — AMLODIPINE BESYLATE 2.5 MG: 2.5 TABLET ORAL at 08:20

## 2018-03-06 RX ADMIN — FLUOXETINE 40 MG: 20 CAPSULE ORAL at 08:20

## 2018-03-06 RX ADMIN — POTASSIUM CHLORIDE 40 MEQ: 1500 TABLET, EXTENDED RELEASE ORAL at 08:20

## 2018-03-06 RX ADMIN — PETROLATUM 1 APPLICATION: 42 OINTMENT TOPICAL at 08:23

## 2018-03-06 RX ADMIN — METFORMIN HYDROCHLORIDE 1000 MG: 500 TABLET ORAL at 08:20

## 2018-03-06 RX ADMIN — PRIMIDONE 250 MG: 250 TABLET ORAL at 08:19

## 2018-03-06 RX ADMIN — OXYCODONE HYDROCHLORIDE AND ACETAMINOPHEN 1 TABLET: 5; 325 TABLET ORAL at 03:37

## 2018-03-06 RX ADMIN — LOSARTAN POTASSIUM 100 MG: 50 TABLET ORAL at 08:20

## 2018-03-06 RX ADMIN — POTASSIUM CHLORIDE 40 MEQ: 1500 TABLET, FILM COATED, EXTENDED RELEASE ORAL at 08:20

## 2018-03-06 RX ADMIN — GABAPENTIN 800 MG: 400 CAPSULE ORAL at 06:14

## 2018-03-06 RX ADMIN — OXYCODONE HYDROCHLORIDE AND ACETAMINOPHEN 1 TABLET: 5; 325 TABLET ORAL at 00:07

## 2018-03-06 RX ADMIN — OXYCODONE HYDROCHLORIDE AND ACETAMINOPHEN 1 TABLET: 5; 325 TABLET ORAL at 08:19

## 2018-03-06 NOTE — DISCHARGE INSTR - LAB
Patient has discharge @ 4:15pm on 3/08/2018.  [facility]   Address: [facility] [place] [address]   Phone: [phone]

## 2018-03-06 NOTE — PLAN OF CARE
Problem: Patient Care Overview (Adult)  Goal: Plan of Care Review   03/06/18 0955   Outcome Evaluation   Outcome Summary/Follow up Plan OT evaluation completed. Patient performed bed mobility with conditional independence. Performed LB ADLs with supervision and funtional transfers (stand-pivot-sit) with supervision. Patient at baseline uses wheelchair for mobiltiy. Educated patient on weight bearing status and safety during functional transfers. Patient reports no concerns for return home and is not interested in home health. No skilled OT needs at this time.

## 2018-03-06 NOTE — THERAPY DISCHARGE NOTE
Acute Care - Occupational Therapy Initial Eval/Discharge  EVONNE Epstein     Patient Name: Eliseo Price  : 1965  MRN: [id number]  Today's Date: 3/6/2018  Onset of Illness/Injury or Date of Surgery Date: 18  Date of Referral to OT: 18  Referring Physician: MAICO Valencia      Admit Date: 3/4/2018       ICD-10-CM ICD-9-CM   1. Open dislocation of phalanx of foot, left, initial encounter S93.105A 838.19     Patient Active Problem List   Diagnosis   • Disease of thyroid gland   • Cellulitis of right lower extremity   • Cellulitis   • Infected epidermoid cyst   • Altered mental status, unspecified   • Altered mental status   • Immobility   • Foot fracture, right   • Open dislocation of toe   • Open dislocation of phalanx of foot     Past Medical History:   Diagnosis Date   • Arthritis    • Bipolar disorder    • Cellulitis of lower extremity     RIGHT   • Coronary artery disease    • Diabetes mellitus    • Disease of thyroid gland     nodule on thyroid   • DVT (deep venous thrombosis)    • Fracture of right foot    • Hyperlipidemia    • Hypertension    • Pseudogout    • Septic shock 2017    H/O   • Toxic metabolic encephalopathy 2017    H/O     Past Surgical History:   Procedure Laterality Date   • 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   • HARDWARE REMOVAL Right 2017    Procedure: RIGHT EXTERNAL FIXATOR REMOVAL;  Surgeon: Anish Farah DPM;  Location:  [facility] OR;  Service:    • JOINT REPLACEMENT     • KNEE SURGERY     • ORIF FOOT FRACTURE Right 2017    Procedure: open reduction with percutaneous pinning of midfoot dislocation fracture;  Surgeon: Anish Farah DPM;  Location: [facility] OR;  Service:    • TOE FUSION Right 2017    Procedure: RIGHT  MEDIAL COLUMN ARTHRODESIS, RIGHT TARAL METATARSAL ARTHRODESIS;  Surgeon: Anish Farah DPM;  Location: [facility] OR;  Service:    • TOTAL HIP ARTHROPLASTY            OT ASSESSMENT FLOWSHEET (last 72 hours)      OT Evaluation        03/06/18 0841 03/06/18 0840 03/05/18 1239 03/05/18 0859 03/05/18 0759       Document Type evaluation  -EN    Subjective Information agree to therapy  -EN    Patient Effort, Rehab Treatment     Symptoms Noted During/After Treatment     Symptoms Noted Comment increased pain in foot following transfers  -EN       Patient Profile Review yes  -EN    Onset of Illness/Injury or Date of Surgery Date 03/04/18  -EN    Referring Physician MAICO Valencia  -EN    General Observations Patient supine in bed, agreed to evaluation  -EN    Pertinent History Of Current Problem Patient fell at home on 3/1/2018 and injured his left fourth toe.  Patient presented to the ED on 3/4/18 and found to have an open dislocation of the fourth toe and is now s/p ORIF open dislocation of fourth toe  -EN    Precautions/Limitations fall precautions   partial weight bearing L LE (surgical shoe)  -EN    Prior Level of Function --   ind with ADLs, w/c for mobility, stand pivot transfers  -EN    Equipment Currently Used at Home dressing device;shower chair;wheelchair;walker, rolling;cane, straight  -EN    Plans/Goals Discussed With patient;agreed upon  -EN    Risks Reviewed patient:;LOB;increased discomfort  -EN    Benefits Reviewed patient:;improve function;increase independence  -EN    Barriers to Rehab previous functional deficit   decreased B LE sensation  -EN       Lives With alone  -EN    Living Arrangements house  -EN    Home Accessibility bed and bath on same level   has ramp  -EN    Transportation Available     Living Environment Comment Patient has one level home with ramp.  Has history of decreased LE sensation and uses wheelchair for mobiltiiy.  Has long handled adaptive equipment from previous admisions.    -EN       Date of Referral to OT 03/06/18  -EN    Functional Level At Time Of Evaluation Patient performed bed mobility with conditional independence, transfers with supervision and LB adls with supervision.  -EN     Patient/Family Goals Statement To return home, not interested in home health at this time  -EN    Criteria for Skilled Therapeutic Interventions Met no problems identified which require skilled intervention  -EN    Therapy Frequency other (see comments)   evaluation only  -EN    Anticipated Equipment Needs At Discharge --   none  -EN    Anticipated Discharge Disposition home  -EN       Ambulation     Transferring     Toileting     Bathing     Dressing     Eating     Communication     Swallowing        Pain Assessment 0-10   pain following tx 7-8/10 in ribs, 6/10 in L LE  -EN    Pain Score     Pain Type Acute pain;Surgical pain  -EN    Pain Location Foot   ribs  -EN    Pain Orientation     Pain Intervention(s) Repositioned  -EN    Response to Interventions tolerated  -EN       Current Cognitive/Communication Assessment --   history of mental illness, imprulsive  -EN    Orientation Status oriented x 4  -EN    Follows Commands/Answers Questions 100% of the time  -EN    Personal Safety --   decreased insights into deficits  -EN    Personal Safety Interventions gait belt;nonskid shoes/slippers when out of bed  -EN       General ROM Detail B UE AROM WFL  -EN       General MMT Assessment Detail B UE strength WLF  -EN       Muscle Tone Assessment     Bilateral Upper Extremities Muscle Tone Assessment     Bilateral Lower Extremities Muscle Tone Assessment        Bed Mobility, Assistive Device     Bed Mob, Supine to Sit, [place] conditional independence  -EN       Transfers, Bed-Chair [place] supervision required  -EN    Transfers, Chair-Bed [place] supervision required  -EN    Transfers, Bed-Chair-Bed, Assist Device     Transfers, Sit-Stand [place] supervision required  -EN    Transfers, Stand-Sit [place] supervision required  -EN    Transfer, Comment        Functional Mobility- Comment Patient uses wheelchair for functional mobility  -EN       LB Bathing Assess/Train, [place] Level supervision  required  -EN       LB Dressing Assess/Train, [place] supervision required  -EN       Sensory Impairment        Planned Therapy Interventions other (see comments)   evaluation only  -EN       Pre-Treatment Position in bed  -EN    Post Treatment Position chair  -EN    In Chair reclined;call light within reach;encouraged to call for assist;notified nsg   Notified APRN  -EN      03/05/18 0530 03/05/18 0524               User Key  (r) = Recorded By, (t) = Taken By, (c) = Cosigned By    Initials Name Effective Dates    EN Reina Calderón, OTR 06/22/16 -     GREGORIO Johnson, RN 02/24/17 -     BP Lois Duke, PT 12/01/15 -     ELENA Wilson, RN 06/16/16 -     LADARIUS Thorpe, DAVE 12/22/17 -           Occupational Therapy Education     Title: PT OT SLP Therapies (Resolved)     Topic: Occupational Therapy (Resolved)     Point: ADL training (Resolved)    Description: Instruct learner(s) on proper safety adaptation and remediation techniques during self care or transfers.   Instruct in proper use of assistive devices.    Learning Progress Summary    Learner Readiness Method Response Comment Documented by Status   Patient Acceptance E VU Educated patient on weight bearing status and safety during functional transfers. EN 03/06/18 0841 Done               Point: Precautions (Resolved)    Description: Instruct learner(s) on prescribed precautions during self-care and functional transfers.    Learning Progress Summary    Learner Readiness Method Response Comment Documented by Status   Patient Acceptance E VU Educated patient on weight bearing status and safety during functional transfers. EN 03/06/18 0841 Done                      User Key     Initials Effective Dates Name Provider Type Discipline    EN 06/22/16 -  Reina Calderón, OTR Occupational Therapist OT                OT Recommendation and Plan  Anticipated Equipment Needs At Discharge:  (none)  Anticipated Discharge Disposition: home  Planned Therapy  Interventions: other (see comments) (evaluation only)  Therapy Frequency: other (see comments) (evaluation only)  Plan of Care Review  Outcome Summary/Follow up Plan: OT evaluation completed.  Patient performed bed mobility with conditional independence.  Performed LB ADLs with supervision and funtional transfers (stand-pivot-sit) with supervision.  Patient at baseline uses wheelchair for mobiltiy.  Educated patient on weight bearing status and safety during functional transfers.  Patient reports no concerns for return home and is not interested in home health.  No skilled OT needs at this time.               Outcome Measures       03/06/18 0900 03/06/18 0841 03/06/18 0840    How much help from another is currently needed...    Putting on and taking off regular lower body clothing?  3  -EN     Bathing (including washing, rinsing, and drying)  3  -EN     Toileting (which includes using toilet bed pan or urinal)  3  -EN     Putting on and taking off regular upper body clothing  4  -EN     Taking care of personal grooming (such as brushing teeth)  4  -EN     Eating meals  4  -EN     Score  21  -EN     Functional Assessment    Outcome Measure Options AM-PAC 6 Clicks Daily Activity (OT)  -EN  (P)  AM-PAC 6 Clicks Basic Mobility (PT)  -BP      User Key  (r) = Recorded By, (t) = Taken By, (c) = Cosigned By    Initials Name Provider Type    REINA Redmond Occupational Therapist    BP Lois Duke, PT Physical Therapist          Time Calculation:         Time Calculation- OT       03/06/18 0959          Time Calculation- OT    OT Start Time 0840  -EN        User Key  (r) = Recorded By, (t) = Taken By, (c) = Cosigned By    Initials Name Provider Type    REINA Redmond Occupational Therapist          Therapy Charges for Today     Code Description Service Date Service Provider Modifiers Qty    66118744396  OT EVAL LOW COMPLEXITY 2 3/6/2018 REINA Beasley GO 1               OT Discharge  Summary  Anticipated Discharge Disposition: home    Reina Calderón, OTR  3/6/2018

## 2018-03-06 NOTE — PLAN OF CARE
Problem: Patient Care Overview (Adult)  Goal: Plan of Care Review  Outcome: Ongoing (interventions implemented as appropriate)    Goal: Adult Individualization and Mutuality  Outcome: Ongoing (interventions implemented as appropriate)      Problem: Infection, Risk/Actual (Adult)  Goal: Identify Related Risk Factors and Signs and Symptoms  Outcome: Ongoing (interventions implemented as appropriate)    Goal: Infection Prevention/Resolution  Outcome: Ongoing (interventions implemented as appropriate)      Problem: Pain, Acute (Adult)  Goal: Identify Related Risk Factors and Signs and Symptoms  Outcome: Ongoing (interventions implemented as appropriate)    Goal: Acceptable Pain Control/Comfort Level  Outcome: Ongoing (interventions implemented as appropriate)

## 2018-03-06 NOTE — BRIEF OP NOTE
OPEN TOE DISLOCATION REDUCTION AND LACERATION REPAIR BRIEF OP NOTE    Patient Name:  Eliseo Price    Surgery Date: 3/5/2018    Pre-op Diagnosis:   Open dislocation of left fourth toe proximal interphalangeal joint with associated laceration [S93.115A, S91.115A]       Post-Op Diagnosis Codes:     * Dislocation of toe, left, open, initial encounter [S93.115A, S91.115A]    Procedure/CPT® Codes:  [place] OPEN TREATMENT INTERPHALANGEAL JOINT DISLOCATION [78743]    Procedure(s):  OPEN REDUCTION WITH IRRIGATION AND WOUND CLOSURE LEFT FOURTH TOE DISLOCATION    Surgeon(s):  Anish Farah DPM    Anesthesia: Monitor Anesthesia Care with pre-op local block using 15ccs of a 1:1 mix of 0.25% Marcaine and 1% Lidocaine plain.    Staff:   Circulator: Chadwick Garrison RN  Radiology Technologist: Ga Nagel  Scrub Person: Mindy Barba    Estimated Blood Loss: < 10ccs.    Specimens: None submitted.      Findings: Dorsal laterally dislocated left 4th toe PIPJ with 2.0x0.4cm open laceration extending from the medial to the plantar-lateral aspect of the toe. The flexor tendons appear intact.    Complications: None.      Anish Farah DPM     Date: 3/5/2018  Time: 7:05 PM

## 2018-03-06 NOTE — THERAPY DISCHARGE NOTE
Acute Care - Physical Therapy Initial Eval/Discharge  EVONNE Epstein     Patient Name: Eliseo Price  : 1965  MRN: [id number]  Today's Date: 3/6/2018   Onset of Illness/Injury or Date of Surgery Date: 18  Date of Referral to PT: 18  Referring Physician: MAICO Valencia      Admit Date: 3/4/2018    Visit Dx:    ICD-10-CM ICD-9-CM   1. Open dislocation of phalanx of foot, left, initial encounter S93.105A 838.19     Patient Active Problem List   Diagnosis   • Disease of thyroid gland   • Cellulitis of right lower extremity   • Cellulitis   • Infected epidermoid cyst   • Altered mental status, unspecified   • Altered mental status   • Immobility   • Foot fracture, right   • Open dislocation of toe   • Open dislocation of phalanx of foot     Past Medical History:   Diagnosis Date   • Arthritis    • Bipolar disorder    • Cellulitis of lower extremity     RIGHT   • Coronary artery disease    • Diabetes mellitus    • Disease of thyroid gland     nodule on thyroid   • DVT (deep venous thrombosis)    • Fracture of right foot    • Hyperlipidemia    • Hypertension    • Pseudogout    • Septic shock 2017    H/O   • Toxic metabolic encephalopathy 2017    H/O     Past Surgical History:   Procedure Laterality Date   • 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   • HARDWARE REMOVAL Right 2017    Procedure: RIGHT EXTERNAL FIXATOR REMOVAL;  Surgeon: Anish Farah DPM;  Location: [facility] OR;  Service:    • JOINT REPLACEMENT     • KNEE SURGERY     • ORIF FOOT FRACTURE Right 2017    Procedure: open reduction with percutaneous pinning of midfoot dislocation fracture;  Surgeon: Anish Farah DPM;  Location: [facility] OR;  Service:    • TOE FUSION Right 2017    Procedure: RIGHT  MEDIAL COLUMN ARTHRODESIS, RIGHT TARAL METATARSAL ARTHRODESIS;  Surgeon: Anish Farah DPM;  Location: [facility] OR;  Service:    • TOTAL HIP ARTHROPLASTY            PT ASSESSMENT (last 72 hours)      PT Evaluation        03/06/18 0840        Document Type evaluation  -EN    Subjective Information agree to therapy  -EN    Patient Effort, Rehab Treatment     Symptoms Noted During/After Treatment     Symptoms Noted Comment increased pain in foot following transfers  -EN       Patient Profile Review yes  -EN    Onset of Illness/Injury or Date of Surgery Date 03/04/18  -EN    Referring Physician MAICO Valencia  -EN    General Observations Patient supine in bed, agreed to evaluation  -EN    Pertinent History Of Current Problem Patient fell at home on 3/1/2018 and injured his left fourth toe.  Patient presented to the ED on 3/4/18 and found to have an open dislocation of the fourth toe and is now s/p ORIF open dislocation of fourth toe  -EN    Precautions/Limitations fall precautions   partial weight bearing L LE (surgical shoe)  -EN    Prior Level of Function --   ind with ADLs, w/c for mobility, stand pivot transfers  -EN    Equipment Currently Used at Home dressing device;shower chair;wheelchair;walker, rolling;cane, straight  -EN    Plans/Goals Discussed With patient;agreed upon  -EN    Risks Reviewed patient:;LOB;increased discomfort  -EN    Benefits Reviewed patient:;improve function;increase independence  -EN    Barriers to Rehab previous functional deficit   decreased B LE sensation  -EN       Lives With alone  -EN    Living Arrangements house  -EN    Home Accessibility bed and bath on same level   has ramp  -EN    Living Environment Comment Patient has one level home with ramp.  Has history of decreased LE sensation and uses wheelchair for mobiltiiy.  Has long handled adaptive equipment from previous admisions.    -EN       Date of Referral to PT     Criteria for Skilled Therapeutic Interventions Met        Pain Assessment 0-10   pain following tx 7-8/10 in ribs, 6/10 in L LE  -EN    Pain Score     Pain Type Acute pain;Surgical pain  -EN    Pain Location Foot   ribs  -EN    Pain Orientation     Pain Intervention(s)  Repositioned  -EN    Response to Interventions tolerated  -EN       Current Cognitive/Communication Assessment --   history of mental illness, imprulsive  -EN    Orientation Status oriented x 4  -EN    Follows Commands/Answers Questions 100% of the time  -EN    Personal Safety --   decreased insights into deficits  -EN    Personal Safety Interventions gait belt;nonskid shoes/slippers when out of bed  -EN       General ROM Detail B UE AROM WFL  -EN       General MMT Assessment Detail B UE strength WLF  -EN       Bed Mobility, Assistive Device     Bed Mob, Supine to Sit, [place] conditional independence  -EN       Transfers, Bed-Chair [place] supervision required  -EN    Transfers, Chair-Bed [place] supervision required  -EN    Transfers, Bed-Chair-Bed, Assist Device     Transfers, Sit-Stand [place] supervision required  -EN    Transfers, Stand-Sit [place] supervision required  -EN    Transfer, Comment        Gait, Comment        Sensory Impairment        Pre-Treatment Position in bed  -EN    Post Treatment Position chair  -EN    In Chair reclined;call light within reach;encouraged to call for assist;notified nsg   Notified APRN  -EN      User Key  (r) = Recorded By, (t) = Taken By, (c) = Cosigned By    Initials Name Provider Type    EN Reina Calderón, OTR Occupational Therapist    GREGORIO Johnson, RN Case Manager    BP Lois Duke, PT Physical Therapist    ELENA Wilson, RN Registered Nurse    LADARIUS Thorpe, RN Registered Nurse          Physical Therapy Education     Title: PT OT SLP Therapies (Resolved)     Topic: Physical Therapy (Resolved)     Point: Mobility training (Resolved)    Learning Progress Summary    Learner Readiness Method Response Comment Documented by Status   Patient Acceptance E VU Educated regarding risk of non adherance to PWB status of L LE. Educated regarding recommendation of w/c for mobiltiy due to PWB status. Discussed risk of using rollator at this  time with weight bearing restriction. Pt receptive and agreeable BP 03/06/18 0955 Done               Point: Precautions (Resolved)    Learning Progress Summary    Learner Readiness Method Response Comment Documented by Status   Patient Acceptance E VU Educated regarding risk of non adherance to PWB status of L LE. Educated regarding recommendation of w/c for mobiltiy due to PWB status. Discussed risk of using rollator at this time with weight bearing restriction. Pt receptive and agreeable BP 03/06/18 0955 Done                      User Key     Initials Effective Dates Name Provider Type Discipline     12/01/15 -  Lois Duke, PT Physical Therapist PT                PT Recommendation and Plan  Anticipated Equipment Needs At Discharge:  (none)  Anticipated Discharge Disposition: home  Planned Therapy Interventions:  (evaluation only)  PT Frequency: evaluation only  Plan of Care Review  Plan Of Care Reviewed With: patient  Outcome Summary/Follow up Plan: PT Eval Complete: Patient performs supine to sit transfer with conditional independence and sit pivot transfer bed to/from chair x 3 with supervision. Patient able to maintain PWB status during transfer. Per patient report he is non ambulatory at baseline, performs sit pivot transfers only to/from w/c. Overall mobility limited at this time due to chronic peripheral neuropathy B LE's and PWB status. Recommend continued use of w/c for mobility. Patient receptive. No further inpatient skilled PT needs. Patient to be discharged home today.              Outcome Measures       03/06/18 0900 03/06/18 0841 03/06/18 0840    How much help from another person do you currently need...    Turning from your back to your side while in flat bed without using bedrails?   4  -BP    Moving from lying on back to sitting on the side of a flat bed without bedrails?   4  -BP    Moving to and from a bed to a chair (including a wheelchair)?   3  -BP    Standing up from a chair using  your arms (e.g., wheelchair, bedside chair)?   3  -BP    Climbing 3-5 steps with a railing?   1  -BP    To walk in hospital room?   1  -BP    AM-PAC 6 Clicks Score   16  -BP    How much help from another is currently needed...    Putting on and taking off regular lower body clothing?  3  -EN     Bathing (including washing, rinsing, and drying)  3  -EN     Toileting (which includes using toilet bed pan or urinal)  3  -EN     Putting on and taking off regular upper body clothing  4  -EN     Taking care of personal grooming (such as brushing teeth)  4  -EN     Eating meals  4  -EN     Score  21  -EN     Functional Assessment    Outcome Measure Options AM-PAC 6 Clicks Daily Activity (OT)  -EN  AM-PAC 6 Clicks Basic Mobility (PT)  -BP      User Key  (r) = Recorded By, (t) = Taken By, (c) = Cosigned By    Initials Name Provider Type    EN Reina Calderón, OTR Occupational Therapist    BP Lois Duke, PT Physical Therapist           Time Calculation:         PT Charges       03/06/18 1000          Time Calculation    Start Time 0840  -BP      PT Received On 03/06/18  -BP        User Key  (r) = Recorded By, (t) = Taken By, (c) = Cosigned By    Initials Name Provider Type    BP Lois Duke, PT Physical Therapist          Therapy Charges for Today     Code Description Service Date Service Provider Modifiers Qty    63124620450  PT EVAL LOW COMPLEXITY 2 3/6/2018 Lois Duke, PT GP 1          PT G-Codes  Outcome Measure Options: AM-PAC 6 Clicks Daily Activity (OT)    PT Discharge Summary  Anticipated Discharge Disposition: home  Reason for Discharge: At baseline function    Lois Duke, FIFI  3/6/2018

## 2018-03-06 NOTE — PLAN OF CARE
Problem: Patient Care Overview (Adult)  Goal: Plan of Care Review  Outcome: Ongoing (interventions implemented as appropriate)   03/05/18 4029   Coping/Psychosocial Response Interventions   Plan Of Care Reviewed With patient   Patient Care Overview   Progress improving   Outcome Evaluation   Outcome Summary/Follow up Plan vss. no complaints of pain. will continue to monitor

## 2018-03-06 NOTE — PLAN OF CARE
Problem: Perioperative Period (Adult)  Goal: Signs and Symptoms of Listed Potential Problems Will be Absent or Manageable (Perioperative Period)  Outcome: Ongoing (interventions implemented as appropriate)   03/05/18 4523   Perioperative Period   Problems Assessed (Perioperative Period) all   Problems Present (Perioperative Period) none

## 2018-03-06 NOTE — OP NOTE
OPEN TOE DISLOCATION REDUCTION AND LACERATION REPAIR OPERATIVE NOTE    Patient Name:  Eliseo Price    Surgery Date: 3/5/2018    Pre-op Diagnosis:   Open dislocation of left fourth toe proximal interphalangeal joint with associated laceration [S93.115A, S91.115A]       Post-Op Diagnosis Codes:     * Dislocation of toe, left, open, initial encounter [S93.115A, S91.115A]    Procedure/CPT® Codes:  IL OPEN TREATMENT INTERPHALANGEAL JOINT DISLOCATION [81269]    Procedure(s):  OPEN REDUCTION WITH IRRIGATION AND WOUND CLOSURE LEFT FOURTH TOE DISLOCATION    Surgeon(s):  Anish Farah DPM    Anesthesia: Monitor Anesthesia Care with pre-op local block using 15ccs of a 1:1 mix of 0.25% Marcaine and 1% Lidocaine plain.    Staff:   Circulator: Chadwick Garrison RN  Radiology Technologist: Ga Nagel  Scrub Person: Mindy Barba    Estimated Blood Loss: < 10ccs.    Specimens: None submitted.      Findings: Dorsal laterally dislocated left 4th toe PIPJ with 2.0x0.4cm open laceration extending from the medial to the plantar-lateral aspect of the toe. The flexor tendons appear intact.    Complications: None.    Operative Narrative: The patient was brought to the operating room and placed in a supine position on the OR table. He was secured with a safety strap. The presurgical briefing is conducted. The patient is sedated by the anesthesia team. An ankle tourniquet was applied over stockinette  padding. The pre surgical timeout was conducted confirming that the correct patient is receiving the correct procedure on the correct extremity. All parties are in agreement. The local block of the operative site was  performed.  The foot was prepped and draped in the standard sterile manner. Surgery then commenced.     Attention is directed to the left fourth toe. The digit was noted to be dorsal laterally dislocated and there was a full thickness laceration to the plantar aspect of the digit at the level of the proximal interphalangeal  joint. The wound edges appear viable. No infected, contaminated or nonviable tissue is observed in the wound. No foreign material is noted within the wound. The flexor tendons are visible at the deep wound as is the head of the proximal phalanx. The tendons are noted to be intact. The plantar PIPJ capsule was observed to be  torn. I grasped the distal aspect of the digit and applied distalward traction to the digit while at the same time stabilizing the proximal phalanx. I was able to relocate the proximal interphalangeal joint. Imaging with a mini C-arm confirmed relocation/reduction of the PIPJ dislocation.    Attention was then turned to the plantar laceration. The wound is irrigated with sterile saline. I attempted to place suture into the plantar PIPJ capsule but the tissue was insufficient to hold suture. #4-0 Vicryl is used to reapproximate the subcutaneous tissue. #4-0 Prolene is used to bring the skin edges together in a simple interrupted manner with care taken to ensure no excessive tension at the skin edges is present with wound closure. At the completion of the closure a dressing was applied consisting of Betadine moistened gauze, dry gauze, Kerlix and ACE bandage. Following dressing application the tourniquet was removed having never been inflated.    The patient is transferred to a hospital stretcher and transported to the recovery unit where he arrived in satisfactory condition with stable vital signs. After an uneventful recovery the patient is transferred to the MedSur unit for continued medical management and post-op monitoring. He was noted to have tolerated the procedure and anesthesia well.    Anish Farah DPM     Date: 3/5/2018  Time: 7:05 PM

## 2018-03-06 NOTE — DISCHARGE SUMMARY
Eliseo Price  1965  [id number]    Hospitalists Discharge Summary    Date of Admission: 3/4/2018  Date of Discharge:  3/6/2018    Primary Discharge Diagnoses:  1. S/P ORIF Open dislocation of the left fourth toe, POD 1   Secondary Discharge Diagnoses:    2. Hypertension    3. DM-2 with peripheral neuropathy    4. Bipolar disorder    5. H/O DVT    6. Chronic splenic vein thrombosis    7. Obesity    8. H/O Chronic macrocytic anemia    9. H/O dyslipidemia    10. Benign essential tremor    11. Hypokalemia  PCP  Patient Care Team:  Killian Scales MD as PCP - General  Killian Scales MD as PCP - Family Medicine    Consults:   Consults     Date and Time Order Name Status Description    3/4/2018 2317 Inpatient Podiatry Consult      3/4/2018 1831 Inpatient Podiatry Consult Completed         Operations and Procedures Performed:  Procedure(s):  OPEN REDUCTION WITH IRRIGATION AND WOUND CLOSURE LEFT FOURTH TOE     Xr Ribs 2 View Right    Result Date: 3/5/2018  Narrative: RIGHT RIB SERIES, 03/05/2018:  HISTORY: 52-year-old male in the ED with right lower rib pain after a fall yesterday.  TECHNIQUE: Four view right rib series.  FINDINGS: No rib fracture or other osseous abnormality is demonstrated. Right lung appears clear with no pneumothorax or pleural effusion. Right total shoulder arthroplasty.      Impression: Negative right rib series.  This report was finalized on 3/5/2018 8:50 AM by Dr. Bigg Del Angel MD.      Xr Foot 3+ View Right    Result Date: 3/5/2018  Narrative: RIGHT FOOT, 03/04/2018:  HISTORY: 52-year-old male in the ED with bilateral foot pain after a fall yesterday. Right rib pain.  TECHNIQUE: Three-view right foot series.  FINDINGS: No fracture, dislocation or other acute osseous abnormality is demonstrated.  Postoperative changes of midfoot arthrodesis with plate and screw fixation hardware traversing the first, second and third TMT joints. Degenerative arthropathy throughout the mid foot and at  the first MTP joint.      Impression: 1. No acute osseous abnormality. 2. Postoperative and degenerative changes as noted.  This report was finalized on 3/5/2018 8:51 AM by Dr. Bigg Del Angel MD.      Xr Toe 2+ View Left    Result Date: 3/5/2018  Narrative: LEFT FOOT, 03/04/2018:  HISTORY: 52-year-old male in the ED with foot pain after a fall today.  TECHNIQUE: Three-view left foot series.  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  Complete lateral dislocation of the fourth PIP joint. No visible fracture.      Impression: 1. Fourth PIP joint dislocation. 2. Previous fifth digit amputation.  This report was finalized on 3/5/2018 8:48 AM by Dr. Bigg Del Angel MD.      Allergies:  is allergic to lisinopril.    Liborio  Klonipin/oxycodone 1/2018    Discharge Medications:   Eliseo Price   Home Medication Instructions LUZ MARIA:[id number]    Printed on:03/06/18 0906   Medication Information                      amLODIPine (NORVASC) 2.5 MG tablet  Take 2.5 mg by mouth Daily.             clindamycin (CLEOCIN) 300 MG capsule  Take 1 capsule by mouth 3 (Three) Times a Day.             FLUoxetine (PROzac) 20 MG capsule  Take 40 mg by mouth Daily.             gabapentin (NEURONTIN) 800 MG tablet  Take 800 mg by mouth 3 (three) times a day.             hydrophor (AQUAPHOR) ointment ointment  Apply 1 application topically Daily.             lactobacillus acidophilus (RISAQUAD) capsule capsule  Take 1 capsule by mouth Daily.             LORazepam (ATIVAN) 0.5 MG tablet  Take 0.5 mg by mouth Daily As Needed for Anxiety.             losartan (COZAAR) 100 MG tablet  Take 1 tablet by mouth Daily.             metFORMIN (GLUCOPHAGE) 1000 MG tablet  Take 1,000 mg by mouth 2 (Two) Times a Day With Meals.             oxyCODONE-acetaminophen (PERCOCET) 5-325 MG per tablet  Take 1 tablet by mouth Every 4 (Four) Hours As Needed for Moderate Pain  or Severe Pain  for up to 3 days.             primidone  "(MYSOLINE) 250 MG tablet  Take 250 mg by mouth 2 (two) times a day.             temazepam (RESTORIL) 15 MG capsule  Take 15 mg by mouth At Night As Needed for Sleep.               History of Present Illness: Taken from [facility] on admit:   \"53 y/o male with hypertension, DM-2 with peripheral neuropathy, Bipolar d/o and anxiety, h/o DVT, Chronic splenic vein thrombosis, Obesity, Chronic macrocytic anemia, H/O dyslipidemia and benign essential tremor presented to the ER secondary to an injury he obtain to his left fourth toe. The patient notes he lost his balance and fell at his home this past thursday and his left foot struck the wall.  There was an apparent deformity present and some bleeding at that time of the fourth toe but the patient chose not to get medical attention until today. The patient is having \"a little\" pain associated with the injury. He notes the toe is swollen and a laceration is present. He was hoping it would heal. He notes he's felt a little tired this past week but has not been ill. The patient notes because of his previous amputations and foot surgeries and severe neuropathy he has balance issues. He denies any CP, SOA, N/V or heart palpitations. He denies any N/V/D, abd pain, cough or congestion.\"     Hospital Course  1. S/P ORIF Open dislocation of the left fourth toe secondary to fall, POD 1: Podiatry followed  One dose of IV vancomycin given in ER  Home on clindamycin x 7 days, prophylactically with probiotic  F/U Dr. Farah on 3/8/18  F/U Killian Scales MD 1 week      2. Hypertension:   At goal on home amlodipine 2.5 mg daily/losartan 100 mg daily      3. DM-2 with peripheral neuropathy: A1C 5.6%  Glucose at goal, continue metformin 1000 mg twice daily/gabapentin 800 mg every 8 hours  Monitor accuchecks ac/hs with low dose SSI  Patient has NO sensation in feet. PT/OT evaluated and counseled regarding home recommendations      4. Bipolar disorder: no acute issues on prozac 40 mg " daily/restoril 15 mg nightly      5. H/O DVT: no acute issues, resume lovenox prophy dose post operatively as per Dr. Farah      6. Chronic splenic vein thrombosis: No acute issues here.       7. Obesity: Nutrition followed  Body mass index is 37.64 kg/(m^2).      8. H/O Chronic macrocytic anemia: No issues here, labs normal       9. H/O dyslipidemia: continue CCC diet, no home meds, no acute issues       10. Benign essential tremor:   Follows with neurology outpatient, no acute issues on home primidone.       11. Hypokalemia: oral replacement given  Last Lab Results:   Lab Results (most recent)     Procedure Component Value Units Date/Time    CBC & Differential [[id number]] Collected:  03/04/18 1459    Specimen:  Blood Updated:  03/04/18 1509    Narrative:       The following orders were created for panel order CBC & Differential.  Procedure                               Abnormality         Status                     ---------                               -----------         ------                     CBC Auto Differential[[id number]]        Abnormal            Final result                 Please view results for these tests on the individual orders.    CBC Auto Differential [[id number]]  (Abnormal) Collected:  03/04/18 1459    Specimen:  Blood Updated:  03/04/18 1509     WBC 9.14 10*3/mm3      RBC 4.96 10*6/mm3      Hemoglobin 14.6 g/dL      Hematocrit 42.7 %      MCV 86.1 fL      MCH 29.4 pg      MCHC 34.2 g/dL      RDW 13.2 %      RDW-SD 41.9 fl      MPV 8.8 fL      Platelets 196 10*3/mm3      Neutrophil % 72.9 (H) %      Lymphocyte % 16.3 (L) %      Monocyte % 10.1 (H) %      Eosinophil % 0.2 %      Basophil % 0.1 %      Immature Grans % 0.4 %      Neutrophils, Absolute 6.66 10*3/mm3      Lymphocytes, Absolute 1.49 10*3/mm3      Monocytes, Absolute 0.92 10*3/mm3      Eosinophils, Absolute 0.02 (L) 10*3/mm3      Basophils, Absolute 0.01 10*3/mm3      Immature Grans, Absolute 0.04 (H) 10*3/mm3      nRBC 0.0 /100  WBC     Basic Metabolic Panel [[id number]]  (Abnormal) Collected:  03/04/18 1550    Specimen:  Blood Updated:  03/04/18 1622     Glucose 110 (H) mg/dL      BUN 16 mg/dL      Creatinine 0.75 (L) mg/dL      Sodium 134 (L) mmol/L      Potassium 3.8 mmol/L      Chloride 97 (L) mmol/L      CO2 19.2 (L) mmol/L      Calcium 8.9 mg/dL      eGFR Non African Amer 109 mL/min/1.73      BUN/Creatinine Ratio 21.3     Anion Gap 17.8 mmol/L     Narrative:       GFR Normal >60  Chronic Kidney Disease <60  Kidney Failure <15    Basic Metabolic Panel [[id number]]  (Abnormal) Collected:  03/05/18 0346    Specimen:  Blood Updated:  03/05/18 0433     Glucose 103 (H) mg/dL      BUN 14 mg/dL      Creatinine 0.61 (L) mg/dL      Sodium 137 mmol/L      Potassium 3.7 mmol/L      Chloride 104 mmol/L      CO2 23.3 mmol/L      Calcium 8.7 mg/dL      eGFR Non African Amer 138 mL/min/1.73      BUN/Creatinine Ratio 23.0     Anion Gap 9.7 mmol/L     Narrative:       GFR Normal >60  Chronic Kidney Disease <60  Kidney Failure <15    Hemoglobin A1c [[id number]]  (Normal) Collected:  03/04/18 1459    Specimen:  Blood Updated:  03/05/18 1341     Hemoglobin A1C 5.60 %     Narrative:       Hemoglobin A1C Ranges:    Increased Risk for Diabetes  5.7% to 6.4%  Diabetes                     >= 6.5%  Diabetic Goal                < 7.0%    Blood Culture - Blood, [[id number]]  (Normal) Collected:  03/04/18 1500    Specimen:  Blood from Arm, Right Updated:  03/05/18 1516     Blood Culture No growth at 24 hours    POC Glucose Once [[id number]]  (Normal) Collected:  03/05/18 1550    Specimen:  Blood Updated:  03/05/18 1557     Glucose 89 mg/dL     Narrative:       Meter: [id number] : Tariq Carballo RN    Blood Culture - Blood, [[id number]]  (Normal) Collected:  03/04/18 1517    Specimen:  Blood from Blood, Venous Line Updated:  03/05/18 1601     Blood Culture No growth at 24 hours    Basic Metabolic Panel [[id number]]  (Abnormal) Collected:  03/06/18 0404     Specimen:  Blood Updated:  03/06/18 0458     Glucose 87 mg/dL      BUN 11 mg/dL      Creatinine 0.59 (L) mg/dL      Sodium 139 mmol/L      Potassium 3.3 (L) mmol/L      Chloride 105 mmol/L      CO2 24.2 mmol/L      Calcium 8.5 (L) mg/dL      eGFR Non African Amer 143 mL/min/1.73      BUN/Creatinine Ratio 18.6     Anion Gap 9.8 mmol/L     Narrative:       GFR Normal >60  Chronic Kidney Disease <60  Kidney Failure <15        Imaging Results (most recent)     Procedure Component Value Units Date/Time    XR Toe 2+ View Left [[id number]] Collected:  03/05/18 0847     Updated:  03/05/18 0851    Narrative:       LEFT FOOT, 03/04/2018:     HISTORY:   52-year-old male in the ED with foot pain after a fall today.     TECHNIQUE:  Three-view left foot series.     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     Complete lateral dislocation of the fourth PIP joint. No visible  fracture.       Impression:       1. Fourth PIP joint dislocation.  2. Previous fifth digit amputation.     This report was finalized on 3/5/2018 8:48 AM by Dr. Bigg Del Angel MD.       XR Ribs 2 View Right [[id number]] Collected:  03/05/18 0849     Updated:  03/05/18 0852    Narrative:       RIGHT RIB SERIES, 03/05/2018:     HISTORY:   52-year-old male in the ED with right lower rib pain after a fall  yesterday.     TECHNIQUE:  Four view right rib series.     FINDINGS:  No rib fracture or other osseous abnormality is demonstrated. Right lung  appears clear with no pneumothorax or pleural effusion. Right total  shoulder arthroplasty.       Impression:       Negative right rib series.     This report was finalized on 3/5/2018 8:50 AM by Dr. Bigg Del Angel MD.       FL C Arm During Surgery [[id number]] Updated:  03/05/18 1907        PROCEDURES  Procedure(s):  OPEN REDUCTION WITH IRRIGATION AND WOUND CLOSURE LEFT FOURTH TOE    Condition on Discharge:  stable    Physical Exam at Discharge  Vital Signs  Temp:  [97.3 °F (36.3  °C)-98.9 °F (37.2 °C)] 97.4 °F (36.3 °C)  Heart Rate:  [] 86  Resp:  [12-20] 18  BP: (119-176)/() 137/80    Physical Exam:  Physical Exam   Constitutional: Patient appears well-developed and well-nourished and in no acute distress, obese   HEENT:   Head: Normocephalic and atraumatic.   Eyes:  Pupils are equal, round, and reactive to light. EOM are intact. Sclera are anicteric and non-injected.  Mouth and Throat: Patient has moist mucous membranes. Oropharynx is clear of any erythema or exudate.     Neck: Neck supple. No JVD present. No thyromegaly present. No lymphadenopathy present.  Cardiovascular: Regular rate, regular rhythm, S1 normal and S2 normal.  Exam reveals no gallop and no friction rub.  No murmur heard.  Pulmonary/Chest: Lungs are clear to auscultation bilaterally. No respiratory distress. No wheezes. No rhonchi. No rales.   Abdominal: obese, Soft. Bowel sounds are normal. No distension and no mass. There is no hepatosplenomegaly. There is no tenderness.   Musculoskeletal: Normal Muscle tone  Extremities: No edema. Pulses are palpable in all 4 extremities.  Neurological: Patient is alert and oriented to person, place, and time. Cranial nerves II-XII are grossly intact with no focal deficits.  Skin: Left foot in ace, wound not visualized. Skin is warm. No rash noted. Nails show no clubbing.  No cyanosis or erythema.    Discharge Disposition  Home    Visiting Nurse:    No     Home PT/OT:  No     Home Safety Evaluation:  No     DME  None new    Discharge Diet:         Dietary Orders            Start     Ordered    03/05/18 1929  Diet Regular; Consistent Carbohydrate  Diet Effective Now     Question Answer Comment   Diet Texture / Consistency Regular    Common Modifiers Consistent Carbohydrate        03/05/18 1928        Activity at Discharge:  As tolerated    Pre-discharge education  Diabetic, Wound Care, medications, follow up    Follow-up Appointments  No future appointments.  Additional  Instructions for the Follow-ups that You Need to Schedule     Discharge Follow-up with PCP    As directed    Follow Up Details:  1 week           Discharge Follow-up with Specialty: Dr. Farah    As directed    Specialty:  Dr. Farah    Follow Up Details:  3/8/18                     Test Results Pending at Discharge: will need f/u by Killian Scales MD   Order Current Status    Blood Culture - Blood, Preliminary result    Blood Culture - Blood, Preliminary result           Sarah Gómez, MAICO  03/06/18  9:06 AM    Time: Discharge 30 min (if over 30 minutes give explanation as to why it took greater than 30 minutes)

## 2018-03-06 NOTE — PLAN OF CARE
Problem: Patient Care Overview (Adult)  Goal: Plan of Care Review   03/06/18 0957   Coping/Psychosocial Response Interventions   Plan Of Care Reviewed With patient   Outcome Evaluation   Outcome Summary/Follow up Plan PT Eval Complete: Patient performs supine to sit transfer with conditional independence and sit pivot transfer bed to/from chair x 3 with supervision. Patient able to maintain PWB status during transfer. Per patient report he is non ambulatory at baseline, performs sit pivot transfers only to/from w/c. Overall mobility limited at this time due to chronic peripheral neuropathy B LE's and PWB status. Recommend continued use of w/c for mobility. Patient receptive. No further inpatient skilled PT needs. Patient to be discharged home today.

## 2018-03-06 NOTE — PLAN OF CARE
Problem: Patient Care Overview (Adult)  Goal: Adult Individualization and Mutuality  Outcome: Ongoing (interventions implemented as appropriate)   03/05/18 2522   Individualization   Patient Specific Interventions appropriate interventions implemented as needed

## 2018-03-09 ENCOUNTER — HOSPITAL ENCOUNTER (EMERGENCY)
Facility: HOSPITAL | Age: 53
Discharge: HOME | End: 2018-03-09
Attending: EMERGENCY MEDICINE | Admitting: EMERGENCY MEDICINE

## 2018-03-09 ENCOUNTER — APPOINTMENT (OUTPATIENT)
Dept: CT IMAGING | Facility: HOSPITAL | Age: 53
End: 2018-03-09

## 2018-03-09 ENCOUNTER — APPOINTMENT (OUTPATIENT)
Dept: GENERAL RADIOLOGY | Facility: HOSPITAL | Age: 53
End: 2018-03-09

## 2018-03-09 VITALS
RESPIRATION RATE: 16 BRPM | HEART RATE: 93 BPM | OXYGEN SATURATION: 97 % | WEIGHT: 275 LBS | HEIGHT: 72 IN | BODY MASS INDEX: 37.25 KG/M2 | SYSTOLIC BLOOD PRESSURE: 141 MMHG | TEMPERATURE: 98.4 F | DIASTOLIC BLOOD PRESSURE: 99 MMHG
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
## 2018-03-09 DIAGNOSIS — T07.XXXA MULTIPLE CONTUSIONS: ICD-10-CM

## 2018-03-09 LAB
ALBUMIN SERPL-MCNC: 3.5 G/DL (ref 3.5–5.2)
ALBUMIN/GLOB SERPL: 1.1 G/DL
ALP SERPL-CCNC: 91 U/L (ref 40–129)
ALT SERPL W P-5'-P-CCNC: 31 U/L (ref 5–41)
AMMONIA BLD-SCNC: 56 UMOL/L (ref 16–60)
ANION GAP SERPL CALCULATED.3IONS-SCNC: 12.5 MMOL/L
APTT PPP: 32.5 SECONDS (ref 24.3–38.1)
AST SERPL-CCNC: 39 U/L (ref 5–40)
BACTERIA SPEC AEROBE CULT: NORMAL
BACTERIA SPEC AEROBE CULT: NORMAL
BASOPHILS # BLD AUTO: 0.02 10*3/MM3 (ref 0–0.2)
BASOPHILS NFR BLD AUTO: 0.2 % (ref 0–2)
BILIRUB SERPL-MCNC: 0.2 MG/DL (ref 0.2–1.2)
BUN BLD-MCNC: 10 MG/DL (ref 6–20)
BUN/CREAT SERPL: 18.9 (ref 7–25)
CALCIUM SPEC-SCNC: 8.4 MG/DL (ref 8.6–10.5)
CHLORIDE SERPL-SCNC: 105 MMOL/L (ref 98–107)
CO2 SERPL-SCNC: 21.5 MMOL/L (ref 22–29)
CREAT BLD-MCNC: 0.53 MG/DL (ref 0.76–1.27)
DEPRECATED RDW RBC AUTO: 42.7 FL (ref 37–54)
EOSINOPHIL # BLD AUTO: 0.26 10*3/MM3 (ref 0.1–0.3)
EOSINOPHIL NFR BLD AUTO: 3 % (ref 0–4)
ERYTHROCYTE [DISTWIDTH] IN BLOOD BY AUTOMATED COUNT: 13.4 % (ref 11.5–14.5)
GFR SERPL CREATININE-BSD FRML MDRD: >150 ML/MIN/1.73
GLOBULIN UR ELPH-MCNC: 3.2 GM/DL
GLUCOSE BLD-MCNC: 94 MG/DL (ref 65–99)
HCT VFR BLD AUTO: 40.7 % (ref 42–52)
HGB BLD-MCNC: 13.7 G/DL (ref 14–18)
IMM GRANULOCYTES # BLD: 0.03 10*3/MM3 (ref 0–0.03)
IMM GRANULOCYTES NFR BLD: 0.3 % (ref 0–0.5)
INR PPP: 1.03 (ref 0.9–1.1)
LIPASE SERPL-CCNC: 26 U/L (ref 13–60)
LYMPHOCYTES # BLD AUTO: 1.15 10*3/MM3 (ref 0.6–4.8)
LYMPHOCYTES NFR BLD AUTO: 13.1 % (ref 20–45)
MCH RBC QN AUTO: 29.6 PG (ref 27–31)
MCHC RBC AUTO-ENTMCNC: 33.7 G/DL (ref 31–37)
MCV RBC AUTO: 87.9 FL (ref 80–94)
MONOCYTES # BLD AUTO: 0.81 10*3/MM3 (ref 0–1)
MONOCYTES NFR BLD AUTO: 9.2 % (ref 3–8)
NEUTROPHILS # BLD AUTO: 6.54 10*3/MM3 (ref 1.5–8.3)
NEUTROPHILS NFR BLD AUTO: 74.2 % (ref 45–70)
NRBC BLD MANUAL-RTO: 0 /100 WBC (ref 0–0)
PLAT MORPH BLD: NORMAL
PLATELET # BLD AUTO: 146 10*3/MM3 (ref 140–500)
PMV BLD AUTO: 11.1 FL (ref 7.4–10.4)
POTASSIUM BLD-SCNC: 3.9 MMOL/L (ref 3.5–5.2)
PROT SERPL-MCNC: 6.7 G/DL (ref 6–8.5)
PROTHROMBIN TIME: 13.5 SECONDS (ref 12.1–15)
RBC # BLD AUTO: 4.63 10*6/MM3 (ref 4.7–6.1)
RBC MORPH BLD: NORMAL
SODIUM BLD-SCNC: 139 MMOL/L (ref 136–145)
WBC MORPH BLD: NORMAL
WBC NRBC COR # BLD: 8.81 10*3/MM3 (ref 4.8–10.8)

## 2018-03-09 PROCEDURE — 73562 X-RAY EXAM OF KNEE 3: CPT

## 2018-03-09 PROCEDURE — 85730 THROMBOPLASTIN TIME PARTIAL: CPT | Performed by: EMERGENCY MEDICINE

## 2018-03-09 PROCEDURE — 85007 BL SMEAR W/DIFF WBC COUNT: CPT | Performed by: EMERGENCY MEDICINE

## 2018-03-09 PROCEDURE — 85610 PROTHROMBIN TIME: CPT | Performed by: EMERGENCY MEDICINE

## 2018-03-09 PROCEDURE — 82140 ASSAY OF AMMONIA: CPT | Performed by: EMERGENCY MEDICINE

## 2018-03-09 PROCEDURE — 70450 CT HEAD/BRAIN W/O DYE: CPT

## 2018-03-09 PROCEDURE — 99283 EMERGENCY DEPT VISIT LOW MDM: CPT

## 2018-03-09 PROCEDURE — 83690 ASSAY OF LIPASE: CPT | Performed by: EMERGENCY MEDICINE

## 2018-03-09 PROCEDURE — 85025 COMPLETE CBC W/AUTO DIFF WBC: CPT | Performed by: EMERGENCY MEDICINE

## 2018-03-09 PROCEDURE — 71101 X-RAY EXAM UNILAT RIBS/CHEST: CPT

## 2018-03-09 PROCEDURE — 80053 COMPREHEN METABOLIC PANEL: CPT | Performed by: EMERGENCY MEDICINE

## 2018-03-09 PROCEDURE — 72125 CT NECK SPINE W/O DYE: CPT

## 2018-03-09 PROCEDURE — 99282 EMERGENCY DEPT VISIT SF MDM: CPT | Performed by: EMERGENCY MEDICINE

## 2018-03-09 RX ORDER — OXYCODONE HYDROCHLORIDE AND ACETAMINOPHEN 5; 325 MG/1; MG/1
1 TABLET ORAL EVERY 4 HOURS PRN
Qty: 18 TABLET | Refills: 0 | Status: SHIPPED | OUTPATIENT
Start: 2018-03-09 | End: 2018-03-20 | Stop reason: HOSPADM

## 2018-03-09 NOTE — DISCHARGE INSTRUCTIONS
Reschedule your appointment to see Dr. Farah.  Keep wounds clean as instructed and increase activity as tolerated

## 2018-03-09 NOTE — ED PROVIDER NOTES
Subjective   History of Present Illness  History of Present Illness    Chief complaint: Fall with multiple injuries    Location: Patient fell in the bathroom at home    Quality/Severity:  Moderate severity    Timing/Duration: Event occurred last evening    Modifying Factors: Movement of lower extremities exacerbates bilateral knee pain    Associated Symptoms: Headache, right rib pain, left elbow pain and bruising    Narrative: The patient is a 52-year-old white male who presents as noted above.  The patient relates that he fell twice in his bathroom at home last evening.  Patient is not completely clear on all details, but does relate that he hit his head and also his right chest.  The patient was ultimately able to scoot himself back to the bed where he spent the rest of the night.  When he awoke the patient noted significant headache, right chest pain, and extremity pain.  Again, the patient is not completely clear on all details and a review of the patient's other history does show a remote history of alcoholism and previous admissions for hepatic encephalopathy.  Patient just admitted to our facility March 4-6 for a left fourth toe injury that required surgery.    Review of Systems   Constitutional: Positive for activity change (due to foot problems) and fatigue. Negative for fever.   HENT: Negative for congestion.    Respiratory: Negative for cough, shortness of breath and wheezing.    Cardiovascular: Positive for chest pain (right, lateral, lower ribs). Negative for palpitations.   Gastrointestinal: Negative for abdominal pain, diarrhea, nausea and vomiting.   Genitourinary: Negative for dysuria, flank pain, hematuria and urgency.   Musculoskeletal: Positive for neck pain (chronic and worse today). Negative for back pain.   Skin: Negative for rash.   Neurological: Positive for weakness and headaches. Negative for dizziness.   Hematological: Bruises/bleeds easily.   Psychiatric/Behavioral: Positive for  confusion and decreased concentration.   All other systems reviewed and are negative.      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   • HARDWARE REMOVAL Right 8/18/2017    Procedure: RIGHT EXTERNAL FIXATOR REMOVAL;  Surgeon: Anish Farah DPM;  Location:  [facility];  Service:    • JOINT REPLACEMENT     • KNEE SURGERY     • ORIF FOOT FRACTURE Right 7/29/2017    Procedure: open reduction with percutaneous pinning of midfoot dislocation fracture;  Surgeon: Anish Farah DPM;  Location:  [facility];  Service:    • ORIF FOOT FRACTURE Left 3/5/2018    Procedure: OPEN REDUCTION WITH IRRIGATION AND WOUND CLOSURE LEFT FOURTH TOE;  Surgeon: Anish Farah DPM;  Location:  [facility];  Service:    • TOE FUSION Right 8/18/2017    Procedure: RIGHT  MEDIAL COLUMN ARTHRODESIS, RIGHT TARAL METATARSAL ARTHRODESIS;  Surgeon: Anish Farah DPM;  Location:  [facility];  Service:    • TOTAL HIP ARTHROPLASTY         Family History   Problem Relation Age of Onset   • Arthritis Mother    • Cancer Mother    • Hyperlipidemia Mother    • Arthritis Father    • Cancer Father    • Depression Father    • Hypertension Father    • Mental illness Father    • Cancer Sister    • Arthritis Sister    • Hyperlipidemia Sister    • Cancer Paternal Aunt    • Hypertension Daughter    • Depression Son    • Hyperlipidemia Paternal Grandmother    • Hearing loss Paternal Grandfather    • Hyperlipidemia Paternal  Grandfather    • Hypertension Paternal Grandfather        Social History     Social History   • Marital status:      Social History Main Topics   • Smoking status: Never Smoker   • Smokeless tobacco: Never Used   • Alcohol use No      Comment: h/o alcohol consumption-quit 10 years 8 months   • Drug use: No   • Sexual activity: Defer           Objective   Physical Exam   Constitutional: He is oriented to person, place, and time.   The patient is an unhealthy appearing, obese, 52-year-old, white male in no acute distress   HENT:   Head: Normocephalic and atraumatic.   Eyes: Conjunctivae and EOM are normal. Pupils are equal, round, and reactive to light.   Neck: Normal range of motion. Neck supple. No thyromegaly present.   Mild, diffuse, posterior tenderness.   Cardiovascular: Normal rate, regular rhythm and normal heart sounds.    No murmur heard.  Pulmonary/Chest: Effort normal and breath sounds normal. No respiratory distress. He has no wheezes. He has no rales. He exhibits tenderness (right, lower anterior and lateral ribs).   Abdominal: Soft. Bowel sounds are normal. He exhibits no distension. There is no tenderness.   Minor bruising noted in the right upper quadrant and the left mid abdomen.   Musculoskeletal: Normal range of motion. He exhibits edema (1+ edema bilaterally). He exhibits no tenderness.   The patient does have dressings on his left foot.   Lymphadenopathy:     He has no cervical adenopathy.   Neurological: He is alert and oriented to person, place, and time. No cranial nerve deficit.   The patient is alert and oriented ×3, but the patient does have some difficulty explaining very simple questions concerning his current health and last night's events.   Skin: Skin is warm and dry. No rash noted. There is pallor (mild).   Psychiatric:   Patient does appear mildly confused.   Nursing note and vitals reviewed.      Procedures  Ct Head Without Contrast    Result Date: 3/9/2018  CT HEAD,  NONCONTRAST, 3/9/2018:  HISTORY: 52-year-old male in the ED complaining of head pain after trip and fall injury last evening.  TECHNIQUE: CT examination of the head without IV contrast. Radiation dose reduction techniques included automated exposure control or exposure modulation based on body size. Radiation audit for CT and nuclear cardiology exams in the last 12 months: 6.  COMPARISON: *  CT head, 9/1/2017.  FINDINGS: No acute intracranial abnormality is identified. No visible skull fracture.  Mild generalized cerebral atrophy. Mild diffuse low-attenuation white matter changes are nonspecific but likely related to chronic small vessel disease. This is stable since the prior exam.  No evidence of intracranial hemorrhage, mass, mass effect, cerebral edema, hydrocephalus or additional abnormality.      1. No acute intracranial abnormality. 2. Stable mild diffuse chronic changes as noted above. 3. No change since 9/1/2017.  This report was finalized on 3/9/2018 4:59 PM by Dr. Bigg Del Angel MD.      Ct Cervical Spine Without Contrast    Result Date: 3/9/2018  CT CERVICAL SPINE, 3/9/2018:  HISTORY: 52-year-old male in the ED with head and neck pain after trip and fall injury last evening.  TECHNIQUE: Axial CT images from the skull base to the midportion of T2 with multiplanar image reconstruction. Radiation dose reduction techniques included automated exposure control or exposure modulation based on body size. Radiation audit for CT and nuclear cardiology exams in the last 12 months: 6.  COMPARISON: *  CT cervical spine, 7/22/2017.  FINDINGS: Imaging is limited due to restricted patient positioning and patient body habitus.  No fracture or other acute osseous abnormality is demonstrated.  Moderate degenerative disc space narrowing and vertebral osteophyte formation at C5-6 and C6-7 with more mild disc space narrowing throughout the remainder of the cervical spine. Moderately severe bilateral degenerative facet  arthropathy throughout the cervical spine. Cervical vertebral alignment is normal.  No traumatic soft tissue abnormality is seen within the neck. No significant change since the previous study.      1. Technically limited examination. 2. No acute osseous abnormality is demonstrated. 3. Multilevel degenerative changes. 4. No change since 7/22/2017.  This report was finalized on 3/9/2018 5:03 PM by Dr. Bigg Del Angel MD.         ED Course  ED Course   Comment By Time   03/09/18, 5:49 PM  Radiology final reads on the head CT and cervical spine negative and noted.  My contemporaneous wet reads on the right ribs and both knees showed no significant findings.  All findings discussed with patient and he will be treated symptomatically. Luiz Woodson MD 03/09 0236                  MDM  Number of Diagnoses or Management Options  Fall, initial encounter: new and does not require workup  Multiple contusions: new and does not require workup     Amount and/or Complexity of Data Reviewed  Clinical lab tests: ordered and reviewed  Tests in the radiology section of CPT®: ordered and reviewed  Independent visualization of images, tracings, or specimens: yes    Risk of Complications, Morbidity, and/or Mortality  Presenting problems: high  Diagnostic procedures: high  Management options: high    Patient Progress  Patient progress: stable    Labs this visit  Lab Results (last 24 hours)     Procedure Component Value Units Date/Time    CBC & Differential [[id number]] Collected:  03/09/18 1449    Specimen:  Blood Updated:  03/09/18 0655    Narrative:       The following orders were created for panel order CBC & Differential.  Procedure                               Abnormality         Status                     ---------                               -----------         ------                     Scan Slide[[id number]]                   Normal              Final result               CBC Auto Differential[[id number]]        Abnormal             Final result                 Please view results for these tests on the individual orders.    Comprehensive Metabolic Panel [[id number]]  (Abnormal) Collected:  03/09/18 1449    Specimen:  Blood Updated:  03/09/18 1515     Glucose 94 mg/dL      BUN 10 mg/dL      Creatinine 0.53 (L) mg/dL      Sodium 139 mmol/L      Potassium 3.9 mmol/L      Chloride 105 mmol/L      CO2 21.5 (L) mmol/L      Calcium 8.4 (L) mg/dL      Total Protein 6.7 g/dL      Albumin 3.50 g/dL      ALT (SGPT) 31 U/L      AST (SGOT) 39 U/L       Specimen hemolyzed.  Results may be affected.        Alkaline Phosphatase 91 U/L      Total Bilirubin 0.2 mg/dL      eGFR Non African Amer >150 mL/min/1.73      Globulin 3.2 gm/dL      A/G Ratio 1.1 g/dL      BUN/Creatinine Ratio 18.9     Anion Gap 12.5 mmol/L     CBC Auto Differential [[id number]]  (Abnormal) Collected:  03/09/18 1449    Specimen:  Blood Updated:  03/09/18 1515     WBC 8.81 10*3/mm3      RBC 4.63 (L) 10*6/mm3      Hemoglobin 13.7 (L) g/dL      Hematocrit 40.7 (L) %      MCV 87.9 fL      MCH 29.6 pg      MCHC 33.7 g/dL      RDW 13.4 %      RDW-SD 42.7 fl      MPV 11.1 (H) fL      Platelets 146 10*3/mm3      Neutrophil % 74.2 (H) %      Lymphocyte % 13.1 (L) %      Monocyte % 9.2 (H) %      Eosinophil % 3.0 %      Basophil % 0.2 %      Immature Grans % 0.3 %      Neutrophils, Absolute 6.54 10*3/mm3      Lymphocytes, Absolute 1.15 10*3/mm3      Monocytes, Absolute 0.81 10*3/mm3      Eosinophils, Absolute 0.26 10*3/mm3      Basophils, Absolute 0.02 10*3/mm3      Immature Grans, Absolute 0.03 10*3/mm3      nRBC 0.0 /100 WBC     Scan Slide [[id number]]  (Normal) Collected:  03/09/18 1449    Specimen:  Blood Updated:  03/09/18 1515     RBC Morphology Normal     WBC Morphology Normal     Platelet Morphology Normal    Lipase [[id number]]  (Normal) Collected:  03/09/18 1636    Specimen:  Blood Updated:  03/09/18 1645     Lipase 26 U/L     Protime-INR [[id number]] Collected:  03/09/18 3665     Specimen:  Blood Updated:  03/09/18 1728    aPTT [[id number]] Collected:  03/09/18 1727    Specimen:  Blood Updated:  03/09/18 1728    Ammonia [[id number]]  (Normal) Collected:  03/09/18 1727    Specimen:  Blood Updated:  03/09/18 1746     Ammonia 56 umol/L         Prescribed on discharge             Medication List      Changed          oxyCODONE-acetaminophen 5-325 MG per tablet   Commonly known as:  PERCOCET   Take 1 tablet by mouth Every 4 (Four) Hours As Needed for Moderate Pain    for up to 3 days.   What changed:  reasons to take this           All lab results, imaging results and other tests were reviewed by Luiz Woodson MD and unless otherwise specified were found to be unremarkable.      Final diagnoses:   Fall, initial encounter   Multiple contusions            Luiz Woodson MD  03/09/18 0426

## 2018-03-13 ENCOUNTER — HOSPITAL ENCOUNTER (INPATIENT)
Facility: HOSPITAL | Age: 53
LOS: 7 days | Discharge: HOME HEALTH | End: 2018-03-20
Attending: EMERGENCY MEDICINE | Admitting: INTERNAL MEDICINE

## 2018-03-13 ENCOUNTER — APPOINTMENT (OUTPATIENT)
Dept: CT IMAGING | Facility: HOSPITAL | Age: 53
End: 2018-03-13

## 2018-03-13 ENCOUNTER — APPOINTMENT (OUTPATIENT)
Dept: GENERAL RADIOLOGY | Facility: HOSPITAL | Age: 53
End: 2018-03-13

## 2018-03-13 DIAGNOSIS — R41.0 CONFUSION ASSOCIATED WITH INFECTION: Primary | ICD-10-CM

## 2018-03-13 DIAGNOSIS — D64.89 ANEMIA DUE TO OTHER CAUSE, NOT CLASSIFIED: ICD-10-CM

## 2018-03-13 DIAGNOSIS — E86.0 DEHYDRATION: ICD-10-CM

## 2018-03-13 DIAGNOSIS — T81.40XA POSTOPERATIVE INFECTION, INITIAL ENCOUNTER: ICD-10-CM

## 2018-03-13 DIAGNOSIS — K92.0 COFFEE GROUND EMESIS: ICD-10-CM

## 2018-03-13 DIAGNOSIS — T81.31XA WOUND DEHISCENCE, SURGICAL, INITIAL ENCOUNTER: ICD-10-CM

## 2018-03-13 DIAGNOSIS — B99.9 CONFUSION ASSOCIATED WITH INFECTION: Primary | ICD-10-CM

## 2018-03-13 DIAGNOSIS — N17.9 ACUTE RENAL INJURY (HCC): ICD-10-CM

## 2018-03-13 LAB
ALBUMIN SERPL-MCNC: 4 G/DL (ref 3.5–5.2)
ALBUMIN/GLOB SERPL: 1.3 G/DL
ALP SERPL-CCNC: 112 U/L (ref 40–129)
ALT SERPL W P-5'-P-CCNC: 21 U/L (ref 5–41)
AMPHET+METHAMPHET UR QL: NEGATIVE
AMPHETAMINES UR QL: NEGATIVE
ANION GAP SERPL CALCULATED.3IONS-SCNC: 22.7 MMOL/L
AST SERPL-CCNC: 20 U/L (ref 5–40)
BACTERIA UR QL AUTO: ABNORMAL /HPF
BARBITURATES UR QL SCN: POSITIVE
BASOPHILS # BLD AUTO: 0.02 10*3/MM3 (ref 0–0.2)
BASOPHILS NFR BLD AUTO: 0.2 % (ref 0–2)
BENZODIAZ UR QL SCN: POSITIVE
BILIRUB SERPL-MCNC: 0.3 MG/DL (ref 0.2–1.2)
BILIRUB UR QL STRIP: ABNORMAL
BUN BLD-MCNC: 21 MG/DL (ref 6–20)
BUN/CREAT SERPL: 27.3 (ref 7–25)
BUPRENORPHINE SERPL-MCNC: NEGATIVE NG/ML
CALCIUM SPEC-SCNC: 8.7 MG/DL (ref 8.6–10.5)
CANNABINOIDS SERPL QL: NEGATIVE
CHLORIDE SERPL-SCNC: 100 MMOL/L (ref 98–107)
CLARITY UR: ABNORMAL
CO2 SERPL-SCNC: 13.3 MMOL/L (ref 22–29)
COCAINE UR QL: NEGATIVE
COLOR UR: YELLOW
CREAT BLD-MCNC: 0.77 MG/DL (ref 0.76–1.27)
D-LACTATE SERPL-SCNC: 1.8 MMOL/L (ref 0.5–2)
DEPRECATED RDW RBC AUTO: 43.9 FL (ref 37–54)
EOSINOPHIL # BLD AUTO: 0.01 10*3/MM3 (ref 0.1–0.3)
EOSINOPHIL NFR BLD AUTO: 0.1 % (ref 0–4)
ERYTHROCYTE [DISTWIDTH] IN BLOOD BY AUTOMATED COUNT: 13.9 % (ref 11.5–14.5)
GFR SERPL CREATININE-BSD FRML MDRD: 105 ML/MIN/1.73
GLOBULIN UR ELPH-MCNC: 3.2 GM/DL
GLUCOSE BLD-MCNC: 120 MG/DL (ref 65–99)
GLUCOSE UR STRIP-MCNC: NEGATIVE MG/DL
HCT VFR BLD AUTO: 46.3 % (ref 42–52)
HGB BLD-MCNC: 15.5 G/DL (ref 14–18)
HGB UR QL STRIP.AUTO: ABNORMAL
HOLD SPECIMEN: NORMAL
HOLD SPECIMEN: NORMAL
HYALINE CASTS UR QL AUTO: ABNORMAL /LPF
IMM GRANULOCYTES # BLD: 0.1 10*3/MM3 (ref 0–0.03)
IMM GRANULOCYTES NFR BLD: 0.8 % (ref 0–0.5)
KETONES UR QL STRIP: ABNORMAL
LEUKOCYTE ESTERASE UR QL STRIP.AUTO: NEGATIVE
LYMPHOCYTES # BLD AUTO: 1.23 10*3/MM3 (ref 0.6–4.8)
LYMPHOCYTES NFR BLD AUTO: 10 % (ref 20–45)
MCH RBC QN AUTO: 29.2 PG (ref 27–31)
MCHC RBC AUTO-ENTMCNC: 33.5 G/DL (ref 31–37)
MCV RBC AUTO: 87.2 FL (ref 80–94)
METHADONE UR QL SCN: NEGATIVE
MONOCYTES # BLD AUTO: 1.33 10*3/MM3 (ref 0–1)
MONOCYTES NFR BLD AUTO: 10.8 % (ref 3–8)
NEUTROPHILS # BLD AUTO: 9.59 10*3/MM3 (ref 1.5–8.3)
NEUTROPHILS NFR BLD AUTO: 78.1 % (ref 45–70)
NITRITE UR QL STRIP: NEGATIVE
NRBC BLD MANUAL-RTO: 0 /100 WBC (ref 0–0)
OPIATES UR QL: NEGATIVE
OXYCODONE UR QL SCN: NEGATIVE
PCP UR QL SCN: NEGATIVE
PH UR STRIP.AUTO: 6 [PH] (ref 4.5–8)
PLATELET # BLD AUTO: 302 10*3/MM3 (ref 140–500)
PMV BLD AUTO: 10 FL (ref 7.4–10.4)
POTASSIUM BLD-SCNC: 3.4 MMOL/L (ref 3.5–5.2)
PROPOXYPH UR QL: NEGATIVE
PROT SERPL-MCNC: 7.2 G/DL (ref 6–8.5)
PROT UR QL STRIP: ABNORMAL
RBC # BLD AUTO: 5.31 10*6/MM3 (ref 4.7–6.1)
RBC # UR: ABNORMAL /HPF
REF LAB TEST METHOD: ABNORMAL
SODIUM BLD-SCNC: 136 MMOL/L (ref 136–145)
SP GR UR STRIP: 1.01 (ref 1–1.03)
SQUAMOUS #/AREA URNS HPF: ABNORMAL /HPF
TRICYCLICS UR QL SCN: NEGATIVE
UROBILINOGEN UR QL STRIP: ABNORMAL
WBC NRBC COR # BLD: 12.28 10*3/MM3 (ref 4.8–10.8)
WBC UR QL AUTO: ABNORMAL /HPF
WHOLE BLOOD HOLD SPECIMEN: NORMAL
WHOLE BLOOD HOLD SPECIMEN: NORMAL

## 2018-03-13 PROCEDURE — 80053 COMPREHEN METABOLIC PANEL: CPT | Performed by: EMERGENCY MEDICINE

## 2018-03-13 PROCEDURE — 87186 SC STD MICRODIL/AGAR DIL: CPT | Performed by: EMERGENCY MEDICINE

## 2018-03-13 PROCEDURE — 71046 X-RAY EXAM CHEST 2 VIEWS: CPT

## 2018-03-13 PROCEDURE — 70450 CT HEAD/BRAIN W/O DYE: CPT

## 2018-03-13 PROCEDURE — 99285 EMERGENCY DEPT VISIT HI MDM: CPT

## 2018-03-13 PROCEDURE — 25010000002 CEFTRIAXONE: Performed by: EMERGENCY MEDICINE

## 2018-03-13 PROCEDURE — 87205 SMEAR GRAM STAIN: CPT | Performed by: EMERGENCY MEDICINE

## 2018-03-13 PROCEDURE — 81001 URINALYSIS AUTO W/SCOPE: CPT | Performed by: EMERGENCY MEDICINE

## 2018-03-13 PROCEDURE — G0378 HOSPITAL OBSERVATION PER HR: HCPCS

## 2018-03-13 PROCEDURE — 87070 CULTURE OTHR SPECIMN AEROBIC: CPT | Performed by: EMERGENCY MEDICINE

## 2018-03-13 PROCEDURE — 82009 KETONE BODYS QUAL: CPT | Performed by: NURSE PRACTITIONER

## 2018-03-13 PROCEDURE — 25010000002 LORAZEPAM PER 2 MG: Performed by: EMERGENCY MEDICINE

## 2018-03-13 PROCEDURE — 83605 ASSAY OF LACTIC ACID: CPT | Performed by: EMERGENCY MEDICINE

## 2018-03-13 PROCEDURE — 85025 COMPLETE CBC W/AUTO DIFF WBC: CPT | Performed by: EMERGENCY MEDICINE

## 2018-03-13 PROCEDURE — 80306 DRUG TEST PRSMV INSTRMNT: CPT | Performed by: EMERGENCY MEDICINE

## 2018-03-13 PROCEDURE — 87040 BLOOD CULTURE FOR BACTERIA: CPT | Performed by: EMERGENCY MEDICINE

## 2018-03-13 PROCEDURE — 99220 PR INITIAL OBSERVATION CARE/DAY 70 MINUTES: CPT | Performed by: INTERNAL MEDICINE

## 2018-03-13 RX ORDER — SODIUM CHLORIDE 9 MG/ML
40 INJECTION, SOLUTION INTRAVENOUS AS NEEDED
Status: DISCONTINUED | OUTPATIENT
Start: 2018-03-13 | End: 2018-03-16

## 2018-03-13 RX ORDER — ONDANSETRON 4 MG/1
4 TABLET, FILM COATED ORAL EVERY 6 HOURS PRN
Status: DISCONTINUED | OUTPATIENT
Start: 2018-03-13 | End: 2018-03-14

## 2018-03-13 RX ORDER — SENNA AND DOCUSATE SODIUM 50; 8.6 MG/1; MG/1
2 TABLET, FILM COATED ORAL NIGHTLY PRN
Status: DISCONTINUED | OUTPATIENT
Start: 2018-03-13 | End: 2018-03-14

## 2018-03-13 RX ORDER — ACETAMINOPHEN 325 MG/1
650 TABLET ORAL EVERY 4 HOURS PRN
Status: DISCONTINUED | OUTPATIENT
Start: 2018-03-13 | End: 2018-03-16

## 2018-03-13 RX ORDER — LORAZEPAM 2 MG/ML
1 INJECTION INTRAMUSCULAR ONCE
Status: COMPLETED | OUTPATIENT
Start: 2018-03-13 | End: 2018-03-13

## 2018-03-13 RX ORDER — SODIUM CHLORIDE 0.9 % (FLUSH) 0.9 %
10 SYRINGE (ML) INJECTION AS NEEDED
Status: DISCONTINUED | OUTPATIENT
Start: 2018-03-13 | End: 2018-03-16

## 2018-03-13 RX ORDER — SODIUM CHLORIDE 0.9 % (FLUSH) 0.9 %
1-10 SYRINGE (ML) INJECTION AS NEEDED
Status: DISCONTINUED | OUTPATIENT
Start: 2018-03-13 | End: 2018-03-16

## 2018-03-13 RX ORDER — CALCIUM CARBONATE 200(500)MG
1 TABLET,CHEWABLE ORAL 2 TIMES DAILY PRN
Status: DISCONTINUED | OUTPATIENT
Start: 2018-03-13 | End: 2018-03-16

## 2018-03-13 RX ADMIN — VANCOMYCIN HYDROCHLORIDE 1 G: 1 INJECTION, POWDER, LYOPHILIZED, FOR SOLUTION INTRAVENOUS at 19:10

## 2018-03-13 RX ADMIN — SODIUM CHLORIDE 1000 ML: 900 INJECTION, SOLUTION INTRAVENOUS at 16:15

## 2018-03-13 RX ADMIN — LORAZEPAM 1 MG: 2 INJECTION INTRAMUSCULAR; INTRAVENOUS at 19:46

## 2018-03-13 RX ADMIN — CEFTRIAXONE 2 G: 2 INJECTION, POWDER, FOR SOLUTION INTRAMUSCULAR; INTRAVENOUS at 19:50

## 2018-03-13 NOTE — ED NOTES
"Walked in pt room and he was trying to climb out of the bed.  Asked pt where was he going and he stated that he needed to pee.  This tech assisted pt with using the urinal.  Conrad area very dirty with \"fuzz\" penis has a crusty substances.  Will assist pt with cleaning conrad area bc mother stated that he is a very clean person.        Nissa Dallas  03/13/18 1819       Nissa Dallas  03/13/18 1819    "

## 2018-03-13 NOTE — ED NOTES
While assisting nurse with holding pt arm while she was trying to obtain blood, pt bit his IV and pulled it out of his hand with his teeth.  This tech and the nurse tried to stop the pt from pulling out the IV with his teeth but he was to quick.  Had another tech come into the room to assist with turning off the fluids that were going into his IV.  Pt did not seem to understand what he was doing at the time.      Nissa Dallas  03/13/18 4032

## 2018-03-13 NOTE — ED NOTES
Mother stated that she would be leaving for a while and if he gets admitted to please contact her.       Nissa Dallas  03/13/18 5866

## 2018-03-14 ENCOUNTER — APPOINTMENT (OUTPATIENT)
Dept: GENERAL RADIOLOGY | Facility: HOSPITAL | Age: 53
End: 2018-03-14

## 2018-03-14 ENCOUNTER — APPOINTMENT (OUTPATIENT)
Dept: CT IMAGING | Facility: HOSPITAL | Age: 53
End: 2018-03-14
Attending: NURSE PRACTITIONER

## 2018-03-14 LAB
ACETONE BLD QL: NEGATIVE
AMMONIA BLD-SCNC: 38 UMOL/L (ref 16–60)
ANION GAP SERPL CALCULATED.3IONS-SCNC: 15.3 MMOL/L
APTT PPP: 58.9 SECONDS (ref 24.3–38.1)
B PERT DNA SPEC QL NAA+PROBE: NOT DETECTED
BASOPHILS # BLD AUTO: 0.02 10*3/MM3 (ref 0–0.2)
BASOPHILS NFR BLD AUTO: 0.2 % (ref 0–2)
BUN BLD-MCNC: 25 MG/DL (ref 6–20)
BUN/CREAT SERPL: 39.7 (ref 7–25)
C PNEUM DNA NPH QL NAA+NON-PROBE: NOT DETECTED
CALCIUM SPEC-SCNC: 8.5 MG/DL (ref 8.6–10.5)
CHLORIDE SERPL-SCNC: 105 MMOL/L (ref 98–107)
CO2 SERPL-SCNC: 19.7 MMOL/L (ref 22–29)
CREAT BLD-MCNC: 0.63 MG/DL (ref 0.76–1.27)
CRP SERPL-MCNC: 12.18 MG/DL (ref 0–0.5)
DEPRECATED RDW RBC AUTO: 43 FL (ref 37–54)
EOSINOPHIL # BLD AUTO: 0 10*3/MM3 (ref 0.1–0.3)
EOSINOPHIL NFR BLD AUTO: 0 % (ref 0–4)
ERYTHROCYTE [DISTWIDTH] IN BLOOD BY AUTOMATED COUNT: 13.9 % (ref 11.5–14.5)
ERYTHROCYTE [SEDIMENTATION RATE] IN BLOOD: 24 MM/HR (ref 0–20)
FIBRINOGEN PPP-MCNC: 626 MG/DL (ref 200–400)
FLUAV H1 2009 PAND RNA NPH QL NAA+PROBE: NOT DETECTED
FLUAV H1 HA GENE NPH QL NAA+PROBE: NOT DETECTED
FLUAV H3 RNA NPH QL NAA+PROBE: NOT DETECTED
FLUAV SUBTYP SPEC NAA+PROBE: NOT DETECTED
FLUBV RNA ISLT QL NAA+PROBE: NOT DETECTED
GASTROCULT GAST QL: POSITIVE
GFR SERPL CREATININE-BSD FRML MDRD: 133 ML/MIN/1.73
GLUCOSE BLD-MCNC: 152 MG/DL (ref 65–99)
GLUCOSE BLDC GLUCOMTR-MCNC: 123 MG/DL (ref 70–130)
GLUCOSE BLDC GLUCOMTR-MCNC: 128 MG/DL (ref 70–130)
HADV DNA SPEC NAA+PROBE: NOT DETECTED
HCOV 229E RNA SPEC QL NAA+PROBE: NOT DETECTED
HCOV HKU1 RNA SPEC QL NAA+PROBE: NOT DETECTED
HCOV NL63 RNA SPEC QL NAA+PROBE: NOT DETECTED
HCOV OC43 RNA SPEC QL NAA+PROBE: NOT DETECTED
HCT VFR BLD AUTO: 38.3 % (ref 42–52)
HGB BLD-MCNC: 13.1 G/DL (ref 14–18)
HMPV RNA NPH QL NAA+NON-PROBE: NOT DETECTED
HPIV1 RNA SPEC QL NAA+PROBE: NOT DETECTED
HPIV2 RNA SPEC QL NAA+PROBE: NOT DETECTED
HPIV3 RNA NPH QL NAA+PROBE: NOT DETECTED
HPIV4 P GENE NPH QL NAA+PROBE: NOT DETECTED
IMM GRANULOCYTES # BLD: 0.09 10*3/MM3 (ref 0–0.03)
IMM GRANULOCYTES NFR BLD: 0.7 % (ref 0–0.5)
INR PPP: 1.67 (ref 0.9–1.1)
LYMPHOCYTES # BLD AUTO: 1.38 10*3/MM3 (ref 0.6–4.8)
LYMPHOCYTES NFR BLD AUTO: 10.5 % (ref 20–45)
M PNEUMO IGG SER IA-ACNC: NOT DETECTED
MAGNESIUM SERPL-MCNC: 1.7 MG/DL (ref 1.7–2.5)
MCH RBC QN AUTO: 29.7 PG (ref 27–31)
MCHC RBC AUTO-ENTMCNC: 34.2 G/DL (ref 31–37)
MCV RBC AUTO: 86.8 FL (ref 80–94)
MONOCYTES # BLD AUTO: 1.35 10*3/MM3 (ref 0–1)
MONOCYTES NFR BLD AUTO: 10.3 % (ref 3–8)
NEUTROPHILS # BLD AUTO: 10.32 10*3/MM3 (ref 1.5–8.3)
NEUTROPHILS NFR BLD AUTO: 78.3 % (ref 45–70)
NRBC BLD MANUAL-RTO: 0 /100 WBC (ref 0–0)
PH GAST: 3 [PH]
PLATELET # BLD AUTO: 355 10*3/MM3 (ref 140–500)
PMV BLD AUTO: 9.9 FL (ref 7.4–10.4)
POTASSIUM BLD-SCNC: 3.4 MMOL/L (ref 3.5–5.2)
PROCALCITONIN SERPL-MCNC: 0.19 NG/ML (ref 0.1–0.25)
PROTHROMBIN TIME: 19.9 SECONDS (ref 12.1–15)
RBC # BLD AUTO: 4.41 10*6/MM3 (ref 4.7–6.1)
RHINOVIRUS RNA SPEC NAA+PROBE: NOT DETECTED
RSV RNA NPH QL NAA+NON-PROBE: NOT DETECTED
SODIUM BLD-SCNC: 140 MMOL/L (ref 136–145)
WBC NRBC COR # BLD: 13.16 10*3/MM3 (ref 4.8–10.8)

## 2018-03-14 PROCEDURE — 85652 RBC SED RATE AUTOMATED: CPT | Performed by: NURSE PRACTITIONER

## 2018-03-14 PROCEDURE — 86140 C-REACTIVE PROTEIN: CPT | Performed by: NURSE PRACTITIONER

## 2018-03-14 PROCEDURE — 25010000002 PIPERACILLIN SOD-TAZOBACTAM PER 1 G: Performed by: NURSE PRACTITIONER

## 2018-03-14 PROCEDURE — 82962 GLUCOSE BLOOD TEST: CPT

## 2018-03-14 PROCEDURE — 93005 ELECTROCARDIOGRAM TRACING: CPT | Performed by: INTERNAL MEDICINE

## 2018-03-14 PROCEDURE — 25010000002 VANCOMYCIN PER 500 MG: Performed by: INTERNAL MEDICINE

## 2018-03-14 PROCEDURE — 82140 ASSAY OF AMMONIA: CPT | Performed by: NURSE PRACTITIONER

## 2018-03-14 PROCEDURE — 87633 RESP VIRUS 12-25 TARGETS: CPT | Performed by: INTERNAL MEDICINE

## 2018-03-14 PROCEDURE — 85730 THROMBOPLASTIN TIME PARTIAL: CPT | Performed by: NURSE PRACTITIONER

## 2018-03-14 PROCEDURE — 85025 COMPLETE CBC W/AUTO DIFF WBC: CPT | Performed by: INTERNAL MEDICINE

## 2018-03-14 PROCEDURE — 83540 ASSAY OF IRON: CPT | Performed by: NURSE PRACTITIONER

## 2018-03-14 PROCEDURE — 84466 ASSAY OF TRANSFERRIN: CPT | Performed by: NURSE PRACTITIONER

## 2018-03-14 PROCEDURE — 25010000002 MORPHINE PER 10 MG: Performed by: FAMILY MEDICINE

## 2018-03-14 PROCEDURE — 87581 M.PNEUMON DNA AMP PROBE: CPT | Performed by: INTERNAL MEDICINE

## 2018-03-14 PROCEDURE — 85610 PROTHROMBIN TIME: CPT | Performed by: INTERNAL MEDICINE

## 2018-03-14 PROCEDURE — 25010000002 ONDANSETRON PER 1 MG: Performed by: INTERNAL MEDICINE

## 2018-03-14 PROCEDURE — 93010 ELECTROCARDIOGRAM REPORT: CPT | Performed by: INTERNAL MEDICINE

## 2018-03-14 PROCEDURE — 74019 RADEX ABDOMEN 2 VIEWS: CPT

## 2018-03-14 PROCEDURE — 87486 CHLMYD PNEUM DNA AMP PROBE: CPT | Performed by: INTERNAL MEDICINE

## 2018-03-14 PROCEDURE — 99221 1ST HOSP IP/OBS SF/LOW 40: CPT | Performed by: SURGERY

## 2018-03-14 PROCEDURE — 80048 BASIC METABOLIC PNL TOTAL CA: CPT | Performed by: INTERNAL MEDICINE

## 2018-03-14 PROCEDURE — 87798 DETECT AGENT NOS DNA AMP: CPT | Performed by: INTERNAL MEDICINE

## 2018-03-14 PROCEDURE — 25010000002 ONDANSETRON PER 1 MG

## 2018-03-14 PROCEDURE — 85384 FIBRINOGEN ACTIVITY: CPT | Performed by: NURSE PRACTITIONER

## 2018-03-14 PROCEDURE — 99232 SBSQ HOSP IP/OBS MODERATE 35: CPT | Performed by: NURSE PRACTITIONER

## 2018-03-14 PROCEDURE — 99285 EMERGENCY DEPT VISIT HI MDM: CPT | Performed by: EMERGENCY MEDICINE

## 2018-03-14 PROCEDURE — 25010000002 LORAZEPAM PER 2 MG

## 2018-03-14 PROCEDURE — 83735 ASSAY OF MAGNESIUM: CPT | Performed by: NURSE PRACTITIONER

## 2018-03-14 PROCEDURE — 86022 PLATELET ANTIBODIES: CPT | Performed by: NURSE PRACTITIONER

## 2018-03-14 PROCEDURE — 82271 OCCULT BLOOD OTHER SOURCES: CPT | Performed by: INTERNAL MEDICINE

## 2018-03-14 PROCEDURE — 84145 PROCALCITONIN (PCT): CPT | Performed by: NURSE PRACTITIONER

## 2018-03-14 PROCEDURE — 73660 X-RAY EXAM OF TOE(S): CPT

## 2018-03-14 RX ORDER — PROMETHAZINE HYDROCHLORIDE 25 MG/ML
12.5 INJECTION, SOLUTION INTRAMUSCULAR; INTRAVENOUS EVERY 6 HOURS PRN
Status: DISCONTINUED | OUTPATIENT
Start: 2018-03-14 | End: 2018-03-16

## 2018-03-14 RX ORDER — MORPHINE SULFATE 2 MG/ML
INJECTION, SOLUTION INTRAMUSCULAR; INTRAVENOUS
Status: DISPENSED
Start: 2018-03-14 | End: 2018-03-15

## 2018-03-14 RX ORDER — POTASSIUM CHLORIDE 7.45 MG/ML
10 INJECTION INTRAVENOUS
Status: DISCONTINUED | OUTPATIENT
Start: 2018-03-14 | End: 2018-03-16

## 2018-03-14 RX ORDER — AMLODIPINE BESYLATE 2.5 MG/1
2.5 TABLET ORAL DAILY
Status: DISCONTINUED | OUTPATIENT
Start: 2018-03-14 | End: 2018-03-16

## 2018-03-14 RX ORDER — SODIUM CHLORIDE 9 MG/ML
100 INJECTION, SOLUTION INTRAVENOUS CONTINUOUS
Status: DISCONTINUED | OUTPATIENT
Start: 2018-03-14 | End: 2018-03-16

## 2018-03-14 RX ORDER — DEXTROSE MONOHYDRATE 25 G/50ML
25 INJECTION, SOLUTION INTRAVENOUS
Status: DISCONTINUED | OUTPATIENT
Start: 2018-03-14 | End: 2018-03-16

## 2018-03-14 RX ORDER — OXYCODONE HYDROCHLORIDE AND ACETAMINOPHEN 5; 325 MG/1; MG/1
1 TABLET ORAL EVERY 4 HOURS PRN
Status: DISCONTINUED | OUTPATIENT
Start: 2018-03-14 | End: 2018-03-16

## 2018-03-14 RX ORDER — PROMETHAZINE HYDROCHLORIDE 12.5 MG/1
12.5 TABLET ORAL EVERY 6 HOURS PRN
Status: DISCONTINUED | OUTPATIENT
Start: 2018-03-14 | End: 2018-03-16

## 2018-03-14 RX ORDER — HALOPERIDOL 5 MG/ML
1 INJECTION INTRAMUSCULAR ONCE
Status: DISCONTINUED | OUTPATIENT
Start: 2018-03-14 | End: 2018-03-16

## 2018-03-14 RX ORDER — FLUOXETINE HYDROCHLORIDE 20 MG/1
40 CAPSULE ORAL DAILY
Status: DISCONTINUED | OUTPATIENT
Start: 2018-03-14 | End: 2018-03-16

## 2018-03-14 RX ORDER — PANTOPRAZOLE SODIUM 40 MG/1
40 TABLET, DELAYED RELEASE ORAL
Status: DISCONTINUED | OUTPATIENT
Start: 2018-03-14 | End: 2018-03-15

## 2018-03-14 RX ORDER — L.ACID,PARA/B.BIFIDUM/S.THERM 8B CELL
1 CAPSULE ORAL DAILY
Status: DISCONTINUED | OUTPATIENT
Start: 2018-03-14 | End: 2018-03-14

## 2018-03-14 RX ORDER — ONDANSETRON 2 MG/ML
INJECTION INTRAMUSCULAR; INTRAVENOUS
Status: COMPLETED
Start: 2018-03-14 | End: 2018-03-14

## 2018-03-14 RX ORDER — MAGNESIUM SULFATE 1 G/100ML
1 INJECTION INTRAVENOUS AS NEEDED
Status: DISCONTINUED | OUTPATIENT
Start: 2018-03-14 | End: 2018-03-16

## 2018-03-14 RX ORDER — OXYCODONE HYDROCHLORIDE AND ACETAMINOPHEN 5; 325 MG/1; MG/1
1 TABLET ORAL ONCE
Status: COMPLETED | OUTPATIENT
Start: 2018-03-14 | End: 2018-03-14

## 2018-03-14 RX ORDER — ONDANSETRON 2 MG/ML
4 INJECTION INTRAMUSCULAR; INTRAVENOUS EVERY 6 HOURS PRN
Status: DISCONTINUED | OUTPATIENT
Start: 2018-03-14 | End: 2018-03-16

## 2018-03-14 RX ORDER — PROMETHAZINE HYDROCHLORIDE 12.5 MG/1
12.5 SUPPOSITORY RECTAL EVERY 6 HOURS PRN
Status: DISCONTINUED | OUTPATIENT
Start: 2018-03-14 | End: 2018-03-16

## 2018-03-14 RX ORDER — SODIUM CHLORIDE, SODIUM LACTATE, POTASSIUM CHLORIDE, CALCIUM CHLORIDE 600; 310; 30; 20 MG/100ML; MG/100ML; MG/100ML; MG/100ML
125 INJECTION, SOLUTION INTRAVENOUS CONTINUOUS
Status: ACTIVE | OUTPATIENT
Start: 2018-03-14 | End: 2018-03-14

## 2018-03-14 RX ORDER — CLINDAMYCIN PHOSPHATE 150 MG/ML
600 INJECTION, SOLUTION INTRAVENOUS EVERY 8 HOURS SCHEDULED
Status: DISCONTINUED | OUTPATIENT
Start: 2018-03-14 | End: 2018-03-14

## 2018-03-14 RX ORDER — MAGNESIUM SULFATE HEPTAHYDRATE 40 MG/ML
4 INJECTION, SOLUTION INTRAVENOUS AS NEEDED
Status: DISCONTINUED | OUTPATIENT
Start: 2018-03-14 | End: 2018-03-16

## 2018-03-14 RX ORDER — NICOTINE POLACRILEX 4 MG
15 LOZENGE BUCCAL
Status: DISCONTINUED | OUTPATIENT
Start: 2018-03-14 | End: 2018-03-16

## 2018-03-14 RX ORDER — PETROLATUM 42 G/100G
1 OINTMENT TOPICAL
Status: DISCONTINUED | OUTPATIENT
Start: 2018-03-14 | End: 2018-03-16

## 2018-03-14 RX ORDER — MORPHINE SULFATE 2 MG/ML
2 INJECTION, SOLUTION INTRAMUSCULAR; INTRAVENOUS EVERY 4 HOURS PRN
Status: DISCONTINUED | OUTPATIENT
Start: 2018-03-14 | End: 2018-03-16

## 2018-03-14 RX ORDER — POTASSIUM CHLORIDE 20 MEQ/1
40 TABLET, EXTENDED RELEASE ORAL AS NEEDED
Status: DISCONTINUED | OUTPATIENT
Start: 2018-03-14 | End: 2018-03-16

## 2018-03-14 RX ORDER — LORAZEPAM 2 MG/ML
INJECTION INTRAMUSCULAR
Status: COMPLETED
Start: 2018-03-14 | End: 2018-03-14

## 2018-03-14 RX ORDER — LOSARTAN POTASSIUM 50 MG/1
50 TABLET ORAL
Status: DISCONTINUED | OUTPATIENT
Start: 2018-03-14 | End: 2018-03-14

## 2018-03-14 RX ORDER — LOSARTAN POTASSIUM 50 MG/1
100 TABLET ORAL
Status: DISCONTINUED | OUTPATIENT
Start: 2018-03-14 | End: 2018-03-16

## 2018-03-14 RX ORDER — TEMAZEPAM 15 MG/1
15 CAPSULE ORAL NIGHTLY PRN
Status: DISCONTINUED | OUTPATIENT
Start: 2018-03-14 | End: 2018-03-16

## 2018-03-14 RX ORDER — MAGNESIUM SULFATE HEPTAHYDRATE 40 MG/ML
2 INJECTION, SOLUTION INTRAVENOUS AS NEEDED
Status: DISCONTINUED | OUTPATIENT
Start: 2018-03-14 | End: 2018-03-16

## 2018-03-14 RX ORDER — LORAZEPAM 2 MG/ML
0.5 INJECTION INTRAMUSCULAR ONCE
Status: COMPLETED | OUTPATIENT
Start: 2018-03-14 | End: 2018-03-14

## 2018-03-14 RX ORDER — L.ACID,PARA/B.BIFIDUM/S.THERM 8B CELL
1 CAPSULE ORAL DAILY
Status: DISCONTINUED | OUTPATIENT
Start: 2018-03-14 | End: 2018-03-16

## 2018-03-14 RX ORDER — POTASSIUM CHLORIDE 1.5 G/1.77G
40 POWDER, FOR SOLUTION ORAL AS NEEDED
Status: DISCONTINUED | OUTPATIENT
Start: 2018-03-14 | End: 2018-03-16

## 2018-03-14 RX ADMIN — SODIUM CHLORIDE 100 ML/HR: 9 INJECTION, SOLUTION INTRAVENOUS at 09:27

## 2018-03-14 RX ADMIN — SODIUM CHLORIDE, POTASSIUM CHLORIDE, SODIUM LACTATE AND CALCIUM CHLORIDE 125 ML/HR: 600; 310; 30; 20 INJECTION, SOLUTION INTRAVENOUS at 01:40

## 2018-03-14 RX ADMIN — SODIUM CHLORIDE 1000 ML: 9 INJECTION, SOLUTION INTRAVENOUS at 09:48

## 2018-03-14 RX ADMIN — ONDANSETRON 4 MG: 2 INJECTION INTRAMUSCULAR; INTRAVENOUS at 04:02

## 2018-03-14 RX ADMIN — ONDANSETRON 4 MG: 2 INJECTION INTRAMUSCULAR; INTRAVENOUS at 19:58

## 2018-03-14 RX ADMIN — LORAZEPAM 0.5 MG: 2 INJECTION INTRAMUSCULAR at 04:58

## 2018-03-14 RX ADMIN — MORPHINE SULFATE 2 MG: 2 INJECTION, SOLUTION INTRAMUSCULAR; INTRAVENOUS at 21:45

## 2018-03-14 RX ADMIN — OXYCODONE HYDROCHLORIDE AND ACETAMINOPHEN 1 TABLET: 5; 325 TABLET ORAL at 01:41

## 2018-03-14 RX ADMIN — Medication 10 ML: at 19:54

## 2018-03-14 RX ADMIN — AMLODIPINE BESYLATE 2.5 MG: 2.5 TABLET ORAL at 09:49

## 2018-03-14 RX ADMIN — VANCOMYCIN HYDROCHLORIDE 2000 MG: 1 INJECTION, POWDER, LYOPHILIZED, FOR SOLUTION INTRAVENOUS at 12:00

## 2018-03-14 RX ADMIN — METOPROLOL TARTRATE 25 MG: 25 TABLET ORAL at 06:41

## 2018-03-14 RX ADMIN — VANCOMYCIN HYDROCHLORIDE 2000 MG: 1 INJECTION, POWDER, LYOPHILIZED, FOR SOLUTION INTRAVENOUS at 16:35

## 2018-03-14 RX ADMIN — FLUOXETINE 40 MG: 20 CAPSULE ORAL at 09:49

## 2018-03-14 RX ADMIN — METOPROLOL TARTRATE 2.5 MG: 5 INJECTION, SOLUTION INTRAVENOUS at 23:57

## 2018-03-14 RX ADMIN — LOSARTAN POTASSIUM 100 MG: 50 TABLET ORAL at 04:18

## 2018-03-14 RX ADMIN — LORAZEPAM 0.5 MG: 2 INJECTION INTRAMUSCULAR; INTRAVENOUS at 04:58

## 2018-03-14 RX ADMIN — PIPERACILLIN SODIUM AND TAZOBACTAM SODIUM 3.38 G: 3; .375 INJECTION, POWDER, LYOPHILIZED, FOR SOLUTION INTRAVENOUS at 13:50

## 2018-03-14 RX ADMIN — ONDANSETRON 4 MG: 2 INJECTION INTRAMUSCULAR; INTRAVENOUS at 10:36

## 2018-03-14 RX ADMIN — METOPROLOL TARTRATE 2.5 MG: 5 INJECTION, SOLUTION INTRAVENOUS at 15:20

## 2018-03-14 RX ADMIN — PETROLATUM 1 APPLICATION: 42 OINTMENT TOPICAL at 09:50

## 2018-03-14 RX ADMIN — PIPERACILLIN SODIUM,TAZOBACTAM SODIUM 3.38 G: 3; .375 INJECTION, POWDER, FOR SOLUTION INTRAVENOUS at 18:59

## 2018-03-14 RX ADMIN — Medication 1 CAPSULE: at 09:50

## 2018-03-14 RX ADMIN — OXYCODONE HYDROCHLORIDE AND ACETAMINOPHEN 1 TABLET: 5; 325 TABLET ORAL at 06:41

## 2018-03-14 NOTE — CONSULTS
CC: L 4th toe laceration repair dehiscence and infection.    HPI: The patient is a 53 y/o W male who was admitted to this facility on 3/4/18 after he sustained an open dislocation w/o fracture to his L 4th toe in a fall at home striking the toe on a piece of furniture. The patient was taken to the OR on 3/5/18 and the dislocation was reduced and the toe laceration irrigated and closed. He was discharged home the following day with a prescription for Clindamycin 300mg, 1 po QID x 7 days. Beginning last Wednesday the patient began falling at home, including several times in his bathroom. He has fallen with such force that he displaced the toilet from the floor causing some water to spill out onto the floor. He apparently continued to use the bathroom and slipped on the wet floor falling the next day. He missed his follow up appointment in my office last Thursday, 3/8/18, and was argumentative with his family when they tried to get him ready for the appointment. They were able to convince him to go to the ER at this facility on Friday, 3/9/2018, where he was evaluated following the fall on Thursday. His mother reports that his foot was not assessed at that time. Review of the encounter note shows that the foot was dressed at the time of ER exam.  He was discharged home with Rx for Percocet for the pain associated with multiple contusions from the fall. He continued to experience falls and deteriorated in terms of alertness and became more confused. He was brought to the ER last PM with confusion and mental status changes. He is admitted with sepsis due to infection in his L 4th toe and cellulitis is diffusely present dorsally on the foot and anteriorly on his leg. He complains of R medial midfoot and lateral column pain which is not new complaint since undergoing arthrodesis procedures in the midfoot last summer due to dislocation of the tarsal-metatarsal intertarsal joints also due to a fall at home. He is complaining  of abdominal pain for preceding several days and has had diarrhea and projectile vomiting this morning apparently with NG tube placement. He also complains of pain in his rib cage (R>L) side due to recent falls. He reports fever and chills for the past two days.    Past Medical History:   Diagnosis Date   • Arthritis    • Bipolar disorder    • Cellulitis of lower extremity     RIGHT   • Coronary artery disease    • Diabetes mellitus    • Disease of thyroid gland     nodule on thyroid   • DVT (deep venous thrombosis)    • Fracture of right foot    • Hyperlipidemia    • Hypertension    • Pseudogout    • Septic shock 07/2017    H/O   • Toxic metabolic encephalopathy 07/2017    H/O       Past Surgical History:   Procedure Laterality Date   • 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   • HARDWARE REMOVAL Right 8/18/2017    Procedure: RIGHT EXTERNAL FIXATOR REMOVAL;  Surgeon: Anish Farah DPM;  Location:  [facility];  Service:    • JOINT REPLACEMENT     • KNEE SURGERY     • ORIF FOOT FRACTURE Right 7/29/2017    Procedure: open reduction with percutaneous pinning of midfoot dislocation fracture;  Surgeon: Anish Farah DPM;  Location:  [facility];  Service:    • ORIF FOOT FRACTURE Left 3/5/2018    Procedure: OPEN REDUCTION WITH IRRIGATION AND WOUND CLOSURE LEFT FOURTH TOE;  Surgeon: Anish Farah DPM;  Location:  [facility];  Service:    • TOE FUSION Right 8/18/2017    Procedure: RIGHT  MEDIAL COLUMN ARTHRODESIS, RIGHT TARAL METATARSAL ARTHRODESIS;  Surgeon: Anish Farah DPM;  Location:  [facility];  Service:    • TOTAL HIP ARTHROPLASTY       Medications  Scheduled Meds:  amLODIPine 2.5 mg Oral Daily   FLUoxetine 40 mg Oral Daily   haloperidol lactate 1 mg Intramuscular Once   hydrophor 1 application Topical Q24H   insulin aspart 0-7 Units Subcutaneous 4x Daily AC & at Bedtime   lactobacillus acidophilus 1 capsule Oral Daily   losartan 100 mg Oral Q24H   metoprolol tartrate 2.5 mg Intravenous Q6H   pantoprazole  "40 mg Oral Q AM   piperacillin-tazobactam 3.375 g Intravenous Once   piperacillin-tazobactam 3.375 g Intravenous Q8H   vancomycin 2,000 mg Intravenous Q8H   Continuous Infusions:  Pharmacy to dose vancomycin     sodium chloride 100 mL/hr Last Rate: 100 mL/hr (03/14/18 0927)   PRN Meds:.•  acetaminophen  •  calcium carbonate  •  dextrose  •  dextrose  •  glucagon (human recombinant)  •  magnesium sulfate **OR** magnesium sulfate in D5W 1g/100mL (PREMIX) **OR** magnesium sulfate  •  ondansetron  •  oxyCODONE-acetaminophen  •  Pharmacy to dose vancomycin  •  potassium chloride **OR** potassium chloride **OR** potassium chloride  •  promethazine **OR** promethazine **OR** promethazine  •  sodium chloride  •  sodium chloride  •  sodium chloride  •  temazepam     Allergies   Allergen Reactions   • Lisinopril Cough     COUGHING     Social History   Substance Use Topics   • Smoking status: Never Smoker   • Smokeless tobacco: Never Used   • Alcohol use No      Comment: h/o alcohol consumption-quit 10 years 8 months     Family History   Problem Relation Age of Onset   • Arthritis Mother    • Cancer Mother    • Hyperlipidemia Mother    • Arthritis Father    • Cancer Father    • Depression Father    • Hypertension Father    • Mental illness Father    • Cancer Sister    • Arthritis Sister    • Hyperlipidemia Sister    • Cancer Paternal Aunt    • Hypertension Daughter    • Depression Son    • Hyperlipidemia Paternal Grandmother    • Hearing loss Paternal Grandfather    • Hyperlipidemia Paternal Grandfather    • Hypertension Paternal Grandfather      Review of Systems  Constitutional: Fever, chills, \"worn down\" feeling  HEENT: Headache, decreased vision acuity  Respiratory: SOB with exertion, sleep apnea  Cardiovascular: No chest pain/pressure, no palpitations, no lower extremity swelling or intermittent claudication  GI: No abdominal pain since NG placement, vomiting x 1 episode this AM, diarrhea this AM  : No dysuria, " hematuria  Immunologic: No recurrent fevers or infections, no immunocompromised status  Endocrine: Polydipsia; no polyphagia/polyuria, no heat/cold intolerance  Hematologic: No bruising or bleeding tendency, no lymphadenopathy  Neurologic: Bilateral pedal numbness associated with diabetic peripheral neuropathy  Musculoskeletal: Left foot swelling, right midfoot pain  Dermatologic: Diffuse dorsal left foot cellulitis, anterior left lower leg cellulitis, full-thickness open wound plantar left 4th toe, multiple bruises and scabs throughout body associated with multiple falls  Psychiatric: Confusion, forgetfulness, bipolar    Physical Examination  General: The patient is examined while he is lying supine position on his hospital bed. An NG tube is in place. The patient is awake, alert and responsive to questioning. He is oriented to person and place but not date. He appears disheveled, confused and somewhat uncomfortable.  Vitals:    03/14/18 1000 03/14/18 1100 03/14/18 1200 03/14/18 1300   BP: 119/88  (!) 165/102 (!) 157/103   BP Location:       Patient Position:       Pulse: 110 120 115 118   Resp: 22 22 18   Temp:       TempSrc:       SpO2: 97% 99% 94% 97%   Weight:       Height:         Lower Extremity Examination: Bilateral lower extremity examination is performed and findings are as follows.  Vascular: Both feet are warm and appear adequately perfused.Pedal pulses are palpable. Capillary refill to the toes is brisk. 2+ left foot, ankle, lower leg edema is present. No significant right foot, ankle or lower leg swelling is present.  Neurologic: Light touch sensation is absent to the plantar aspects of both feet consistent with loss of protective sensation.  Dermatologic: Diffuse erythema is present over the dorsal left forefoot and midfoot ending at the level of the ankle consistent with cellulitis. Above the ankle, cellulitis continues anteriorly on the  lower leg to the level of the upper third of the leg. Calor  is present at these cellulitic areas. There is a full thickness dehiscence at the plantar aspect of the left 4th toe at the laceration repair. The non-absorbable sutures have pulled through and failed to keep the skin edges approximated. The wound edges appear to contain yellow slough. There is no expressible drainage or active  bleeding present at this time. No foul odor is detected at the wound site. The flexor tendon is visible in the deep wound. The digit is erythematous and edematous as well. The toe webspaces appear clear. Dry scabs are present at the dorsal-lateral left 2nd toe, dorsal medial left 4th toe, dorsal right 2nd/3rd toe and the distal right 5th toe. There is also a superficial laceration at the dorsal aspect of the right 4th toe. Dry scabs are also present at the anterior aspect of the right lower leg and overlying both patellae  Musculoskeletal: Active ankle dorsiflexion and plantarflexion is present. Subtalar inversion and eversion is actively present without significant restriction and no pain with motion or palpation. There is reduced right midtarsal and tarsal-metatarsal range of motion due to arthrodesis of the medial column on the right foot. There does seem to be some minor motion present at the navicular-cuneiform joint which is concerning for incomplete union and/or fixation failure. The left midtarsal and tarsometatarsal joint is without limitation or pain with motion.  Orthopedic: Medial prominence at the right navicular tuberosity is noted with tenderness to palpation. There is localized tenderness to the 4th/5th metatarsal-cuboid and calcaneocuboid joint on the right foot as well. Bilateral hallux malleus is present. Semi-rigid clawtoe contracture of the B/L 2nd-4th and R 5th toes is noted. The left 4th toe dislocation remains reduced.    Laboratory and Diagnostic Results as of 3/14/2018 18:18   Ref. Range 3/9/2018 14:49 3/9/2018 16:36 3/9/2018 17:27 3/13/2018 16:49 3/13/2018 17:20  3/14/2018 04:01 3/14/2018 09:07 3/14/2018 09:24 3/14/2018 12:21 3/14/2018 14:32 3/14/2018 16:32   Glucose Latest Ref Range: 70 - 130 mg/dL 94   120 (H)  152 (H)    123 128   Sodium Latest Ref Range: 136 - 145 mmol/L 139   136  140        Potassium Latest Ref Range: 3.5 - 5.2 mmol/L 3.9   3.4 (L)  3.4 (L)        CO2 Latest Ref Range: 22.0 - 29.0 mmol/L 21.5 (L)   13.3 (L)  19.7 (L)        Chloride Latest Ref Range: 98 - 107 mmol/L 105   100  105        Anion Gap Latest Units: mmol/L 12.5   22.7  15.3        Creatinine Latest Ref Range: 0.76 - 1.27 mg/dL 0.53 (L)   0.77  0.63 (L)        BUN Latest Ref Range: 6 - 20 mg/dL 10   21 (H)  25 (H)        BUN/Creatinine Ratio Latest Ref Range: 7.0 - 25.0  18.9   27.3 (H)  39.7 (H)        Calcium Latest Ref Range: 8.6 - 10.5 mg/dL 8.4 (L)   8.7  8.5 (L)        eGFR Non  Amer Latest Ref Range: >60 mL/min/1.73 >150   105  133        Alkaline Phosphatase Latest Ref Range: 40 - 129 U/L 91   112          Total Protein Latest Ref Range: 6.0 - 8.5 g/dL 6.7   7.2          ALT (SGPT) Latest Ref Range: 5 - 41 U/L 31   21          AST (SGOT) Latest Ref Range: 5 - 40 U/L 39   20          Total Bilirubin Latest Ref Range: 0.2 - 1.2 mg/dL 0.2   0.3          Albumin Latest Ref Range: 3.50 - 5.20 g/dL 3.50   4.00          Globulin Latest Units: gm/dL 3.2   3.2          A/G Ratio Latest Units: g/dL 1.1   1.3          Acetone Latest Ref Range: Negative      Negative         Ammonia Latest Ref Range: 16 - 60 umol/L   56     38      C-Reactive Protein Latest Ref Range: 0.00 - 0.50 mg/dL      12.18 (H)        Lactate, Venous Latest Ref Range: 0.5 - 2.0 mmol/L    1.8          Lipase Latest Ref Range: 13 - 60 U/L  26            Magnesium Latest Ref Range: 1.7 - 2.5 mg/dL       1.7       Procalcitonin Latest Ref Range: 0.10 - 0.25 ng/mL        0.19      Fibrinogen Latest Ref Range: 200 - 400 mg/dL         626 (H)     Protime Latest Ref Range: 12.1 - 15.0 Seconds   13.5   19.9 (H)        INR  Latest Ref Range: 0.90 - 1.10    1.03   1.67 (H)        aPTT Latest Ref Range: 24.3 - 38.1 seconds   32.5      58.9 (H)     WBC Latest Ref Range: 4.80 - 10.80 10*3/mm3 8.81   12.28 (H)  13.16 (H)        RBC Latest Ref Range: 4.70 - 6.10 10*6/mm3 4.63 (L)   5.31  4.41 (L)        Hemoglobin Latest Ref Range: 14.0 - 18.0 g/dL 13.7 (L)   15.5  13.1 (L)        Hematocrit Latest Ref Range: 42.0 - 52.0 % 40.7 (L)   46.3  38.3 (L)        RDW Latest Ref Range: 11.5 - 14.5 % 13.4   13.9  13.9        MCV Latest Ref Range: 80.0 - 94.0 fL 87.9   87.2  86.8        MCH Latest Ref Range: 27.0 - 31.0 pg 29.6   29.2  29.7        MCHC Latest Ref Range: 31.0 - 37.0 g/dL 33.7   33.5  34.2        MPV Latest Ref Range: 7.4 - 10.4 fL 11.1 (H)   10.0  9.9        Platelets Latest Ref Range: 140 - 500 10*3/mm3 146   302  355        RDW-SD Latest Ref Range: 37.0 - 54.0 fl 42.7   43.9  43.0        Neutrophil % Latest Ref Range: 45.0 - 70.0 % 74.2 (H)   78.1 (H)  78.3 (H)        Lymphocyte % Latest Ref Range: 20.0 - 45.0 % 13.1 (L)   10.0 (L)  10.5 (L)        Monocyte % Latest Ref Range: 3.0 - 8.0 % 9.2 (H)   10.8 (H)  10.3 (H)        Eosinophil % Latest Ref Range: 0.0 - 4.0 % 3.0   0.1  0.0        Basophil % Latest Ref Range: 0.0 - 2.0 % 0.2   0.2  0.2        Immature Grans % Latest Ref Range: 0.0 - 0.5 % 0.3   0.8 (H)  0.7 (H)        Neutrophils, Absolute Latest Ref Range: 1.50 - 8.30 10*3/mm3 6.54   9.59 (H)  10.32 (H)        Lymphocytes, Absolute Latest Ref Range: 0.60 - 4.80 10*3/mm3 1.15   1.23  1.38        Monocytes, Absolute Latest Ref Range: 0.00 - 1.00 10*3/mm3 0.81   1.33 (H)  1.35 (H)        Eosinophils, Absolute Latest Ref Range: 0.10 - 0.30 10*3/mm3 0.26   0.01 (L)  0.00 (L)        Basophils, Absolute Latest Ref Range: 0.00 - 0.20 10*3/mm3 0.02   0.02  0.02        Immature Grans, Absolute Latest Ref Range: 0.00 - 0.03 10*3/mm3 0.03   0.10 (H)  0.09 (H)        WBC Morphology Latest Ref Range: Normal  Normal             RBC  Morphology Latest Ref Range: Normal  Normal             Platelet Morphology Latest Ref Range: Normal  Normal             nRBC Latest Ref Range: 0.0 - 0.0 /100 WBC 0.0   0.0  0.0        Sed Rate Latest Ref Range: 0 - 20 mm/hr      24 (H)          LEFT TOES RADIOGRAPHIC FINDINGS: 03/13/2018  2 views of the left 4 toe.  No visible fracture. Apparent interval reduction of the fourth digit dislocation..  Status post amputation through the mid fifth metatarsal bone. No aggressive appearing bone change or soft tissue gas..   No foreign body.  IMPRESSION: Apparent interval reduction of the left digit dislocation. Otherwise no acute change from 3/4/2018.  This report was finalized on 3/14/2018 12:03 PM by Dr. Valente Hooks MD.     CHEST RADIOGRAPHIC FINDINGS: 03/13/2018  PA and lateral views of the chest. Stable heart size. Persistent low lung volumes. No dense consolidation. No pneumothorax or pleural effusions.  IMPRESSION: Stable low lung volumes. No acute change compared with 3/9/2018.  This report was finalized on 3/14/2018 7:46 AM by Dr. Valente Hooks Md.    Assessment:  1. Acute metabolic encephalopathy.  2. Sepsis secondary to left foot infection; S/P repair/reduction of open dislocation of left fourth toe PIPJ on 3/5/18.   3. Probable upper GI bleed.  4. Normocytic anemia with H/O chronic macrocytic anemia.   5. Coagulopathy: INR 1.67.  6. H/O Chronic splenic vein thrombosis.   7. Uncontrolled Hypertension.  8. DM 2 with peripheral neuropathy.   9. Bipolar disorder.  10. Obesity: Body mass index is 36.81 kg/m².   11. H/O dyslipidemia.  12. Benign essential tremor.   13. Hypokalemia.  14. H/O DVT. SCD use advised if patient will keep them on extremities.    Treatment Plan  1. The patient is examined and the laboratory and diagnostic results are reviewed.  2. I had the opportunity to discuss the patient's condition with the patient's daughter and mother at separate times. They both report that he has been falling  regularly at home. They are also concerned re: his mental status changes and his inability to care for himself. He was found at home with fecal material all over his bedroom and himself. His mother believes he was trying to empty the bedside commode and fell with the bucket as opposed to defacating directly on the floor. Needless to say his family does not believe he can safely continue to live by himself at his home due to the falls and inability to care for himself.  3. The patient's foot is examined and the plantar open dislocation repair site has fully dehisced with sutures pulling thru and the wound opening. The wound is full thickness with flexor tendon exposure. Despite the multiple falls his L 4th PIPJ has remained reduced. The operative site is obviously infected and I would anticipate a polymicrobial infection given the potential for exposure to feces, urine and toilet water. Agree with the IV Vancomycin and Zosyn combination. ID consult is pending.  4. The cellulitic areas on the dorsal foot and anterior leg are traced with a skin marker to monitor progression or regression of the cellulitis with antibiotic therapy. L 4th toe wound is dressed with betadine solution moistened gauze, dry gauze and Kerlix. Dressing orders charted.  5. If the patient is to be out of bed at all he should be immobilized in surgical shoe on the L foot.  6. Will order R foot radiographs to assess the hardware at the midfoot arthrodesis sites as he is complaining of pain present in the R foot as well.   7. I will see how the patient does in terms of response to the antibiotics but more than likely he will require amputation of the 4th toe in the next few days.   8. Discussed case with the Hospitalist team.

## 2018-03-14 NOTE — NURSING NOTE
During 1800 hourly round patient found to have removed his NG tube, Bloody discharge noted from left nostril.  Right nostril contra indicated secondary to noted deviated septum.  Will monitor patient for continued signs of nausea and vomiting and reassess need for NG tube to low wall suction.    Jose Rodrigues RN

## 2018-03-14 NOTE — ED NOTES
Patient reportedly has fallen several times in the past week. He has large abrasions surrounded by redness on both knees. Bruising noted on patients arms and abdomen. He reportedly had  an injury to his left 4th toe that had been sutured previously. That injury has pulled apart and although sutures are visible the wound is open.     Emily Ferreira RN  03/13/18 2264

## 2018-03-14 NOTE — ED PROVIDER NOTES
"Subjective   History of Present Illness  History of Present Illness  The patient's mother and the patient are the historians.  · Chief complaint: Confused and falling    · Location: The patient has bruises all over from falling.    · Quality/Severity: The patient's mother reports that he has been confused and that the confusion comes and goes.  The patient does not elaborate on the confusion.    · Timing/Onset: The patient's mother states that he's been confused for a couple weeks, but the patient was seen here last Friday for an injury to his left forth toe at which time the nursing staff states he was not confused and behaved normally.    · Modifying Factors: No aggravating or relieving factors are noted.    · Associated symptoms: He reports a cough, but can't articulate whether he has sputum.  He also reports some discomfort with urination and urinary frequency.  He also reports some abdominal discomfort.    · Narrative: The patient is a 52-year-old white male that was brought in by his mother for confusion and falling a lot.  She states that he's been confused for a couple weeks and the confusion comes and goes.  The patient was recently seen here last Friday for an injury to his left fourth toe, and the nursing staff remembers him and he was not confused at that time.  The patient's mother states that he gets confused a couple times a year and that is frequently due to \"infection in his blood\".  She states this occurs about 3-4 times a year and he was hospitalized here for last year.  Review of the records and time show that he was admitted here in September for metabolic encephalitis and left lower extremity cellulitis.  Review of his records and update show that he underwent open reduction of a dislocation of left fourth toe with wound cleansing and closing on 3/5/18 by Dr. Farah.  His mother states that he is a history of MRSA since knee surgery in 2002.  He also has a history of bipolar disorder, type 2 " diabetes, DVT, hypertension, hyperlipidemia, and a history of septic shock.  Social history the patient lives at home alone, he doesn't smoke, stopped drinking alcohol 10 years ago.    ED Triage Vitals   Temp Heart Rate Resp BP SpO2   03/13/18 1525 03/13/18 1525 03/13/18 1525 03/13/18 1533 03/13/18 1525   98.5 °F (36.9 °C) 114 24 (!) 161/107 96 %      Temp src Heart Rate Source Patient Position BP Location FiO2 (%)   03/13/18 1525 03/13/18 2159 03/13/18 2159 03/13/18 2159 --   Oral Monitor Lying Right arm        Review of Systems   Reason unable to perform ROS: The patient is a poor historian and answers some of my review of system questions, but I'm not sure how reliable he was.   Constitutional: Negative for fever.   HENT: Positive for congestion. Negative for rhinorrhea and sore throat.    Respiratory: Positive for cough. Negative for shortness of breath.    Cardiovascular: Negative for chest pain.   Gastrointestinal: Positive for abdominal pain. Negative for diarrhea, nausea and vomiting.   Genitourinary: Positive for dysuria and frequency. Negative for scrotal swelling and testicular pain.   Musculoskeletal: Negative for back pain, neck pain and neck stiffness.   Neurological: Negative for weakness and headaches.   Psychiatric/Behavioral: Positive for confusion.       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   • HARDWARE REMOVAL Right 8/18/2017    Procedure: RIGHT  EXTERNAL FIXATOR REMOVAL;  Surgeon: Anish Farah DPM;  Location:  [facility];  Service:    • JOINT REPLACEMENT     • KNEE SURGERY     • ORIF FOOT FRACTURE Right 7/29/2017    Procedure: open reduction with percutaneous pinning of midfoot dislocation fracture;  Surgeon: Anish Farah DPM;  Location:  [facility];  Service:    • ORIF FOOT FRACTURE Left 3/5/2018    Procedure: OPEN REDUCTION WITH IRRIGATION AND WOUND CLOSURE LEFT FOURTH TOE;  Surgeon: Anish Farah DPM;  Location:  [facility];  Service:    • TOE FUSION Right 8/18/2017    Procedure: RIGHT  MEDIAL COLUMN ARTHRODESIS, RIGHT TARAL METATARSAL ARTHRODESIS;  Surgeon: Anish Farah DPM;  Location:  [facility];  Service:    • TOTAL HIP ARTHROPLASTY         Family History   Problem Relation Age of Onset   • Arthritis Mother    • Cancer Mother    • Hyperlipidemia Mother    • Arthritis Father    • Cancer Father    • Depression Father    • Hypertension Father    • Mental illness Father    • Cancer Sister    • Arthritis Sister    • Hyperlipidemia Sister    • Cancer Paternal Aunt    • Hypertension Daughter    • Depression Son    • Hyperlipidemia Paternal Grandmother    • Hearing loss Paternal Grandfather    • Hyperlipidemia Paternal Grandfather    • Hypertension Paternal Grandfather        Social History     Social History   • Marital status:      Social History Main Topics   • Smoking status: Never Smoker   • Smokeless tobacco: Never Used   • Alcohol use No      Comment: h/o alcohol consumption-quit 10 years 8 months   • Drug use: No   • Sexual activity: Defer     Other Topics Concern   • Not on file           Objective   Physical Exam   Constitutional: He appears well-developed and well-nourished. No distress.   The patient didn't appear in acute distress, but he did appear poorly.  Reviewed his vital signs he is tachycardic with a heart rate of 114, tachypnea with a respiratory rate of 24, he was afebrile and had a normal oxygen saturation of 96%.    HENT:   Head: Normocephalic and atraumatic.   Nose: Nose normal.   Mouth/Throat: Oropharynx is clear and moist. No oropharyngeal exudate.   Eyes: Conjunctivae and EOM are normal. Pupils are equal, round, and reactive to light. Right eye exhibits no discharge. Left eye exhibits no discharge. No scleral icterus.   Neck: Normal range of motion. Neck supple. No JVD present. No thyromegaly present.   Cardiovascular: Normal rate, regular rhythm and normal heart sounds.    No murmur heard.  Pulmonary/Chest: Effort normal and breath sounds normal. He has no wheezes. He has no rales. He exhibits no tenderness.   Abdominal: Soft. Bowel sounds are normal. He exhibits no distension. There is no tenderness.   He had a subacute brownish ecchymosis on his left upper abdominal wall.  His abdomen is soft and nontender.   Musculoskeletal: Normal range of motion. He exhibits no edema, tenderness or deformity.   The patient has a subacute open wound at the plantar base of the left fourth toe with dirt and debris in the wound.  There is no purulent drainage and no peripheral erythema surrounding the wound.  The wound was cultured.  He is status post surgical amputation of the fifth toe of both feet.  He is subacute bruising of the right arm, as well as subacute contusions and abrasions of both knees and legs.  He had extensive of acute ecchymosis.   Lymphadenopathy:     He has no cervical adenopathy.   Neurological: He is alert. No cranial nerve deficit. Coordination normal.   No focal motor sensory deficit.  The patient is oriented to person and the city.  He knew he was in the hospital, thought he was in [facility].  He thought it was 1999.   Skin: Skin is warm and dry. Capillary refill takes less than 2 seconds. No rash noted. He is not diaphoretic.   Numerous diffuse subacute ecchymotic contusions   Psychiatric:   The patient would attempt to answer questions, but was a very poor and unreliable historian.  He did know what city he was  in, but thought it was 1999.   Nursing note and vitals reviewed.      Procedures         ED Course  ED Course   Comment By Time   Reviewed the patient's laboratory studies: His white count was mildly elevated at 12.3 with a left shift, his lactic acid was normal at 1.8.  Hemoglobin, hematocrit and platelets within normal limits.  His CMP had an elevated BUN of 21, a creatinine of 0.77, a glucose that was slightly elevated at 120, and the potassium is low at 3.4.  His urinalysis had 80+ ketones and microscopic hematuria with 13-20 red blood cells.  His reducing substances and white blood cells and bacteria were negative in the urine.  A urine tox was positive for benzodiazepines and barbiturates.  A culture is obtained of his left fourth toe wound, as well as 2 sets of blood cultures.  A chest x-ray showed no acute disease.  CT of his head showed nothing acute, and generalized atrophy. Quinton Davis MD 03/14 0029   It is my impression the patient has a combination of dehydration and acute renal injury.  His confusion may very well be due to infection of a undetermined source.  Antibiotics therapy was initiated with vancomycin 1 g and Rocephin 2 g IV. Quinton Davis MD 03/14 0032   The patient was discussed with Dr. Henriquez, hospitalist, who agreed to admit the patient for other evaluation and treatment. Quinton Davis MD 03/14 0032                  MDM  Number of Diagnoses or Management Options  Acute renal injury: new and requires workup  Confusion associated with infection: new and requires workup  Dehydration: new and requires workup     Amount and/or Complexity of Data Reviewed  Clinical lab tests: ordered and reviewed  Tests in the radiology section of CPT®: ordered and reviewed  Tests in the medicine section of CPT®: ordered and reviewed  Obtain history from someone other than the patient: yes  Review and summarize past medical records: yes  Discuss the patient with other providers: yes  Independent  visualization of images, tracings, or specimens: yes    Risk of Complications, Morbidity, and/or Mortality  General comments: My differential diagnosis for altered mental status includes but is not limited to:  Hypoglycemia, hyperglycemia, DKA, overdose, ethanol intoxication, thiamine deficiency, niacin deficiency, hypothymia, hyperviscosity, Silver’s disease, hyponatremia, hypernatremia, liver failure, kidney failure, hyper or hypothyroid, no insufficiency, hypoxia, hypercarbia, carbon monoxide poisoning, postanoxic encephalopathy, ischemic stroke, intracranial bleed, subarachnoid hemorrhage, brain tumor, closed head injury, epidural hematoma, epidural hematoma, seizure activity, postictal state, syncopal episode, disseminated encephalomyelitis, central pontine myelinolysis, post cardiac arrest, bacterial meningitis, viral meningitis, fungal meningitis, encephalitis, brain abscess, subdural empyema, hysteria, catatonic state, malingering, hypertensive encephalopathy, vasculitis, TTP, DIC    Patient Progress  Patient progress: other (comment) (Symptoms persist)      Final diagnoses:   Confusion associated with infection   Acute renal injury   Dehydration           Labs Reviewed   COMPREHENSIVE METABOLIC PANEL - Abnormal; Notable for the following:        Result Value    Glucose 120 (*)     BUN 21 (*)     Potassium 3.4 (*)     CO2 13.3 (*)     BUN/Creatinine Ratio 27.3 (*)     All other components within normal limits   CBC WITH AUTO DIFFERENTIAL - Abnormal; Notable for the following:     WBC 12.28 (*)     Neutrophil % 78.1 (*)     Lymphocyte % 10.0 (*)     Monocyte % 10.8 (*)     Immature Grans % 0.8 (*)     Neutrophils, Absolute 9.59 (*)     Monocytes, Absolute 1.33 (*)     Eosinophils, Absolute 0.01 (*)     Immature Grans, Absolute 0.10 (*)     All other components within normal limits   URINALYSIS W/ CULTURE IF INDICATED - Abnormal; Notable for the following:     Appearance, UA Slightly Cloudy (*)     Bilirubin,  UA Small (1+) (*)     Blood, UA Moderate (2+) (*)     Protein, UA 30 mg/dL (1+) (*)     All other components within normal limits   URINALYSIS, MICROSCOPIC ONLY - Abnormal; Notable for the following:     RBC, UA 13-20 (*)     WBC, UA 0-2 (*)     All other components within normal limits   URINE DRUG SCREEN - Abnormal; Notable for the following:     Benzodiazepine Screen, Urine Positive (*)     Barbiturates Screen, Urine Positive (*)     All other components within normal limits    Narrative:     Urine drug screen results are to be used for medical purposes only.  They are not to be used for legal purposes such as employment testing.  Negative results do not necessarily mean the complete absence of a subtance, but rather that the result is less than the cutoff for that substance.  Positive results are unconfirmed and considered Preliminary Positive.  [facility] does not automatically confirm Postitive Unconfirmed results.  The physician may request (order) an Unconfirmed Positive result to be sent out for confirmation.      Negative Thresholds for Drugs Screened:    THC screen, urine                          50 ng/ml  Phenycyclidine (PCP), urine                25 ng/ml  Cocaine screen, urine                     150 ng/ml  Methamphetamine, urine                    500 ng/ml  Opiate screen, urine                      100 ng/ml  Amphetamine screen, urine                 500 ng/ml  Benzodiazepine screen, urine              150 ng/ml  Tricyclic Antidepressants screen, urine   300 ng/ml  Methadone screen, urine                   200 ng/ml  Barbiturates screen, urine                200 ng/ml  Oxycodone screen, urine                   100 ng/ml  Propoxyphene screen, urine                300 ng/ml  Buprenorphine screen, urine                10 ng/ml   LACTIC ACID, PLASMA - Normal   BLOOD CULTURE   BLOOD CULTURE   WOUND CULTURE   RAINBOW DRAW    Narrative:     The following orders were created for panel order  [place] Draw.  Procedure                               Abnormality         Status                     ---------                               -----------         ------                     Light Blue Top[[id number]]                                   Final result               Green Top (Gel)[[id number]]                                  Final result               Lavender Top[[id number]]                                     Final result               Gold Top - SST[[id number]]                                   Final result                 Please view results for these tests on the individual orders.   CBC WITH AUTO DIFFERENTIAL   BASIC METABOLIC PANEL   CBC AND DIFFERENTIAL    Narrative:     The following orders were created for panel order CBC & Differential.  Procedure                               Abnormality         Status                     ---------                               -----------         ------                     CBC Auto Differential[[id number]]        Abnormal            Final result                 Please view results for these tests on the individual orders.   LIGHT BLUE TOP   GREEN TOP   LAVENDER TOP   GOLD TOP - SST     CT Head Without Contrast   ED Interpretation   No acute disease.  No change from prior CT 3/9/08. Per Dr Dewitt      XR Chest 2 View   ED Interpretation   Normal lung fields.  No acute disease.             Medication List      No changes were made to your prescriptions during this visit.            Quinton Davis MD  03/14/18 0036

## 2018-03-14 NOTE — CONSULTS
Complaint abdominal pain for several days  History of present illness this 52-year-old white male who I seen on prior occasions was admitted last night with sepsis.  I was asked to see him this morning for abdominal pain.  An NG tube was passed and he said his abdominal pain has resolved.  He has had a recent left foot surgery by Dr. Farah for an open fracture of his left fourth toe and he presents with cellulitis of that.  He has been on some antibiotics for that that has not been taking them as prescribed.  He denies any current abdominal pain he denies any nausea or vomiting he did not have any diarrhea.  Past medical history is significant for a laparoscopic cholecystectomy multiple operations on his feet including a fifth toe amputation and the open fracture on his left fourth toe.  He says he has diabetes, hypertension, coronary artery disease, prior myocardial infarction.  He has had bilateral hip replacements and knee surgery.  His medications at home include Norvasc Cleocin Prozac Neurontin Aquaphor lactobacillus Ativan Cozaar metformin oxycodone Restoril  He says he has an allergy to lisinopril.  Social history he does not smoke and he denies any current alcohol use.  Family history significant for arthritis depression and hypertension hyperlipidemia unknown cancer  Review of systems is significant for recently falling a lot  Physical exam this is a confused white male in no active distress.  His lungs are clear and equal was heart shows a regular rate and rhythm.  He is morbidly obese he has hypoactive bowel sounds his abdomen is soft and nontender without appreciable mass.  Abdominal x-rays were reviewed by me and are very poor technique and little can be discerned from them.  He has some sutures in his left fourth toe with approximately 12 cm area of cellulitis on his left foot.  His white blood count is elevated  His potassium is low at 3.4 spoke os tone is 0.19 BUN is elevated 25 creatinine is 0.63.   His hemoglobin is 13.1 and his white blood count is 13.16 platelet count is 355  Assessment confusion probably multifactorial including multiple medications that could cause confusion and possible sepsis.  His abdominal pain resolved after his NG tube was placed and I feel that it was secondary to gastric distention.  He has a cellulitis of his left foot and his podiatrist needs to be consulted  Recommendation I would continue his NG tube for now.  his abdominal exam is fairly benign I think we can hold off on a CT scan I think it would be less than helpful because I don't think that he would tolerate oral contrast.  I will follow him along

## 2018-03-14 NOTE — PROGRESS NOTES
Physical Exam   Constitutional: He is oriented to person, place, and time.   HENT:   Fruity odor to breath   Abdominal: Soft.   High pitched tinkling bowel sounds  Generalized tenderness   Neurological: He is alert and oriented to person, place, and time.   Speech is slurred, no focal deficits   Skin:   Extensive areas of ecchymosis over entire body including arms/feet/abdomen/legs       Patient more calm/cooperative, able to examine    Suspect possible acute abdomen:  Ordered NGT, stat CT abdomen/pelvis  Change antibiotics to zosyn, d/c rocephin, continue vancomycin  Continue boluses as needed and close monitoring  Check serum acetone on specimen in lab  Check HIT panel/fibrinogen/PTT with extensive bruising, recent lovenox from previous admit    Plan d/w patient who is able to relate that he is having abdominal pain/nausea, constant.  Sister relates that patient's toilet was moved and broken when she found him at home after reportedly falling multiple times over the weekend

## 2018-03-14 NOTE — PROGRESS NOTES
"SERVICE: [facility] HOSPITALIST    CONSULTANTS: Podiatry/ID    CHIEF COMPLAINT: Follow-up confusion    SUBJECTIVE: staff reported overnight patient was extremely confused, frequent stools, covered in feces throughout the night unable to keep him calm, requiring a sitter at the bedside.  He also was noted to room removed IVs however can recall my name when entered room. The patient reports he doesn't feel well. Reportedly mother called staff yesterday to report change in mental status and then brought patient to ER due to increasing confusion.     OBJECTIVE:    BP (!) 175/103 (BP Location: Left arm, Patient Position: Lying)   Pulse 112   Temp 98 °F (36.7 °C) (Oral)   Resp 22   Ht 182.9 cm (72\")   Wt 123 kg (271 lb 6.2 oz)   SpO2 96%   BMI 36.81 kg/m²     MEDS/LABS REVIEWED AND ORDERED    amLODIPine 2.5 mg Oral Daily   ceftriaxone 1 g Intravenous Q24H   FLUoxetine 40 mg Oral Daily   haloperidol lactate 1 mg Intramuscular Once   hydrophor 1 application Topical Q24H   insulin aspart 0-7 Units Subcutaneous 4x Daily AC & at Bedtime   lactobacillus acidophilus 1 capsule Oral Daily   losartan 100 mg Oral Q24H   metoprolol tartrate 2.5 mg Intravenous Q6H   pantoprazole 40 mg Oral Q AM   sodium chloride 1,000 mL Intravenous Once   vancomycin 2,000 mg Intravenous Q8H     Physical Exam   Constitutional: He appears well-developed and well-nourished.   obese   HENT:   Head: Normocephalic and atraumatic.   Eyes: EOM are normal. Pupils are equal, round, and reactive to light.   Cardiovascular:   Tachycardic rate regular rhythm   Pulmonary/Chest: Effort normal and breath sounds normal. No respiratory distress. He has no wheezes.   Abdominal: Soft. Bowel sounds are normal. He exhibits no distension. There is no tenderness.   obese   Musculoskeletal:   Left third fourth toe erythematous, edematous   Neurological: He is alert.   Oriented to self, place   Skin: Skin is warm and dry.   Psychiatric:   Confused "   Vitals reviewed.    LAB/DIAGNOSTICS:    Lab Results (last 24 hours)     Procedure Component Value Units Date/Time    Respiratory Panel, PCR - Swab, Nasopharynx [[id number]]  (Normal) Collected:  03/14/18 0140    Specimen:  Swab from Nasopharynx Updated:  03/14/18 0920     ADENOVIRUS, PCR Not Detected     Coronavirus 229E Not Detected     Coronavirus HKU1 Not Detected     Coronavirus NL63 Not Detected     Coronavirus OC43 Not Detected     Human Metapneumovirus Not Detected     Human Rhinovirus/Enterovirus Not Detected     Influenza B PCR Not Detected     Parainfluenza Virus 1 Not Detected     Parainfluenza Virus 2 Not Detected     Parainfluenza Virus 3 Not Detected     Parainfluenza Virus 4 Not Detected     Bordetella pertussis pcr Not Detected     Influenza A H1 2009 PCR Not Detected     Chlamydophila pneumoniae PCR Not Detected     Mycoplasma pneumo by PCR Not Detected     Influenza A PCR Not Detected     Influenza A H3 Not Detected     Influenza A H1 Not Detected     RSV, PCR Not Detected    Sedimentation Rate [[id number]]  (Abnormal) Collected:  03/14/18 0401    Specimen:  Blood Updated:  03/14/18 0817     Sed Rate 24 (H) mm/hr     C-reactive Protein [[id number]] Collected:  03/14/18 0401    Specimen:  Blood Updated:  03/14/18 0755    Occult Blood Gastric / Duodenum - Gastric Contents, [[id number]]  (Abnormal) Collected:  03/14/18 0512    Specimen:  Gastric Contents Updated:  03/14/18 0532     Gastroccult Positive (A)     pH, Gastric 3    Blood Culture - Blood, [[id number]]  (Normal) Collected:  03/13/18 1720    Specimen:  Blood from Arm, Left Updated:  03/14/18 0531     Blood Culture No growth at less than 24 hours    Protime-INR [[id number]]  (Abnormal) Collected:  03/14/18 0401    Specimen:  Blood Updated:  03/14/18 0526     Protime 19.9 (H) Seconds      INR 1.67 (H)    Narrative:       Therapeutic Ranges for INR: 2.0-3.0 (PT 20-30)                              2.5-3.5 (PT 25-34)    Basic Metabolic Panel  [[id number]]  (Abnormal) Collected:  03/14/18 0401    Specimen:  Blood Updated:  03/14/18 0501     Glucose 152 (H) mg/dL      BUN 25 (H) mg/dL      Creatinine 0.63 (L) mg/dL      Sodium 140 mmol/L      Potassium 3.4 (L) mmol/L      Chloride 105 mmol/L      CO2 19.7 (L) mmol/L      Calcium 8.5 (L) mg/dL      eGFR Non African Amer 133 mL/min/1.73      BUN/Creatinine Ratio 39.7 (H)     Anion Gap 15.3 mmol/L     Narrative:       GFR Normal >60  Chronic Kidney Disease <60  Kidney Failure <15    Blood Culture - Blood, [[id number]]  (Normal) Collected:  03/13/18 1649    Specimen:  Blood from Hand, Left Updated:  03/14/18 0501     Blood Culture No growth at less than 24 hours    CBC & Differential [[id number]] Collected:  03/14/18 0401    Specimen:  Blood Updated:  03/14/18 0444    Narrative:       The following orders were created for panel order CBC & Differential.  Procedure                               Abnormality         Status                     ---------                               -----------         ------                     CBC Auto Differential[[id number]]        Abnormal            Final result                 Please view results for these tests on the individual orders.    CBC Auto Differential [[id number]]  (Abnormal) Collected:  03/14/18 0401    Specimen:  Blood Updated:  03/14/18 0444     WBC 13.16 (H) 10*3/mm3      RBC 4.41 (L) 10*6/mm3      Hemoglobin 13.1 (L) g/dL      Hematocrit 38.3 (L) %      MCV 86.8 fL      MCH 29.7 pg      MCHC 34.2 g/dL      RDW 13.9 %      RDW-SD 43.0 fl      MPV 9.9 fL      Platelets 355 10*3/mm3      Neutrophil % 78.3 (H) %      Lymphocyte % 10.5 (L) %      Monocyte % 10.3 (H) %      Eosinophil % 0.0 %      Basophil % 0.2 %      Immature Grans % 0.7 (H) %      Neutrophils, Absolute 10.32 (H) 10*3/mm3      Lymphocytes, Absolute 1.38 10*3/mm3      Monocytes, Absolute 1.35 (H) 10*3/mm3      Eosinophils, Absolute 0.00 (L) 10*3/mm3      Basophils, Absolute 0.02 10*3/mm3      Immature  Grans, Absolute 0.09 (H) 10*3/mm3      nRBC 0.0 /100 WBC     Urine Drug Screen - Urine, Clean Catch [[id number]]  (Abnormal) Collected:  03/13/18 1659    Specimen:  Urine from Urine, Clean Catch Updated:  03/13/18 2009     THC, Screen, Urine Negative     Phencyclidine (PCP), Urine Negative     Cocaine Screen, Urine Negative     Methamphetamine, Urine Negative     Opiate Screen Negative     Amphetamine Screen, Urine Negative     Benzodiazepine Screen, Urine Positive (A)     Tricyclic Antidepressants Screen Negative     Methadone Screen, Urine Negative     Barbiturates Screen, Urine Positive (A)     Oxycodone Screen, Urine Negative     Propoxyphene Screen Negative     Buprenorphine, Screen, Urine Negative    Narrative:       Urine drug screen results are to be used for medical purposes only.  They are not to be used for legal purposes such as employment testing.  Negative results do not necessarily mean the complete absence of a subtance, but rather that the result is less than the cutoff for that substance.  Positive results are unconfirmed and considered Preliminary Positive.  [facility] does not automatically confirm Postitive Unconfirmed results.  The physician may request (order) an Unconfirmed Positive result to be sent out for confirmation.      Negative Thresholds for Drugs Screened:    THC screen, urine                          50 ng/ml  Phenycyclidine (PCP), urine                25 ng/ml  Cocaine screen, urine                     150 ng/ml  Methamphetamine, urine                    500 ng/ml  Opiate screen, urine                      100 ng/ml  Amphetamine screen, urine                 500 ng/ml  Benzodiazepine screen, urine              150 ng/ml  Tricyclic Antidepressants screen, urine   300 ng/ml  Methadone screen, urine                   200 ng/ml  Barbiturates screen, urine                200 ng/ml  Oxycodone screen, urine                   100 ng/ml  Propoxyphene screen, urine                 300 ng/ml  Buprenorphine screen, urine                10 ng/ml    [place] Draw [[id number]] Collected:  03/13/18 1649    Specimen:  Blood Updated:  03/13/18 1831    Narrative:       The following orders were created for panel order [place] Draw.  Procedure                               Abnormality         Status                     ---------                               -----------         ------                     Light Blue Top[[id number]]                                   Final result               Green Top (Gel)[[id number]]                                  Final result               Lavender Top[[id number]]                                     Final result               Gold Top - SST[[id number]]                                   Final result                 Please view results for these tests on the individual orders.    Light Blue Top [[id number]] Collected:  03/13/18 1720    Specimen:  Blood Updated:  03/13/18 1831     Extra Tube hold for add-on     Comment: Auto resulted       Gold Top - SST [[id number]] Collected:  03/13/18 1720    Specimen:  Blood Updated:  03/13/18 1831     Extra Tube Hold for add-ons.     Comment: Auto resulted.       Green Top (Gel) [[id number]] Collected:  03/13/18 1649    Specimen:  Blood Updated:  03/13/18 1801     Extra Tube Hold for add-ons.     Comment: Auto resulted.       Lavender Top [[id number]] Collected:  03/13/18 1649    Specimen:  Blood Updated:  03/13/18 1801     Extra Tube hold for add-on     Comment: Auto resulted       Comprehensive Metabolic Panel [[id number]]  (Abnormal) Collected:  03/13/18 1649    Specimen:  Blood Updated:  03/13/18 1734     Glucose 120 (H) mg/dL      BUN 21 (H) mg/dL      Creatinine 0.77 mg/dL      Sodium 136 mmol/L      Potassium 3.4 (L) mmol/L      Chloride 100 mmol/L      CO2 13.3 (L) mmol/L      Calcium 8.7 mg/dL      Total Protein 7.2 g/dL      Albumin 4.00 g/dL      ALT (SGPT) 21 U/L      AST (SGOT) 20 U/L      Alkaline Phosphatase 112 U/L       Total Bilirubin 0.3 mg/dL      eGFR Non African Amer 105 mL/min/1.73      Globulin 3.2 gm/dL      A/G Ratio 1.3 g/dL      BUN/Creatinine Ratio 27.3 (H)     Anion Gap 22.7 mmol/L     Urinalysis, Microscopic Only - Urine, Clean Catch [[id number]]  (Abnormal) Collected:  03/13/18 1659    Specimen:  Urine from Urine, Catheter Updated:  03/13/18 1719     RBC, UA 13-20 (A) /HPF      WBC, UA 0-2 (A) /HPF      Bacteria, UA None Seen /HPF      Squamous Epithelial Cells, UA None Seen /HPF      Hyaline Casts, UA None Seen /LPF      Methodology Manual Light Microscopy    Urinalysis With / Culture If Indicated - Urine, Catheter [[id number]]  (Abnormal) Collected:  03/13/18 1659    Specimen:  Urine from Urine, Catheter Updated:  03/13/18 1715     Color, UA Yellow     Appearance, UA Slightly Cloudy (A)     pH, UA 6.0     Specific Gravity, UA 1.015     Glucose, UA Negative     Ketones, UA 80 mg/dL (3+)     Bilirubin, UA Small (1+) (A)     Blood, UA Moderate (2+) (A)     Protein, UA 30 mg/dL (1+) (A)     Leuk Esterase, UA Negative     Nitrite, UA Negative     Urobilinogen, UA 0.2 E.U./dL    Lactic Acid, Plasma [[id number]]  (Normal) Collected:  03/13/18 1649    Specimen:  Blood Updated:  03/13/18 1713     Lactate 1.8 mmol/L     CBC & Differential [[id number]] Collected:  03/13/18 1649    Specimen:  Blood Updated:  03/13/18 1709    Narrative:       The following orders were created for panel order CBC & Differential.  Procedure                               Abnormality         Status                     ---------                               -----------         ------                     CBC Auto Differential[[id number]]        Abnormal            Final result                 Please view results for these tests on the individual orders.    CBC Auto Differential [[id number]]  (Abnormal) Collected:  03/13/18 1649    Specimen:  Blood Updated:  03/13/18 1709     WBC 12.28 (H) 10*3/mm3      RBC 5.31 10*6/mm3      Hemoglobin 15.5 g/dL       Hematocrit 46.3 %      MCV 87.2 fL      MCH 29.2 pg      MCHC 33.5 g/dL      RDW 13.9 %      RDW-SD 43.9 fl      MPV 10.0 fL      Platelets 302 10*3/mm3      Neutrophil % 78.1 (H) %      Lymphocyte % 10.0 (L) %      Monocyte % 10.8 (H) %      Eosinophil % 0.1 %      Basophil % 0.2 %      Immature Grans % 0.8 (H) %      Neutrophils, Absolute 9.59 (H) 10*3/mm3      Lymphocytes, Absolute 1.23 10*3/mm3      Monocytes, Absolute 1.33 (H) 10*3/mm3      Eosinophils, Absolute 0.01 (L) 10*3/mm3      Basophils, Absolute 0.02 10*3/mm3      Immature Grans, Absolute 0.10 (H) 10*3/mm3      nRBC 0.0 /100 WBC     Wound Culture - Wound, Foot, Left [[id number]] Collected:  03/13/18 1645    Specimen:  Wound from Foot, Left Updated:  03/13/18 1648        ASSESSMENT/PLAN:  Transfer to ICU:    1. Acute Metabolic encephalopathy:   2. Sepsis secondary to suspected right foot infection: podiatry/ID consulted  Suspected infection from S/P ORIF left fourth toe 3/5/18 per Dr. Farah  Ativan no effect on mentation  CT head negative for acute findings  Haldol 1 mg IM now/sitter at bedside  Bolus 1 liter IVFs then continue 100 ml/hr  Continue Vancomycin/Rocephin started yesterday, consult ID  BC x 2 pending  Sed rate low at 24, pending CRP/ammonia/stool for CDC/respiratory viral panel    3. Probable UGIB: consult GI  4. Normocytic anemia with H/O chronic macrocytic anemia: hemoglobin 13.1, monitor  Vomiting coffee ground emesis, hemoccult +  Check abdomen film    5. Coagulopathy: INR 1.67  6. H/O Chronic splenic vein thrombosis:  HOLD lovenox, monitor   Emesis hemoccult positive  monitor    7. Uncontrolled Hypertension:   Continue amlodipine 2.5 mg daily/losartan 100 mg daily  Add metoprolol 2.5 mg every 6 hours  monitor    8. DM 2 with peripheral neuropathy:   Add accuchecks ac/hs, low dose SSI  HOLD metformin, monitor    9. Bipolar disorder: confused today, continue prozac 40 mg daily/restoril 15 mg nightly    10. Obesity:  Body mass index is  36.81 kg/m².   Counseled each admit    11. H/O dyslipidemia: no acute issues    12. Benign essential tremor: no acute issues here, previously on mysoline  monitor    13. Hypokalemia:  Add electrolyte protocol, check magnesium    14. Recent ORIF left 4th toe secondary to fracture: await input from Dr. Farah as above    15. H/O DVT: monitor off lovenox, SCDs

## 2018-03-14 NOTE — NURSING NOTE
"Discharge Planning Assessment   Rachel Barr     Patient Name: Eliseo Price  MRN: [id number]  Today's Date: 3/14/2018    Admit Date: 3/13/2018          Discharge Needs Assessment     Row Name 03/14/18 1257       Living Environment    Lives With alone    Current Living Arrangements home/apartment/condo    Potentially Unsafe Housing Conditions unable to assess    Primary Care Provided by self    Provides Primary Care For no one, unable/limited ability to care for self    Quality of Family Relationships helpful   Patient seldom accepts help from family.    Able to Return to Prior Arrangements no    Living Arrangement Comments --   Patient was found in deplorable living conditions - feces on floor, bruises all over his body.         Resource/Environmental Concerns    Transportation Concerns rides, unreliable from others       Transition Planning    Patient/Family Anticipates Transition to long term care facility    Transportation Anticipated family or friend will provide       Discharge Needs Assessment    Readmission Within the Last 30 Days previous discharge plan unsuccessful   Adm 3/4-3/6    Concerns to be Addressed discharge planning;home safety    Concerns Comments --   Pateint has severe neuropathy and poor balance with frequesnt falls.    Equipment Currently Used at Home walker, rolling    Anticipated Changes Related to Illness inability to care for self    Equipment Needed After Discharge --   Uncertain    Discharge Facility/Level of Care Needs --   Will continue to assess    Current Discharge Risk cognitively impaired;lives alone;physical impairment    Discharge Coordination/Progress Spoke with Mr Price, his daughter and his sister at bedside (with permission).  He is oriented to name only.  He lives alone in a house on [place] acres [place] land, in [facility].  His sister (Bere) and his daughter (Ann-Marie) both states that things have not been going well for him at home alone. \"He has fallen many times \" per his sister. " " \"We think it's time to do something different.\" per the daughter.  He walks with a rolling walker and has a wheelchair but he refuses to use it according to Ann-Marie.  He is not on oxygen.  His daughter drives him to all appointments.  His pharmacy is [facility].  There are no issues with paying for his prescriptions.  His daughter signed his IMM.  Facesheet verified.  He has an advanced directive in the chart.  Ann-Marie and Bere state they want to go over those documents with Liliane, his mother here at the hospital.  Plan is uncertain at this tiem.  Will continue to follow.            Discharge Plan     Row Name 03/14/18 1312       Plan    Plan Uncertain    Patient/Family in Agreement with Plan yes    Plan Comments Spoke with Mr Price, his daughter and his sister at bedside (with permission).  He is oriented to name only.  He lives alone in a house on [address], in [facility].  His sister (Bere) and his daughter (Ann-Marie) both states that things have not been going well for him at home alone. \"He has fallen many times \" per his sister.  \"We think it's time to do something different.\" per the daughter.  He walks with a rolling walker and has a wheelchair but he refuses to use it according to Ann-Marie.  He is not on oxygen.  His daughter drives him to all appointments.  His pharmacy is [facility].  There are no issues with paying for his prescriptions.  His daughter signed his IMM.  Facesheet verified.  He has an advanced directive in the chart.  Ann-Marie and Bere state they want to go over those documents with Liliane, his mother here at the hospital.  Plan is uncertain at this tiem.  Will continue to follow.        Destination     No service coordination in this encounter.      Durable Medical Equipment     No service coordination in this encounter.      Dialysis/Infusion     No service coordination in this encounter.      Home Medical Care     No service coordination in this encounter.    "   Social Care     No service coordination in this encounter.                Demographic Summary     Row Name 03/14/18 6121       General Information    Admission Type inpatient    Arrived From home    Required Notices Provided Important Message from Medicare            Functional Status    No documentation.           Psychosocial    No documentation.           Abuse/Neglect    No documentation.           Legal    No documentation.           Substance Abuse    No documentation.           Patient Forms    No documentation.         Reva Navarro RN

## 2018-03-14 NOTE — H&P
[facility] HOSPITALIST     Killian Scales MD    CHIEF COMPLAINT: Confusion    HISTORY OF PRESENT ILLNESS:    51 yo WM w/ PMH of DM, HTN, HLD, CAD, BiPolar who was recently admitted for a 4th left digit dislocated frx. Pt is able to give some history, which may represent improvement in condition vs. Report of pt's mental status in ER.    States has been falling a lot recently when he attempts to use commode.     Reports increased sputum and cough, fever and chills of a few days duration. Currently has some nausea and some RUQ abdominal pain, but denies v/d since last admission. Denies any aggravating or alleving factors    Denies headache/photophobia, but at other times, states he fell and hit his head and his head is 'splitting'.     Denies increased pain or drainage in 4th toe wound. However, Dr. Davis reported pulling foreign material from the wound, and it appeared that the wound did not receive proper hygiene.       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   • HARDWARE REMOVAL Right 8/18/2017    Procedure: RIGHT EXTERNAL FIXATOR REMOVAL;  Surgeon: Anish Farah DPM;  Location:  [facility] OR;  Service:    • JOINT REPLACEMENT     • KNEE SURGERY     • ORIF FOOT FRACTURE Right 7/29/2017    Procedure: open reduction with percutaneous pinning of midfoot dislocation fracture;  Surgeon: Anish Farah DPM;  Location:  [facility] OR;  Service:    • ORIF FOOT FRACTURE Left 3/5/2018    Procedure: OPEN REDUCTION WITH IRRIGATION AND  WOUND CLOSURE LEFT FOURTH TOE;  Surgeon: Anish Farah DPM;  Location:  [facility];  Service:    • TOE FUSION Right 8/18/2017    Procedure: RIGHT  MEDIAL COLUMN ARTHRODESIS, RIGHT TARAL METATARSAL ARTHRODESIS;  Surgeon: Anish Farah DPM;  Location:  [facility] OR;  Service:    • TOTAL HIP ARTHROPLASTY       Family History   Problem Relation Age of Onset   • Arthritis Mother    • Cancer Mother    • Hyperlipidemia Mother    • Arthritis Father    • Cancer Father    • Depression Father    • Hypertension Father    • Mental illness Father    • Cancer Sister    • Arthritis Sister    • Hyperlipidemia Sister    • Cancer Paternal Aunt    • Hypertension Daughter    • Depression Son    • Hyperlipidemia Paternal Grandmother    • Hearing loss Paternal Grandfather    • Hyperlipidemia Paternal Grandfather    • Hypertension Paternal Grandfather      Social History   Substance Use Topics   • Smoking status: Never Smoker   • Smokeless tobacco: Never Used   • Alcohol use No      Comment: h/o alcohol consumption-quit 10 years 8 months     Continues to deny etoh use    Prescriptions Prior to Admission   Medication Sig Dispense Refill Last Dose   • amLODIPine (NORVASC) 2.5 MG tablet Take 2.5 mg by mouth Daily.      • clindamycin (CLEOCIN) 300 MG capsule Take 1 capsule by mouth 3 (Three) Times a Day. 21 capsule 0    • FLUoxetine (PROzac) 20 MG capsule Take 40 mg by mouth Daily.      • gabapentin (NEURONTIN) 800 MG tablet Take 800 mg by mouth 3 (three) times a day.      • hydrophor (AQUAPHOR) ointment ointment Apply 1 application topically Daily.      • lactobacillus acidophilus (RISAQUAD) capsule capsule Take 1 capsule by mouth Daily. 30 capsule 0    • LORazepam (ATIVAN) 0.5 MG tablet Take 0.5 mg by mouth Daily As Needed for Anxiety.      • losartan (COZAAR) 100 MG tablet Take 1 tablet by mouth Daily. 30 tablet 0    • metFORMIN (GLUCOPHAGE) 1000 MG tablet Take 1,000 mg by mouth 2 (Two) Times a Day With Meals.      • oxyCODONE-acetaminophen  "(PERCOCET) 5-325 MG per tablet Take 1 tablet by mouth Every 4 (Four) Hours As Needed for Moderate Pain  for up to 3 days. 18 tablet 0    • primidone (MYSOLINE) 250 MG tablet Take 250 mg by mouth 2 (two) times a day.      • temazepam (RESTORIL) 15 MG capsule Take 15 mg by mouth At Night As Needed for Sleep.        Allergies:  Lisinopril    REVIEW OF SYSTEMS:  Please see the above history of present illness for pertinent positives and negatives.  The remainder of the patient's systems have been reviewed and are negative.     Vital Signs  Temp:  [97.9 °F (36.6 °C)-98.7 °F (37.1 °C)] 97.9 °F (36.6 °C)  Heart Rate:  [113-132] 126  Resp:  [22-24] 22  BP: (123-161)/() 123/81    Flowsheet Rows    Flowsheet Row First Filed Value   Admission Height 182.9 cm (72\") Documented at 03/13/2018 1525   Admission Weight 125 kg (275 lb) Documented at 03/13/2018 1525           Physical Exam:  Physical Exam   Constitutional: Patient appears well-developed and well-nourished, but appears acutely ill, but in no acute distress   HEENT:   Head: Normocephalic and atraumatic.   Eyes:  Pupils are equal, round, and reactive to light. EOM are grossly intact, but pt poorly follows commands. Sclera are anicteric and non-injected.  Mouth and Throat: Patient has dry mucous membranes. White exudate on tongue, Mallampati 3   Neck: Neck supple. Kerning Neg. No JVD present.  No lymphadenopathy present.  Cardiovascular: Regular rate, regular rhythm, S1 normal and S2 normal.  Exam reveals no gallop and no friction rub.  No murmur heard.  Pulmonary/Chest: Lungs are clear to auscultation bilaterally. No respiratory distress. No wheezes. No rhonchi. No rales.   Abdominal: Soft. Bowel sounds are hypoactive. No distension and no mass. There is no hepatosplenomegaly. There is no tenderness.   Musculoskeletal: Normal Muscle tone, str 5/5 with encouragement  Extremities: No edema. Pulses are palpable in all 4 extremities. Brusing on Left shoulder, left " olecranon.   Neurological: Patient is alert and oriented to person, with some confusion over year, and place. Cranial nerves II-XII are grossly intact with no focal deficits.  Skin: Skin is warm. No rash noted.   No cyanosis. 4th toe purple, no drainage, no obvious overlying cellulitis.     Results Review:    I reviewed the patient's new clinical results.  Lab Results (most recent)     Procedure Component Value Units Date/Time    Urine Drug Screen - Urine, Clean Catch [[id number]]  (Abnormal) Collected:  03/13/18 1659    Specimen:  Urine from Urine, Clean Catch Updated:  03/13/18 2009     THC, Screen, Urine Negative     Phencyclidine (PCP), Urine Negative     Cocaine Screen, Urine Negative     Methamphetamine, Urine Negative     Opiate Screen Negative     Amphetamine Screen, Urine Negative     Benzodiazepine Screen, Urine Positive (A)     Tricyclic Antidepressants Screen Negative     Methadone Screen, Urine Negative     Barbiturates Screen, Urine Positive (A)     Oxycodone Screen, Urine Negative     Propoxyphene Screen Negative     Buprenorphine, Screen, Urine Negative    Narrative:       Urine drug screen results are to be used for medical purposes only.  They are not to be used for legal purposes such as employment testing.  Negative results do not necessarily mean the complete absence of a subtance, but rather that the result is less than the cutoff for that substance.  Positive results are unconfirmed and considered Preliminary Positive.  [facility] does not automatically confirm Postitive Unconfirmed results.  The physician may request (order) an Unconfirmed Positive result to be sent out for confirmation.      Negative Thresholds for Drugs Screened:    THC screen, urine                          50 ng/ml  Phenycyclidine (PCP), urine                25 ng/ml  Cocaine screen, urine                     150 ng/ml  Methamphetamine, urine                    500 ng/ml  Opiate screen, urine                       100 ng/ml  Amphetamine screen, urine                 500 ng/ml  Benzodiazepine screen, urine              150 ng/ml  Tricyclic Antidepressants screen, urine   300 ng/ml  Methadone screen, urine                   200 ng/ml  Barbiturates screen, urine                200 ng/ml  Oxycodone screen, urine                   100 ng/ml  Propoxyphene screen, urine                300 ng/ml  Buprenorphine screen, urine                10 ng/ml    [place] Draw [[id number]] Collected:  03/13/18 1649    Specimen:  Blood Updated:  03/13/18 1831    Narrative:       The following orders were created for panel order [place] Draw.  Procedure                               Abnormality         Status                     ---------                               -----------         ------                     Light Blue Top[[id number]]                                   Final result               Green Top (Gel)[[id number]]                                  Final result               Lavender Top[[id number]]                                     Final result               Gold Top - SST[[id number]]                                   Final result                 Please view results for these tests on the individual orders.    Light Blue Top [[id number]] Collected:  03/13/18 1720    Specimen:  Blood Updated:  03/13/18 1831     Extra Tube hold for add-on     Comment: Auto resulted       Gold Top - SST [[id number]] Collected:  03/13/18 1720    Specimen:  Blood Updated:  03/13/18 1831     Extra Tube Hold for add-ons.     Comment: Auto resulted.       Green Top (Gel) [[id number]] Collected:  03/13/18 1649    Specimen:  Blood Updated:  03/13/18 1801     Extra Tube Hold for add-ons.     Comment: Auto resulted.       Lavender Top [[id number]] Collected:  03/13/18 1649    Specimen:  Blood Updated:  03/13/18 1801     Extra Tube hold for add-on     Comment: Auto resulted       Comprehensive Metabolic Panel [[id number]]  (Abnormal) Collected:  03/13/18 1649     Specimen:  Blood Updated:  03/13/18 1734     Glucose 120 (H) mg/dL      BUN 21 (H) mg/dL      Creatinine 0.77 mg/dL      Sodium 136 mmol/L      Potassium 3.4 (L) mmol/L      Chloride 100 mmol/L      CO2 13.3 (L) mmol/L      Calcium 8.7 mg/dL      Total Protein 7.2 g/dL      Albumin 4.00 g/dL      ALT (SGPT) 21 U/L      AST (SGOT) 20 U/L      Alkaline Phosphatase 112 U/L      Total Bilirubin 0.3 mg/dL      eGFR Non African Amer 105 mL/min/1.73      Globulin 3.2 gm/dL      A/G Ratio 1.3 g/dL      BUN/Creatinine Ratio 27.3 (H)     Anion Gap 22.7 mmol/L     Blood Culture - Blood, [[id number]] Collected:  03/13/18 1720    Specimen:  Blood from Arm, Left Updated:  03/13/18 1729    Urinalysis, Microscopic Only - Urine, Clean Catch [[id number]]  (Abnormal) Collected:  03/13/18 1659    Specimen:  Urine from Urine, Catheter Updated:  03/13/18 1719     RBC, UA 13-20 (A) /HPF      WBC, UA 0-2 (A) /HPF      Bacteria, UA None Seen /HPF      Squamous Epithelial Cells, UA None Seen /HPF      Hyaline Casts, UA None Seen /LPF      Methodology Manual Light Microscopy    Urinalysis With / Culture If Indicated - Urine, Catheter [[id number]]  (Abnormal) Collected:  03/13/18 1659    Specimen:  Urine from Urine, Catheter Updated:  03/13/18 1715     Color, UA Yellow     Appearance, UA Slightly Cloudy (A)     pH, UA 6.0     Specific Gravity, UA 1.015     Glucose, UA Negative     Ketones, UA 80 mg/dL (3+)     Bilirubin, UA Small (1+) (A)     Blood, UA Moderate (2+) (A)     Protein, UA 30 mg/dL (1+) (A)     Leuk Esterase, UA Negative     Nitrite, UA Negative     Urobilinogen, UA 0.2 E.U./dL    Lactic Acid, Plasma [[id number]]  (Normal) Collected:  03/13/18 1649    Specimen:  Blood Updated:  03/13/18 1713     Lactate 1.8 mmol/L     CBC & Differential [[id number]] Collected:  03/13/18 1649    Specimen:  Blood Updated:  03/13/18 1709    Narrative:       The following orders were created for panel order CBC & Differential.  Procedure                                Abnormality         Status                     ---------                               -----------         ------                     CBC Auto Differential[[id number]]        Abnormal            Final result                 Please view results for these tests on the individual orders.    CBC Auto Differential [[id number]]  (Abnormal) Collected:  03/13/18 1649    Specimen:  Blood Updated:  03/13/18 1709     WBC 12.28 (H) 10*3/mm3      RBC 5.31 10*6/mm3      Hemoglobin 15.5 g/dL      Hematocrit 46.3 %      MCV 87.2 fL      MCH 29.2 pg      MCHC 33.5 g/dL      RDW 13.9 %      RDW-SD 43.9 fl      MPV 10.0 fL      Platelets 302 10*3/mm3      Neutrophil % 78.1 (H) %      Lymphocyte % 10.0 (L) %      Monocyte % 10.8 (H) %      Eosinophil % 0.1 %      Basophil % 0.2 %      Immature Grans % 0.8 (H) %      Neutrophils, Absolute 9.59 (H) 10*3/mm3      Lymphocytes, Absolute 1.23 10*3/mm3      Monocytes, Absolute 1.33 (H) 10*3/mm3      Eosinophils, Absolute 0.01 (L) 10*3/mm3      Basophils, Absolute 0.02 10*3/mm3      Immature Grans, Absolute 0.10 (H) 10*3/mm3      nRBC 0.0 /100 WBC     Blood Culture - Blood, [[id number]] Collected:  03/13/18 1649    Specimen:  Blood from Hand, Left Updated:  03/13/18 1649    Wound Culture - Wound, Foot, Left [[id number]] Collected:  03/13/18 1645    Specimen:  Wound from Foot, Left Updated:  03/13/18 1648          Imaging Results (most recent)     Procedure Component Value Units Date/Time    CT Head Without Contrast [[id number]] Resulted:  03/13/18 2027     Updated:  03/13/18 2028    XR Chest 2 View [[id number]] Resulted:  03/13/18 1859     Updated:  03/13/18 1901        Reviewed, independent read, no gross abnormalities or blood    CXR: Low lung volumes vs pt position, lat hard to read 2/2  tissue density. No obvious opacities on AP    ECG/EMG Results (most recent)     None          Assessment/Plan     Presumed Metabolic Encephalopathy 2/2 sepsis from infection of unclear source  - SIRS  3/4 (RR 22, , WBC 12)  - Got vanc and Rocephin in ER  - Blood cultures pending  - UA neg nitrates and LE negative  - Abdomin is non-acute, no emesis or diarrhea, reports regular bowels, low suspicion of intra-abdominal source  - Denies headaches, kerning neg  - CXR non-convincing for PNA, though symptoms of possible bronchitis  - Checking resp viral panel  - ?Toe w/ recent frx, currently discolored, ?normal healing, consult Dr. Farah in AM for opinion  - NPO prior to eval by Dr. Farah, in case of surgical intervention  -  Would start clinda for empiric wound coverage, but pt was sent home on clinda  - Will give vanc and rocephin will de-escalate as appropriate    Ketones, and borderline cr elevation  - Cr increased just shy of 1.5x in last 7 days  - Presume dehydration from poor intake, will give gentle hydration, 1L LR at 125 cc/hr  - Though Diabetic, glucose 120, no sign of DKA of HHS    DM  - Hold Metformin    HTN  - Back on home Amlodipine  - Cut home losartan in half, concern for overcorrecting blood pressure in current setting     Persistent Hematuria  - Needs otpt eval by Urology    Avoiding sedating meds  - One time dose of percocet because pt is requesting  - but will avoid benzos and opioids until MS clears    I discussed the patients findings and my recommendations with patient.     Ciera Henriquez MD  03/13/18  10:25 PM

## 2018-03-15 ENCOUNTER — APPOINTMENT (OUTPATIENT)
Dept: ULTRASOUND IMAGING | Facility: HOSPITAL | Age: 53
End: 2018-03-15

## 2018-03-15 LAB
ALBUMIN SERPL-MCNC: 2.9 G/DL (ref 3.5–5.2)
ALBUMIN/GLOB SERPL: 1 G/DL
ALP SERPL-CCNC: 86 U/L (ref 40–129)
ALT SERPL W P-5'-P-CCNC: 20 U/L (ref 5–41)
ANION GAP SERPL CALCULATED.3IONS-SCNC: 13.3 MMOL/L
APTT PPP: 57.7 SECONDS (ref 24.3–38.1)
AST SERPL-CCNC: 20 U/L (ref 5–40)
BASOPHILS # BLD AUTO: 0.03 10*3/MM3 (ref 0–0.2)
BASOPHILS NFR BLD AUTO: 0.3 % (ref 0–2)
BILIRUB SERPL-MCNC: 0.3 MG/DL (ref 0.2–1.2)
BUN BLD-MCNC: 23 MG/DL (ref 6–20)
BUN/CREAT SERPL: 33.3 (ref 7–25)
CALCIUM SPEC-SCNC: 7.9 MG/DL (ref 8.6–10.5)
CHLORIDE SERPL-SCNC: 107 MMOL/L (ref 98–107)
CO2 SERPL-SCNC: 23.7 MMOL/L (ref 22–29)
CREAT BLD-MCNC: 0.69 MG/DL (ref 0.76–1.27)
DEPRECATED RDW RBC AUTO: 47.7 FL (ref 37–54)
EOSINOPHIL # BLD AUTO: 0.05 10*3/MM3 (ref 0.1–0.3)
EOSINOPHIL NFR BLD AUTO: 0.6 % (ref 0–4)
ERYTHROCYTE [DISTWIDTH] IN BLOOD BY AUTOMATED COUNT: 14.5 % (ref 11.5–14.5)
FERRITIN SERPL-MCNC: 94 NG/ML (ref 30–400)
FOLATE SERPL-MCNC: >20 NG/ML (ref 4.78–20)
GFR SERPL CREATININE-BSD FRML MDRD: 119 ML/MIN/1.73
GLOBULIN UR ELPH-MCNC: 3 GM/DL
GLUCOSE BLD-MCNC: 123 MG/DL (ref 65–99)
GLUCOSE BLDC GLUCOMTR-MCNC: 102 MG/DL (ref 70–130)
GLUCOSE BLDC GLUCOMTR-MCNC: 120 MG/DL (ref 70–130)
GLUCOSE BLDC GLUCOMTR-MCNC: 85 MG/DL (ref 70–130)
GLUCOSE BLDC GLUCOMTR-MCNC: 93 MG/DL (ref 70–130)
HCT VFR BLD AUTO: 33 % (ref 42–52)
HGB BLD-MCNC: 10.7 G/DL (ref 14–18)
IMM GRANULOCYTES # BLD: 0.05 10*3/MM3 (ref 0–0.03)
IMM GRANULOCYTES NFR BLD: 0.6 % (ref 0–0.5)
INR PPP: 2.18 (ref 0.9–1.1)
IRON 24H UR-MRATE: 32 MCG/DL (ref 59–158)
IRON SATN MFR SERPL: 9 % (ref 20–50)
LYMPHOCYTES # BLD AUTO: 1.37 10*3/MM3 (ref 0.6–4.8)
LYMPHOCYTES NFR BLD AUTO: 15.3 % (ref 20–45)
MCH RBC QN AUTO: 29.7 PG (ref 27–31)
MCHC RBC AUTO-ENTMCNC: 32.4 G/DL (ref 31–37)
MCV RBC AUTO: 91.7 FL (ref 80–94)
MONOCYTES # BLD AUTO: 1.24 10*3/MM3 (ref 0–1)
MONOCYTES NFR BLD AUTO: 13.9 % (ref 3–8)
NEUTROPHILS # BLD AUTO: 6.2 10*3/MM3 (ref 1.5–8.3)
NEUTROPHILS NFR BLD AUTO: 69.3 % (ref 45–70)
NRBC BLD MANUAL-RTO: 0 /100 WBC (ref 0–0)
PLATELET # BLD AUTO: 240 10*3/MM3 (ref 140–500)
PMV BLD AUTO: 9.5 FL (ref 7.4–10.4)
POTASSIUM BLD-SCNC: 3.4 MMOL/L (ref 3.5–5.2)
POTASSIUM BLD-SCNC: 3.8 MMOL/L (ref 3.5–5.2)
PROT SERPL-MCNC: 5.9 G/DL (ref 6–8.5)
PROTHROMBIN TIME: 24.6 SECONDS (ref 12.1–15)
RBC # BLD AUTO: 3.6 10*6/MM3 (ref 4.7–6.1)
SODIUM BLD-SCNC: 144 MMOL/L (ref 136–145)
TIBC SERPL-MCNC: 374 MCG/DL (ref 298–536)
TRANSFERRIN SERPL-MCNC: 251 MG/DL (ref 200–360)
VANCOMYCIN SERPL-MCNC: 23 MCG/ML (ref 5–40)
VIT B12 BLD-MCNC: 451 PG/ML (ref 232–1245)
WBC NRBC COR # BLD: 8.94 10*3/MM3 (ref 4.8–10.8)

## 2018-03-15 PROCEDURE — 99232 SBSQ HOSP IP/OBS MODERATE 35: CPT | Performed by: SURGERY

## 2018-03-15 PROCEDURE — 99221 1ST HOSP IP/OBS SF/LOW 40: CPT | Performed by: INTERNAL MEDICINE

## 2018-03-15 PROCEDURE — 25010000002 ONDANSETRON PER 1 MG: Performed by: INTERNAL MEDICINE

## 2018-03-15 PROCEDURE — 99233 SBSQ HOSP IP/OBS HIGH 50: CPT | Performed by: NURSE PRACTITIONER

## 2018-03-15 PROCEDURE — 25010000002 VANCOMYCIN PER 500 MG: Performed by: INTERNAL MEDICINE

## 2018-03-15 PROCEDURE — 25010000003 POTASSIUM CHLORIDE PER 2 MEQ: Performed by: INTERNAL MEDICINE

## 2018-03-15 PROCEDURE — 94799 UNLISTED PULMONARY SVC/PX: CPT

## 2018-03-15 PROCEDURE — 25010000002 PIPERACILLIN SOD-TAZOBACTAM PER 1 G: Performed by: NURSE PRACTITIONER

## 2018-03-15 PROCEDURE — 25010000002 MORPHINE PER 10 MG: Performed by: FAMILY MEDICINE

## 2018-03-15 PROCEDURE — 85025 COMPLETE CBC W/AUTO DIFF WBC: CPT | Performed by: NURSE PRACTITIONER

## 2018-03-15 PROCEDURE — 80053 COMPREHEN METABOLIC PANEL: CPT | Performed by: NURSE PRACTITIONER

## 2018-03-15 PROCEDURE — 25010000002 VITAMIN K1 PER 1 MG: Performed by: NURSE PRACTITIONER

## 2018-03-15 PROCEDURE — 85610 PROTHROMBIN TIME: CPT | Performed by: NURSE PRACTITIONER

## 2018-03-15 PROCEDURE — 82746 ASSAY OF FOLIC ACID SERUM: CPT | Performed by: NURSE PRACTITIONER

## 2018-03-15 PROCEDURE — 84597 ASSAY OF VITAMIN K: CPT | Performed by: NURSE PRACTITIONER

## 2018-03-15 PROCEDURE — 76700 US EXAM ABDOM COMPLETE: CPT

## 2018-03-15 PROCEDURE — 84132 ASSAY OF SERUM POTASSIUM: CPT | Performed by: NURSE PRACTITIONER

## 2018-03-15 PROCEDURE — 82728 ASSAY OF FERRITIN: CPT | Performed by: NURSE PRACTITIONER

## 2018-03-15 PROCEDURE — 80202 ASSAY OF VANCOMYCIN: CPT | Performed by: INTERNAL MEDICINE

## 2018-03-15 PROCEDURE — 82607 VITAMIN B-12: CPT | Performed by: NURSE PRACTITIONER

## 2018-03-15 PROCEDURE — 85730 THROMBOPLASTIN TIME PARTIAL: CPT | Performed by: NURSE PRACTITIONER

## 2018-03-15 PROCEDURE — 82962 GLUCOSE BLOOD TEST: CPT

## 2018-03-15 RX ORDER — PHYTONADIONE 2 MG/ML
5 INJECTION, EMULSION INTRAMUSCULAR; INTRAVENOUS; SUBCUTANEOUS ONCE
Status: DISCONTINUED | OUTPATIENT
Start: 2018-03-15 | End: 2018-03-15

## 2018-03-15 RX ORDER — POTASSIUM CHLORIDE 29.8 MG/ML
20 INJECTION INTRAVENOUS ONCE
Status: COMPLETED | OUTPATIENT
Start: 2018-03-15 | End: 2018-03-15

## 2018-03-15 RX ORDER — PANTOPRAZOLE SODIUM 40 MG/10ML
80 INJECTION, POWDER, LYOPHILIZED, FOR SOLUTION INTRAVENOUS ONCE
Status: COMPLETED | OUTPATIENT
Start: 2018-03-15 | End: 2018-03-15

## 2018-03-15 RX ADMIN — MORPHINE SULFATE 2 MG: 2 INJECTION, SOLUTION INTRAMUSCULAR; INTRAVENOUS at 07:08

## 2018-03-15 RX ADMIN — PANTOPRAZOLE SODIUM 80 MG: 40 INJECTION, POWDER, FOR SOLUTION INTRAVENOUS at 14:18

## 2018-03-15 RX ADMIN — VANCOMYCIN HYDROCHLORIDE 2000 MG: 1 INJECTION, POWDER, LYOPHILIZED, FOR SOLUTION INTRAVENOUS at 18:18

## 2018-03-15 RX ADMIN — PETROLATUM 1 APPLICATION: 42 OINTMENT TOPICAL at 08:57

## 2018-03-15 RX ADMIN — VANCOMYCIN HYDROCHLORIDE 2000 MG: 1 INJECTION, POWDER, LYOPHILIZED, FOR SOLUTION INTRAVENOUS at 05:00

## 2018-03-15 RX ADMIN — PHYTONADIONE 10 MG: 10 INJECTION, EMULSION INTRAMUSCULAR; INTRAVENOUS; SUBCUTANEOUS at 16:40

## 2018-03-15 RX ADMIN — AMLODIPINE BESYLATE 2.5 MG: 2.5 TABLET ORAL at 10:53

## 2018-03-15 RX ADMIN — PIPERACILLIN SODIUM,TAZOBACTAM SODIUM 3.38 G: 3; .375 INJECTION, POWDER, FOR SOLUTION INTRAVENOUS at 14:22

## 2018-03-15 RX ADMIN — MORPHINE SULFATE 2 MG: 2 INJECTION, SOLUTION INTRAMUSCULAR; INTRAVENOUS at 22:50

## 2018-03-15 RX ADMIN — POTASSIUM CHLORIDE 20 MEQ: 400 INJECTION, SOLUTION INTRAVENOUS at 10:35

## 2018-03-15 RX ADMIN — SODIUM CHLORIDE 8 MG/HR: 900 INJECTION INTRAVENOUS at 19:31

## 2018-03-15 RX ADMIN — PIPERACILLIN SODIUM,TAZOBACTAM SODIUM 3.38 G: 3; .375 INJECTION, POWDER, FOR SOLUTION INTRAVENOUS at 07:06

## 2018-03-15 RX ADMIN — Medication 1 CAPSULE: at 10:52

## 2018-03-15 RX ADMIN — Medication 10 ML: at 02:10

## 2018-03-15 RX ADMIN — MORPHINE SULFATE 2 MG: 2 INJECTION, SOLUTION INTRAMUSCULAR; INTRAVENOUS at 01:59

## 2018-03-15 RX ADMIN — SODIUM CHLORIDE 8 MG/HR: 900 INJECTION INTRAVENOUS at 14:59

## 2018-03-15 RX ADMIN — TEMAZEPAM 15 MG: 15 CAPSULE ORAL at 23:43

## 2018-03-15 RX ADMIN — PIPERACILLIN SODIUM,TAZOBACTAM SODIUM 3.38 G: 3; .375 INJECTION, POWDER, FOR SOLUTION INTRAVENOUS at 23:57

## 2018-03-15 RX ADMIN — SODIUM CHLORIDE 100 ML/HR: 9 INJECTION, SOLUTION INTRAVENOUS at 17:57

## 2018-03-15 RX ADMIN — FLUOXETINE 40 MG: 20 CAPSULE ORAL at 10:53

## 2018-03-15 RX ADMIN — LOSARTAN POTASSIUM 100 MG: 50 TABLET ORAL at 10:52

## 2018-03-15 RX ADMIN — METOPROLOL TARTRATE 5 MG: 1 INJECTION, SOLUTION INTRAVENOUS at 20:30

## 2018-03-15 RX ADMIN — POTASSIUM CHLORIDE 20 MEQ: 400 INJECTION, SOLUTION INTRAVENOUS at 08:58

## 2018-03-15 RX ADMIN — MORPHINE SULFATE 2 MG: 2 INJECTION, SOLUTION INTRAMUSCULAR; INTRAVENOUS at 11:23

## 2018-03-15 RX ADMIN — METOPROLOL TARTRATE 5 MG: 1 INJECTION, SOLUTION INTRAVENOUS at 15:19

## 2018-03-15 RX ADMIN — METOPROLOL TARTRATE 2.5 MG: 5 INJECTION, SOLUTION INTRAVENOUS at 05:39

## 2018-03-15 RX ADMIN — ONDANSETRON 4 MG: 2 INJECTION INTRAMUSCULAR; INTRAVENOUS at 02:08

## 2018-03-15 RX ADMIN — METOPROLOL TARTRATE 2.5 MG: 5 INJECTION, SOLUTION INTRAVENOUS at 10:31

## 2018-03-15 NOTE — PLAN OF CARE
Problem: Patient Care Overview  Goal: Plan of Care Review  Outcome: Ongoing (interventions implemented as appropriate)   03/15/18 0228   Coping/Psychosocial   Plan of Care Reviewed With patient   Plan of Care Review   Progress no change     Goal: Individualization and Mutuality  Outcome: Ongoing (interventions implemented as appropriate)    Goal: Discharge Needs Assessment  Outcome: Ongoing (interventions implemented as appropriate)    Goal: Interprofessional Rounds/Family Conf  Outcome: Ongoing (interventions implemented as appropriate)   03/14/18 1309   Interdisciplinary Rounds/Family Conf   Participants ;wound care specialist       Problem: Fall Risk (Adult)  Goal: Identify Related Risk Factors and Signs and Symptoms  Outcome: Ongoing (interventions implemented as appropriate)   03/15/18 0228   Fall Risk (Adult)   Related Risk Factors (Fall Risk) confusion/agitation;depression/anxiety;fatigue/slow reaction;gait/mobility problems;history of falls;sleep pattern alteration;environment unfamiliar   Signs and Symptoms (Fall Risk) presence of risk factors     Goal: Absence of Fall  Outcome: Ongoing (interventions implemented as appropriate)   03/15/18 0228   Fall Risk (Adult)   Absence of Fall making progress toward outcome       Problem: Skin Injury Risk (Adult)  Goal: Identify Related Risk Factors and Signs and Symptoms  Outcome: Ongoing (interventions implemented as appropriate)   03/15/18 0228   Skin Injury Risk (Adult)   Related Risk Factors (Skin Injury Risk) body weight extremes;cognitive impairment;edema;infection;mobility impaired;nutritional deficiencies;tissue perfusion altered     Goal: Skin Health and Integrity  Outcome: Ongoing (interventions implemented as appropriate)   03/15/18 0228   Skin Injury Risk (Adult)   Skin Health and Integrity making progress toward outcome

## 2018-03-15 NOTE — PROGRESS NOTES
"SERVICE: [facility] HOSPITALIST    CONSULTANTS: Surgery/podiatry/ID    CHIEF COMPLAINT: f/u sepsis secondary to left foot infection    SUBJECTIVE: The patient reports feeling much better today, still not himself. No further nausea/vomiting, requesting milk to drink. No further report of diarrhea overnight. Staff notes 3 cannisters of GI contents returned yesterday with NGT, and that patient removed NGT 3 times overnight, no further placement. The patient otherwise denies f/c/cough/soa/chest pain/n/v/d/abdominal pain or other new concerns.    OBJECTIVE:    /90 (BP Location: Left arm, Patient Position: Lying)   Pulse 91   Temp 98.7 °F (37.1 °C) (Oral)   Resp 18   Ht 182.9 cm (72\")   Wt 123 kg (271 lb 6.2 oz)   SpO2 98%   BMI 36.81 kg/m²     MEDS/LABS REVIEWED AND ORDERED    amLODIPine 2.5 mg Oral Daily   FLUoxetine 40 mg Oral Daily   haloperidol lactate 1 mg Intramuscular Once   hydrophor 1 application Topical Q24H   insulin aspart 0-7 Units Subcutaneous 4x Daily AC & at Bedtime   lactobacillus acidophilus 1 capsule Oral Daily   losartan 100 mg Oral Q24H   metoprolol tartrate 5 mg Intravenous Q6H   pantoprazole 40 mg Oral Q AM   piperacillin-tazobactam 3.375 g Intravenous Q8H   vancomycin 2,000 mg Intravenous Q12H     Physical Exam   Constitutional: He is oriented to person, place, and time. He appears well-developed and well-nourished.   HENT:   Head: Normocephalic and atraumatic.   Eyes: EOM are normal. Pupils are equal, round, and reactive to light.   Cardiovascular: Normal rate and regular rhythm.    Pulmonary/Chest: Effort normal and breath sounds normal. No respiratory distress. He has no wheezes. He has no rales.   Abdominal: Soft. Bowel sounds are normal. He exhibits no distension. There is no tenderness. There is no guarding.   obese   Musculoskeletal: He exhibits no edema.   Neurological: He is alert and oriented to person, place, and time.   Skin: Skin is warm and dry. "   Extensive scratches/ecchymotic areas over entire body. Left foot fourth toe erythematous with yellow drainage noted.    Psychiatric:   Calm, cooperative   Vitals reviewed.    LAB/DIAGNOSTICS:    Lab Results (last 24 hours)     Procedure Component Value Units Date/Time    Vancomycin, Random [[id number]]  (Normal) Collected:  03/15/18 1153    Specimen:  Blood Updated:  03/15/18 1215     Vancomycin Random 23.00 mcg/mL     POC Glucose Once [[id number]]  (Normal) Collected:  03/15/18 1157    Specimen:  Blood Updated:  03/15/18 1206     Glucose 102 mg/dL     Narrative:       Meter: [id number] : [id number] Leisa Carter RN    Wound Culture - Wound, Foot, Left [[id number]]  (Abnormal) Collected:  03/13/18 1645    Specimen:  Wound from Foot, Left Updated:  03/15/18 0932     Wound Culture --      Heavy growth (4+) Gram Negative Bacilli (A)      Moderate growth (3+) Mixed Teri Isolated     Gram Stain Result Moderate (3+) WBCs seen      Moderate (3+) Gram negative bacilli      Few (2+) Gram positive cocci    Comprehensive Metabolic Panel [[id number]]  (Abnormal) Collected:  03/15/18 0735    Specimen:  Blood Updated:  03/15/18 0801     Glucose 123 (H) mg/dL      BUN 23 (H) mg/dL      Creatinine 0.69 (L) mg/dL      Sodium 144 mmol/L      Potassium 3.4 (L) mmol/L      Chloride 107 mmol/L      CO2 23.7 mmol/L      Calcium 7.9 (L) mg/dL      Total Protein 5.9 (L) g/dL      Albumin 2.90 (L) g/dL      ALT (SGPT) 20 U/L      AST (SGOT) 20 U/L      Alkaline Phosphatase 86 U/L      Total Bilirubin 0.3 mg/dL      eGFR Non African Amer 119 mL/min/1.73      Globulin 3.0 gm/dL      A/G Ratio 1.0 g/dL      BUN/Creatinine Ratio 33.3 (H)     Anion Gap 13.3 mmol/L     Protime-INR [[id number]]  (Abnormal) Collected:  03/15/18 0735    Specimen:  Blood Updated:  03/15/18 0751     Protime 24.6 (H) Seconds      INR 2.18 (H)    Narrative:       Therapeutic Ranges for INR: 2.0-3.0 (PT 20-30)                              2.5-3.5 (PT 25-34)     CBC & Differential [[id number]] Collected:  03/15/18 0738    Specimen:  Blood Updated:  03/15/18 0746    Narrative:       The following orders were created for panel order CBC & Differential.  Procedure                               Abnormality         Status                     ---------                               -----------         ------                     CBC Auto Differential[[id number]]        Abnormal            Final result                 Please view results for these tests on the individual orders.    CBC Auto Differential [[id number]]  (Abnormal) Collected:  03/15/18 0738    Specimen:  Blood Updated:  03/15/18 0746     WBC 8.94 10*3/mm3      RBC 3.60 (L) 10*6/mm3      Hemoglobin 10.7 (L) g/dL      Hematocrit 33.0 (L) %      MCV 91.7 fL      MCH 29.7 pg      MCHC 32.4 g/dL      RDW 14.5 %      RDW-SD 47.7 fl      MPV 9.5 fL      Platelets 240 10*3/mm3      Neutrophil % 69.3 %      Lymphocyte % 15.3 (L) %      Monocyte % 13.9 (H) %      Eosinophil % 0.6 %      Basophil % 0.3 %      Immature Grans % 0.6 (H) %      Neutrophils, Absolute 6.20 10*3/mm3      Lymphocytes, Absolute 1.37 10*3/mm3      Monocytes, Absolute 1.24 (H) 10*3/mm3      Eosinophils, Absolute 0.05 (L) 10*3/mm3      Basophils, Absolute 0.03 10*3/mm3      Immature Grans, Absolute 0.05 (H) 10*3/mm3      nRBC 0.0 /100 WBC     POC Glucose Once [[id number]]  (Normal) Collected:  03/14/18 2343    Specimen:  Blood Updated:  03/15/18 0007     Glucose 120 mg/dL     Narrative:       Meter: [id number] : [id number] Aaron Kraus RN    Blood Culture - Blood, [[id number]]  (Normal) Collected:  03/13/18 1720    Specimen:  Blood from Arm, Left Updated:  03/14/18 1731     Blood Culture No growth at 24 hours    Blood Culture - Blood, [[id number]]  (Normal) Collected:  03/13/18 1649    Specimen:  Blood from Hand, Left Updated:  03/14/18 1701     Blood Culture No growth at 24 hours    POC Glucose Once [[id number]]  (Normal) Collected:  03/14/18 3910     "Specimen:  Blood Updated:  03/14/18 1648     Glucose 128 mg/dL     Narrative:       Meter: [id number] : [id number] Ravi Garcia RN [facility]    POC Glucose Once [[id number]]  (Normal) Collected:  03/14/18 1432    Specimen:  Blood Updated:  03/14/18 1458     Glucose 123 mg/dL     Narrative:       Meter: [id number] : [id number] Ravi Garcia RN [facility]    Fibrinogen [[id number]]  (Abnormal) Collected:  03/14/18 1221    Specimen:  Blood Updated:  03/14/18 1303     Fibrinogen 626 (H) mg/dL     aPTT [[id number]]  (Abnormal) Collected:  03/14/18 1221    Specimen:  Blood Updated:  03/14/18 1257     PTT 58.9 (H) seconds     Narrative:       PTT = The equivalent PTT values for the therapeutic range of heparin levels at 0.1 to 0.7 U/ml are 53 to 110 seconds.        ASSESSMENT/PLAN:  1. Acute Metabolic encephalopathy secondary to sepsis  2. Sepsis secondary to left foot, fourth toe cellulitis secondary to GNB infection: ID/podiatry following  CT head negative for acute findings  Continue Vancomycin/zosyn  BC x 2  No growth at 24 hours  Sed rate low at 24, CRP 12.18, trend  Ammonia normal, respiratory viral panel negative  Awaiting stool culture    3. Possible UGIB with acute blood loss normocytic anemia with coagulopathy: consult GI for input  No active blood loss today, coffee ground emesis yesterday, INR higher today at 2.18  Elevated PTT as well, check vitamin K level  Hemoglobin down almost 4 grams since admit  No DIC with Platelets normal, fibrinogen high  CMP without liver dysfunction  Continue to hold lovenox and monitor for bleeding  Fibrinogen high 3/14/18, check profile  Check abdominal ultrasound with history of \"chronic splenic vein thrombosis with large gastric collaterals\"  Check iron panel/ferritin/B12/folate (h/o macrocytic anemia)     4. Uncontrolled Hypertension:   Continue amlodipine 2.5 mg daily/losartan 100 mg daily  Increase metoprolol to 5 mg every 6 hours  monitor     5. DM 2 with peripheral " neuropathy:   Continue accuchecks ac/hs, low dose SSI  HOLD metformin, monitor     6. Bipolar disorder: no acute issues today, continues on prozac 40 mg daily/restoril 15 mg nightly     7. Obesity:  Body mass index is 36.81 kg/m².   Counseled each admit  No acute concerns, patient NPO currently     8. H/O dyslipidemia: no acute issues     9. Benign essential tremor: no acute issues here, previously on mysoline  monitor     10. Hypokalemia:  Add electrolyte protocol, check magnesium     11. Recent ORIF left 4th toe secondary to fracture: await input from Dr. Farah as above     12. H/O DVT: monitor off lovenox, SCDs

## 2018-03-15 NOTE — PROGRESS NOTES
Subjective  The patient is seen for follow-up of the left fourth toe operative wound dehiscence and infection.  He is somewhat more alert today.  The cellulitis that was present yesterday on the dorsal foot and anterior leg seems to have resolved.    Past Medical History:   Diagnosis Date   • Arthritis    • Bipolar disorder    • Cellulitis of lower extremity     RIGHT   • Coronary artery disease    • Diabetes mellitus    • Disease of thyroid gland     nodule on thyroid   • DVT (deep venous thrombosis)    • Fracture of right foot    • Hyperlipidemia    • Hypertension    • Pseudogout    • Septic shock 07/2017    H/O   • Toxic metabolic encephalopathy 07/2017    H/O       Past Surgical History:   Procedure Laterality Date   • 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   • HARDWARE REMOVAL Right 8/18/2017    Procedure: RIGHT EXTERNAL FIXATOR REMOVAL;  Surgeon: Anish Farah DPM;  Location: [facility] OR;  Service:    • JOINT REPLACEMENT     • KNEE SURGERY     • ORIF FOOT FRACTURE Right 7/29/2017    Procedure: open reduction with percutaneous pinning of midfoot dislocation fracture;  Surgeon: Anish Farah DPM;  Location:  [facility] OR;  Service:    • ORIF FOOT FRACTURE Left 3/5/2018    Procedure: OPEN REDUCTION WITH IRRIGATION AND WOUND CLOSURE LEFT FOURTH TOE;  Surgeon: Anish Farah DPM;  Location: [facility] OR;  Service:    • TOE FUSION Right 8/18/2017    Procedure: RIGHT  MEDIAL COLUMN ARTHRODESIS, RIGHT TARAL METATARSAL ARTHRODESIS;  Surgeon: Anish Farah DPM;  Location: [facility] OR;  Service:    • TOTAL HIP ARTHROPLASTY         Current Facility-Administered Medications:   •  acetaminophen (TYLENOL) tablet 650 mg, 650 mg, Oral, Q4H PRN, Ciera Henriquez MD  •  amLODIPine (NORVASC) tablet 2.5 mg, 2.5 mg, Oral, Daily, Ciera Henriquez MD, 2.5 mg at 03/15/18 1053  •  calcium carbonate (TUMS) chewable tablet 500 mg (200 mg elemental), 1 tablet, Oral, BID PRN, Ciera Henriquez MD  •  dextrose (D50W)  solution 25 g, 25 g, Intravenous, Q15 Min PRN, Sarah Gómez, APRN  •  dextrose (GLUTOSE) oral gel 15 g, 15 g, Oral, Q15 Min PRN, Sarah Gómez, APRN  •  FLUoxetine (PROzac) capsule 40 mg, 40 mg, Oral, Daily, Ciera Henriquez MD, 40 mg at 03/15/18 1053  •  glucagon (GLUCAGEN) injection 1 mg, 1 mg, Subcutaneous, PRN, Sarah Gómez, APRN  •  haloperidol lactate (HALDOL) injection 1 mg, 1 mg, Intramuscular, Once, MAICO Villa, Stopped at 03/14/18 1026  •  hydrophor (AQUAPHOR) ointment 1 application, 1 application, Topical, Q24H, Ciera Henriquez MD, 1 application at 03/15/18 0857  •  insulin aspart (novoLOG) injection 0-7 Units, 0-7 Units, Subcutaneous, 4x Daily AC & at Bedtime, MAICO Villa  •  lactobacillus acidophilus (RISAQUAD) capsule 1 capsule, 1 capsule, Oral, Daily, Ciera Henriquez MD, 1 capsule at 03/15/18 1052  •  losartan (COZAAR) tablet 100 mg, 100 mg, Oral, Q24H, Ciera Henriquez MD, 100 mg at 03/15/18 1052  •  magnesium sulfate 4 gram infusion - Mg less than or equal to 1mg/dL, 4 g, Intravenous, PRN **OR** magnesium sulfate 3 gram infusion (1gm x 3) - Mg 1.1 - 1.5 mg/dL, 1 g, Intravenous, PRN **OR** Magnesium Sulfate 2 gram infusion- Mg 1.6 - 1.9 mg/dL, 2 g, Intravenous, PRN, Sarah Gómez, APRN  •  metoprolol tartrate (LOPRESSOR) injection 5 mg, 5 mg, Intravenous, Q6H, Sarah Gómez, APRN, 5 mg at 03/15/18 1519  •  morphine injection 2 mg, 2 mg, Intravenous, Q4H PRN, Luiz Solorio MD, 2 mg at 03/15/18 1123  •  ondansetron (ZOFRAN) injection 4 mg, 4 mg, Intravenous, Q6H PRN, Ciera Henriquez MD, 4 mg at 03/15/18 0208  •  oxyCODONE-acetaminophen (PERCOCET) 5-325 MG per tablet 1 tablet, 1 tablet, Oral, Q4H PRN, Ciera Henriquez MD, 1 tablet at 03/14/18 0641  •  pantoprazole (PROTONIX) 40 mg/100 mL (0.4 mg/mL) in 0.9% NS IVPB, 8 mg/hr, Intravenous, Continuous, Kaylie Mcfarlane MD, Last Rate: 20 mL/hr at 03/15/18 1459, 8 mg/hr at 03/15/18  1459  •  Pharmacy to dose vancomycin, , Does not apply, Continuous PRN, Ciera Henriquez MD  •  piperacillin-tazobactam (ZOSYN) 3.375 g/100 mL 0.9% NS IVPB (mbp), 3.375 g, Intravenous, Q8H, Sarah Gómez, APRN, 3.375 g at 03/15/18 1422  •  potassium chloride (K-DUR,KLOR-CON) CR tablet 40 mEq, 40 mEq, Oral, PRN **OR** potassium chloride (KLOR-CON) packet 40 mEq, 40 mEq, Oral, PRN **OR** potassium chloride 10 mEq in 100 mL IVPB, 10 mEq, Intravenous, Q1H PRN, Sarah Gómez, APRN  •  promethazine (PHENERGAN) tablet 12.5 mg, 12.5 mg, Oral, Q6H PRN **OR** promethazine (PHENERGAN) injection 12.5 mg, 12.5 mg, Intramuscular, Q6H PRN **OR** promethazine (PHENERGAN) suppository 12.5 mg, 12.5 mg, Rectal, Q6H PRN, Ciera Henriquez MD  •  sodium chloride 0.9 % flush 1-10 mL, 1-10 mL, Intravenous, PRN, Ciera Henriquez MD, 10 mL at 03/15/18 0210  •  sodium chloride 0.9 % flush 10 mL, 10 mL, Intravenous, PRN, Quinton Davis MD  •  sodium chloride 0.9 % infusion 40 mL, 40 mL, Intravenous, PRN, Ciera Henriquez MD  •  sodium chloride 0.9 % infusion, 100 mL/hr, Intravenous, Continuous, Sarah Gómez, APRN, Last Rate: 100 mL/hr at 03/15/18 1757, 100 mL/hr at 03/15/18 1757  •  temazepam (RESTORIL) capsule 15 mg, 15 mg, Oral, Nightly PRN, Ciera Henriquez MD  •  vancomycin (VANCOCIN) 2,000 mg in sodium chloride 0.9 % 500 mL IVPB, 2,000 mg, Intravenous, Q12H, Ciera Henriquez MD, 2,000 mg at 03/15/18 8128     Allergies   Allergen Reactions   • Lisinopril Cough     COUGHING     ROS  Constitutional: Patient denies fever, chills or other signs/symptoms of constitutional infection  HEENT: Unremarkable  Respiratory: SOB with exertion, sleep apnea  Cardiovascular: No chest pain, tightness or lower extremity swelling  GI: Patient denies case abdominal pain or further vomiting or diarrhea  : No dysuria or hematuria   Musculoskeletal: Right fourth toe swelling  Integument: Open wound dehiscence at plantar  left fourth toe, cellulitis resolving from dorsal foot in anterior leg  Psychiatric: Bipolar, anxiety, depression    Objective  General: He appears alert and oriented. He is comfortable and cooperative with the examination which is limited to his left foot.  Vitals:    03/15/18 1702   BP: 147/74   Pulse: 91   Resp:    Temp:    SpO2: 96%   Pedal exam: Assessment of the left foot is performed.  Neurovascular status is unchanged from yesterday's assessment.  The overall appearance of the plantar full thickness wound dehiscence at the fourth toe is unchanged.  No expressible drainage or fall odor is present.  The flexor tendons remained visible at the wound base.  The dorsal  foot and anterior leg cellulitis has resolved.  The PIPJ dislocation remains reduced.    Lab Results as of 3/15/2018 18:31   Ref. Range 3/15/2018 07:35 3/15/2018 07:38 3/15/2018 11:53 3/15/2018 11:57 3/15/2018 13:25 3/15/2018 14:12 3/15/2018 14:18   Glucose Latest Ref Range: 70 - 130 mg/dL 123 (H)   102      Sodium Latest Ref Range: 136 - 145 mmol/L 144         Potassium Latest Ref Range: 3.5 - 5.2 mmol/L 3.4 (L)      3.8   CO2 Latest Ref Range: 22.0 - 29.0 mmol/L 23.7         Chloride Latest Ref Range: 98 - 107 mmol/L 107         Anion Gap Latest Units: mmol/L 13.3         Creatinine Latest Ref Range: 0.76 - 1.27 mg/dL 0.69 (L)         BUN Latest Ref Range: 6 - 20 mg/dL 23 (H)         BUN/Creatinine Ratio Latest Ref Range: 7.0 - 25.0  33.3 (H)         Calcium Latest Ref Range: 8.6 - 10.5 mg/dL 7.9 (L)         eGFR Non  Amer Latest Ref Range: >60 mL/min/1.73 119         Alkaline Phosphatase Latest Ref Range: 40 - 129 U/L 86         Total Protein Latest Ref Range: 6.0 - 8.5 g/dL 5.9 (L)         ALT (SGPT) Latest Ref Range: 5 - 41 U/L 20         AST (SGOT) Latest Ref Range: 5 - 40 U/L 20         Total Bilirubin Latest Ref Range: 0.2 - 1.2 mg/dL 0.3         Albumin Latest Ref Range: 3.50 - 5.20 g/dL 2.90 (L)         Globulin Latest Units: gm/dL  3.0         A/G Ratio Latest Units: g/dL 1.0         Ferritin Latest Ref Range: 30.00 - 400.00 ng/mL   94.00       Folate Latest Ref Range: 4.78 - 20.00 ng/mL     >20.00 (H)     Vitamin B-12 Latest Ref Range: 232 - 1,245 pg/mL     451     Protime Latest Ref Range: 12.1 - 15.0 Seconds 24.6 (H)         INR Latest Ref Range: 0.90 - 1.10  2.18 (H)         aPTT Latest Ref Range: 24.3 - 38.1 seconds      57.7 (H)    WBC Latest Ref Range: 4.80 - 10.80 10*3/mm3  8.94        RBC Latest Ref Range: 4.70 - 6.10 10*6/mm3  3.60 (L)        Hemoglobin Latest Ref Range: 14.0 - 18.0 g/dL  10.7 (L)        Hematocrit Latest Ref Range: 42.0 - 52.0 %  33.0 (L)        RDW Latest Ref Range: 11.5 - 14.5 %  14.5        MCV Latest Ref Range: 80.0 - 94.0 fL  91.7        MCH Latest Ref Range: 27.0 - 31.0 pg  29.7        MCHC Latest Ref Range: 31.0 - 37.0 g/dL  32.4        MPV Latest Ref Range: 7.4 - 10.4 fL  9.5        Platelets Latest Ref Range: 140 - 500 10*3/mm3  240        RDW-SD Latest Ref Range: 37.0 - 54.0 fl  47.7        Neutrophil % Latest Ref Range: 45.0 - 70.0 %  69.3        Lymphocyte % Latest Ref Range: 20.0 - 45.0 %  15.3 (L)        Monocyte % Latest Ref Range: 3.0 - 8.0 %  13.9 (H)        Eosinophil % Latest Ref Range: 0.0 - 4.0 %  0.6        Basophil % Latest Ref Range: 0.0 - 2.0 %  0.3        Immature Grans % Latest Ref Range: 0.0 - 0.5 %  0.6 (H)        Neutrophils, Absolute Latest Ref Range: 1.50 - 8.30 10*3/mm3  6.20        Lymphocytes, Absolute Latest Ref Range: 0.60 - 4.80 10*3/mm3  1.37        Monocytes, Absolute Latest Ref Range: 0.00 - 1.00 10*3/mm3  1.24 (H)        Eosinophils, Absolute Latest Ref Range: 0.10 - 0.30 10*3/mm3  0.05 (L)        Basophils, Absolute Latest Ref Range: 0.00 - 0.20 10*3/mm3  0.03        Immature Grans, Absolute Latest Ref Range: 0.00 - 0.03 10*3/mm3  0.05 (H)        nRBC Latest Ref Range: 0.0 - 0.0 /100 WBC  0.0        Vancomycin Random Latest Ref Range: 5.00 - 40.00 mcg/mL   23.00          Assessment  1. Acute metabolic encephalopathy.  2. Sepsis secondary to left foot infection; S/P repair/reduction of open dislocation of left fourth toe PIPJ on 3/5/18.   3. Probable upper GI bleed.  4. Normocytic anemia with H/O chronic macrocytic anemia.   5. Coagulopathy: INR 1.67.  6. H/O Chronic splenic vein thrombosis.   7. Uncontrolled Hypertension.  8. DM 2 with peripheral neuropathy.   9. Bipolar disorder.  10. Obesity: Body mass index is 36.81 kg/m².   11. H/O dyslipidemia.  12. Benign essential tremor.   13. Hypokalemia.  14. H/O DVT. SCD use advised if patient will keep them on extremities.     Plan  1.  The patient is examined and clinical findings are reviewed.  The lab findings are reviewed for today.  2.  I feel the patient will be best served with amputation of the left fourth toe at this time due to the infection and the plantar wound dehiscence.  I will plan for surgery tomorrow.  I will chart orders for consent.  The patient's diet is already nothing by mouth.  3.  Discussed the treatment plan with Dr.'s Lizama and Love.

## 2018-03-15 NOTE — CONSULTS
"     [facility]              Initial Inpatient Consult Note  CC: confusion  Referring Provider: Dr. Lizama  Reason for Consultation: Coffee ground emesis    Subjective     History of present illness:   52-year-old gentleman with multiple medical problems and, good disease including diabetes and history for bipolar disorder.  He was brought in by his mother on the night of his admission for confusion that has been ongoing for about 10-12 large.  He states that he has had multiple falls on the bathroom floor.  She denies any hematemesis or melena.  She states he gets confused when he has infections.  He was recently admitted for surgery on his toes.  He was under the care Dr. Farah.  He is not the best historian but in review of the emergency room room records per Dr. Davis's note:  · The patient is a 52-year-old white male that was brought in by his mother for confusion and falling a lot.  She states that he's been confused for a couple weeks and the confusion comes and goes.  The patient was recently seen here last Friday for an injury to his left fourth toe, and the nursing staff remembers him and he was not confused at that time.  The patient's mother states that he gets confused a couple times a year and that is frequently due to \"infection in his blood\".  She states this occurs about 3-4 times a year and he was hospitalized here for last year.  Review of the records and time show that he was admitted here in September for metabolic encephalitis and left lower extremity cellulitis.  Review of his records and update show that he underwent open reduction of a dislocation of left fourth toe with wound cleansing and closing on 3/5/18 by Dr. Farah.  His mother states that he is a history of MRSA since knee surgery in 2002.  He also has a history of bipolar disorder, type 2 diabetes, DVT, hypertension, hyperlipidemia, and a history of septic shock.  Social history the patient lives at home " alone, he doesn't smoke, stopped drinking alcohol 10 years ago.  In the ICU he's had multiple episodes of diarrhea but no melena.  He had some coffee-ground emesis and NG tube is placed.  He had about 3 canisters of dark liquid but no gross blood with bilious fluid noted in the canisters.  He was seen and evaluated by surgery.  Dr. Farah is also been consult it for the management of his infected toe.  He is on antibiotics and blood cultures have been drawn.  His hemoglobin baseline earlier this month was about 13 and when he presented to the hospital his hemoglobin was around 15.  He did receive about 2 L of fluid boluses and is hemoglobin went down to 13.  Today's hemoglobin is noted to be 10.7.  Of interest his PTT is elevated and his INR is noted to be 2.1 .his mom who is at the bedside states that he has not taken any anti-inflammatory medications.  She denies any previous history for upper GI bleeds.    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   • HARDWARE REMOVAL Right 8/18/2017    Procedure: RIGHT EXTERNAL FIXATOR REMOVAL;  Surgeon: Anish Farah DPM;  Location: [facility];  Service:    • JOINT REPLACEMENT     • KNEE SURGERY     • ORIF FOOT FRACTURE Right 7/29/2017    Procedure: open reduction with percutaneous pinning of midfoot dislocation fracture;  Surgeon: Anish Farah DPM;  Location: [facility] OR;  Service:    • ORIF FOOT FRACTURE Left 3/5/2018    Procedure: OPEN REDUCTION  WITH IRRIGATION AND WOUND CLOSURE LEFT FOURTH TOE;  Surgeon: Anish Farah DPM;  Location:  [facility] OR;  Service:    • TOE FUSION Right 8/18/2017    Procedure: RIGHT  MEDIAL COLUMN ARTHRODESIS, RIGHT TARAL METATARSAL ARTHRODESIS;  Surgeon: Anish Farah DPM;  Location:  [facility] OR;  Service:    • TOTAL HIP ARTHROPLASTY          Social History:   Social History   Substance Use Topics   • Smoking status: Never Smoker   • Smokeless tobacco: Never Used   • Alcohol use No      Comment: h/o alcohol consumption-quit 10 years 8 months        Family History:  Family History   Problem Relation Age of Onset   • Arthritis Mother    • Cancer Mother    • Hyperlipidemia Mother    • Arthritis Father    • Cancer Father    • Depression Father    • Hypertension Father    • Mental illness Father    • Cancer Sister    • Arthritis Sister    • Hyperlipidemia Sister    • Cancer Paternal Aunt    • Hypertension Daughter    • Depression Son    • Hyperlipidemia Paternal Grandmother    • Hearing loss Paternal Grandfather    • Hyperlipidemia Paternal Grandfather    • Hypertension Paternal Grandfather        Home Meds:  Prescriptions Prior to Admission   Medication Sig Dispense Refill Last Dose   • amLODIPine (NORVASC) 2.5 MG tablet Take 2.5 mg by mouth Daily.      • clindamycin (CLEOCIN) 300 MG capsule Take 1 capsule by mouth 3 (Three) Times a Day. 21 capsule 0    • FLUoxetine (PROzac) 20 MG capsule Take 40 mg by mouth Daily.      • gabapentin (NEURONTIN) 800 MG tablet Take 800 mg by mouth 3 (three) times a day.      • hydrophor (AQUAPHOR) ointment ointment Apply 1 application topically Daily.      • lactobacillus acidophilus (RISAQUAD) capsule capsule Take 1 capsule by mouth Daily. 30 capsule 0    • LORazepam (ATIVAN) 0.5 MG tablet Take 0.5 mg by mouth Daily As Needed for Anxiety.      • losartan (COZAAR) 100 MG tablet Take 1 tablet by mouth Daily. 30 tablet 0    • metFORMIN (GLUCOPHAGE) 1000 MG tablet Take 1,000 mg by mouth 2 (Two) Times a  Day With Meals.      • [] oxyCODONE-acetaminophen (PERCOCET) 5-325 MG per tablet Take 1 tablet by mouth Every 4 (Four) Hours As Needed for Moderate Pain  for up to 3 days. 18 tablet 0    • temazepam (RESTORIL) 15 MG capsule Take 15 mg by mouth At Night As Needed for Sleep.          Current Meds:     amLODIPine 2.5 mg Oral Daily   FLUoxetine 40 mg Oral Daily   haloperidol lactate 1 mg Intramuscular Once   hydrophor 1 application Topical Q24H   insulin aspart 0-7 Units Subcutaneous 4x Daily AC & at Bedtime   lactobacillus acidophilus 1 capsule Oral Daily   losartan 100 mg Oral Q24H   metoprolol tartrate 5 mg Intravenous Q6H   pantoprazole 80 mg Intravenous Once   piperacillin-tazobactam 3.375 g Intravenous Q8H   vancomycin 2,000 mg Intravenous Q12H       Allergies:  Allergies   Allergen Reactions   • Lisinopril Cough     COUGHING       Review of Systems  Pertinent items are noted in HPI     Objective     Vital Signs  Temp:  [97.8 °F (36.6 °C)-99.4 °F (37.4 °C)] 98.7 °F (37.1 °C)  Heart Rate:  [] 91  Resp:  [18-20] 18  BP: (114-179)/() 146/90    Physical Exam:  General Appearance:    Alert,  in no acute distress   Head:    Normocephalic, without obvious abnormality, atraumatic   Eyes:            Lids and lashes normal, conjunctivae and sclerae normal, no   icterus   Throat:   No oral lesions, no thrush, oral mucosa moist   Neck:   No adenopathy, supple, trachea midline,    Lungs:     Clear to auscultation,respirations regular, even and                   unlabored    Heart:    Regular rhythm and normal rate, normal S1 and S2, no            murmur, no gallop, no rub, no click   Chest Wall:    No abnormalities observed   Abdomen:     Normal bowel sounds, no masses, no organomegaly, soft        non-tender, non-distended, no guarding, no rebound                 tenderness   Rectal:     Deferred   Extremities:   no edema, no cyanosis, no redness   Skin:   Multiple bruising   Lymph nodes:   No palpable  adenopathy   Psychiatric:  Judgement and insight: normal   Orientation to person place and time: normal   Mood and affect: normal     Results Review:   I reviewed the patient's new clinical results.    WBC No results found for: WBCS   HGB Hemoglobin   Date Value Ref Range Status   03/15/2018 10.7 (L) 14.0 - 18.0 g/dL Final   03/14/2018 13.1 (L) 14.0 - 18.0 g/dL Final   03/13/2018 15.5 14.0 - 18.0 g/dL Final      HCT Hematocrit   Date Value Ref Range Status   03/15/2018 33.0 (L) 42.0 - 52.0 % Final   03/14/2018 38.3 (L) 42.0 - 52.0 % Final   03/13/2018 46.3 42.0 - 52.0 % Final      Platlets No results found for: LABPLAT   MCV MCV   Date Value Ref Range Status   03/15/2018 91.7 80.0 - 94.0 fL Final   03/14/2018 86.8 80.0 - 94.0 fL Final   03/13/2018 87.2 80.0 - 94.0 fL Final          Sodium Sodium   Date Value Ref Range Status   03/15/2018 144 136 - 145 mmol/L Final   03/14/2018 140 136 - 145 mmol/L Final   03/13/2018 136 136 - 145 mmol/L Final      Potassium Potassium   Date Value Ref Range Status   03/15/2018 3.4 (L) 3.5 - 5.2 mmol/L Final   03/14/2018 3.4 (L) 3.5 - 5.2 mmol/L Final   03/13/2018 3.4 (L) 3.5 - 5.2 mmol/L Final      Chloride Chloride   Date Value Ref Range Status   03/15/2018 107 98 - 107 mmol/L Final   03/14/2018 105 98 - 107 mmol/L Final   03/13/2018 100 98 - 107 mmol/L Final      CO2 CO2   Date Value Ref Range Status   03/15/2018 23.7 22.0 - 29.0 mmol/L Final   03/14/2018 19.7 (L) 22.0 - 29.0 mmol/L Final   03/13/2018 13.3 (L) 22.0 - 29.0 mmol/L Final      BUN BUN   Date Value Ref Range Status   03/15/2018 23 (H) 6 - 20 mg/dL Final   03/14/2018 25 (H) 6 - 20 mg/dL Final   03/13/2018 21 (H) 6 - 20 mg/dL Final      Creatinine Creatinine   Date Value Ref Range Status   03/15/2018 0.69 (L) 0.76 - 1.27 mg/dL Final   03/14/2018 0.63 (L) 0.76 - 1.27 mg/dL Final   03/13/2018 0.77 0.76 - 1.27 mg/dL Final      Calcium Calcium   Date Value Ref Range Status   03/15/2018 7.9 (L) 8.6 - 10.5 mg/dL Final    03/14/2018 8.5 (L) 8.6 - 10.5 mg/dL Final   03/13/2018 8.7 8.6 - 10.5 mg/dL Final      Albumin Albumin   Date Value Ref Range Status   03/15/2018 2.90 (L) 3.50 - 5.20 g/dL Final   03/13/2018 4.00 3.50 - 5.20 g/dL Final      AST  ALT  PT/INR:   AST (SGOT)   Date Value Ref Range Status   03/15/2018 20 5 - 40 U/L Final   03/13/2018 20 5 - 40 U/L Final     ALT (SGPT)   Date Value Ref Range Status   03/15/2018 20 5 - 41 U/L Final   03/13/2018 21 5 - 41 U/L Final     Protime   Date Value Ref Range Status   03/15/2018 24.6 (H) 12.1 - 15.0 Seconds Final   03/14/2018 19.9 (H) 12.1 - 15.0 Seconds Final   /  INR   Date Value Ref Range Status   03/15/2018 2.18 (H) 0.90 - 1.10 Final   03/14/2018 1.67 (H) 0.90 - 1.10 Final            Imaging Results (last 72 hours)     Procedure Component Value Units Date/Time    XR Toe 2+ View Left [[id number]] Collected:  03/14/18 1200     Updated:  03/14/18 1205    Narrative:       INDICATION: Redness and swelling of the left fourth toe since 3/5/2018.     COMPARISON: 3/4/2018      FINDINGS: 2 views of the left 4 toe.  No visible fracture. Apparent  interval reduction of the fourth digit dislocation..  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 No aggressive  appearing bone change or soft tissue gas..   No foreign body.       Impression:       Apparent interval reduction of the left digit dislocation.     Otherwise no acute change from 3/4/2018.     This report was finalized on 3/14/2018 12:03 PM by Dr. Valente Hooks MD.       XR Abdomen Flat & Upright [[id number]] Collected:  03/14/18 1159     Updated:  03/14/18 1202    Narrative:       FLAT AND UPRIGHT VIEWS OF THE ABDOMEN     INDICATION: Generalized abdominal pain for the past 4 days      PROCEDURE: Supine and upright views of the abdomen      COMPARISON: None      FINDINGS:  Degraded by body habitus. Slightly elevated right  hemidiaphragm. No free air. Others probably at least a moderate amount  of gas in the stomach. No  frankly dilated air-filled loops of small  bowel are seen. There is probably at least a scattered to moderate  amount of stool the colon.       Impression:       Significantly degraded by technique. Nonspecific bowel gas  pattern but no definitive evidence for obstruction     This report was finalized on 3/14/2018 12:00 PM by Dr. Valente Hooks MD.       XR Chest 2 View [[id number]] Collected:  03/14/18 0745     Updated:  03/14/18 0748    Narrative:       INDICATION:  Confusion and altered mental status today     COMPARISON:  3/9/2018     FINDINGS: PA and lateral views of the chest. Stable heart size.  Persistent low lung volumes. No dense consolidation. No pneumothorax or  pleural effusions.       Impression:       Stable low lung volumes. No acute change compared with  3/9/2018     This report was finalized on 3/14/2018 7:46 AM by Dr. Valente Hooks MD.       CT Head Without Contrast [[id number]] Collected:  03/14/18 0719     Updated:  03/14/18 0723    Narrative:       CT HEAD WITHOUT CONTRAST     INDICATION: Confusion today      PROCEDURE: Unenhanced CT head.     9 CTs no known nuclear medicine cardiac perfusion studies last year      COMPARISON: 3/9/2018      FINDINGS:  No acute hemorrhage, abnormal mass effect, extra-axial fluid  collection, or hydrocephalus.        Impression:       No acute intracranial finding.     This report was finalized on 3/14/2018 7:21 AM by Dr. Valente Hooks MD.             Assessment/Plan     Active Problems:    Confusion associated with infection    Coffee-ground emesis: In the setting of sepsis and coagulopathy.  He is receiving IV antibiotics and is under the care Dr. Farah for this.  He has been on by mouth Protonix yesterday but he has been with an NG tube up until last night.  We'll switch him over to Protonix IV and a drip for the next 24 hours.  Will monitor his hemoglobin closely.  We'll need to correct his coagulopathy and figure out why he is coagulopathic  Give vitamin K and see  if inr corrects as he has been on abx lately. Discussed with Radha NINA    I discussed the patients findings and my recommendations with patient, family and nursing staff    Kaylie Mcfarlane MD  [facility]  03/15/18  @NOW      Time:

## 2018-03-15 NOTE — CONSULTS
LOS: 0 days   Patient Care Team:  Killian Scales MD as PCP - General  Killian Scales MD as PCP - Family Medicine        Subjective       Attending MD : Ciera Henriquez MD    Patient Complaints: R foot pain  falls         History of Present Illness  :     Patient Denies:  NVD    PMH : Active Problems:    Confusion associated with infection      Review of Systems:    Confused on and off     Objective     Vital Signs  Temp:  [97.5 °F (36.4 °C)-98.8 °F (37.1 °C)] 98.8 °F (37.1 °C)  Heart Rate:  [] 117  Resp:  [18-22] 20  BP: (119-184)/() 157/103    Physical Exam:     General Appearance:    Alert, cooperative, in no acute distress   Head:    Normocephalic, without obvious abnormality, atraumatic   Eyes:            Lids and lashes normal, conjunctivae and sclerae normal, no   icterus, no pallor, corneas clear, PERRLA   Ears:    Ears appear intact with no abnormalities noted   Throat:   No oral lesions, no thrush, oral mucosa moist   Neck:   No adenopathy, supple, trachea midline, no thyromegaly, no   carotid bruit, no JVD   Back:     No kyphosis present, no scoliosis present, no skin lesions,      erythema or scars, no tenderness to percussion or                   palpation,   range of motion normal   Lungs:     Clear to auscultation,respirations regular, even and                  unlabored    Heart:    Regular rhythm and normal rate, normal S1 and S2, no            murmur, no gallop, no rub, no click   Chest Wall:    No abnormalities observed   Abdomen:     Normal bowel sounds, no masses, no organomegaly, soft        tender, non-distended, no guarding, no rebound                tenderness   Rectal:     Deferred   Extremities: R foot erythema up to mid calf   Pulses:    Skin:   No bleeding, bruising or rash   Lymph nodes:   No palpable adenopathy   Neurologic:   Cranial nerves 2 - 12 grossly intact, sensation intact, DTR       present and equal bilaterally          Results Review:    Lab  Results (last 72 hours)     Procedure Component Value Units Date/Time    Blood Culture - Blood, [[id number]]  (Normal) Collected:  03/13/18 1720    Specimen:  Blood from Arm, Left Updated:  03/14/18 1731     Blood Culture No growth at 24 hours    Blood Culture - Blood, [[id number]]  (Normal) Collected:  03/13/18 1649    Specimen:  Blood from Hand, Left Updated:  03/14/18 1701     Blood Culture No growth at 24 hours    POC Glucose Once [[id number]]  (Normal) Collected:  03/14/18 1632    Specimen:  Blood Updated:  03/14/18 1648     Glucose 128 mg/dL     Narrative:       Meter: [id number] : [id number] Ravi Garcia RN [facility]    POC Glucose Once [[id number]]  (Normal) Collected:  03/14/18 1432    Specimen:  Blood Updated:  03/14/18 1458     Glucose 123 mg/dL     Narrative:       Meter: [id number] : [id number] Ravi Garcia RN [facility]    Wound Culture - Wound, Foot, Left [[id number]]  (Normal) Collected:  03/13/18 1645    Specimen:  Wound from Foot, Left Updated:  03/14/18 1355     Wound Culture Growth present, too young to evaluate     Gram Stain Result Moderate (3+) WBCs seen      Moderate (3+) Gram negative bacilli      Few (2+) Gram positive cocci    Fibrinogen [[id number]]  (Abnormal) Collected:  03/14/18 1221    Specimen:  Blood Updated:  03/14/18 1303     Fibrinogen 626 (H) mg/dL     aPTT [[id number]]  (Abnormal) Collected:  03/14/18 1221    Specimen:  Blood Updated:  03/14/18 1257     PTT 58.9 (H) seconds     Narrative:       PTT = The equivalent PTT values for the therapeutic range of heparin levels at 0.1 to 0.7 U/ml are 53 to 110 seconds.    Heparin-induced Platelet Antibody [[id number]] Collected:  03/14/18 1221    Specimen:  Blood Updated:  03/14/18 1226    Acetone [[id number]]  (Normal) Collected:  03/13/18 1720    Specimen:  Blood Updated:  03/14/18 1112     Acetone Negative    C-reactive Protein [[id number]]  (Abnormal) Collected:  03/14/18 0401    Specimen:  Blood Updated:  03/14/18 1105      C-Reactive Protein 12.18 (H) mg/dL     Procalcitonin [[id number]]  (Normal) Collected:  03/14/18 0924    Specimen:  Blood Updated:  03/14/18 0956     Procalcitonin 0.19 ng/mL     Narrative:       As a Marker for Sepsis (Non-Neonates):   1. <0.5 ng/mL represents a low risk of severe sepsis and/or septic shock.  2. >2 ng/mL represents a high risk of severe sepsis and/or septic shock.    As a Marker for Lower Respiratory Tract Infections that require antibiotic therapy:    PCT on Admission     Antibiotic Therapy       6-12 Hrs later  > 0.5                Strongly Recommended             >0.25 - <0.5         Recommended  0.1 - 0.25           Discouraged              Remeasure/reassess PCT  <0.1                 Strongly Discouraged     Remeasure/reassess PCT                     PCT values of < 0.5 ng/mL do not exclude an infection, because localized infections (without systemic signs) may be associated with such low concentrations, or a systemic infection in its initial stages (< 6 hours). Furthermore, increased PCT can occur without infection. PCT concentrations between 0.5 and 2.0 ng/mL should be interpreted taking into account the patient's history. It is recommended to retest PCT within 6-24 hours if any concentrations < 2 ng/mL are obtained.    Magnesium [[id number]]  (Normal) Collected:  03/14/18 0907    Specimen:  Blood Updated:  03/14/18 0949     Magnesium 1.7 mg/dL     Ammonia [[id number]]  (Normal) Collected:  03/14/18 0924    Specimen:  Blood Updated:  03/14/18 0948     Ammonia 38 umol/L     Respiratory Panel, PCR - Swab, Nasopharynx [[id number]]  (Normal) Collected:  03/14/18 0140    Specimen:  Swab from Nasopharynx Updated:  03/14/18 0920     ADENOVIRUS, PCR Not Detected     Coronavirus 229E Not Detected     Coronavirus HKU1 Not Detected     Coronavirus NL63 Not Detected     Coronavirus OC43 Not Detected     Human Metapneumovirus Not Detected     Human Rhinovirus/Enterovirus Not Detected     Influenza B PCR Not  Detected     Parainfluenza Virus 1 Not Detected     Parainfluenza Virus 2 Not Detected     Parainfluenza Virus 3 Not Detected     Parainfluenza Virus 4 Not Detected     Bordetella pertussis pcr Not Detected     Influenza A H1 2009 PCR Not Detected     Chlamydophila pneumoniae PCR Not Detected     Mycoplasma pneumo by PCR Not Detected     Influenza A PCR Not Detected     Influenza A H3 Not Detected     Influenza A H1 Not Detected     RSV, PCR Not Detected    Sedimentation Rate [[id number]]  (Abnormal) Collected:  03/14/18 0401    Specimen:  Blood Updated:  03/14/18 0817     Sed Rate 24 (H) mm/hr     Occult Blood Gastric / Duodenum - Gastric Contents, [[id number]]  (Abnormal) Collected:  03/14/18 0512    Specimen:  Gastric Contents Updated:  03/14/18 0532     Gastroccult Positive (A)     pH, Gastric 3    Protime-INR [[id number]]  (Abnormal) Collected:  03/14/18 0401    Specimen:  Blood Updated:  03/14/18 0526     Protime 19.9 (H) Seconds      INR 1.67 (H)    Narrative:       Therapeutic Ranges for INR: 2.0-3.0 (PT 20-30)                              2.5-3.5 (PT 25-34)    Basic Metabolic Panel [[id number]]  (Abnormal) Collected:  03/14/18 0401    Specimen:  Blood Updated:  03/14/18 0501     Glucose 152 (H) mg/dL      BUN 25 (H) mg/dL      Creatinine 0.63 (L) mg/dL      Sodium 140 mmol/L      Potassium 3.4 (L) mmol/L      Chloride 105 mmol/L      CO2 19.7 (L) mmol/L      Calcium 8.5 (L) mg/dL      eGFR Non African Amer 133 mL/min/1.73      BUN/Creatinine Ratio 39.7 (H)     Anion Gap 15.3 mmol/L     Narrative:       GFR Normal >60  Chronic Kidney Disease <60  Kidney Failure <15    CBC & Differential [[id number]] Collected:  03/14/18 0401    Specimen:  Blood Updated:  03/14/18 0444    Narrative:       The following orders were created for panel order CBC & Differential.  Procedure                               Abnormality         Status                     ---------                               -----------         ------                      CBC Auto Differential[[id number]]        Abnormal            Final result                 Please view results for these tests on the individual orders.    CBC Auto Differential [[id number]]  (Abnormal) Collected:  03/14/18 0401    Specimen:  Blood Updated:  03/14/18 0444     WBC 13.16 (H) 10*3/mm3      RBC 4.41 (L) 10*6/mm3      Hemoglobin 13.1 (L) g/dL      Hematocrit 38.3 (L) %      MCV 86.8 fL      MCH 29.7 pg      MCHC 34.2 g/dL      RDW 13.9 %      RDW-SD 43.0 fl      MPV 9.9 fL      Platelets 355 10*3/mm3      Neutrophil % 78.3 (H) %      Lymphocyte % 10.5 (L) %      Monocyte % 10.3 (H) %      Eosinophil % 0.0 %      Basophil % 0.2 %      Immature Grans % 0.7 (H) %      Neutrophils, Absolute 10.32 (H) 10*3/mm3      Lymphocytes, Absolute 1.38 10*3/mm3      Monocytes, Absolute 1.35 (H) 10*3/mm3      Eosinophils, Absolute 0.00 (L) 10*3/mm3      Basophils, Absolute 0.02 10*3/mm3      Immature Grans, Absolute 0.09 (H) 10*3/mm3      nRBC 0.0 /100 WBC     Urine Drug Screen - Urine, Clean Catch [[id number]]  (Abnormal) Collected:  03/13/18 1659    Specimen:  Urine from Urine, Clean Catch Updated:  03/13/18 2009     THC, Screen, Urine Negative     Phencyclidine (PCP), Urine Negative     Cocaine Screen, Urine Negative     Methamphetamine, Urine Negative     Opiate Screen Negative     Amphetamine Screen, Urine Negative     Benzodiazepine Screen, Urine Positive (A)     Tricyclic Antidepressants Screen Negative     Methadone Screen, Urine Negative     Barbiturates Screen, Urine Positive (A)     Oxycodone Screen, Urine Negative     Propoxyphene Screen Negative     Buprenorphine, Screen, Urine Negative    Narrative:       Urine drug screen results are to be used for medical purposes only.  They are not to be used for legal purposes such as employment testing.  Negative results do not necessarily mean the complete absence of a subtance, but rather that the result is less than the cutoff for that substance.   Positive results are unconfirmed and considered Preliminary Positive.  [facility] does not automatically confirm Postitive Unconfirmed results.  The physician may request (order) an Unconfirmed Positive result to be sent out for confirmation.      Negative Thresholds for Drugs Screened:    THC screen, urine                          50 ng/ml  Phenycyclidine (PCP), urine                25 ng/ml  Cocaine screen, urine                     150 ng/ml  Methamphetamine, urine                    500 ng/ml  Opiate screen, urine                      100 ng/ml  Amphetamine screen, urine                 500 ng/ml  Benzodiazepine screen, urine              150 ng/ml  Tricyclic Antidepressants screen, urine   300 ng/ml  Methadone screen, urine                   200 ng/ml  Barbiturates screen, urine                200 ng/ml  Oxycodone screen, urine                   100 ng/ml  Propoxyphene screen, urine                300 ng/ml  Buprenorphine screen, urine                10 ng/ml    [place] Draw [[id number]] Collected:  03/13/18 1649    Specimen:  Blood Updated:  03/13/18 1831    Narrative:       The following orders were created for panel order [place] Draw.  Procedure                               Abnormality         Status                     ---------                               -----------         ------                     Light Blue Top[[id number]]                                   Final result               Green Top (Gel)[[id number]]                                  Final result               Lavender Top[[id number]]                                     Final result               Gold Top - SST[[id number]]                                   Final result                 Please view results for these tests on the individual orders.    Light Blue Top [[id number]] Collected:  03/13/18 1720    Specimen:  Blood Updated:  03/13/18 1831     Extra Tube hold for add-on     Comment: Auto resulted       Gold Top - SST  [[id number]] Collected:  03/13/18 1720    Specimen:  Blood Updated:  03/13/18 1831     Extra Tube Hold for add-ons.     Comment: Auto resulted.       Green Top (Gel) [[id number]] Collected:  03/13/18 1649    Specimen:  Blood Updated:  03/13/18 1801     Extra Tube Hold for add-ons.     Comment: Auto resulted.       Lavender Top [[id number]] Collected:  03/13/18 1649    Specimen:  Blood Updated:  03/13/18 1801     Extra Tube hold for add-on     Comment: Auto resulted       Comprehensive Metabolic Panel [[id number]]  (Abnormal) Collected:  03/13/18 1649    Specimen:  Blood Updated:  03/13/18 1734     Glucose 120 (H) mg/dL      BUN 21 (H) mg/dL      Creatinine 0.77 mg/dL      Sodium 136 mmol/L      Potassium 3.4 (L) mmol/L      Chloride 100 mmol/L      CO2 13.3 (L) mmol/L      Calcium 8.7 mg/dL      Total Protein 7.2 g/dL      Albumin 4.00 g/dL      ALT (SGPT) 21 U/L      AST (SGOT) 20 U/L      Alkaline Phosphatase 112 U/L      Total Bilirubin 0.3 mg/dL      eGFR Non African Amer 105 mL/min/1.73      Globulin 3.2 gm/dL      A/G Ratio 1.3 g/dL      BUN/Creatinine Ratio 27.3 (H)     Anion Gap 22.7 mmol/L     Urinalysis, Microscopic Only - Urine, Clean Catch [[id number]]  (Abnormal) Collected:  03/13/18 1659    Specimen:  Urine from Urine, Catheter Updated:  03/13/18 1719     RBC, UA 13-20 (A) /HPF      WBC, UA 0-2 (A) /HPF      Bacteria, UA None Seen /HPF      Squamous Epithelial Cells, UA None Seen /HPF      Hyaline Casts, UA None Seen /LPF      Methodology Manual Light Microscopy    Urinalysis With / Culture If Indicated - Urine, Catheter [[id number]]  (Abnormal) Collected:  03/13/18 1659    Specimen:  Urine from Urine, Catheter Updated:  03/13/18 1715     Color, UA Yellow     Appearance, UA Slightly Cloudy (A)     pH, UA 6.0     Specific Gravity, UA 1.015     Glucose, UA Negative     Ketones, UA 80 mg/dL (3+)     Bilirubin, UA Small (1+) (A)     Blood, UA Moderate (2+) (A)     Protein, UA 30 mg/dL (1+) (A)     Leuk Esterase,  UA Negative     Nitrite, UA Negative     Urobilinogen, UA 0.2 E.U./dL    Lactic Acid, Plasma [[id number]]  (Normal) Collected:  03/13/18 1649    Specimen:  Blood Updated:  03/13/18 1713     Lactate 1.8 mmol/L     CBC & Differential [[id number]] Collected:  03/13/18 1649    Specimen:  Blood Updated:  03/13/18 1709    Narrative:       The following orders were created for panel order CBC & Differential.  Procedure                               Abnormality         Status                     ---------                               -----------         ------                     CBC Auto Differential[[id number]]        Abnormal            Final result                 Please view results for these tests on the individual orders.    CBC Auto Differential [[id number]]  (Abnormal) Collected:  03/13/18 1649    Specimen:  Blood Updated:  03/13/18 1709     WBC 12.28 (H) 10*3/mm3      RBC 5.31 10*6/mm3      Hemoglobin 15.5 g/dL      Hematocrit 46.3 %      MCV 87.2 fL      MCH 29.2 pg      MCHC 33.5 g/dL      RDW 13.9 %      RDW-SD 43.9 fl      MPV 10.0 fL      Platelets 302 10*3/mm3      Neutrophil % 78.1 (H) %      Lymphocyte % 10.0 (L) %      Monocyte % 10.8 (H) %      Eosinophil % 0.1 %      Basophil % 0.2 %      Immature Grans % 0.8 (H) %      Neutrophils, Absolute 9.59 (H) 10*3/mm3      Lymphocytes, Absolute 1.23 10*3/mm3      Monocytes, Absolute 1.33 (H) 10*3/mm3      Eosinophils, Absolute 0.01 (L) 10*3/mm3      Basophils, Absolute 0.02 10*3/mm3      Immature Grans, Absolute 0.10 (H) 10*3/mm3      nRBC 0.0 /100 WBC               Imaging Results (last 72 hours)     Procedure Component Value Units Date/Time    XR Toe 2+ View Left [[id number]] Collected:  03/14/18 1200     Updated:  03/14/18 1205    Narrative:       INDICATION: Redness and swelling of the left fourth toe since 3/5/2018.     COMPARISON: 3/4/2018      FINDINGS: 2 views of the left 4 toe.  No visible fracture. Apparent  interval reduction of the fourth digit  dislocation..  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 No aggressive  appearing bone change or soft tissue gas..   No foreign body.       Impression:       Apparent interval reduction of the left digit dislocation.     Otherwise no acute change from 3/4/2018.     This report was finalized on 3/14/2018 12:03 PM by Dr. Valente Hooks MD.       XR Abdomen Flat & Upright [[id number]] Collected:  03/14/18 1159     Updated:  03/14/18 1202    Narrative:       FLAT AND UPRIGHT VIEWS OF THE ABDOMEN     INDICATION: Generalized abdominal pain for the past 4 days      PROCEDURE: Supine and upright views of the abdomen      COMPARISON: None      FINDINGS:  Degraded by body habitus. Slightly elevated right  hemidiaphragm. No free air. Others probably at least a moderate amount  of gas in the stomach. No frankly dilated air-filled loops of small  bowel are seen. There is probably at least a scattered to moderate  amount of stool the colon.       Impression:       Significantly degraded by technique. Nonspecific bowel gas  pattern but no definitive evidence for obstruction     This report was finalized on 3/14/2018 12:00 PM by Dr. Valente Hooks MD.       XR Chest 2 View [[id number]] Collected:  03/14/18 0745     Updated:  03/14/18 0748    Narrative:       INDICATION:  Confusion and altered mental status today     COMPARISON:  3/9/2018     FINDINGS: PA and lateral views of the chest. Stable heart size.  Persistent low lung volumes. No dense consolidation. No pneumothorax or  pleural effusions.       Impression:       Stable low lung volumes. No acute change compared with  3/9/2018     This report was finalized on 3/14/2018 7:46 AM by Dr. Valente Hooks MD.       CT Head Without Contrast [[id number]] Collected:  03/14/18 0719     Updated:  03/14/18 0723    Narrative:       CT HEAD WITHOUT CONTRAST     INDICATION: Confusion today      PROCEDURE: Unenhanced CT head.     9 CTs no known nuclear medicine cardiac perfusion  studies last year      COMPARISON: 3/9/2018      FINDINGS:  No acute hemorrhage, abnormal mass effect, extra-axial fluid  collection, or hydrocephalus.        Impression:       No acute intracranial finding.     This report was finalized on 3/14/2018 7:21 AM by Dr. Valente Hooks MD.               Medication Review:      Hospital Medications (active)       Dose Frequency Start End    acetaminophen (TYLENOL) tablet 650 mg 650 mg Every 4 Hours PRN 3/13/2018     Sig - Route: Take 2 tablets by mouth Every 4 (Four) Hours As Needed for Mild Pain . - Oral    amLODIPine (NORVASC) tablet 2.5 mg 2.5 mg Daily 3/14/2018     Sig - Route: Take 1 tablet by mouth Daily. - Oral    calcium carbonate (TUMS) chewable tablet 500 mg (200 mg elemental) 1 tablet 2 Times Daily PRN 3/13/2018     Sig - Route: Chew 500 mg 2 (Two) Times a Day As Needed for Heartburn. - Oral    dextrose (D50W) solution 25 g 25 g Every 15 Minutes PRN 3/14/2018     Sig - Route: Infuse 50 mL into a venous catheter Every 15 (Fifteen) Minutes As Needed for Low Blood Sugar (Blood Sugar Less Than 70). - Intravenous    dextrose (GLUTOSE) oral gel 15 g 15 g Every 15 Minutes PRN 3/14/2018     Sig - Route: Take 15 g by mouth Every 15 (Fifteen) Minutes As Needed for Low Blood Sugar (Blood sugar less than 70). - Oral    FLUoxetine (PROzac) capsule 40 mg 40 mg Daily 3/14/2018     Sig - Route: Take 2 capsules by mouth Daily. - Oral    glucagon (GLUCAGEN) injection 1 mg 1 mg As Needed 3/14/2018     Sig - Route: Inject 1 mg under the skin As Needed (Blood Glucose Less Than 70). - Subcutaneous    haloperidol lactate (HALDOL) injection 1 mg 1 mg Once 3/14/2018     Sig - Route: Inject 0.2 mL into the shoulder, thigh, or buttocks 1 (One) Time. - Intramuscular    hydrophor (AQUAPHOR) ointment 1 application 1 application Every 24 Hours Scheduled 3/14/2018     Sig - Route: Apply 1 application topically Daily. - Topical    insulin aspart (novoLOG) injection 0-7 Units 0-7 Units 4 Times  "Daily Before Meals & Nightly 3/14/2018     Sig - Route: Inject 0-7 Units under the skin 4 (Four) Times a Day Before Meals & at Bedtime. - Subcutaneous    lactated ringers infusion 125 mL/hr Continuous 3/14/2018 3/14/2018    Sig - Route: Infuse 125 mL/hr into a venous catheter Continuous. - Intravenous    lactobacillus acidophilus (RISAQUAD) capsule 1 capsule 1 capsule Daily 3/14/2018     Sig - Route: Take 1 capsule by mouth Daily. - Oral    LORazepam (ATIVAN) injection 0.5 mg 0.5 mg Once 3/14/2018 3/14/2018    Sig - Route: Infuse 0.25 mL into a venous catheter 1 (One) Time. - Intravenous    losartan (COZAAR) tablet 100 mg 100 mg Every 24 Hours Scheduled 3/14/2018     Sig - Route: Take 2 tablets by mouth Daily. - Oral    Magnesium Sulfate 2 gram infusion- Mg 1.6 - 1.9 mg/dL 2 g As Needed 3/14/2018     Sig - Route: Infuse 50 mL into a venous catheter As Needed (Mg 1.6 - 1.9 mg/dL). - Intravenous    Linked Group 1:  \"Or\" Linked Group Details        magnesium sulfate 3 gram infusion (1gm x 3) - Mg 1.1 - 1.5 mg/dL 1 g As Needed 3/14/2018     Sig - Route: Infuse 100 mL into a venous catheter As Needed (Mg 1.1 - 1.5 mg/dL). - Intravenous    Linked Group 1:  \"Or\" Linked Group Details        magnesium sulfate 4 gram infusion - Mg less than or equal to 1mg/dL 4 g As Needed 3/14/2018     Sig - Route: Infuse 100 mL into a venous catheter As Needed (Mg less than or equal to 1mg/dL). - Intravenous    Linked Group 1:  \"Or\" Linked Group Details        metoprolol tartrate (LOPRESSOR) injection 2.5 mg 2.5 mg Every 6 Hours 3/14/2018     Sig - Route: Infuse 2.5 mL into a venous catheter Every 6 (Six) Hours. - Intravenous    metoprolol tartrate (LOPRESSOR) tablet 25 mg 25 mg Once 3/14/2018 3/14/2018    Sig - Route: Take 1 tablet by mouth 1 (One) Time. - Oral    morphine injection 2 mg 2 mg Every 4 Hours PRN 3/14/2018 3/24/2018    Sig - Route: Infuse 1 mL into a venous catheter Every 4 (Four) Hours As Needed for Severe Pain  (1-2mg q 4hr " "prn pain). - Intravenous    ondansetron (ZOFRAN) injection 4 mg 4 mg Every 6 Hours PRN 3/14/2018     Sig - Route: Infuse 2 mL into a venous catheter Every 6 (Six) Hours As Needed for Nausea or Vomiting. - Intravenous    oxyCODONE-acetaminophen (PERCOCET) 5-325 MG per tablet 1 tablet 1 tablet Once 3/14/2018 3/14/2018    Sig - Route: Take 1 tablet by mouth 1 (One) Time. - Oral    oxyCODONE-acetaminophen (PERCOCET) 5-325 MG per tablet 1 tablet 1 tablet Every 4 Hours PRN 3/14/2018     Sig - Route: Take 1 tablet by mouth Every 4 (Four) Hours As Needed for Moderate Pain . - Oral    pantoprazole (PROTONIX) EC tablet 40 mg 40 mg Every Early Morning 3/14/2018     Sig - Route: Take 1 tablet by mouth Every Morning. - Oral    Pharmacy to dose vancomycin  Continuous PRN 3/14/2018 3/19/2018    Sig - Route: Continuous As Needed for Consult. - Does not apply    piperacillin-tazobactam (ZOSYN) 3.375 g/100 mL 0.9% NS IVPB (mbp) 3.375 g Once 3/14/2018 3/14/2018    Sig - Route: Infuse 100 mL into a venous catheter 1 (One) Time. - Intravenous    piperacillin-tazobactam (ZOSYN) 3.375 g/100 mL 0.9% NS IVPB (mbp) 3.375 g Every 8 Hours 3/14/2018 3/21/2018    Sig - Route: Infuse 100 mL into a venous catheter Every 8 (Eight) Hours. - Intravenous    potassium chloride (K-DUR,KLOR-CON) CR tablet 40 mEq 40 mEq As Needed 3/14/2018     Sig - Route: Take 2 tablets by mouth As Needed (potassium replacement.  see admin instructions). - Oral    Linked Group 2:  \"Or\" Linked Group Details        potassium chloride (KLOR-CON) packet 40 mEq 40 mEq As Needed 3/14/2018     Sig - Route: Take 40 mEq by mouth As Needed (potassium replacement, see admin instructions). - Oral    Linked Group 2:  \"Or\" Linked Group Details        potassium chloride 10 mEq in 100 mL IVPB 10 mEq Every 1 Hour PRN 3/14/2018     Sig - Route: Infuse 100 mL into a venous catheter Every 1 (One) Hour As Needed (potassium protocol PERIPHERAL - see admin instructions). - Intravenous    " "Linked Group 2:  \"Or\" Linked Group Details        promethazine (PHENERGAN) injection 12.5 mg 12.5 mg Every 6 Hours PRN 3/14/2018     Sig - Route: Inject 0.5 mL into the shoulder, thigh, or buttocks Every 6 (Six) Hours As Needed for Nausea or Vomiting. - Intramuscular    Linked Group 3:  \"Or\" Linked Group Details        promethazine (PHENERGAN) suppository 12.5 mg 12.5 mg Every 6 Hours PRN 3/14/2018     Sig - Route: Insert 1 suppository into the rectum Every 6 (Six) Hours As Needed for Nausea or Vomiting. - Rectal    Linked Group 3:  \"Or\" Linked Group Details        promethazine (PHENERGAN) tablet 12.5 mg 12.5 mg Every 6 Hours PRN 3/14/2018     Sig - Route: Take 1 tablet by mouth Every 6 (Six) Hours As Needed for Nausea or Vomiting. - Oral    Linked Group 3:  \"Or\" Linked Group Details        sodium chloride 0.9 % bolus 1,000 mL 1,000 mL Once 3/14/2018 3/14/2018    Sig - Route: Infuse 1,000 mL into a venous catheter 1 (One) Time. - Intravenous    sodium chloride 0.9 % flush 1-10 mL 1-10 mL As Needed 3/13/2018     Sig - Route: Infuse 1-10 mL into a venous catheter As Needed for Line Care. - Intravenous    sodium chloride 0.9 % flush 10 mL 10 mL As Needed 3/13/2018     Sig - Route: Infuse 10 mL into a venous catheter As Needed for Line Care. - Intravenous    Cosign for Ordering: Accepted by Quinton Davis MD on 3/14/2018 12:36 AM    sodium chloride 0.9 % infusion 40 mL 40 mL As Needed 3/13/2018     Sig - Route: Infuse 40 mL into a venous catheter As Needed for Line Care. - Intravenous    sodium chloride 0.9 % infusion 100 mL/hr Continuous 3/14/2018     Sig - Route: Infuse 100 mL/hr into a venous catheter Continuous. - Intravenous    temazepam (RESTORIL) capsule 15 mg 15 mg Nightly PRN 3/14/2018     Sig - Route: Take 1 capsule by mouth At Night As Needed for Sleep. - Oral    vancomycin (VANCOCIN) 2,000 mg in sodium chloride 0.9 % 500 mL IVPB 2,000 mg Every 8 Hours 3/14/2018 3/21/2018    Sig - Route: Infuse 2,000 mg into " a venous catheter Every 8 (Eight) Hours. - Intravenous    cefTRIAXone (ROCEPHIN) IVPB 1 g/50ml dextrose (premix) (Discontinued) 1 g Every 24 Hours 3/14/2018 3/14/2018    Sig - Route: Infuse 50 mL into a venous catheter Daily. - Intravenous    clindamycin (CLEOCIN) injection 600 mg (Discontinued) 600 mg Every 8 Hours Scheduled 3/14/2018 3/14/2018    Sig - Route: Infuse 4 mL into a venous catheter Every 8 (Eight) Hours. - Intravenous    lactobacillus acidophilus (RISAQUAD) capsule 1 capsule (Discontinued) 1 capsule Daily 3/14/2018 3/14/2018    Sig - Route: Take 1 capsule by mouth Daily. - Oral    losartan (COZAAR) tablet 50 mg (Discontinued) 50 mg Every 24 Hours Scheduled 3/14/2018 3/14/2018    Sig - Route: Take 1 tablet by mouth Daily. - Oral    ondansetron (ZOFRAN) tablet 4 mg (Discontinued) 4 mg Every 6 Hours PRN 3/13/2018 3/14/2018    Sig - Route: Take 1 tablet by mouth Every 6 (Six) Hours As Needed for Nausea or Vomiting. - Oral    sennosides-docusate sodium (SENOKOT-S) 8.6-50 MG tablet 2 tablet (Discontinued) 2 tablet Nightly PRN 3/13/2018 3/14/2018    Sig - Route: Take 2 tablets by mouth At Night As Needed for Constipation. - Oral    vancomycin (VANCOCIN) 1,750 mg in sodium chloride 0.9 % 500 mL IVPB (Discontinued) 15 mg/kg × 123 kg Every 12 Hours 3/14/2018 3/14/2018    Sig - Route: Infuse 1,750 mg into a venous catheter Every 12 (Twelve) Hours. - Intravenous    Reason for Discontinue: Dose adjustment          Assessment/Plan       Active Problems:    Confusion associated with infection  R foot cellulitis  TME  DM  HTN        Plan :    Agree with mavis goetz  BC  Local care  Thank you      Delfino Arce MD  03/14/18  9:43 PM

## 2018-03-15 NOTE — PROGRESS NOTES
[facility]/GI Progress Note   Chief Complaint:  Abdominal pain    Subjective     Patient seen in follow-up abdominal pain and gastric distention.  He has removed his NG tube on 3 occasions.  Currently his NG tube out.  He still has some mild confusion.  Denies abdominal pain at this time.  Currently under treatment for infected foot.      Objective     Vital Signs  Temp:  [98.3 °F (36.8 °C)-99.4 °F (37.4 °C)] 99.4 °F (37.4 °C)  Heart Rate:  [] 101  Resp:  [18-22] 20  BP: (114-179)/() 121/71  Body mass index is 36.81 kg/m².    Intake/Output Summary (Last 24 hours) at 03/15/18 1038  Last data filed at 03/15/18 1035   Gross per 24 hour   Intake             2150 ml   Output             3975 ml   Net            -1825 ml     I/O this shift:  In: 1550 [I.V.:1100; IV Piggyback:450]  Out: 350 [Urine:350]       Physical Exam:   General: patient awake, alert and cooperative   Abdomen: soft, nontender, nondistended; normal bowel sounds   Extremities: no rash or edema   Neurologic: Normal mood and behavior     Results Review:     I reviewed the patient's new clinical results.      WBC No results found for: WBCS   HGB Hemoglobin   Date Value Ref Range Status   03/15/2018 10.7 (L) 14.0 - 18.0 g/dL Final   03/14/2018 13.1 (L) 14.0 - 18.0 g/dL Final   03/13/2018 15.5 14.0 - 18.0 g/dL Final      HCT Hematocrit   Date Value Ref Range Status   03/15/2018 33.0 (L) 42.0 - 52.0 % Final   03/14/2018 38.3 (L) 42.0 - 52.0 % Final   03/13/2018 46.3 42.0 - 52.0 % Final      Platlets No results found for: LABPLAT     PT/INR:    Protime   Date Value Ref Range Status   03/15/2018 24.6 (H) 12.1 - 15.0 Seconds Final   03/14/2018 19.9 (H) 12.1 - 15.0 Seconds Final   /  INR   Date Value Ref Range Status   03/15/2018 2.18 (H) 0.90 - 1.10 Final   03/14/2018 1.67 (H) 0.90 - 1.10 Final       Sodium Sodium   Date Value Ref Range Status   03/15/2018 144 136 - 145 mmol/L Final   03/14/2018 140 136 - 145 mmol/L Final   03/13/2018 136 136 - 145  mmol/L Final      Potassium Potassium   Date Value Ref Range Status   03/15/2018 3.4 (L) 3.5 - 5.2 mmol/L Final   03/14/2018 3.4 (L) 3.5 - 5.2 mmol/L Final   03/13/2018 3.4 (L) 3.5 - 5.2 mmol/L Final      Chloride Chloride   Date Value Ref Range Status   03/15/2018 107 98 - 107 mmol/L Final   03/14/2018 105 98 - 107 mmol/L Final   03/13/2018 100 98 - 107 mmol/L Final      Bicarbonate No results found for: PLASMABICARB   BUN BUN   Date Value Ref Range Status   03/15/2018 23 (H) 6 - 20 mg/dL Final   03/14/2018 25 (H) 6 - 20 mg/dL Final   03/13/2018 21 (H) 6 - 20 mg/dL Final      Creatinine Creatinine   Date Value Ref Range Status   03/15/2018 0.69 (L) 0.76 - 1.27 mg/dL Final   03/14/2018 0.63 (L) 0.76 - 1.27 mg/dL Final   03/13/2018 0.77 0.76 - 1.27 mg/dL Final      Calcium Calcium   Date Value Ref Range Status   03/15/2018 7.9 (L) 8.6 - 10.5 mg/dL Final   03/14/2018 8.5 (L) 8.6 - 10.5 mg/dL Final   03/13/2018 8.7 8.6 - 10.5 mg/dL Final      Magnesium  AST  ALT  Bilirubin, Total  AlkPhos  Albumin    Amylase  Lipase    Radiology: Magnesium   Date Value Ref Range Status   03/14/2018 1.7 1.7 - 2.5 mg/dL Final     No components found for: AST.*  No components found for: ALT.*  No components found for: BILIRUBIN, TOTAL.*    No components found for: ALKPHOS.*  No components found for: ALBUMIN.*      No components found for: AMYLASE.*  No components found for: LIPASE.*            Imaging Results (most recent)     Procedure Component Value Units Date/Time    XR Toe 2+ View Left [[id number]] Collected:  03/14/18 1200     Updated:  03/14/18 1205    Narrative:       INDICATION: Redness and swelling of the left fourth toe since 3/5/2018.     COMPARISON: 3/4/2018      FINDINGS: 2 views of the left 4 toe.  No visible fracture. Apparent  interval reduction of the fourth digit dislocation..  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 No aggressive  appearing bone change or soft tissue gas..   No foreign body.        Impression:       Apparent interval reduction of the left digit dislocation.     Otherwise no acute change from 3/4/2018.     This report was finalized on 3/14/2018 12:03 PM by Dr. Valente Hooks MD.       XR Abdomen Flat & Upright [[id number]] Collected:  03/14/18 1159     Updated:  03/14/18 1202    Narrative:       FLAT AND UPRIGHT VIEWS OF THE ABDOMEN     INDICATION: Generalized abdominal pain for the past 4 days      PROCEDURE: Supine and upright views of the abdomen      COMPARISON: None      FINDINGS:  Degraded by body habitus. Slightly elevated right  hemidiaphragm. No free air. Others probably at least a moderate amount  of gas in the stomach. No frankly dilated air-filled loops of small  bowel are seen. There is probably at least a scattered to moderate  amount of stool the colon.       Impression:       Significantly degraded by technique. Nonspecific bowel gas  pattern but no definitive evidence for obstruction     This report was finalized on 3/14/2018 12:00 PM by Dr. Valente Hooks MD.       XR Chest 2 View [[id number]] Collected:  03/14/18 0745     Updated:  03/14/18 0748    Narrative:       INDICATION:  Confusion and altered mental status today     COMPARISON:  3/9/2018     FINDINGS: PA and lateral views of the chest. Stable heart size.  Persistent low lung volumes. No dense consolidation. No pneumothorax or  pleural effusions.       Impression:       Stable low lung volumes. No acute change compared with  3/9/2018     This report was finalized on 3/14/2018 7:46 AM by Dr. Valente Hooks MD.       CT Head Without Contrast [[id number]] Collected:  03/14/18 0719     Updated:  03/14/18 0723    Narrative:       CT HEAD WITHOUT CONTRAST     INDICATION: Confusion today      PROCEDURE: Unenhanced CT head.     9 CTs no known nuclear medicine cardiac perfusion studies last year      COMPARISON: 3/9/2018      FINDINGS:  No acute hemorrhage, abnormal mass effect, extra-axial fluid  collection, or hydrocephalus.         Impression:       No acute intracranial finding.     This report was finalized on 3/14/2018 7:21 AM by Dr. Valente Hooks MD.                                        Pharmacy to dose vancomycin     sodium chloride 100 mL/hr Last Rate: 100 mL/hr (03/14/18 0927)           Assessment/Plan     Patient Active Problem List   Diagnosis Code   • Disease of thyroid gland E07.9   • Cellulitis of right lower extremity L03.115   • Cellulitis L03.90   • Infected epidermoid cyst L72.0, L08.9   • Altered mental status, unspecified R41.82   • Altered mental status R41.82   • Immobility Z74.09   • Foot fracture, right S92.901A   • Open dislocation of toe S93.106A, S91.109A   • Open dislocation of phalanx of foot S93.106A, S91.109A   • Confusion associated with infection R41.0, B99.9       Early patient denies abdominal pain, distention, nausea or vomiting.  His abdominal exam is benign today.  We'll leave his NG tube out.  Try some ice chips.        Tre Moscoso MD  03/15/18  10:38 AM

## 2018-03-15 NOTE — PLAN OF CARE
Problem: Patient Care Overview  Goal: Plan of Care Review  Outcome: Ongoing (interventions implemented as appropriate)   03/15/18 0810   Coping/Psychosocial   Plan of Care Reviewed With patient   Plan of Care Review   Progress improving   OTHER   Outcome Summary Pt continues to have periods of confusion/ forgetfulness but is easily reoriented. Pt denies nausea/abdominal pain and has not had any episodes of vomiting or diarrhea (or BM) today. Still need a specimen for c.diff. RVP is negative. Blood cultures remain negative at this time. Wound culture came back positive for gram neg bacilli, and pt continues on iv vanc and zosyn. Pt started on a protonix drip. Left leg cellulitis is improving, as redness/swelling has gone down. Pt cont to c/o back, rib, and leg pain r/t recent fall. Pain controlled with IV pain meds.        Problem: Fall Risk (Adult)  Goal: Absence of Fall  Outcome: Ongoing (interventions implemented as appropriate)      Problem: Skin Injury Risk (Adult)  Goal: Identify Related Risk Factors and Signs and Symptoms  Outcome: Ongoing (interventions implemented as appropriate)    Goal: Skin Health and Integrity  Outcome: Ongoing (interventions implemented as appropriate)      Problem: Pain, Acute (Adult)  Goal: Identify Related Risk Factors and Signs and Symptoms  Outcome: Ongoing (interventions implemented as appropriate)    Goal: Acceptable Pain Control/Comfort Level  Outcome: Ongoing (interventions implemented as appropriate)

## 2018-03-16 ENCOUNTER — APPOINTMENT (OUTPATIENT)
Dept: GENERAL RADIOLOGY | Facility: HOSPITAL | Age: 53
End: 2018-03-16

## 2018-03-16 ENCOUNTER — ANESTHESIA EVENT (OUTPATIENT)
Dept: PERIOP | Facility: HOSPITAL | Age: 53
End: 2018-03-16

## 2018-03-16 ENCOUNTER — ANESTHESIA (OUTPATIENT)
Dept: PERIOP | Facility: HOSPITAL | Age: 53
End: 2018-03-16

## 2018-03-16 PROBLEM — T81.40XA POSTOPERATIVE INFECTION: Status: ACTIVE | Noted: 2018-03-13

## 2018-03-16 PROBLEM — K92.0 COFFEE GROUND EMESIS: Status: ACTIVE | Noted: 2018-03-16

## 2018-03-16 PROBLEM — D64.9 ABSOLUTE ANEMIA: Status: ACTIVE | Noted: 2018-03-16

## 2018-03-16 PROBLEM — T81.31XA WOUND DEHISCENCE, SURGICAL, INITIAL ENCOUNTER: Status: ACTIVE | Noted: 2018-03-13

## 2018-03-16 LAB
ALBUMIN SERPL-MCNC: 2.7 G/DL (ref 3.5–5.2)
ALBUMIN/GLOB SERPL: 1 G/DL
ALP SERPL-CCNC: 76 U/L (ref 40–129)
ALT SERPL W P-5'-P-CCNC: 17 U/L (ref 5–41)
ANION GAP SERPL CALCULATED.3IONS-SCNC: 10.8 MMOL/L
APTT PPP: 40.6 SECONDS (ref 24.3–38.1)
AST SERPL-CCNC: 16 U/L (ref 5–40)
BACTERIA SPEC AEROBE CULT: ABNORMAL
BASOPHILS # BLD AUTO: 0.03 10*3/MM3 (ref 0–0.2)
BASOPHILS NFR BLD AUTO: 0.5 % (ref 0–2)
BILIRUB SERPL-MCNC: 0.3 MG/DL (ref 0.2–1.2)
BUN BLD-MCNC: 13 MG/DL (ref 6–20)
BUN/CREAT SERPL: 23.2 (ref 7–25)
CALCIUM SPEC-SCNC: 7.8 MG/DL (ref 8.6–10.5)
CHLORIDE SERPL-SCNC: 110 MMOL/L (ref 98–107)
CO2 SERPL-SCNC: 24.2 MMOL/L (ref 22–29)
CREAT BLD-MCNC: 0.56 MG/DL (ref 0.76–1.27)
DEPRECATED RDW RBC AUTO: 48 FL (ref 37–54)
EOSINOPHIL # BLD AUTO: 0.16 10*3/MM3 (ref 0.1–0.3)
EOSINOPHIL NFR BLD AUTO: 2.7 % (ref 0–4)
ERYTHROCYTE [DISTWIDTH] IN BLOOD BY AUTOMATED COUNT: 14.6 % (ref 11.5–14.5)
GFR SERPL CREATININE-BSD FRML MDRD: >150 ML/MIN/1.73
GLOBULIN UR ELPH-MCNC: 2.7 GM/DL
GLUCOSE BLD-MCNC: 82 MG/DL (ref 65–99)
GLUCOSE BLDC GLUCOMTR-MCNC: 155 MG/DL (ref 70–130)
GLUCOSE BLDC GLUCOMTR-MCNC: 69 MG/DL (ref 70–130)
GLUCOSE BLDC GLUCOMTR-MCNC: 76 MG/DL (ref 70–130)
GLUCOSE BLDC GLUCOMTR-MCNC: 82 MG/DL (ref 70–130)
GRAM STN SPEC: ABNORMAL
HCT VFR BLD AUTO: 27 % (ref 42–52)
HGB BLD-MCNC: 8.8 G/DL (ref 14–18)
IMM GRANULOCYTES # BLD: 0.03 10*3/MM3 (ref 0–0.03)
IMM GRANULOCYTES NFR BLD: 0.5 % (ref 0–0.5)
INR PPP: 1.41 (ref 0.9–1.1)
LYMPHOCYTES # BLD AUTO: 1.14 10*3/MM3 (ref 0.6–4.8)
LYMPHOCYTES NFR BLD AUTO: 19 % (ref 20–45)
MAGNESIUM SERPL-MCNC: 1.7 MG/DL (ref 1.7–2.5)
MCH RBC QN AUTO: 29.8 PG (ref 27–31)
MCHC RBC AUTO-ENTMCNC: 32.6 G/DL (ref 31–37)
MCV RBC AUTO: 91.5 FL (ref 80–94)
MONOCYTES # BLD AUTO: 0.75 10*3/MM3 (ref 0–1)
MONOCYTES NFR BLD AUTO: 12.5 % (ref 3–8)
NEUTROPHILS # BLD AUTO: 3.9 10*3/MM3 (ref 1.5–8.3)
NEUTROPHILS NFR BLD AUTO: 64.8 % (ref 45–70)
NRBC BLD MANUAL-RTO: 0 /100 WBC (ref 0–0)
PLATELET # BLD AUTO: 216 10*3/MM3 (ref 140–500)
PMV BLD AUTO: 9.7 FL (ref 7.4–10.4)
POTASSIUM BLD-SCNC: 3.4 MMOL/L (ref 3.5–5.2)
PROT SERPL-MCNC: 5.4 G/DL (ref 6–8.5)
PROTHROMBIN TIME: 17.4 SECONDS (ref 12.1–15)
RBC # BLD AUTO: 2.95 10*6/MM3 (ref 4.7–6.1)
SODIUM BLD-SCNC: 145 MMOL/L (ref 136–145)
VANCOMYCIN SERPL-MCNC: 17.3 MCG/ML (ref 5–40)
WBC NRBC COR # BLD: 6.01 10*3/MM3 (ref 4.8–10.8)

## 2018-03-16 PROCEDURE — 80053 COMPREHEN METABOLIC PANEL: CPT | Performed by: NURSE PRACTITIONER

## 2018-03-16 PROCEDURE — 85670 THROMBIN TIME PLASMA: CPT | Performed by: NURSE PRACTITIONER

## 2018-03-16 PROCEDURE — 25010000002 PIPERACILLIN SOD-TAZOBACTAM PER 1 G: Performed by: PODIATRIST

## 2018-03-16 PROCEDURE — 99232 SBSQ HOSP IP/OBS MODERATE 35: CPT | Performed by: NURSE PRACTITIONER

## 2018-03-16 PROCEDURE — 85635 REPTILASE TEST: CPT | Performed by: NURSE PRACTITIONER

## 2018-03-16 PROCEDURE — 0Y6W0Z0 DETACHMENT AT LEFT 4TH TOE, COMPLETE, OPEN APPROACH: ICD-10-PCS | Performed by: PODIATRIST

## 2018-03-16 PROCEDURE — 25010000002 PIPERACILLIN SOD-TAZOBACTAM PER 1 G: Performed by: NURSE PRACTITIONER

## 2018-03-16 PROCEDURE — 0DB68ZX EXCISION OF STOMACH, VIA NATURAL OR ARTIFICIAL OPENING ENDOSCOPIC, DIAGNOSTIC: ICD-10-PCS | Performed by: INTERNAL MEDICINE

## 2018-03-16 PROCEDURE — 43239 EGD BIOPSY SINGLE/MULTIPLE: CPT | Performed by: INTERNAL MEDICINE

## 2018-03-16 PROCEDURE — 25010000002 MORPHINE PER 10 MG: Performed by: FAMILY MEDICINE

## 2018-03-16 PROCEDURE — 25010000002 PROPOFOL 1000 MG/ML EMULSION: Performed by: NURSE ANESTHETIST, CERTIFIED REGISTERED

## 2018-03-16 PROCEDURE — 87075 CULTR BACTERIA EXCEPT BLOOD: CPT | Performed by: PODIATRIST

## 2018-03-16 PROCEDURE — 85730 THROMBOPLASTIN TIME PARTIAL: CPT | Performed by: NURSE PRACTITIONER

## 2018-03-16 PROCEDURE — 25010000002 VANCOMYCIN PER 500 MG: Performed by: INTERNAL MEDICINE

## 2018-03-16 PROCEDURE — 25010000002 VANCOMYCIN PER 500 MG: Performed by: PODIATRIST

## 2018-03-16 PROCEDURE — 85385 FIBRINOGEN ANTIGEN: CPT | Performed by: NURSE PRACTITIONER

## 2018-03-16 PROCEDURE — 25010000002 PROPOFOL 10 MG/ML EMULSION: Performed by: ANESTHESIOLOGY

## 2018-03-16 PROCEDURE — 63710000001 INSULIN ASPART PER 5 UNITS: Performed by: PODIATRIST

## 2018-03-16 PROCEDURE — 83735 ASSAY OF MAGNESIUM: CPT | Performed by: NURSE PRACTITIONER

## 2018-03-16 PROCEDURE — 82962 GLUCOSE BLOOD TEST: CPT

## 2018-03-16 PROCEDURE — 73630 X-RAY EXAM OF FOOT: CPT

## 2018-03-16 PROCEDURE — 80202 ASSAY OF VANCOMYCIN: CPT | Performed by: INTERNAL MEDICINE

## 2018-03-16 PROCEDURE — 85384 FIBRINOGEN ACTIVITY: CPT | Performed by: NURSE PRACTITIONER

## 2018-03-16 PROCEDURE — 99232 SBSQ HOSP IP/OBS MODERATE 35: CPT | Performed by: SURGERY

## 2018-03-16 PROCEDURE — 85025 COMPLETE CBC W/AUTO DIFF WBC: CPT | Performed by: NURSE PRACTITIONER

## 2018-03-16 PROCEDURE — 85610 PROTHROMBIN TIME: CPT | Performed by: NURSE PRACTITIONER

## 2018-03-16 PROCEDURE — 25010000002 MORPHINE PER 10 MG: Performed by: PODIATRIST

## 2018-03-16 PROCEDURE — 87070 CULTURE OTHR SPECIMN AEROBIC: CPT | Performed by: PODIATRIST

## 2018-03-16 RX ORDER — TEMAZEPAM 15 MG/1
15 CAPSULE ORAL NIGHTLY PRN
Status: DISCONTINUED | OUTPATIENT
Start: 2018-03-16 | End: 2018-03-17

## 2018-03-16 RX ORDER — ACETAMINOPHEN 325 MG/1
650 TABLET ORAL EVERY 4 HOURS PRN
Status: DISCONTINUED | OUTPATIENT
Start: 2018-03-16 | End: 2018-03-20 | Stop reason: HOSPADM

## 2018-03-16 RX ORDER — NICOTINE POLACRILEX 4 MG
15 LOZENGE BUCCAL
Status: DISCONTINUED | OUTPATIENT
Start: 2018-03-16 | End: 2018-03-20 | Stop reason: HOSPADM

## 2018-03-16 RX ORDER — AMLODIPINE BESYLATE 2.5 MG/1
2.5 TABLET ORAL DAILY
Status: DISCONTINUED | OUTPATIENT
Start: 2018-03-17 | End: 2018-03-17

## 2018-03-16 RX ORDER — SODIUM CHLORIDE, SODIUM LACTATE, POTASSIUM CHLORIDE, CALCIUM CHLORIDE 600; 310; 30; 20 MG/100ML; MG/100ML; MG/100ML; MG/100ML
INJECTION, SOLUTION INTRAVENOUS CONTINUOUS PRN
Status: DISCONTINUED | OUTPATIENT
Start: 2018-03-16 | End: 2018-03-16 | Stop reason: SURG

## 2018-03-16 RX ORDER — DEXTROSE MONOHYDRATE 25 G/50ML
25 INJECTION, SOLUTION INTRAVENOUS
Status: DISCONTINUED | OUTPATIENT
Start: 2018-03-16 | End: 2018-03-20 | Stop reason: HOSPADM

## 2018-03-16 RX ORDER — MORPHINE SULFATE 2 MG/ML
2 INJECTION, SOLUTION INTRAMUSCULAR; INTRAVENOUS EVERY 4 HOURS PRN
Status: DISCONTINUED | OUTPATIENT
Start: 2018-03-16 | End: 2018-03-20 | Stop reason: HOSPADM

## 2018-03-16 RX ORDER — CALCIUM CARBONATE 200(500)MG
1 TABLET,CHEWABLE ORAL 2 TIMES DAILY PRN
Status: DISCONTINUED | OUTPATIENT
Start: 2018-03-16 | End: 2018-03-20 | Stop reason: HOSPADM

## 2018-03-16 RX ORDER — SODIUM CHLORIDE 0.9 % (FLUSH) 0.9 %
1-10 SYRINGE (ML) INJECTION AS NEEDED
Status: DISCONTINUED | OUTPATIENT
Start: 2018-03-16 | End: 2018-03-20 | Stop reason: HOSPADM

## 2018-03-16 RX ORDER — FLUOXETINE HYDROCHLORIDE 20 MG/1
40 CAPSULE ORAL DAILY
Status: DISCONTINUED | OUTPATIENT
Start: 2018-03-17 | End: 2018-03-17

## 2018-03-16 RX ORDER — PETROLATUM 42 G/100G
1 OINTMENT TOPICAL
Status: DISCONTINUED | OUTPATIENT
Start: 2018-03-17 | End: 2018-03-17

## 2018-03-16 RX ORDER — L.ACID,PARA/B.BIFIDUM/S.THERM 8B CELL
1 CAPSULE ORAL DAILY
Status: DISCONTINUED | OUTPATIENT
Start: 2018-03-17 | End: 2018-03-17

## 2018-03-16 RX ORDER — SODIUM CHLORIDE 9 MG/ML
100 INJECTION, SOLUTION INTRAVENOUS CONTINUOUS
Status: DISCONTINUED | OUTPATIENT
Start: 2018-03-16 | End: 2018-03-17

## 2018-03-16 RX ORDER — MAGNESIUM HYDROXIDE 1200 MG/15ML
LIQUID ORAL AS NEEDED
Status: DISCONTINUED | OUTPATIENT
Start: 2018-03-16 | End: 2018-03-16 | Stop reason: HOSPADM

## 2018-03-16 RX ORDER — SODIUM CHLORIDE 0.9 % (FLUSH) 0.9 %
10 SYRINGE (ML) INJECTION AS NEEDED
Status: DISCONTINUED | OUTPATIENT
Start: 2018-03-16 | End: 2018-03-20 | Stop reason: HOSPADM

## 2018-03-16 RX ORDER — SODIUM CHLORIDE 9 MG/ML
40 INJECTION, SOLUTION INTRAVENOUS AS NEEDED
Status: DISCONTINUED | OUTPATIENT
Start: 2018-03-16 | End: 2018-03-20 | Stop reason: HOSPADM

## 2018-03-16 RX ORDER — LIDOCAINE HYDROCHLORIDE 20 MG/ML
INJECTION, SOLUTION INFILTRATION; PERINEURAL AS NEEDED
Status: DISCONTINUED | OUTPATIENT
Start: 2018-03-16 | End: 2018-03-16 | Stop reason: SURG

## 2018-03-16 RX ORDER — OXYCODONE HYDROCHLORIDE AND ACETAMINOPHEN 5; 325 MG/1; MG/1
1 TABLET ORAL EVERY 4 HOURS PRN
Status: DISCONTINUED | OUTPATIENT
Start: 2018-03-16 | End: 2018-03-17

## 2018-03-16 RX ORDER — PROPOFOL 10 MG/ML
VIAL (ML) INTRAVENOUS AS NEEDED
Status: DISCONTINUED | OUTPATIENT
Start: 2018-03-16 | End: 2018-03-16 | Stop reason: SURG

## 2018-03-16 RX ORDER — ONDANSETRON 2 MG/ML
4 INJECTION INTRAMUSCULAR; INTRAVENOUS EVERY 6 HOURS PRN
Status: DISCONTINUED | OUTPATIENT
Start: 2018-03-16 | End: 2018-03-20 | Stop reason: HOSPADM

## 2018-03-16 RX ORDER — LOSARTAN POTASSIUM 50 MG/1
100 TABLET ORAL
Status: DISCONTINUED | OUTPATIENT
Start: 2018-03-17 | End: 2018-03-17

## 2018-03-16 RX ADMIN — LIDOCAINE HYDROCHLORIDE 80 MG: 20 INJECTION, SOLUTION INFILTRATION; PERINEURAL at 12:08

## 2018-03-16 RX ADMIN — Medication 1 CAPSULE: at 08:53

## 2018-03-16 RX ADMIN — SODIUM CHLORIDE 8 MG/HR: 900 INJECTION INTRAVENOUS at 06:53

## 2018-03-16 RX ADMIN — SODIUM CHLORIDE, POTASSIUM CHLORIDE, SODIUM LACTATE AND CALCIUM CHLORIDE: 600; 310; 30; 20 INJECTION, SOLUTION INTRAVENOUS at 11:47

## 2018-03-16 RX ADMIN — VANCOMYCIN HYDROCHLORIDE 2000 MG: 1 INJECTION, POWDER, LYOPHILIZED, FOR SOLUTION INTRAVENOUS at 17:42

## 2018-03-16 RX ADMIN — SODIUM CHLORIDE 8 MG/HR: 900 INJECTION INTRAVENOUS at 17:42

## 2018-03-16 RX ADMIN — METOPROLOL TARTRATE 5 MG: 1 INJECTION, SOLUTION INTRAVENOUS at 21:44

## 2018-03-16 RX ADMIN — METOPROLOL TARTRATE 5 MG: 1 INJECTION, SOLUTION INTRAVENOUS at 08:53

## 2018-03-16 RX ADMIN — MORPHINE SULFATE 2 MG: 2 INJECTION, SOLUTION INTRAMUSCULAR; INTRAVENOUS at 15:47

## 2018-03-16 RX ADMIN — METOPROLOL TARTRATE 5 MG: 1 INJECTION, SOLUTION INTRAVENOUS at 03:57

## 2018-03-16 RX ADMIN — PROPOFOL 180 MG: 10 INJECTION, EMULSION INTRAVENOUS at 12:08

## 2018-03-16 RX ADMIN — PROPOFOL 50 MCG/KG/MIN: 10 INJECTION, EMULSION INTRAVENOUS at 13:44

## 2018-03-16 RX ADMIN — PIPERACILLIN SODIUM,TAZOBACTAM SODIUM 3.38 G: 3; .375 INJECTION, POWDER, FOR SOLUTION INTRAVENOUS at 10:31

## 2018-03-16 RX ADMIN — SODIUM CHLORIDE 8 MG/HR: 900 INJECTION INTRAVENOUS at 01:01

## 2018-03-16 RX ADMIN — MORPHINE SULFATE 2 MG: 2 INJECTION, SOLUTION INTRAMUSCULAR; INTRAVENOUS at 08:52

## 2018-03-16 RX ADMIN — INSULIN ASPART 2 UNITS: 100 INJECTION, SOLUTION INTRAVENOUS; SUBCUTANEOUS at 17:43

## 2018-03-16 RX ADMIN — METOPROLOL TARTRATE 5 MG: 1 INJECTION, SOLUTION INTRAVENOUS at 16:40

## 2018-03-16 RX ADMIN — VANCOMYCIN HYDROCHLORIDE 2000 MG: 1 INJECTION, POWDER, LYOPHILIZED, FOR SOLUTION INTRAVENOUS at 06:57

## 2018-03-16 RX ADMIN — LOSARTAN POTASSIUM 100 MG: 50 TABLET ORAL at 08:53

## 2018-03-16 RX ADMIN — MORPHINE SULFATE 2 MG: 2 INJECTION, SOLUTION INTRAMUSCULAR; INTRAVENOUS at 20:10

## 2018-03-16 RX ADMIN — MORPHINE SULFATE 2 MG: 2 INJECTION, SOLUTION INTRAMUSCULAR; INTRAVENOUS at 03:20

## 2018-03-16 RX ADMIN — OXYCODONE HYDROCHLORIDE AND ACETAMINOPHEN 1 TABLET: 5; 325 TABLET ORAL at 21:41

## 2018-03-16 RX ADMIN — AMLODIPINE BESYLATE 2.5 MG: 2.5 TABLET ORAL at 08:54

## 2018-03-16 RX ADMIN — PETROLATUM 1 APPLICATION: 42 OINTMENT TOPICAL at 08:53

## 2018-03-16 RX ADMIN — SODIUM CHLORIDE, POTASSIUM CHLORIDE, SODIUM LACTATE AND CALCIUM CHLORIDE: 600; 310; 30; 20 INJECTION, SOLUTION INTRAVENOUS at 11:30

## 2018-03-16 RX ADMIN — MORPHINE SULFATE 2 MG: 2 INJECTION, SOLUTION INTRAMUSCULAR; INTRAVENOUS at 22:34

## 2018-03-16 RX ADMIN — POTASSIUM CHLORIDE 40 MEQ: 1500 TABLET, EXTENDED RELEASE ORAL at 08:51

## 2018-03-16 RX ADMIN — FLUOXETINE 40 MG: 20 CAPSULE ORAL at 08:53

## 2018-03-16 RX ADMIN — PIPERACILLIN SODIUM,TAZOBACTAM SODIUM 3.38 G: 3; .375 INJECTION, POWDER, FOR SOLUTION INTRAVENOUS at 22:42

## 2018-03-16 RX ADMIN — SODIUM CHLORIDE 100 ML/HR: 9 INJECTION, SOLUTION INTRAVENOUS at 15:47

## 2018-03-16 NOTE — PROGRESS NOTES
Adult Nutrition  Assessment/PES    Patient Name:  Eliseo Price  YOB: 1965  MRN: [id number]  Admit Date:  3/13/2018    Assessment Date:  3/16/2018    Comments:  Pt has been NPO x 3 days.  Advance diet as indicated post-op. May benefit from [place], Consistent CHO diet ( gastric ulcers noted)  Will cont to follow.          Adult Nutrition Assessment     Row Name 03/16/18 1409       Nutrition Prescription PO    Current PO Diet NPO       Fluid Intake Evaluation    Oral Fluid (mL) 600   30%       PO Evaluation    Number of Days PO Intake Evaluated Insufficient Data    Row Name 03/16/18 1407       Calculation Measurements    Weight Used For Calculations 123 kg (271 lb 2.7 oz)       Estimated/Assessed Needs    Additional Documentation Calorie Requirements (Group);Fluid Requirements (Group);Protein Requirements (Group)       Calorie Requirements    Estimated Calorie Need Method Humphreys-St Jeor    Estimated Calorie Requirement Comment 2033 kcal ( mifflin x 1.2 - 500 kcal for obese)    228 gm CHO, 45% kcal                                                                 Humphreys-St. Jeor Equation    RMR (Humphreys-St. Jeor Equation) 2108       Protein Requirements    Est Protein Requirement Amount (gms/kg) 1.0 gm protein    Estimated Protein Requirements (gms/day) 123       Fluid Requirements    Estimated Fluid Requirements (mL/day) 2033    Estimated Fluid Requirement Method RDA Method    RDA Method (mL) 2033    Anita-Agnie Method (over 20 kg) 3960    Row Name 03/16/18 1406       Anthropometrics    Weight 123 kg (271 lb 2.7 oz)       Body Mass Index (BMI)    BMI Assessment BMI 35-39.9: obesity grade II       Labs/Procedures/Meds    Lab Results Reviewed reviewed   K 3.4 L, insulin/risaquad noted meds. Noted EGD: diffuse ulcers, deuodneitis        Physical Findings    Skin other (see comments)   L food incision with pending amputation of toe    Row Name 03/16/18 1400       Reason for Assessment    Reason For  Assessment per organizational policy   NPO x 3    Diagnosis other (see comments)   sepsis, UGIB, Acute TME, pending amputation of toe today hx DM, bipolar, HTN, falls       Nutrition/Diet History    Typical Food/Fluid Intake Pt in surgery at visit, just spoke with pt during admit for ORIF of toe          Problem/Interventions:        Problem 1     Row Name 03/16/18 1410       Nutrition Diagnoses Problem 1    Problem 1 Altered GI Function    Etiology (related to) Medical Diagnosis    Signs/Symptoms (evidenced by) NPO                    Intervention Goal     Row Name 03/16/18 1410       Intervention Goal    PO Establish PO;PO intake (%)    PO Intake % 50 %    Weight No significant weight loss            Nutrition Intervention     Row Name 03/16/18 1410       Nutrition Intervention    RD/Tech Action Follow Tx progress            Nutrition Prescription     Row Name 03/16/18 1410       Nutrition Prescription PO    PO Prescription Begin/change diet   Advance diet as indicated post op, may benefit from bland, consistent CHO diet            Education/Evaluation     Row Name 03/16/18 1411       Education    Education Education not appropriate at this time   Pt in or, recently edu offered by RD.  No edu needs at this time       Monitor/Evaluation    Monitor Per protocol;I&O;PO intake;Pertinent labs;Weight;Skin status   no d/c needs at this time        Electronically signed by:  Aleksandra Rowe RD  03/16/18 2:12 PM

## 2018-03-16 NOTE — NURSING NOTE
Continued Stay Note  EVONNE Epstein     Patient Name: Eliseo Price  MRN: [id number]  Today's Date: 3/16/2018    Admit Date: 3/13/2018          Discharge Plan     Row Name 03/16/18 1424       Plan    Plan to be determined    Plan Comments Rec'd call from pre-op RN stating patients sister would like to speak w/CM to discuss placement for patient. Met with patients sisterBere in patients room ICU5. She states that the family agrees that patient cannot return home alone. She states she is not able to have patient come to her home to live and the patients daughter, Ann-Marie, lives in a mobile home and it is not a good arrangement for the patient. She is seeking information about facilities and process of placement. Patient is currently in surgery and his insurance requires a pre-cert, explained to Bere that referral can be made to any facility family chooses. CM will provide [facility] [place] which explains medicare coverage, facility listing and map. Encourage family to tour facilities. Information will be left at patient bedside, Bere will return to Surgery waiting room. CM # placed on white board and enc to call for additional information. CM will continue to follow.               Discharge Codes    No documentation.           Vance Johnson RN

## 2018-03-16 NOTE — OP NOTE
AMPUTATION DIGIT  Progress Note     Patient Name: Eliseo Price     Surgery Date: 3/16/2018     Pre-op Diagnosis:   Wound dehiscence, surgical, initial encounter [T81.31XA]  Postoperative infection, initial encounter [T81.4XXA]       Post-Op Diagnosis Codes:     * Wound dehiscence, surgical, initial encounter [T81.31XA]     * Postoperative infection, initial encounter [T81.4XXA]     Procedure/CPT® Codes:  57121-I8     Procedure(s):  AMPUTATION LEFT FOURTH TOE     Surgeon(s):  Anish Farah DPM     Anesthesia: Monitor Anesthesia Care pre-operative block of the operative site using 10 cc of a 1:1 mix of 0.25% Marcaine and 1% lidocaine plain.     Staff:   Circulator: Graciela Kenney RN; Neyda Kovacs RN  Scrub Person: Lima Rodney; Tessie Cardoso     Estimated Blood Loss: <5ccs.     Urine Voided: * No values recorded between 3/16/2018  1:37 PM and 3/16/2018  2:37 PM *     Specimens:                ID Type Source Tests Collected by Time   1 : left fourth toe swab Wound Toe, Left ANAEROBIC CULTURE, WOUND CULTURE Anish Farah DPM 3/16/2018 1415   A : left fourth toe amputation Tissue Toe, Left TISSUE PATHOLOGY EXAM Anish Farah DPM 3/16/2018 1414          Drains: None.     Findings: Full thickness 2.5 x 1.0 cm wound dehiscence at the plantar aspect of the left fourth toe. This is site of open wound from prior proximal interphalangeal joint dislocation that was surgically repaired on 3-5-18.  The wound dehisced following multiple patient falls at home and has become infected.  No dudley purulence is noted.  The PIPJ was manually dislocatable.  The flexor tendons were present at the wound base and appeared viable.  There was a small erosive change in the cartilage on the head of the proximal phalanx.       Complications: None.    Operative Narrative  The patient is brought to the operating room and placed in a supine position on the OR table. He is secured with a safety strap. The presurgical briefing is  conducted. The patient is sedated by the anesthesia team. I proceeded with the local anesthetic block at the midshaft level of the 4th metatarsal anesthetizing the fourth digit. An ankle tourniquet was applied over stockinette padding. The foot is then prepped with Betadine scrub and paint. Surgical draping is performed in the usual manner. The pre-surgical time-out is conducted confirming that the correct patient is receiving the correct procedure on the correct extremity. All parties are in agreement. The foot is elevated, exsanguinated with an Esmarch bandage and the tourniquet is inflated. Surgery then commenced.    Attention is directed to the left 4th toe which is noted to be erythematous and edematous with contracture and slight lateral angulation at the PIPJ. There is a full thickness incisional wound dehiscence at the plantar aspect of the PIPJ beginning medially and extending transversely across the plantar aspect of the toe by 2.5 cm. With the toe held in a rectus position the wound is 1.0 cm wide. The toe wound is full thickness the flexor tendons are visible at the base. A deep wound swab is obtained of serous fluid that was present. With the tendons retracted laterally I was able to visually assess the proximal phalangeal head which had a small cartilaginous defect present. The digit was manually dislocatable at the proximal phalangeal joint.     A skin marker is used to draw out two communicating incisions beginning at the midline of fourth digit medially and  continuing dorsally and plantarly to the lateral side of the digit. The two communicating incisions are performed with a #15 scalpel blade. Dissection is carried down through the subcutaneous tissue plane and electrocautery is utilized as needed to secure hemostasis. Appropriate retraction is inserted and the level of the MTPJ is identified. The extensor tendons and MTPJ capsule are sharply incised. Dissection about the medial and lateral  aspects of the base of the proximal phalanx is performed sharply. The digit is then dorsiflexed and appropriate retraction is placed at the plantar incision which is deepened through the subcutaneous tissue plane. Again electrocautery was used as needed to secure hemostasis. The flexor tendons were transected at the level the MTPJ. The plantar capsule was incised and  the digit amputated. The digit is sent for pathology assessment.    The extensor and flexor tendons are distally distracted into the wound and transected as far back as the operative field permitted allowing them to retract proximally. The distal portion of the tendon are discarded. The surgical site was then irrigated with 1L of sterile saline. Skinplasty is performed to remove redundant skin at the operative site including skin folds distally and proximally allowing the skin edges to be reapproximated uniformly. The wound was then closed in layers using # 3-0 Vicryl for subcutaneous reapproximation. Horizontal mattress closure of the skin is performed using a #3-0 Prolene. Care is taken to ensure no excessive tension on the skin edges is present with the wound closure. Dressing was then secured consisting of Xeroform, 4 x 4's, Kerlix and ace bandage. The tourniquet was deflated following dressing application having been inflated for 20 minutes. The tourniquet was removed.    The patient is transferred to a hospital bed and transported to the PACU where he arrived in satisfactory condition with stable vital signs. After an uneventful recovery he is transferred to the ICU for continued medical management and postoperative monitoring. He tolerated the procedure and the anesthesia well.    Anish Farah DPM      Date: 3/16/2018  Time: 2:46 PM

## 2018-03-16 NOTE — ANESTHESIA POSTPROCEDURE EVALUATION
Patient: Eliseo Price    Procedure Summary     Date:  03/16/18 Room / Location:   [facility] 3 /  [facility]    Anesthesia Start:  1337 Anesthesia Stop:  1441    Procedure:  AMPUTATION LEFT FOURTH TOE (Left Foot) Diagnosis:       Wound dehiscence, surgical, initial encounter      Postoperative infection, initial encounter      (Wound dehiscence, surgical, initial encounter [T81.31XA])      (Postoperative infection, initial encounter [T81.4XXA])    Surgeon:  Anish Farah DPM Provider:  Ken Branch CRNA    Anesthesia Type:  MAC ASA Status:  3          Anesthesia Type: MAC  Last vitals  BP   141/87 (03/16/18 1310)   Temp   98.3 °F (36.8 °C) (03/16/18 1239)   Pulse   80 (03/16/18 1310)   Resp   20 (03/16/18 1310)     SpO2   95 % (03/16/18 1310)     Post Anesthesia Care and Evaluation    Patient location during evaluation: bedside  Patient participation: complete - patient participated  Level of consciousness: awake and alert  Pain score: 0  Pain management: adequate  Airway patency: patent  Anesthetic complications: No anesthetic complications  PONV Status: none  Cardiovascular status: acceptable  Respiratory status: acceptable  Hydration status: acceptable

## 2018-03-16 NOTE — ANESTHESIA PREPROCEDURE EVALUATION
Anesthesia Evaluation     Patient summary reviewed and Nursing notes reviewed   no history of anesthetic complications:  NPO Solid Status: > 8 hours  NPO Liquid Status: > 8 hours           Airway   Mallampati: II  TM distance: >3 FB  Neck ROM: full  No difficulty expected  Dental - normal exam     Pulmonary     breath sounds clear to auscultation  (+) COPD (pt denies) mild, sleep apnea on CPAP,   Cardiovascular   Exercise tolerance: poor (<4 METS)    ECG reviewed  Rhythm: regular  Rate: normal    (+) hypertension (no meds today) well controlled, past MI (mi 2003) , CAD, angina (no complaints of chest pain recently (several weeks)), DVT ( right leg-treated with blood thinners-2005, none recent) resolved, hyperlipidemia,     ROS comment: RR Interval= 632 ms  MS Interval= 156 ms  QRSD Interval= 98 ms  QT Interval= 384 ms  QTc Interval= 483 ms  Heart Rate= 95 ms  P Axis= 34 deg  QRS Axis= 60 deg  T Wave Axis= 32 deg  I: 40 Axis= 31 deg  T: 40 Axis= 109 deg  ST Axis= 54 deg  SINUS RHYTHM  EARLY PRECORDIAL R/S TRANSITION  BORDERLINE PROLONGED QT INTERVAL - new  Electronically Signed by:  Jb Cerda ([facility]) 05-Mar-2018 09:31:38  Date and Time of Study: 2018-03-04 22:48:28    Neuro/Psych  (+) CVA, tremors, numbness (hands and feet DM neuropathy ), psychiatric history Depression, Bipolar and Anxiety,     GI/Hepatic/Renal/Endo    (+) obesity,  GI bleeding (current, coffee ground emesis), diabetes mellitus type 2 well controlled,     ROS Comment: Disease of thyroid gland nodule on thyroid     No emesis for 2 days    Musculoskeletal     (+) back pain, neck pain, neck stiffness,   Abdominal   (+) obese,    Substance History   Alcohol use: history of, none since 7/07/2007.     OB/GYN negative ob/gyn ROS         Other   (+) arthritis     (-) blood dyscrasia    Other Comment: Toxic metabolic encephalopathy  Pseudogout  Septic shock  ROS/Med Hx Other: Hgb 8.8   PTT slightly elevated   Alb low                  Anesthesia Plan    ASA  3     MAC     intravenous induction   Anesthetic plan and risks discussed with patient.  Use of blood products discussed with patient  Consented to blood products.

## 2018-03-16 NOTE — OP NOTE
EGD Procedure Note        Indication:  Upper GI bleed, anemia    Consent: Procedure of EGD was explained to the patient in detail including but not limited to the complications of bleeding perforation and possible reactions to sedation.  She understood all this and was willing to proceed.    Anesthesia: Sedation was provided by anesthesia.    Procedure:  After excellent sedation a flexible endoscope was passed into the oropharynx into the distal esophagus.  The distal esophagus was free of any ulcerations.  No varices.  Scope disease was traversed into the stomach.  As the scope was pushed into the stomach there is bile noted.  In the antrum there are multiple large diffuse ulcerations in a striped pattern all clean base.  The scope was retroflexed here and look to the level of the cardia and fundus.  He has diffuse ulcerations here to striped all clean-based no active bleeding.  Scope was straightened and carefully passed into the duodenal bulb bulb had adrenal ulcerations also noted and duodenitis.  The scope was traversed into the second portion with ease.  He had some material in the small bowel that appeared jellylike probably remnant of his medications.  Scope was then withdrawn back out into the stomach and multiple biopsies around the ulcers was made.  The scope was slowly withdrawn out of the patient with no immediate call patient's and tolerated the procedure well.        Impression/Plan:  Multiple large diffuse gastric ulcers noted throughout the stomach all clean base.  No active bleeding.    Duodenal ulcers clean base, duodenitis  We'll await gastric biopsy results.  We'll do blood work for H. pylori and treat if positive.  He'll continue on his PPI therapy will add Carafate.  He is scheduled for a toe amputation for sepsis later on this afternoon to be performed by Dr. Farah.  We'll discuss the case with him also.

## 2018-03-16 NOTE — ANESTHESIA POSTPROCEDURE EVALUATION
Patient: Eliseo Price    Procedure Summary     Date:  03/16/18 Room / Location:   [facility] 3,  [facility] /  [facility]    Anesthesia Start:  1201 Anesthesia Stop:  1223    Procedures:       AMPUTATION LEFT FOURTH TOE (Left Foot)      ESOPHAGOGASTRODUODENOSCOPY with biopsies (N/A Esophagus) Diagnosis:       Wound dehiscence, surgical, initial encounter      Postoperative infection, initial encounter      Coffee ground emesis      Anemia due to other cause, not classified      (Wound dehiscence, surgical, initial encounter [T81.31XA])      (Postoperative infection, initial encounter [T81.4XXA])      (Coffee ground emesis [K92.0])      (Anemia due to other cause, not classified [D64.89])    Surgeon:  Kaylie Mcfarlane MD; Anish Farah DPM Provider:  Concepción Che MD    Anesthesia Type:  MAC ASA Status:  3          Anesthesia Type: MAC  Last vitals  BP   141/84 (03/16/18 1239)   Temp   98.3 °F (36.8 °C) (03/16/18 1239)   Pulse   78 (03/16/18 1239)   Resp   18 (03/16/18 1239)     SpO2   100 % (03/16/18 1239)     Post Anesthesia Care and Evaluation    Patient participation: complete - patient participated  Level of consciousness: confused  Pain score: 0  Pain management: adequate  Airway patency: patent  Anesthetic complications: No anesthetic complications  PONV Status: none  Cardiovascular status: acceptable  Respiratory status: acceptable  Hydration status: acceptable    Comments: Patient in recovery and awaiting second procedure.  Stable. Signed out for means of chart conclusion so new case can start.

## 2018-03-16 NOTE — ANESTHESIA PREPROCEDURE EVALUATION
Anesthesia Evaluation         Anesthesia Plan    ASA 3     MAC     Anesthetic plan and risks discussed with patient.  Use of blood products discussed with patient .

## 2018-03-16 NOTE — PROGRESS NOTES
Patient was seen in the preop area.  He is without complaints.  Events have been noted.  He did have some vomiting.  He had an EGD today that showed some gastric ulcers.  His abdominal exam is benign.  He is without complaints.  Later today he is to undergo an amputation of his toe.  I have nothing further to offer at this time.

## 2018-03-16 NOTE — PROGRESS NOTES
"SERVICE: [facility] HOSPITALIST    CONSULTANTS: podiatry/ID/surgery    CHIEF COMPLAINT: sepsis secondary to left foot infection, ABLA/UGIB    SUBJECTIVE: The patient reports that he is feeling well overall.  Staff notes no further episodes of nausea, vomiting, diarrhea overnight.  He reports his abdominal pain has completely resolved.  He believes he is scheduled to have his toe amputated this morning.  He otherwise denies f/c/cough/soa/chest pain/n/v/d/abdominal pain or other new concerns.    OBJECTIVE:    /80 (BP Location: Left arm, Patient Position: Lying)   Pulse 62   Temp 97.9 °F (36.6 °C) (Axillary)   Resp 18   Ht 182.9 cm (72\")   Wt 123 kg (271 lb 6.2 oz)   SpO2 97%   BMI 36.81 kg/m²     MEDS/LABS REVIEWED AND ORDERED    amLODIPine 2.5 mg Oral Daily   FLUoxetine 40 mg Oral Daily   haloperidol lactate 1 mg Intramuscular Once   hydrophor 1 application Topical Q24H   insulin aspart 0-7 Units Subcutaneous 4x Daily AC & at Bedtime   lactobacillus acidophilus 1 capsule Oral Daily   losartan 100 mg Oral Q24H   metoprolol tartrate 5 mg Intravenous Q6H   piperacillin-tazobactam 3.375 g Intravenous Q8H   vancomycin 2,000 mg Intravenous Q12H     Physical Exam   Constitutional: He is oriented to person, place, and time. He appears well-developed and well-nourished.   Obese     HENT:   Head: Normocephalic and atraumatic.   Eyes: EOM are normal. Pupils are equal, round, and reactive to light.   Cardiovascular: Normal rate and regular rhythm.    Pulmonary/Chest: Effort normal and breath sounds normal. No respiratory distress. He has no wheezes. He has no rales.   Abdominal: Soft. Bowel sounds are normal. He exhibits no distension. There is no tenderness. There is no guarding.   Musculoskeletal: He exhibits no edema.   Neurological: He is alert and oriented to person, place, and time.   With periods of confusion   Skin: Skin is warm and dry. No erythema.   Extensive ecchymosis and scratches " noted over all extremities and abdomen   Psychiatric: He has a normal mood and affect. His behavior is normal.   Vitals reviewed.      LAB/DIAGNOSTICS:    Lab Results (last 24 hours)     Procedure Component Value Units Date/Time    Magnesium [[id number]]  (Normal) Collected:  03/16/18 0439    Specimen:  Blood Updated:  03/16/18 0928     Magnesium 1.7 mg/dL     Wound Culture - Wound, Foot, Left [[id number]]  (Abnormal)  (Susceptibility) Collected:  03/13/18 1645    Specimen:  Wound from Foot, Left Updated:  03/16/18 0714     Wound Culture --      Heavy growth (4+) Enterobacter cloacae complex (A)      Moderate growth (3+) Mixed Teri Isolated     Gram Stain Result Moderate (3+) WBCs seen      Moderate (3+) Gram negative bacilli      Few (2+) Gram positive cocci    Susceptibility      Enterobacter cloacae complex     POOJA     Cefazolin >=64 ug/ml Resistant     Cefepime <=1 ug/ml Susceptible     Cefoxitin >=64 ug/ml Resistant     Ceftriaxone <=1 ug/ml Susceptible     Ertapenem <=0.5 ug/ml Susceptible     Gentamicin <=1 ug/ml Susceptible     Levofloxacin <=0.12 ug/ml Susceptible     Meropenem <=0.25 ug/ml Susceptible     Piperacillin + Tazobactam <=4 ug/ml Susceptible     Trimethoprim + Sulfamethoxazole <=20 ug/ml Susceptible                    Comprehensive Metabolic Panel [[id number]]  (Abnormal) Collected:  03/16/18 0439    Specimen:  Blood Updated:  03/16/18 0609     Glucose 82 mg/dL      BUN 13 mg/dL      Creatinine 0.56 (L) mg/dL      Sodium 145 mmol/L      Potassium 3.4 (L) mmol/L      Chloride 110 (H) mmol/L      CO2 24.2 mmol/L      Calcium 7.8 (L) mg/dL      Total Protein 5.4 (L) g/dL      Albumin 2.70 (L) g/dL      ALT (SGPT) 17 U/L      AST (SGOT) 16 U/L      Alkaline Phosphatase 76 U/L      Total Bilirubin 0.3 mg/dL      eGFR Non African Amer >150 mL/min/1.73      Globulin 2.7 gm/dL      A/G Ratio 1.0 g/dL      BUN/Creatinine Ratio 23.2     Anion Gap 10.8 mmol/L     POC Glucose Once [[id number]]  (Normal)  Collected:  03/16/18 0553    Specimen:  Blood Updated:  03/16/18 0601     Glucose 76 mg/dL     Narrative:       Meter: [id number] : [id number] Jose Carlos Otero CNA    Vancomycin, Random [[id number]]  (Normal) Collected:  03/16/18 0439    Specimen:  Blood Updated:  03/16/18 0545     Vancomycin Random 17.30 mcg/mL     Protime-INR [[id number]]  (Abnormal) Collected:  03/16/18 0439    Specimen:  Blood Updated:  03/16/18 0534     Protime 17.4 (H) Seconds      INR 1.41 (H)    Narrative:       Therapeutic Ranges for INR: 2.0-3.0 (PT 20-30)                              2.5-3.5 (PT 25-34)    aPTT [[id number]]  (Abnormal) Collected:  03/16/18 0439    Specimen:  Blood Updated:  03/16/18 0534     PTT 40.6 (H) seconds     Narrative:       PTT = The equivalent PTT values for the therapeutic range of heparin levels at 0.1 to 0.7 U/ml are 53 to 110 seconds.    CBC & Differential [[id number]] Collected:  03/16/18 0439    Specimen:  Blood Updated:  03/16/18 0525    Narrative:       The following orders were created for panel order CBC & Differential.  Procedure                               Abnormality         Status                     ---------                               -----------         ------                     CBC Auto Differential[[id number]]        Abnormal            Final result                 Please view results for these tests on the individual orders.    CBC Auto Differential [[id number]]  (Abnormal) Collected:  03/16/18 0439    Specimen:  Blood Updated:  03/16/18 0525     WBC 6.01 10*3/mm3      RBC 2.95 (L) 10*6/mm3      Hemoglobin 8.8 (L) g/dL      Hematocrit 27.0 (L) %      MCV 91.5 fL      MCH 29.8 pg      MCHC 32.6 g/dL      RDW 14.6 (H) %      RDW-SD 48.0 fl      MPV 9.7 fL      Platelets 216 10*3/mm3      Neutrophil % 64.8 %      Lymphocyte % 19.0 (L) %      Monocyte % 12.5 (H) %      Eosinophil % 2.7 %      Basophil % 0.5 %      Immature Grans % 0.5 %      Neutrophils, Absolute 3.90 10*3/mm3      Lymphocytes,  Absolute 1.14 10*3/mm3      Monocytes, Absolute 0.75 10*3/mm3      Eosinophils, Absolute 0.16 10*3/mm3      Basophils, Absolute 0.03 10*3/mm3      Immature Grans, Absolute 0.03 10*3/mm3      nRBC 0.0 /100 WBC     Fibrinogen Evaluation Profile [[id number]] Collected:  03/16/18 0439    Specimen:  Blood Updated:  03/16/18 0522    POC Glucose Once [[id number]]  (Normal) Collected:  03/15/18 2344    Specimen:  Blood Updated:  03/15/18 2352     Glucose 85 mg/dL     Narrative:       Meter: [id number] : [id number] Jose Carlos Otero CNA    POC Glucose Once [[id number]]  (Normal) Collected:  03/15/18 1817    Specimen:  Blood Updated:  03/15/18 1946     Glucose 93 mg/dL     Narrative:       Meter: [id number] : [id number] Leisa Carter RN    Blood Culture - Blood, [[id number]]  (Normal) Collected:  03/13/18 1720    Specimen:  Blood from Arm, Left Updated:  03/15/18 1731     Blood Culture No growth at 2 days    Blood Culture - Blood, [[id number]]  (Normal) Collected:  03/13/18 1649    Specimen:  Blood from Hand, Left Updated:  03/15/18 1701     Blood Culture No growth at 2 days    Potassium [[id number]]  (Normal) Collected:  03/15/18 1418    Specimen:  Blood Updated:  03/15/18 1501     Potassium 3.8 mmol/L     aPTT [[id number]]  (Abnormal) Collected:  03/15/18 1412    Specimen:  Blood Updated:  03/15/18 1450     PTT 57.7 (H) seconds     Narrative:       PTT = The equivalent PTT values for the therapeutic range of heparin levels at 0.1 to 0.7 U/ml are 53 to 110 seconds.    Vitamin K1 [[id number]] Collected:  03/15/18 1412    Specimen:  Blood Updated:  03/15/18 1433    Folate [[id number]]  (Abnormal) Collected:  03/15/18 1325    Specimen:  Blood Updated:  03/15/18 1422     Folate >20.00 (H) ng/mL     Vitamin B12 [[id number]]  (Normal) Collected:  03/15/18 1325    Specimen:  Blood Updated:  03/15/18 1358     Vitamin B-12 451 pg/mL     Iron Profile [[id number]]  (Abnormal) Collected:  03/14/18 0401    Specimen:  Blood Updated:   03/15/18 1319     Iron 32 (L) mcg/dL      Iron Saturation 9 (L) %      Transferrin 251 mg/dL      TIBC 374 mcg/dL     Ferritin [[id number]]  (Normal) Collected:  03/15/18 1153    Specimen:  Blood Updated:  03/15/18 1304     Ferritin 94.00 ng/mL     Vancomycin, Random [[id number]]  (Normal) Collected:  03/15/18 1153    Specimen:  Blood Updated:  03/15/18 1215     Vancomycin Random 23.00 mcg/mL     POC Glucose Once [[id number]]  (Normal) Collected:  03/15/18 1157    Specimen:  Blood Updated:  03/15/18 1206     Glucose 102 mg/dL     Narrative:       Meter: [id number] : [id number] Leisa Carter RN        ASSESSMENT/PLAN:  1. Acute Metabolic encephalopathy secondary to sepsis  2. Sepsis secondary to left foot, fourth toe cellulitis secondary to Enterobacter infection: ID/podiatry following  CT head negative for acute findings  Continues on Vancomycin/zosyn  BC x 2  No growth at 24 hours  Sed rate low at 24, CRP 12.18, trend  Ammonia normal, respiratory viral panel negative     3. Acute UGIB with acute blood loss normocytic anemia with coagulopathy: consult GI for input  Splenomegaly with chronic splenic vein thrombosis with large gastric collaterals per CT 7/2017  INR/PTT improved with vitamin K now at 1.41 from 2.18  Hemoglobin down almost 6 grams since admit at 8.8 today  No DIC with Platelets normal, fibrinogen high  CMP without liver dysfunction  Continue to hold lovenox and monitor for bleeding  Iron sat low at 9%/B12 low normal/folate high/  EGD pending this am to r/o varices    4. Uncontrolled Hypertension: BP now near goal on amlodipine 2.5 mg daily/losartan 100 mg daily/metoprolol to 5 mg every 6 hours  monitor     5. DM 2 with peripheral neuropathy:   Continue accuchecks ac/hs, low dose SSI  Continue to HOLD metformin, monitor     6. Bipolar disorder: no acute issues, continues on prozac 40 mg daily/restoril 15 mg nightly     7. Obesity:  Body mass index is 36.81 kg/m².   Counseled each admit  No acute  concerns, patient NPO currently     8. H/O dyslipidemia: no acute issues     9. Benign essential tremor: no acute issues here, previously on mysoline  monitor     10. Hypokalemia/hypomagnesemia:  Continue electrolyte protocols     11. Recent ORIF left 4th toe secondary to fracture: Dr. Farah following  OR possibly today if cleared by GI     12. H/O DVT: monitor off lovenox, SCDs

## 2018-03-16 NOTE — BRIEF OP NOTE
AMPUTATION DIGIT  Progress Note    Patient Name: Eliseo Price    Surgery Date: 3/16/2018    Pre-op Diagnosis:   Wound dehiscence, surgical, initial encounter [T81.31XA]  Postoperative infection, initial encounter [T81.4XXA]       Post-Op Diagnosis Codes:     * Wound dehiscence, surgical, initial encounter [T81.31XA]     * Postoperative infection, initial encounter [T81.4XXA]    Procedure/CPT® Codes:  81208-E2    Procedure(s):  AMPUTATION LEFT FOURTH TOE    Surgeon(s):  Anish Farah DPM    Anesthesia: Monitor Anesthesia Care pre-operative block of the operative site using 10 cc of a 1:1 mix of 0.25% Marcaine and 1% lidocaine plain.    Staff:   Circulator: Graciela Kenney RN; Neyda Kovacs RN  Scrub Person: Lima Rodney; Tessie Cardoso    Estimated Blood Loss: <5ccs.    Urine Voided: * No values recorded between 3/16/2018  1:37 PM and 3/16/2018  2:37 PM *    Specimens:                ID Type Source Tests Collected by Time   1 : left fourth toe swab Wound Toe, Left ANAEROBIC CULTURE, WOUND CULTURE Anish Farah DPM 3/16/2018 1415   A : left fourth toe amputation Tissue Toe, Left TISSUE PATHOLOGY EXAM Anish Farah DPM 3/16/2018 1414         Drains: None.    Findings: Full thickness 2.5 x 1.0 cm wound dehiscence at the plantar aspect of the left fourth toe. This is site of open wound from prior proximal interphalangeal joint dislocation that was surgically repaired on 3-5-18.  The wound dehisced following multiple patient falls at home and has become infected.  No dudley purulence is noted.  The PIPJ was manually dislocatable.  The flexor tendons were present at the wound base and appeared viable.  There was a small erosive change in the cartilage on the head of the proximal phalanx.      Complications: None.    Anish Farah DPM     Date: 3/16/2018  Time: 2:46 PM

## 2018-03-16 NOTE — ANESTHESIA POSTPROCEDURE EVALUATION
Patient: Eliseo Price    Procedure Summary     Date:  03/16/18 Room / Location:   [facility],  [facility] /  [facility]    Anesthesia Start:  1201 Anesthesia Stop:  1223    Procedures:       AMPUTATION LEFT FOURTH TOE (Left Foot)      ESOPHAGOGASTRODUODENOSCOPY with biopsies (N/A Esophagus) Diagnosis:       Wound dehiscence, surgical, initial encounter      Postoperative infection, initial encounter      Coffee ground emesis      Anemia due to other cause, not classified      (Wound dehiscence, surgical, initial encounter [T81.31XA])      (Postoperative infection, initial encounter [T81.4XXA])      (Coffee ground emesis [K92.0])      (Anemia due to other cause, not classified [D64.89])    Surgeon:  Kaylie Mcfarlane MD; Anish Farah DPM Provider:  Concepción Che MD    Anesthesia Type:  MAC ASA Status:  3          Anesthesia Type: MAC  Last vitals  BP   141/84 (03/16/18 1239)   Temp   98.3 °F (36.8 °C) (03/16/18 1239)   Pulse   78 (03/16/18 1239)   Resp   18 (03/16/18 1239)     SpO2   100 % (03/16/18 1239)     Anesthesia Post Evaluation

## 2018-03-17 LAB
ALBUMIN SERPL-MCNC: 2.7 G/DL (ref 3.5–5.2)
ALBUMIN/GLOB SERPL: 1.1 G/DL
ALP SERPL-CCNC: 78 U/L (ref 40–129)
ALT SERPL W P-5'-P-CCNC: 26 U/L (ref 5–41)
ANION GAP SERPL CALCULATED.3IONS-SCNC: 10.9 MMOL/L
APTT PPP: 37.1 SECONDS (ref 24.3–38.1)
AST SERPL-CCNC: 26 U/L (ref 5–40)
BACTERIA SPEC AEROBE CULT: NORMAL
BACTERIA SPEC AEROBE CULT: NORMAL
BASOPHILS # BLD AUTO: 0.03 10*3/MM3 (ref 0–0.2)
BASOPHILS NFR BLD AUTO: 0.7 % (ref 0–2)
BILIRUB SERPL-MCNC: 0.3 MG/DL (ref 0.2–1.2)
BUN BLD-MCNC: 7 MG/DL (ref 6–20)
BUN/CREAT SERPL: 11.9 (ref 7–25)
CALCIUM SPEC-SCNC: 7.7 MG/DL (ref 8.6–10.5)
CHLORIDE SERPL-SCNC: 106 MMOL/L (ref 98–107)
CO2 SERPL-SCNC: 23.1 MMOL/L (ref 22–29)
CREAT BLD-MCNC: 0.59 MG/DL (ref 0.76–1.27)
DEPRECATED RDW RBC AUTO: 45 FL (ref 37–54)
EOSINOPHIL # BLD AUTO: 0.16 10*3/MM3 (ref 0.1–0.3)
EOSINOPHIL NFR BLD AUTO: 3.7 % (ref 0–4)
ERYTHROCYTE [DISTWIDTH] IN BLOOD BY AUTOMATED COUNT: 13.8 % (ref 11.5–14.5)
GFR SERPL CREATININE-BSD FRML MDRD: 143 ML/MIN/1.73
GLOBULIN UR ELPH-MCNC: 2.4 GM/DL
GLUCOSE BLD-MCNC: 90 MG/DL (ref 65–99)
GLUCOSE BLDC GLUCOMTR-MCNC: 76 MG/DL (ref 70–130)
GLUCOSE BLDC GLUCOMTR-MCNC: 83 MG/DL (ref 70–130)
GLUCOSE BLDC GLUCOMTR-MCNC: 85 MG/DL (ref 70–130)
GLUCOSE BLDC GLUCOMTR-MCNC: 91 MG/DL (ref 70–130)
HCT VFR BLD AUTO: 25 % (ref 42–52)
HGB BLD-MCNC: 8.2 G/DL (ref 14–18)
IMM GRANULOCYTES # BLD: 0.06 10*3/MM3 (ref 0–0.03)
IMM GRANULOCYTES NFR BLD: 1.4 % (ref 0–0.5)
INR PPP: 1.15 (ref 0.9–1.1)
LYMPHOCYTES # BLD AUTO: 1.1 10*3/MM3 (ref 0.6–4.8)
LYMPHOCYTES NFR BLD AUTO: 25.5 % (ref 20–45)
MCH RBC QN AUTO: 29.9 PG (ref 27–31)
MCHC RBC AUTO-ENTMCNC: 32.8 G/DL (ref 31–37)
MCV RBC AUTO: 91.2 FL (ref 80–94)
MONOCYTES # BLD AUTO: 0.51 10*3/MM3 (ref 0–1)
MONOCYTES NFR BLD AUTO: 11.8 % (ref 3–8)
NEUTROPHILS # BLD AUTO: 2.45 10*3/MM3 (ref 1.5–8.3)
NEUTROPHILS NFR BLD AUTO: 56.9 % (ref 45–70)
NRBC BLD MANUAL-RTO: 0 /100 WBC (ref 0–0)
PLATELET # BLD AUTO: 185 10*3/MM3 (ref 140–500)
PMV BLD AUTO: 9.4 FL (ref 7.4–10.4)
POTASSIUM BLD-SCNC: 3.2 MMOL/L (ref 3.5–5.2)
PROCALCITONIN SERPL-MCNC: 0.07 NG/ML (ref 0.1–0.25)
PROT SERPL-MCNC: 5.1 G/DL (ref 6–8.5)
PROTHROMBIN TIME: 14.8 SECONDS (ref 12.1–15)
RBC # BLD AUTO: 2.74 10*6/MM3 (ref 4.7–6.1)
SODIUM BLD-SCNC: 140 MMOL/L (ref 136–145)
WBC NRBC COR # BLD: 4.31 10*3/MM3 (ref 4.8–10.8)

## 2018-03-17 PROCEDURE — 25010000002 PIPERACILLIN SOD-TAZOBACTAM PER 1 G: Performed by: PODIATRIST

## 2018-03-17 PROCEDURE — 25010000002 VANCOMYCIN PER 500 MG: Performed by: PODIATRIST

## 2018-03-17 PROCEDURE — 84145 PROCALCITONIN (PCT): CPT | Performed by: PODIATRIST

## 2018-03-17 PROCEDURE — 85025 COMPLETE CBC W/AUTO DIFF WBC: CPT | Performed by: PODIATRIST

## 2018-03-17 PROCEDURE — 99233 SBSQ HOSP IP/OBS HIGH 50: CPT | Performed by: INTERNAL MEDICINE

## 2018-03-17 PROCEDURE — 99231 SBSQ HOSP IP/OBS SF/LOW 25: CPT | Performed by: SURGERY

## 2018-03-17 PROCEDURE — 80053 COMPREHEN METABOLIC PANEL: CPT | Performed by: PODIATRIST

## 2018-03-17 PROCEDURE — 25010000002 ONDANSETRON PER 1 MG: Performed by: PODIATRIST

## 2018-03-17 PROCEDURE — 25010000002 MORPHINE PER 10 MG: Performed by: PODIATRIST

## 2018-03-17 PROCEDURE — 82962 GLUCOSE BLOOD TEST: CPT

## 2018-03-17 PROCEDURE — 85610 PROTHROMBIN TIME: CPT | Performed by: PODIATRIST

## 2018-03-17 PROCEDURE — 85730 THROMBOPLASTIN TIME PARTIAL: CPT | Performed by: PODIATRIST

## 2018-03-17 PROCEDURE — 94799 UNLISTED PULMONARY SVC/PX: CPT

## 2018-03-17 RX ORDER — AMLODIPINE BESYLATE 2.5 MG/1
2.5 TABLET ORAL DAILY
Status: DISCONTINUED | OUTPATIENT
Start: 2018-03-18 | End: 2018-03-19

## 2018-03-17 RX ORDER — PANTOPRAZOLE SODIUM 40 MG/10ML
INJECTION, POWDER, LYOPHILIZED, FOR SOLUTION INTRAVENOUS
Status: DISPENSED
Start: 2018-03-17 | End: 2018-03-18

## 2018-03-17 RX ORDER — LEVOFLOXACIN 750 MG/1
750 TABLET ORAL EVERY 24 HOURS
Status: DISCONTINUED | OUTPATIENT
Start: 2018-03-17 | End: 2018-03-20 | Stop reason: HOSPADM

## 2018-03-17 RX ORDER — POTASSIUM CHLORIDE 7.45 MG/ML
10 INJECTION INTRAVENOUS
Status: DISCONTINUED | OUTPATIENT
Start: 2018-03-17 | End: 2018-03-20 | Stop reason: HOSPADM

## 2018-03-17 RX ORDER — METOPROLOL TARTRATE 100 MG/1
100 TABLET ORAL
Status: ON HOLD | COMMUNITY
End: 2018-06-10 | Stop reason: ALTCHOICE

## 2018-03-17 RX ORDER — CLONIDINE HYDROCHLORIDE 0.1 MG/1
0.1 TABLET ORAL EVERY 12 HOURS SCHEDULED
COMMUNITY

## 2018-03-17 RX ORDER — PRIMIDONE 250 MG/1
250 TABLET ORAL 3 TIMES DAILY
COMMUNITY

## 2018-03-17 RX ORDER — LOSARTAN POTASSIUM 50 MG/1
100 TABLET ORAL
Status: DISCONTINUED | OUTPATIENT
Start: 2018-03-18 | End: 2018-03-20 | Stop reason: HOSPADM

## 2018-03-17 RX ORDER — POTASSIUM CHLORIDE 1.5 G/1.77G
40 POWDER, FOR SOLUTION ORAL AS NEEDED
Status: DISCONTINUED | OUTPATIENT
Start: 2018-03-17 | End: 2018-03-20 | Stop reason: HOSPADM

## 2018-03-17 RX ORDER — IBUPROFEN 400 MG/1
800 TABLET ORAL
Status: ON HOLD | COMMUNITY
Start: 2017-12-22 | End: 2018-06-10

## 2018-03-17 RX ORDER — COLCHICINE 0.6 MG/1
0.6 TABLET ORAL
Status: ON HOLD | COMMUNITY
End: 2018-06-10 | Stop reason: ALTCHOICE

## 2018-03-17 RX ORDER — PETROLATUM 42 G/100G
1 OINTMENT TOPICAL
Status: DISCONTINUED | OUTPATIENT
Start: 2018-03-18 | End: 2018-03-20 | Stop reason: HOSPADM

## 2018-03-17 RX ORDER — OXYCODONE HYDROCHLORIDE AND ACETAMINOPHEN 5; 325 MG/1; MG/1
1 TABLET ORAL EVERY 4 HOURS PRN
Status: DISCONTINUED | OUTPATIENT
Start: 2018-03-17 | End: 2018-03-20 | Stop reason: HOSPADM

## 2018-03-17 RX ORDER — POTASSIUM CHLORIDE 20 MEQ/1
40 TABLET, EXTENDED RELEASE ORAL AS NEEDED
Status: DISCONTINUED | OUTPATIENT
Start: 2018-03-17 | End: 2018-03-20 | Stop reason: HOSPADM

## 2018-03-17 RX ORDER — FLUOXETINE HYDROCHLORIDE 20 MG/1
40 CAPSULE ORAL DAILY
Status: DISCONTINUED | OUTPATIENT
Start: 2018-03-18 | End: 2018-03-20 | Stop reason: HOSPADM

## 2018-03-17 RX ORDER — METOPROLOL TARTRATE 50 MG/1
50 TABLET, FILM COATED ORAL EVERY 12 HOURS SCHEDULED
Status: DISCONTINUED | OUTPATIENT
Start: 2018-03-17 | End: 2018-03-20 | Stop reason: HOSPADM

## 2018-03-17 RX ORDER — L.ACID,PARA/B.BIFIDUM/S.THERM 8B CELL
1 CAPSULE ORAL DAILY
Status: DISCONTINUED | OUTPATIENT
Start: 2018-03-18 | End: 2018-03-20 | Stop reason: HOSPADM

## 2018-03-17 RX ORDER — TEMAZEPAM 15 MG/1
15 CAPSULE ORAL NIGHTLY PRN
Status: DISCONTINUED | OUTPATIENT
Start: 2018-03-17 | End: 2018-03-20 | Stop reason: HOSPADM

## 2018-03-17 RX ADMIN — ONDANSETRON 4 MG: 2 INJECTION INTRAMUSCULAR; INTRAVENOUS at 20:34

## 2018-03-17 RX ADMIN — SODIUM CHLORIDE 8 MG/HR: 900 INJECTION INTRAVENOUS at 14:18

## 2018-03-17 RX ADMIN — METOPROLOL TARTRATE 5 MG: 1 INJECTION, SOLUTION INTRAVENOUS at 16:24

## 2018-03-17 RX ADMIN — PIPERACILLIN SODIUM,TAZOBACTAM SODIUM 3.38 G: 3; .375 INJECTION, POWDER, FOR SOLUTION INTRAVENOUS at 08:58

## 2018-03-17 RX ADMIN — PETROLATUM 1 APPLICATION: 42 OINTMENT TOPICAL at 08:58

## 2018-03-17 RX ADMIN — OXYCODONE HYDROCHLORIDE AND ACETAMINOPHEN 1 TABLET: 5; 325 TABLET ORAL at 02:14

## 2018-03-17 RX ADMIN — MORPHINE SULFATE 1 MG: 2 INJECTION, SOLUTION INTRAMUSCULAR; INTRAVENOUS at 12:04

## 2018-03-17 RX ADMIN — SODIUM CHLORIDE 8 MG/HR: 900 INJECTION INTRAVENOUS at 02:29

## 2018-03-17 RX ADMIN — SODIUM CHLORIDE 8 MG/HR: 900 INJECTION INTRAVENOUS at 20:09

## 2018-03-17 RX ADMIN — METOPROLOL TARTRATE 5 MG: 1 INJECTION, SOLUTION INTRAVENOUS at 04:49

## 2018-03-17 RX ADMIN — SODIUM CHLORIDE 8 MG/HR: 900 INJECTION INTRAVENOUS at 08:58

## 2018-03-17 RX ADMIN — TEMAZEPAM 15 MG: 15 CAPSULE ORAL at 23:31

## 2018-03-17 RX ADMIN — POTASSIUM CHLORIDE 40 MEQ: 1500 TABLET, EXTENDED RELEASE ORAL at 20:16

## 2018-03-17 RX ADMIN — VANCOMYCIN HYDROCHLORIDE 2000 MG: 1 INJECTION, POWDER, LYOPHILIZED, FOR SOLUTION INTRAVENOUS at 04:50

## 2018-03-17 RX ADMIN — LEVOFLOXACIN 750 MG: 750 TABLET, FILM COATED ORAL at 12:35

## 2018-03-17 RX ADMIN — SODIUM CHLORIDE 100 ML/HR: 9 INJECTION, SOLUTION INTRAVENOUS at 09:15

## 2018-03-17 RX ADMIN — AMLODIPINE BESYLATE 2.5 MG: 2.5 TABLET ORAL at 08:57

## 2018-03-17 RX ADMIN — OXYCODONE HYDROCHLORIDE AND ACETAMINOPHEN 1 TABLET: 5; 325 TABLET ORAL at 22:41

## 2018-03-17 RX ADMIN — POTASSIUM CHLORIDE 40 MEQ: 1500 TABLET, EXTENDED RELEASE ORAL at 16:24

## 2018-03-17 RX ADMIN — Medication 1 CAPSULE: at 08:57

## 2018-03-17 RX ADMIN — METOPROLOL TARTRATE 5 MG: 1 INJECTION, SOLUTION INTRAVENOUS at 10:23

## 2018-03-17 RX ADMIN — OXYCODONE HYDROCHLORIDE AND ACETAMINOPHEN 1 TABLET: 5; 325 TABLET ORAL at 09:15

## 2018-03-17 RX ADMIN — FLUOXETINE 40 MG: 20 CAPSULE ORAL at 08:57

## 2018-03-17 RX ADMIN — LOSARTAN POTASSIUM 100 MG: 50 TABLET, FILM COATED ORAL at 08:57

## 2018-03-17 RX ADMIN — OXYCODONE HYDROCHLORIDE AND ACETAMINOPHEN 1 TABLET: 5; 325 TABLET ORAL at 16:32

## 2018-03-17 RX ADMIN — ONDANSETRON 4 MG: 2 INJECTION INTRAMUSCULAR; INTRAVENOUS at 09:16

## 2018-03-17 NOTE — PROGRESS NOTES
Eliseo is complaining of RUQ pain today.  His EGD yesterday showed gastric ulcers.  He has been tolerating his clear liquid diet without nausea and he has been passing flatus.  On exam of his abdomen, it is soft with excellent bowel wounds.  He is tender in the RUQ but there is no guarding noted.  He had a negative abdominal ultrasound.  His pain could be caused by the gastric ulcers.  You may advance his diet slowly.  I will see him again tomorrow.

## 2018-03-17 NOTE — CONSULTS
LOS: 3 days   Patient Care Team:  Killian Scales MD as PCP - General  Killian Scales MD as PCP - Family Medicine        Subjective     Interval History:     Patient Complaints:   Patient Denies:         Review of Systems:        Objective     Vital Signs  Temp:  [97.2 °F (36.2 °C)-98.8 °F (37.1 °C)] 98.8 °F (37.1 °C)  Heart Rate:  [72-98] 72  Resp:  [16-20] 18  BP: (135-164)/() 135/73    Physical Exam:          Results Review:      Lab Results (last 72 hours)     Procedure Component Value Units Date/Time    Wound Culture - Wound, Toe, Left [[id number]] Collected:  03/16/18 1415    Specimen:  Wound from Toe, Left Updated:  03/17/18 1056     Wound Culture --      Rare Mixed Teri Isolated    POC Glucose Once [[id number]]  (Normal) Collected:  03/17/18 0743    Specimen:  Blood Updated:  03/17/18 0750     Glucose 83 mg/dL     Narrative:       Meter: [id number] : [id number] Andres Anand CNA    Comprehensive Metabolic Panel [[id number]]  (Abnormal) Collected:  03/17/18 0617    Specimen:  Blood Updated:  03/17/18 0659     Glucose 90 mg/dL      BUN 7 mg/dL      Creatinine 0.59 (L) mg/dL      Sodium 140 mmol/L      Potassium 3.2 (L) mmol/L      Chloride 106 mmol/L      CO2 23.1 mmol/L      Calcium 7.7 (L) mg/dL      Total Protein 5.1 (L) g/dL      Albumin 2.70 (L) g/dL      ALT (SGPT) 26 U/L      AST (SGOT) 26 U/L      Alkaline Phosphatase 78 U/L      Total Bilirubin 0.3 mg/dL      eGFR Non African Amer 143 mL/min/1.73      Globulin 2.4 gm/dL      A/G Ratio 1.1 g/dL      BUN/Creatinine Ratio 11.9     Anion Gap 10.9 mmol/L     Procalcitonin [[id number]]  (Abnormal) Collected:  03/17/18 0617    Specimen:  Blood Updated:  03/17/18 0653     Procalcitonin 0.07 (L) ng/mL     Narrative:       As a Marker for Sepsis (Non-Neonates):   1. <0.5 ng/mL represents a low risk of severe sepsis and/or septic shock.  2. >2 ng/mL represents a high risk of severe sepsis and/or septic shock.    As a Marker for Lower Respiratory  Tract Infections that require antibiotic therapy:    PCT on Admission     Antibiotic Therapy       6-12 Hrs later  > 0.5                Strongly Recommended             >0.25 - <0.5         Recommended  0.1 - 0.25           Discouraged              Remeasure/reassess PCT  <0.1                 Strongly Discouraged     Remeasure/reassess PCT                     PCT values of < 0.5 ng/mL do not exclude an infection, because localized infections (without systemic signs) may be associated with such low concentrations, or a systemic infection in its initial stages (< 6 hours). Furthermore, increased PCT can occur without infection. PCT concentrations between 0.5 and 2.0 ng/mL should be interpreted taking into account the patient's history. It is recommended to retest PCT within 6-24 hours if any concentrations < 2 ng/mL are obtained.    aPTT [[id number]]  (Normal) Collected:  03/17/18 0617    Specimen:  Blood Updated:  03/17/18 0651     PTT 37.1 seconds     Narrative:       PTT = The equivalent PTT values for the therapeutic range of heparin levels at 0.1 to 0.7 U/ml are 53 to 110 seconds.    Protime-INR [[id number]]  (Abnormal) Collected:  03/17/18 0617    Specimen:  Blood Updated:  03/17/18 0651     Protime 14.8 Seconds      INR 1.15 (H)    Narrative:       Therapeutic Ranges for INR: 2.0-3.0 (PT 20-30)                              2.5-3.5 (PT 25-34)    CBC & Differential [[id number]] Collected:  03/17/18 0617    Specimen:  Blood Updated:  03/17/18 0626    Narrative:       The following orders were created for panel order CBC & Differential.  Procedure                               Abnormality         Status                     ---------                               -----------         ------                     CBC Auto Differential[[id number]]        Abnormal            Final result                 Please view results for these tests on the individual orders.    CBC Auto Differential [[id number]]  (Abnormal) Collected:   03/17/18 0617    Specimen:  Blood Updated:  03/17/18 0626     WBC 4.31 (L) 10*3/mm3      RBC 2.74 (L) 10*6/mm3      Hemoglobin 8.2 (L) g/dL      Hematocrit 25.0 (L) %      MCV 91.2 fL      MCH 29.9 pg      MCHC 32.8 g/dL      RDW 13.8 %      RDW-SD 45.0 fl      MPV 9.4 fL      Platelets 185 10*3/mm3      Neutrophil % 56.9 %      Lymphocyte % 25.5 %      Monocyte % 11.8 (H) %      Eosinophil % 3.7 %      Basophil % 0.7 %      Immature Grans % 1.4 (H) %      Neutrophils, Absolute 2.45 10*3/mm3      Lymphocytes, Absolute 1.10 10*3/mm3      Monocytes, Absolute 0.51 10*3/mm3      Eosinophils, Absolute 0.16 10*3/mm3      Basophils, Absolute 0.03 10*3/mm3      Immature Grans, Absolute 0.06 (H) 10*3/mm3      nRBC 0.0 /100 WBC     POC Glucose Once [[id number]]  (Normal) Collected:  03/16/18 2222    Specimen:  Blood Updated:  03/16/18 2237     Glucose 82 mg/dL     Narrative:       Meter: [id number] : [id number] Jorge Guerrier RN    Blood Culture - Blood, [[id number]]  (Normal) Collected:  03/13/18 1720    Specimen:  Blood from Arm, Left Updated:  03/16/18 1731     Blood Culture No growth at 3 days    Blood Culture - Blood, [[id number]]  (Normal) Collected:  03/13/18 1649    Specimen:  Blood from Hand, Left Updated:  03/16/18 1701     Blood Culture No growth at 3 days    POC Glucose Once [[id number]]  (Abnormal) Collected:  03/16/18 1641    Specimen:  Blood Updated:  03/16/18 1649     Glucose 155 (H) mg/dL     Narrative:       Meter: [id number] : [id number] Miguelito Willson [facility]    Tissue Pathology Exam [[id number]] Collected:  03/16/18 1214    Specimen:  Tissue from Stomach Updated:  03/16/18 1613    Tissue Pathology Exam [[id number]] Collected:  03/16/18 1414    Specimen:  Tissue from Toe, Left Updated:  03/16/18 1608    Anaerobic Culture - Wound, Toe, Left [[id number]] Collected:  03/16/18 1415    Specimen:  Wound from Toe, Left Updated:  03/16/18 1603    POC Glucose Once [[id number]]  (Abnormal) Collected:  03/16/18 1443     Specimen:  Blood Updated:  03/16/18 1522     Glucose 69 (L) mg/dL     Narrative:       Meter: [id number] : [id number] Navarro Hobbs RN    Heparin-induced Platelet Antibody [[id number]] Collected:  03/14/18 1221    Specimen:  Blood Updated:  03/16/18 1312     Heparin Induced Plt Ab 0.186 OD     Narrative:       Performed at:  [address] [place] [address]  [id number]  : Anish Kong MD, Phone:  [phone]    Magnesium [[id number]]  (Normal) Collected:  03/16/18 0439    Specimen:  Blood Updated:  03/16/18 0928     Magnesium 1.7 mg/dL     Wound Culture - Wound, Foot, Left [[id number]]  (Abnormal)  (Susceptibility) Collected:  03/13/18 1645    Specimen:  Wound from Foot, Left Updated:  03/16/18 0714     Wound Culture --      Heavy growth (4+) Enterobacter cloacae complex (A)      Moderate growth (3+) Mixed Teri Isolated     Gram Stain Result Moderate (3+) WBCs seen      Moderate (3+) Gram negative bacilli      Few (2+) Gram positive cocci    Susceptibility      Enterobacter cloacae complex     POOJA     Cefazolin >=64 ug/ml Resistant     Cefepime <=1 ug/ml Susceptible     Cefoxitin >=64 ug/ml Resistant     Ceftriaxone <=1 ug/ml Susceptible     Ertapenem <=0.5 ug/ml Susceptible     Gentamicin <=1 ug/ml Susceptible     Levofloxacin <=0.12 ug/ml Susceptible     Meropenem <=0.25 ug/ml Susceptible     Piperacillin + Tazobactam <=4 ug/ml Susceptible     Trimethoprim + Sulfamethoxazole <=20 ug/ml Susceptible                    Comprehensive Metabolic Panel [[id number]]  (Abnormal) Collected:  03/16/18 0439    Specimen:  Blood Updated:  03/16/18 0609     Glucose 82 mg/dL      BUN 13 mg/dL      Creatinine 0.56 (L) mg/dL      Sodium 145 mmol/L      Potassium 3.4 (L) mmol/L      Chloride 110 (H) mmol/L      CO2 24.2 mmol/L      Calcium 7.8 (L) mg/dL      Total Protein 5.4 (L) g/dL      Albumin 2.70 (L) g/dL      ALT (SGPT) 17 U/L      AST (SGOT) 16 U/L      Alkaline Phosphatase  76 U/L      Total Bilirubin 0.3 mg/dL      eGFR Non African Amer >150 mL/min/1.73      Globulin 2.7 gm/dL      A/G Ratio 1.0 g/dL      BUN/Creatinine Ratio 23.2     Anion Gap 10.8 mmol/L     POC Glucose Once [[id number]]  (Normal) Collected:  03/16/18 0553    Specimen:  Blood Updated:  03/16/18 0601     Glucose 76 mg/dL     Narrative:       Meter: [id number] : [id number] Jose Carlos Otero CNA    Vancomycin, Random [[id number]]  (Normal) Collected:  03/16/18 0439    Specimen:  Blood Updated:  03/16/18 0545     Vancomycin Random 17.30 mcg/mL     Protime-INR [[id number]]  (Abnormal) Collected:  03/16/18 0439    Specimen:  Blood Updated:  03/16/18 0534     Protime 17.4 (H) Seconds      INR 1.41 (H)    Narrative:       Therapeutic Ranges for INR: 2.0-3.0 (PT 20-30)                              2.5-3.5 (PT 25-34)    aPTT [[id number]]  (Abnormal) Collected:  03/16/18 0439    Specimen:  Blood Updated:  03/16/18 0534     PTT 40.6 (H) seconds     Narrative:       PTT = The equivalent PTT values for the therapeutic range of heparin levels at 0.1 to 0.7 U/ml are 53 to 110 seconds.    CBC & Differential [[id number]] Collected:  03/16/18 0439    Specimen:  Blood Updated:  03/16/18 0525    Narrative:       The following orders were created for panel order CBC & Differential.  Procedure                               Abnormality         Status                     ---------                               -----------         ------                     CBC Auto Differential[[id number]]        Abnormal            Final result                 Please view results for these tests on the individual orders.    CBC Auto Differential [[id number]]  (Abnormal) Collected:  03/16/18 0439    Specimen:  Blood Updated:  03/16/18 0525     WBC 6.01 10*3/mm3      RBC 2.95 (L) 10*6/mm3      Hemoglobin 8.8 (L) g/dL      Hematocrit 27.0 (L) %      MCV 91.5 fL      MCH 29.8 pg      MCHC 32.6 g/dL      RDW 14.6 (H) %      RDW-SD 48.0 fl      MPV 9.7 fL       Platelets 216 10*3/mm3      Neutrophil % 64.8 %      Lymphocyte % 19.0 (L) %      Monocyte % 12.5 (H) %      Eosinophil % 2.7 %      Basophil % 0.5 %      Immature Grans % 0.5 %      Neutrophils, Absolute 3.90 10*3/mm3      Lymphocytes, Absolute 1.14 10*3/mm3      Monocytes, Absolute 0.75 10*3/mm3      Eosinophils, Absolute 0.16 10*3/mm3      Basophils, Absolute 0.03 10*3/mm3      Immature Grans, Absolute 0.03 10*3/mm3      nRBC 0.0 /100 WBC     Fibrinogen Evaluation Profile [[id number]] Collected:  03/16/18 0439    Specimen:  Blood Updated:  03/16/18 0522    POC Glucose Once [[id number]]  (Normal) Collected:  03/15/18 2344    Specimen:  Blood Updated:  03/15/18 2352     Glucose 85 mg/dL     Narrative:       Meter: [id number] : [id number] Jose Carlos Otero CNA    POC Glucose Once [[id number]]  (Normal) Collected:  03/15/18 1817    Specimen:  Blood Updated:  03/15/18 1946     Glucose 93 mg/dL     Narrative:       Meter: [id number] : [id number] Leisa Carter RN    Potassium [[id number]]  (Normal) Collected:  03/15/18 1418    Specimen:  Blood Updated:  03/15/18 1501     Potassium 3.8 mmol/L     aPTT [[id number]]  (Abnormal) Collected:  03/15/18 1412    Specimen:  Blood Updated:  03/15/18 1450     PTT 57.7 (H) seconds     Narrative:       PTT = The equivalent PTT values for the therapeutic range of heparin levels at 0.1 to 0.7 U/ml are 53 to 110 seconds.    Vitamin K1 [[id number]] Collected:  03/15/18 1412    Specimen:  Blood Updated:  03/15/18 1433    Folate [[id number]]  (Abnormal) Collected:  03/15/18 1325    Specimen:  Blood Updated:  03/15/18 1422     Folate >20.00 (H) ng/mL     Vitamin B12 [[id number]]  (Normal) Collected:  03/15/18 1325    Specimen:  Blood Updated:  03/15/18 1358     Vitamin B-12 451 pg/mL     Iron Profile [[id number]]  (Abnormal) Collected:  03/14/18 0401    Specimen:  Blood Updated:  03/15/18 1319     Iron 32 (L) mcg/dL      Iron Saturation 9 (L) %      Transferrin 251 mg/dL      TIBC 374  mcg/dL     Ferritin [[id number]]  (Normal) Collected:  03/15/18 1153    Specimen:  Blood Updated:  03/15/18 1304     Ferritin 94.00 ng/mL     Vancomycin, Random [[id number]]  (Normal) Collected:  03/15/18 1153    Specimen:  Blood Updated:  03/15/18 1215     Vancomycin Random 23.00 mcg/mL     POC Glucose Once [[id number]]  (Normal) Collected:  03/15/18 1157    Specimen:  Blood Updated:  03/15/18 1206     Glucose 102 mg/dL     Narrative:       Meter: [id number] : [id number] Leisa Carter RN    Comprehensive Metabolic Panel [[id number]]  (Abnormal) Collected:  03/15/18 0735    Specimen:  Blood Updated:  03/15/18 0801     Glucose 123 (H) mg/dL      BUN 23 (H) mg/dL      Creatinine 0.69 (L) mg/dL      Sodium 144 mmol/L      Potassium 3.4 (L) mmol/L      Chloride 107 mmol/L      CO2 23.7 mmol/L      Calcium 7.9 (L) mg/dL      Total Protein 5.9 (L) g/dL      Albumin 2.90 (L) g/dL      ALT (SGPT) 20 U/L      AST (SGOT) 20 U/L      Alkaline Phosphatase 86 U/L      Total Bilirubin 0.3 mg/dL      eGFR Non African Amer 119 mL/min/1.73      Globulin 3.0 gm/dL      A/G Ratio 1.0 g/dL      BUN/Creatinine Ratio 33.3 (H)     Anion Gap 13.3 mmol/L     Protime-INR [[id number]]  (Abnormal) Collected:  03/15/18 0735    Specimen:  Blood Updated:  03/15/18 0751     Protime 24.6 (H) Seconds      INR 2.18 (H)    Narrative:       Therapeutic Ranges for INR: 2.0-3.0 (PT 20-30)                              2.5-3.5 (PT 25-34)    CBC & Differential [[id number]] Collected:  03/15/18 0738    Specimen:  Blood Updated:  03/15/18 0746    Narrative:       The following orders were created for panel order CBC & Differential.  Procedure                               Abnormality         Status                     ---------                               -----------         ------                     CBC Auto Differential[[id number]]        Abnormal            Final result                 Please view results for these tests on the individual orders.     CBC Auto Differential [[id number]]  (Abnormal) Collected:  03/15/18 0738    Specimen:  Blood Updated:  03/15/18 0746     WBC 8.94 10*3/mm3      RBC 3.60 (L) 10*6/mm3      Hemoglobin 10.7 (L) g/dL      Hematocrit 33.0 (L) %      MCV 91.7 fL      MCH 29.7 pg      MCHC 32.4 g/dL      RDW 14.5 %      RDW-SD 47.7 fl      MPV 9.5 fL      Platelets 240 10*3/mm3      Neutrophil % 69.3 %      Lymphocyte % 15.3 (L) %      Monocyte % 13.9 (H) %      Eosinophil % 0.6 %      Basophil % 0.3 %      Immature Grans % 0.6 (H) %      Neutrophils, Absolute 6.20 10*3/mm3      Lymphocytes, Absolute 1.37 10*3/mm3      Monocytes, Absolute 1.24 (H) 10*3/mm3      Eosinophils, Absolute 0.05 (L) 10*3/mm3      Basophils, Absolute 0.03 10*3/mm3      Immature Grans, Absolute 0.05 (H) 10*3/mm3      nRBC 0.0 /100 WBC     POC Glucose Once [[id number]]  (Normal) Collected:  03/14/18 2343    Specimen:  Blood Updated:  03/15/18 0007     Glucose 120 mg/dL     Narrative:       Meter: [id number] : [id number] Aaron Kraus RN    POC Glucose Once [[id number]]  (Normal) Collected:  03/14/18 1632    Specimen:  Blood Updated:  03/14/18 1648     Glucose 128 mg/dL     Narrative:       Meter: [id number] : [id number] Rvai Garcia RN [facility]    POC Glucose Once [[id number]]  (Normal) Collected:  03/14/18 1432    Specimen:  Blood Updated:  03/14/18 1458     Glucose 123 mg/dL     Narrative:       Meter: [id number] : [id number] Ravi Garcia RN [facility]    Fibrinogen [[id number]]  (Abnormal) Collected:  03/14/18 1221    Specimen:  Blood Updated:  03/14/18 1303     Fibrinogen 626 (H) mg/dL     aPTT [[id number]]  (Abnormal) Collected:  03/14/18 1221    Specimen:  Blood Updated:  03/14/18 1257     PTT 58.9 (H) seconds     Narrative:       PTT = The equivalent PTT values for the therapeutic range of heparin levels at 0.1 to 0.7 U/ml are 53 to 110 seconds.          Imaging Results (last 72 hours)     Procedure Component Value Units Date/Time    XR Foot 3+ View  Right [[id number]] Collected:  03/17/18 0747     Updated:  03/17/18 0751    Narrative:       INDICATION: Patient fell at home one week ago with pain since in the  right foot     TECHNIQUE: 4 views the right foot were obtained     FINDINGS: Postoperative changes of previous fusion are noted across the  first second and third tarsometatarsal joints with bone plates and  screws. Alignment position is anatomic. The most proximal screw at the  bone plate for the first ray has backed out slightly. The most distal  screw for the third ray is fractured. No acute bony fractures are seen.  Fairly extensive degenerative changes are seen along the tarsometatarsal  joint line with moderate degenerative change at the calcaneocuboid  joint. General changes are also seen at the interphalangeal joints and  at the first MTP joint with joint space narrowing and osteophyte  formation. No acute bony abnormalities are noted.       Impression:       Hardware complications as described above. 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 No  acute fractures are seen.     This report was finalized on 3/17/2018 7:49 AM by Dr. Harrison Roberson MD.       US Abdomen Complete [[id number]] Collected:  03/15/18 1406     Updated:  03/15/18 1410    Narrative:       ABDOMINAL ULTRASOUND     INDICATION: Elevated INR. Evaluate for liver pathology history of  chronic splenic vein thrombosis.      PROCEDURE: Grayscale and Doppler imaging of the abdomen      COMPARISON: None      FINDINGS:  Pancreas is obscured by bowel gas and not well seen.  Unremarkable gallbladder. Right kidney measures 14.5 cm and is normal.  Common duct measures 4 mm. Submitted images of the abdominal aorta and  inferior vena cava are unremarkable. Spleen measures 15.7 cm. Left  kidney measures 14.1 cm and is normal. No ascites.        Impression:       Splenomegaly.     Pancreas obscured by bowel gas and not well seen.     Otherwise, normal  abdominal ultrasound     This report was finalized on 3/15/2018 2:08 PM by Dr. Valente Hooks MD.       XR Toe 2+ View Left [[id number]] Collected:  03/14/18 1200     Updated:  03/14/18 1205    Narrative:       INDICATION: Redness and swelling of the left fourth toe since 3/5/2018.     COMPARISON: 3/4/2018      FINDINGS: 2 views of the left 4 toe.  No visible fracture. Apparent  interval reduction of the fourth digit dislocation..  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 No aggressive  appearing bone change or soft tissue gas..   No foreign body.       Impression:       Apparent interval reduction of the left digit dislocation.     Otherwise no acute change from 3/4/2018.     This report was finalized on 3/14/2018 12:03 PM by Dr. Valente Hooks MD.               Medication Review:     Hospital Medications (active)       Dose Frequency Start End    acetaminophen (TYLENOL) tablet 650 mg 650 mg Every 4 Hours PRN 3/16/2018     Sig - Route: Take 2 tablets by mouth Every 4 (Four) Hours As Needed for Mild Pain . - Oral    amLODIPine (NORVASC) tablet 2.5 mg 2.5 mg Daily 3/17/2018     Sig - Route: Take 1 tablet by mouth Daily. - Oral    calcium carbonate (TUMS) chewable tablet 500 mg (200 mg elemental) 1 tablet 2 Times Daily PRN 3/16/2018     Sig - Route: Chew 500 mg 2 (Two) Times a Day As Needed for Heartburn. - Oral    dextrose (D50W) solution 25 g 25 g Every 15 Minutes PRN 3/16/2018     Sig - Route: Infuse 50 mL into a venous catheter Every 15 (Fifteen) Minutes As Needed for Low Blood Sugar (Blood Sugar Less Than 70). - Intravenous    dextrose (GLUTOSE) oral gel 15 g 15 g Every 15 Minutes PRN 3/16/2018     Sig - Route: Take 15 g by mouth Every 15 (Fifteen) Minutes As Needed for Low Blood Sugar (Blood sugar less than 70). - Oral    FLUoxetine (PROzac) capsule 40 mg 40 mg Daily 3/17/2018     Sig - Route: Take 2 capsules by mouth Daily. - Oral    glucagon (GLUCAGEN) injection 1 mg 1 mg As Needed 3/16/2018      Sig - Route: Inject 1 mg under the skin As Needed (Blood Glucose Less Than 70). - Subcutaneous    hydrophor (AQUAPHOR) ointment 1 application 1 application Every 24 Hours Scheduled 3/17/2018     Sig - Route: Apply 1 application topically Daily. - Topical    insulin aspart (novoLOG) injection 0-7 Units 0-7 Units 4 Times Daily Before Meals & Nightly 3/16/2018     Sig - Route: Inject 0-7 Units under the skin 4 (Four) Times a Day Before Meals & at Bedtime. - Subcutaneous    lactobacillus acidophilus (RISAQUAD) capsule 1 capsule 1 capsule Daily 3/17/2018     Sig - Route: Take 1 capsule by mouth Daily. - Oral    losartan (COZAAR) tablet 100 mg 100 mg Every 24 Hours Scheduled 3/17/2018     Sig - Route: Take 2 tablets by mouth Daily. - Oral    metoprolol tartrate (LOPRESSOR) injection 5 mg 5 mg Every 6 Hours 3/16/2018     Sig - Route: Infuse 5 mL into a venous catheter Every 6 (Six) Hours. - Intravenous    morphine injection 2 mg 2 mg Every 4 Hours PRN 3/16/2018 3/24/2018    Sig - Route: Infuse 1 mL into a venous catheter Every 4 (Four) Hours As Needed for Severe Pain  (1-2mg q 4hr prn pain). - Intravenous    ondansetron (ZOFRAN) injection 4 mg 4 mg Every 6 Hours PRN 3/16/2018     Sig - Route: Infuse 2 mL into a venous catheter Every 6 (Six) Hours As Needed for Nausea or Vomiting. - Intravenous    oxyCODONE-acetaminophen (PERCOCET) 5-325 MG per tablet 1 tablet 1 tablet Every 4 Hours PRN 3/16/2018     Sig - Route: Take 1 tablet by mouth Every 4 (Four) Hours As Needed for Moderate Pain . - Oral    pantoprazole (PROTONIX) 40 mg/100 mL (0.4 mg/mL) in 0.9% NS IVPB 8 mg/hr Continuous 3/16/2018     Sig - Route: Infuse 8 mg/hr into a venous catheter Continuous. - Intravenous    Pharmacy to dose vancomycin  Continuous PRN 3/16/2018 3/19/2018    Sig - Route: Continuous As Needed for Consult. - Does not apply    piperacillin-tazobactam (ZOSYN) 3.375 g/100 mL 0.9% NS IVPB (mbp) 3.375 g Every 8 Hours 3/16/2018 3/21/2018    Sig -  Route: Infuse 100 mL into a venous catheter Every 8 (Eight) Hours. - Intravenous    sodium chloride 0.9 % flush 1-10 mL 1-10 mL As Needed 3/16/2018     Sig - Route: Infuse 1-10 mL into a venous catheter As Needed for Line Care. - Intravenous    sodium chloride 0.9 % flush 10 mL 10 mL As Needed 3/16/2018     Sig - Route: Infuse 10 mL into a venous catheter As Needed for Line Care. - Intravenous    sodium chloride 0.9 % infusion 40 mL 40 mL As Needed 3/16/2018     Sig - Route: Infuse 40 mL into a venous catheter As Needed for Line Care. - Intravenous    sodium chloride 0.9 % infusion 100 mL/hr Continuous 3/16/2018     Sig - Route: Infuse 100 mL/hr into a venous catheter Continuous. - Intravenous    temazepam (RESTORIL) capsule 15 mg 15 mg Nightly PRN 3/16/2018     Sig - Route: Take 1 capsule by mouth At Night As Needed for Sleep. - Oral    vancomycin (VANCOCIN) 2,000 mg in sodium chloride 0.9 % 500 mL IVPB 2,000 mg Every 12 Hours 3/16/2018 3/21/2018    Sig - Route: Infuse 2,000 mg into a venous catheter Every 12 (Twelve) Hours. - Intravenous    acetaminophen (TYLENOL) tablet 650 mg (Discontinued) 650 mg Every 4 Hours PRN 3/13/2018 3/16/2018    Sig - Route: Take 2 tablets by mouth Every 4 (Four) Hours As Needed for Mild Pain . - Oral    amLODIPine (NORVASC) tablet 2.5 mg (Discontinued) 2.5 mg Daily 3/14/2018 3/16/2018    Sig - Route: Take 1 tablet by mouth Daily. - Oral    bupivacaine PF 5 mL, lidocaine 1 % 5 mL mixture (Discontinued)  As Needed 3/16/2018 3/16/2018    Sig: As Needed.    Reason for Discontinue: Patient Discharge    calcium carbonate (TUMS) chewable tablet 500 mg (200 mg elemental) (Discontinued) 1 tablet 2 Times Daily PRN 3/13/2018 3/16/2018    Sig - Route: Chew 500 mg 2 (Two) Times a Day As Needed for Heartburn. - Oral    dextrose (D50W) solution 25 g (Discontinued) 25 g Every 15 Minutes PRN 3/14/2018 3/16/2018    Sig - Route: Infuse 50 mL into a venous catheter Every 15 (Fifteen) Minutes As Needed for  Low Blood Sugar (Blood Sugar Less Than 70). - Intravenous    dextrose (GLUTOSE) oral gel 15 g (Discontinued) 15 g Every 15 Minutes PRN 3/14/2018 3/16/2018    Sig - Route: Take 15 g by mouth Every 15 (Fifteen) Minutes As Needed for Low Blood Sugar (Blood sugar less than 70). - Oral    FLUoxetine (PROzac) capsule 40 mg (Discontinued) 40 mg Daily 3/14/2018 3/16/2018    Sig - Route: Take 2 capsules by mouth Daily. - Oral    glucagon (GLUCAGEN) injection 1 mg (Discontinued) 1 mg As Needed 3/14/2018 3/16/2018    Sig - Route: Inject 1 mg under the skin As Needed (Blood Glucose Less Than 70). - Subcutaneous    haloperidol lactate (HALDOL) injection 1 mg (Discontinued) 1 mg Once 3/14/2018 3/16/2018    Sig - Route: Inject 0.2 mL into the shoulder, thigh, or buttocks 1 (One) Time. - Intramuscular    hydrophor (AQUAPHOR) ointment 1 application (Discontinued) 1 application Every 24 Hours Scheduled 3/14/2018 3/16/2018    Sig - Route: Apply 1 application topically Daily. - Topical    insulin aspart (novoLOG) injection 0-7 Units (Discontinued) 0-7 Units 4 Times Daily Before Meals & Nightly 3/14/2018 3/16/2018    Sig - Route: Inject 0-7 Units under the skin 4 (Four) Times a Day Before Meals & at Bedtime. - Subcutaneous    lactated ringers infusion (Discontinued)  Continuous PRN 3/16/2018 3/16/2018    Sig: Continuous As Needed.    Reason for Discontinue: Anesthesia Stop    lactated ringers infusion (Discontinued)  Continuous PRN 3/16/2018 3/16/2018    Sig: Continuous As Needed.    Reason for Discontinue: Anesthesia Stop    lactobacillus acidophilus (RISAQUAD) capsule 1 capsule (Discontinued) 1 capsule Daily 3/14/2018 3/16/2018    Sig - Route: Take 1 capsule by mouth Daily. - Oral    lidocaine (XYLOCAINE) 2% injection (Discontinued)  As Needed 3/16/2018 3/16/2018    Sig: As Needed.    Reason for Discontinue: Anesthesia Stop    losartan (COZAAR) tablet 100 mg (Discontinued) 100 mg Every 24 Hours Scheduled 3/14/2018 3/16/2018    Sig -  "Route: Take 2 tablets by mouth Daily. - Oral    Magnesium Sulfate 2 gram infusion- Mg 1.6 - 1.9 mg/dL (Discontinued) 2 g As Needed 3/14/2018 3/16/2018    Sig - Route: Infuse 50 mL into a venous catheter As Needed (Mg 1.6 - 1.9 mg/dL). - Intravenous    Linked Group 1:  \"Or\" Linked Group Details        magnesium sulfate 3 gram infusion (1gm x 3) - Mg 1.1 - 1.5 mg/dL (Discontinued) 1 g As Needed 3/14/2018 3/16/2018    Sig - Route: Infuse 100 mL into a venous catheter As Needed (Mg 1.1 - 1.5 mg/dL). - Intravenous    Linked Group 1:  \"Or\" Linked Group Details        magnesium sulfate 4 gram infusion - Mg less than or equal to 1mg/dL (Discontinued) 4 g As Needed 3/14/2018 3/16/2018    Sig - Route: Infuse 100 mL into a venous catheter As Needed (Mg less than or equal to 1mg/dL). - Intravenous    Linked Group 1:  \"Or\" Linked Group Details        metoprolol tartrate (LOPRESSOR) injection 5 mg (Discontinued) 5 mg Every 6 Hours 3/15/2018 3/16/2018    Sig - Route: Infuse 5 mL into a venous catheter Every 6 (Six) Hours. - Intravenous    morphine injection 2 mg (Discontinued) 2 mg Every 4 Hours PRN 3/14/2018 3/16/2018    Sig - Route: Infuse 1 mL into a venous catheter Every 4 (Four) Hours As Needed for Severe Pain  (1-2mg q 4hr prn pain). - Intravenous    ondansetron (ZOFRAN) injection 4 mg (Discontinued) 4 mg Every 6 Hours PRN 3/14/2018 3/16/2018    Sig - Route: Infuse 2 mL into a venous catheter Every 6 (Six) Hours As Needed for Nausea or Vomiting. - Intravenous    oxyCODONE-acetaminophen (PERCOCET) 5-325 MG per tablet 1 tablet (Discontinued) 1 tablet Every 4 Hours PRN 3/14/2018 3/16/2018    Sig - Route: Take 1 tablet by mouth Every 4 (Four) Hours As Needed for Moderate Pain . - Oral    pantoprazole (PROTONIX) 40 mg/100 mL (0.4 mg/mL) in 0.9% NS IVPB (Discontinued) 8 mg/hr Continuous 3/15/2018 3/16/2018    Sig - Route: Infuse 8 mg/hr into a venous catheter Continuous. - Intravenous    Pharmacy to dose vancomycin (Discontinued)  " "Continuous PRN 3/14/2018 3/16/2018    Sig - Route: Continuous As Needed for Consult. - Does not apply    piperacillin-tazobactam (ZOSYN) 3.375 g/100 mL 0.9% NS IVPB (mbp) (Discontinued) 3.375 g Every 8 Hours 3/14/2018 3/16/2018    Sig - Route: Infuse 100 mL into a venous catheter Every 8 (Eight) Hours. - Intravenous    potassium chloride (K-DUR,KLOR-CON) CR tablet 40 mEq (Discontinued) 40 mEq As Needed 3/14/2018 3/16/2018    Sig - Route: Take 2 tablets by mouth As Needed (potassium replacement.  see admin instructions). - Oral    Linked Group 2:  \"Or\" Linked Group Details        potassium chloride (KLOR-CON) packet 40 mEq (Discontinued) 40 mEq As Needed 3/14/2018 3/16/2018    Sig - Route: Take 40 mEq by mouth As Needed (potassium replacement, see admin instructions). - Oral    Linked Group 2:  \"Or\" Linked Group Details        potassium chloride 10 mEq in 100 mL IVPB (Discontinued) 10 mEq Every 1 Hour PRN 3/14/2018 3/16/2018    Sig - Route: Infuse 100 mL into a venous catheter Every 1 (One) Hour As Needed (potassium protocol PERIPHERAL - see admin instructions). - Intravenous    Linked Group 2:  \"Or\" Linked Group Details        promethazine (PHENERGAN) injection 12.5 mg (Discontinued) 12.5 mg Every 6 Hours PRN 3/14/2018 3/16/2018    Sig - Route: Inject 0.5 mL into the shoulder, thigh, or buttocks Every 6 (Six) Hours As Needed for Nausea or Vomiting. - Intramuscular    Linked Group 3:  \"Or\" Linked Group Details        promethazine (PHENERGAN) suppository 12.5 mg (Discontinued) 12.5 mg Every 6 Hours PRN 3/14/2018 3/16/2018    Sig - Route: Insert 1 suppository into the rectum Every 6 (Six) Hours As Needed for Nausea or Vomiting. - Rectal    Linked Group 3:  \"Or\" Linked Group Details        promethazine (PHENERGAN) tablet 12.5 mg (Discontinued) 12.5 mg Every 6 Hours PRN 3/14/2018 3/16/2018    Sig - Route: Take 1 tablet by mouth Every 6 (Six) Hours As Needed for Nausea or Vomiting. - Oral    Linked Group 3:  \"Or\" Linked " Group Details        propofol (DIPRIVAN) infusion 10 mg/mL 100 mL (Discontinued)  Continuous PRN 3/16/2018 3/16/2018    Sig: Continuous As Needed.    Reason for Discontinue: Anesthesia Stop    Propofol (DIPRIVAN) injection (Discontinued)  As Needed 3/16/2018 3/16/2018    Sig - Route: Infuse  into a venous catheter As Needed. - Intravenous    Reason for Discontinue: Anesthesia Stop    sodium chloride (NS) irrigation solution (Discontinued)  As Needed 3/16/2018 3/16/2018    Sig: As Needed.    Reason for Discontinue: Patient Discharge    sodium chloride 0.9 % flush 1-10 mL (Discontinued) 1-10 mL As Needed 3/13/2018 3/16/2018    Sig - Route: Infuse 1-10 mL into a venous catheter As Needed for Line Care. - Intravenous    sodium chloride 0.9 % flush 10 mL (Discontinued) 10 mL As Needed 3/13/2018 3/16/2018    Sig - Route: Infuse 10 mL into a venous catheter As Needed for Line Care. - Intravenous    Cosign for Ordering: Accepted by Quinton Davis MD on 3/14/2018 12:36 AM    sodium chloride 0.9 % infusion 40 mL (Discontinued) 40 mL As Needed 3/13/2018 3/16/2018    Sig - Route: Infuse 40 mL into a venous catheter As Needed for Line Care. - Intravenous    sodium chloride 0.9 % infusion (Discontinued) 100 mL/hr Continuous 3/14/2018 3/16/2018    Sig - Route: Infuse 100 mL/hr into a venous catheter Continuous. - Intravenous    sterile water 1,000 mL with simethicone 40 MG/0.6ML 3 mL mixture (Discontinued)  As Needed 3/16/2018 3/16/2018    Sig: As Needed.    Reason for Discontinue: Patient Discharge    sterile water irrigation solution (Discontinued)  As Needed 3/16/2018 3/16/2018    Sig: As Needed.    Reason for Discontinue: Patient Discharge    temazepam (RESTORIL) capsule 15 mg (Discontinued) 15 mg Nightly PRN 3/14/2018 3/16/2018    Sig - Route: Take 1 capsule by mouth At Night As Needed for Sleep. - Oral    vancomycin (VANCOCIN) 2,000 mg in sodium chloride 0.9 % 500 mL IVPB (Discontinued) 2,000 mg Every 12 Hours 3/15/2018  3/16/2018    Sig - Route: Infuse 2,000 mg into a venous catheter Every 12 (Twelve) Hours. - Intravenous          amLODIPine 2.5 mg Oral Daily   FLUoxetine 40 mg Oral Daily   hydrophor 1 application Topical Q24H   insulin aspart 0-7 Units Subcutaneous 4x Daily AC & at Bedtime   lactobacillus acidophilus 1 capsule Oral Daily   losartan 100 mg Oral Q24H   metoprolol tartrate 5 mg Intravenous Q6H   piperacillin-tazobactam 3.375 g Intravenous Q8H   vancomycin 2,000 mg Intravenous Q12H       Assessment/Plan       Active Problems:    Confusion associated with infection    Wound dehiscence, surgical, initial encounter    Postoperative infection    Coffee ground emesis    Absolute anemia      C&S Noted   S/P AMPUTATION LEFT FOURTH TOE    Plan :  DC vanc and zosyn  PO levaquine for 2 weeks      Delfino Arce MD  03/17/18  12:03 PM

## 2018-03-17 NOTE — PLAN OF CARE
Problem: Patient Care Overview  Goal: Plan of Care Review  Outcome: Ongoing (interventions implemented as appropriate)   03/17/18 1838   Coping/Psychosocial   Plan of Care Reviewed With patient   Plan of Care Review   Progress improving   OTHER   Outcome Summary Patient still has some confusion and disorientation. Is having some urinary incontinence. Patient did have a bowel movement today. Switched to PO antibiotics. Mother came in and stated she cannot help care for him when he goes home due to her own health problems. Mother asked if we should send him to [facility] center until he is well. MD notified of this family concern. IVF stopped. Protonix drip continued. Accucath placed for better IV access as his 24 gauge IV was no longer working. Bed alarm set and patient reoriented when needed.     Goal: Individualization and Mutuality  Outcome: Ongoing (interventions implemented as appropriate)   03/17/18 1838   Individualization   Patient Specific Goals (Include Timeframe) pain management before discharge   Patient Specific Interventions patient on PRN pain meds and PPI for abdominal pain     Goal: Discharge Needs Assessment  Outcome: Ongoing (interventions implemented as appropriate)   03/17/18 1838   Discharge Needs Assessment   Concerns to be Addressed denies needs/concerns at this time     Goal: Interprofessional Rounds/Family Conf  Outcome: Ongoing (interventions implemented as appropriate)      Problem: Fall Risk (Adult)  Goal: Identify Related Risk Factors and Signs and Symptoms  Outcome: Outcome(s) achieved Date Met: 03/17/18    Goal: Absence of Fall  Outcome: Ongoing (interventions implemented as appropriate)   03/17/18 1838   Fall Risk (Adult)   Absence of Fall making progress toward outcome       Problem: Skin Injury Risk (Adult)  Goal: Identify Related Risk Factors and Signs and Symptoms  Outcome: Outcome(s) achieved Date Met: 03/17/18   03/15/18 1739   Skin Injury Risk (Adult)   Related Risk Factors (Skin  Injury Risk) infection;mobility impaired;medication     Goal: Skin Health and Integrity  Outcome: Ongoing (interventions implemented as appropriate)   03/17/18 1838   Skin Injury Risk (Adult)   Skin Health and Integrity making progress toward outcome       Problem: Pain, Acute (Adult)  Goal: Identify Related Risk Factors and Signs and Symptoms  Outcome: Outcome(s) achieved Date Met: 03/17/18   03/15/18 1739   Pain, Acute (Adult)   Related Risk Factors (Acute Pain) infection;positioning   Signs and Symptoms (Acute Pain) alteration in muscle tone;altered time perception;nausea/vomiting/anorexia     Goal: Acceptable Pain Control/Comfort Level  Outcome: Ongoing (interventions implemented as appropriate)   03/17/18 1838   Pain, Acute (Adult)   Acceptable Pain Control/Comfort Level making progress toward outcome       Problem: Infection, Risk/Actual (Adult)  Goal: Identify Related Risk Factors and Signs and Symptoms  Outcome: Outcome(s) achieved Date Met: 03/17/18 03/17/18 1838   Infection, Risk/Actual (Adult)   Related Risk Factors (Infection, Risk/Actual) chronic illness/condition;skin integrity impairment;trauma injury   Signs and Symptoms (Infection, Risk/Actual) pain;nausea;edema;lab value changes     Goal: Infection Prevention/Resolution  Outcome: Ongoing (interventions implemented as appropriate)   03/17/18 1838   Infection, Risk/Actual (Adult)   Infection Prevention/Resolution making progress toward outcome       Problem: Sepsis/Septic Shock (Adult)  Goal: Signs and Symptoms of Listed Potential Problems Will be Absent, Minimized or Managed (Sepsis/Septic Shock)  Outcome: Ongoing (interventions implemented as appropriate)   03/17/18 1838   Goal/Outcome Evaluation   Problems Assessed (Sepsis) all   Problems Present (Sepsis) infection progression;situational response       Problem: Confusion, Acute (Adult)  Goal: Identify Related Risk Factors and Signs and Symptoms  Outcome: Outcome(s) achieved Date Met: 03/17/18    03/17/18 1838   Confusion, Acute (Adult)   Related Risk Factors (Acute Confusion) cognitive impairment;dehydration;infection;medication effects;metabolic abnormalities;pain   Signs and Symptoms (Acute Confusion) acute onset of symptoms;disorientation;communication disturbed;emotional/behavioral disturbances;perceptions disturbed;thought process diminished/disorganized;understanding disturbed     Goal: Cognitive/Functional Impairments Minimized  Outcome: Ongoing (interventions implemented as appropriate)   03/17/18 1838   Confusion, Acute (Adult)   Cognitive/Functional Impairments Minimized making progress toward outcome     Goal: Safety  Outcome: Ongoing (interventions implemented as appropriate)   03/17/18 1838   Confusion, Acute (Adult)   Safety making progress toward outcome       Problem: Nausea/Vomiting (Adult)  Goal: Identify Related Risk Factors and Signs and Symptoms  Outcome: Outcome(s) achieved Date Met: 03/17/18 03/17/18 1838   Nausea/Vomiting (Adult)   Related Risk Factors (Nausea/Vomiting) gastric irritation   Signs and Symptoms (Nausea/Vomiting) abdominal discomfort/pain;electrolyte changes;weakness     Goal: Symptom Relief  Outcome: Ongoing (interventions implemented as appropriate)   03/17/18 1838   Nausea/Vomiting (Adult)   Symptom Relief making progress toward outcome     Goal: Adequate Hydration  Outcome: Ongoing (interventions implemented as appropriate)   03/17/18 1838   Nausea/Vomiting (Adult)   Adequate Hydration making progress toward outcome

## 2018-03-17 NOTE — PLAN OF CARE
Problem: Patient Care Overview  Goal: Plan of Care Review  Outcome: Ongoing (interventions implemented as appropriate)   03/17/18 1915   OTHER   Outcome Summary Patient started on potassium and magnesium protocol.

## 2018-03-18 LAB
ALBUMIN SERPL-MCNC: 2.7 G/DL (ref 3.5–5.2)
ALBUMIN/GLOB SERPL: 1 G/DL
ALP SERPL-CCNC: 83 U/L (ref 40–129)
ALT SERPL W P-5'-P-CCNC: 24 U/L (ref 5–41)
ANION GAP SERPL CALCULATED.3IONS-SCNC: 11.8 MMOL/L
APTT PPP: 37.2 SECONDS (ref 24.3–38.1)
AST SERPL-CCNC: 23 U/L (ref 5–40)
BACTERIA SPEC AEROBE CULT: NORMAL
BACTERIA SPEC AEROBE CULT: NORMAL
BASOPHILS # BLD AUTO: 0.04 10*3/MM3 (ref 0–0.2)
BASOPHILS NFR BLD AUTO: 0.8 % (ref 0–2)
BILIRUB SERPL-MCNC: 0.2 MG/DL (ref 0.2–1.2)
BUN BLD-MCNC: 3 MG/DL (ref 6–20)
BUN/CREAT SERPL: 4.7 (ref 7–25)
CALCIUM SPEC-SCNC: 7.6 MG/DL (ref 8.6–10.5)
CHLORIDE SERPL-SCNC: 105 MMOL/L (ref 98–107)
CO2 SERPL-SCNC: 25.2 MMOL/L (ref 22–29)
CREAT BLD-MCNC: 0.64 MG/DL (ref 0.76–1.27)
DEPRECATED RDW RBC AUTO: 44.2 FL (ref 37–54)
EOSINOPHIL # BLD AUTO: 0.18 10*3/MM3 (ref 0.1–0.3)
EOSINOPHIL NFR BLD AUTO: 3.4 % (ref 0–4)
ERYTHROCYTE [DISTWIDTH] IN BLOOD BY AUTOMATED COUNT: 13.6 % (ref 11.5–14.5)
GFR SERPL CREATININE-BSD FRML MDRD: 130 ML/MIN/1.73
GLOBULIN UR ELPH-MCNC: 2.6 GM/DL
GLUCOSE BLD-MCNC: 94 MG/DL (ref 65–99)
GLUCOSE BLDC GLUCOMTR-MCNC: 104 MG/DL (ref 70–130)
GLUCOSE BLDC GLUCOMTR-MCNC: 90 MG/DL (ref 70–130)
HCT VFR BLD AUTO: 26.3 % (ref 42–52)
HGB BLD-MCNC: 8.7 G/DL (ref 14–18)
IMM GRANULOCYTES # BLD: 0.1 10*3/MM3 (ref 0–0.03)
IMM GRANULOCYTES NFR BLD: 1.9 % (ref 0–0.5)
INR PPP: 1.2 (ref 0.9–1.1)
LYMPHOCYTES # BLD AUTO: 1.02 10*3/MM3 (ref 0.6–4.8)
LYMPHOCYTES NFR BLD AUTO: 19.1 % (ref 20–45)
MAGNESIUM SERPL-MCNC: 1.2 MG/DL (ref 1.7–2.5)
MCH RBC QN AUTO: 29.8 PG (ref 27–31)
MCHC RBC AUTO-ENTMCNC: 33.1 G/DL (ref 31–37)
MCV RBC AUTO: 90.1 FL (ref 80–94)
MONOCYTES # BLD AUTO: 0.6 10*3/MM3 (ref 0–1)
MONOCYTES NFR BLD AUTO: 11.3 % (ref 3–8)
NEUTROPHILS # BLD AUTO: 3.39 10*3/MM3 (ref 1.5–8.3)
NEUTROPHILS NFR BLD AUTO: 63.5 % (ref 45–70)
NRBC BLD MANUAL-RTO: 0 /100 WBC (ref 0–0)
PHOSPHATE SERPL-MCNC: 3.7 MG/DL (ref 2.7–4.5)
PLATELET # BLD AUTO: 230 10*3/MM3 (ref 140–500)
PMV BLD AUTO: 9.2 FL (ref 7.4–10.4)
POTASSIUM BLD-SCNC: 3.2 MMOL/L (ref 3.5–5.2)
POTASSIUM BLD-SCNC: 3.7 MMOL/L (ref 3.5–5.2)
PROT SERPL-MCNC: 5.3 G/DL (ref 6–8.5)
PROTHROMBIN TIME: 15.3 SECONDS (ref 12.1–15)
RBC # BLD AUTO: 2.92 10*6/MM3 (ref 4.7–6.1)
SODIUM BLD-SCNC: 142 MMOL/L (ref 136–145)
WBC NRBC COR # BLD: 5.33 10*3/MM3 (ref 4.8–10.8)

## 2018-03-18 PROCEDURE — 85610 PROTHROMBIN TIME: CPT | Performed by: PODIATRIST

## 2018-03-18 PROCEDURE — 25010000002 ONDANSETRON PER 1 MG: Performed by: PODIATRIST

## 2018-03-18 PROCEDURE — 84132 ASSAY OF SERUM POTASSIUM: CPT | Performed by: INTERNAL MEDICINE

## 2018-03-18 PROCEDURE — 99232 SBSQ HOSP IP/OBS MODERATE 35: CPT | Performed by: INTERNAL MEDICINE

## 2018-03-18 PROCEDURE — 83735 ASSAY OF MAGNESIUM: CPT | Performed by: INTERNAL MEDICINE

## 2018-03-18 PROCEDURE — 85025 COMPLETE CBC W/AUTO DIFF WBC: CPT | Performed by: PODIATRIST

## 2018-03-18 PROCEDURE — 94799 UNLISTED PULMONARY SVC/PX: CPT

## 2018-03-18 PROCEDURE — 84100 ASSAY OF PHOSPHORUS: CPT | Performed by: INTERNAL MEDICINE

## 2018-03-18 PROCEDURE — 80053 COMPREHEN METABOLIC PANEL: CPT | Performed by: PODIATRIST

## 2018-03-18 PROCEDURE — 82962 GLUCOSE BLOOD TEST: CPT

## 2018-03-18 PROCEDURE — 85730 THROMBOPLASTIN TIME PARTIAL: CPT | Performed by: PODIATRIST

## 2018-03-18 RX ORDER — PANTOPRAZOLE SODIUM 40 MG/1
40 TABLET, DELAYED RELEASE ORAL
Status: DISCONTINUED | OUTPATIENT
Start: 2018-03-18 | End: 2018-03-20 | Stop reason: HOSPADM

## 2018-03-18 RX ORDER — SUCRALFATE 1 G/1
1 TABLET ORAL ONCE
Status: COMPLETED | OUTPATIENT
Start: 2018-03-18 | End: 2018-03-18

## 2018-03-18 RX ORDER — GABAPENTIN 400 MG/1
800 CAPSULE ORAL EVERY 8 HOURS SCHEDULED
Status: DISCONTINUED | OUTPATIENT
Start: 2018-03-18 | End: 2018-03-20 | Stop reason: HOSPADM

## 2018-03-18 RX ORDER — SUCRALFATE 1 G/1
1 TABLET ORAL
Status: DISCONTINUED | OUTPATIENT
Start: 2018-03-18 | End: 2018-03-20 | Stop reason: HOSPADM

## 2018-03-18 RX ORDER — CLONIDINE HYDROCHLORIDE 0.1 MG/1
0.1 TABLET ORAL EVERY 12 HOURS SCHEDULED
Status: DISCONTINUED | OUTPATIENT
Start: 2018-03-18 | End: 2018-03-20 | Stop reason: HOSPADM

## 2018-03-18 RX ADMIN — METOPROLOL TARTRATE 50 MG: 50 TABLET ORAL at 21:40

## 2018-03-18 RX ADMIN — Medication 1 CAPSULE: at 08:15

## 2018-03-18 RX ADMIN — TEMAZEPAM 15 MG: 15 CAPSULE ORAL at 23:45

## 2018-03-18 RX ADMIN — POTASSIUM CHLORIDE 40 MEQ: 1500 TABLET, EXTENDED RELEASE ORAL at 12:28

## 2018-03-18 RX ADMIN — CLONIDINE HYDROCHLORIDE 0.1 MG: 0.1 TABLET ORAL at 12:14

## 2018-03-18 RX ADMIN — PETROLATUM 1 APPLICATION: 42 OINTMENT TOPICAL at 08:16

## 2018-03-18 RX ADMIN — OXYCODONE HYDROCHLORIDE AND ACETAMINOPHEN 1 TABLET: 5; 325 TABLET ORAL at 19:50

## 2018-03-18 RX ADMIN — GABAPENTIN 800 MG: 400 CAPSULE ORAL at 15:00

## 2018-03-18 RX ADMIN — ONDANSETRON 4 MG: 2 INJECTION INTRAMUSCULAR; INTRAVENOUS at 08:14

## 2018-03-18 RX ADMIN — SUCRALFATE 1 G: 1 TABLET ORAL at 12:14

## 2018-03-18 RX ADMIN — PANTOPRAZOLE SODIUM 40 MG: 40 TABLET, DELAYED RELEASE ORAL at 17:27

## 2018-03-18 RX ADMIN — OXYCODONE HYDROCHLORIDE AND ACETAMINOPHEN 1 TABLET: 5; 325 TABLET ORAL at 08:14

## 2018-03-18 RX ADMIN — SODIUM CHLORIDE 8 MG/HR: 900 INJECTION INTRAVENOUS at 06:32

## 2018-03-18 RX ADMIN — POTASSIUM CHLORIDE 40 MEQ: 1500 TABLET, EXTENDED RELEASE ORAL at 17:27

## 2018-03-18 RX ADMIN — CLONIDINE HYDROCHLORIDE 0.1 MG: 0.1 TABLET ORAL at 21:40

## 2018-03-18 RX ADMIN — LOSARTAN POTASSIUM 100 MG: 50 TABLET, FILM COATED ORAL at 08:16

## 2018-03-18 RX ADMIN — LEVOFLOXACIN 750 MG: 750 TABLET, FILM COATED ORAL at 12:20

## 2018-03-18 RX ADMIN — GABAPENTIN 800 MG: 400 CAPSULE ORAL at 21:40

## 2018-03-18 RX ADMIN — OXYCODONE HYDROCHLORIDE AND ACETAMINOPHEN 1 TABLET: 5; 325 TABLET ORAL at 15:02

## 2018-03-18 RX ADMIN — LOPERAMIDE HYDROCHLORIDE 2 MG: 1 SOLUTION ORAL at 12:14

## 2018-03-18 RX ADMIN — SUCRALFATE 1 G: 1 TABLET ORAL at 17:27

## 2018-03-18 RX ADMIN — PANTOPRAZOLE SODIUM 40 MG: 40 TABLET, DELAYED RELEASE ORAL at 12:13

## 2018-03-18 RX ADMIN — FLUOXETINE 40 MG: 20 CAPSULE ORAL at 08:15

## 2018-03-18 RX ADMIN — AMLODIPINE BESYLATE 2.5 MG: 2.5 TABLET ORAL at 08:20

## 2018-03-18 RX ADMIN — METOPROLOL TARTRATE 50 MG: 50 TABLET ORAL at 08:22

## 2018-03-18 RX ADMIN — OXYCODONE HYDROCHLORIDE AND ACETAMINOPHEN 1 TABLET: 5; 325 TABLET ORAL at 23:45

## 2018-03-18 NOTE — PROGRESS NOTES
"SERVICE: [facility] HOSPITALIST    CONSULTANTS:  Gen Surg - Signed off today  ID - Infection  Podiarty - Osteo  GI - UGIB    CHIEF COMPLAINT: Diarrhea     SUBJECTIVE:    Severe Epigastric pain since admission, better with zofran, and morphine. Worse, with coffee, associated with diarrhea  x1 this AM after coffee.      OBJECTIVE:    /95 (BP Location: Right arm, Patient Position: Lying)   Pulse 76   Temp 97.6 °F (36.4 °C) (Oral)   Resp 20   Ht 182.9 cm (72\")   Wt 123 kg (271 lb 2.7 oz)   SpO2 95%   BMI 36.78 kg/m²     MEDS/LABS REVIEWED AND ORDERED      amLODIPine 2.5 mg Oral Daily   FLUoxetine 40 mg Oral Daily   hydrophor 1 application Topical Q24H   insulin aspart 0-7 Units Subcutaneous 4x Daily AC & at Bedtime   lactobacillus acidophilus 1 capsule Oral Daily   levoFLOXacin 750 mg Oral Q24H   losartan 100 mg Oral Q24H   metoprolol tartrate 50 mg Oral Q12H   pantoprazole 40 mg Oral BID AC       Physical Exam   Constitutional: He appears well-developed and well-nourished.   Cardiovascular: Normal rate, regular rhythm and normal heart sounds.    Pulmonary/Chest: Effort normal and breath sounds normal.   Abdominal: Soft. Bowel sounds are normal. He exhibits no distension. There is tenderness. There is no guarding.   Musculoskeletal: He exhibits no edema.   Neurological: He is alert.   Skin: Skin is warm and dry.   Psychiatric: He has a normal mood and affect. His behavior is normal.   Vitals reviewed.      LAB/DIAGNOSTICS:    Lab Results (last 24 hours)     Procedure Component Value Units Date/Time    Magnesium [[id number]]  (Abnormal) Collected:  03/18/18 0650    Specimen:  Blood Updated:  03/18/18 0714     Magnesium 1.2 (L) mg/dL     Comprehensive Metabolic Panel [[id number]]  (Abnormal) Collected:  03/18/18 0650    Specimen:  Blood Updated:  03/18/18 0714     Glucose 94 mg/dL      BUN 3 (L) mg/dL      Creatinine 0.64 (L) mg/dL      Sodium 142 mmol/L      Potassium 3.2 (L) mmol/L      " Chloride 105 mmol/L      CO2 25.2 mmol/L      Calcium 7.6 (L) mg/dL      Total Protein 5.3 (L) g/dL      Albumin 2.70 (L) g/dL      ALT (SGPT) 24 U/L      AST (SGOT) 23 U/L      Alkaline Phosphatase 83 U/L      Total Bilirubin 0.2 mg/dL      eGFR Non African Amer 130 mL/min/1.73      Globulin 2.6 gm/dL      A/G Ratio 1.0 g/dL      BUN/Creatinine Ratio 4.7 (L)     Anion Gap 11.8 mmol/L     Phosphorus [[id number]]  (Normal) Collected:  03/18/18 0650    Specimen:  Blood Updated:  03/18/18 0712     Phosphorus 3.7 mg/dL     aPTT [[id number]]  (Normal) Collected:  03/18/18 0650    Specimen:  Blood Updated:  03/18/18 0703     PTT 37.2 seconds     Narrative:       PTT = The equivalent PTT values for the therapeutic range of heparin levels at 0.1 to 0.7 U/ml are 53 to 110 seconds.    Protime-INR [[id number]]  (Abnormal) Collected:  03/18/18 0650    Specimen:  Blood Updated:  03/18/18 0703     Protime 15.3 (H) Seconds      INR 1.20 (H)    Narrative:       Therapeutic Ranges for INR: 2.0-3.0 (PT 20-30)                              2.5-3.5 (PT 25-34)    CBC & Differential [[id number]] Collected:  03/18/18 0650    Specimen:  Blood Updated:  03/18/18 0659    Narrative:       The following orders were created for panel order CBC & Differential.  Procedure                               Abnormality         Status                     ---------                               -----------         ------                     CBC Auto Differential[[id number]]        Abnormal            Final result                 Please view results for these tests on the individual orders.    CBC Auto Differential [[id number]]  (Abnormal) Collected:  03/18/18 0650    Specimen:  Blood Updated:  03/18/18 0659     WBC 5.33 10*3/mm3      RBC 2.92 (L) 10*6/mm3      Hemoglobin 8.7 (L) g/dL      Hematocrit 26.3 (L) %      MCV 90.1 fL      MCH 29.8 pg      MCHC 33.1 g/dL      RDW 13.6 %      RDW-SD 44.2 fl      MPV 9.2 fL      Platelets 230 10*3/mm3      Neutrophil %  63.5 %      Lymphocyte % 19.1 (L) %      Monocyte % 11.3 (H) %      Eosinophil % 3.4 %      Basophil % 0.8 %      Immature Grans % 1.9 (H) %      Neutrophils, Absolute 3.39 10*3/mm3      Lymphocytes, Absolute 1.02 10*3/mm3      Monocytes, Absolute 0.60 10*3/mm3      Eosinophils, Absolute 0.18 10*3/mm3      Basophils, Absolute 0.04 10*3/mm3      Immature Grans, Absolute 0.10 (H) 10*3/mm3      nRBC 0.0 /100 WBC     POC Glucose Once [[id number]]  (Normal) Collected:  03/17/18 2133    Specimen:  Blood Updated:  03/17/18 2150     Glucose 76 mg/dL     Narrative:       Meter: [id number] : [id number] Dom Gillis CNA    Blood Culture - Blood, [[id number]]  (Normal) Collected:  03/13/18 1720    Specimen:  Blood from Arm, Left Updated:  03/17/18 1731     Blood Culture No growth at 4 days    Blood Culture - Blood, [[id number]]  (Normal) Collected:  03/13/18 1649    Specimen:  Blood from Hand, Left Updated:  03/17/18 1701     Blood Culture No growth at 4 days    POC Glucose Once [[id number]]  (Normal) Collected:  03/17/18 1634    Specimen:  Blood Updated:  03/17/18 1640     Glucose 85 mg/dL     Narrative:       Meter: [id number] : [id number] Tyrone Canseco RN    POC Glucose Once [[id number]]  (Normal) Collected:  03/17/18 1206    Specimen:  Blood Updated:  03/17/18 1212     Glucose 91 mg/dL     Narrative:       Meter: [id number] : [id number] Andres Cheng CNA    Wound Culture - Wound, Toe, Left [[id number]] Collected:  03/16/18 1415    Specimen:  Wound from Toe, Left Updated:  03/17/18 1056     Wound Culture --      Rare Mixed Teri Isolated          ASSESSMENT/PLAN:    Epigastric pain likely 2/2 gastric ulcers  - Gen surg signed off  - added carafate to PPI    Diarrhea  - one time loperamide, 2/2 diarrhea x1, wbc not up, no suspicion of Cdiff    Sepsis secondary to left foot, fourth toe cellulitis secondary to Enterobacter infection:  - ID/podiatry following, s/p lft 4th toe amp on 3/16  - Sed rate low at 24, CRP 12.18  -  BC x 2  No growth to date  - WBC now normal  - PER ID, po Levaquin for two weeks: Stop date 3/31     Acute UGIB with acute  blood loss on chronic iron def & chronic disease normocytic anemia with coagulopathy:   - Splenomegaly with chronic splenic vein thrombosis with large gastric collaterals per CT 7/2017, Had concern for varicies  - Iron sat low at 9%/B12 low normal/folate high/   - Hgb stable at 8-9  - Continue to hold ppx lovenox and monitor for bleeding, appreciate GI recs on when to restart     Uncontrolled Hypertension: BP now near goal on amlodipine 2.5 mg daily/losartan 100 mg daily/  - Switced 5mg IV Metop to metoprolol to 50mg bid     DM 2 with peripheral neuropathy:   Continue accuchecks ac/hs, low dose SSI  Continue to HOLD metformin, monitor     Bipolar disorder: no acute issues, continues on prozac 40 mg daily/restoril 15 mg nightly     PPX w/ h/o DVT, monitor off lovenox, SCDs    Acute Metabolic encephalopathy secondary to sepsis - Resolved/resolving  - CT head negative for acute findings, Ammonia normal, respiratory viral panel negative    Dispo:  Pt stated he wants to go home to live independently, because he own his own house separate from his mother, and she doesn't have to take care of him  - PT / OT / Case management consult

## 2018-03-18 NOTE — PROGRESS NOTES
Gastroenterology Progress Note     CC: abdominal pain, nausea and vomiting  Subjective:    No new complaints. Tolerating full liquids    Objective:    Vital Signs  Temp:  [96.9 °F (36.1 °C)-98.8 °F (37.1 °C)] 98.3 °F (36.8 °C)  Heart Rate:  [72-82] 82  Resp:  [18-20] 20  BP: (135-162)/(73-99) 162/95  Body mass index is 36.78 kg/m².    Intake/Output Summary (Last 24 hours) at 03/18/18 0854  Last data filed at 03/18/18 0000   Gross per 24 hour   Intake          1823.33 ml   Output             1475 ml   Net           348.33 ml            Physical Exam:   General: patient awake, alert and cooperative   Skin: warm and dry, not jaundiced   Cardiovascular: regular rhythm and rate, no murmurs auscultated   Pulm: clear to auscultation bilaterally, regular and unlabored   Abdomen: soft, nontender, nondistended; normal bowel sounds   Extremities: LLE dressed   Neurologic: Normal mood and behavior     Results Review:     I reviewed the patient's new clinical results.        Results from last 7 days  Lab Units 03/18/18  0650   WBC 10*3/mm3 5.33   HEMOGLOBIN g/dL 8.7*   HEMATOCRIT % 26.3*   PLATELETS 10*3/mm3 230       Results from last 7 days  Lab Units 03/18/18  0650   SODIUM mmol/L 142   POTASSIUM mmol/L 3.2*   CHLORIDE mmol/L 105   CO2 mmol/L 25.2   BUN mg/dL 3*   CREATININE mg/dL 0.64*   CALCIUM mg/dL 7.6*   BILIRUBIN mg/dL 0.2   ALK PHOS U/L 83   ALT (SGPT) U/L 24   AST (SGOT) U/L 23   GLUCOSE mg/dL 94         PT/INR:    Protime   Date Value Ref Range Status   03/18/2018 15.3 (H) 12.1 - 15.0 Seconds Final   03/17/2018 14.8 12.1 - 15.0 Seconds Final   03/16/2018 17.4 (H) 12.1 - 15.0 Seconds Final   /  INR   Date Value Ref Range Status   03/18/2018 1.20 (H) 0.90 - 1.10 Final   03/17/2018 1.15 (H) 0.90 - 1.10 Final   03/16/2018 1.41 (H) 0.90 - 1.10 Final       Calcium Calcium   Date Value Ref Range Status   03/18/2018 7.6 (L) 8.6 - 10.5 mg/dL Final   03/17/2018 7.7 (L) 8.6 - 10.5 mg/dL Final   03/16/2018 7.8 (L) 8.6 - 10.5  mg/dL Final      Magnesium  AST  ALT  Bilirubin, Total  AlkPhos  Albumin    Amylase  Lipase    Radiology: Magnesium   Date Value Ref Range Status   03/18/2018 1.2 (L) 1.7 - 2.5 mg/dL Final   03/16/2018 1.7 1.7 - 2.5 mg/dL Final     No components found for: AST.*  No components found for: ALT.*  No components found for: BILIRUBIN, TOTAL.*    No components found for: ALKPHOS.*  No components found for: ALBUMIN.*      No components found for: AMYLASE.*  No components found for: LIPASE.*      Imaging Results (most recent)     Procedure Component Value Units Date/Time    XR Foot 3+ View Right [[id number]] Collected:  03/17/18 0771     Updated:  03/17/18 0754    Narrative:       INDICATION: Patient fell at home one week ago with pain since in the  right foot     TECHNIQUE: 4 views the right foot were obtained     FINDINGS: Postoperative changes of previous fusion are noted across the  first second and third tarsometatarsal joints with bone plates and  screws. Alignment position is anatomic. The most proximal screw at the  bone plate for the first ray has backed out slightly. The most distal  screw for the third ray is fractured. No acute bony fractures are seen.  Fairly extensive degenerative changes are seen along the tarsometatarsal  joint line with moderate degenerative change at the calcaneocuboid  joint. General changes are also seen at the interphalangeal joints and  at the first MTP joint with joint space narrowing and osteophyte  formation. No acute bony abnormalities are noted.       Impression:       Hardware complications as described above. 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 No  acute fractures are seen.     This report was finalized on 3/17/2018 7:49 AM by Dr. Harrison Roberson MD.       US Abdomen Complete [[id number]] Collected:  03/15/18 1406     Updated:  03/15/18 1410    Narrative:       ABDOMINAL ULTRASOUND     INDICATION: Elevated INR. Evaluate for liver  pathology history of  chronic splenic vein thrombosis.      PROCEDURE: Grayscale and Doppler imaging of the abdomen      COMPARISON: None      FINDINGS:  Pancreas is obscured by bowel gas and not well seen.  Unremarkable gallbladder. Right kidney measures 14.5 cm and is normal.  Common duct measures 4 mm. Submitted images of the abdominal aorta and  inferior vena cava are unremarkable. Spleen measures 15.7 cm. Left  kidney measures 14.1 cm and is normal. No ascites.        Impression:       Splenomegaly.     Pancreas obscured by bowel gas and not well seen.     Otherwise, normal abdominal ultrasound     This report was finalized on 3/15/2018 2:08 PM by Dr. Valente Hooks MD.       XR Toe 2+ View Left [[id number]] Collected:  03/14/18 1200     Updated:  03/14/18 1205    Narrative:       INDICATION: Redness and swelling of the left fourth toe since 3/5/2018.     COMPARISON: 3/4/2018      FINDINGS: 2 views of the left 4 toe.  No visible fracture. Apparent  interval reduction of the fourth digit dislocation..  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 No aggressive  appearing bone change or soft tissue gas..   No foreign body.       Impression:       Apparent interval reduction of the left digit dislocation.     Otherwise no acute change from 3/4/2018.     This report was finalized on 3/14/2018 12:03 PM by Dr. Valente Hooks MD.       XR Abdomen Flat & Upright [[id number]] Collected:  03/14/18 1159     Updated:  03/14/18 1202    Narrative:       FLAT AND UPRIGHT VIEWS OF THE ABDOMEN     INDICATION: Generalized abdominal pain for the past 4 days      PROCEDURE: Supine and upright views of the abdomen      COMPARISON: None      FINDINGS:  Degraded by body habitus. Slightly elevated right  hemidiaphragm. No free air. Others probably at least a moderate amount  of gas in the stomach. No frankly dilated air-filled loops of small  bowel are seen. There is probably at least a scattered to moderate  amount of  stool the colon.       Impression:       Significantly degraded by technique. Nonspecific bowel gas  pattern but no definitive evidence for obstruction     This report was finalized on 3/14/2018 12:00 PM by Dr. Valente Hooks MD.       XR Chest 2 View [[id number]] Collected:  03/14/18 0745     Updated:  03/14/18 0748    Narrative:       INDICATION:  Confusion and altered mental status today     COMPARISON:  3/9/2018     FINDINGS: PA and lateral views of the chest. Stable heart size.  Persistent low lung volumes. No dense consolidation. No pneumothorax or  pleural effusions.       Impression:       Stable low lung volumes. No acute change compared with  3/9/2018     This report was finalized on 3/14/2018 7:46 AM by Dr. Valente Hooks MD.       CT Head Without Contrast [[id number]] Collected:  03/14/18 0719     Updated:  03/14/18 0723    Narrative:       CT HEAD WITHOUT CONTRAST     INDICATION: Confusion today      PROCEDURE: Unenhanced CT head.     9 CTs no known nuclear medicine cardiac perfusion studies last year      COMPARISON: 3/9/2018      FINDINGS:  No acute hemorrhage, abnormal mass effect, extra-axial fluid  collection, or hydrocephalus.        Impression:       No acute intracranial finding.     This report was finalized on 3/14/2018 7:21 AM by Dr. Valente Hooks MD.                Assessment/Plan     GI bleed-gu, tolerating diet, advance, hemoglobin stable at 8.7, bun down  ppi bid po        Kaylie Mcfarlane MD  03/18/18  8:54 AM    Time:

## 2018-03-18 NOTE — PROGRESS NOTES
"SERVICE: [facility] HOSPITALIST    CONSULTANTS:  Gen Surg - Abdominal pain n/v  ID - Infection  Podiatry - Toe dislocation/infection  GI - UGIB    CHIEF COMPLAINT:  Confusion    SUBJECTIVE:  Pt underwent Amputation of left fourth toe yesterday. Mentation slightly improved, but still confused. Did not sleep well overnight, was sleeping on exam this AM.     History obtained from nurse. Does continue to have abdominal pain, but seems to be controlled with percocet and morphine prns.     OBJECTIVE:    /99 (BP Location: Right arm, Patient Position: Lying)   Pulse 81   Temp 96.9 °F (36.1 °C) (Oral)   Resp 20   Ht 182.9 cm (72\")   Wt 123 kg (271 lb 2.7 oz)   SpO2 96%   BMI 36.78 kg/m²     MEDS/LABS REVIEWED AND ORDERED      amLODIPine 2.5 mg Oral Daily   FLUoxetine 40 mg Oral Daily   hydrophor 1 application Topical Q24H   insulin aspart 0-7 Units Subcutaneous 4x Daily AC & at Bedtime   lactobacillus acidophilus 1 capsule Oral Daily   levoFLOXacin 750 mg Oral Q24H   losartan 100 mg Oral Q24H   metoprolol tartrate 5 mg Intravenous Q6H   pantoprazole          Physical Exam   Constitutional: He appears well-developed and well-nourished.   Cardiovascular: Normal rate, regular rhythm and normal heart sounds.    Pulmonary/Chest: Effort normal and breath sounds normal.   Abdominal: Soft. Bowel sounds are normal.   Musculoskeletal: He exhibits edema.   Skin: Skin is warm and dry. He is not diaphoretic.       LAB/DIAGNOSTICS:    Lab Results (last 24 hours)     Procedure Component Value Units Date/Time    Blood Culture - Blood, [[id number]]  (Normal) Collected:  03/13/18 1720    Specimen:  Blood from Arm, Left Updated:  03/17/18 1731     Blood Culture No growth at 4 days    Blood Culture - Blood, [[id number]]  (Normal) Collected:  03/13/18 1649    Specimen:  Blood from Hand, Left Updated:  03/17/18 1701     Blood Culture No growth at 4 days    POC Glucose Once [[id number]]  (Normal) Collected:  03/17/18 " 1634    Specimen:  Blood Updated:  03/17/18 1640     Glucose 85 mg/dL     Narrative:       Meter: [id number] : [id number] Tyrone Canseco RN    POC Glucose Once [[id number]]  (Normal) Collected:  03/17/18 1206    Specimen:  Blood Updated:  03/17/18 1212     Glucose 91 mg/dL     Narrative:       Meter: [id number] : [id number] Andres Anand CNA    Wound Culture - Wound, Toe, Left [[id number]] Collected:  03/16/18 1415    Specimen:  Wound from Toe, Left Updated:  03/17/18 1056     Wound Culture --      Rare Mixed Teri Isolated    POC Glucose Once [[id number]]  (Normal) Collected:  03/17/18 0743    Specimen:  Blood Updated:  03/17/18 0750     Glucose 83 mg/dL     Narrative:       Meter: [id number] : [id number] Andres Anand CNA    Comprehensive Metabolic Panel [[id number]]  (Abnormal) Collected:  03/17/18 0617    Specimen:  Blood Updated:  03/17/18 0659     Glucose 90 mg/dL      BUN 7 mg/dL      Creatinine 0.59 (L) mg/dL      Sodium 140 mmol/L      Potassium 3.2 (L) mmol/L      Chloride 106 mmol/L      CO2 23.1 mmol/L      Calcium 7.7 (L) mg/dL      Total Protein 5.1 (L) g/dL      Albumin 2.70 (L) g/dL      ALT (SGPT) 26 U/L      AST (SGOT) 26 U/L      Alkaline Phosphatase 78 U/L      Total Bilirubin 0.3 mg/dL      eGFR Non African Amer 143 mL/min/1.73      Globulin 2.4 gm/dL      A/G Ratio 1.1 g/dL      BUN/Creatinine Ratio 11.9     Anion Gap 10.9 mmol/L     Procalcitonin [[id number]]  (Abnormal) Collected:  03/17/18 0617    Specimen:  Blood Updated:  03/17/18 0653     Procalcitonin 0.07 (L) ng/mL     Narrative:       As a Marker for Sepsis (Non-Neonates):   1. <0.5 ng/mL represents a low risk of severe sepsis and/or septic shock.  2. >2 ng/mL represents a high risk of severe sepsis and/or septic shock.    As a Marker for Lower Respiratory Tract Infections that require antibiotic therapy:    PCT on Admission     Antibiotic Therapy       6-12 Hrs later  > 0.5                Strongly Recommended             >0.25 -  <0.5         Recommended  0.1 - 0.25           Discouraged              Remeasure/reassess PCT  <0.1                 Strongly Discouraged     Remeasure/reassess PCT                     PCT values of < 0.5 ng/mL do not exclude an infection, because localized infections (without systemic signs) may be associated with such low concentrations, or a systemic infection in its initial stages (< 6 hours). Furthermore, increased PCT can occur without infection. PCT concentrations between 0.5 and 2.0 ng/mL should be interpreted taking into account the patient's history. It is recommended to retest PCT within 6-24 hours if any concentrations < 2 ng/mL are obtained.    aPTT [[id number]]  (Normal) Collected:  03/17/18 0617    Specimen:  Blood Updated:  03/17/18 0651     PTT 37.1 seconds     Narrative:       PTT = The equivalent PTT values for the therapeutic range of heparin levels at 0.1 to 0.7 U/ml are 53 to 110 seconds.    Protime-INR [[id number]]  (Abnormal) Collected:  03/17/18 0617    Specimen:  Blood Updated:  03/17/18 0651     Protime 14.8 Seconds      INR 1.15 (H)    Narrative:       Therapeutic Ranges for INR: 2.0-3.0 (PT 20-30)                              2.5-3.5 (PT 25-34)    CBC & Differential [[id number]] Collected:  03/17/18 0617    Specimen:  Blood Updated:  03/17/18 0626    Narrative:       The following orders were created for panel order CBC & Differential.  Procedure                               Abnormality         Status                     ---------                               -----------         ------                     CBC Auto Differential[[id number]]        Abnormal            Final result                 Please view results for these tests on the individual orders.    CBC Auto Differential [[id number]]  (Abnormal) Collected:  03/17/18 0617    Specimen:  Blood Updated:  03/17/18 0626     WBC 4.31 (L) 10*3/mm3      RBC 2.74 (L) 10*6/mm3      Hemoglobin 8.2 (L) g/dL      Hematocrit 25.0 (L) %      MCV  91.2 fL      MCH 29.9 pg      MCHC 32.8 g/dL      RDW 13.8 %      RDW-SD 45.0 fl      MPV 9.4 fL      Platelets 185 10*3/mm3      Neutrophil % 56.9 %      Lymphocyte % 25.5 %      Monocyte % 11.8 (H) %      Eosinophil % 3.7 %      Basophil % 0.7 %      Immature Grans % 1.4 (H) %      Neutrophils, Absolute 2.45 10*3/mm3      Lymphocytes, Absolute 1.10 10*3/mm3      Monocytes, Absolute 0.51 10*3/mm3      Eosinophils, Absolute 0.16 10*3/mm3      Basophils, Absolute 0.03 10*3/mm3      Immature Grans, Absolute 0.06 (H) 10*3/mm3      nRBC 0.0 /100 WBC     POC Glucose Once [[id number]]  (Normal) Collected:  03/16/18 2222    Specimen:  Blood Updated:  03/16/18 2237     Glucose 82 mg/dL     Narrative:       Meter: [id number] : [id number] Jorge Guerrier RN          ASSESSMENT/PLAN:  Acute Metabolic encephalopathy secondary to sepsis  - CT head negative for acute findings, Ammonia normal, respiratory viral panel negative    Sepsis secondary to left foot, fourth toe cellulitis secondary to Enterobacter infection:  - ID/podiatry following, s/p lft 4th toe amp on 3/16  - Sed rate low at 24, CRP 12.18  - BC x 2  No growth to date  - WBC downtrending  - PER ID, stop IV zosyn and Vanc, start po Levaquin for two weeks: Stop date 3/31     Acute UGIB with acute  blood loss on chronic iron def & chronic disease normocytic anemia with coagulopathy:   - Splenomegaly with chronic splenic vein thrombosis with large gastric collaterals per CT 7/2017, Had concern for varicies  - Iron sat low at 9%/B12 low normal/folate high/   - Hgb has been stable since 3/15 at 8.8 -> 8.2 (Has not needed Blood this admission)  - Discussed EGD with Dr. Mcfarlane this AM, saw small ulcers, continue protonix drip until tomorrow, and then bid  - Continue to hold lovenox and monitor for bleeding    Uncontrolled Hypertension: BP now near goal on amlodipine 2.5 mg daily/losartan 100 mg daily/  - Switced 5mg IV Metop to metoprolol to 50mg bid    DM 2 with peripheral  neuropathy:   Continue accuchecks ac/hs, low dose SSI  Continue to HOLD metformin, monitor     Bipolar disorder: no acute issues, continues on prozac 40 mg daily/restoril 15 mg nightly     PPX w/ h/o DVT, monitor off lovenox, SCDs    Elevated PT/INR - Resolved  - Required Vitamin K supp prior to EGD    TIME: >30 minutes spent on face to face time with coordination of care, discussing management with GI, & NUrsing.

## 2018-03-18 NOTE — PLAN OF CARE
Problem: Patient Care Overview  Goal: Plan of Care Review  Outcome: Ongoing (interventions implemented as appropriate)   03/18/18 1938   Coping/Psychosocial   Plan of Care Reviewed With patient;mother;other (see comments)   OTHER   Outcome Summary pt had a fair day today his protonix drip was discontinued. Protonix po was started. VSS, no vomiting today, intermittent nausea was noted , full liq diet was increased to CCC reg, and appetite was good. Up to bathroom with x1 assist, NWB, dressing to his left toe was changed, Dr. Farah was here and changed it. Pt had x1 incontient stool today brown in color. linen changed per staff. Pt is quiet and depressed tonight, him and his mother spoke about poss rehab        Problem: Pain, Acute (Adult)  Goal: Acceptable Pain Control/Comfort Level  Outcome: Ongoing (interventions implemented as appropriate)      Problem: Confusion, Acute (Adult)  Goal: Cognitive/Functional Impairments Minimized  Outcome: Ongoing (interventions implemented as appropriate)      Problem: Nausea/Vomiting (Adult)  Goal: Symptom Relief  Outcome: Ongoing (interventions implemented as appropriate)

## 2018-03-19 LAB
GLUCOSE BLDC GLUCOMTR-MCNC: 107 MG/DL (ref 70–130)
GLUCOSE BLDC GLUCOMTR-MCNC: 111 MG/DL (ref 70–130)
GLUCOSE BLDC GLUCOMTR-MCNC: 73 MG/DL (ref 70–130)
GLUCOSE BLDC GLUCOMTR-MCNC: 97 MG/DL (ref 70–130)
PHYTONADIONE SERPL-MCNC: 0.27 NG/ML (ref 0.13–1.88)

## 2018-03-19 PROCEDURE — 97166 OT EVAL MOD COMPLEX 45 MIN: CPT

## 2018-03-19 PROCEDURE — 97161 PT EVAL LOW COMPLEX 20 MIN: CPT

## 2018-03-19 PROCEDURE — 25010000002 IRON SUCROSE PER 1 MG: Performed by: HOSPITALIST

## 2018-03-19 PROCEDURE — 82962 GLUCOSE BLOOD TEST: CPT

## 2018-03-19 PROCEDURE — 25010000002 MORPHINE PER 10 MG: Performed by: PODIATRIST

## 2018-03-19 PROCEDURE — 99232 SBSQ HOSP IP/OBS MODERATE 35: CPT | Performed by: HOSPITALIST

## 2018-03-19 RX ORDER — MAGNESIUM SULFATE HEPTAHYDRATE 40 MG/ML
4 INJECTION, SOLUTION INTRAVENOUS ONCE
Status: COMPLETED | OUTPATIENT
Start: 2018-03-19 | End: 2018-03-19

## 2018-03-19 RX ORDER — AMLODIPINE BESYLATE 5 MG/1
5 TABLET ORAL DAILY
Status: DISCONTINUED | OUTPATIENT
Start: 2018-03-20 | End: 2018-03-20 | Stop reason: HOSPADM

## 2018-03-19 RX ADMIN — PETROLATUM 1 APPLICATION: 42 OINTMENT TOPICAL at 09:27

## 2018-03-19 RX ADMIN — GABAPENTIN 800 MG: 400 CAPSULE ORAL at 22:17

## 2018-03-19 RX ADMIN — Medication 1 CAPSULE: at 09:25

## 2018-03-19 RX ADMIN — FLUOXETINE 40 MG: 20 CAPSULE ORAL at 09:26

## 2018-03-19 RX ADMIN — GABAPENTIN 800 MG: 400 CAPSULE ORAL at 13:29

## 2018-03-19 RX ADMIN — TEMAZEPAM 15 MG: 15 CAPSULE ORAL at 22:17

## 2018-03-19 RX ADMIN — CLONIDINE HYDROCHLORIDE 0.1 MG: 0.1 TABLET ORAL at 09:29

## 2018-03-19 RX ADMIN — LEVOFLOXACIN 750 MG: 750 TABLET, FILM COATED ORAL at 13:29

## 2018-03-19 RX ADMIN — METOPROLOL TARTRATE 50 MG: 50 TABLET ORAL at 09:25

## 2018-03-19 RX ADMIN — MAGNESIUM SULFATE HEPTAHYDRATE 4 G: 40 INJECTION, SOLUTION INTRAVENOUS at 14:27

## 2018-03-19 RX ADMIN — MORPHINE SULFATE 2 MG: 2 INJECTION, SOLUTION INTRAMUSCULAR; INTRAVENOUS at 09:35

## 2018-03-19 RX ADMIN — LOSARTAN POTASSIUM 100 MG: 50 TABLET, FILM COATED ORAL at 09:26

## 2018-03-19 RX ADMIN — METOPROLOL TARTRATE 50 MG: 50 TABLET ORAL at 20:40

## 2018-03-19 RX ADMIN — GABAPENTIN 800 MG: 400 CAPSULE ORAL at 06:25

## 2018-03-19 RX ADMIN — METFORMIN HYDROCHLORIDE 1000 MG: 500 TABLET ORAL at 17:48

## 2018-03-19 RX ADMIN — PANTOPRAZOLE SODIUM 40 MG: 40 TABLET, DELAYED RELEASE ORAL at 17:49

## 2018-03-19 RX ADMIN — OXYCODONE HYDROCHLORIDE AND ACETAMINOPHEN 1 TABLET: 5; 325 TABLET ORAL at 06:25

## 2018-03-19 RX ADMIN — PANTOPRAZOLE SODIUM 40 MG: 40 TABLET, DELAYED RELEASE ORAL at 09:28

## 2018-03-19 RX ADMIN — ACETAMINOPHEN 650 MG: 325 TABLET, FILM COATED ORAL at 13:32

## 2018-03-19 RX ADMIN — AMLODIPINE BESYLATE 2.5 MG: 2.5 TABLET ORAL at 09:27

## 2018-03-19 RX ADMIN — SUCRALFATE 1 G: 1 TABLET ORAL at 09:29

## 2018-03-19 RX ADMIN — OXYCODONE HYDROCHLORIDE AND ACETAMINOPHEN 1 TABLET: 5; 325 TABLET ORAL at 14:34

## 2018-03-19 RX ADMIN — CLONIDINE HYDROCHLORIDE 0.1 MG: 0.1 TABLET ORAL at 20:41

## 2018-03-19 RX ADMIN — MORPHINE SULFATE 2 MG: 2 INJECTION, SOLUTION INTRAMUSCULAR; INTRAVENOUS at 22:25

## 2018-03-19 RX ADMIN — OXYCODONE HYDROCHLORIDE AND ACETAMINOPHEN 1 TABLET: 5; 325 TABLET ORAL at 20:39

## 2018-03-19 RX ADMIN — IRON SUCROSE 200 MG: 20 INJECTION, SOLUTION INTRAVENOUS at 13:29

## 2018-03-19 NOTE — PLAN OF CARE
Problem: Patient Care Overview  Goal: Plan of Care Review  Outcome: Ongoing (interventions implemented as appropriate)   03/19/18 1033   Coping/Psychosocial   Plan of Care Reviewed With patient   OTHER   Outcome Summary Physical therapy evaluation complete. Patient requires SBA for bed mobility and CGA for stand pivot transfers from bed to chair. Patient requires verbal cues to adhere to weight bearing precautions per MD. Patient requires education on overall safety with mobility and importance of maintaining weight bearing status. Due to patient's current mobility status and significantly decreased sensation in bilateral LEs, recommend pivot transfers to wheelchair only and use of wheelchair as primary means of mobility. Discussed with patient, who is in agreement with plan. No further PT needs identified at this time.

## 2018-03-19 NOTE — PROGRESS NOTES
"Hospitalist Team      Patient Care Team:  Killian Scales MD as PCP - General  Killian Scales MD as PCP - Family Medicine        Chief Complaint:  Follow-up Sepsis, GI Bleed, Uncontrolled hypertension    Subjective    No acute events overnight.  Mr. Price still c/o RUQ, but he is tolerating PO.  He denies chest pain and dyspnea.    Objective    Vital Signs  Temp:  [97.9 °F (36.6 °C)-100.3 °F (37.9 °C)] 100.3 °F (37.9 °C)  Heart Rate:  [69-88] 76  Resp:  [16-20] 16  BP: (139-168)/() 168/100  Oxygen Therapy  SpO2: 99 %  Pulse Oximetry Type: Intermittent  Device (Oxygen Therapy): room air  ETCO2 (mmHg): 42 mmHg  Oximetry Probe Site Changed: No}    Flowsheet Rows    Flowsheet Row First Filed Value   Admission Height 182.9 cm (72\") Documented at 03/13/2018 1525   Admission Weight 125 kg (275 lb) Documented at 03/13/2018 1525          Physical Exam:    General Appearance:    Alert, cooperative, in no acute distress   Lungs:     Clear to auscultation,respirations regular, even and                  unlabored    Heart:    Regular rhythm and normal rate, normal S1 and S2, no            murmur, no gallop, no rub, no click   Abdomen:     Obese, soft w/ some tenderness RUQ but no guarding or rebound; Bowel sounds are normal.   Extremities:   No clubbing or cyanosis   Pulses:   Radial pulses palpable and equal bilaterally   Neurologic:   Cranial nerves 2 - 12 grossly intact       Results Review:     I reviewed the patient's new clinical results.    Lab Results (last 24 hours)     Procedure Component Value Units Date/Time    POC Glucose Once [[id number]]  (Normal) Collected:  03/19/18 0725    Specimen:  Blood Updated:  03/19/18 0735     Glucose 73 mg/dL     Narrative:       Meter: [id number] : [id number] Nancy DICKSON CERT.    Potassium [[id number]]  (Normal) Collected:  03/18/18 2239    Specimen:  Blood Updated:  03/18/18 2322     Potassium 3.7 mmol/L     Blood Culture - Blood, [[id number]]  (Normal) " Collected:  03/13/18 1720    Specimen:  Blood from Arm, Left Updated:  03/18/18 1731     Blood Culture No growth at 5 days    POC Glucose Once [[id number]]  (Normal) Collected:  03/18/18 1657    Specimen:  Blood Updated:  03/18/18 1714     Glucose 104 mg/dL     Narrative:       Meter: [id number] : [id number] Andres Anand CNA    Blood Culture - Blood, [[id number]]  (Normal) Collected:  03/13/18 1649    Specimen:  Blood from Hand, Left Updated:  03/18/18 1701     Blood Culture No growth at 5 days    POC Glucose Once [[id number]]  (Normal) Collected:  03/18/18 1206    Specimen:  Blood Updated:  03/18/18 1213     Glucose 90 mg/dL     Narrative:       Meter: [id number] : [id number] Andres Cheng CNA          Imaging Results (last 24 hours)     ** No results found for the last 24 hours. **          Medication Review:   I have reviewed the patient's current medication list    Current Facility-Administered Medications:   •  acetaminophen (TYLENOL) tablet 650 mg, 650 mg, Oral, Q4H PRN, Anish Farah DPM  •  amLODIPine (NORVASC) tablet 2.5 mg, 2.5 mg, Oral, Daily, Ciera Henriquez MD, 2.5 mg at 03/18/18 0820  •  calcium carbonate (TUMS) chewable tablet 500 mg (200 mg elemental), 1 tablet, Oral, BID PRN, Anish Farah DPM  •  CloNIDine (CATAPRES) tablet 0.1 mg, 0.1 mg, Oral, Q12H, Ciera Henriquez MD, 0.1 mg at 03/18/18 2140  •  dextrose (D50W) solution 25 g, 25 g, Intravenous, Q15 Min PRN, Anish Farah DPM  •  dextrose (GLUTOSE) oral gel 15 g, 15 g, Oral, Q15 Min PRN, Anish Farah DPM  •  FLUoxetine (PROzac) capsule 40 mg, 40 mg, Oral, Daily, Ciera Henriquez MD, 40 mg at 03/18/18 0815  •  gabapentin (NEURONTIN) capsule 800 mg, 800 mg, Oral, Q8H, Ciera Henriquez MD, 800 mg at 03/19/18 0625  •  glucagon (GLUCAGEN) injection 1 mg, 1 mg, Subcutaneous, PRN, Anish Farah DPM  •  hydrophor (AQUAPHOR) ointment 1 application, 1 application, Topical, Q24H, Ciera Henriquez MD, 1 application  at 03/18/18 0816  •  insulin aspart (novoLOG) injection 0-7 Units, 0-7 Units, Subcutaneous, 4x Daily AC & at Bedtime, Anish Farah DPM, 2 Units at 03/16/18 1743  •  lactobacillus acidophilus (RISAQUAD) capsule 1 capsule, 1 capsule, Oral, Daily, Ciera Henriquez MD, 1 capsule at 03/18/18 0815  •  levoFLOXacin (LEVAQUIN) tablet 750 mg, 750 mg, Oral, Q24H, Delfino Arce MD, 750 mg at 03/18/18 1220  •  losartan (COZAAR) tablet 100 mg, 100 mg, Oral, Q24H, Ciera Henriquez MD, 100 mg at 03/18/18 0816  •  metoprolol tartrate (LOPRESSOR) tablet 50 mg, 50 mg, Oral, Q12H, Ciera Henriquez MD, 50 mg at 03/18/18 2140  •  morphine injection 2 mg, 2 mg, Intravenous, Q4H PRN, Anish Farah DPM, 1 mg at 03/17/18 1204  •  ondansetron (ZOFRAN) injection 4 mg, 4 mg, Intravenous, Q6H PRN, Anish Farah DPM, 4 mg at 03/18/18 0814  •  oxyCODONE-acetaminophen (PERCOCET) 5-325 MG per tablet 1 tablet, 1 tablet, Oral, Q4H PRN, Ciera Henriquez MD, 1 tablet at 03/19/18 0625  •  pantoprazole (PROTONIX) EC tablet 40 mg, 40 mg, Oral, BID AC, Kaylie Mcfarlane MD, 40 mg at 03/18/18 1727  •  potassium chloride (K-DUR,KLOR-CON) CR tablet 40 mEq, 40 mEq, Oral, PRN, 40 mEq at 03/18/18 1727 **OR** potassium chloride (KLOR-CON) packet 40 mEq, 40 mEq, Oral, PRN **OR** potassium chloride 10 mEq in 100 mL IVPB, 10 mEq, Intravenous, Q1H PRN, Ciera Henriquez MD  •  sodium chloride 0.9 % flush 1-10 mL, 1-10 mL, Intravenous, PRN, Anish Farah DPM  •  sodium chloride 0.9 % flush 10 mL, 10 mL, Intravenous, PRN, Anish Farah DPM  •  sodium chloride 0.9 % infusion 40 mL, 40 mL, Intravenous, PRN, Anish Farah DPM  •  sucralfate (CARAFATE) tablet 1 g, 1 g, Oral, BID AC, Ciera Henriquez MD, 1 g at 03/18/18 9202  •  temazepam (RESTORIL) capsule 15 mg, 15 mg, Oral, Nightly PRN, Ciera Henriquez MD, 15 mg at 03/18/18 2264      Assessment/Plan     1.  Enterobacter sepsis due to cellulitis of the left foot:  He is s/p 4th  toe amputation.  Leokocytosis has resolved.  He is to continue Levaquin through 3/31.    2.  Gastric Ulcers/ Upper GI bleed:  Stop daily lab draws as Hgb has been stable.  Continue BID PPI and Carafate.    3.  Uncontrolled HTN:  BP not at goal on exam this morning, but this was prior to receving morning meds.  Improved to SBP of 140mmHg after meds.  Will increase Norvasc to 5mg.  Continue to follow trend.    4.  Iron Deficiency Anemia:  Will give Venofer x3.  Iron saturation was 9%.  As in #1.    5.  Diabetes Mellitus, Type 2 in Obese:  Bedsides and AM at goal.  Can resume home Metformin.    6.  Bipolar Disorder:  Appears at baseline.  No change to regimen.      Plan for disposition: Staff reports unsafe for home.  Looking at placement.    Mariano Brown MD  03/19/18  9:21 AM

## 2018-03-19 NOTE — NURSING NOTE
Continued Stay Note  EVONNE Epstein     Patient Name: Eliseo Price  MRN: [id number]  Today's Date: 3/19/2018    Admit Date: 3/13/2018          Discharge Plan     Row Name 03/19/18 1340       Plan    Plan to be determined    Plan Comments Spoke with patient at bedside, he is sitting up in recliner. He states he is feeling better.  He states he plans to return home at dc. He says his sister, Bere, lives about 40 minutes away and his daughter, Ann-Marie, lives closer and can check on him. He states there are no issues with mobility at his home.  Asked if he needs rehab prior to returning home and he does not believe he will need any type of rehab. Will continue to follow for dc needs.              Discharge Codes    No documentation.           Vance Johnson RN

## 2018-03-19 NOTE — PROGRESS NOTES
Subjective  POD #3. S/P L 4th toe amputation.  The patient reports that she is doing well regarding his left foot surgical site. He has no complaints of pain at this time. He states he was up with physical therapy earlier today utilizing a walker. He was not successful in keeping weight off of left foot with walker use.    Past Medical History:   Diagnosis Date   • Arthritis    • Bipolar disorder    • Cellulitis of lower extremity     RIGHT   • Coronary artery disease    • Diabetes mellitus    • Disease of thyroid gland     nodule on thyroid   • DVT (deep venous thrombosis)    • Fracture of right foot    • Hyperlipidemia    • Hypertension    • Pseudogout    • Septic shock 07/2017    H/O   • Toxic metabolic encephalopathy 07/2017    H/O       Past Surgical History:   Procedure Laterality Date   • AMPUTATION DIGIT Left 3/16/2018    Procedure: AMPUTATION LEFT FOURTH TOE;  Surgeon: Anish Farah DPM;  Location: [facility] OR;  Service: Podiatry   • 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   • ENDOSCOPY N/A 3/16/2018    Procedure: ESOPHAGOGASTRODUODENOSCOPY with biopsies;  Surgeon: Kaylie Mcfarlane MD;  Location: [facility] OR;  Service: Gastroenterology   • HARDWARE REMOVAL Right 8/18/2017    Procedure: RIGHT EXTERNAL FIXATOR REMOVAL;  Surgeon: Anish Farah DPM;  Location: [facility] OR;  Service:    • JOINT REPLACEMENT     • KNEE SURGERY     • ORIF FOOT FRACTURE Right 7/29/2017    Procedure: open reduction with percutaneous pinning of midfoot dislocation fracture;  Surgeon: Anish Farah DPM;  Location: [facility] OR;  Service:    • ORIF FOOT FRACTURE Left 3/5/2018    Procedure: OPEN REDUCTION WITH IRRIGATION AND WOUND CLOSURE LEFT FOURTH TOE;  Surgeon: Anish Farah DPM;  Location: [facility] OR;  Service:    • TOE FUSION Right 8/18/2017    Procedure: RIGHT  MEDIAL COLUMN ARTHRODESIS, RIGHT TARAL METATARSAL ARTHRODESIS;  Surgeon: Anish Farah DPM;  Location: [facility] OR;  Service:    • TOTAL HIP ARTHROPLASTY            Current Facility-Administered Medications:   •  acetaminophen (TYLENOL) tablet 650 mg, 650 mg, Oral, Q4H PRN, Anish Farah DPM, 650 mg at 03/19/18 1332  •  [START ON 3/20/2018] amLODIPine (NORVASC) tablet 5 mg, 5 mg, Oral, Daily, Mariano Brown MD  •  calcium carbonate (TUMS) chewable tablet 500 mg (200 mg elemental), 1 tablet, Oral, BID PRN, Anish Farah DPM  •  CloNIDine (CATAPRES) tablet 0.1 mg, 0.1 mg, Oral, Q12H, Ciera Henriquez MD, 0.1 mg at 03/19/18 2041  •  dextrose (D50W) solution 25 g, 25 g, Intravenous, Q15 Min PRN, Anish Farah DPM  •  dextrose (GLUTOSE) oral gel 15 g, 15 g, Oral, Q15 Min PRN, Anish Farah DPM  •  FLUoxetine (PROzac) capsule 40 mg, 40 mg, Oral, Daily, Ciera Henriquez MD, 40 mg at 03/19/18 0926  •  gabapentin (NEURONTIN) capsule 800 mg, 800 mg, Oral, Q8H, Ciera Henriquez MD, 800 mg at 03/19/18 1329  •  glucagon (GLUCAGEN) injection 1 mg, 1 mg, Subcutaneous, PRN, Anish Farah DPM  •  hydrophor (AQUAPHOR) ointment 1 application, 1 application, Topical, Q24H, Ciera Henriquez MD, 1 application at 03/19/18 0927  •  insulin aspart (novoLOG) injection 0-7 Units, 0-7 Units, Subcutaneous, 4x Daily AC & at Bedtime, Anish Farah DPM, 2 Units at 03/16/18 1743  •  iron sucrose (VENOFER) 200 mg in sodium chloride 0.9 % 100 mL IVPB, 200 mg, Intravenous, Q24H, Mariano Brown MD, Stopped at 03/19/18 1600  •  lactobacillus acidophilus (RISAQUAD) capsule 1 capsule, 1 capsule, Oral, Daily, Ciera Henriquez MD, 1 capsule at 03/19/18 0925  •  levoFLOXacin (LEVAQUIN) tablet 750 mg, 750 mg, Oral, Q24H, Delfino Arce MD, 750 mg at 03/19/18 1329  •  losartan (COZAAR) tablet 100 mg, 100 mg, Oral, Q24H, Ciera Henriquez MD, 100 mg at 03/19/18 0950  •  metFORMIN (GLUCOPHAGE) tablet 1,000 mg, 1,000 mg, Oral, BID With Meals, Mariano Brown MD, 1,000 mg at 03/19/18 174  •  metoprolol tartrate (LOPRESSOR) tablet 50 mg, 50 mg, Oral, Q12H, Ciera Henriquez,  MD, 50 mg at 03/19/18 2040  •  morphine injection 2 mg, 2 mg, Intravenous, Q4H PRN, Anish Farah DPM, 2 mg at 03/19/18 0935  •  ondansetron (ZOFRAN) injection 4 mg, 4 mg, Intravenous, Q6H PRN, Anish Farah DPM, 4 mg at 03/18/18 0814  •  oxyCODONE-acetaminophen (PERCOCET) 5-325 MG per tablet 1 tablet, 1 tablet, Oral, Q4H PRN, Ciera Henriquez MD, 1 tablet at 03/19/18 2039  •  pantoprazole (PROTONIX) EC tablet 40 mg, 40 mg, Oral, BID AC, Kaylie Mcfarlane MD, 40 mg at 03/19/18 1749  •  potassium chloride (K-DUR,KLOR-CON) CR tablet 40 mEq, 40 mEq, Oral, PRN, 40 mEq at 03/18/18 1727 **OR** potassium chloride (KLOR-CON) packet 40 mEq, 40 mEq, Oral, PRN **OR** potassium chloride 10 mEq in 100 mL IVPB, 10 mEq, Intravenous, Q1H PRN, Ciera Henriquez MD  •  sodium chloride 0.9 % flush 1-10 mL, 1-10 mL, Intravenous, PRN, Anish Farah DPM  •  sodium chloride 0.9 % flush 10 mL, 10 mL, Intravenous, PRN, Anish Farah DPM  •  sodium chloride 0.9 % infusion 40 mL, 40 mL, Intravenous, PRN, Anish Farah DPM  •  sucralfate (CARAFATE) tablet 1 g, 1 g, Oral, BID AC, Ciera Henriquez MD, 1 g at 03/19/18 0929  •  temazepam (RESTORIL) capsule 15 mg, 15 mg, Oral, Nightly PRN, Ciera Henriquez MD, 15 mg at 03/18/18 4180     Allergies   Allergen Reactions   • Lisinopril Cough     COUGHING     Review of Systems  Constitutional: Fever earlier today  HEENT: No headache or ENT complaints  Respiratory: No SOB or breathing difficulty  Cardiovascular: No chest pain/pressure, no palpitations, L lower extremity swelling, no calf pain or intermittent claudication  Gastrointestinal: Right upper quadrant pain, recent GI bleed  Musculoskeletal: Right midfoot pain, left 4th toe amputation, left partial fifth ray amputation  Dermatologic: Intact left 4th toe incision, multiple scabs in various degrees of healing bilateral lower extremity and knees  Neurologic: Pedal numbness associated with diabetic neuropathy  Psychiatric:  Bipolar      Objective  General: The patient is examined while he is seated in a semi reclined position in the bedside chair. He appears alert, pleasant and generally comfortable.  Vitals:    03/19/18 0355 03/19/18 0700 03/19/18 1143 03/19/18 1500   BP: 148/94 168/100 128/81 151/88   BP Location:   Right arm Right arm   Patient Position:   Sitting Sitting   Pulse: 69 76 73 77   Resp: 18 16 16 16   Temp: 97.9 °F (36.6 °C) 100.3 °F (37.9 °C) 100.2 °F (37.9 °C) 99.2 °F (37.3 °C)   TempSrc: Axillary  Oral Oral   SpO2: 95% 99% 94% 93%   Weight:       Height:       Pedal Examination: The left foot dressing is clean, dry and intact. It is removed and the operative site visually assessed. The 4th toe amputation incision is well approximated with external sutures. There is no conrad-incisional erythema. No drainage or hemorrhage is noted. The dorsal foot cellulitis has completely resolved.    Lab Results as of 3/19/2018 18:26   Ref. Range 3/18/2018 06:50   Glucose Latest Ref Range: 65 - 99 mg/dL 94   Sodium Latest Ref Range: 136 - 145 mmol/L 142   Potassium Latest Ref Range: 3.5 - 5.2 mmol/L 3.2 (L)   CO2 Latest Ref Range: 22.0 - 29.0 mmol/L 25.2   Chloride Latest Ref Range: 98 - 107 mmol/L 105   Anion Gap Latest Units: mmol/L 11.8   Creatinine Latest Ref Range: 0.76 - 1.27 mg/dL 0.64 (L)   BUN Latest Ref Range: 6 - 20 mg/dL 3 (L)   BUN/Creatinine Ratio Latest Ref Range: 7.0 - 25.0  4.7 (L)   Calcium Latest Ref Range: 8.6 - 10.5 mg/dL 7.6 (L)   eGFR Non African Amer Latest Ref Range: >60 mL/min/1.73 130   Alkaline Phosphatase Latest Ref Range: 40 - 129 U/L 83   Total Protein Latest Ref Range: 6.0 - 8.5 g/dL 5.3 (L)   ALT (SGPT) Latest Ref Range: 5 - 41 U/L 24   AST (SGOT) Latest Ref Range: 5 - 40 U/L 23   Total Bilirubin Latest Ref Range: 0.2 - 1.2 mg/dL 0.2   Albumin Latest Ref Range: 3.50 - 5.20 g/dL 2.70 (L)   Globulin Latest Units: gm/dL 2.6   A/G Ratio Latest Units: g/dL 1.0   Phosphorus Latest Ref Range: 2.7 - 4.5 mg/dL  3.7   Magnesium Latest Ref Range: 1.7 - 2.5 mg/dL 1.2 (L)   Protime Latest Ref Range: 12.1 - 15.0 Seconds 15.3 (H)   INR Latest Ref Range: 0.90 - 1.10  1.20 (H)   aPTT Latest Ref Range: 24.3 - 38.1 seconds 37.2   WBC Latest Ref Range: 4.80 - 10.80 10*3/mm3 5.33   RBC Latest Ref Range: 4.70 - 6.10 10*6/mm3 2.92 (L)   Hemoglobin Latest Ref Range: 14.0 - 18.0 g/dL 8.7 (L)   Hematocrit Latest Ref Range: 42.0 - 52.0 % 26.3 (L)   RDW Latest Ref Range: 11.5 - 14.5 % 13.6   MCV Latest Ref Range: 80.0 - 94.0 fL 90.1   MCH Latest Ref Range: 27.0 - 31.0 pg 29.8   MCHC Latest Ref Range: 31.0 - 37.0 g/dL 33.1   MPV Latest Ref Range: 7.4 - 10.4 fL 9.2   Platelets Latest Ref Range: 140 - 500 10*3/mm3 230   RDW-SD Latest Ref Range: 37.0 - 54.0 fl 44.2   Neutrophil % Latest Ref Range: 45.0 - 70.0 % 63.5   Lymphocyte % Latest Ref Range: 20.0 - 45.0 % 19.1 (L)   Monocyte % Latest Ref Range: 3.0 - 8.0 % 11.3 (H)   Eosinophil % Latest Ref Range: 0.0 - 4.0 % 3.4   Basophil % Latest Ref Range: 0.0 - 2.0 % 0.8   Immature Grans % Latest Ref Range: 0.0 - 0.5 % 1.9 (H)   Neutrophils, Absolute Latest Ref Range: 1.50 - 8.30 10*3/mm3 3.39   Lymphocytes, Absolute Latest Ref Range: 0.60 - 4.80 10*3/mm3 1.02   Monocytes, Absolute Latest Ref Range: 0.00 - 1.00 10*3/mm3 0.60   Eosinophils, Absolute Latest Ref Range: 0.10 - 0.30 10*3/mm3 0.18   Basophils, Absolute Latest Ref Range: 0.00 - 0.20 10*3/mm3 0.04   Immature Grans, Absolute Latest Ref Range: 0.00 - 0.03 10*3/mm3 0.10 (H)   nRBC Latest Ref Range: 0.0 - 0.0 /100 WBC 0.0     Intra-op L 4th Toe Culture   Rare Mixed Teri Isolated      No anaerobes isolated at 3 days      Assessment  POD #3. S/P L 4th toe amputation.    Plan  1. The patient is examined in the intraoperative culture and the laboratory results are reviewed.  2. The dressing is removed  and the operative site  inspected. The incision is painted with Betadine solution followed by application of a gauze, Kerlix and ACE bandage  dressing.  3. It would be ideal that the patient remain nonweightbearing on the operative foot but I do not believe this will be possible for him. I will order a [employer] shoe for at least some degree of protection of the left foot. The patient is advised to limit his weightbearing on the left foot to transfers only, or if needed, partial weight bearing preferably on the heel using a walker for gait assistance. The patient should continue working with physical therapy while  hospitalized for continued gait training.  4. I had a dudley conversation with the patient regarding his ability to live on his own at home given the multiple falls that he has experienced and the subsequent pedal injuries necessitating operative interventions and ultimately costing him his L 4th and 5th toes. I advised him of my concerns for his safety living on his own. These concerns are also shared by his family. He expects to be discharged home following this hospitalization. He states that he is considering moving to a smaller home or apartment as he is coming to the realization that he cannot keep up the acres of property or his current sized home on his own.  5. From a podiatry standpoint the patient's pedal condition is stable for discharge.

## 2018-03-19 NOTE — THERAPY DISCHARGE NOTE
Acute Care - Physical Therapy Initial Eval/Discharge  EVONNE Epstein     Patient Name: Eliseo Price  : 1965  MRN: [id number]  Today's Date: 3/19/2018   Onset of Illness/Injury or Date of Surgery: 18  Date of Referral to PT: 18  Referring Physician: Dr Henriquez      Admit Date: 3/13/2018    Visit Dx:    ICD-10-CM ICD-9-CM   1. Confusion associated with infection R41.0 298.9    B99.9 136.9   2. Acute renal injury N17.9 584.9   3. Dehydration E86.0 276.51   4. Wound dehiscence, surgical, initial encounter T81.31XA 998.32   5. Postoperative infection, initial encounter T81.4XXA 998.59   6. Coffee ground emesis K92.0 578.0   7. Anemia due to other cause, not classified D64.89 285.8     Patient Active Problem List   Diagnosis   • Disease of thyroid gland   • Cellulitis of right lower extremity   • Cellulitis   • Infected epidermoid cyst   • Altered mental status, unspecified   • Altered mental status   • Immobility   • Foot fracture, right   • Open dislocation of toe   • Open dislocation of phalanx of foot   • Confusion associated with infection   • Wound dehiscence, surgical, initial encounter   • Postoperative infection   • Coffee ground emesis   • Absolute anemia     Past Medical History:   Diagnosis Date   • Arthritis    • Bipolar disorder    • Cellulitis of lower extremity     RIGHT   • Coronary artery disease    • Diabetes mellitus    • Disease of thyroid gland     nodule on thyroid   • DVT (deep venous thrombosis)    • Fracture of right foot    • Hyperlipidemia    • Hypertension    • Pseudogout    • Septic shock 2017    H/O   • Toxic metabolic encephalopathy 2017    H/O     Past Surgical History:   Procedure Laterality Date   • 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   • HARDWARE REMOVAL Right 2017    Procedure: RIGHT EXTERNAL FIXATOR REMOVAL;  Surgeon: Anish Farah DPM;  Location: [facility] OR;  Service:    • JOINT REPLACEMENT     • KNEE SURGERY     • ORIF FOOT FRACTURE Right  7/29/2017    Procedure: open reduction with percutaneous pinning of midfoot dislocation fracture;  Surgeon: Anish Farah DPM;  Location:  [facility] OR;  Service:    • ORIF FOOT FRACTURE Left 3/5/2018    Procedure: OPEN REDUCTION WITH IRRIGATION AND WOUND CLOSURE LEFT FOURTH TOE;  Surgeon: Anish Farah DPM;  Location:  [facility] OR;  Service:    • TOE FUSION Right 8/18/2017    Procedure: RIGHT  MEDIAL COLUMN ARTHRODESIS, RIGHT TARAL METATARSAL ARTHRODESIS;  Surgeon: Anish Farah DPM;  Location:  [facility] OR;  Service:    • TOTAL HIP ARTHROPLASTY            PT ASSESSMENT (last 72 hours)      Physical Therapy Evaluation     Row Name 03/19/18 0845          PT Evaluation Time/Intention    Subjective Information complains of;pain  -     Document Type evaluation  -JW     Patient Effort good  -JW     Symptoms Noted During/After Treatment increased pain  -JW     Comment pt reports increased abdominal pain  -     Row Name 03/19/18 0845          General Information    Patient Profile Reviewed? yes  -JW     Onset of Illness/Injury or Date of Surgery 03/13/18  -JW     Referring Physician Dr Henriquez  -JW     Patient Observations alert;cooperative  -JW     Patient/Family Observations pt supine, agreeable to therapy.    -JW     Prior Level of Function independent:;transfer;bed mobility  -JW     Equipment Currently Used at Home shower chair;wheelchair;walker, rolling;cane, straight  -JW     Pertinent History of Current Functional Problem pt with recent left toe amputation.  pt returned home however began to have increased confusion.  pt admitted to hospital and diagnosed with sepsis  -JW     Existing Precautions/Restrictions fall   weight bearing through left heel only with transfers  -JW     Limitations/Impairments insensate body part   history of neuropathy bilateral LEs  -JW     Risks Reviewed patient:;increased discomfort  -JW     Barriers to Rehab previous functional deficit  -     Row Name 03/19/18 0845           Relationship/Environment    Primary Source of Support/Comfort parent   mother  -JW     Lives With alone  -     Row Name 03/19/18 0845          Resource/Environmental Concerns    Current Living Arrangements home/apartment/condo  -JW     Row Name 03/19/18 0845          Cognitive Assessment/Intervention- PT/OT    Orientation Status (Cognition) oriented x 3  -JW     Follows Commands (Cognition) WFL  -     Safety Deficit (Cognitive) insight into deficits/self awareness   significant cues for weight bearing status  -     Row Name 03/19/18 0845          Safety Issues, Functional Mobility    Safety Issues Affecting Function (Mobility) insight into deficits/self awareness  -     Comment, Safety Issues/Impairments (Mobility) pt requires significant education regarding not bearing weight through left toes  -     Row Name 03/19/18 0845          Mobility Assessment/Treatment    Extremity Weight-bearing Status other (see comments)   weight bearing through left heel with transfers only  -     Row Name 03/19/18 0845          Bed Mobility Assessment/Treatment    Bed Mobility Assessment/Treatment supine-sit  -     Supine-Sit [place] (Bed Mobility) supervision  -     Assistive Device (Bed Mobility) bed rails;head of bed elevated  -     Row Name 03/19/18 0845          Transfer Assessment/Treatment    Transfer Assessment/Treatment stand pivot/stand step transfer;sit-stand transfer  -     Maintains Weight-bearing Status (Transfers) verbal cues to maintain  -     Sit-Stand [place] (Transfers) contact guard;verbal cues  -     Stand-Sit [place] (Transfers) verbal cues;supervision  -     Row Name 03/19/18 0845          Sit-Stand Transfer    Assistive Device (Sit-Stand Transfers) walker, front-wheeled  -JW     Row Name 03/19/18 0845          Stand-Sit Transfer    Assistive Device (Stand-Sit Transfers) walker, front-wheeled  -     Row Name 03/19/18 0845          Stand Pivot/Stand Step Transfer     "Stand Pivot/Stand Step [place] contact guard;verbal cues  -     Assistive Device (Stand Pivot Stand Step Transfer) walker, front-wheeled  -     Row Name 03/19/18 0845          Gait/Stairs Assessment/Training    Comment (Gait/Stairs) pt able to perform stand pivot transfer from bed to chair.  pt able to stand from recliner surface and take 2 steps forward and backward.  due to current weight bearing status and lack of sensation in bilateral LEs, gait not recommended at this time  -     Ernetsina Name 03/19/18 0845          General ROM    GENERAL ROM COMMENTS LE ROM WFL, left ankle not tested  -     Ernestina Name 03/19/18 0845          General Assessment (Manual Muscle Testing)    Comment, General Manual Muscle Testing (MMT) Assessment LE functional bilaterally  -     Ernestina Name 03/19/18 0845          Sensory Assessment/Intervention    Sensory General Assessment light touch sensation deficits identified  -     Additional Documentation --   significant numbness in bilateral LEs due to neuropathy  -     Ernestina Name 03/19/18 0845          Pain Assessment    Additional Documentation Pain Scale: Numbers Pre/Post-Treatment (Group)  -     Ernestina Name 03/19/18 0845          Pain Scale: Numbers Pre/Post-Treatment    Pain Scale: Numbers, Pretreatment 2/10  -     Pain Scale: Numbers, Post-Treatment 8/10  -     Pain Location - Orientation generalized  -     Pain Location abdomen  -     Pain Intervention(s) Repositioned  -     Ernestina Name             Wound 03/16/18 1420 foot incision    Wound - Properties Group Date first assessed: 03/16/18  - Time first assessed: 1420  - Location: foot  - Type: incision  -    Row Name 03/19/18 0845          Plan of Care Review    Plan of Care Reviewed With patient  -     Ernestina Name 03/19/18 0845          Physical Therapy Clinical Impression    Date of Referral to PT 03/18/18  -     Patient/Family Goals Statement (PT Clinical Impression) \"go home\"  -     Criteria for Skilled " Interventions Met (PT Clinical Impression) no problems identified which require skilled intervention;current level of function same as previous level of function  -     Care Plan Review (PT) evaluation/treatment results reviewed;care plan/treatment goals reviewed  -TOM     Row Name 03/19/18 0845          Positioning and Restraints    Pre-Treatment Position in bed  -     Post Treatment Position chair  -     In Chair reclined;call light within reach;encouraged to call for assist;notified nsg  -TOM     Row Name 03/19/18 0845          Discharge Summary, PT Eval    Reason for Discharge (PT Discharge Summary) no further needs identified  -     Row Name 03/19/18 0845          Living Environment    Home Accessibility other (see comments)   ramp to enter home  -       User Key  (r) = Recorded By, (t) = Taken By, (c) = Cosigned By    Initials Name Provider Type     Graciela Kenney, RN Registered Nurse    TOM Acosta, FIFI Physical Therapist          Physical Therapy Education     Title: PT OT SLP Therapies (Done)     Topic: Physical Therapy (Done)     Point: Mobility training (Done)    Learning Progress Summary     Learner Status Readiness Method Response Comment Documented by    Patient Done Acceptance E [facility] 03/19/18 1033          Point: Precautions (Done)    Learning Progress Summary     Learner Status Readiness Method Response Comment Documented by    Patient Done Acceptance E [facility] 03/19/18 1033                      User Key     Initials Effective Dates Name Provider Type Discipline     12/01/15 -  Viviane Acosta PT Physical Therapist PT                PT Recommendation and Plan  Anticipated Discharge Disposition (PT): home with home health care  Therapy Frequency (PT Clinical Impression): evaluation only  Outcome Summary/Treatment Plan (PT)  Anticipated Discharge Disposition (PT): home with home health care  Plan of Care Reviewed With: patient  Outcome Summary: Physical therapy evaluation complete.   Patient requires SBA for bed mobility and CGA for stand pivot transfers from bed to chair.  Patient requires verbal cues to adhere to weight bearing precautions per MD.  Patient requires education on overall safety with mobility and importance of maintaining weight bearing status.  Due to patient's current mobility status and significantly decreased sensation in bilateral LEs, recommend pivot transfers to wheelchair only and use of wheelchair as primary means of mobility.  Discussed with patient, who is in agreement with plan.  No further PT needs identified at this time.          Outcome Measures     Row Name 03/19/18 0845             How much help from another person do you currently need...    Turning from your back to your side while in flat bed without using bedrails? 4  -JW      Moving from lying on back to sitting on the side of a flat bed without bedrails? 4  -JW      Moving to and from a bed to a chair (including a wheelchair)? 3  -JW      Standing up from a chair using your arms (e.g., wheelchair, bedside chair)? 3  -JW      Climbing 3-5 steps with a railing? 1  -JW      To walk in hospital room? 1  -JW      AM-PAC 6 Clicks Score 16  -         Functional Assessment    Outcome Measure Options AM-PAC 6 Clicks Basic Mobility (PT)  -        User Key  (r) = Recorded By, (t) = Taken By, (c) = Cosigned By    Initials Name Provider Type    TOM Acosta PT Physical Therapist           Time Calculation:         PT Charges     Row Name 03/19/18 1035             Time Calculation    Start Time 0845  -      PT Received On 03/19/18  -        User Key  (r) = Recorded By, (t) = Taken By, (c) = Cosigned By    Initials Name Provider Type    TOM Acosta PT Physical Therapist          Therapy Charges for Today     Code Description Service Date Service Provider Modifiers Qty    [id number]  PT EVAL LOW COMPLEXITY 2 3/19/2018 Viviane Acosta, FIFI GP 1          PT G-Codes  Outcome Measure Options: AM-PAC 6  Clicks Basic Mobility (PT)    PT Discharge Summary  Anticipated Discharge Disposition (PT): home with home health care    Viviane Acosta, PT  3/19/2018

## 2018-03-19 NOTE — PLAN OF CARE
Problem: Patient Care Overview  Goal: Plan of Care Review   03/19/18 1320   OTHER   Outcome Summary OT evaluation completed. Pt is able to perform functional transfers with CGA, and he manages adl activity from chair level independently. Pt reports he is at his baseline status. Pt with history of LE neuropathy which impacts his safetty with mobility and for ctivity from standing. Rec performance of ADL activity from chair level,  Rec use of wheelchair as primary mode of mobility due to partial weight bear status of LLE and history of LE neuropathy. Rec home health services upon discharge.

## 2018-03-19 NOTE — THERAPY EVALUATION
Acute Care - Occupational Therapy Initial Evaluation/Discharge   Rachel Barr     Patient Name: Eliseo Price  : 1965  MRN: [id number]  Today's Date: 3/19/2018  Onset of Illness/Injury or Date of Surgery: 18  Date of Referral to OT: 18  Referring Physician: Dr. Henriquez    Admit Date: 3/13/2018       ICD-10-CM ICD-9-CM   1. Confusion associated with infection R41.0 298.9    B99.9 136.9   2. Acute renal injury N17.9 584.9   3. Dehydration E86.0 276.51   4. Wound dehiscence, surgical, initial encounter T81.31XA 998.32   5. Postoperative infection, initial encounter T81.4XXA 998.59   6. Coffee ground emesis K92.0 578.0   7. Anemia due to other cause, not classified D64.89 285.8     Patient Active Problem List   Diagnosis   • Disease of thyroid gland   • Cellulitis of right lower extremity   • Cellulitis   • Infected epidermoid cyst   • Altered mental status, unspecified   • Altered mental status   • Immobility   • Foot fracture, right   • Open dislocation of toe   • Open dislocation of phalanx of foot   • Confusion associated with infection   • Wound dehiscence, surgical, initial encounter   • Postoperative infection   • Coffee ground emesis   • Absolute anemia     Past Medical History:   Diagnosis Date   • Arthritis    • Bipolar disorder    • Cellulitis of lower extremity     RIGHT   • Coronary artery disease    • Diabetes mellitus    • Disease of thyroid gland     nodule on thyroid   • DVT (deep venous thrombosis)    • Fracture of right foot    • Hyperlipidemia    • Hypertension    • Pseudogout    • Septic shock 2017    H/O   • Toxic metabolic encephalopathy 2017    H/O     Past Surgical History:   Procedure Laterality Date   • AMPUTATION DIGIT Left 3/16/2018    Procedure: AMPUTATION LEFT FOURTH TOE;  Surgeon: Anish Farah DPM;  Location: [facility];  Service: Podiatry   • 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   • ENDOSCOPY N/A 3/16/2018    Procedure: ESOPHAGOGASTRODUODENOSCOPY with  biopsies;  Surgeon: Kaylie Mcfarlane MD;  Location: [facility] OR;  Service: Gastroenterology   • HARDWARE REMOVAL Right 8/18/2017    Procedure: RIGHT EXTERNAL FIXATOR REMOVAL;  Surgeon: Anish Farah DPM;  Location: [facility] OR;  Service:    • JOINT REPLACEMENT     • KNEE SURGERY     • ORIF FOOT FRACTURE Right 7/29/2017    Procedure: open reduction with percutaneous pinning of midfoot dislocation fracture;  Surgeon: Anish Farah DPM;  Location: [facility] OR;  Service:    • ORIF FOOT FRACTURE Left 3/5/2018    Procedure: OPEN REDUCTION WITH IRRIGATION AND WOUND CLOSURE LEFT FOURTH TOE;  Surgeon: Anish Farah DPM;  Location: [facility] OR;  Service:    • TOE FUSION Right 8/18/2017    Procedure: RIGHT  MEDIAL COLUMN ARTHRODESIS, RIGHT TARAL METATARSAL ARTHRODESIS;  Surgeon: Anish Farah DPM;  Location: [facility] OR;  Service:    • TOTAL HIP ARTHROPLASTY            OT ASSESSMENT FLOWSHEET (last 72 hours)      Occupational Therapy Evaluation     Row Name 03/19/18 0900                   OT Evaluation Time/Intention    Subjective Information complains of;pain  -SD        Document Type evaluation  -SD        Patient Effort good  -SD           General Information    Patient Profile Reviewed? yes  -SD        Onset of Illness/Injury or Date of Surgery 03/13/18  -SD        Referring Physician Dr. Henriquez  -SD        Patient Observations alert;cooperative  -SD        Patient/Family Observations pt supine, agreeable to therapy.    -SD        Prior Level of Function independent:;ADL's;home management   per pt report  -SD        Equipment Currently Used at Home wheelchair;shower chair;walker, rolling;cane, straight  -SD        Pertinent History of Current Functional Problem pt with recent left toe amputation.  pt returned home however began to have increased confusion.  pt admitted to hospital and diagnosed with sepsis.  Pt reports he was managing daily tasks independently prior to the sepsis.  -SD        Existing  Precautions/Restrictions hip;partial weight bearing   to pt weight bear through left heal only, poor LE sensation  -SD        Limitations/Impairments insensate body part   decreased LE sensation increases falls risk  -SD        Risks Reviewed patient:;LOB;increased discomfort  -SD        Benefits Reviewed patient:;improve function  -SD        Barriers to Rehab previous functional deficit   pt with history of limited mobility due to LE neuropathy  -SD           Relationship/Environment    Primary Source of Support/Comfort parent   mother  -SD        Lives With alone  -SD           Resource/Environmental Concerns    Current Living Arrangements home/apartment/condo  -SD           Cognitive Assessment/Intervention- PT/OT    Follows Commands (Cognition) WFL  -SD        Safety Deficit (Cognitive) insight into deficits/self awareness   need cues for weight bearing  -SD           Safety Issues, Functional Mobility    Safety Issues Affecting Function (Mobility) insight into deficits/self awareness  -SD        Comment, Safety Issues/Impairments (Mobility) Pt appears to minimize the risks associated with his history of neuropathy and current limited weight bear status.  pt will continue to be a falls risk due to his medical issues  -SD           Bed Mobility Assessment/Treatment    Bed Mobility Assessment/Treatment supine-sit  -SD        Supine-Sit [place] (Bed Mobility) supervision  -SD        Assistive Device (Bed Mobility) bed rails;head of bed elevated  -SD           Transfer Assessment/Treatment    Transfer Assessment/Treatment stand pivot/stand step transfer  -SD        Maintains Weight-bearing Status (Transfers) verbal cues to maintain   VC's for partial weight bear status  -SD        Sit-Stand [place] (Transfers) contact guard  -SD        Stand-Sit [place] (Transfers) contact guard  -SD           Sit-Stand Transfer    Assistive Device (Sit-Stand Transfers) walker, front-wheeled  -SD           Stand-Sit  Transfer    Assistive Device (Stand-Sit Transfers) walker, front-wheeled  -SD           Stand Pivot/Stand Step Transfer    Stand Pivot/Stand Step [place] contact guard  -SD        Assistive Device (Stand Pivot Stand Step Transfer) walker, front-wheeled  -SD           Lower Body Dressing Assessment/Training    Lower Body Dressing [place] Level lower body dressing skills;don;socks;independent;set up  -SD        Lower Body Dressing Position edge of bed sitting  -SD           General ROM    GENERAL ROM COMMENTS BUE rom wfl  -SD           General Assessment (Manual Muscle Testing)    Comment, General Manual Muscle Testing (MMT) Assessment BUE strength wfl  -SD           Positioning and Restraints    Pre-Treatment Position in bed  -SD        Post Treatment Position chair  -SD        In Chair reclined;call light within reach;encouraged to call for assist  -SD           Wound 03/16/18 1420 foot incision    Wound - Properties Group Date first assessed: 03/16/18  - Time first assessed: 1420 -JM Location: foot  -JM Type: incision  -JM       Clinical Impression (OT)    Date of Referral to OT 03/16/18  -SD        OT Diagnosis weakness  -SD        Functional Level at Time of Evaluation (OT Eval) Pt is able to perform functional transfers with CGA, and he manages adl activity from chair level independently.   Pt reports he is at his baseline status.  Pt with history of LE neuropathy which impacts his safetty with mobility and activity from standing.  Rec performance of ADL activity from chair level rec use of wheelchair as primary mode of mobility due to LE neuropathy and partial weight bear status of LLE.    Rec home health services upon discharge.    -SD        Criteria for Skilled Therapeutic Interventions Met (OT Eval) current level of function same as previous level of function;no  -SD        Therapy Frequency (OT Eval) evaluation only  -SD        Care Plan Review (OT) evaluation/treatment results reviewed   rec  home health services  -SD        Anticipated Discharge Disposition (OT) home with home health  -SD           Living Environment    Home Accessibility other (see comments)   ramp to enter home  -SD          User Key  (r) = Recorded By, (t) = Taken By, (c) = Cosigned By    Initials Name Effective Dates    SD Vamshi Farooq, OTR 06/22/16 -     GEORGINA Kenney, RN 06/16/16 -            Occupational Therapy Education     Title: PT OT SLP Therapies (Done)     Topic: Occupational Therapy (Resolved)     Point: ADL training (Resolved)     Description: Instruct learner(s) on proper safety adaptation and remediation techniques during self care or transfers.   Instruct in proper use of assistive devices.   Learning Progress Summary     Learner Status Readiness Method Response Comment Documented by    Patient Done Acceptance E [facility] Education regarding OT services and recommendation for use of w/c as primary mode of mobility and performance of daily tasks from chair level to increase safety due to partial weight bear status of LLE and hx of LE neuropathy SD 03/19/18 1317                      User Key     Initials Effective Dates Name Provider Type Discipline    SD 06/22/16 -  Vamshi Farooq, OTR Occupational Therapist OT                  OT Recommendation and Plan  Rehab Outcome Summary/Treatment Plan  Anticipated Discharge Disposition (OT): home with home health  Therapy Frequency (OT Eval): evaluation only  Outcome Summary: OT evaluation completed.  Pt is able to perform functional transfers with CGA, and he manages adl activity from chair level independently.   Pt reports he is at his baseline status.  Pt with history of LE neuropathy which impacts his safetty with mobility and activity from standing.  Rec performance of ADL activity from chair level rec use of wheelchair as primary mode of mobility due to LE neuropathy and partial weight bear status of LLE.    Rec home health services upon discharge.            Outcome  Measures     Row Name 03/19/18 0900 03/19/18 0845          How much help from another person do you currently need...    Turning from your back to your side while in flat bed without using bedrails?  -- 4  -JW     Moving from lying on back to sitting on the side of a flat bed without bedrails?  -- 4  -JW     Moving to and from a bed to a chair (including a wheelchair)?  -- 3  -JW     Standing up from a chair using your arms (e.g., wheelchair, bedside chair)?  -- 3  -JW     Climbing 3-5 steps with a railing?  -- 1  -JW     To walk in hospital room?  -- 1  -JW     AM-PAC 6 Clicks Score  -- 16  -JW        How much help from another is currently needed...    Putting on and taking off regular lower body clothing? 3  -SD  --     Bathing (including washing, rinsing, and drying) 3  -SD  --     Toileting (which includes using toilet bed pan or urinal) 3  -SD  --     Putting on and taking off regular upper body clothing 4  -SD  --     Taking care of personal grooming (such as brushing teeth) 4  -SD  --     Eating meals 4  -SD  --     Score 21  -SD  --        Functional Assessment    Outcome Measure Options AM-PAC 6 Clicks Daily Activity (OT)  -SD AM-PAC 6 Clicks Basic Mobility (PT)  -       User Key  (r) = Recorded By, (t) = Taken By, (c) = Cosigned By    Initials Name Provider Type    REINA Carney Occupational Therapist    TOM Acosta, FIFI Physical Therapist          Time Calculation:   OT Start Time: 0840  OT Stop Time: 0905  OT Time Calculation (min): 25 min    Therapy Charges for Today     Code Description Service Date Service Provider Modifiers Qty    [id number]  OT EVAL MOD COMPLEXITY 2 3/19/2018 Vamshi Farooq OTR GO 1               REINA Landers  3/19/2018

## 2018-03-20 VITALS
HEART RATE: 80 BPM | TEMPERATURE: 98.6 F | DIASTOLIC BLOOD PRESSURE: 85 MMHG | RESPIRATION RATE: 19 BRPM | BODY MASS INDEX: 36.73 KG/M2 | OXYGEN SATURATION: 97 % | WEIGHT: 271.17 LBS | HEIGHT: 72 IN | SYSTOLIC BLOOD PRESSURE: 151 MMHG

## 2018-03-20 LAB
FIBRINOGEN AG PPP IA-MCNC: 471 MG/DL (ref 180–350)
FIBRINOGEN PPP-MCNC: 622 MG/DL (ref 160–420)
GLUCOSE BLDC GLUCOMTR-MCNC: 99 MG/DL (ref 70–130)
LAB AP CASE REPORT: NORMAL
LAB AP CASE REPORT: NORMAL
Lab: NORMAL
Lab: NORMAL
MAGNESIUM SERPL-MCNC: 1.8 MG/DL (ref 1.7–2.5)
PATH REPORT.FINAL DX SPEC: NORMAL
PATH REPORT.FINAL DX SPEC: NORMAL
PF4 HEPARIN CMPLX AB SER-ACNC: 0.19 OD (ref 0–0.4)
REPTILASE TIME: 14.9 SEC (ref 0–21.9)
THROMBIN TIME: 15.5 SEC (ref 0–23)

## 2018-03-20 PROCEDURE — 82962 GLUCOSE BLOOD TEST: CPT

## 2018-03-20 PROCEDURE — 99239 HOSP IP/OBS DSCHRG MGMT >30: CPT | Performed by: INTERNAL MEDICINE

## 2018-03-20 PROCEDURE — 83735 ASSAY OF MAGNESIUM: CPT | Performed by: INTERNAL MEDICINE

## 2018-03-20 RX ORDER — LEVOFLOXACIN 750 MG/1
750 TABLET ORAL EVERY 24 HOURS
Qty: 12 TABLET | Refills: 0 | Status: SHIPPED | OUTPATIENT
Start: 2018-03-20 | End: 2018-04-01

## 2018-03-20 RX ORDER — PANTOPRAZOLE SODIUM 40 MG/1
40 TABLET, DELAYED RELEASE ORAL
Qty: 60 TABLET | Refills: 0 | Status: SHIPPED | OUTPATIENT
Start: 2018-03-20 | End: 2018-04-19

## 2018-03-20 RX ORDER — AMLODIPINE BESYLATE 5 MG/1
5 TABLET ORAL DAILY
Qty: 30 TABLET | Refills: 0 | Status: SHIPPED | OUTPATIENT
Start: 2018-03-21 | End: 2021-07-31 | Stop reason: HOSPADM

## 2018-03-20 RX ORDER — SUCRALFATE 1 G/1
1 TABLET ORAL
Qty: 60 TABLET | Refills: 0 | Status: SHIPPED | OUTPATIENT
Start: 2018-03-20 | End: 2018-04-19

## 2018-03-20 RX ADMIN — FLUOXETINE 40 MG: 20 CAPSULE ORAL at 08:33

## 2018-03-20 RX ADMIN — SUCRALFATE 1 G: 1 TABLET ORAL at 08:35

## 2018-03-20 RX ADMIN — PANTOPRAZOLE SODIUM 40 MG: 40 TABLET, DELAYED RELEASE ORAL at 08:38

## 2018-03-20 RX ADMIN — Medication 1 CAPSULE: at 08:32

## 2018-03-20 RX ADMIN — ACETAMINOPHEN 650 MG: 325 TABLET, FILM COATED ORAL at 02:16

## 2018-03-20 RX ADMIN — METOPROLOL TARTRATE 50 MG: 50 TABLET ORAL at 08:33

## 2018-03-20 RX ADMIN — GABAPENTIN 800 MG: 400 CAPSULE ORAL at 06:05

## 2018-03-20 RX ADMIN — AMLODIPINE BESYLATE 5 MG: 2.5 TABLET ORAL at 08:32

## 2018-03-20 RX ADMIN — LOSARTAN POTASSIUM 100 MG: 50 TABLET, FILM COATED ORAL at 08:32

## 2018-03-20 RX ADMIN — PETROLATUM 1 APPLICATION: 42 OINTMENT TOPICAL at 08:43

## 2018-03-20 RX ADMIN — METFORMIN HYDROCHLORIDE 1000 MG: 500 TABLET ORAL at 08:33

## 2018-03-20 NOTE — DISCHARGE INSTR - APPOINTMENTS
Patient has discharge follow-up on March 27,2018@11:00am.  Killian Scales MD    [facility]   [phone]  [address] [place] [address]    Next Steps: Follow up    Patient has discharge on March 29,2018 @11:15am.  Anish Farah  [facility]   Podiatrist in [place], [place]   Address: [address] [place] [address]   Phone: [phone]    Patient has discharge follow-up on April 19,2018@11:00am.  Kaylie Mcfarlane MD   Address: [address], [place] [address]   Phone: [phone]  Patient has discharge follow-up on March 27,2018@11:00am.  Killian Scales MD    [facility]   [phone]  [phone]  [address] [place] [address]    Next Steps: Follow up    Patient has discharge on March 29,2018 @11:15am.  Anish Farah  [facility]   Podiatrist in [place], [place]   Address: [address] [place] [address]   Phone: [phone]    Patient has discharge follow-up on April 19,2018@11:00am.  Kaylie Mcfarlane MD   Address: [address], [place] [address]   Phone: [phone]

## 2018-03-20 NOTE — DISCHARGE SUMMARY
CHARLES MUJICA  1965  [id number]    Hospitalists Discharge Summary    Date of Admission: 3/13/2018  Date of Discharge:  3/20/2018    Primary Discharge Diagnoses: Enterobacter Sepsis  Gastric Ulcers/GI bleeding    Secondary Discharge Diagnoses:   HTN  Iron deficency Anemia  Type II DM  Bipolar Disorder    PCP  Killian Scales MD    Consults:   Consults     Date and Time Order Name Status Description    3/16/2018 1516 Inpatient Infectious Diseases Consult      3/15/2018 1242 Inpatient Gastroenterology Consult Completed     3/14/2018 1120 Inpatient General Surgery Consult Completed     3/14/2018 0036 Inpatient Podiatry Consult Completed     3/4/2018 1831 Inpatient Podiatry Consult Completed           Family History   Problem Relation Age of Onset   • Arthritis Mother    • Cancer Mother    • Hyperlipidemia Mother    • Arthritis Father    • Cancer Father    • Depression Father    • Hypertension Father    • Mental illness Father    • Cancer Sister    • Arthritis Sister    • Hyperlipidemia Sister    • Cancer Paternal Aunt    • Hypertension Daughter    • Depression Son    • Hyperlipidemia Paternal Grandmother    • Hearing loss Paternal Grandfather    • Hyperlipidemia Paternal Grandfather    • Hypertension Paternal Grandfather      Past Medical History:   Diagnosis Date   • Arthritis    • Bipolar disorder    • Cellulitis of lower extremity     RIGHT   • Coronary artery disease    • Diabetes mellitus    • Disease of thyroid gland     nodule on thyroid   • DVT (deep venous thrombosis)    • Fracture of right foot    • Hyperlipidemia    • Hypertension    • Pseudogout    • Septic shock 07/2017    H/O   • Toxic metabolic encephalopathy 07/2017    H/O     Past Surgical History:   Procedure Laterality Date   • AMPUTATION DIGIT Left 3/16/2018    Procedure: AMPUTATION LEFT FOURTH TOE;  Surgeon: Anish Farah DPM;  Location: [facility];  Service: Podiatry   • 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   • ENDOSCOPY N/A  3/16/2018    Procedure: ESOPHAGOGASTRODUODENOSCOPY with biopsies;  Surgeon: Kaylie Mcfarlane MD;  Location:  [facility];  Service: Gastroenterology   • HARDWARE REMOVAL Right 8/18/2017    Procedure: RIGHT EXTERNAL FIXATOR REMOVAL;  Surgeon: Anish Farah DPM;  Location:  [facility];  Service:    • JOINT REPLACEMENT     • KNEE SURGERY     • ORIF FOOT FRACTURE Right 7/29/2017    Procedure: open reduction with percutaneous pinning of midfoot dislocation fracture;  Surgeon: Anish Farah DPM;  Location:  [facility];  Service:    • ORIF FOOT FRACTURE Left 3/5/2018    Procedure: OPEN REDUCTION WITH IRRIGATION AND WOUND CLOSURE LEFT FOURTH TOE;  Surgeon: Anish Farah DPM;  Location:  [facility];  Service:    • TOE FUSION Right 8/18/2017    Procedure: RIGHT  MEDIAL COLUMN ARTHRODESIS, RIGHT TARAL METATARSAL ARTHRODESIS;  Surgeon: Anish Farah DPM;  Location: [facility] OR;  Service:    • TOTAL HIP ARTHROPLASTY       Social History     Social History   • Marital status:      Spouse name: N/A   • Number of children: N/A   • Years of education: N/A     Occupational History   • Not on file.     Social History Main Topics   • Smoking status: Never Smoker   • Smokeless tobacco: Never Used   • Alcohol use No      Comment: h/o alcohol consumption-quit 10 years 8 months   • Drug use: No   • Sexual activity: Defer     Other Topics Concern   • Not on file     Social History Narrative   • No narrative on file     Allergies   Allergen Reactions   • Lisinopril Cough     COUGHING        Eliseo Price   Home Medication Instructions LUZ MARIA:[id number]    Printed on:03/20/18 2273   Medication Information                      amLODIPine (NORVASC) 2.5 MG tablet  Take 2.5 mg by mouth Daily.             amLODIPine (NORVASC) 5 MG tablet  Take 1 tablet by mouth Daily.             CloNIDine (CATAPRES) 0.1 MG tablet  Take 0.1 mg by mouth.             colchicine 0.6 MG tablet  Take 0.6 mg by mouth.             FLUoxetine (PROzac) 20 MG  capsule  Take 40 mg by mouth Daily.             gabapentin (NEURONTIN) 800 MG tablet  Take 800 mg by mouth 3 (three) times a day.             hydrophor (AQUAPHOR) ointment ointment  Apply 1 application topically Daily.             ibuprofen (ADVIL,MOTRIN) 400 MG tablet  Take 800 mg by mouth.             lactobacillus acidophilus (RISAQUAD) capsule capsule  Take 1 capsule by mouth Daily.             levoFLOXacin (LEVAQUIN) 750 MG tablet  Take 1 tablet by mouth Daily for 12 doses.             LORazepam (ATIVAN) 0.5 MG tablet  Take 0.5 mg by mouth Daily As Needed for Anxiety.             losartan (COZAAR) 100 MG tablet  Take 1 tablet by mouth Daily.             metFORMIN (GLUCOPHAGE) 1000 MG tablet  Take 1,000 mg by mouth 2 (Two) Times a Day With Meals.             metoprolol tartrate (LOPRESSOR) 100 MG tablet  Take 100 mg by mouth.             pantoprazole (PROTONIX) 40 MG EC tablet  Take 1 tablet by mouth 2 (Two) Times a Day Before Meals for 30 days.             primidone (MYSOLINE) 250 MG tablet  Take 250 mg by mouth.             sucralfate (CARAFATE) 1 g tablet  Take 1 tablet by mouth 2 (Two) Times a Day Before Meals for 30 days.             temazepam (RESTORIL) 15 MG capsule  Take 15 mg by mouth At Night As Needed for Sleep.                       CC: Sepsis, GI Bleed, Uncontrolled hypertension    History of Present Illness:  51 yo male admitted with sepsis, GI bleed and Hypertension    Hospital Course  1.  Enterobacter sepsis due to cellulitis of the left foot:  He is s/p 4th toe amputation.  Leokocytosis has resolved.  He is to continue Levaquin for 15  days     2.  Gastric Ulcers/ Upper GI bleed:   Continue BID PPI and Carafate.     3.  Uncontrolled HTN:  -Stable on home med regiemn     4.  Iron Deficiency Anemia:    -likely partly d/t GI bleeding   -received Venofer here     5.  Diabetes Mellitus, Type 2 in Obese:  Bedsides and AM at goal.  Can resume home Metformin.     6.  Bipolar Disorder:  Appears at  baseline.  No change to regimen.         Lab Results (last 72 hours)     Procedure Component Value Units Date/Time    POC Glucose Once [[id number]]  (Normal) Collected:  03/20/18 0717    Specimen:  Blood Updated:  03/20/18 0725     Glucose 99 mg/dL     Narrative:       Meter: [id number] : [id number] Mert Amanda NURSING ASSISTANT    Magnesium [[id number]]  (Normal) Collected:  03/20/18 0407    Specimen:  Blood Updated:  03/20/18 0551     Magnesium 1.8 mg/dL     Fibrinogen Evaluation Profile [[id number]]  (Abnormal) Collected:  03/16/18 0439    Specimen:  Blood Updated:  03/20/18 0336     Fibrinogen 622 (H) mg/dL      Fibrinogen Ag 471 (H) mg/dL      Thrombin Time 15.5 sec      Reptilase Tm 14.9 sec     Narrative:       Performed at:  [address] [place] [address]  [id number]  : Anish Kong MD, Phone:  [phone]    POC Glucose Once [[id number]]  (Normal) Collected:  03/19/18 2024    Specimen:  Blood Updated:  03/19/18 2030     Glucose 107 mg/dL     Narrative:       Meter: [id number] : [id number] Rupali CALL    POC Glucose Once [[id number]]  (Normal) Collected:  03/19/18 1712    Specimen:  Blood Updated:  03/19/18 1724     Glucose 97 mg/dL     Narrative:       Meter: [id number] : [id number] Nancy DICKSON CERT.    Vitamin K1 [[id number]] Collected:  03/15/18 1412    Specimen:  Blood Updated:  03/19/18 1710     Vitamin K 0.27 ng/mL     Narrative:       Performed at:  [address] [place] [address]  [id number]  : Anish Kong MD, Phone:  [phone]    Anaerobic Culture - Wound, Toe, Left [[id number]]  (Normal) Collected:  03/16/18 1415    Specimen:  Wound from Toe, Left Updated:  03/19/18 1249     Culture No anaerobes isolated at 3 days    POC Glucose Once [[id number]]  (Normal) Collected:  03/19/18 1203    Specimen:  Blood Updated:  03/19/18 1229     Glucose 111 mg/dL     Narrative:       Meter: [id number] :  [id number] Nancy Park NA CERT.    POC Glucose Once [[id number]]  (Normal) Collected:  03/19/18 0725    Specimen:  Blood Updated:  03/19/18 0735     Glucose 73 mg/dL     Narrative:       Meter: [id number] : [id number] Nancy Vivian NA CERT.    Potassium [[id number]]  (Normal) Collected:  03/18/18 2239    Specimen:  Blood Updated:  03/18/18 2322     Potassium 3.7 mmol/L     Blood Culture - Blood, [[id number]]  (Normal) Collected:  03/13/18 1720    Specimen:  Blood from Arm, Left Updated:  03/18/18 1731     Blood Culture No growth at 5 days    POC Glucose Once [[id number]]  (Normal) Collected:  03/18/18 1657    Specimen:  Blood Updated:  03/18/18 1714     Glucose 104 mg/dL     Narrative:       Meter: [id number] : [id number] Andres Anand CNA    Blood Culture - Blood, [[id number]]  (Normal) Collected:  03/13/18 1649    Specimen:  Blood from Hand, Left Updated:  03/18/18 1701     Blood Culture No growth at 5 days    POC Glucose Once [[id number]]  (Normal) Collected:  03/18/18 1206    Specimen:  Blood Updated:  03/18/18 1213     Glucose 90 mg/dL     Narrative:       Meter: [id number] : [id number] Andres Anand CNA    Magnesium [[id number]]  (Abnormal) Collected:  03/18/18 0650    Specimen:  Blood Updated:  03/18/18 0714     Magnesium 1.2 (L) mg/dL     Comprehensive Metabolic Panel [[id number]]  (Abnormal) Collected:  03/18/18 0650    Specimen:  Blood Updated:  03/18/18 0714     Glucose 94 mg/dL      BUN 3 (L) mg/dL      Creatinine 0.64 (L) mg/dL      Sodium 142 mmol/L      Potassium 3.2 (L) mmol/L      Chloride 105 mmol/L      CO2 25.2 mmol/L      Calcium 7.6 (L) mg/dL      Total Protein 5.3 (L) g/dL      Albumin 2.70 (L) g/dL      ALT (SGPT) 24 U/L      AST (SGOT) 23 U/L      Alkaline Phosphatase 83 U/L      Total Bilirubin 0.2 mg/dL      eGFR Non African Amer 130 mL/min/1.73      Globulin 2.6 gm/dL      A/G Ratio 1.0 g/dL      BUN/Creatinine Ratio 4.7 (L)     Anion Gap 11.8 mmol/L     Phosphorus [[id number]]   (Normal) Collected:  03/18/18 0650    Specimen:  Blood Updated:  03/18/18 0712     Phosphorus 3.7 mg/dL     aPTT [[id number]]  (Normal) Collected:  03/18/18 0650    Specimen:  Blood Updated:  03/18/18 0703     PTT 37.2 seconds     Narrative:       PTT = The equivalent PTT values for the therapeutic range of heparin levels at 0.1 to 0.7 U/ml are 53 to 110 seconds.    Protime-INR [[id number]]  (Abnormal) Collected:  03/18/18 0650    Specimen:  Blood Updated:  03/18/18 0703     Protime 15.3 (H) Seconds      INR 1.20 (H)    Narrative:       Therapeutic Ranges for INR: 2.0-3.0 (PT 20-30)                              2.5-3.5 (PT 25-34)    CBC & Differential [[id number]] Collected:  03/18/18 0650    Specimen:  Blood Updated:  03/18/18 0659    Narrative:       The following orders were created for panel order CBC & Differential.  Procedure                               Abnormality         Status                     ---------                               -----------         ------                     CBC Auto Differential[[id number]]        Abnormal            Final result                 Please view results for these tests on the individual orders.    CBC Auto Differential [[id number]]  (Abnormal) Collected:  03/18/18 0650    Specimen:  Blood Updated:  03/18/18 0659     WBC 5.33 10*3/mm3      RBC 2.92 (L) 10*6/mm3      Hemoglobin 8.7 (L) g/dL      Hematocrit 26.3 (L) %      MCV 90.1 fL      MCH 29.8 pg      MCHC 33.1 g/dL      RDW 13.6 %      RDW-SD 44.2 fl      MPV 9.2 fL      Platelets 230 10*3/mm3      Neutrophil % 63.5 %      Lymphocyte % 19.1 (L) %      Monocyte % 11.3 (H) %      Eosinophil % 3.4 %      Basophil % 0.8 %      Immature Grans % 1.9 (H) %      Neutrophils, Absolute 3.39 10*3/mm3      Lymphocytes, Absolute 1.02 10*3/mm3      Monocytes, Absolute 0.60 10*3/mm3      Eosinophils, Absolute 0.18 10*3/mm3      Basophils, Absolute 0.04 10*3/mm3      Immature Grans, Absolute 0.10 (H) 10*3/mm3      nRBC 0.0 /100 WBC      POC Glucose Once [[id number]]  (Normal) Collected:  03/17/18 2133    Specimen:  Blood Updated:  03/17/18 2150     Glucose 76 mg/dL     Narrative:       Meter: [id number] : [id number] Dom Gillis CNA    POC Glucose Once [[id number]]  (Normal) Collected:  03/17/18 1634    Specimen:  Blood Updated:  03/17/18 1640     Glucose 85 mg/dL     Narrative:       Meter: [id number] : [id number] Tyrone Canseco RN    POC Glucose Once [[id number]]  (Normal) Collected:  03/17/18 1206    Specimen:  Blood Updated:  03/17/18 1212     Glucose 91 mg/dL     Narrative:       Meter: [id number] : [id number] Andres Anand CNA    Wound Culture - Wound, Toe, Left [[id number]] Collected:  03/16/18 1415    Specimen:  Wound from Toe, Left Updated:  03/17/18 1056     Wound Culture --      Rare Mixed Teri Isolated        Imaging Results (most recent)     Procedure Component Value Units Date/Time    XR Foot 3+ View Right [[id number]] Collected:  03/17/18 0747     Updated:  03/17/18 0751    Narrative:       INDICATION: Patient fell at home one week ago with pain since in the  right foot     TECHNIQUE: 4 views the right foot were obtained     FINDINGS: Postoperative changes of previous fusion are noted across the  first second and third tarsometatarsal joints with bone plates and  screws. Alignment position is anatomic. The most proximal screw at the  bone plate for the first ray has backed out slightly. The most distal  screw for the third ray is fractured. No acute bony fractures are seen.  Fairly extensive degenerative changes are seen along the tarsometatarsal  joint line with moderate degenerative change at the calcaneocuboid  joint. General changes are also seen at the interphalangeal joints and  at the first MTP joint with joint space narrowing and osteophyte  formation. No acute bony abnormalities are noted.       Impression:       Hardware complications as described above. Tarsometatarsal  joint line arthrosis. Osteoarthritis of the toes  as described above. No  acute fractures are seen.     This report was finalized on 3/17/2018 7:49 AM by Dr. Harrison Roberson MD.       US Abdomen Complete [[id number]] Collected:  03/15/18 1406     Updated:  03/15/18 1410    Narrative:       ABDOMINAL ULTRASOUND     INDICATION: Elevated INR. Evaluate for liver pathology history of  chronic splenic vein thrombosis.      PROCEDURE: Grayscale and Doppler imaging of the abdomen      COMPARISON: None      FINDINGS:  Pancreas is obscured by bowel gas and not well seen.  Unremarkable gallbladder. Right kidney measures 14.5 cm and is normal.  Common duct measures 4 mm. Submitted images of the abdominal aorta and  inferior vena cava are unremarkable. Spleen measures 15.7 cm. Left  kidney measures 14.1 cm and is normal. No ascites.        Impression:       Splenomegaly.     Pancreas obscured by bowel gas and not well seen.     Otherwise, normal abdominal ultrasound     This report was finalized on 3/15/2018 2:08 PM by Dr. Valente Hooks MD.       XR Toe 2+ View Left [[id number]] Collected:  03/14/18 1200     Updated:  03/14/18 1205    Narrative:       INDICATION: Redness and swelling of the left fourth toe since 3/5/2018.     COMPARISON: 3/4/2018      FINDINGS: 2 views of the left 4 toe.  No visible fracture. Apparent  interval reduction of the fourth digit dislocation..  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 No aggressive  appearing bone change or soft tissue gas..   No foreign body.       Impression:       Apparent interval reduction of the left digit dislocation.     Otherwise no acute change from 3/4/2018.     This report was finalized on 3/14/2018 12:03 PM by Dr. Valente Hooks MD.       XR Abdomen Flat & Upright [[id number]] Collected:  03/14/18 1159     Updated:  03/14/18 1202    Narrative:       FLAT AND UPRIGHT VIEWS OF THE ABDOMEN     INDICATION: Generalized abdominal pain for the past 4 days      PROCEDURE: Supine and upright views of the abdomen       COMPARISON: None      FINDINGS:  Degraded by body habitus. Slightly elevated right  hemidiaphragm. No free air. Others probably at least a moderate amount  of gas in the stomach. No frankly dilated air-filled loops of small  bowel are seen. There is probably at least a scattered to moderate  amount of stool the colon.       Impression:       Significantly degraded by technique. Nonspecific bowel gas  pattern but no definitive evidence for obstruction     This report was finalized on 3/14/2018 12:00 PM by Dr. Valente Hooks MD.       XR Chest 2 View [[id number]] Collected:  03/14/18 0745     Updated:  03/14/18 0748    Narrative:       INDICATION:  Confusion and altered mental status today     COMPARISON:  3/9/2018     FINDINGS: PA and lateral views of the chest. Stable heart size.  Persistent low lung volumes. No dense consolidation. No pneumothorax or  pleural effusions.       Impression:       Stable low lung volumes. No acute change compared with  3/9/2018     This report was finalized on 3/14/2018 7:46 AM by Dr. Valente Hooks MD.       CT Head Without Contrast [[id number]] Collected:  03/14/18 0719     Updated:  03/14/18 0723    Narrative:       CT HEAD WITHOUT CONTRAST     INDICATION: Confusion today      PROCEDURE: Unenhanced CT head.     9 CTs no known nuclear medicine cardiac perfusion studies last year      COMPARISON: 3/9/2018      FINDINGS:  No acute hemorrhage, abnormal mass effect, extra-axial fluid  collection, or hydrocephalus.        Impression:       No acute intracranial finding.     This report was finalized on 3/14/2018 7:21 AM by Dr. Valente Hooks MD.               PROCEDURES  Procedure(s):  AMPUTATION LEFT FOURTH TOE    Condition on Discharge:  stable    Physical Exam at Discharge  Temp:  [98.6 °F (37 °C)-100.2 °F (37.9 °C)] 98.6 °F (37 °C)  Heart Rate:  [73-82] 80  Resp:  [16-19] 19  BP: (128-169)/(52-88) 151/85    Physical Exam:  Physical Exam   Constitutional: Patient appears well-developed and  well-nourished and in no acute distress   HEENT:   Head: Normocephalic and atraumatic.   Eyes:  Pupils are equal, round, and reactive to light. EOM are intact. Sclera are anicteric and non-injected.  Mouth and Throat: Patient has moist mucous membranes. Oropharynx is clear of any erythema or exudate.     Neck: Neck supple. No JVD present. No thyromegaly present. No lymphadenopathy present.  Cardiovascular: Regular rate, regular rhythm, S1 normal and S2 normal.  Exam reveals no gallop and no friction rub.  No murmur heard.  Pulmonary/Chest: Lungs are clear to auscultation bilaterally. No respiratory distress. No wheezes. No rhonchi. No rales.   Abdominal: Soft. Bowel sounds are normal. No distension and no mass. There is no hepatosplenomegaly. There is no tenderness.   Musculoskeletal: Normal Muscle tone  Extremities: lft foot dressing is c/d/i  Neurological: Patient is alert and oriented to person, place, and time. Cranial nerves II-XII are grossly intact with no focal deficits.  Skin: Skin is warm. No rash noted. Nails show no clubbing.  No cyanosis or erythema.    Discharge Disposition  Home             Discharge Diet:    Dietary Orders     Start     Ordered    03/18/18 0858  Diet Regular; Consistent Carbohydrate  Diet Effective Now     Question Answer Comment   Diet Texture / Consistency Regular    Common Modifiers Consistent Carbohydrate        03/18/18 0857          Activity at Discharge:  As tolerated        Follow-up Appointments  No future appointments.  Additional Instructions for the Follow-ups that You Need to Schedule     Discharge Follow-up with PCP    As directed      Follow Up Details:  one week         Discharge Follow-up with Specified Provider: Dr Farah Podiatry; 2 Weeks    As directed      To:  Dr Farah Podiatry    Follow Up:  2 Weeks         Discharge Follow-up with Specified Provider: Gastroenterology; 2 Weeks    As directed      To:  Gastroenterology    Follow Up:  2 Weeks             No future  appointments.      Test Results Pending at Discharge   Order Current Status    Tissue Pathology Exam In process    Tissue Pathology Exam In process    Anaerobic Culture - Wound, Toe, Left Preliminary result           Freddie Sanchez,   03/20/18  9:45 AM    Time: 32 coordinating care with nursing, pt, case management

## 2018-03-20 NOTE — PLAN OF CARE
Problem: Fall Risk (Adult)  Goal: Absence of Fall  Outcome: Ongoing (interventions implemented as appropriate)   03/20/18 0500   Fall Risk (Adult)   Absence of Fall making progress toward outcome

## 2018-03-20 NOTE — PROGRESS NOTES
Subjective  POD #2. S/P L 4th toe amputation.  The patient reports that he is doing well regarding his left foot surgical site. He has no complaints of post-op pain at this time.     Review of Systems  Constitutional: No constitutional symptoms of infection  HEENT: No headache or ENT complaints  Respiratory: No SOB or breathing difficulty  Cardiovascular: No chest pain/pressure, no palpitations, L lower extremity swelling, no calf pain or intermittent claudication  Gastrointestinal: Right upper quadrant pain, recent GI bleed  Musculoskeletal: Right midfoot pain, left 4th toe amputation, left partial fifth ray amputation  Dermatologic: Intact left 4th toe incision, multiple scabs on bilateral lower extremity and knees  Neurologic: Pedal numbness associated with diabetic neuropathy  Psychiatric: Bipolar     Objective  General: The patient is examined while he is resting in a semi reclined position in the hospital bed. He appears alert, pleasant and generally comfortable. He is visiting with his mother and daughter.  Vitals: PW=628/97. HR=88. RR=20. Temp=97.9  Pedal Examination: The left foot dressing is clean, dry and intact. It is removed and the operative site visually assessed. The 4th toe amputation incision is well approximated with external sutures. There is no conrad-incisional erythema. No drainage or hemorrhage is noted. The dorsal foot cellulitis has completely resolved.     Lab Results as of 3/18/2018 1646    Ref. Range 3/18/2018 06:50   Glucose Latest Ref Range: 65 - 99 mg/dL 94   Sodium Latest Ref Range: 136 - 145 mmol/L 142   Potassium Latest Ref Range: 3.5 - 5.2 mmol/L 3.2 (L)   CO2 Latest Ref Range: 22.0 - 29.0 mmol/L 25.2   Chloride Latest Ref Range: 98 - 107 mmol/L 105   Anion Gap Latest Units: mmol/L 11.8   Creatinine Latest Ref Range: 0.76 - 1.27 mg/dL 0.64 (L)   BUN Latest Ref Range: 6 - 20 mg/dL 3 (L)   BUN/Creatinine Ratio Latest Ref Range: 7.0 - 25.0  4.7 (L)   Calcium Latest Ref Range: 8.6 -  10.5 mg/dL 7.6 (L)   eGFR Non African Amer Latest Ref Range: >60 mL/min/1.73 130   Alkaline Phosphatase Latest Ref Range: 40 - 129 U/L 83   Total Protein Latest Ref Range: 6.0 - 8.5 g/dL 5.3 (L)   ALT (SGPT) Latest Ref Range: 5 - 41 U/L 24   AST (SGOT) Latest Ref Range: 5 - 40 U/L 23   Total Bilirubin Latest Ref Range: 0.2 - 1.2 mg/dL 0.2   Albumin Latest Ref Range: 3.50 - 5.20 g/dL 2.70 (L)   Globulin Latest Units: gm/dL 2.6   A/G Ratio Latest Units: g/dL 1.0   Phosphorus Latest Ref Range: 2.7 - 4.5 mg/dL 3.7   Magnesium Latest Ref Range: 1.7 - 2.5 mg/dL 1.2 (L)   Protime Latest Ref Range: 12.1 - 15.0 Seconds 15.3 (H)   INR Latest Ref Range: 0.90 - 1.10  1.20 (H)   aPTT Latest Ref Range: 24.3 - 38.1 seconds 37.2   WBC Latest Ref Range: 4.80 - 10.80 10*3/mm3 5.33   RBC Latest Ref Range: 4.70 - 6.10 10*6/mm3 2.92 (L)   Hemoglobin Latest Ref Range: 14.0 - 18.0 g/dL 8.7 (L)   Hematocrit Latest Ref Range: 42.0 - 52.0 % 26.3 (L)   RDW Latest Ref Range: 11.5 - 14.5 % 13.6   MCV Latest Ref Range: 80.0 - 94.0 fL 90.1   MCH Latest Ref Range: 27.0 - 31.0 pg 29.8   MCHC Latest Ref Range: 31.0 - 37.0 g/dL 33.1   MPV Latest Ref Range: 7.4 - 10.4 fL 9.2   Platelets Latest Ref Range: 140 - 500 10*3/mm3 230   RDW-SD Latest Ref Range: 37.0 - 54.0 fl 44.2   Neutrophil % Latest Ref Range: 45.0 - 70.0 % 63.5   Lymphocyte % Latest Ref Range: 20.0 - 45.0 % 19.1 (L)   Monocyte % Latest Ref Range: 3.0 - 8.0 % 11.3 (H)   Eosinophil % Latest Ref Range: 0.0 - 4.0 % 3.4   Basophil % Latest Ref Range: 0.0 - 2.0 % 0.8   Immature Grans % Latest Ref Range: 0.0 - 0.5 % 1.9 (H)   Neutrophils, Absolute Latest Ref Range: 1.50 - 8.30 10*3/mm3 3.39   Lymphocytes, Absolute Latest Ref Range: 0.60 - 4.80 10*3/mm3 1.02   Monocytes, Absolute Latest Ref Range: 0.00 - 1.00 10*3/mm3 0.60   Eosinophils, Absolute Latest Ref Range: 0.10 - 0.30 10*3/mm3 0.18   Basophils, Absolute Latest Ref Range: 0.00 - 0.20 10*3/mm3 0.04   Immature Grans, Absolute Latest Ref  Range: 0.00 - 0.03 10*3/mm3 0.10 (H)   nRBC Latest Ref Range: 0.0 - 0.0 /100 WBC 0.0      Assessment  POD #2. S/P L 4th toe amputation.     Plan  1. The patient is examined and the laboratory results are reviewed.  2. The dressing is removed and the operative site is inspected. The incision is painted with Betadine solution followed by application of a gauze, Kerlix and ACE bandage dressing.  3. The patient is advised to stay nonweightbearing on the operative foot so that no incision break down occurs.  4. Will follow up tomorrow.

## 2018-03-20 NOTE — PLAN OF CARE
Problem: Patient Care Overview  Goal: Plan of Care Review  Outcome: Ongoing (interventions implemented as appropriate)   03/20/18 1002   Coping/Psychosocial   Plan of Care Reviewed With patient   Plan of Care Review   Progress improving     Goal: Individualization and Mutuality  Outcome: Ongoing (interventions implemented as appropriate)    Goal: Discharge Needs Assessment  Outcome: Ongoing (interventions implemented as appropriate)    Goal: Interprofessional Rounds/Family Conf  Outcome: Ongoing (interventions implemented as appropriate)      Problem: Fall Risk (Adult)  Goal: Absence of Fall  Outcome: Ongoing (interventions implemented as appropriate)      Problem: Skin Injury Risk (Adult)  Goal: Skin Health and Integrity  Outcome: Ongoing (interventions implemented as appropriate)      Problem: Sepsis/Septic Shock (Adult)  Goal: Signs and Symptoms of Listed Potential Problems Will be Absent, Minimized or Managed (Sepsis/Septic Shock)  Outcome: Ongoing (interventions implemented as appropriate)      Problem: Confusion, Acute (Adult)  Goal: Cognitive/Functional Impairments Minimized  Outcome: Ongoing (interventions implemented as appropriate)    Goal: Safety  Outcome: Ongoing (interventions implemented as appropriate)      Problem: Nausea/Vomiting (Adult)  Goal: Symptom Relief  Outcome: Ongoing (interventions implemented as appropriate)    Goal: Adequate Hydration  Outcome: Ongoing (interventions implemented as appropriate)

## 2018-03-20 NOTE — NURSING NOTE
Continued Stay Note  EVONNE Epstein     Patient Name: Eliseo Price  MRN: [id number]  Today's Date: 3/20/2018    Admit Date: 3/13/2018          Discharge Plan     Row Name 03/20/18 1010       Plan    Plan plan home with home health    Patient/Family in Agreement with Plan yes    Plan Comments Spoke with patient at bedside, IMM updated. Patient states he has all needed DME at home, he has used [facility] in [facility] past, but requests [email or web address] to follow at [place]. Spoke with [email or web address], referral given. He states his mother is coming to transport him home, but he does not know when she will arrive. Will follow through [facility].              Discharge Codes    No documentation.       Expected Discharge Date and Time     Expected Discharge Date Expected Discharge Time    Mar 20, 2018             Vance Johnson RN

## 2018-03-21 LAB — BACTERIA SPEC ANAEROBE CULT: NORMAL

## 2018-03-21 NOTE — NURSING NOTE
Case Management Discharge Note    Final Note: patient discharged home with home health services.    Destination     No service coordination in this encounter.      Durable Medical Equipment     No service coordination in this encounter.      Dialysis/Infusion     No service coordination in this encounter.      Home Medical Care - Selection Complete     Service Request Status Selected Specialties Address Phone Number Fax Number    [facility] [place] [facility] [address] [place] [phone]      Social Care     No service coordination in this encounter.             Final Discharge Disposition Code: 06 - home with home health care

## 2018-03-28 ENCOUNTER — DOCUMENTATION (OUTPATIENT)
Dept: OCCUPATIONAL THERAPY | Facility: HOSPITAL | Age: 53
End: 2018-03-28

## 2018-04-19 ENCOUNTER — OFFICE VISIT (OUTPATIENT)
Dept: GASTROENTEROLOGY | Facility: CLINIC | Age: 53
End: 2018-04-19

## 2018-04-19 VITALS
BODY MASS INDEX: 35.38 KG/M2 | SYSTOLIC BLOOD PRESSURE: 124 MMHG | HEIGHT: 72 IN | DIASTOLIC BLOOD PRESSURE: 80 MMHG | WEIGHT: 261.2 LBS

## 2018-04-19 DIAGNOSIS — K27.9 PEPTIC ULCER DISEASE: Primary | ICD-10-CM

## 2018-04-19 PROCEDURE — 99213 OFFICE O/P EST LOW 20 MIN: CPT | Performed by: INTERNAL MEDICINE

## 2018-04-19 NOTE — PROGRESS NOTES
PATIENT INFORMATION  Eliseo Price       - 1965    CHIEF COMPLAINT  Chief Complaint   Patient presents with   • Abdominal Pain     hospital follow up       HISTORY OF PRESENT ILLNESS  Abdominal Pain   Associated symptoms include arthralgias and headaches.     He is here in follow up from the hospital and is doing well. Pain has decreased. He is on ppi bid. He is off nsaids.  Weight has been stable.  He is planning on getting cls done. pcp sent him paperwork for Dr. Henry. No previous cls.  Last hemoglobin was 8.7. He has labs scheduled with pcp next. BM are brown. No melena or hematemesis.  Weight has been down about 10 pounds since discharge from hospital.    REVIEW OF SYSTEMS  Review of Systems   Gastrointestinal: Positive for abdominal pain.   Musculoskeletal: Positive for arthralgias, back pain, gait problem, joint swelling and neck pain.   Neurological: Positive for dizziness, tremors, light-headedness and headaches.   Psychiatric/Behavioral: Positive for agitation and confusion. The patient is nervous/anxious.    All other systems reviewed and are negative.        ACTIVE PROBLEMS  Patient Active Problem List    Diagnosis   • Coffee ground emesis [K92.0]     Overview Note:     Added automatically from request for surgery [id number]     • Absolute anemia [D64.9]     Overview Note:     Added automatically from request for surgery [id number]     • Confusion associated with infection [R41.0, B99.9]   • Wound dehiscence, surgical, initial encounter [T81.31XA]     Overview Note:     Added automatically from request for surgery      • Postoperative infection [T81.4XXA]     Overview Note:     Added automatically from request for surgery      • Open dislocation of phalanx of foot [S93.106A, S91.109A]   • Open dislocation of toe [S93.106A, S91.109A]   • Foot fracture, right [S92.901A]   • Immobility [Z74.09]   • Altered mental status [R41.82]   • Altered mental status, unspecified [R41.82]   •  Infected epidermoid cyst [L72.0, L08.9]   • Cellulitis of right lower extremity [L03.115]   • Cellulitis [L03.90]   • Disease of thyroid gland [E07.9]     Overview Note:     nodule on thyroid           PAST MEDICAL HISTORY  Past Medical History:   Diagnosis Date   • Arthritis    • Bipolar disorder    • Cellulitis of lower extremity     RIGHT   • Coronary artery disease    • Diabetes mellitus    • Disease of thyroid gland     nodule on thyroid   • DVT (deep venous thrombosis)    • Fracture of right foot    • Hyperlipidemia    • Hypertension    • Pseudogout    • Septic shock 07/2017    H/O   • Toxic metabolic encephalopathy 07/2017    H/O         SURGICAL HISTORY  Past Surgical History:   Procedure Laterality Date   • AMPUTATION DIGIT Left 3/16/2018    Procedure: AMPUTATION LEFT FOURTH TOE;  Surgeon: Anish Farah DPM;  Location: [facility] OR;  Service: Podiatry   • 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   • ENDOSCOPY N/A 3/16/2018    Procedure: ESOPHAGOGASTRODUODENOSCOPY with biopsies;  Surgeon: Kaylie Mcfarlane MD;  Location: [facility] OR;  Service: Gastroenterology   • HARDWARE REMOVAL Right 8/18/2017    Procedure: RIGHT EXTERNAL FIXATOR REMOVAL;  Surgeon: Anish Farah DPM;  Location: [facility] OR;  Service:    • JOINT REPLACEMENT     • KNEE SURGERY     • ORIF FOOT FRACTURE Right 7/29/2017    Procedure: open reduction with percutaneous pinning of midfoot dislocation fracture;  Surgeon: Anish Farah DPM;  Location: [facility] OR;  Service:    • ORIF FOOT FRACTURE Left 3/5/2018    Procedure: OPEN REDUCTION WITH IRRIGATION AND WOUND CLOSURE LEFT FOURTH TOE;  Surgeon: Anish Farah DPM;  Location: [facility] OR;  Service:    • TOE FUSION Right 8/18/2017    Procedure: RIGHT  MEDIAL COLUMN ARTHRODESIS, RIGHT TARAL METATARSAL ARTHRODESIS;  Surgeon: Anish Farah DPM;  Location: [facility] OR;  Service:    • TOTAL HIP ARTHROPLASTY           FAMILY HISTORY  Family History   Problem Relation Age of Onset   • Arthritis  Mother    • Cancer Mother    • Hyperlipidemia Mother    • Arthritis Father    • Cancer Father    • Depression Father    • Hypertension Father    • Mental illness Father    • Cancer Sister    • Arthritis Sister    • Hyperlipidemia Sister    • Cancer Paternal Aunt    • Hypertension Daughter    • Depression Son    • Hyperlipidemia Paternal Grandmother    • Hearing loss Paternal Grandfather    • Hyperlipidemia Paternal Grandfather    • Hypertension Paternal Grandfather          SOCIAL HISTORY  Social History     Occupational History   • Not on file.     Social History Main Topics   • Smoking status: Never Smoker   • Smokeless tobacco: Never Used   • Alcohol use No      Comment: h/o alcohol consumption-quit 10 years 8 months   • Drug use: No   • Sexual activity: Defer         CURRENT MEDICATIONS    Current Outpatient Prescriptions:   •  amLODIPine (NORVASC) 2.5 MG tablet, Take 2.5 mg by mouth Daily., Disp: , Rfl:   •  amLODIPine (NORVASC) 5 MG tablet, Take 1 tablet by mouth Daily., Disp: 30 tablet, Rfl: 0  •  CloNIDine (CATAPRES) 0.1 MG tablet, Take 0.1 mg by mouth., Disp: , Rfl:   •  colchicine 0.6 MG tablet, Take 0.6 mg by mouth., Disp: , Rfl:   •  FLUoxetine (PROzac) 20 MG capsule, Take 40 mg by mouth Daily., Disp: , Rfl:   •  gabapentin (NEURONTIN) 800 MG tablet, Take 800 mg by mouth 3 (three) times a day., Disp: , Rfl:   •  hydrophor (AQUAPHOR) ointment ointment, Apply 1 application topically Daily., Disp: , Rfl:   •  ibuprofen (ADVIL,MOTRIN) 400 MG tablet, Take 800 mg by mouth., Disp: , Rfl:   •  lactobacillus acidophilus (RISAQUAD) capsule capsule, Take 1 capsule by mouth Daily., Disp: 30 capsule, Rfl: 0  •  LORazepam (ATIVAN) 0.5 MG tablet, Take 0.5 mg by mouth Daily As Needed for Anxiety., Disp: , Rfl:   •  losartan (COZAAR) 100 MG tablet, Take 1 tablet by mouth Daily., Disp: 30 tablet, Rfl: 0  •  metFORMIN (GLUCOPHAGE) 1000 MG tablet, Take 1,000 mg by mouth 2 (Two) Times a Day With Meals., Disp: , Rfl:   •   "metoprolol tartrate (LOPRESSOR) 100 MG tablet, Take 100 mg by mouth., Disp: , Rfl:   •  pantoprazole (PROTONIX) 40 MG EC tablet, Take 1 tablet by mouth 2 (Two) Times a Day Before Meals for 30 days., Disp: 60 tablet, Rfl: 0  •  primidone (MYSOLINE) 250 MG tablet, Take 250 mg by mouth., Disp: , Rfl:   •  sucralfate (CARAFATE) 1 g tablet, Take 1 tablet by mouth 2 (Two) Times a Day Before Meals for 30 days., Disp: 60 tablet, Rfl: 0  •  temazepam (RESTORIL) 15 MG capsule, Take 15 mg by mouth At Night As Needed for Sleep., Disp: , Rfl:     ALLERGIES  Lisinopril    VITALS  Vitals:    04/19/18 1046   BP: 124/80   Weight: 118 kg (261 lb 3.2 oz)   Height: 182.9 cm (72.01\")       LAST RESULTS   Admission on 03/13/2018, Discharged on 03/20/2018   No results displayed because visit has over 200 results.        No results found.    PHYSICAL EXAM  Physical Exam   Constitutional: He is oriented to person, place, and time. He appears well-developed and well-nourished. No distress.   HENT:   Head: Normocephalic and atraumatic.   Eyes: EOM are normal. Pupils are equal, round, and reactive to light.   Neck: Neck supple. No tracheal deviation present.   Cardiovascular: Normal rate, regular rhythm, normal heart sounds and intact distal pulses.  Exam reveals no gallop and no friction rub.    No murmur heard.  Pulmonary/Chest: Effort normal and breath sounds normal. No respiratory distress. He has no wheezes. He has no rales. He exhibits no tenderness.   Abdominal: Soft. Bowel sounds are normal. He exhibits no distension. There is no tenderness. There is no rebound and no guarding.   Musculoskeletal: He exhibits no edema.   Lymphadenopathy:     He has no cervical adenopathy.   Neurological: He is alert and oriented to person, place, and time.   Skin: Skin is warm. He is not diaphoretic. No erythema.   Psychiatric: He has a normal mood and affect. His behavior is normal. Judgment and thought content normal.   Nursing note and vitals " reviewed.      ASSESSMENT  Diagnoses and all orders for this visit:    Peptic ulcer disease          PLAN  No Follow-up on file.    Continue on ppi avoid nsaids.  Has cls planned with Dr. Henry encouraged to pursue this.

## 2018-05-23 ENCOUNTER — TELEPHONE (OUTPATIENT)
Dept: GASTROENTEROLOGY | Facility: CLINIC | Age: 53
End: 2018-05-23

## 2018-05-23 DIAGNOSIS — Z12.11 COLON CANCER SCREENING: Primary | ICD-10-CM

## 2018-05-25 ENCOUNTER — HOSPITAL ENCOUNTER (OUTPATIENT)
Facility: HOSPITAL | Age: 53
Setting detail: HOSPITAL OUTPATIENT SURGERY
End: 2018-05-25
Attending: INTERNAL MEDICINE | Admitting: INTERNAL MEDICINE

## 2018-05-25 PROBLEM — Z12.11 COLON CANCER SCREENING: Status: ACTIVE | Noted: 2018-05-25

## 2018-05-25 RX ORDER — SODIUM, POTASSIUM,MAG SULFATES 17.5-3.13G
1 SOLUTION, RECONSTITUTED, ORAL ORAL EVERY 12 HOURS
Qty: 2 BOTTLE | Refills: 0 | Status: ON HOLD | OUTPATIENT
Start: 2018-05-25 | End: 2018-06-10

## 2018-05-31 ENCOUNTER — TELEPHONE (OUTPATIENT)
Dept: SURGERY | Facility: CLINIC | Age: 53
End: 2018-05-31

## 2018-05-31 RX ORDER — SODIUM CHLORIDE 9 MG/ML
40 INJECTION, SOLUTION INTRAVENOUS AS NEEDED
Status: CANCELLED | OUTPATIENT
Start: 2018-06-01

## 2018-05-31 RX ORDER — SODIUM CHLORIDE, SODIUM LACTATE, POTASSIUM CHLORIDE, CALCIUM CHLORIDE 600; 310; 30; 20 MG/100ML; MG/100ML; MG/100ML; MG/100ML
9 INJECTION, SOLUTION INTRAVENOUS CONTINUOUS
Status: CANCELLED | OUTPATIENT
Start: 2018-06-01

## 2018-05-31 RX ORDER — SODIUM CHLORIDE 0.9 % (FLUSH) 0.9 %
1-10 SYRINGE (ML) INJECTION AS NEEDED
Status: CANCELLED | OUTPATIENT
Start: 2018-06-01

## 2018-05-31 RX ORDER — LIDOCAINE HYDROCHLORIDE 10 MG/ML
0.5 INJECTION, SOLUTION EPIDURAL; INFILTRATION; INTRACAUDAL; PERINEURAL ONCE AS NEEDED
Status: CANCELLED | OUTPATIENT
Start: 2018-06-01

## 2018-06-09 ENCOUNTER — HOSPITAL ENCOUNTER (INPATIENT)
Facility: HOSPITAL | Age: 53
LOS: 5 days | Discharge: HOME HEALTH | End: 2018-06-14
Attending: EMERGENCY MEDICINE | Admitting: HOSPITALIST

## 2018-06-09 ENCOUNTER — APPOINTMENT (OUTPATIENT)
Dept: GENERAL RADIOLOGY | Facility: HOSPITAL | Age: 53
End: 2018-06-09

## 2018-06-09 ENCOUNTER — APPOINTMENT (OUTPATIENT)
Dept: CT IMAGING | Facility: HOSPITAL | Age: 53
End: 2018-06-09

## 2018-06-09 DIAGNOSIS — L03.115 CELLULITIS OF RIGHT FOOT: ICD-10-CM

## 2018-06-09 DIAGNOSIS — L03.116 CELLULITIS OF LEFT FOOT: Primary | ICD-10-CM

## 2018-06-09 DIAGNOSIS — L03.115 CELLULITIS OF RIGHT LOWER EXTREMITY: ICD-10-CM

## 2018-06-09 DIAGNOSIS — R44.1 HALLUCINATIONS, VISUAL: ICD-10-CM

## 2018-06-09 DIAGNOSIS — Z74.09 IMMOBILITY: ICD-10-CM

## 2018-06-09 DIAGNOSIS — T07.XXXA MULTIPLE CONTUSIONS: ICD-10-CM

## 2018-06-09 DIAGNOSIS — R80.9 UNCONTROLLED TYPE 2 DIABETES MELLITUS WITH MICROALBUMINURIA, UNSPECIFIED LONG TERM INSULIN USE STATUS: ICD-10-CM

## 2018-06-09 DIAGNOSIS — E11.29 UNCONTROLLED TYPE 2 DIABETES MELLITUS WITH MICROALBUMINURIA, UNSPECIFIED LONG TERM INSULIN USE STATUS: ICD-10-CM

## 2018-06-09 DIAGNOSIS — R40.4 TRANSIENT ALTERATION OF AWARENESS: ICD-10-CM

## 2018-06-09 DIAGNOSIS — R41.0 CONFUSION ASSOCIATED WITH INFECTION: ICD-10-CM

## 2018-06-09 DIAGNOSIS — T07.XXXA ABRASIONS OF MULTIPLE SITES: ICD-10-CM

## 2018-06-09 DIAGNOSIS — B99.9 CONFUSION ASSOCIATED WITH INFECTION: ICD-10-CM

## 2018-06-09 DIAGNOSIS — S91.115D: ICD-10-CM

## 2018-06-09 DIAGNOSIS — S91.115A: ICD-10-CM

## 2018-06-09 DIAGNOSIS — E11.65 UNCONTROLLED TYPE 2 DIABETES MELLITUS WITH MICROALBUMINURIA, UNSPECIFIED LONG TERM INSULIN USE STATUS: ICD-10-CM

## 2018-06-09 LAB
ACETONE BLD QL: NEGATIVE
ALBUMIN SERPL-MCNC: 4.2 G/DL (ref 3.5–5.2)
ALBUMIN/GLOB SERPL: 1.1 G/DL
ALP SERPL-CCNC: 132 U/L (ref 40–129)
ALT SERPL W P-5'-P-CCNC: 24 U/L (ref 5–41)
ANION GAP SERPL CALCULATED.3IONS-SCNC: 11.9 MMOL/L
AST SERPL-CCNC: 40 U/L (ref 5–40)
BACTERIA UR QL AUTO: ABNORMAL /HPF
BASOPHILS # BLD AUTO: 0.04 10*3/MM3 (ref 0–0.2)
BASOPHILS NFR BLD AUTO: 0.4 % (ref 0–2)
BILIRUB SERPL-MCNC: 0.4 MG/DL (ref 0.2–1.2)
BILIRUB UR QL STRIP: NEGATIVE
BUN BLD-MCNC: 19 MG/DL (ref 6–20)
BUN/CREAT SERPL: 25.3 (ref 7–25)
CALCIUM SPEC-SCNC: 9.5 MG/DL (ref 8.6–10.5)
CHLORIDE SERPL-SCNC: 93 MMOL/L (ref 98–107)
CLARITY UR: CLEAR
CO2 SERPL-SCNC: 29.1 MMOL/L (ref 22–29)
COLOR UR: YELLOW
CREAT BLD-MCNC: 0.75 MG/DL (ref 0.76–1.27)
D-LACTATE SERPL-SCNC: 1.8 MMOL/L (ref 0.5–2)
DEPRECATED RDW RBC AUTO: 43.8 FL (ref 37–54)
EOSINOPHIL # BLD AUTO: 0.2 10*3/MM3 (ref 0.1–0.3)
EOSINOPHIL NFR BLD AUTO: 1.8 % (ref 0–4)
ERYTHROCYTE [DISTWIDTH] IN BLOOD BY AUTOMATED COUNT: 14.2 % (ref 11.5–14.5)
GFR SERPL CREATININE-BSD FRML MDRD: 109 ML/MIN/1.73
GLOBULIN UR ELPH-MCNC: 3.7 GM/DL
GLUCOSE BLD-MCNC: 82 MG/DL (ref 65–99)
GLUCOSE UR STRIP-MCNC: NEGATIVE MG/DL
HCT VFR BLD AUTO: 41.7 % (ref 42–52)
HGB BLD-MCNC: 13.1 G/DL (ref 14–18)
HGB UR QL STRIP.AUTO: ABNORMAL
HYALINE CASTS UR QL AUTO: ABNORMAL /LPF
IMM GRANULOCYTES # BLD: 0.04 10*3/MM3 (ref 0–0.03)
IMM GRANULOCYTES NFR BLD: 0.4 % (ref 0–0.5)
KETONES UR QL STRIP: ABNORMAL
LEUKOCYTE ESTERASE UR QL STRIP.AUTO: NEGATIVE
LYMPHOCYTES # BLD AUTO: 1.66 10*3/MM3 (ref 0.6–4.8)
LYMPHOCYTES NFR BLD AUTO: 15 % (ref 20–45)
MCH RBC QN AUTO: 27.6 PG (ref 27–31)
MCHC RBC AUTO-ENTMCNC: 31.4 G/DL (ref 31–37)
MCV RBC AUTO: 87.8 FL (ref 80–94)
MONOCYTES # BLD AUTO: 1.19 10*3/MM3 (ref 0–1)
MONOCYTES NFR BLD AUTO: 10.7 % (ref 3–8)
NEUTROPHILS # BLD AUTO: 7.94 10*3/MM3 (ref 1.5–8.3)
NEUTROPHILS NFR BLD AUTO: 71.7 % (ref 45–70)
NITRITE UR QL STRIP: NEGATIVE
NRBC BLD MANUAL-RTO: 0 /100 WBC (ref 0–0)
PH UR STRIP.AUTO: 8.5 [PH] (ref 4.5–8)
PLATELET # BLD AUTO: 340 10*3/MM3 (ref 140–500)
PMV BLD AUTO: 9.4 FL (ref 7.4–10.4)
POTASSIUM BLD-SCNC: 5.1 MMOL/L (ref 3.5–5.2)
PROT SERPL-MCNC: 7.9 G/DL (ref 6–8.5)
PROT UR QL STRIP: ABNORMAL
RBC # BLD AUTO: 4.75 10*6/MM3 (ref 4.7–6.1)
RBC # UR: ABNORMAL /HPF
REF LAB TEST METHOD: ABNORMAL
SODIUM BLD-SCNC: 134 MMOL/L (ref 136–145)
SP GR UR STRIP: 1.01 (ref 1–1.03)
SQUAMOUS #/AREA URNS HPF: ABNORMAL /HPF
UROBILINOGEN UR QL STRIP: ABNORMAL
WBC NRBC COR # BLD: 11.07 10*3/MM3 (ref 4.8–10.8)
WBC UR QL AUTO: ABNORMAL /HPF

## 2018-06-09 PROCEDURE — 93010 ELECTROCARDIOGRAM REPORT: CPT | Performed by: INTERNAL MEDICINE

## 2018-06-09 PROCEDURE — 87040 BLOOD CULTURE FOR BACTERIA: CPT | Performed by: EMERGENCY MEDICINE

## 2018-06-09 PROCEDURE — 84484 ASSAY OF TROPONIN QUANT: CPT | Performed by: INTERNAL MEDICINE

## 2018-06-09 PROCEDURE — 82009 KETONE BODYS QUAL: CPT | Performed by: INTERNAL MEDICINE

## 2018-06-09 PROCEDURE — 85025 COMPLETE CBC W/AUTO DIFF WBC: CPT | Performed by: EMERGENCY MEDICINE

## 2018-06-09 PROCEDURE — 25010000002 PIPERACILLIN-TAZOBACTAM: Performed by: EMERGENCY MEDICINE

## 2018-06-09 PROCEDURE — G0378 HOSPITAL OBSERVATION PER HR: HCPCS

## 2018-06-09 PROCEDURE — P9612 CATHETERIZE FOR URINE SPEC: HCPCS

## 2018-06-09 PROCEDURE — 80053 COMPREHEN METABOLIC PANEL: CPT | Performed by: EMERGENCY MEDICINE

## 2018-06-09 PROCEDURE — 99284 EMERGENCY DEPT VISIT MOD MDM: CPT

## 2018-06-09 PROCEDURE — 99219 PR INITIAL OBSERVATION CARE/DAY 50 MINUTES: CPT | Performed by: INTERNAL MEDICINE

## 2018-06-09 PROCEDURE — 99285 EMERGENCY DEPT VISIT HI MDM: CPT | Performed by: EMERGENCY MEDICINE

## 2018-06-09 PROCEDURE — 73630 X-RAY EXAM OF FOOT: CPT

## 2018-06-09 PROCEDURE — 83605 ASSAY OF LACTIC ACID: CPT | Performed by: EMERGENCY MEDICINE

## 2018-06-09 PROCEDURE — 81001 URINALYSIS AUTO W/SCOPE: CPT | Performed by: EMERGENCY MEDICINE

## 2018-06-09 PROCEDURE — 70450 CT HEAD/BRAIN W/O DYE: CPT

## 2018-06-09 PROCEDURE — 36556 INSERT NON-TUNNEL CV CATH: CPT | Performed by: EMERGENCY MEDICINE

## 2018-06-09 PROCEDURE — 93005 ELECTROCARDIOGRAM TRACING: CPT | Performed by: INTERNAL MEDICINE

## 2018-06-09 PROCEDURE — 71045 X-RAY EXAM CHEST 1 VIEW: CPT

## 2018-06-09 RX ORDER — DEXTROSE MONOHYDRATE 25 G/50ML
25 INJECTION, SOLUTION INTRAVENOUS
Status: DISCONTINUED | OUTPATIENT
Start: 2018-06-09 | End: 2018-06-14 | Stop reason: HOSPADM

## 2018-06-09 RX ORDER — DOCUSATE SODIUM 100 MG/1
100 CAPSULE, LIQUID FILLED ORAL 2 TIMES DAILY
Status: DISCONTINUED | OUTPATIENT
Start: 2018-06-09 | End: 2018-06-09

## 2018-06-09 RX ORDER — SODIUM CHLORIDE 0.9 % (FLUSH) 0.9 %
10 SYRINGE (ML) INJECTION AS NEEDED
Status: DISCONTINUED | OUTPATIENT
Start: 2018-06-09 | End: 2018-06-14 | Stop reason: HOSPADM

## 2018-06-09 RX ORDER — DEXAMETHASONE SODIUM PHOSPHATE 4 MG/ML
4 INJECTION, SOLUTION INTRA-ARTICULAR; INTRALESIONAL; INTRAMUSCULAR; INTRAVENOUS; SOFT TISSUE ONCE
Status: DISCONTINUED | OUTPATIENT
Start: 2018-06-09 | End: 2018-06-09

## 2018-06-09 RX ORDER — NITROGLYCERIN 0.4 MG/1
0.4 TABLET SUBLINGUAL
Status: DISCONTINUED | OUTPATIENT
Start: 2018-06-09 | End: 2018-06-14 | Stop reason: HOSPADM

## 2018-06-09 RX ORDER — SODIUM CHLORIDE 450 MG/100ML
100 INJECTION, SOLUTION INTRAVENOUS CONTINUOUS
Status: DISCONTINUED | OUTPATIENT
Start: 2018-06-09 | End: 2018-06-10

## 2018-06-09 RX ORDER — SODIUM CHLORIDE 0.9 % (FLUSH) 0.9 %
1-10 SYRINGE (ML) INJECTION AS NEEDED
Status: DISCONTINUED | OUTPATIENT
Start: 2018-06-09 | End: 2018-06-14 | Stop reason: HOSPADM

## 2018-06-09 RX ORDER — PIPERACILLIN SODIUM, TAZOBACTAM SODIUM 4; .5 G/20ML; G/20ML
INJECTION, POWDER, LYOPHILIZED, FOR SOLUTION INTRAVENOUS
Status: DISPENSED
Start: 2018-06-09 | End: 2018-06-10

## 2018-06-09 RX ORDER — DIPHENOXYLATE HYDROCHLORIDE AND ATROPINE SULFATE 2.5; .025 MG/1; MG/1
1 TABLET ORAL ONCE
Status: COMPLETED | OUTPATIENT
Start: 2018-06-09 | End: 2018-06-09

## 2018-06-09 RX ORDER — HYDROCODONE BITARTRATE AND ACETAMINOPHEN 7.5; 325 MG/1; MG/1
1 TABLET ORAL EVERY 6 HOURS PRN
Status: DISCONTINUED | OUTPATIENT
Start: 2018-06-09 | End: 2018-06-14 | Stop reason: HOSPADM

## 2018-06-09 RX ORDER — PANTOPRAZOLE SODIUM 20 MG/1
40 TABLET, DELAYED RELEASE ORAL
Status: DISCONTINUED | OUTPATIENT
Start: 2018-06-10 | End: 2018-06-13

## 2018-06-09 RX ORDER — SODIUM CHLORIDE 450 MG/100ML
INJECTION, SOLUTION INTRAVENOUS
Status: COMPLETED
Start: 2018-06-09 | End: 2018-06-10

## 2018-06-09 RX ORDER — KETOROLAC TROMETHAMINE 30 MG/ML
60 INJECTION, SOLUTION INTRAMUSCULAR; INTRAVENOUS ONCE
Status: DISCONTINUED | OUTPATIENT
Start: 2018-06-09 | End: 2018-06-09

## 2018-06-09 RX ORDER — ACETAMINOPHEN 325 MG/1
650 TABLET ORAL EVERY 6 HOURS PRN
Status: DISCONTINUED | OUTPATIENT
Start: 2018-06-09 | End: 2018-06-14 | Stop reason: HOSPADM

## 2018-06-09 RX ORDER — NICOTINE POLACRILEX 4 MG
15 LOZENGE BUCCAL
Status: DISCONTINUED | OUTPATIENT
Start: 2018-06-09 | End: 2018-06-14 | Stop reason: HOSPADM

## 2018-06-09 RX ORDER — ONDANSETRON 4 MG/1
4 TABLET, ORALLY DISINTEGRATING ORAL ONCE
Status: CANCELLED | OUTPATIENT
Start: 2018-06-09 | End: 2018-06-09

## 2018-06-09 RX ORDER — CYCLOBENZAPRINE HCL 10 MG
10 TABLET ORAL ONCE
Status: DISCONTINUED | OUTPATIENT
Start: 2018-06-09 | End: 2018-06-09

## 2018-06-09 RX ORDER — SODIUM CHLORIDE 9 MG/ML
40 INJECTION, SOLUTION INTRAVENOUS AS NEEDED
Status: DISCONTINUED | OUTPATIENT
Start: 2018-06-09 | End: 2018-06-14 | Stop reason: HOSPADM

## 2018-06-09 RX ADMIN — DIPHENOXYLATE HYDROCHLORIDE AND ATROPINE SULFATE 1 TABLET: 2.5; .025 TABLET ORAL at 17:20

## 2018-06-09 RX ADMIN — TAZOBACTAM SODIUM AND PIPERACILLIN SODIUM 4.5 G: .5; 4 INJECTION, POWDER, LYOPHILIZED, FOR SOLUTION INTRAVENOUS at 21:55

## 2018-06-10 ENCOUNTER — APPOINTMENT (OUTPATIENT)
Dept: GENERAL RADIOLOGY | Facility: HOSPITAL | Age: 53
End: 2018-06-10

## 2018-06-10 LAB
ALBUMIN SERPL-MCNC: 3.4 G/DL (ref 3.5–5.2)
ALBUMIN/GLOB SERPL: 1.1 G/DL
ALP SERPL-CCNC: 101 U/L (ref 40–129)
ALT SERPL W P-5'-P-CCNC: 21 U/L (ref 5–41)
ANION GAP SERPL CALCULATED.3IONS-SCNC: 12.8 MMOL/L
AST SERPL-CCNC: 33 U/L (ref 5–40)
BASOPHILS # BLD AUTO: 0.03 10*3/MM3 (ref 0–0.2)
BASOPHILS NFR BLD AUTO: 0.4 % (ref 0–2)
BILIRUB SERPL-MCNC: 0.4 MG/DL (ref 0.2–1.2)
BUN BLD-MCNC: 10 MG/DL (ref 6–20)
BUN/CREAT SERPL: 17.9 (ref 7–25)
CALCIUM SPEC-SCNC: 8.3 MG/DL (ref 8.6–10.5)
CHLORIDE SERPL-SCNC: 94 MMOL/L (ref 98–107)
CO2 SERPL-SCNC: 23.2 MMOL/L (ref 22–29)
CREAT BLD-MCNC: 0.56 MG/DL (ref 0.76–1.27)
D-LACTATE SERPL-SCNC: 0.9 MMOL/L (ref 0.5–2)
DEPRECATED RDW RBC AUTO: 45.6 FL (ref 37–54)
EOSINOPHIL # BLD AUTO: 0.21 10*3/MM3 (ref 0.1–0.3)
EOSINOPHIL NFR BLD AUTO: 2.5 % (ref 0–4)
ERYTHROCYTE [DISTWIDTH] IN BLOOD BY AUTOMATED COUNT: 14.7 % (ref 11.5–14.5)
GFR SERPL CREATININE-BSD FRML MDRD: >150 ML/MIN/1.73
GLOBULIN UR ELPH-MCNC: 3 GM/DL
GLUCOSE BLD-MCNC: 108 MG/DL (ref 65–99)
GLUCOSE BLDC GLUCOMTR-MCNC: 106 MG/DL (ref 70–130)
GLUCOSE BLDC GLUCOMTR-MCNC: 109 MG/DL (ref 70–130)
GLUCOSE BLDC GLUCOMTR-MCNC: 127 MG/DL (ref 70–130)
GLUCOSE BLDC GLUCOMTR-MCNC: 130 MG/DL (ref 70–130)
HCT VFR BLD AUTO: 34.3 % (ref 42–52)
HGB BLD-MCNC: 10.9 G/DL (ref 14–18)
IMM GRANULOCYTES # BLD: 0.03 10*3/MM3 (ref 0–0.03)
IMM GRANULOCYTES NFR BLD: 0.4 % (ref 0–0.5)
IRON 24H UR-MRATE: 50 MCG/DL (ref 59–158)
IRON SATN MFR SERPL: 14 %
LYMPHOCYTES # BLD AUTO: 1.65 10*3/MM3 (ref 0.6–4.8)
LYMPHOCYTES NFR BLD AUTO: 19.6 % (ref 20–45)
MCH RBC QN AUTO: 27.9 PG (ref 27–31)
MCHC RBC AUTO-ENTMCNC: 31.8 G/DL (ref 31–37)
MCV RBC AUTO: 87.9 FL (ref 80–94)
MONOCYTES # BLD AUTO: 0.94 10*3/MM3 (ref 0–1)
MONOCYTES NFR BLD AUTO: 11.2 % (ref 3–8)
NEUTROPHILS # BLD AUTO: 5.56 10*3/MM3 (ref 1.5–8.3)
NEUTROPHILS NFR BLD AUTO: 65.9 % (ref 45–70)
NRBC BLD MANUAL-RTO: 0 /100 WBC (ref 0–0)
PLATELET # BLD AUTO: 281 10*3/MM3 (ref 140–500)
PMV BLD AUTO: 9.3 FL (ref 7.4–10.4)
POTASSIUM BLD-SCNC: 4.2 MMOL/L (ref 3.5–5.2)
PROCALCITONIN SERPL-MCNC: 0.06 NG/ML (ref 0.1–0.25)
PROT SERPL-MCNC: 6.4 G/DL (ref 6–8.5)
RBC # BLD AUTO: 3.9 10*6/MM3 (ref 4.7–6.1)
SODIUM BLD-SCNC: 130 MMOL/L (ref 136–145)
TIBC SERPL-MCNC: 358 MCG/DL (ref 261–498)
TROPONIN T SERPL-MCNC: <0.01 NG/ML (ref 0–0.03)
UIBC SERPL-MCNC: 308 MCG/DL (ref 112–346)
WBC NRBC COR # BLD: 8.42 10*3/MM3 (ref 4.8–10.8)

## 2018-06-10 PROCEDURE — 25010000002 PIPERACILLIN SOD-TAZOBACTAM PER 1 G

## 2018-06-10 PROCEDURE — 71110 X-RAY EXAM RIBS BIL 3 VIEWS: CPT

## 2018-06-10 PROCEDURE — 83605 ASSAY OF LACTIC ACID: CPT | Performed by: INTERNAL MEDICINE

## 2018-06-10 PROCEDURE — 25010000002 ENOXAPARIN PER 10 MG: Performed by: INTERNAL MEDICINE

## 2018-06-10 PROCEDURE — 85025 COMPLETE CBC W/AUTO DIFF WBC: CPT | Performed by: INTERNAL MEDICINE

## 2018-06-10 PROCEDURE — 82962 GLUCOSE BLOOD TEST: CPT

## 2018-06-10 PROCEDURE — 25010000002 PIPERACILLIN-TAZOBACTAM: Performed by: INTERNAL MEDICINE

## 2018-06-10 PROCEDURE — 80053 COMPREHEN METABOLIC PANEL: CPT | Performed by: INTERNAL MEDICINE

## 2018-06-10 PROCEDURE — 25010000002 PIPERACILLIN SOD-TAZOBACTAM PER 1 G: Performed by: INTERNAL MEDICINE

## 2018-06-10 PROCEDURE — 99232 SBSQ HOSP IP/OBS MODERATE 35: CPT | Performed by: HOSPITALIST

## 2018-06-10 PROCEDURE — 83540 ASSAY OF IRON: CPT | Performed by: HOSPITALIST

## 2018-06-10 PROCEDURE — 84484 ASSAY OF TROPONIN QUANT: CPT | Performed by: INTERNAL MEDICINE

## 2018-06-10 PROCEDURE — 83550 IRON BINDING TEST: CPT | Performed by: HOSPITALIST

## 2018-06-10 PROCEDURE — 84145 PROCALCITONIN (PCT): CPT | Performed by: INTERNAL MEDICINE

## 2018-06-10 RX ORDER — FLUOXETINE HYDROCHLORIDE 20 MG/1
40 CAPSULE ORAL DAILY
Status: DISCONTINUED | OUTPATIENT
Start: 2018-06-10 | End: 2018-06-14 | Stop reason: HOSPADM

## 2018-06-10 RX ORDER — PANTOPRAZOLE SODIUM 40 MG/1
40 TABLET, DELAYED RELEASE ORAL DAILY
COMMUNITY
End: 2020-10-12

## 2018-06-10 RX ORDER — PRIMIDONE 250 MG/1
250 TABLET ORAL NIGHTLY
Status: DISCONTINUED | OUTPATIENT
Start: 2018-06-10 | End: 2018-06-10

## 2018-06-10 RX ORDER — GABAPENTIN 400 MG/1
800 CAPSULE ORAL EVERY 8 HOURS SCHEDULED
Status: DISCONTINUED | OUTPATIENT
Start: 2018-06-10 | End: 2018-06-10

## 2018-06-10 RX ORDER — AMLODIPINE BESYLATE 5 MG/1
5 TABLET ORAL DAILY
Status: DISCONTINUED | OUTPATIENT
Start: 2018-06-10 | End: 2018-06-14 | Stop reason: HOSPADM

## 2018-06-10 RX ORDER — PIPERACILLIN SODIUM, TAZOBACTAM SODIUM 4; .5 G/20ML; G/20ML
INJECTION, POWDER, LYOPHILIZED, FOR SOLUTION INTRAVENOUS
Status: COMPLETED
Start: 2018-06-10 | End: 2018-06-10

## 2018-06-10 RX ORDER — CLONIDINE HYDROCHLORIDE 0.1 MG/1
0.1 TABLET ORAL EVERY 12 HOURS SCHEDULED
Status: DISCONTINUED | OUTPATIENT
Start: 2018-06-10 | End: 2018-06-14 | Stop reason: HOSPADM

## 2018-06-10 RX ORDER — SODIUM CHLORIDE 9 MG/ML
INJECTION, SOLUTION INTRAVENOUS
Status: DISPENSED
Start: 2018-06-10 | End: 2018-06-10

## 2018-06-10 RX ORDER — PROPRANOLOL HYDROCHLORIDE 40 MG/1
40 TABLET ORAL 2 TIMES DAILY
COMMUNITY

## 2018-06-10 RX ORDER — METFORMIN HYDROCHLORIDE 500 MG/1
500 TABLET, EXTENDED RELEASE ORAL 2 TIMES DAILY WITH MEALS
Status: DISCONTINUED | OUTPATIENT
Start: 2018-06-10 | End: 2018-06-14 | Stop reason: HOSPADM

## 2018-06-10 RX ORDER — FERROUS SULFATE TAB EC 324 MG (65 MG FE EQUIVALENT) 324 (65 FE) MG
324 TABLET DELAYED RESPONSE ORAL
Status: DISCONTINUED | OUTPATIENT
Start: 2018-06-11 | End: 2018-06-14 | Stop reason: HOSPADM

## 2018-06-10 RX ORDER — CLONAZEPAM 0.5 MG/1
0.5 TABLET ORAL 2 TIMES DAILY PRN
Status: DISCONTINUED | OUTPATIENT
Start: 2018-06-10 | End: 2018-06-14 | Stop reason: HOSPADM

## 2018-06-10 RX ORDER — COLCHICINE 0.6 MG/1
0.6 TABLET ORAL DAILY
Status: DISCONTINUED | OUTPATIENT
Start: 2018-06-10 | End: 2018-06-10

## 2018-06-10 RX ORDER — METOPROLOL TARTRATE 50 MG/1
100 TABLET, FILM COATED ORAL DAILY
Status: DISCONTINUED | OUTPATIENT
Start: 2018-06-10 | End: 2018-06-10

## 2018-06-10 RX ORDER — TEMAZEPAM 15 MG/1
15 CAPSULE ORAL NIGHTLY PRN
Status: DISCONTINUED | OUTPATIENT
Start: 2018-06-10 | End: 2018-06-10

## 2018-06-10 RX ORDER — METFORMIN HYDROCHLORIDE 500 MG/1
500 TABLET, EXTENDED RELEASE ORAL 2 TIMES DAILY WITH MEALS
COMMUNITY
End: 2020-10-12

## 2018-06-10 RX ORDER — LOSARTAN POTASSIUM 50 MG/1
100 TABLET ORAL
Status: DISCONTINUED | OUTPATIENT
Start: 2018-06-10 | End: 2018-06-14 | Stop reason: HOSPADM

## 2018-06-10 RX ORDER — CLONAZEPAM 0.5 MG/1
0.5 TABLET ORAL 3 TIMES DAILY PRN
COMMUNITY
End: 2020-11-09 | Stop reason: HOSPADM

## 2018-06-10 RX ORDER — FERROUS SULFATE 325(65) MG
325 TABLET ORAL
Status: ON HOLD | COMMUNITY
End: 2018-11-23

## 2018-06-10 RX ORDER — PROPRANOLOL HYDROCHLORIDE 40 MG/1
40 TABLET ORAL EVERY 12 HOURS SCHEDULED
Status: DISCONTINUED | OUTPATIENT
Start: 2018-06-10 | End: 2018-06-14 | Stop reason: HOSPADM

## 2018-06-10 RX ORDER — LORAZEPAM 0.5 MG/1
0.5 TABLET ORAL DAILY PRN
Status: DISCONTINUED | OUTPATIENT
Start: 2018-06-10 | End: 2018-06-10

## 2018-06-10 RX ORDER — PRIMIDONE 250 MG/1
250 TABLET ORAL 3 TIMES DAILY
Status: DISCONTINUED | OUTPATIENT
Start: 2018-06-10 | End: 2018-06-14 | Stop reason: HOSPADM

## 2018-06-10 RX ORDER — TEMAZEPAM 15 MG/1
30 CAPSULE ORAL NIGHTLY PRN
Status: DISCONTINUED | OUTPATIENT
Start: 2018-06-10 | End: 2018-06-14 | Stop reason: HOSPADM

## 2018-06-10 RX ORDER — HYDROCODONE BITARTRATE AND ACETAMINOPHEN 5; 325 MG/1; MG/1
1 TABLET ORAL EVERY 6 HOURS PRN
Status: ON HOLD | COMMUNITY
End: 2018-11-23

## 2018-06-10 RX ADMIN — COLCHICINE 0.6 MG: 0.6 TABLET, FILM COATED ORAL at 09:18

## 2018-06-10 RX ADMIN — ENOXAPARIN SODIUM 40 MG: 100 INJECTION SUBCUTANEOUS at 21:05

## 2018-06-10 RX ADMIN — SODIUM CHLORIDE 4.5 G: 900 INJECTION, SOLUTION INTRAVENOUS at 21:15

## 2018-06-10 RX ADMIN — SODIUM CHLORIDE 100 ML/HR: 4.5 INJECTION, SOLUTION INTRAVENOUS at 00:01

## 2018-06-10 RX ADMIN — FLUOXETINE 40 MG: 20 CAPSULE ORAL at 09:18

## 2018-06-10 RX ADMIN — AMLODIPINE BESYLATE 5 MG: 5 TABLET ORAL at 09:18

## 2018-06-10 RX ADMIN — LOSARTAN POTASSIUM 100 MG: 50 TABLET, FILM COATED ORAL at 09:18

## 2018-06-10 RX ADMIN — CLONAZEPAM 0.5 MG: 0.5 TABLET ORAL at 15:54

## 2018-06-10 RX ADMIN — HYDROCODONE BITARTRATE AND ACETAMINOPHEN 1 TABLET: 7.5; 325 TABLET ORAL at 15:23

## 2018-06-10 RX ADMIN — PRIMIDONE 250 MG: 250 TABLET ORAL at 03:02

## 2018-06-10 RX ADMIN — SODIUM CHLORIDE 100 ML/HR: 450 INJECTION, SOLUTION INTRAVENOUS at 00:01

## 2018-06-10 RX ADMIN — PIPERACILLIN SODIUM,TAZOBACTAM SODIUM 4.5 G: 4; .5 INJECTION, POWDER, FOR SOLUTION INTRAVENOUS at 05:58

## 2018-06-10 RX ADMIN — PANTOPRAZOLE SODIUM 40 MG: 40 TABLET, DELAYED RELEASE ORAL at 05:57

## 2018-06-10 RX ADMIN — METFORMIN HYDROCHLORIDE 500 MG: 500 TABLET, EXTENDED RELEASE ORAL at 18:04

## 2018-06-10 RX ADMIN — GABAPENTIN 800 MG: 400 CAPSULE ORAL at 05:57

## 2018-06-10 RX ADMIN — ENOXAPARIN SODIUM 40 MG: 100 INJECTION SUBCUTANEOUS at 00:00

## 2018-06-10 RX ADMIN — METOPROLOL TARTRATE 100 MG: 50 TABLET, FILM COATED ORAL at 09:19

## 2018-06-10 RX ADMIN — PROPRANOLOL HYDROCHLORIDE 40 MG: 40 TABLET ORAL at 21:04

## 2018-06-10 RX ADMIN — METFORMIN HYDROCHLORIDE 1000 MG: 500 TABLET ORAL at 09:18

## 2018-06-10 RX ADMIN — TEMAZEPAM 30 MG: 15 CAPSULE ORAL at 21:15

## 2018-06-10 RX ADMIN — SODIUM CHLORIDE 4.5 G: 900 INJECTION, SOLUTION INTRAVENOUS at 12:35

## 2018-06-10 RX ADMIN — HYDROCODONE BITARTRATE AND ACETAMINOPHEN 1 TABLET: 7.5; 325 TABLET ORAL at 01:20

## 2018-06-10 RX ADMIN — PRIMIDONE 250 MG: 250 TABLET ORAL at 21:05

## 2018-06-10 RX ADMIN — PRIMIDONE 250 MG: 250 TABLET ORAL at 18:04

## 2018-06-10 RX ADMIN — CLONIDINE HYDROCHLORIDE 0.1 MG: 0.1 TABLET ORAL at 21:04

## 2018-06-11 LAB
ANION GAP SERPL CALCULATED.3IONS-SCNC: 11.1 MMOL/L
BASOPHILS # BLD AUTO: 0.05 10*3/MM3 (ref 0–0.2)
BASOPHILS NFR BLD AUTO: 0.6 % (ref 0–2)
BUN BLD-MCNC: 8 MG/DL (ref 6–20)
BUN/CREAT SERPL: 12.9 (ref 7–25)
CALCIUM SPEC-SCNC: 8.4 MG/DL (ref 8.6–10.5)
CHLORIDE SERPL-SCNC: 99 MMOL/L (ref 98–107)
CO2 SERPL-SCNC: 23.9 MMOL/L (ref 22–29)
CREAT BLD-MCNC: 0.62 MG/DL (ref 0.76–1.27)
DEPRECATED RDW RBC AUTO: 46.2 FL (ref 37–54)
EOSINOPHIL # BLD AUTO: 0.24 10*3/MM3 (ref 0.1–0.3)
EOSINOPHIL NFR BLD AUTO: 3.1 % (ref 0–4)
ERYTHROCYTE [DISTWIDTH] IN BLOOD BY AUTOMATED COUNT: 14.6 % (ref 11.5–14.5)
GFR SERPL CREATININE-BSD FRML MDRD: 135 ML/MIN/1.73
GLUCOSE BLD-MCNC: 125 MG/DL (ref 65–99)
GLUCOSE BLDC GLUCOMTR-MCNC: 106 MG/DL (ref 70–130)
GLUCOSE BLDC GLUCOMTR-MCNC: 113 MG/DL (ref 70–130)
GLUCOSE BLDC GLUCOMTR-MCNC: 116 MG/DL (ref 70–130)
GLUCOSE BLDC GLUCOMTR-MCNC: 134 MG/DL (ref 70–130)
HCT VFR BLD AUTO: 32.2 % (ref 42–52)
HGB BLD-MCNC: 10.4 G/DL (ref 14–18)
IMM GRANULOCYTES # BLD: 0.06 10*3/MM3 (ref 0–0.03)
IMM GRANULOCYTES NFR BLD: 0.8 % (ref 0–0.5)
LYMPHOCYTES # BLD AUTO: 1.74 10*3/MM3 (ref 0.6–4.8)
LYMPHOCYTES NFR BLD AUTO: 22.2 % (ref 20–45)
MCH RBC QN AUTO: 28.6 PG (ref 27–31)
MCHC RBC AUTO-ENTMCNC: 32.3 G/DL (ref 31–37)
MCV RBC AUTO: 88.5 FL (ref 80–94)
MONOCYTES # BLD AUTO: 1.03 10*3/MM3 (ref 0–1)
MONOCYTES NFR BLD AUTO: 13.2 % (ref 3–8)
NEUTROPHILS # BLD AUTO: 4.71 10*3/MM3 (ref 1.5–8.3)
NEUTROPHILS NFR BLD AUTO: 60.1 % (ref 45–70)
NRBC BLD MANUAL-RTO: 0 /100 WBC (ref 0–0)
PLATELET # BLD AUTO: 267 10*3/MM3 (ref 140–500)
PMV BLD AUTO: 9.4 FL (ref 7.4–10.4)
POTASSIUM BLD-SCNC: 4.5 MMOL/L (ref 3.5–5.2)
RBC # BLD AUTO: 3.64 10*6/MM3 (ref 4.7–6.1)
SODIUM BLD-SCNC: 134 MMOL/L (ref 136–145)
WBC NRBC COR # BLD: 7.83 10*3/MM3 (ref 4.8–10.8)

## 2018-06-11 PROCEDURE — 82962 GLUCOSE BLOOD TEST: CPT

## 2018-06-11 PROCEDURE — 99232 SBSQ HOSP IP/OBS MODERATE 35: CPT | Performed by: INTERNAL MEDICINE

## 2018-06-11 PROCEDURE — 94799 UNLISTED PULMONARY SVC/PX: CPT

## 2018-06-11 PROCEDURE — 85025 COMPLETE CBC W/AUTO DIFF WBC: CPT | Performed by: HOSPITALIST

## 2018-06-11 PROCEDURE — 80048 BASIC METABOLIC PNL TOTAL CA: CPT | Performed by: HOSPITALIST

## 2018-06-11 PROCEDURE — 25010000002 ENOXAPARIN PER 10 MG: Performed by: INTERNAL MEDICINE

## 2018-06-11 PROCEDURE — 25010000002 PIPERACILLIN SOD-TAZOBACTAM PER 1 G: Performed by: INTERNAL MEDICINE

## 2018-06-11 RX ADMIN — PRIMIDONE 250 MG: 250 TABLET ORAL at 09:07

## 2018-06-11 RX ADMIN — HYDROCODONE BITARTRATE AND ACETAMINOPHEN 1 TABLET: 7.5; 325 TABLET ORAL at 17:18

## 2018-06-11 RX ADMIN — HYDROCODONE BITARTRATE AND ACETAMINOPHEN 1 TABLET: 7.5; 325 TABLET ORAL at 00:20

## 2018-06-11 RX ADMIN — PRIMIDONE 250 MG: 250 TABLET ORAL at 17:19

## 2018-06-11 RX ADMIN — PRIMIDONE 250 MG: 250 TABLET ORAL at 20:47

## 2018-06-11 RX ADMIN — AMLODIPINE BESYLATE 5 MG: 5 TABLET ORAL at 09:07

## 2018-06-11 RX ADMIN — SODIUM CHLORIDE 4.5 G: 900 INJECTION, SOLUTION INTRAVENOUS at 05:08

## 2018-06-11 RX ADMIN — METFORMIN HYDROCHLORIDE 500 MG: 500 TABLET, EXTENDED RELEASE ORAL at 17:19

## 2018-06-11 RX ADMIN — FERROUS SULFATE TAB EC 324 MG (65 MG FE EQUIVALENT) 324 MG: 324 (65 FE) TABLET DELAYED RESPONSE at 09:05

## 2018-06-11 RX ADMIN — PROPRANOLOL HYDROCHLORIDE 40 MG: 40 TABLET ORAL at 09:06

## 2018-06-11 RX ADMIN — CLONIDINE HYDROCHLORIDE 0.1 MG: 0.1 TABLET ORAL at 20:49

## 2018-06-11 RX ADMIN — SODIUM CHLORIDE 4.5 G: 900 INJECTION, SOLUTION INTRAVENOUS at 20:46

## 2018-06-11 RX ADMIN — METFORMIN HYDROCHLORIDE 500 MG: 500 TABLET, EXTENDED RELEASE ORAL at 09:07

## 2018-06-11 RX ADMIN — TEMAZEPAM 30 MG: 15 CAPSULE ORAL at 23:51

## 2018-06-11 RX ADMIN — FLUOXETINE 40 MG: 20 CAPSULE ORAL at 09:05

## 2018-06-11 RX ADMIN — LOSARTAN POTASSIUM 100 MG: 50 TABLET, FILM COATED ORAL at 09:05

## 2018-06-11 RX ADMIN — ENOXAPARIN SODIUM 40 MG: 100 INJECTION SUBCUTANEOUS at 20:49

## 2018-06-11 RX ADMIN — CLONAZEPAM 0.5 MG: 0.5 TABLET ORAL at 09:15

## 2018-06-11 RX ADMIN — SODIUM CHLORIDE 4.5 G: 900 INJECTION, SOLUTION INTRAVENOUS at 13:36

## 2018-06-11 RX ADMIN — CLONIDINE HYDROCHLORIDE 0.1 MG: 0.1 TABLET ORAL at 09:05

## 2018-06-11 RX ADMIN — PROPRANOLOL HYDROCHLORIDE 40 MG: 40 TABLET ORAL at 20:48

## 2018-06-11 RX ADMIN — PANTOPRAZOLE SODIUM 40 MG: 40 TABLET, DELAYED RELEASE ORAL at 05:08

## 2018-06-12 LAB
ANION GAP SERPL CALCULATED.3IONS-SCNC: 10.8 MMOL/L
BASOPHILS # BLD AUTO: 0.04 10*3/MM3 (ref 0–0.2)
BASOPHILS NFR BLD AUTO: 0.6 % (ref 0–2)
BUN BLD-MCNC: 10 MG/DL (ref 6–20)
BUN/CREAT SERPL: 20 (ref 7–25)
CALCIUM SPEC-SCNC: 8.6 MG/DL (ref 8.6–10.5)
CHLORIDE SERPL-SCNC: 100 MMOL/L (ref 98–107)
CO2 SERPL-SCNC: 23.2 MMOL/L (ref 22–29)
CREAT BLD-MCNC: 0.5 MG/DL (ref 0.76–1.27)
DEPRECATED RDW RBC AUTO: 44.3 FL (ref 37–54)
EOSINOPHIL # BLD AUTO: 0.25 10*3/MM3 (ref 0.1–0.3)
EOSINOPHIL NFR BLD AUTO: 3.7 % (ref 0–4)
ERYTHROCYTE [DISTWIDTH] IN BLOOD BY AUTOMATED COUNT: 14.5 % (ref 11.5–14.5)
GFR SERPL CREATININE-BSD FRML MDRD: >150 ML/MIN/1.73
GLUCOSE BLD-MCNC: 103 MG/DL (ref 65–99)
GLUCOSE BLDC GLUCOMTR-MCNC: 110 MG/DL (ref 70–130)
GLUCOSE BLDC GLUCOMTR-MCNC: 113 MG/DL (ref 70–130)
GLUCOSE BLDC GLUCOMTR-MCNC: 121 MG/DL (ref 70–130)
GLUCOSE BLDC GLUCOMTR-MCNC: 97 MG/DL (ref 70–130)
HCT VFR BLD AUTO: 33.1 % (ref 42–52)
HGB BLD-MCNC: 10.5 G/DL (ref 14–18)
IMM GRANULOCYTES # BLD: 0.06 10*3/MM3 (ref 0–0.03)
IMM GRANULOCYTES NFR BLD: 0.9 % (ref 0–0.5)
LYMPHOCYTES # BLD AUTO: 1.89 10*3/MM3 (ref 0.6–4.8)
LYMPHOCYTES NFR BLD AUTO: 27.8 % (ref 20–45)
MCH RBC QN AUTO: 27.7 PG (ref 27–31)
MCHC RBC AUTO-ENTMCNC: 31.7 G/DL (ref 31–37)
MCV RBC AUTO: 87.3 FL (ref 80–94)
MONOCYTES # BLD AUTO: 0.91 10*3/MM3 (ref 0–1)
MONOCYTES NFR BLD AUTO: 13.4 % (ref 3–8)
NEUTROPHILS # BLD AUTO: 3.66 10*3/MM3 (ref 1.5–8.3)
NEUTROPHILS NFR BLD AUTO: 53.6 % (ref 45–70)
NRBC BLD MANUAL-RTO: 0 /100 WBC (ref 0–0)
PLATELET # BLD AUTO: 305 10*3/MM3 (ref 140–500)
PMV BLD AUTO: 9.2 FL (ref 7.4–10.4)
POTASSIUM BLD-SCNC: 4.6 MMOL/L (ref 3.5–5.2)
RBC # BLD AUTO: 3.79 10*6/MM3 (ref 4.7–6.1)
SODIUM BLD-SCNC: 134 MMOL/L (ref 136–145)
WBC NRBC COR # BLD: 6.81 10*3/MM3 (ref 4.8–10.8)

## 2018-06-12 PROCEDURE — 82962 GLUCOSE BLOOD TEST: CPT

## 2018-06-12 PROCEDURE — 80048 BASIC METABOLIC PNL TOTAL CA: CPT | Performed by: INTERNAL MEDICINE

## 2018-06-12 PROCEDURE — 99232 SBSQ HOSP IP/OBS MODERATE 35: CPT | Performed by: INTERNAL MEDICINE

## 2018-06-12 PROCEDURE — 25010000002 PIPERACILLIN SOD-TAZOBACTAM PER 1 G: Performed by: INTERNAL MEDICINE

## 2018-06-12 PROCEDURE — 25010000002 ENOXAPARIN PER 10 MG: Performed by: INTERNAL MEDICINE

## 2018-06-12 PROCEDURE — 94799 UNLISTED PULMONARY SVC/PX: CPT

## 2018-06-12 PROCEDURE — 85025 COMPLETE CBC W/AUTO DIFF WBC: CPT | Performed by: INTERNAL MEDICINE

## 2018-06-12 RX ADMIN — AMLODIPINE BESYLATE 5 MG: 5 TABLET ORAL at 09:05

## 2018-06-12 RX ADMIN — FLUOXETINE 40 MG: 20 CAPSULE ORAL at 08:51

## 2018-06-12 RX ADMIN — CLONIDINE HYDROCHLORIDE 0.1 MG: 0.1 TABLET ORAL at 20:58

## 2018-06-12 RX ADMIN — CLONAZEPAM 0.5 MG: 0.5 TABLET ORAL at 21:13

## 2018-06-12 RX ADMIN — HYDROCODONE BITARTRATE AND ACETAMINOPHEN 1 TABLET: 7.5; 325 TABLET ORAL at 18:43

## 2018-06-12 RX ADMIN — LOSARTAN POTASSIUM 100 MG: 50 TABLET, FILM COATED ORAL at 08:51

## 2018-06-12 RX ADMIN — CLONAZEPAM 0.5 MG: 0.5 TABLET ORAL at 08:51

## 2018-06-12 RX ADMIN — METFORMIN HYDROCHLORIDE 500 MG: 500 TABLET, EXTENDED RELEASE ORAL at 17:04

## 2018-06-12 RX ADMIN — PRIMIDONE 250 MG: 250 TABLET ORAL at 20:57

## 2018-06-12 RX ADMIN — PANTOPRAZOLE SODIUM 40 MG: 40 TABLET, DELAYED RELEASE ORAL at 05:23

## 2018-06-12 RX ADMIN — PROPRANOLOL HYDROCHLORIDE 40 MG: 40 TABLET ORAL at 08:51

## 2018-06-12 RX ADMIN — CLONIDINE HYDROCHLORIDE 0.1 MG: 0.1 TABLET ORAL at 09:05

## 2018-06-12 RX ADMIN — PRIMIDONE 250 MG: 250 TABLET ORAL at 08:51

## 2018-06-12 RX ADMIN — METFORMIN HYDROCHLORIDE 500 MG: 500 TABLET, EXTENDED RELEASE ORAL at 08:53

## 2018-06-12 RX ADMIN — PROPRANOLOL HYDROCHLORIDE 40 MG: 40 TABLET ORAL at 20:59

## 2018-06-12 RX ADMIN — HYDROCODONE BITARTRATE AND ACETAMINOPHEN 1 TABLET: 7.5; 325 TABLET ORAL at 05:23

## 2018-06-12 RX ADMIN — TEMAZEPAM 30 MG: 15 CAPSULE ORAL at 21:02

## 2018-06-12 RX ADMIN — SODIUM CHLORIDE 4.5 G: 900 INJECTION, SOLUTION INTRAVENOUS at 20:57

## 2018-06-12 RX ADMIN — HYDROCODONE BITARTRATE AND ACETAMINOPHEN 1 TABLET: 7.5; 325 TABLET ORAL at 11:37

## 2018-06-12 RX ADMIN — SODIUM CHLORIDE 4.5 G: 900 INJECTION, SOLUTION INTRAVENOUS at 05:22

## 2018-06-12 RX ADMIN — FERROUS SULFATE TAB EC 324 MG (65 MG FE EQUIVALENT) 324 MG: 324 (65 FE) TABLET DELAYED RESPONSE at 09:05

## 2018-06-12 RX ADMIN — PRIMIDONE 250 MG: 250 TABLET ORAL at 17:04

## 2018-06-12 RX ADMIN — ENOXAPARIN SODIUM 40 MG: 100 INJECTION SUBCUTANEOUS at 20:59

## 2018-06-12 RX ADMIN — SODIUM CHLORIDE 4.5 G: 900 INJECTION, SOLUTION INTRAVENOUS at 12:51

## 2018-06-13 ENCOUNTER — ANESTHESIA (OUTPATIENT)
Dept: PERIOP | Facility: HOSPITAL | Age: 53
End: 2018-06-13

## 2018-06-13 ENCOUNTER — ANESTHESIA EVENT (OUTPATIENT)
Dept: PERIOP | Facility: HOSPITAL | Age: 53
End: 2018-06-13

## 2018-06-13 ENCOUNTER — APPOINTMENT (OUTPATIENT)
Dept: CT IMAGING | Facility: HOSPITAL | Age: 53
End: 2018-06-13
Attending: PODIATRIST

## 2018-06-13 PROBLEM — S91.115A: Status: ACTIVE | Noted: 2018-06-09

## 2018-06-13 LAB
ANION GAP SERPL CALCULATED.3IONS-SCNC: 12.6 MMOL/L
BUN BLD-MCNC: 15 MG/DL (ref 6–20)
BUN/CREAT SERPL: 26.3 (ref 7–25)
CALCIUM SPEC-SCNC: 8.8 MG/DL (ref 8.6–10.5)
CHLORIDE SERPL-SCNC: 99 MMOL/L (ref 98–107)
CO2 SERPL-SCNC: 21.4 MMOL/L (ref 22–29)
CREAT BLD-MCNC: 0.57 MG/DL (ref 0.76–1.27)
GFR SERPL CREATININE-BSD FRML MDRD: 149 ML/MIN/1.73
GLUCOSE BLD-MCNC: 106 MG/DL (ref 65–99)
GLUCOSE BLDC GLUCOMTR-MCNC: 105 MG/DL (ref 70–130)
GLUCOSE BLDC GLUCOMTR-MCNC: 138 MG/DL (ref 70–130)
GLUCOSE BLDC GLUCOMTR-MCNC: 84 MG/DL (ref 70–130)
GLUCOSE BLDC GLUCOMTR-MCNC: 97 MG/DL (ref 70–130)
POTASSIUM BLD-SCNC: 4.4 MMOL/L (ref 3.5–5.2)
SODIUM BLD-SCNC: 133 MMOL/L (ref 136–145)

## 2018-06-13 PROCEDURE — 0HDNXZZ EXTRACTION OF LEFT FOOT SKIN, EXTERNAL APPROACH: ICD-10-PCS | Performed by: PODIATRIST

## 2018-06-13 PROCEDURE — 25010000002 FENTANYL CITRATE (PF) 100 MCG/2ML SOLUTION: Performed by: NURSE ANESTHETIST, CERTIFIED REGISTERED

## 2018-06-13 PROCEDURE — 73700 CT LOWER EXTREMITY W/O DYE: CPT

## 2018-06-13 PROCEDURE — 82962 GLUCOSE BLOOD TEST: CPT

## 2018-06-13 PROCEDURE — 25010000002 MIDAZOLAM PER 1 MG: Performed by: NURSE ANESTHETIST, CERTIFIED REGISTERED

## 2018-06-13 PROCEDURE — 25010000002 ENOXAPARIN PER 10 MG: Performed by: INTERNAL MEDICINE

## 2018-06-13 PROCEDURE — 80048 BASIC METABOLIC PNL TOTAL CA: CPT | Performed by: INTERNAL MEDICINE

## 2018-06-13 PROCEDURE — 99232 SBSQ HOSP IP/OBS MODERATE 35: CPT | Performed by: HOSPITALIST

## 2018-06-13 PROCEDURE — 25010000002 DIPHENHYDRAMINE PER 50 MG: Performed by: NURSE ANESTHETIST, CERTIFIED REGISTERED

## 2018-06-13 PROCEDURE — 0HQNXZZ REPAIR LEFT FOOT SKIN, EXTERNAL APPROACH: ICD-10-PCS | Performed by: PODIATRIST

## 2018-06-13 PROCEDURE — 3E10X8Z IRRIGATION OF SKIN AND MUCOUS MEMBRANES USING IRRIGATING SUBSTANCE: ICD-10-PCS | Performed by: PODIATRIST

## 2018-06-13 PROCEDURE — 25010000002 PIPERACILLIN SOD-TAZOBACTAM PER 1 G: Performed by: INTERNAL MEDICINE

## 2018-06-13 RX ORDER — CLONAZEPAM 0.5 MG/1
0.5 TABLET ORAL 3 TIMES DAILY PRN
Status: DISCONTINUED | OUTPATIENT
Start: 2018-06-13 | End: 2018-06-14 | Stop reason: HOSPADM

## 2018-06-13 RX ORDER — MAGNESIUM HYDROXIDE 1200 MG/15ML
LIQUID ORAL AS NEEDED
Status: DISCONTINUED | OUTPATIENT
Start: 2018-06-13 | End: 2018-06-13 | Stop reason: HOSPADM

## 2018-06-13 RX ORDER — MIDAZOLAM HYDROCHLORIDE 1 MG/ML
2 INJECTION INTRAMUSCULAR; INTRAVENOUS
Status: DISCONTINUED | OUTPATIENT
Start: 2018-06-13 | End: 2018-06-13 | Stop reason: HOSPADM

## 2018-06-13 RX ORDER — DIPHENHYDRAMINE HYDROCHLORIDE 50 MG/ML
12.5 INJECTION INTRAMUSCULAR; INTRAVENOUS ONCE
Status: COMPLETED | OUTPATIENT
Start: 2018-06-13 | End: 2018-06-13

## 2018-06-13 RX ORDER — SODIUM CHLORIDE, SODIUM LACTATE, POTASSIUM CHLORIDE, CALCIUM CHLORIDE 600; 310; 30; 20 MG/100ML; MG/100ML; MG/100ML; MG/100ML
100 INJECTION, SOLUTION INTRAVENOUS CONTINUOUS
Status: DISCONTINUED | OUTPATIENT
Start: 2018-06-13 | End: 2018-06-14 | Stop reason: HOSPADM

## 2018-06-13 RX ORDER — MIDAZOLAM HYDROCHLORIDE 1 MG/ML
1 INJECTION INTRAMUSCULAR; INTRAVENOUS
Status: DISCONTINUED | OUTPATIENT
Start: 2018-06-13 | End: 2018-06-13 | Stop reason: HOSPADM

## 2018-06-13 RX ORDER — SODIUM CHLORIDE 9 MG/ML
40 INJECTION, SOLUTION INTRAVENOUS AS NEEDED
Status: DISCONTINUED | OUTPATIENT
Start: 2018-06-13 | End: 2018-06-13 | Stop reason: HOSPADM

## 2018-06-13 RX ORDER — BUPIVACAINE HYDROCHLORIDE 2.5 MG/ML
INJECTION, SOLUTION INFILTRATION; PERINEURAL AS NEEDED
Status: DISCONTINUED | OUTPATIENT
Start: 2018-06-13 | End: 2018-06-13 | Stop reason: HOSPADM

## 2018-06-13 RX ORDER — ONDANSETRON 2 MG/ML
4 INJECTION INTRAMUSCULAR; INTRAVENOUS ONCE AS NEEDED
Status: DISCONTINUED | OUTPATIENT
Start: 2018-06-13 | End: 2018-06-14 | Stop reason: HOSPADM

## 2018-06-13 RX ORDER — ONDANSETRON 2 MG/ML
4 INJECTION INTRAMUSCULAR; INTRAVENOUS ONCE AS NEEDED
Status: DISCONTINUED | OUTPATIENT
Start: 2018-06-13 | End: 2018-06-13 | Stop reason: HOSPADM

## 2018-06-13 RX ORDER — SODIUM CHLORIDE, SODIUM LACTATE, POTASSIUM CHLORIDE, CALCIUM CHLORIDE 600; 310; 30; 20 MG/100ML; MG/100ML; MG/100ML; MG/100ML
INJECTION, SOLUTION INTRAVENOUS CONTINUOUS PRN
Status: DISCONTINUED | OUTPATIENT
Start: 2018-06-13 | End: 2018-06-13 | Stop reason: SURG

## 2018-06-13 RX ORDER — SODIUM CHLORIDE 0.9 % (FLUSH) 0.9 %
1-10 SYRINGE (ML) INJECTION AS NEEDED
Status: DISCONTINUED | OUTPATIENT
Start: 2018-06-13 | End: 2018-06-13 | Stop reason: HOSPADM

## 2018-06-13 RX ORDER — MEPERIDINE HYDROCHLORIDE 25 MG/ML
12.5 INJECTION INTRAMUSCULAR; INTRAVENOUS; SUBCUTANEOUS
Status: DISCONTINUED | OUTPATIENT
Start: 2018-06-13 | End: 2018-06-14 | Stop reason: HOSPADM

## 2018-06-13 RX ORDER — FENTANYL CITRATE 50 UG/ML
INJECTION, SOLUTION INTRAMUSCULAR; INTRAVENOUS AS NEEDED
Status: DISCONTINUED | OUTPATIENT
Start: 2018-06-13 | End: 2018-06-13 | Stop reason: SURG

## 2018-06-13 RX ORDER — SODIUM CHLORIDE 9 MG/ML
9 INJECTION, SOLUTION INTRAVENOUS CONTINUOUS PRN
Status: DISCONTINUED | OUTPATIENT
Start: 2018-06-13 | End: 2018-06-13 | Stop reason: HOSPADM

## 2018-06-13 RX ORDER — LIDOCAINE HYDROCHLORIDE 10 MG/ML
INJECTION, SOLUTION EPIDURAL; INFILTRATION; INTRACAUDAL; PERINEURAL AS NEEDED
Status: DISCONTINUED | OUTPATIENT
Start: 2018-06-13 | End: 2018-06-13 | Stop reason: HOSPADM

## 2018-06-13 RX ORDER — METFORMIN HYDROCHLORIDE 500 MG/1
500 TABLET, EXTENDED RELEASE ORAL 2 TIMES DAILY WITH MEALS
Status: DISCONTINUED | OUTPATIENT
Start: 2018-06-13 | End: 2018-06-14 | Stop reason: HOSPADM

## 2018-06-13 RX ORDER — PANTOPRAZOLE SODIUM 40 MG/1
40 TABLET, DELAYED RELEASE ORAL DAILY
Status: DISCONTINUED | OUTPATIENT
Start: 2018-06-13 | End: 2018-06-14 | Stop reason: HOSPADM

## 2018-06-13 RX ADMIN — PRIMIDONE 250 MG: 250 TABLET ORAL at 18:12

## 2018-06-13 RX ADMIN — LOSARTAN POTASSIUM 100 MG: 50 TABLET, FILM COATED ORAL at 18:59

## 2018-06-13 RX ADMIN — MIDAZOLAM HYDROCHLORIDE 1 MG: 1 INJECTION, SOLUTION INTRAMUSCULAR; INTRAVENOUS at 14:03

## 2018-06-13 RX ADMIN — SODIUM CHLORIDE, POTASSIUM CHLORIDE, SODIUM LACTATE AND CALCIUM CHLORIDE 100 ML/HR: 600; 310; 30; 20 INJECTION, SOLUTION INTRAVENOUS at 15:34

## 2018-06-13 RX ADMIN — METFORMIN HYDROCHLORIDE 500 MG: 500 TABLET, EXTENDED RELEASE ORAL at 18:11

## 2018-06-13 RX ADMIN — CLONAZEPAM 0.5 MG: 0.5 TABLET ORAL at 22:40

## 2018-06-13 RX ADMIN — MIDAZOLAM HYDROCHLORIDE 2 MG: 1 INJECTION, SOLUTION INTRAMUSCULAR; INTRAVENOUS at 13:41

## 2018-06-13 RX ADMIN — PROPRANOLOL HYDROCHLORIDE 40 MG: 40 TABLET ORAL at 20:54

## 2018-06-13 RX ADMIN — AMLODIPINE BESYLATE 5 MG: 5 TABLET ORAL at 18:09

## 2018-06-13 RX ADMIN — HYDROCODONE BITARTRATE AND ACETAMINOPHEN 1 TABLET: 7.5; 325 TABLET ORAL at 00:44

## 2018-06-13 RX ADMIN — CLONIDINE HYDROCHLORIDE 0.1 MG: 0.1 TABLET ORAL at 18:09

## 2018-06-13 RX ADMIN — SODIUM CHLORIDE, POTASSIUM CHLORIDE, SODIUM LACTATE AND CALCIUM CHLORIDE: 600; 310; 30; 20 INJECTION, SOLUTION INTRAVENOUS at 13:08

## 2018-06-13 RX ADMIN — ENOXAPARIN SODIUM 40 MG: 100 INJECTION SUBCUTANEOUS at 20:54

## 2018-06-13 RX ADMIN — SODIUM CHLORIDE 4.5 G: 900 INJECTION, SOLUTION INTRAVENOUS at 18:21

## 2018-06-13 RX ADMIN — FENTANYL CITRATE 50 MCG: 50 INJECTION, SOLUTION INTRAMUSCULAR; INTRAVENOUS at 14:03

## 2018-06-13 RX ADMIN — MIDAZOLAM HYDROCHLORIDE 1 MG: 1 INJECTION, SOLUTION INTRAMUSCULAR; INTRAVENOUS at 14:07

## 2018-06-13 RX ADMIN — FAMOTIDINE 20 MG: 10 INJECTION, SOLUTION INTRAVENOUS at 13:35

## 2018-06-13 RX ADMIN — FERROUS SULFATE TAB EC 324 MG (65 MG FE EQUIVALENT) 324 MG: 324 (65 FE) TABLET DELAYED RESPONSE at 18:09

## 2018-06-13 RX ADMIN — FENTANYL CITRATE 50 MCG: 50 INJECTION, SOLUTION INTRAMUSCULAR; INTRAVENOUS at 14:07

## 2018-06-13 RX ADMIN — TEMAZEPAM 30 MG: 15 CAPSULE ORAL at 22:46

## 2018-06-13 RX ADMIN — DIPHENHYDRAMINE HYDROCHLORIDE 12.5 MG: 50 INJECTION, SOLUTION INTRAMUSCULAR; INTRAVENOUS at 13:35

## 2018-06-13 RX ADMIN — PANTOPRAZOLE SODIUM 40 MG: 40 TABLET, DELAYED RELEASE ORAL at 18:12

## 2018-06-13 RX ADMIN — HYDROCODONE BITARTRATE AND ACETAMINOPHEN 1 TABLET: 7.5; 325 TABLET ORAL at 18:10

## 2018-06-13 RX ADMIN — METFORMIN HYDROCHLORIDE 500 MG: 500 TABLET, EXTENDED RELEASE ORAL at 18:09

## 2018-06-13 RX ADMIN — FLUOXETINE 40 MG: 20 CAPSULE ORAL at 18:09

## 2018-06-13 RX ADMIN — PRIMIDONE 250 MG: 250 TABLET ORAL at 20:55

## 2018-06-13 RX ADMIN — SODIUM CHLORIDE 4.5 G: 900 INJECTION, SOLUTION INTRAVENOUS at 04:42

## 2018-06-13 RX ADMIN — MIDAZOLAM HYDROCHLORIDE 2 MG: 1 INJECTION, SOLUTION INTRAMUSCULAR; INTRAVENOUS at 13:29

## 2018-06-13 NOTE — ANESTHESIA PREPROCEDURE EVALUATION
Anesthesia Evaluation     Patient summary reviewed and Nursing notes reviewed   no history of anesthetic complications:  NPO Solid Status: > 8 hours  NPO Liquid Status: > 8 hours           Airway   Mallampati: II  TM distance: >3 FB  Neck ROM: limited  Possible difficult intubation  Dental - normal exam     Pulmonary     breath sounds clear to auscultation  (+) sleep apnea on CPAP, decreased breath sounds,   Cardiovascular - normal exam    ECG reviewed  Rhythm: regular  Rate: normal    (+) hypertension poorly controlled 2 medications or greater, past MI ()  >12 months, CAD,   DVT:  - resolved.      Neuro/Psych  (+) CVA (. -- blindness right eye resolved within 2 minutes), numbness (Diabetic neuropathy bilat. feet), psychiatric history Depression,     GI/Hepatic/Renal/Endo    (+) obesity,  GERD, PUD,  diabetes mellitus type 2 poorly controlled,   GI bleedin months ago.    Musculoskeletal     (+) back pain, neck stiffness,   Abdominal   (+) obese,    Substance History      OB/GYN          Other   (+) arthritis                     Anesthesia Plan    ASA 3     MAC and general   (MAC vs. GA/LMA )  intravenous induction   Anesthetic plan and risks discussed with patient.  Use of blood products discussed with patient  Consented to blood products.

## 2018-06-13 NOTE — ANESTHESIA POSTPROCEDURE EVALUATION
Patient: Eliseo Price    Procedure Summary     Date:  06/13/18 Room / Location:   [facility] /  [facility]    Anesthesia Start:  1336 Anesthesia Stop:  1444    Procedure:  Left 2nd, 3rd toe laceration irrigation, debridement and multi layer wound closure. (Left Foot) Diagnosis:       Laceration of lesser toe of left foot without foreign body without damage to nail, initial encounter      (Laceration of lesser toe of left foot without foreign body without damage to nail, initial encounter [S91.115A])    Surgeon:  Anish Farah DPM Provider:  Ken Branch CRNA    Anesthesia Type:  MAC, general ASA Status:  3          Anesthesia Type: MAC, general  Last vitals  BP   (!) 148/105 (06/13/18 1450)   Temp   96.9 °F (36.1 °C) (06/13/18 1302)   Pulse   72 (06/13/18 1450)   Resp   15 (06/13/18 1450)     SpO2   100 % (06/13/18 1450)     Post Anesthesia Care and Evaluation    Patient location during evaluation: bedside  Patient participation: complete - patient participated  Level of consciousness: awake and alert  Pain score: 0  Pain management: adequate  Airway patency: patent  Anesthetic complications: No anesthetic complications    Cardiovascular status: acceptable  Respiratory status: acceptable  Hydration status: acceptable

## 2018-06-14 VITALS
HEIGHT: 72 IN | TEMPERATURE: 98.4 F | OXYGEN SATURATION: 96 % | SYSTOLIC BLOOD PRESSURE: 157 MMHG | RESPIRATION RATE: 18 BRPM | DIASTOLIC BLOOD PRESSURE: 83 MMHG | HEART RATE: 68 BPM | BODY MASS INDEX: 37.33 KG/M2 | WEIGHT: 275.57 LBS

## 2018-06-14 LAB
BACTERIA SPEC AEROBE CULT: NORMAL
BACTERIA SPEC AEROBE CULT: NORMAL
GLUCOSE BLDC GLUCOMTR-MCNC: 89 MG/DL (ref 70–130)

## 2018-06-14 PROCEDURE — G8979 MOBILITY GOAL STATUS: HCPCS

## 2018-06-14 PROCEDURE — 99239 HOSP IP/OBS DSCHRG MGMT >30: CPT | Performed by: HOSPITALIST

## 2018-06-14 PROCEDURE — 82962 GLUCOSE BLOOD TEST: CPT

## 2018-06-14 PROCEDURE — 94799 UNLISTED PULMONARY SVC/PX: CPT

## 2018-06-14 PROCEDURE — 25010000002 PIPERACILLIN-TAZOBACTAM: Performed by: INTERNAL MEDICINE

## 2018-06-14 PROCEDURE — G8980 MOBILITY D/C STATUS: HCPCS

## 2018-06-14 PROCEDURE — 97161 PT EVAL LOW COMPLEX 20 MIN: CPT

## 2018-06-14 PROCEDURE — G8978 MOBILITY CURRENT STATUS: HCPCS

## 2018-06-14 RX ORDER — AMOXICILLIN AND CLAVULANATE POTASSIUM 875; 125 MG/1; MG/1
1 TABLET, FILM COATED ORAL EVERY 12 HOURS SCHEDULED
Qty: 14 TABLET | Refills: 0 | Status: SHIPPED | OUTPATIENT
Start: 2018-06-14 | End: 2018-06-21

## 2018-06-14 RX ORDER — ACETAMINOPHEN 325 MG/1
650 TABLET ORAL EVERY 6 HOURS PRN
Start: 2018-06-14 | End: 2021-01-14

## 2018-06-14 RX ADMIN — METFORMIN HYDROCHLORIDE 500 MG: 500 TABLET, EXTENDED RELEASE ORAL at 08:44

## 2018-06-14 RX ADMIN — AMLODIPINE BESYLATE 5 MG: 5 TABLET ORAL at 08:44

## 2018-06-14 RX ADMIN — SODIUM CHLORIDE 4.5 G: 900 INJECTION, SOLUTION INTRAVENOUS at 01:16

## 2018-06-14 RX ADMIN — LOSARTAN POTASSIUM 100 MG: 50 TABLET, FILM COATED ORAL at 08:44

## 2018-06-14 RX ADMIN — HYDROCODONE BITARTRATE AND ACETAMINOPHEN 1 TABLET: 7.5; 325 TABLET ORAL at 01:16

## 2018-06-14 RX ADMIN — HYDROCODONE BITARTRATE AND ACETAMINOPHEN 1 TABLET: 7.5; 325 TABLET ORAL at 08:56

## 2018-06-14 RX ADMIN — PROPRANOLOL HYDROCHLORIDE 40 MG: 40 TABLET ORAL at 08:44

## 2018-06-14 RX ADMIN — CLONIDINE HYDROCHLORIDE 0.1 MG: 0.1 TABLET ORAL at 08:44

## 2018-06-14 RX ADMIN — SODIUM CHLORIDE 4.5 G: 900 INJECTION, SOLUTION INTRAVENOUS at 08:58

## 2018-06-14 RX ADMIN — FLUOXETINE 40 MG: 20 CAPSULE ORAL at 08:44

## 2018-06-14 RX ADMIN — FERROUS SULFATE TAB EC 324 MG (65 MG FE EQUIVALENT) 324 MG: 324 (65 FE) TABLET DELAYED RESPONSE at 08:44

## 2018-06-14 RX ADMIN — CLONAZEPAM 0.5 MG: 0.5 TABLET ORAL at 06:01

## 2018-06-14 RX ADMIN — PANTOPRAZOLE SODIUM 40 MG: 40 TABLET, DELAYED RELEASE ORAL at 08:44

## 2018-06-14 RX ADMIN — PRIMIDONE 250 MG: 250 TABLET ORAL at 08:44

## 2018-06-19 ENCOUNTER — TELEPHONE (OUTPATIENT)
Dept: GASTROENTEROLOGY | Facility: CLINIC | Age: 53
End: 2018-06-19

## 2018-06-19 DIAGNOSIS — Z12.11 COLON CANCER SCREENING: Primary | ICD-10-CM

## 2018-06-19 NOTE — TELEPHONE ENCOUNTER
Patient is calling to reschedule colonoscopy. Is it okay to do so? Please review recent ER visit. Patient is finishing up antibiotics for infection.

## 2018-06-19 NOTE — TELEPHONE ENCOUNTER
I think he was in hospital for cellulitis / osteomylitis of the foot. So he can schedule this 4 weeks out

## 2018-06-20 NOTE — TELEPHONE ENCOUNTER
Emailed instructions  Dr. Kaylie Mcfarlane   Date: ____7/16/18________     Arrival Time: __12:45pm_______    Hospital:  [facility] ([address] [place] [address] [place] [address]) (back of hospital at the emergency room) or         Please call the office as soon as possible if you need to reschedule or cancel your procedure please give two weeks’ notice.  If you do not show up or frequently reschedule your procedure, your provider has the option of not rescheduling.    Stop the following medications 5 days prior to your procedure- check with the prescribing doctor prior to holding.  No Aspirin, Advil, Aleve, Motrin, IBU or anything listed on the back of this form.    If you are taking any medications on the attached list and/or pills that contain iron please stop them one week prior. Insulin will be addressed separately.    1.  your prescription AND a 10 oz. bottle of Magnesium Citrate (over the counter) as soon as possible. Do not wait until the day before in case of issues with cost or etc.    The day before your procedure  It is very important that you follow the instructions on this form and not on the prep you were prescribed.    You will be on a clear liquid diet only which may include coffee , tea, soft drinks, broth(chicken beef or vegetable), popsicles, Jell-O, Gatorade, juice (no orange, grapefruit or V-8).  ®No red or purple dyes and no dairy or non-dairy.    2. At 8:00 am drink the bottle of magnesium citrate.  Drink plenty of fluids in between    3. At    2:00 pm drink first dose or ½ of prep, mix as directed on container/box    Drink plenty of fluids between    4. At   10:00 pm      drink second dose or ½ of prep, mix as directed on container/box    5. If you start to get nauseous while drinking your prep try stepping away for 15-20 min. then resume again at a slower pace.    You may have clear liquids only up to 4 hours before your procedure.  No gum or candy!     You may only take your morning  prescription blood pressure (no diuretic combinations), breathing, seizure, psych or heart medications.     You will need a  to accompany you for your exam. The average time spent in our facility is around 2-3 hours.  You are not to drive, operate machinery or make legal decisions the remainder of the day following you procedure.    Please call [email or web address]@ [phone] if you have questions about any of this information or your bowel movements are not a clearish liquid the morning of your procedure.  If it is after hours or the weekend and you need to reach the doctor or have questions for the office please call [phone].     It is your responsibility to check with your insurance company to determine benefits and out of pocket costs.      Avoid these medications 5-7 days prior to surgery  Please check with your prescribing doctor before stopping any medications  NSAIDS- Advil, Aleve, Motrin, Ibuprofen, Midol, Excedrin, Fiorinal, [place]  (Some cold medications may have these in them)    All herbal medications- iron, vitamin E, fish oil, decongestants (phenylephrine, pseudoephedrine), ginkgo, garlic, ginseng, Ramona’s wart    Mobic (meloxicam), Celebrex, Diclofenac (Voltaren), Nambumetone (Relafen), Daypro, Naproxen, Sulindac, Indomethacin, Toradol, Feldine, Salsalate, Etodolac (Lodine),     Viagra, Cialis, Levitra    Aspirin, Plavix (clopidogrel), Effient, Pletal, Coumadin, Pradaxa, Brilinta, Ticlide, Eliquis, (Xaralto- 3 days)      Diet pills -Adipex (phentermine) -2 weeks priorRescheduled colonoscopy for 7/16/18 to arrive at 12:45pm.

## 2018-07-13 ENCOUNTER — ANESTHESIA EVENT (OUTPATIENT)
Dept: PERIOP | Facility: HOSPITAL | Age: 53
End: 2018-07-13

## 2018-07-13 NOTE — ANESTHESIA PREPROCEDURE EVALUATION
Anesthesia Evaluation     Patient summary reviewed   no history of anesthetic complications:  NPO Solid Status: > 8 hours  NPO Liquid Status: > 8 hours           Airway   Mallampati: II  TM distance: >3 FB  Neck ROM: full  No difficulty expected  Dental - normal exam         Pulmonary - normal exam   (+) sleep apnea (not using for a year now),   Cardiovascular - normal exam  Exercise tolerance: good (4-7 METS)    Rhythm: regular  Rate: normal    (+) hypertension well controlled,       Neuro/Psych  GI/Hepatic/Renal/Endo    (+)   diabetes mellitus () type 2 well controlled,     Musculoskeletal     Abdominal  - normal exam   Substance History      OB/GYN          Other              Anesthesia Evaluation     Patient summary reviewed and Nursing notes reviewed   no history of anesthetic complications:  NPO Solid Status: > 8 hours  NPO Liquid Status: > 8 hours           Airway   Mallampati: II  TM distance: >3 FB  Neck ROM: limited  Possible difficult intubation  Dental - normal exam     Pulmonary     breath sounds clear to auscultation  (+) sleep apnea on CPAP, decreased breath sounds, Anesthesia Evaluation         Anesthesia Plan    ASA 3     MAC     Anesthetic plan and risks discussed with patient.  Use of blood products discussed with patient .    Anesthesia Evaluation     Patient summary reviewed and Nursing notes reviewed   no history of anesthetic complications:  NPO Solid Status: > 8 hours  NPO Liquid Status: > 8 hours           Airway   Mallampati: II  TM distance: >3 FB  Neck ROM: full  No difficulty expected  Dental - normal exam     Pulmonary     breath sounds clear to auscultation  (+) COPD (pt denies) mild, sleep apnea on CPAP,   Cardiovascular   Exercise tolerance: poor (<4 METS)    ECG reviewed  Rhythm: regular  Rate: normal    (+) hypertension (no meds today) well controlled, past MI (mi 2003) , CAD, angina (no complaints of chest pain recently (several weeks)), DVT ( right leg-treated with blood  thinners-2005, none recent) resolved, hyperlipidemia,     ROS comment: RR Interval= 632 ms  MA Interval= 156 ms  QRSD Interval= 98 ms  QT Interval= 384 ms  QTc Interval= 483 ms  Heart Rate= 95 ms  P Axis= 34 deg  QRS Axis= 60 deg  T Wave Axis= 32 deg  I: 40 Axis= 31 deg  T: 40 Axis= 109 deg  ST Axis= 54 deg  SINUS RHYTHM  EARLY PRECORDIAL R/S TRANSITION  BORDERLINE PROLONGED QT INTERVAL - new  Electronically Signed by:  Jb Cerda ([facility]) 05-Mar-2018 09:31:38  Date and Time of Study: 2018-03-04 22:48:28    Neuro/Psych  (+) CVA, tremors, numbness (hands and feet DM neuropathy ), psychiatric history Depression, Bipolar and Anxiety,     GI/Hepatic/Renal/Endo    (+) obesity,  GI bleeding (current, coffee ground emesis), diabetes mellitus type 2 well controlled,     ROS Comment: Disease of thyroid gland nodule on thyroid     No emesis for 2 days    Musculoskeletal     (+) back pain, neck pain, neck stiffness,   Abdominal   (+) obese,    Substance History   Alcohol use: history of, none since 7/07/2007.     OB/GYN negative ob/gyn ROS         Other   (+) arthritis     (-) blood dyscrasia    Other Comment: Toxic metabolic encephalopathy  Pseudogout  Septic shock  ROS/Med Hx Other: Hgb 8.8   PTT slightly elevated   Alb low                  Anesthesia Plan    ASA 3     MAC     intravenous induction   Anesthetic plan and risks discussed with patient.  Use of blood products discussed with patient  Consented to blood products.    Anesthesia Evaluation     Patient summary reviewed and Nursing notes reviewed   no history of anesthetic complications:  NPO Solid Status: > 8 hours  NPO Liquid Status: > 8 hours           Airway   Mallampati: II  TM distance: >3 FB  Neck ROM: full  No difficulty expected  Dental      Pulmonary     breath sounds clear to auscultation  (+) COPD (pt denies), sleep apnea on CPAP,   Cardiovascular   Exercise tolerance: poor (<4 METS)    ECG reviewed  Rhythm: regular  Rate: normal    (+) hypertension well  controlled, past MI (? pt states due to running out of HTN meds) , CAD, angina (pt states non for abt 10wks), DVT resolved, hyperlipidemia,     ROS comment: RR Interval= 632 ms  IL Interval= 156 ms  QRSD Interval= 98 ms  QT Interval= 384 ms  QTc Interval= 483 ms  Heart Rate= 95 ms  P Axis= 34 deg  QRS Axis= 60 deg  T Wave Axis= 32 deg  I: 40 Axis= 31 deg  T: 40 Axis= 109 deg  ST Axis= 54 deg  SINUS RHYTHM  EARLY PRECORDIAL R/S TRANSITION  BORDERLINE PROLONGED QT INTERVAL - new  Electronically Signed by:  Jb Cerda ([facility] [place]) 05-Mar-2018 09:31:38  Date and Time of Study: 2018 22:48:28    Neuro/Psych  (+) CVA, tremors, numbness (hands and feet DM neuropathy ), psychiatric history Depression, Bipolar and Anxiety,     GI/Hepatic/Renal/Endo    (+) obesity,   diabetes mellitus type 2 well controlled,     ROS Comment: Disease of thyroid gland nodule on thyroid     Musculoskeletal     (+) neck pain, neck stiffness,   Abdominal   (+) obese,    Substance History      OB/GYN          Other   (+) arthritis       Other Comment: Toxic metabolic encephalopathy  Pseudogout  Septic shock                  Anesthesia Plan    ASA 3     MAC     intravenous induction   Anesthetic plan and risks discussed with patient.  Use of blood products discussed with patient  Consented to blood products.     Cardiovascular - normal exam    ECG reviewed  Rhythm: regular  Rate: normal    (+) hypertension poorly controlled 2 medications or greater, past MI ()  >12 months, CAD,   DVT:  - resolved.      Neuro/Psych  (+) CVA (. -- blindness right eye resolved within 2 minutes), numbness (Diabetic neuropathy bilat. feet), psychiatric history Depression,     GI/Hepatic/Renal/Endo    (+) obesity,  GERD, PUD,  diabetes mellitus type 2 poorly controlled,   GI bleedin months ago.    Musculoskeletal     (+) back pain, neck stiffness,   Abdominal   (+) obese,    Substance History      OB/GYN          Other   (+) arthritis                      Anesthesia Plan    ASA 3     MAC and general   (MAC vs. GA/LMA )  intravenous induction   Anesthetic plan and risks discussed with patient.  Use of blood products discussed with patient  Consented to blood products.            Anesthesia Plan    ASA 3     MAC     intravenous induction   Anesthetic plan and risks discussed with patient.         No significant changes since last anesthesia

## 2018-07-16 ENCOUNTER — HOSPITAL ENCOUNTER (OUTPATIENT)
Facility: HOSPITAL | Age: 53
Setting detail: HOSPITAL OUTPATIENT SURGERY
Discharge: HOME | End: 2018-07-16
Attending: INTERNAL MEDICINE | Admitting: NURSE ANESTHETIST, CERTIFIED REGISTERED

## 2018-07-16 ENCOUNTER — ANESTHESIA (OUTPATIENT)
Dept: PERIOP | Facility: HOSPITAL | Age: 53
End: 2018-07-16

## 2018-07-16 VITALS
DIASTOLIC BLOOD PRESSURE: 71 MMHG | TEMPERATURE: 98.4 F | HEART RATE: 71 BPM | BODY MASS INDEX: 35.24 KG/M2 | WEIGHT: 260.2 LBS | HEIGHT: 72 IN | SYSTOLIC BLOOD PRESSURE: 120 MMHG | RESPIRATION RATE: 16 BRPM | OXYGEN SATURATION: 100 %

## 2018-07-16 DIAGNOSIS — Z12.11 COLON CANCER SCREENING: ICD-10-CM

## 2018-07-16 LAB — GLUCOSE BLDC GLUCOMTR-MCNC: 126 MG/DL (ref 70–130)

## 2018-07-16 PROCEDURE — 45380 COLONOSCOPY AND BIOPSY: CPT | Performed by: INTERNAL MEDICINE

## 2018-07-16 PROCEDURE — 82962 GLUCOSE BLOOD TEST: CPT

## 2018-07-16 PROCEDURE — 25010000002 PROPOFOL 10 MG/ML EMULSION: Performed by: NURSE ANESTHETIST, CERTIFIED REGISTERED

## 2018-07-16 RX ORDER — SODIUM CHLORIDE, SODIUM LACTATE, POTASSIUM CHLORIDE, CALCIUM CHLORIDE 600; 310; 30; 20 MG/100ML; MG/100ML; MG/100ML; MG/100ML
100 INJECTION, SOLUTION INTRAVENOUS CONTINUOUS
Status: DISCONTINUED | OUTPATIENT
Start: 2018-07-16 | End: 2018-07-16 | Stop reason: HOSPADM

## 2018-07-16 RX ORDER — ONDANSETRON 2 MG/ML
4 INJECTION INTRAMUSCULAR; INTRAVENOUS ONCE AS NEEDED
Status: DISCONTINUED | OUTPATIENT
Start: 2018-07-16 | End: 2018-07-16 | Stop reason: HOSPADM

## 2018-07-16 RX ORDER — SODIUM CHLORIDE 0.9 % (FLUSH) 0.9 %
1-10 SYRINGE (ML) INJECTION AS NEEDED
Status: DISCONTINUED | OUTPATIENT
Start: 2018-07-16 | End: 2018-07-16 | Stop reason: HOSPADM

## 2018-07-16 RX ORDER — LIDOCAINE HYDROCHLORIDE 10 MG/ML
0.5 INJECTION, SOLUTION EPIDURAL; INFILTRATION; INTRACAUDAL; PERINEURAL ONCE AS NEEDED
Status: DISCONTINUED | OUTPATIENT
Start: 2018-07-16 | End: 2018-07-16 | Stop reason: HOSPADM

## 2018-07-16 RX ORDER — SODIUM CHLORIDE, SODIUM LACTATE, POTASSIUM CHLORIDE, CALCIUM CHLORIDE 600; 310; 30; 20 MG/100ML; MG/100ML; MG/100ML; MG/100ML
9 INJECTION, SOLUTION INTRAVENOUS CONTINUOUS PRN
Status: DISCONTINUED | OUTPATIENT
Start: 2018-07-16 | End: 2018-07-16 | Stop reason: HOSPADM

## 2018-07-16 RX ORDER — PROPOFOL 10 MG/ML
VIAL (ML) INTRAVENOUS AS NEEDED
Status: DISCONTINUED | OUTPATIENT
Start: 2018-07-16 | End: 2018-07-16 | Stop reason: SURG

## 2018-07-16 RX ORDER — SODIUM CHLORIDE 9 MG/ML
40 INJECTION, SOLUTION INTRAVENOUS AS NEEDED
Status: DISCONTINUED | OUTPATIENT
Start: 2018-07-16 | End: 2018-07-16 | Stop reason: HOSPADM

## 2018-07-16 RX ORDER — LIDOCAINE HYDROCHLORIDE 20 MG/ML
INJECTION, SOLUTION INFILTRATION; PERINEURAL AS NEEDED
Status: DISCONTINUED | OUTPATIENT
Start: 2018-07-16 | End: 2018-07-16 | Stop reason: SURG

## 2018-07-16 RX ADMIN — PROPOFOL 50 MG: 10 INJECTION, EMULSION INTRAVENOUS at 13:53

## 2018-07-16 RX ADMIN — PROPOFOL 150 MG: 10 INJECTION, EMULSION INTRAVENOUS at 13:42

## 2018-07-16 RX ADMIN — PROPOFOL 50 MG: 10 INJECTION, EMULSION INTRAVENOUS at 14:03

## 2018-07-16 RX ADMIN — PROPOFOL 50 MG: 10 INJECTION, EMULSION INTRAVENOUS at 13:48

## 2018-07-16 RX ADMIN — PROPOFOL 100 MG: 10 INJECTION, EMULSION INTRAVENOUS at 13:46

## 2018-07-16 RX ADMIN — PROPOFOL 50 MG: 10 INJECTION, EMULSION INTRAVENOUS at 14:07

## 2018-07-16 RX ADMIN — PROPOFOL 50 MG: 10 INJECTION, EMULSION INTRAVENOUS at 13:56

## 2018-07-16 RX ADMIN — PROPOFOL 50 MG: 10 INJECTION, EMULSION INTRAVENOUS at 13:44

## 2018-07-16 RX ADMIN — LIDOCAINE HYDROCHLORIDE 100 MG: 20 INJECTION, SOLUTION INFILTRATION; PERINEURAL at 13:38

## 2018-07-16 RX ADMIN — PROPOFOL 50 MG: 10 INJECTION, EMULSION INTRAVENOUS at 13:59

## 2018-07-16 RX ADMIN — SODIUM CHLORIDE, POTASSIUM CHLORIDE, SODIUM LACTATE AND CALCIUM CHLORIDE 9 ML/HR: 600; 310; 30; 20 INJECTION, SOLUTION INTRAVENOUS at 11:05

## 2018-07-16 RX ADMIN — PROPOFOL 50 MG: 10 INJECTION, EMULSION INTRAVENOUS at 14:17

## 2018-07-16 RX ADMIN — PROPOFOL 50 MG: 10 INJECTION, EMULSION INTRAVENOUS at 14:12

## 2018-07-16 RX ADMIN — PROPOFOL 50 MG: 10 INJECTION, EMULSION INTRAVENOUS at 13:50

## 2018-07-16 NOTE — OP NOTE
Colonoscopy Note:    Indication:    Screening colonoscopy  Consent: Procedure colonoscopy was explained to the patient and detail including but not limited to the, patient of bleeding perforation and possible reactions to sedation.    Sedation: Sedation was provided by anesthesia.    Procedure:  After excellent sedation a digital rectal examination is performed and a flexible colonoscope was inserted into the rectum passed to the cecum.  The cecum was identified by both the ileocecal valve and the appendiceal orifice.  The overall bowel preparation was good in the left colon.  Poor in the cecal bowl and ascending colon.  Much time was taken wash and suction out these areas.  Upon withdrawal scope careful examination mucosa was made.  Pertinent findings include a 2-3 mm sessile ascending polyp removed completely using forceps.  The scope was then slowly withdrawn to the rectum and retroflex were internal hemorrhoids are noted.  The scope was straightened and withdrawn out of the patient with no immediate, patient's and he tolerated procedure well.    Impression/Plan:  Colon polyp removed using forceps  Internal hemorrhoids  We'll await biopsy results.  Repeat colonoscopy will likely be recommended in 5 years but based on pathology results.

## 2018-07-16 NOTE — ANESTHESIA POSTPROCEDURE EVALUATION
Patient: Eliseo Price    Procedure Summary     Date:  07/16/18 Room / Location:   [facility] /  [facility]    Anesthesia Start:  1333 Anesthesia Stop:  1423    Procedure:  COLONOSCOPY and polypectomy (N/A ) Diagnosis:       Colon cancer screening      (Colon cancer screening [Z12.11])    Surgeon:  Kaylie Mcfarlane MD Provider:  Alana Diehl CRNA    Anesthesia Type:  MAC ASA Status:  3          Anesthesia Type: MAC  Last vitals  BP   96/78 (07/16/18 1424)   Temp   98.4 °F (36.9 °C) (07/16/18 1057)   Pulse   79 (07/16/18 1424)   Resp   16 (07/16/18 1424)     SpO2   99 % (07/16/18 1424)     Post Anesthesia Care and Evaluation    Patient location during evaluation: PHASE II  Patient participation: complete - patient participated  Level of consciousness: awake  Pain management: adequate  Airway patency: patent  Anesthetic complications: No anesthetic complications  PONV Status: none  Cardiovascular status: acceptable  Respiratory status: acceptable  Hydration status: acceptable

## 2018-07-16 NOTE — H&P
- 1965     CHIEF COMPLAINT       Chief Complaint   Patient presents with   • Abdominal Pain       hospital follow up         HISTORY OF PRESENT ILLNESS  Abdominal Pain   Associated symptoms include arthralgias and headaches.      He is here in follow up from the hospital and is doing well. Pain has decreased. He is on ppi bid. He is off nsaids.  Weight has been stable.  He is planning on getting cls done. pcp sent him paperwork for Dr. Henry. No previous cls.  Last hemoglobin was 8.7. He has labs scheduled with pcp next. BM are brown. No melena or hematemesis.  Weight has been down about 10 pounds since discharge from hospital.     REVIEW OF SYSTEMS  Review of Systems   Gastrointestinal: Positive for abdominal pain.   Musculoskeletal: Positive for arthralgias, back pain, gait problem, joint swelling and neck pain.   Neurological: Positive for dizziness, tremors, light-headedness and headaches.   Psychiatric/Behavioral: Positive for agitation and confusion. The patient is nervous/anxious.    All other systems reviewed and are negative.           ACTIVE PROBLEMS       Patient Active Problem List     Diagnosis   • Coffee ground emesis [K92.0]       Overview Note:       Added automatically from request for surgery [id number]      • Absolute anemia [D64.9]       Overview Note:       Added automatically from request for surgery       • Confusion associated with infection [R41.0, B99.9]   • Wound dehiscence, surgical, initial encounter [T81.31XA]       Overview Note:       Added automatically from request for surgery       • Postoperative infection [T81.4XXA]       Overview Note:       Added automatically from request for surgery       • Open dislocation of phalanx of foot [S93.106A, S91.109A]   • Open dislocation of toe [S93.106A, S91.109A]   • Foot fracture, right [S92.901A]   • Immobility [Z74.09]   • Altered mental status [R41.82]   • Altered mental status, unspecified [R41.82]   •  Infected epidermoid cyst [L72.0, L08.9]   • Cellulitis of right lower extremity [L03.115]   • Cellulitis [L03.90]   • Disease of thyroid gland [E07.9]       Overview Note:       nodule on thyroid               PAST MEDICAL HISTORY  Medical History        Past Medical History:   Diagnosis Date   • Arthritis     • Bipolar disorder     • Cellulitis of lower extremity       RIGHT   • Coronary artery disease     • Diabetes mellitus     • Disease of thyroid gland       nodule on thyroid   • DVT (deep venous thrombosis)     • Fracture of right foot     • Hyperlipidemia     • Hypertension     • Pseudogout     • Septic shock 07/2017     H/O   • Toxic metabolic encephalopathy 07/2017     H/O               SURGICAL HISTORY  Surgical History         Past Surgical History:   Procedure Laterality Date   • AMPUTATION DIGIT Left 3/16/2018     Procedure: AMPUTATION LEFT FOURTH TOE;  Surgeon: Anish Farah DPM;  Location: [facility] OR;  Service: Podiatry   • AMPUTATION FOOT / TOE Left       5th metatarsal   • ENDOSCOPY N/A 3/16/2018     Procedure: ESOPHAGOGASTRODUODENOSCOPY with biopsies;  Surgeon: Kaylie Mcfarlane MD;  Location: [facility] OR;  Service: Gastroenterology   • HARDWARE REMOVAL Right 8/18/2017     Procedure: RIGHT EXTERNAL FIXATOR REMOVAL;  Surgeon: Anish Farah DPM;  Location: [facility] OR;  Service:    • JOINT REPLACEMENT       • KNEE SURGERY       • ORIF FOOT FRACTURE Right 7/29/2017     Procedure: open reduction with percutaneous pinning of midfoot dislocation fracture;  Surgeon: Anish Farah DPM;  Location: [facility] OR;  Service:    • ORIF FOOT FRACTURE Left 3/5/2018     Procedure: OPEN REDUCTION WITH IRRIGATION AND WOUND CLOSURE LEFT FOURTH TOE;  Surgeon: Anish Farah DPM;  Location: [facility] OR;  Service:    • TOE FUSION Right 8/18/2017     Procedure: RIGHT  MEDIAL COLUMN ARTHRODESIS, RIGHT TARAL METATARSAL ARTHRODESIS;  Surgeon: Anish Farah DPM;  Location: [facility] OR;  Service:    • TOTAL HIP ARTHROPLASTY                    FAMILY HISTORY        Family History   Problem Relation Age of Onset   • Arthritis Mother     • Cancer Mother     • Hyperlipidemia Mother     • Arthritis Father     • Cancer Father     • Depression Father     • Hypertension Father     • Mental illness Father     • Cancer Sister     • Arthritis Sister     • Hyperlipidemia Sister     • Cancer Paternal Aunt     • Hypertension Daughter     • Depression Son     • Hyperlipidemia Paternal Grandmother     • Hearing loss Paternal Grandfather     • Hyperlipidemia Paternal Grandfather     • Hypertension Paternal Grandfather              SOCIAL HISTORY  Social History      Occupational History   • Not on file.             Social History Main Topics   • Smoking status: Never Smoker   • Smokeless tobacco: Never Used   • Alcohol use No         Comment: h/o alcohol consumption-quit 10 years 8 months   • Drug use: No   • Sexual activity: Defer            CURRENT MEDICATIONS     Current Outpatient Prescriptions:   •  amLODIPine (NORVASC) 2.5 MG tablet, Take 2.5 mg by mouth Daily., Disp: , Rfl:   •  amLODIPine (NORVASC) 5 MG tablet, Take 1 tablet by mouth Daily., Disp: 30 tablet, Rfl: 0  •  CloNIDine (CATAPRES) 0.1 MG tablet, Take 0.1 mg by mouth., Disp: , Rfl:   •  colchicine 0.6 MG tablet, Take 0.6 mg by mouth., Disp: , Rfl:   •  FLUoxetine (PROzac) 20 MG capsule, Take 40 mg by mouth Daily., Disp: , Rfl:   •  gabapentin (NEURONTIN) 800 MG tablet, Take 800 mg by mouth 3 (three) times a day., Disp: , Rfl:   •  hydrophor (AQUAPHOR) ointment ointment, Apply 1 application topically Daily., Disp: , Rfl:   •  ibuprofen (ADVIL,MOTRIN) 400 MG tablet, Take 800 mg by mouth., Disp: , Rfl:   •  lactobacillus acidophilus (RISAQUAD) capsule capsule, Take 1 capsule by mouth Daily., Disp: 30 capsule, Rfl: 0  •  LORazepam (ATIVAN) 0.5 MG tablet, Take 0.5 mg by mouth Daily As Needed for Anxiety., Disp: , Rfl:   •  losartan (COZAAR) 100 MG tablet, Take 1 tablet by mouth Daily., Disp: 30  "tablet, Rfl: 0  •  metFORMIN (GLUCOPHAGE) 1000 MG tablet, Take 1,000 mg by mouth 2 (Two) Times a Day With Meals., Disp: , Rfl:   •  metoprolol tartrate (LOPRESSOR) 100 MG tablet, Take 100 mg by mouth., Disp: , Rfl:   •  pantoprazole (PROTONIX) 40 MG EC tablet, Take 1 tablet by mouth 2 (Two) Times a Day Before Meals for 30 days., Disp: 60 tablet, Rfl: 0  •  primidone (MYSOLINE) 250 MG tablet, Take 250 mg by mouth., Disp: , Rfl:   •  sucralfate (CARAFATE) 1 g tablet, Take 1 tablet by mouth 2 (Two) Times a Day Before Meals for 30 days., Disp: 60 tablet, Rfl: 0  •  temazepam (RESTORIL) 15 MG capsule, Take 15 mg by mouth At Night As Needed for Sleep., Disp: , Rfl:      ALLERGIES  Lisinopril     VITALS  Vitals       Vitals:   /80 (BP Location: Left arm, Patient Position: Sitting)   Pulse 73   Temp 98.4 °F (36.9 °C) (Oral)   Resp 16   Ht 182.9 cm (72\")   Wt 118 kg (260 lb 3.2 oz)   SpO2 98%   BMI 35.29 kg/m²                            LAST RESULTS           Admission on 03/13/2018, Discharged on 03/20/2018   No results displayed because visit has over 200 results.          No results found.     PHYSICAL EXAM  Physical Exam   Constitutional: He is oriented to person, place, and time. He appears well-developed and well-nourished. No distress.   HENT:   Head: Normocephalic and atraumatic.   Eyes: EOM are normal. Pupils are equal, round, and reactive to light.   Neck: Neck supple. No tracheal deviation present.   Cardiovascular: Normal rate, regular rhythm, normal heart sounds and intact distal pulses.  Exam reveals no gallop and no friction rub.    No murmur heard.  Pulmonary/Chest: Effort normal and breath sounds normal. No respiratory distress. He has no wheezes. He has no rales. He exhibits no tenderness.   Abdominal: Soft. Bowel sounds are normal. He exhibits no distension. There is no tenderness. There is no rebound and no guarding.   Musculoskeletal: He exhibits no edema.   Lymphadenopathy:     He has no " cervical adenopathy.   Neurological: He is alert and oriented to person, place, and time.   Skin: Skin is warm. He is not diaphoretic. No erythema.   Psychiatric: He has a normal mood and affect. His behavior is normal. Judgment and thought content normal.   Nursing note and vitals reviewed.        ASSESSMENT  Diagnoses and all orders for this visit:     Peptic ulcer disease         Screening colonoscopy     PLAN  No Follow-up on file.     Continue on ppi avoid nsaids.  Has cls planned risks benefits alternatives were all discussed with him.

## 2018-07-19 LAB
LAB AP CASE REPORT: NORMAL
PATH REPORT.FINAL DX SPEC: NORMAL

## 2018-09-20 ENCOUNTER — HOSPITAL ENCOUNTER (OUTPATIENT)
Dept: GENERAL RADIOLOGY | Facility: HOSPITAL | Age: 53
Discharge: HOME | End: 2018-09-20
Attending: PODIATRIST | Admitting: PODIATRIST

## 2018-09-20 DIAGNOSIS — Z98.1 POSTSURGICAL ARTHRODESIS STATUS: Primary | ICD-10-CM

## 2018-09-20 DIAGNOSIS — M79.671 PAIN OF RIGHT FOOT: ICD-10-CM

## 2018-09-20 PROCEDURE — 73630 X-RAY EXAM OF FOOT: CPT

## 2018-11-15 ENCOUNTER — HOSPITAL ENCOUNTER (OUTPATIENT)
Dept: GENERAL RADIOLOGY | Facility: HOSPITAL | Age: 53
Discharge: HOME | End: 2018-11-15
Attending: PODIATRIST | Admitting: PODIATRIST

## 2018-11-15 ENCOUNTER — HOSPITAL ENCOUNTER (OUTPATIENT)
Dept: GENERAL RADIOLOGY | Facility: HOSPITAL | Age: 53
Discharge: HOME | End: 2018-11-15
Attending: PODIATRIST

## 2018-11-15 DIAGNOSIS — M20.5X2 CLAW TOE, LEFT: Primary | ICD-10-CM

## 2018-11-15 DIAGNOSIS — M20.5X1 CLAW TOE, ACQUIRED, RIGHT: ICD-10-CM

## 2018-11-15 DIAGNOSIS — Z98.1 STATUS POST ARTHRODESIS: ICD-10-CM

## 2018-11-15 PROCEDURE — 73630 X-RAY EXAM OF FOOT: CPT

## 2018-11-23 ENCOUNTER — APPOINTMENT (OUTPATIENT)
Dept: GENERAL RADIOLOGY | Facility: HOSPITAL | Age: 53
End: 2018-11-23

## 2018-11-23 ENCOUNTER — APPOINTMENT (OUTPATIENT)
Dept: ULTRASOUND IMAGING | Facility: HOSPITAL | Age: 53
End: 2018-11-23

## 2018-11-23 ENCOUNTER — HOSPITAL ENCOUNTER (OUTPATIENT)
Facility: HOSPITAL | Age: 53
Discharge: HOME | End: 2018-11-25
Attending: EMERGENCY MEDICINE | Admitting: HOSPITALIST

## 2018-11-23 ENCOUNTER — APPOINTMENT (OUTPATIENT)
Dept: CT IMAGING | Facility: HOSPITAL | Age: 53
End: 2018-11-23

## 2018-11-23 DIAGNOSIS — L03.116 CELLULITIS OF LEFT FOOT: Primary | ICD-10-CM

## 2018-11-23 DIAGNOSIS — N17.9 ACUTE KIDNEY INJURY (NONTRAUMATIC) (HCC): ICD-10-CM

## 2018-11-23 LAB
ALBUMIN SERPL-MCNC: 4.1 G/DL (ref 3.5–5.2)
ALBUMIN/GLOB SERPL: 1.1 G/DL
ALP SERPL-CCNC: 93 U/L (ref 40–129)
ALT SERPL W P-5'-P-CCNC: 44 U/L (ref 5–41)
ANION GAP SERPL CALCULATED.3IONS-SCNC: 16.1 MMOL/L
AST SERPL-CCNC: 70 U/L (ref 5–40)
BACTERIA UR QL AUTO: ABNORMAL /HPF
BASOPHILS # BLD AUTO: 0.08 10*3/MM3 (ref 0–0.2)
BASOPHILS NFR BLD AUTO: 0.6 % (ref 0–2)
BILIRUB SERPL-MCNC: 0.4 MG/DL (ref 0.2–1.2)
BILIRUB UR QL STRIP: NEGATIVE
BUN BLD-MCNC: 30 MG/DL (ref 6–20)
BUN/CREAT SERPL: 18.8 (ref 7–25)
CALCIUM SPEC-SCNC: 8.8 MG/DL (ref 8.6–10.5)
CHLORIDE SERPL-SCNC: 92 MMOL/L (ref 98–107)
CLARITY UR: ABNORMAL
CO2 SERPL-SCNC: 21.9 MMOL/L (ref 22–29)
COLOR UR: YELLOW
CREAT BLD-MCNC: 1.6 MG/DL (ref 0.76–1.27)
CRP SERPL-MCNC: 4.27 MG/DL (ref 0–0.5)
D-LACTATE SERPL-SCNC: 1.1 MMOL/L (ref 0.5–2)
DEPRECATED RDW RBC AUTO: 54.2 FL (ref 37–54)
EOSINOPHIL # BLD AUTO: 0.49 10*3/MM3 (ref 0.1–0.3)
EOSINOPHIL NFR BLD AUTO: 3.7 % (ref 0–4)
ERYTHROCYTE [DISTWIDTH] IN BLOOD BY AUTOMATED COUNT: 17.7 % (ref 11.5–14.5)
ERYTHROCYTE [SEDIMENTATION RATE] IN BLOOD: 14 MM/HR (ref 0–20)
GFR SERPL CREATININE-BSD FRML MDRD: 45 ML/MIN/1.73
GLOBULIN UR ELPH-MCNC: 3.9 GM/DL
GLUCOSE BLD-MCNC: 102 MG/DL (ref 65–99)
GLUCOSE UR STRIP-MCNC: NEGATIVE MG/DL
HCT VFR BLD AUTO: 42.3 % (ref 42–52)
HGB BLD-MCNC: 13.3 G/DL (ref 14–18)
HGB UR QL STRIP.AUTO: ABNORMAL
HYALINE CASTS UR QL AUTO: ABNORMAL /LPF
IMM GRANULOCYTES # BLD: 0.06 10*3/MM3 (ref 0–0.03)
IMM GRANULOCYTES NFR BLD: 0.5 % (ref 0–0.5)
KETONES UR QL STRIP: NEGATIVE
LEUKOCYTE ESTERASE UR QL STRIP.AUTO: NEGATIVE
LYMPHOCYTES # BLD AUTO: 1.51 10*3/MM3 (ref 0.6–4.8)
LYMPHOCYTES NFR BLD AUTO: 11.4 % (ref 20–45)
MCH RBC QN AUTO: 26.3 PG (ref 27–31)
MCHC RBC AUTO-ENTMCNC: 31.4 G/DL (ref 31–37)
MCV RBC AUTO: 83.6 FL (ref 80–94)
MONOCYTES # BLD AUTO: 1.09 10*3/MM3 (ref 0–1)
MONOCYTES NFR BLD AUTO: 8.3 % (ref 3–8)
NEUTROPHILS # BLD AUTO: 9.98 10*3/MM3 (ref 1.5–8.3)
NEUTROPHILS NFR BLD AUTO: 75.5 % (ref 45–70)
NITRITE UR QL STRIP: NEGATIVE
NRBC BLD MANUAL-RTO: 0 /100 WBC (ref 0–0)
PH UR STRIP.AUTO: 5.5 [PH] (ref 4.5–8)
PLATELET # BLD AUTO: 250 10*3/MM3 (ref 140–500)
PMV BLD AUTO: 9.1 FL (ref 7.4–10.4)
POTASSIUM BLD-SCNC: 3.7 MMOL/L (ref 3.5–5.2)
PROCALCITONIN SERPL-MCNC: 0.07 NG/ML (ref 0.1–0.25)
PROT SERPL-MCNC: 8 G/DL (ref 6–8.5)
PROT UR QL STRIP: NEGATIVE
RBC # BLD AUTO: 5.06 10*6/MM3 (ref 4.7–6.1)
RBC # UR: ABNORMAL /HPF
REF LAB TEST METHOD: ABNORMAL
SODIUM BLD-SCNC: 130 MMOL/L (ref 136–145)
SP GR UR STRIP: 1.01 (ref 1–1.03)
SQUAMOUS #/AREA URNS HPF: ABNORMAL /HPF
UROBILINOGEN UR QL STRIP: ABNORMAL
WBC NRBC COR # BLD: 13.21 10*3/MM3 (ref 4.8–10.8)
WBC UR QL AUTO: ABNORMAL /HPF

## 2018-11-23 PROCEDURE — A9270 NON-COVERED ITEM OR SERVICE: HCPCS

## 2018-11-23 PROCEDURE — 73630 X-RAY EXAM OF FOOT: CPT

## 2018-11-23 PROCEDURE — 25010000002 PIPERACILLIN-TAZOBACTAM: Performed by: EMERGENCY MEDICINE

## 2018-11-23 PROCEDURE — 83605 ASSAY OF LACTIC ACID: CPT | Performed by: EMERGENCY MEDICINE

## 2018-11-23 PROCEDURE — A9270 NON-COVERED ITEM OR SERVICE: HCPCS | Performed by: INTERNAL MEDICINE

## 2018-11-23 PROCEDURE — 63710000001 PROPRANOLOL 10 MG TABLET

## 2018-11-23 PROCEDURE — 84145 PROCALCITONIN (PCT): CPT | Performed by: EMERGENCY MEDICINE

## 2018-11-23 PROCEDURE — 70450 CT HEAD/BRAIN W/O DYE: CPT

## 2018-11-23 PROCEDURE — 99285 EMERGENCY DEPT VISIT HI MDM: CPT

## 2018-11-23 PROCEDURE — 86140 C-REACTIVE PROTEIN: CPT | Performed by: INTERNAL MEDICINE

## 2018-11-23 PROCEDURE — 99291 CRITICAL CARE FIRST HOUR: CPT | Performed by: EMERGENCY MEDICINE

## 2018-11-23 PROCEDURE — 63710000001 ACETAMINOPHEN 500 MG TABLET: Performed by: EMERGENCY MEDICINE

## 2018-11-23 PROCEDURE — 63710000001 PRIMIDONE 250 MG TABLET: Performed by: INTERNAL MEDICINE

## 2018-11-23 PROCEDURE — 99223 1ST HOSP IP/OBS HIGH 75: CPT | Performed by: INTERNAL MEDICINE

## 2018-11-23 PROCEDURE — 93923 UPR/LXTR ART STDY 3+ LVLS: CPT

## 2018-11-23 PROCEDURE — 96361 HYDRATE IV INFUSION ADD-ON: CPT

## 2018-11-23 PROCEDURE — 71101 X-RAY EXAM UNILAT RIBS/CHEST: CPT

## 2018-11-23 PROCEDURE — 87040 BLOOD CULTURE FOR BACTERIA: CPT | Performed by: EMERGENCY MEDICINE

## 2018-11-23 PROCEDURE — 81001 URINALYSIS AUTO W/SCOPE: CPT | Performed by: EMERGENCY MEDICINE

## 2018-11-23 PROCEDURE — 80053 COMPREHEN METABOLIC PANEL: CPT | Performed by: EMERGENCY MEDICINE

## 2018-11-23 PROCEDURE — 63710000001 CLONIDINE 0.1 MG TABLET: Performed by: INTERNAL MEDICINE

## 2018-11-23 PROCEDURE — 63710000001 HYDROCODONE-ACETAMINOPHEN 10-325 MG TABLET: Performed by: INTERNAL MEDICINE

## 2018-11-23 PROCEDURE — 85025 COMPLETE CBC W/AUTO DIFF WBC: CPT | Performed by: EMERGENCY MEDICINE

## 2018-11-23 PROCEDURE — 72125 CT NECK SPINE W/O DYE: CPT

## 2018-11-23 PROCEDURE — 96365 THER/PROPH/DIAG IV INF INIT: CPT

## 2018-11-23 PROCEDURE — A9270 NON-COVERED ITEM OR SERVICE: HCPCS | Performed by: EMERGENCY MEDICINE

## 2018-11-23 PROCEDURE — 85652 RBC SED RATE AUTOMATED: CPT | Performed by: INTERNAL MEDICINE

## 2018-11-23 RX ORDER — PRIMIDONE 250 MG/1
250 TABLET ORAL NIGHTLY
Status: DISCONTINUED | OUTPATIENT
Start: 2018-11-23 | End: 2018-11-25 | Stop reason: HOSPADM

## 2018-11-23 RX ORDER — ONDANSETRON 4 MG/1
4 TABLET, ORALLY DISINTEGRATING ORAL EVERY 6 HOURS PRN
Status: DISCONTINUED | OUTPATIENT
Start: 2018-11-23 | End: 2018-11-25 | Stop reason: HOSPADM

## 2018-11-23 RX ORDER — PROPRANOLOL HYDROCHLORIDE 40 MG/1
40 TABLET ORAL 2 TIMES DAILY
Status: DISCONTINUED | OUTPATIENT
Start: 2018-11-23 | End: 2018-11-25 | Stop reason: HOSPADM

## 2018-11-23 RX ORDER — ACETAMINOPHEN 500 MG
1000 TABLET ORAL ONCE
Status: COMPLETED | OUTPATIENT
Start: 2018-11-23 | End: 2018-11-23

## 2018-11-23 RX ORDER — SODIUM CHLORIDE, SODIUM LACTATE, POTASSIUM CHLORIDE, CALCIUM CHLORIDE 600; 310; 30; 20 MG/100ML; MG/100ML; MG/100ML; MG/100ML
150 INJECTION, SOLUTION INTRAVENOUS CONTINUOUS
Status: ACTIVE | OUTPATIENT
Start: 2018-11-23 | End: 2018-11-24

## 2018-11-23 RX ORDER — HYDROCODONE BITARTRATE AND ACETAMINOPHEN 10; 325 MG/1; MG/1
1 TABLET ORAL EVERY 4 HOURS PRN
Status: DISCONTINUED | OUTPATIENT
Start: 2018-11-23 | End: 2018-11-25 | Stop reason: HOSPADM

## 2018-11-23 RX ORDER — FLUOXETINE HYDROCHLORIDE 20 MG/1
40 CAPSULE ORAL DAILY
Status: DISCONTINUED | OUTPATIENT
Start: 2018-11-24 | End: 2018-11-25 | Stop reason: HOSPADM

## 2018-11-23 RX ORDER — CLONIDINE HYDROCHLORIDE 0.1 MG/1
0.1 TABLET ORAL EVERY 12 HOURS SCHEDULED
Status: DISCONTINUED | OUTPATIENT
Start: 2018-11-23 | End: 2018-11-25 | Stop reason: HOSPADM

## 2018-11-23 RX ORDER — PROPRANOLOL HYDROCHLORIDE 10 MG/1
TABLET ORAL
Status: COMPLETED
Start: 2018-11-23 | End: 2018-11-23

## 2018-11-23 RX ORDER — HYDROCODONE BITARTRATE AND ACETAMINOPHEN 5; 325 MG/1; MG/1
1 TABLET ORAL EVERY 4 HOURS PRN
Status: DISCONTINUED | OUTPATIENT
Start: 2018-11-23 | End: 2018-11-25 | Stop reason: HOSPADM

## 2018-11-23 RX ORDER — ONDANSETRON 4 MG/1
4 TABLET, FILM COATED ORAL EVERY 6 HOURS PRN
Status: DISCONTINUED | OUTPATIENT
Start: 2018-11-23 | End: 2018-11-25 | Stop reason: HOSPADM

## 2018-11-23 RX ORDER — AMLODIPINE BESYLATE 5 MG/1
5 TABLET ORAL DAILY
Status: DISCONTINUED | OUTPATIENT
Start: 2018-11-24 | End: 2018-11-25 | Stop reason: HOSPADM

## 2018-11-23 RX ORDER — SODIUM CHLORIDE 9 MG/ML
40 INJECTION, SOLUTION INTRAVENOUS AS NEEDED
Status: DISCONTINUED | OUTPATIENT
Start: 2018-11-23 | End: 2018-11-25 | Stop reason: HOSPADM

## 2018-11-23 RX ORDER — CHOLECALCIFEROL (VITAMIN D3) 125 MCG
5 CAPSULE ORAL NIGHTLY PRN
Status: DISCONTINUED | OUTPATIENT
Start: 2018-11-23 | End: 2018-11-25 | Stop reason: HOSPADM

## 2018-11-23 RX ORDER — CLONAZEPAM 0.5 MG/1
0.5 TABLET ORAL 3 TIMES DAILY PRN
Status: DISCONTINUED | OUTPATIENT
Start: 2018-11-23 | End: 2018-11-25 | Stop reason: HOSPADM

## 2018-11-23 RX ORDER — SODIUM CHLORIDE 0.9 % (FLUSH) 0.9 %
1-10 SYRINGE (ML) INJECTION AS NEEDED
Status: DISCONTINUED | OUTPATIENT
Start: 2018-11-23 | End: 2018-11-25 | Stop reason: HOSPADM

## 2018-11-23 RX ORDER — PANTOPRAZOLE SODIUM 40 MG/1
40 TABLET, DELAYED RELEASE ORAL DAILY
Status: DISCONTINUED | OUTPATIENT
Start: 2018-11-24 | End: 2018-11-25 | Stop reason: HOSPADM

## 2018-11-23 RX ORDER — SENNA AND DOCUSATE SODIUM 50; 8.6 MG/1; MG/1
2 TABLET, FILM COATED ORAL NIGHTLY PRN
Status: DISCONTINUED | OUTPATIENT
Start: 2018-11-23 | End: 2018-11-25 | Stop reason: HOSPADM

## 2018-11-23 RX ORDER — ACETAMINOPHEN 325 MG/1
650 TABLET ORAL EVERY 4 HOURS PRN
Status: DISCONTINUED | OUTPATIENT
Start: 2018-11-23 | End: 2018-11-25 | Stop reason: HOSPADM

## 2018-11-23 RX ORDER — TEMAZEPAM 15 MG/1
30 CAPSULE ORAL NIGHTLY PRN
Status: DISCONTINUED | OUTPATIENT
Start: 2018-11-23 | End: 2018-11-25 | Stop reason: HOSPADM

## 2018-11-23 RX ORDER — SODIUM CHLORIDE 0.9 % (FLUSH) 0.9 %
3 SYRINGE (ML) INJECTION EVERY 12 HOURS SCHEDULED
Status: DISCONTINUED | OUTPATIENT
Start: 2018-11-23 | End: 2018-11-25 | Stop reason: HOSPADM

## 2018-11-23 RX ORDER — SODIUM CHLORIDE 0.9 % (FLUSH) 0.9 %
10 SYRINGE (ML) INJECTION AS NEEDED
Status: DISCONTINUED | OUTPATIENT
Start: 2018-11-23 | End: 2018-11-25 | Stop reason: HOSPADM

## 2018-11-23 RX ORDER — ONDANSETRON 2 MG/ML
4 INJECTION INTRAMUSCULAR; INTRAVENOUS EVERY 6 HOURS PRN
Status: DISCONTINUED | OUTPATIENT
Start: 2018-11-23 | End: 2018-11-25 | Stop reason: HOSPADM

## 2018-11-23 RX ADMIN — PROPRANOLOL HYDROCHLORIDE 40 MG: 10 TABLET ORAL at 23:59

## 2018-11-23 RX ADMIN — PROPRANOLOL HYDROCHLORIDE 40 MG: 40 TABLET ORAL at 23:59

## 2018-11-23 RX ADMIN — ACETAMINOPHEN 1000 MG: 500 TABLET, FILM COATED ORAL at 18:30

## 2018-11-23 RX ADMIN — SODIUM CHLORIDE 1000 ML: 900 INJECTION, SOLUTION INTRAVENOUS at 16:58

## 2018-11-23 RX ADMIN — TAZOBACTAM SODIUM AND PIPERACILLIN SODIUM 4.5 G: .5; 4 INJECTION, POWDER, LYOPHILIZED, FOR SOLUTION INTRAVENOUS at 18:47

## 2018-11-23 RX ADMIN — PRIMIDONE 250 MG: 250 TABLET ORAL at 23:57

## 2018-11-23 RX ADMIN — HYDROCODONE BITARTRATE AND ACETAMINOPHEN 1 TABLET: 10; 325 TABLET ORAL at 22:23

## 2018-11-23 RX ADMIN — SODIUM CHLORIDE, POTASSIUM CHLORIDE, SODIUM LACTATE AND CALCIUM CHLORIDE 150 ML/HR: 600; 310; 30; 20 INJECTION, SOLUTION INTRAVENOUS at 18:06

## 2018-11-23 RX ADMIN — CLONIDINE HYDROCHLORIDE 0.1 MG: 0.1 TABLET ORAL at 23:58

## 2018-11-23 NOTE — ED NOTES
Pt had urinal and was attempting to provide a urine specimen       Reina Archer, DAVE  11/23/18 2637

## 2018-11-23 NOTE — ED PROVIDER NOTES
Subjective   History of Present Illness  History of Present Illness    Chief complaint: Confusion, falls, weakness and red/swollen left foot    Location: Entire left foot    Quality/Severity:  Severe    Timing/Duration: 4 days    Modifying Factors: Previous history of osteomyelitis and cellulitis in left foot    Associated Symptoms: None    Narrative: The patient is a 53-year-old white male brought to the emergency department by his family as noted above.  The patient lives by himself but the family does have frequent contact.  Approximately 4 days ago the patient seemed to get more confused and subsequently has been having problems with weakness, falls and a probable cellulitis of his left foot.  Family relates that when he does experience an infection he has mental status changes.  Family relates that the patient fell today and inflicted a large hole in the drywall of a wall.    Review of Systems   Unable to perform ROS: Acuity of condition (Review of systems should be considered cursory as it was obtained from the family.)   Constitutional: Positive for activity change.   Musculoskeletal: Positive for gait problem.   Skin: Positive for wound (several superficial wounds on both feet).   Neurological: Positive for weakness.   Psychiatric/Behavioral: Positive for behavioral problems, confusion and decreased concentration.       Past Medical History:   Diagnosis Date   • Arthritis    • Bipolar disorder (CMS/[facility])    • Cellulitis of lower extremity     RIGHT   • Coronary artery disease    • Diabetes mellitus (CMS/[facility])    • Disease of thyroid gland     nodule on thyroid   • DVT (deep venous thrombosis) (CMS/[facility])    • Fracture of right foot    • Hyperlipidemia    • Hypertension    • Pseudogout    • Septic shock (CMS/[facility]) 07/2017    H/O   • Sleep apnea    • Toxic metabolic encephalopathy 07/2017    H/O       Allergies   Allergen Reactions   • Hydrocodone-Acetaminophen Other (See Comments)     DOES NOT HELP THE PAIN--PT  "STS GENERIC HYDROCODONE OK BUT \"VICODIN\" DOES NOT HELP PAIN   • Lisinopril Cough     COUGHING       Past Surgical History:   Procedure Laterality Date   • 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   • JOINT REPLACEMENT     • KNEE SURGERY     • SHOULDER SURGERY      RIGHT   • TOTAL HIP ARTHROPLASTY         Family History   Problem Relation Age of Onset   • Arthritis Mother    • Cancer Mother    • Hyperlipidemia Mother    • Colon polyps Mother    • Arthritis Father    • Cancer Father    • Depression Father    • Hypertension Father    • Mental illness Father    • Cancer Sister    • Arthritis Sister    • Hyperlipidemia Sister    • Cancer Paternal Aunt    • Hypertension Daughter    • Depression Son    • Hyperlipidemia Paternal Grandmother    • Hearing loss Paternal Grandfather    • Hyperlipidemia Paternal Grandfather    • Hypertension Paternal Grandfather    • Colon cancer Maternal Grandmother        Social History     Socioeconomic History   • Marital status:      Spouse name: Not on file   • Number of children: Not on file   • Years of education: Not on file   • Highest education level: Not on file   Tobacco Use   • Smoking status: Never Smoker   • Smokeless tobacco: Never Used   Substance and Sexual Activity   • Alcohol use: No     Comment: h/o alcohol consumption-   • Drug use: No   • Sexual activity: Defer           Objective   Physical Exam   Constitutional:   The patient is an obese, chronically ill-appearing, white male who is lethargic.   HENT:   Head: Normocephalic.   Minor contusion noted to the right forehead.  Small amount of dried blood on lips with no obvious intraoral injuries.   Eyes: Conjunctivae and EOM are normal. Pupils are equal, round, and reactive to light.   Neck: Normal range of motion. Neck supple.   Patient with complaint of pain with palpation of neck.   Cardiovascular: Normal rate, regular rhythm and normal heart sounds.   No murmur heard.  Pulmonary/Chest: He exhibits tenderness " (right, lower, anterior chest).   Poor compliance with auscultation of the lungs without any gross findings.   Abdominal: Soft. Bowel sounds are normal. He exhibits no distension. There is no tenderness.   Musculoskeletal: Normal range of motion.   The left ankle and foot did demonstrate significant erythema, warmth and edema.  Previous amputation of toes 4 and 5.  Several superficial abrasions noted to the dorsal aspects of both feet.   Neurological: No cranial nerve deficit.   Patient lethargic and oriented to person only   Skin: Skin is warm and dry. No rash noted.   Psychiatric:   Unable to assess   Nursing note and vitals reviewed.      Procedures  Xr Ribs Right With Pa Chest    Result Date: 11/23/2018  PA CHEST WITH RIGHT RIBS  HISTORY: Fall today  Comparison: Rachelle 10, 2018  FINDINGS: The heart size is normal. Mediastinal structures are midline. Lung volumes are diminished. Mild elevation of the right hemidiaphragm. No acute infiltrates.  No right-sided rib fractures are demonstrated. No pneumothorax. No unexpected radiopaque foreign bodies.      Negative chest x-ray. Negative right rib series.  This report was finalized on 11/23/2018 4:06 PM by Dr. Jose Dewitt MD.      Xr Foot 3+ View Left    Result Date: 11/23/2018  LEFT FOOT, 3 VIEWS  HISTORY: Cellulitis with history of osteomyelitis  Comparison: 11/15/2018  FINDINGS: Redemonstrated is the transmetatarsal amputation of the fifth toe. There is amputation of the fourth toe.  No air is identified within the soft tissues. No periosteal reaction is seen. No fractures or dislocations.      No clearly acute radiographic findings. No definite change when compared to exam of 11/15/2018.  This report was finalized on 11/23/2018 4:16 PM by Dr. Jose Dewitt MD.      Ct Head Without Contrast    Result Date: 11/23/2018  CT OF THE HEAD WITHOUT CONTRAST  HISTORY: Fall today. Hit head on wall. Now complains of headache and dizziness  TECHNIQUE: CT of the head without  contrast. Radiation dose reduction techniques included automated exposure control or exposure modulation based on body size. Radiation audit for CT and nuclear cardiology exams in the last 12 months: 5.  FINDINGS: The ventricles are generous in size for patient's age. Cortical sulci are correspondingly prominent particularly in the frontal lobes bilaterally. There are no extra-axial fluid collections. No significant shift of midline structures.  No parenchymal or subarachnoid hemorrhage. No intraventricular hemorrhage. Basal cisterns are maintained. Fourth ventricle is midline.  Calvarium is intact. Mastoid air cells are clear. Visualized paranasal sinuses are clear. No acute orbital findings. Mild deviation of the nasal septum to the left.       1.  No acute intracranial findings. 2.  Cerebral atrophy somewhat out of proportion to the patient's age of 53 years. 3.  Resolution of left maxillary sinus findings prior exam.  This report was finalized on 11/23/2018 3:27 PM by Dr. Jose Dewitt MD.      Ct Cervical Spine Without Contrast    Result Date: 11/23/2018  CT OF THE CERVICAL SPINE WITHOUT CONTRAST  HISTORY: 53-year-old male with fall today. Head on wall. Complaint of neck stiffness  TECHNIQUE: CT of the cervical spine without contrast. Radiation dose reduction techniques included automated exposure control or exposure modulation based on body size. Radiation audit for CT and nuclear cardiology exams in the last 12 months: 5.  FINDINGS: The exam is limited due to patient body habitus. The alignment of the cervical spine appears satisfactory. No obvious fractures are seen. The odontoid is intact. The ring of C1 is intact. Prevertebral soft tissues are normal.  Spondylotic changes of the cervical spine demonstrated at C5-6 and C6-7. Facets show no evidence of subluxation. Facet arthropathy identified. Beam hardening artifact from the patient's right shoulder limits evaluation of the spinal canal.       1.  No clearly  acute radiographic abnormalities demonstrated on this technically limited study. 2.  Cervical spondylosis primarily at C5-6 and C6-7. 3.  No obvious change from prior study of 3/9/2018. 4.  Clinical aspects of the case will determine if MR of the cervical spine could provide additional information.  This report was finalized on 11/23/2018 3:35 PM by Dr. Jose Dewitt MD.             ED Course  ED Course as of Nov 23 1812 Fri Nov 23, 2018   1745 Dr. Paez consult on antibiotic selection and Zosyn was agreed upon  [ML]   1808 Case and findings discussed with Dr. Henriquez who agreed that the patient's condition warranted admission to the hospital for further treatment of his cellulitis and acute kidney injury.  [ML]      ED Course User Index  [ML] Luiz Woodson MD                  MDM  Number of Diagnoses or Management Options  Acute kidney injury (nontraumatic) (CMS/HCC): new and requires workup  Cellulitis of left foot: new and requires workup     Amount and/or Complexity of Data Reviewed  Clinical lab tests: ordered and reviewed  Tests in the radiology section of CPT®: ordered and reviewed  Discuss the patient with other providers: yes  Independent visualization of images, tracings, or specimens: yes    Risk of Complications, Morbidity, and/or Mortality  Presenting problems: high  Diagnostic procedures: high  Management options: high    Critical Care  Total time providing critical care: 30-74 minutes    Patient Progress  Patient progress: stable    Labs this visit  Lab Results (last 24 hours)     Procedure Component Value Units Date/Time    Blood Culture - Blood, Arm, Right [[id number]] Collected:  11/23/18 1551    Specimen:  Blood from Arm, Right Updated:  11/23/18 1647    CBC & Differential [[id number]] Collected:  11/23/18 1600    Specimen:  Blood Updated:  11/23/18 1651    Narrative:       The following orders were created for panel order CBC & Differential.  Procedure                                Abnormality         Status                     ---------                               -----------         ------                     CBC Auto Differential[[id number]]        Abnormal            Final result                 Please view results for these tests on the individual orders.    Comprehensive Metabolic Panel [[id number]]  (Abnormal) Collected:  11/23/18 1600    Specimen:  Blood Updated:  11/23/18 1715     Glucose 102 mg/dL      BUN 30 mg/dL      Creatinine 1.60 mg/dL      Sodium 130 mmol/L      Potassium 3.7 mmol/L      Chloride 92 mmol/L      CO2 21.9 mmol/L      Calcium 8.8 mg/dL      Total Protein 8.0 g/dL      Albumin 4.10 g/dL      ALT (SGPT) 44 U/L      AST (SGOT) 70 U/L      Comment: Specimen hemolyzed.  Results may be affected.        Alkaline Phosphatase 93 U/L      Total Bilirubin 0.4 mg/dL      eGFR Non African Amer 45 mL/min/1.73      Globulin 3.9 gm/dL      A/G Ratio 1.1 g/dL      BUN/Creatinine Ratio 18.8     Anion Gap 16.1 mmol/L     Procalcitonin [[id number]]  (Abnormal) Collected:  11/23/18 1600    Specimen:  Blood Updated:  11/23/18 1737     Procalcitonin 0.07 ng/mL     Narrative:       As a Marker for Sepsis (Non-Neonates):   1. <0.5 ng/mL represents a low risk of severe sepsis and/or septic shock.  2. >2 ng/mL represents a high risk of severe sepsis and/or septic shock.    As a Marker for Lower Respiratory Tract Infections that require antibiotic therapy:    PCT on Admission     Antibiotic Therapy       6-12 Hrs later  > 0.5                Strongly Recommended             >0.25 - <0.5         Recommended  0.1 - 0.25           Discouraged              Remeasure/reassess PCT  <0.1                 Strongly Discouraged     Remeasure/reassess PCT                     PCT values of < 0.5 ng/mL do not exclude an infection, because localized infections (without systemic signs) may be associated with such low concentrations, or a systemic infection in its initial stages (< 6 hours). Furthermore,  increased PCT can occur without infection. PCT concentrations between 0.5 and 2.0 ng/mL should be interpreted taking into account the patient's history. It is recommended to retest PCT within 6-24 hours if any concentrations < 2 ng/mL are obtained.    CBC Auto Differential [[id number]]  (Abnormal) Collected:  11/23/18 1600    Specimen:  Blood Updated:  11/23/18 1651     WBC 13.21 10*3/mm3      RBC 5.06 10*6/mm3      Hemoglobin 13.3 g/dL      Hematocrit 42.3 %      MCV 83.6 fL      MCH 26.3 pg      MCHC 31.4 g/dL      RDW 17.7 %      RDW-SD 54.2 fl      MPV 9.1 fL      Platelets 250 10*3/mm3      Neutrophil % 75.5 %      Lymphocyte % 11.4 %      Monocyte % 8.3 %      Eosinophil % 3.7 %      Basophil % 0.6 %      Immature Grans % 0.5 %      Neutrophils, Absolute 9.98 10*3/mm3      Lymphocytes, Absolute 1.51 10*3/mm3      Monocytes, Absolute 1.09 10*3/mm3      Eosinophils, Absolute 0.49 10*3/mm3      Basophils, Absolute 0.08 10*3/mm3      Immature Grans, Absolute 0.06 10*3/mm3      nRBC 0.0 /100 WBC     Blood Culture - Blood, Arm, Left [[id number]] Collected:  11/23/18 1646    Specimen:  Blood from Arm, Left Updated:  11/23/18 1647    Lactic Acid, Plasma [[id number]]  (Normal) Collected:  11/23/18 1646    Specimen:  Blood Updated:  11/23/18 1709     Lactate 1.1 mmol/L         Prescribed on discharge             Medication List      No changes were made to your prescriptions during this visit.       All lab results, imaging results and other tests were reviewed by Luiz Woodson MD and unless otherwise specified were found to be unremarkable.      Final diagnoses:   Cellulitis of left foot   Acute kidney injury (nontraumatic) (CMS/[facility])            Luiz Woodson MD  11/23/18 2536

## 2018-11-24 ENCOUNTER — APPOINTMENT (OUTPATIENT)
Dept: GENERAL RADIOLOGY | Facility: HOSPITAL | Age: 53
End: 2018-11-24

## 2018-11-24 LAB
ANION GAP SERPL CALCULATED.3IONS-SCNC: 12.4 MMOL/L
B PERT DNA SPEC QL NAA+PROBE: NOT DETECTED
BASOPHILS # BLD AUTO: 0.08 10*3/MM3 (ref 0–0.2)
BASOPHILS NFR BLD AUTO: 0.7 % (ref 0–2)
BUN BLD-MCNC: 20 MG/DL (ref 6–20)
BUN/CREAT SERPL: 19.4 (ref 7–25)
C PNEUM DNA NPH QL NAA+NON-PROBE: NOT DETECTED
CALCIUM SPEC-SCNC: 8.4 MG/DL (ref 8.6–10.5)
CHLORIDE SERPL-SCNC: 98 MMOL/L (ref 98–107)
CO2 SERPL-SCNC: 25.6 MMOL/L (ref 22–29)
CREAT BLD-MCNC: 1.03 MG/DL (ref 0.76–1.27)
DEPRECATED RDW RBC AUTO: 55.9 FL (ref 37–54)
EOSINOPHIL # BLD AUTO: 0.55 10*3/MM3 (ref 0.1–0.3)
EOSINOPHIL NFR BLD AUTO: 5.1 % (ref 0–4)
ERYTHROCYTE [DISTWIDTH] IN BLOOD BY AUTOMATED COUNT: 17.9 % (ref 11.5–14.5)
FLUAV H1 2009 PAND RNA NPH QL NAA+PROBE: NOT DETECTED
FLUAV H1 HA GENE NPH QL NAA+PROBE: NOT DETECTED
FLUAV H3 RNA NPH QL NAA+PROBE: NOT DETECTED
FLUAV SUBTYP SPEC NAA+PROBE: NOT DETECTED
FLUBV RNA ISLT QL NAA+PROBE: NOT DETECTED
GFR SERPL CREATININE-BSD FRML MDRD: 75 ML/MIN/1.73
GLUCOSE BLD-MCNC: 114 MG/DL (ref 65–99)
HADV DNA SPEC NAA+PROBE: NOT DETECTED
HBA1C MFR BLD: 5.9 % (ref 4.8–5.6)
HCOV 229E RNA SPEC QL NAA+PROBE: NOT DETECTED
HCOV HKU1 RNA SPEC QL NAA+PROBE: NOT DETECTED
HCOV NL63 RNA SPEC QL NAA+PROBE: NOT DETECTED
HCOV OC43 RNA SPEC QL NAA+PROBE: NOT DETECTED
HCT VFR BLD AUTO: 40.6 % (ref 42–52)
HGB BLD-MCNC: 12.6 G/DL (ref 14–18)
HMPV RNA NPH QL NAA+NON-PROBE: NOT DETECTED
HPIV1 RNA SPEC QL NAA+PROBE: NOT DETECTED
HPIV2 RNA SPEC QL NAA+PROBE: NOT DETECTED
HPIV3 RNA NPH QL NAA+PROBE: NOT DETECTED
HPIV4 P GENE NPH QL NAA+PROBE: NOT DETECTED
IMM GRANULOCYTES # BLD: 0.04 10*3/MM3 (ref 0–0.03)
IMM GRANULOCYTES NFR BLD: 0.4 % (ref 0–0.5)
LYMPHOCYTES # BLD AUTO: 1.58 10*3/MM3 (ref 0.6–4.8)
LYMPHOCYTES NFR BLD AUTO: 14.6 % (ref 20–45)
M PNEUMO IGG SER IA-ACNC: NOT DETECTED
MCH RBC QN AUTO: 26.4 PG (ref 27–31)
MCHC RBC AUTO-ENTMCNC: 31 G/DL (ref 31–37)
MCV RBC AUTO: 85.1 FL (ref 80–94)
MONOCYTES # BLD AUTO: 1.19 10*3/MM3 (ref 0–1)
MONOCYTES NFR BLD AUTO: 11 % (ref 3–8)
NEUTROPHILS # BLD AUTO: 7.36 10*3/MM3 (ref 1.5–8.3)
NEUTROPHILS NFR BLD AUTO: 68.2 % (ref 45–70)
NRBC BLD MANUAL-RTO: 0 /100 WBC (ref 0–0)
PLATELET # BLD AUTO: 226 10*3/MM3 (ref 140–500)
PMV BLD AUTO: 9.3 FL (ref 7.4–10.4)
POTASSIUM BLD-SCNC: 3.9 MMOL/L (ref 3.5–5.2)
RBC # BLD AUTO: 4.77 10*6/MM3 (ref 4.7–6.1)
RHINOVIRUS RNA SPEC NAA+PROBE: NOT DETECTED
RSV RNA NPH QL NAA+NON-PROBE: NOT DETECTED
SODIUM BLD-SCNC: 136 MMOL/L (ref 136–145)
WBC NRBC COR # BLD: 10.8 10*3/MM3 (ref 4.8–10.8)

## 2018-11-24 PROCEDURE — A9270 NON-COVERED ITEM OR SERVICE: HCPCS | Performed by: INTERNAL MEDICINE

## 2018-11-24 PROCEDURE — 85025 COMPLETE CBC W/AUTO DIFF WBC: CPT | Performed by: INTERNAL MEDICINE

## 2018-11-24 PROCEDURE — 87798 DETECT AGENT NOS DNA AMP: CPT | Performed by: INTERNAL MEDICINE

## 2018-11-24 PROCEDURE — 63710000001 FLUOXETINE 20 MG CAPSULE: Performed by: INTERNAL MEDICINE

## 2018-11-24 PROCEDURE — 87081 CULTURE SCREEN ONLY: CPT | Performed by: INTERNAL MEDICINE

## 2018-11-24 PROCEDURE — 63710000001 AMLODIPINE 5 MG TABLET: Performed by: INTERNAL MEDICINE

## 2018-11-24 PROCEDURE — 99232 SBSQ HOSP IP/OBS MODERATE 35: CPT | Performed by: INTERNAL MEDICINE

## 2018-11-24 PROCEDURE — 63710000001 HYDROCODONE-ACETAMINOPHEN 10-325 MG TABLET: Performed by: INTERNAL MEDICINE

## 2018-11-24 PROCEDURE — 87486 CHLMYD PNEUM DNA AMP PROBE: CPT | Performed by: INTERNAL MEDICINE

## 2018-11-24 PROCEDURE — 87633 RESP VIRUS 12-25 TARGETS: CPT | Performed by: INTERNAL MEDICINE

## 2018-11-24 PROCEDURE — 96372 THER/PROPH/DIAG INJ SC/IM: CPT

## 2018-11-24 PROCEDURE — 83036 HEMOGLOBIN GLYCOSYLATED A1C: CPT | Performed by: INTERNAL MEDICINE

## 2018-11-24 PROCEDURE — 87581 M.PNEUMON DNA AMP PROBE: CPT | Performed by: INTERNAL MEDICINE

## 2018-11-24 PROCEDURE — 80076 HEPATIC FUNCTION PANEL: CPT | Performed by: INTERNAL MEDICINE

## 2018-11-24 PROCEDURE — 25010000002 PIPERACILLIN SOD-TAZOBACTAM PER 1 G: Performed by: INTERNAL MEDICINE

## 2018-11-24 PROCEDURE — 72220 X-RAY EXAM SACRUM TAILBONE: CPT

## 2018-11-24 PROCEDURE — 63710000001 PANTOPRAZOLE 40 MG TABLET DELAYED-RELEASE: Performed by: INTERNAL MEDICINE

## 2018-11-24 PROCEDURE — 25010000002 PIPERACILLIN-TAZOBACTAM: Performed by: INTERNAL MEDICINE

## 2018-11-24 PROCEDURE — 71046 X-RAY EXAM CHEST 2 VIEWS: CPT

## 2018-11-24 PROCEDURE — 63710000001 CLONIDINE 0.1 MG TABLET: Performed by: INTERNAL MEDICINE

## 2018-11-24 PROCEDURE — 96366 THER/PROPH/DIAG IV INF ADDON: CPT

## 2018-11-24 PROCEDURE — 25010000002 ENOXAPARIN PER 10 MG: Performed by: INTERNAL MEDICINE

## 2018-11-24 PROCEDURE — 63710000001 PROPRANOLOL 40 MG TABLET: Performed by: INTERNAL MEDICINE

## 2018-11-24 PROCEDURE — 96361 HYDRATE IV INFUSION ADD-ON: CPT

## 2018-11-24 PROCEDURE — 80048 BASIC METABOLIC PNL TOTAL CA: CPT | Performed by: INTERNAL MEDICINE

## 2018-11-24 PROCEDURE — 63710000001 PRIMIDONE 250 MG TABLET: Performed by: INTERNAL MEDICINE

## 2018-11-24 RX ORDER — PIPERACILLIN SODIUM, TAZOBACTAM SODIUM 3; .375 G/15ML; G/15ML
INJECTION, POWDER, LYOPHILIZED, FOR SOLUTION INTRAVENOUS
Status: DISPENSED
Start: 2018-11-24 | End: 2018-11-24

## 2018-11-24 RX ADMIN — PRIMIDONE 250 MG: 250 TABLET ORAL at 20:49

## 2018-11-24 RX ADMIN — SODIUM CHLORIDE, POTASSIUM CHLORIDE, SODIUM LACTATE AND CALCIUM CHLORIDE 150 ML/HR: 600; 310; 30; 20 INJECTION, SOLUTION INTRAVENOUS at 10:08

## 2018-11-24 RX ADMIN — SODIUM CHLORIDE, POTASSIUM CHLORIDE, SODIUM LACTATE AND CALCIUM CHLORIDE 150 ML/HR: 600; 310; 30; 20 INJECTION, SOLUTION INTRAVENOUS at 20:20

## 2018-11-24 RX ADMIN — CLONIDINE HYDROCHLORIDE 0.1 MG: 0.1 TABLET ORAL at 20:49

## 2018-11-24 RX ADMIN — HYDROCODONE BITARTRATE AND ACETAMINOPHEN 1 TABLET: 10; 325 TABLET ORAL at 04:52

## 2018-11-24 RX ADMIN — HYDROCODONE BITARTRATE AND ACETAMINOPHEN 1 TABLET: 10; 325 TABLET ORAL at 09:25

## 2018-11-24 RX ADMIN — CLONIDINE HYDROCHLORIDE 0.1 MG: 0.1 TABLET ORAL at 09:18

## 2018-11-24 RX ADMIN — PIPERACILLIN SODIUM,TAZOBACTAM SODIUM 3.38 G: 3; .375 INJECTION, POWDER, FOR SOLUTION INTRAVENOUS at 13:02

## 2018-11-24 RX ADMIN — PROPRANOLOL HYDROCHLORIDE 40 MG: 40 TABLET ORAL at 20:49

## 2018-11-24 RX ADMIN — FLUOXETINE 40 MG: 20 CAPSULE ORAL at 09:18

## 2018-11-24 RX ADMIN — PIPERACILLIN SODIUM,TAZOBACTAM SODIUM 3.38 G: 3; .375 INJECTION, POWDER, FOR SOLUTION INTRAVENOUS at 05:39

## 2018-11-24 RX ADMIN — HYDROCODONE BITARTRATE AND ACETAMINOPHEN 1 TABLET: 10; 325 TABLET ORAL at 20:15

## 2018-11-24 RX ADMIN — HYDROCODONE BITARTRATE AND ACETAMINOPHEN 1 TABLET: 10; 325 TABLET ORAL at 15:25

## 2018-11-24 RX ADMIN — PROPRANOLOL HYDROCHLORIDE 40 MG: 40 TABLET ORAL at 09:18

## 2018-11-24 RX ADMIN — ENOXAPARIN SODIUM 40 MG: 40 INJECTION SUBCUTANEOUS at 23:22

## 2018-11-24 RX ADMIN — PANTOPRAZOLE SODIUM 40 MG: 40 TABLET, DELAYED RELEASE ORAL at 09:18

## 2018-11-24 RX ADMIN — AMLODIPINE BESYLATE 5 MG: 5 TABLET ORAL at 09:18

## 2018-11-24 RX ADMIN — SODIUM CHLORIDE, PRESERVATIVE FREE 3 ML: 5 INJECTION INTRAVENOUS at 20:50

## 2018-11-24 RX ADMIN — SODIUM CHLORIDE, PRESERVATIVE FREE 3 ML: 5 INJECTION INTRAVENOUS at 09:19

## 2018-11-24 RX ADMIN — PIPERACILLIN SODIUM,TAZOBACTAM SODIUM 3.38 G: 3; .375 INJECTION, POWDER, FOR SOLUTION INTRAVENOUS at 20:50

## 2018-11-24 NOTE — PLAN OF CARE
Problem: Patient Care Overview  Goal: Plan of Care Review  Outcome: Ongoing (interventions implemented as appropriate)   11/24/18 0407   Coping/Psychosocial   Plan of Care Reviewed With patient   Plan of Care Review   Progress no change   OTHER   Outcome Summary Pt pulled out IV; #24 Right AC put in; LR @ 150ml/hr; NPO after midnight; no falls this shift; Pt c/o headache, rib pain, and coccyx pain from fall       Problem: Fall Risk (Adult)  Goal: Identify Related Risk Factors and Signs and Symptoms  Outcome: Ongoing (interventions implemented as appropriate)   11/24/18 0407   Fall Risk (Adult)   Related Risk Factors (Fall Risk) history of falls;gait/mobility problems   Signs and Symptoms (Fall Risk) presence of risk factors     Goal: Absence of Fall  Outcome: Ongoing (interventions implemented as appropriate)   11/24/18 0407   Fall Risk (Adult)   Absence of Fall making progress toward outcome       Problem: Skin Injury Risk (Adult)  Goal: Identify Related Risk Factors and Signs and Symptoms  Outcome: Ongoing (interventions implemented as appropriate)   11/24/18 0407   Skin Injury Risk (Adult)   Related Risk Factors (Skin Injury Risk) edema;infection;mobility impaired     Goal: Skin Health and Integrity  Outcome: Ongoing (interventions implemented as appropriate)   11/24/18 0407   Skin Injury Risk (Adult)   Skin Health and Integrity making progress toward outcome

## 2018-11-24 NOTE — NURSING NOTE
Discharge Planning Assessment  EVONNE Epstein     Patient Name: Eliseo Price  MRN: [id number]  Today's Date: 11/24/2018    Admit Date: 11/23/2018    Discharge Needs Assessment     Row Name 11/24/18 1128       Living Environment    Lives With  alone    Current Living Arrangements  home/apartment/condo    Primary Care Provided by  self    Provides Primary Care For  no one    Family Caregiver if Needed  child(mohan), adult;parent(s)    Quality of Family Relationships  supportive;helpful    Able to Return to Prior Arrangements  yes       Resource/Environmental Concerns    Transportation Concerns  car, none       Transition Planning    Patient/Family Anticipates Transition to  home    Transportation Anticipated  family or friend will provide       Discharge Needs Assessment    Readmission Within the Last 30 Days  no previous admission in last 30 days    Concerns to be Addressed  mental health    Equipment Currently Used at Home  walker, standard;wheelchair;cane, straight        Discharge Plan     Row Name 11/24/18 1130       Plan    Plan  to be determined    Plan Comments  Spoke with patient at bedside, he recognizes CM by name from previous admissions. He is drowsy during conversation, answers questions appropriately. Face sheet clarified. Patient states address on face sheet is where he gets his mail but he lives at [address] [place] [address]. It is a house and he lives alone. He is indpendent of ADLs and uses a cane, walker and wc as needed for mobility. He states his daughter and mother check on him frequently. He has used [facility] and [facility] in the past. He has not used inpatient rehab previously. He has a living will on file. He uses [facility] in [place] and denies issues obtaining medications. CM consult rec'd for mental health eval when medically stable. CM # placed on white board, will continue to follow.        Destination      No service coordination in this encounter.      Durable Medical  Equipment      No service coordination in this encounter.      Dialysis/Infusion      No service coordination in this encounter.      Home Medical Care      No service coordination in this encounter.      Community Resources      No service coordination in this encounter.          Demographic Summary     Row Name 11/24/18 1127       General Information    Admission Type  inpatient    Arrived From  home    Referral Source  admission list    Reason for Consult  discharge planning    Preferred Language  English     Used During This Interaction  no       Contact Information    Permission Granted to Share Info With          Functional Status    No documentation.       Psychosocial    No documentation.       Abuse/Neglect    No documentation.       Legal    No documentation.       Substance Abuse    No documentation.       Patient Forms    No documentation.           Vance Johnson RN

## 2018-11-24 NOTE — PLAN OF CARE
Problem: Patient Care Overview  Goal: Discharge Needs Assessment  Outcome: Ongoing (interventions implemented as appropriate)   11/23/18 2050 11/24/18 1124   Disability   Equipment Currently Used at Home --  walker, standard;wheelchair;cane, straight   Discharge Needs Assessment   Readmission Within the Last 30 Days --  no previous admission in last 30 days   Concerns to be Addressed --  mental health   Patient/Family Anticipates Transition to --  home   Patient/Family Anticipated Services at Transition none --    Transportation Concerns --  car, none   Transportation Anticipated --  family or friend will provide

## 2018-11-24 NOTE — H&P
[facility]ITAL[facility]     Killian Scales MD    CHIEF COMPLAINT: Cough    HISTORY OF PRESENT ILLNESS:    54 yo WM w/ PMH of Bipolar, CAD, DM, HTN, cellulitis of the lft foot, s/p 5th digit amputation, who has had decreased mental status for the last 4 days similar to presentations prior to other admissions for infections.    Pt complains of increased cough, nausea, malaise, and pluertic rt sided chest pain for the last 4-5 days. With nausea better with popciles, and worse with fatty food. Denies worsening pain, drainage or smell in his lft foot.    Last admission for left foot cellulitis and chronic osteomyelitis was in June.     ROS Positive for 'Coccyx pain'      Past Medical History:   Diagnosis Date   • Arthritis    • Bipolar disorder (CMS/[facility])    • Cellulitis of lower extremity     RIGHT   • Coronary artery disease    • Diabetes mellitus (CMS/[facility])    • Disease of thyroid gland     nodule on thyroid   • DVT (deep venous thrombosis) (CMS/[facility])    • Fracture of right foot    • Hyperlipidemia    • Hypertension    • Pseudogout    • Septic shock (CMS/HCC) 07/2017    H/O   • Sleep apnea    • Toxic metabolic encephalopathy 07/2017    H/O     Past Surgical History:   Procedure Laterality Date   • 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   • JOINT REPLACEMENT     • KNEE SURGERY     • SHOULDER SURGERY      RIGHT   • TOTAL HIP ARTHROPLASTY       Family History   Problem Relation Age of Onset   • Arthritis Mother    • Cancer Mother    • Hyperlipidemia Mother    • Colon polyps Mother    • Arthritis Father    • Cancer Father    • Depression Father    • Hypertension Father    • Mental illness Father    • Cancer Sister    • Arthritis Sister    • Hyperlipidemia Sister    • Cancer Paternal Aunt    • Hypertension Daughter    • Depression Son    • Hyperlipidemia Paternal Grandmother    • Hearing loss Paternal Grandfather    • Hyperlipidemia Paternal Grandfather    • Hypertension Paternal Grandfather    •  Colon cancer Maternal Grandmother      Social History     Tobacco Use   • Smoking status: Never Smoker   • Smokeless tobacco: Never Used   Substance Use Topics   • Alcohol use: No     Comment: h/o alcohol consumption-   • Drug use: No     Medications Prior to Admission   Medication Sig Dispense Refill Last Dose   • clonazePAM (KlonoPIN) 0.5 MG tablet Take 0.5 mg by mouth 3 (Three) Times a Day As Needed for Seizures.   11/23/2018 at Unknown time   • CloNIDine (CATAPRES) 0.1 MG tablet Take 0.1 mg by mouth Every 12 (Twelve) Hours.   11/23/2018 at Unknown time   • FLUoxetine (PROzac) 20 MG capsule Take 40 mg by mouth Daily.   11/23/2018 at Unknown time   • losartan (COZAAR) 100 MG tablet Take 1 tablet by mouth Daily. 30 tablet 0 11/23/2018 at Unknown time   • metFORMIN ER (GLUCOPHAGE-XR) 500 MG 24 hr tablet Take 500 mg by mouth 2 (Two) Times a Day With Meals.   11/23/2018 at Unknown time   • primidone (MYSOLINE) 250 MG tablet Take 250 mg by mouth 3 (Three) Times a Day.   11/23/2018 at Unknown time   • temazepam (RESTORIL) 15 MG capsule Take 30 mg by mouth At Night As Needed for Sleep.   11/23/2018 at Unknown time   • acetaminophen (TYLENOL) 325 MG tablet Take 2 tablets by mouth Every 6 (Six) Hours As Needed for Mild Pain .      • amLODIPine (NORVASC) 5 MG tablet Take 1 tablet by mouth Daily. 30 tablet 0    • ferrous sulfate 325 (65 FE) MG tablet Take 325 mg by mouth Daily With Breakfast.   Past Month at Unknown time   • gabapentin (NEURONTIN) 800 MG tablet Take 800 mg by mouth 3 (three) times a day.   Past Week at Unknown time   • HYDROcodone-acetaminophen (NORCO) 5-325 MG per tablet Take 1 tablet by mouth Every 6 (Six) Hours As Needed.   Past Month at Unknown time   • pantoprazole (PROTONIX) 40 MG EC tablet Take 40 mg by mouth Daily.      • propranolol (INDERAL) 40 MG tablet Take 40 mg by mouth 2 (Two) Times a Day.        Allergies:  Hydrocodone-acetaminophen and Lisinopril  Debilities: As per HPI and Physical Exam.  "    REVIEW OF SYSTEMS:  Please see the above history of present illness for pertinent positives and negatives.  The remainder of the patient's systems have been reviewed and are negative.     Vital Signs  Temp:  [97.6 °F (36.4 °C)-99.2 °F (37.3 °C)] 97.6 °F (36.4 °C)  Heart Rate:  [] 100  Resp:  [18-20] 20  BP: ()/(58-88) 144/83    Flowsheet Rows      First Filed Value   Admission Height  182 cm (71.65\") Documented at 11/23/2018 1305   Admission Weight  127 kg (280 lb) Documented at 11/23/2018 1305           Physical Exam:  Physical Exam   Constitutional: He appears well-nourished. No distress.   Lying in bed comfortably, with wet cough   HENT:   Head: Normocephalic and atraumatic.   Right Ear: External ear normal.   Left Ear: External ear normal.   Nose: Nose normal.   Mouth/Throat: Oropharynx is clear and moist. No oropharyngeal exudate.   Eyes: Conjunctivae and EOM are normal. Pupils are equal, round, and reactive to light. No scleral icterus.   Neck: Normal range of motion. No JVD present. No tracheal deviation present.   Cardiovascular: Normal rate, regular rhythm, normal heart sounds and intact distal pulses. Exam reveals no friction rub.   No murmur heard.  Pulmonary/Chest: Effort normal. No stridor. No respiratory distress. He has no wheezes. He has rales.   Diminished BS in anterior and upper fields, small quiet ronchi in rt posterior field   Abdominal: Soft. Bowel sounds are normal. He exhibits no distension. There is no tenderness.   Musculoskeletal: He exhibits no edema.   Lft foot s/p 5th amp  Pain on palpation of the coccyx   Lymphadenopathy:     He has no cervical adenopathy.   Neurological: He is alert. He has normal strength. He exhibits normal muscle tone. GCS eye subscore is 4. GCS verbal subscore is 5. GCS motor subscore is 6.   Skin: Skin is warm and dry. No rash noted. He is not diaphoretic.   Several liner intermittent red marks on dorsum of left foot running cranially to caudally " from 1st and 2nd toes, pt states are chronic, ?Healing scars?  No increased warm or erythema in skin of left foot  Chronic healing ulcers on feet b/l   Psychiatric: He has a normal mood and affect. His behavior is normal.   Nursing note and vitals reviewed.       Results Review:    I reviewed the patient's new clinical results.  Lab Results (most recent)     Procedure Component Value Units Date/Time    C-reactive Protein [[id number]] Collected:  11/23/18 1400    Specimen:  Blood Updated:  11/23/18 1926    Sedimentation Rate [[id number]]  (Normal) Collected:  11/23/18 1600    Specimen:  Blood Updated:  11/23/18 1918     Sed Rate 14 mm/hr     Urinalysis, Microscopic Only - Urine, Catheter [[id number]]  (Abnormal) Collected:  11/23/18 1842    Specimen:  Urine, Catheter Updated:  11/23/18 1909     RBC, UA 0-2 /HPF      WBC, UA None Seen /HPF      Bacteria, UA None Seen /HPF      Squamous Epithelial Cells, UA 0-2 /HPF      Hyaline Casts, UA None Seen /LPF      Methodology Manual Light Microscopy    Urinalysis With Microscopic If Indicated (No Culture) - Urine, Catheter [[id number]]  (Abnormal) Collected:  11/23/18 1842    Specimen:  Urine, Catheter Updated:  11/23/18 1904     Color, UA Yellow     Appearance, UA Slightly Cloudy     pH, UA 5.5     Specific Gravity, UA 1.010     Glucose, UA Negative     Ketones, UA Negative     Bilirubin, UA Negative     Blood, UA Small (1+)     Protein, UA Negative     Leuk Esterase, UA Negative     Nitrite, UA Negative     Urobilinogen, UA 0.2 E.U./dL    Procalcitonin [[id number]]  (Abnormal) Collected:  11/23/18 1600    Specimen:  Blood Updated:  11/23/18 1737     Procalcitonin 0.07 ng/mL     Narrative:       As a Marker for Sepsis (Non-Neonates):   1. <0.5 ng/mL represents a low risk of severe sepsis and/or septic shock.  2. >2 ng/mL represents a high risk of severe sepsis and/or septic shock.    As a Marker for Lower Respiratory Tract Infections that require antibiotic therapy:    PCT on  Admission     Antibiotic Therapy       6-12 Hrs later  > 0.5                Strongly Recommended             >0.25 - <0.5         Recommended  0.1 - 0.25           Discouraged              Remeasure/reassess PCT  <0.1                 Strongly Discouraged     Remeasure/reassess PCT                     PCT values of < 0.5 ng/mL do not exclude an infection, because localized infections (without systemic signs) may be associated with such low concentrations, or a systemic infection in its initial stages (< 6 hours). Furthermore, increased PCT can occur without infection. PCT concentrations between 0.5 and 2.0 ng/mL should be interpreted taking into account the patient's history. It is recommended to retest PCT within 6-24 hours if any concentrations < 2 ng/mL are obtained.    Comprehensive Metabolic Panel [[id number]]  (Abnormal) Collected:  11/23/18 1600    Specimen:  Blood Updated:  11/23/18 1715     Glucose 102 mg/dL      BUN 30 mg/dL      Creatinine 1.60 mg/dL      Sodium 130 mmol/L      Potassium 3.7 mmol/L      Chloride 92 mmol/L      CO2 21.9 mmol/L      Calcium 8.8 mg/dL      Total Protein 8.0 g/dL      Albumin 4.10 g/dL      ALT (SGPT) 44 U/L      AST (SGOT) 70 U/L      Comment: Specimen hemolyzed.  Results may be affected.        Alkaline Phosphatase 93 U/L      Total Bilirubin 0.4 mg/dL      eGFR Non African Amer 45 mL/min/1.73      Globulin 3.9 gm/dL      A/G Ratio 1.1 g/dL      BUN/Creatinine Ratio 18.8     Anion Gap 16.1 mmol/L     Lactic Acid, Plasma [[id number]]  (Normal) Collected:  11/23/18 1646    Specimen:  Blood Updated:  11/23/18 1709     Lactate 1.1 mmol/L     CBC & Differential [[id number]] Collected:  11/23/18 1600    Specimen:  Blood Updated:  11/23/18 1651    Narrative:       The following orders were created for panel order CBC & Differential.  Procedure                               Abnormality         Status                     ---------                               -----------         ------                      CBC Auto Differential[[id number]]        Abnormal            Final result                 Please view results for these tests on the individual orders.    CBC Auto Differential [[id number]]  (Abnormal) Collected:  11/23/18 1600    Specimen:  Blood Updated:  11/23/18 1651     WBC 13.21 10*3/mm3      RBC 5.06 10*6/mm3      Hemoglobin 13.3 g/dL      Hematocrit 42.3 %      MCV 83.6 fL      MCH 26.3 pg      MCHC 31.4 g/dL      RDW 17.7 %      RDW-SD 54.2 fl      MPV 9.1 fL      Platelets 250 10*3/mm3      Neutrophil % 75.5 %      Lymphocyte % 11.4 %      Monocyte % 8.3 %      Eosinophil % 3.7 %      Basophil % 0.6 %      Immature Grans % 0.5 %      Neutrophils, Absolute 9.98 10*3/mm3      Lymphocytes, Absolute 1.51 10*3/mm3      Monocytes, Absolute 1.09 10*3/mm3      Eosinophils, Absolute 0.49 10*3/mm3      Basophils, Absolute 0.08 10*3/mm3      Immature Grans, Absolute 0.06 10*3/mm3      nRBC 0.0 /100 WBC     Blood Culture - Blood, Arm, Right [[id number]] Collected:  11/23/18 1551    Specimen:  Blood from Arm, Right Updated:  11/23/18 1647    Blood Culture - Blood, Arm, Left [[id number]] Collected:  11/23/18 1646    Specimen:  Blood from Arm, Left Updated:  11/23/18 1647          Imaging Results (most recent)     Procedure Component Value Units Date/Time    US Ankle / Brachial Indices Extremity Complete [[id number]] Updated:  11/23/18 1918    XR Foot 3+ View Left [[id number]] Collected:  11/23/18 1614     Updated:  11/23/18 1619    Narrative:       LEFT FOOT, 3 VIEWS     HISTORY:   Cellulitis with history of osteomyelitis     Comparison:  11/15/2018     FINDINGS:  Redemonstrated is the transmetatarsal amputation of the fifth toe. There  is amputation of the fourth toe.     No air is identified within the soft tissues. No periosteal reaction is  seen. No fractures or dislocations.       Impression:       No clearly acute radiographic findings. No definite change  when compared to exam of 11/15/2018.      This report was finalized on 11/23/2018 4:16 PM by Dr. Jose Dewitt MD.       XR Ribs Right With PA Chest [[id number]] Collected:  11/23/18 1602     Updated:  11/23/18 1608    Narrative:       PA CHEST WITH RIGHT RIBS     HISTORY:   Fall today     Comparison:  Rachelle 10, 2018     FINDINGS:  The heart size is normal. Mediastinal structures are midline. Lung  volumes are diminished. Mild elevation of the right hemidiaphragm. No  acute infiltrates.     No right-sided rib fractures are demonstrated. No pneumothorax. No  unexpected radiopaque foreign bodies.       Impression:       Negative chest x-ray. Negative right rib series.     This report was finalized on 11/23/2018 4:06 PM by Dr. Jose Dewitt MD.       CT Cervical Spine Without Contrast [[id number]] Collected:  11/23/18 1530     Updated:  11/23/18 1537    Narrative:       CT OF THE CERVICAL SPINE WITHOUT CONTRAST     HISTORY:  53-year-old male with fall today. Head on wall. Complaint of neck  stiffness     TECHNIQUE:  CT of the cervical spine without contrast. Radiation dose reduction  techniques included automated exposure control or exposure modulation  based on body size. Radiation audit for CT and nuclear cardiology exams  in the last 12 months: 5.     FINDINGS:  The exam is limited due to patient body habitus. The alignment of the  cervical spine appears satisfactory. No obvious fractures are seen. The  odontoid is intact. The ring of C1 is intact. Prevertebral soft tissues  are normal.     Spondylotic changes of the cervical spine demonstrated at C5-6 and C6-7.  Facets show no evidence of subluxation. Facet arthropathy identified.  Beam hardening artifact from the patient's right shoulder limits  evaluation of the spinal canal.       Impression:          1.  No clearly acute radiographic abnormalities demonstrated on this  technically limited study.  2.  Cervical spondylosis primarily at C5-6 and C6-7.  3.  No obvious change from prior study of 3/9/2018.  4.   Clinical aspects of the case will determine if MR of the cervical  spine could provide additional information.     This report was finalized on 11/23/2018 3:35 PM by Dr. Jose Dewitt MD.       CT Head Without Contrast [[id number]] Collected:  11/23/18 1521     Updated:  11/23/18 1529    Narrative:       CT OF THE HEAD WITHOUT CONTRAST     HISTORY:  Fall today. Hit head on wall. Now complains of headache and dizziness     TECHNIQUE:  CT of the head without contrast. Radiation dose reduction techniques  included automated exposure control or exposure modulation based on body  size. Radiation audit for CT and nuclear cardiology exams in the last 12  months: 5.     FINDINGS:  The ventricles are generous in size for patient's age. Cortical sulci  are correspondingly prominent particularly in the frontal lobes  bilaterally. There are no extra-axial fluid collections. No significant  shift of midline structures.     No parenchymal or subarachnoid hemorrhage. No intraventricular  hemorrhage. Basal cisterns are maintained. Fourth ventricle is midline.     Calvarium is intact. Mastoid air cells are clear. Visualized paranasal  sinuses are clear. No acute orbital findings. Mild deviation of the  nasal septum to the left.       Impression:          1.  No acute intracranial findings.  2.  Cerebral atrophy somewhat out of proportion to the patient's age of  53 years.  3.  Resolution of left maxillary sinus findings prior exam.     This report was finalized on 11/23/2018 3:27 PM by Dr. Jose Dewitt MD.           reviewed    ECG/EMG Results (most recent)     None        reviewed    Assessment/Plan     Sepsis from Left foot cellulitis vs PNA (?Viral)  - Admit vitals: , T 99.2, RR 20, WBC 13.2  - Started on Zosyn in the ER to cover gram-negative organisms and a diabetic foot infection  - MRSA Screen pending, blood cultures pending, resp viral panel  - X-ray without signs of current osteomyelitis, ?need for MRI or further  imaging  - CRP 4.27, SED 14  - Consult podiatry    AK I  - Baseline creatinine approximately 0.5-0.6  - Admit creatinine 1.6.  BUN/creatinine 30  - continue LR at 150 an hour  - Holding home lisinopril    ?seizure Disorder / bipolar  - on home Prozac  - On Primidone, dosing for CrCl < 10 w/ Q24 hr dosing, as Renal function recovers, can return back to 3x/daily dosing  - Holding home metformin, will check A1c but presumed patient is on metformin for metabolic syndrome related to chronic mood stabilizers    Recurrent falls  - Head CT negative, cervical spine negative  - Has coccyx pain, get xr    I discussed the patients findings and my recommendations with patient.     Ciera Henriquez MD  11/23/18  9:08 PM

## 2018-11-24 NOTE — PROGRESS NOTES
"SERVICE: [facility] HOSPITALIST    CONSULTANTS:  Podiatry    CHIEF COMPLAINT: Not feeling well    SUBJECTIVE:    Diarrhea has not recurred. Epigastric pain stable.     Per pt, Dr. Farah was in and was not concerned about his feet.     OBJECTIVE:    /52 (BP Location: Left arm, Patient Position: Lying)   Pulse 75   Temp 98.6 °F (37 °C) (Oral)   Resp 20   Ht 182 cm (71.65\")   Wt (!) 139 kg (307 lb 3.2 oz)   SpO2 94%   BMI 42.07 kg/m²     MEDS/LABS REVIEWED AND ORDERED      piperacillin-tazobactam      amLODIPine 5 mg Oral Daily   CloNIDine 0.1 mg Oral Q12H   enoxaparin 40 mg Subcutaneous Q24H   FLUoxetine 40 mg Oral Daily   pantoprazole 40 mg Oral Daily   piperacillin-tazobactam 3.375 g Intravenous Once   piperacillin-tazobactam 3.375 g Intravenous Q8H   primidone 250 mg Oral Nightly   propranolol 40 mg Oral BID   sodium chloride 3 mL Intravenous Q12H       Physical Exam   Constitutional: No distress.   Cardiovascular: Normal rate, regular rhythm and normal heart sounds.   Pulmonary/Chest: Effort normal and breath sounds normal. No stridor. No respiratory distress. He has no wheezes. He has no rales.   Abdominal: Soft. He exhibits no distension. There is no tenderness. There is no guarding.   Hypoactive BS in all quadrents   Musculoskeletal: He exhibits no edema.   S/p lft amp   Neurological: He is alert.   Skin: Skin is warm and dry. No rash noted. He is not diaphoretic. No erythema.   Skin and ulcers same as when seen yesterday   Psychiatric: He has a normal mood and affect. His behavior is normal.   Nursing note and vitals reviewed.      LAB/DIAGNOSTICS:    Lab Results (last 24 hours)     Procedure Component Value Units Date/Time    Respiratory Panel, PCR - Swab, Nasopharynx [[id number]] Collected:  11/24/18 1052    Specimen:  Swab from Nasopharynx Updated:  11/24/18 1056    MRSA Screen Culture - Swab, Nares [[id number]]  (Normal) Collected:  11/24/18 0235    Specimen:  Swab from Nares " Updated:  11/24/18 0946     MRSA SCREEN CX Culture in progress    Hemoglobin A1c [[id number]]  (Abnormal) Collected:  11/24/18 0442    Specimen:  Blood Updated:  11/24/18 0710     Hemoglobin A1C 5.90 %     Narrative:       Hemoglobin A1C Ranges:    Increased Risk for Diabetes  5.7% to 6.4%  Diabetes                     >= 6.5%  Diabetic Goal                < 7.0%    Basic Metabolic Panel [[id number]]  (Abnormal) Collected:  11/24/18 0443    Specimen:  Blood Updated:  11/24/18 0602     Glucose 114 mg/dL      BUN 20 mg/dL      Creatinine 1.03 mg/dL      Sodium 136 mmol/L      Potassium 3.9 mmol/L      Chloride 98 mmol/L      CO2 25.6 mmol/L      Calcium 8.4 mg/dL      eGFR Non African Amer 75 mL/min/1.73      BUN/Creatinine Ratio 19.4     Anion Gap 12.4 mmol/L     Narrative:       GFR Normal >60  Chronic Kidney Disease <60  Kidney Failure <15    CBC Auto Differential [[id number]]  (Abnormal) Collected:  11/24/18 0442    Specimen:  Blood Updated:  11/24/18 0507     WBC 10.80 10*3/mm3      RBC 4.77 10*6/mm3      Hemoglobin 12.6 g/dL      Hematocrit 40.6 %      MCV 85.1 fL      MCH 26.4 pg      MCHC 31.0 g/dL      RDW 17.9 %      RDW-SD 55.9 fl      MPV 9.3 fL      Platelets 226 10*3/mm3      Neutrophil % 68.2 %      Lymphocyte % 14.6 %      Monocyte % 11.0 %      Eosinophil % 5.1 %      Basophil % 0.7 %      Immature Grans % 0.4 %      Neutrophils, Absolute 7.36 10*3/mm3      Lymphocytes, Absolute 1.58 10*3/mm3      Monocytes, Absolute 1.19 10*3/mm3      Eosinophils, Absolute 0.55 10*3/mm3      Basophils, Absolute 0.08 10*3/mm3      Immature Grans, Absolute 0.04 10*3/mm3      nRBC 0.0 /100 WBC     Blood Culture - Blood, Arm, Right [[id number]] Collected:  11/23/18 1551    Specimen:  Blood from Arm, Right Updated:  11/24/18 0500     Blood Culture No growth at less than 24 hours    Blood Culture - Blood, Arm, Left [[id number]] Collected:  11/23/18 1646    Specimen:  Blood from Arm, Left Updated:  11/24/18 0508     Blood  Culture No growth at less than 24 hours    C-reactive Protein [[id number]]  (Abnormal) Collected:  11/23/18 1400    Specimen:  Blood Updated:  11/23/18 2140     C-Reactive Protein 4.27 mg/dL     Sedimentation Rate [[id number]]  (Normal) Collected:  11/23/18 1600    Specimen:  Blood Updated:  11/23/18 1918     Sed Rate 14 mm/hr     Urinalysis, Microscopic Only - Urine, Catheter [[id number]]  (Abnormal) Collected:  11/23/18 1842    Specimen:  Urine, Catheter Updated:  11/23/18 1909     RBC, UA 0-2 /HPF      WBC, UA None Seen /HPF      Bacteria, UA None Seen /HPF      Squamous Epithelial Cells, UA 0-2 /HPF      Hyaline Casts, UA None Seen /LPF      Methodology Manual Light Microscopy    Urinalysis With Microscopic If Indicated (No Culture) - Urine, Catheter [[id number]]  (Abnormal) Collected:  11/23/18 1842    Specimen:  Urine, Catheter Updated:  11/23/18 1904     Color, UA Yellow     Appearance, UA Slightly Cloudy     pH, UA 5.5     Specific Gravity, UA 1.010     Glucose, UA Negative     Ketones, UA Negative     Bilirubin, UA Negative     Blood, UA Small (1+)     Protein, UA Negative     Leuk Esterase, UA Negative     Nitrite, UA Negative     Urobilinogen, UA 0.2 E.U./dL    Procalcitonin [[id number]]  (Abnormal) Collected:  11/23/18 1600    Specimen:  Blood Updated:  11/23/18 1737     Procalcitonin 0.07 ng/mL     Narrative:       As a Marker for Sepsis (Non-Neonates):   1. <0.5 ng/mL represents a low risk of severe sepsis and/or septic shock.  2. >2 ng/mL represents a high risk of severe sepsis and/or septic shock.    As a Marker for Lower Respiratory Tract Infections that require antibiotic therapy:    PCT on Admission     Antibiotic Therapy       6-12 Hrs later  > 0.5                Strongly Recommended             >0.25 - <0.5         Recommended  0.1 - 0.25           Discouraged              Remeasure/reassess PCT  <0.1                 Strongly Discouraged     Remeasure/reassess PCT                     PCT values of <  0.5 ng/mL do not exclude an infection, because localized infections (without systemic signs) may be associated with such low concentrations, or a systemic infection in its initial stages (< 6 hours). Furthermore, increased PCT can occur without infection. PCT concentrations between 0.5 and 2.0 ng/mL should be interpreted taking into account the patient's history. It is recommended to retest PCT within 6-24 hours if any concentrations < 2 ng/mL are obtained.    Comprehensive Metabolic Panel [[id number]]  (Abnormal) Collected:  11/23/18 1600    Specimen:  Blood Updated:  11/23/18 1715     Glucose 102 mg/dL      BUN 30 mg/dL      Creatinine 1.60 mg/dL      Sodium 130 mmol/L      Potassium 3.7 mmol/L      Chloride 92 mmol/L      CO2 21.9 mmol/L      Calcium 8.8 mg/dL      Total Protein 8.0 g/dL      Albumin 4.10 g/dL      ALT (SGPT) 44 U/L      AST (SGOT) 70 U/L      Comment: Specimen hemolyzed.  Results may be affected.        Alkaline Phosphatase 93 U/L      Total Bilirubin 0.4 mg/dL      eGFR Non African Amer 45 mL/min/1.73      Globulin 3.9 gm/dL      A/G Ratio 1.1 g/dL      BUN/Creatinine Ratio 18.8     Anion Gap 16.1 mmol/L     Lactic Acid, Plasma [[id number]]  (Normal) Collected:  11/23/18 1646    Specimen:  Blood Updated:  11/23/18 1709     Lactate 1.1 mmol/L     CBC & Differential [[id number]] Collected:  11/23/18 1600    Specimen:  Blood Updated:  11/23/18 1651    Narrative:       The following orders were created for panel order CBC & Differential.  Procedure                               Abnormality         Status                     ---------                               -----------         ------                     CBC Auto Differential[[id number]]        Abnormal            Final result                 Please view results for these tests on the individual orders.    CBC Auto Differential [[id number]]  (Abnormal) Collected:  11/23/18 1600    Specimen:  Blood Updated:  11/23/18 1651     WBC 13.21 10*3/mm3       RBC 5.06 10*6/mm3      Hemoglobin 13.3 g/dL      Hematocrit 42.3 %      MCV 83.6 fL      MCH 26.3 pg      MCHC 31.4 g/dL      RDW 17.7 %      RDW-SD 54.2 fl      MPV 9.1 fL      Platelets 250 10*3/mm3      Neutrophil % 75.5 %      Lymphocyte % 11.4 %      Monocyte % 8.3 %      Eosinophil % 3.7 %      Basophil % 0.6 %      Immature Grans % 0.5 %      Neutrophils, Absolute 9.98 10*3/mm3      Lymphocytes, Absolute 1.51 10*3/mm3      Monocytes, Absolute 1.09 10*3/mm3      Eosinophils, Absolute 0.49 10*3/mm3      Basophils, Absolute 0.08 10*3/mm3      Immature Grans, Absolute 0.06 10*3/mm3      nRBC 0.0 /100 WBC            Xr Ribs Right With Pa Chest    Result Date: 11/23/2018  Negative chest x-ray. Negative right rib series.  This report was finalized on 11/23/2018 4:06 PM by Dr. Jose Dewitt MD.      Xr Foot 3+ View Left    Result Date: 11/23/2018  No clearly acute radiographic findings. No definite change when compared to exam of 11/15/2018.  This report was finalized on 11/23/2018 4:16 PM by Dr. Jose Dewitt MD.      Ct Head Without Contrast    Result Date: 11/23/2018   1.  No acute intracranial findings. 2.  Cerebral atrophy somewhat out of proportion to the patient's age of 53 years. 3.  Resolution of left maxillary sinus findings prior exam.  This report was finalized on 11/23/2018 3:27 PM by Dr. Jose Dewitt MD.      Ct Cervical Spine Without Contrast    Result Date: 11/23/2018   1.  No clearly acute radiographic abnormalities demonstrated on this technically limited study. 2.  Cervical spondylosis primarily at C5-6 and C6-7. 3.  No obvious change from prior study of 3/9/2018. 4.  Clinical aspects of the case will determine if MR of the cervical spine could provide additional information.  This report was finalized on 11/23/2018 3:35 PM by Dr. Jose Dewitt MD.      Us Ankle / Brachial Indices Extremity Complete    Result Date: 11/24/2018  Noninvasive testing demonstrates no evidence of significant peripheral vascular  "disease within either lower extremity.  This report was finalized on 11/24/2018 12:23 PM by Dr. Kieran Hansen MD.      Independent read of chest x-ray image from 11/24: Low lung volumes, possible small opacity in the right middle lobe, diffuse haziness likely from overlying subcutaneous tissue.    ASSESSMENT/PLAN:    Sepsis of unknown source  - Admit vitals: , T 99.2, RR 20, WBC 13.2  - Started on Zosyn in the ER to cover gram-negative organisms and a diabetic foot infection  - Podiatry consulted for possible osteomyelitis as source  - CXR equivocal  - ProCal low  - Will continue antibiotics empirically overnight    Mildly Elevated LFT's  - AST 70, ALT 40, will get lfts on this AM\"s labs and check again tomorrow  - US abdomean  - Will get hep panel if continue to rise    AK I  - Baseline creatinine approximately 0.5-0.6  - Admit creatinine 1.6.  BUN/creatinine 30 -> 1.0  - continue LR at 150 an hour  - Holding home lisinopril    Hematuria   - U/A in June and this Admission with Trace to small RBC's  - Needs otpt follow up with Urology    ?seizure Disorder / bipolar  - on home Prozac  - On Primidone, dosing for CrCl < 10 w/ Q24 hr dosing, as Renal function recovers, can return back to 3x/daily dosing  - A1c 5.9, likely on Metformin for pre-diabetes/metabolic syndrome, continue to hold metformin     Recurrent falls  - Head CT negative, cervical spine negative  - Has coccyx pain, awaiting read from XR    DISPO: Suspect pt's renal function will recover, and DIspo tomorrow. ?On abx or not, appreciate inpt by Podiatry  "

## 2018-11-24 NOTE — PLAN OF CARE
Problem: Patient Care Overview  Goal: Plan of Care Review  Outcome: Ongoing (interventions implemented as appropriate)   11/24/18 0708   Coping/Psychosocial   Plan of Care Reviewed With patient   Plan of Care Review   Progress no change   OTHER   Outcome Summary Patient c/o headache. PRN norco given and patient had relief. Dr. Farah in to see patient and he drained a blister and applied a bandaid to area. Waiting for further orders for dressing changes. On Zoysn. Resp panal obtained and pending.        Problem: Fall Risk (Adult)  Goal: Absence of Fall  Outcome: Ongoing (interventions implemented as appropriate)      Problem: Skin Injury Risk (Adult)  Goal: Skin Health and Integrity  Outcome: Ongoing (interventions implemented as appropriate)

## 2018-11-25 ENCOUNTER — APPOINTMENT (OUTPATIENT)
Dept: ULTRASOUND IMAGING | Facility: HOSPITAL | Age: 53
End: 2018-11-25

## 2018-11-25 VITALS
OXYGEN SATURATION: 95 % | DIASTOLIC BLOOD PRESSURE: 69 MMHG | BODY MASS INDEX: 41.61 KG/M2 | TEMPERATURE: 99.8 F | HEIGHT: 72 IN | HEART RATE: 72 BPM | WEIGHT: 307.2 LBS | SYSTOLIC BLOOD PRESSURE: 114 MMHG | RESPIRATION RATE: 14 BRPM

## 2018-11-25 LAB
ALBUMIN SERPL-MCNC: 3.8 G/DL (ref 3.5–5.2)
ALBUMIN SERPL-MCNC: 4.1 G/DL (ref 3.5–5.2)
ALBUMIN/GLOB SERPL: 1.2 G/DL
ALP SERPL-CCNC: 75 U/L (ref 40–129)
ALP SERPL-CCNC: 80 U/L (ref 40–129)
ALT SERPL W P-5'-P-CCNC: 32 U/L (ref 5–41)
ALT SERPL W P-5'-P-CCNC: 35 U/L (ref 5–41)
ANION GAP SERPL CALCULATED.3IONS-SCNC: 11.7 MMOL/L
AST SERPL-CCNC: 36 U/L (ref 5–40)
AST SERPL-CCNC: 50 U/L (ref 5–40)
BASOPHILS # BLD AUTO: 0.07 10*3/MM3 (ref 0–0.2)
BASOPHILS NFR BLD AUTO: 0.8 % (ref 0–2)
BILIRUB CONJ SERPL-MCNC: <0.2 MG/DL (ref 0.2–0.3)
BILIRUB INDIRECT SERPL-MCNC: ABNORMAL MG/DL
BILIRUB SERPL-MCNC: 0.3 MG/DL (ref 0.2–1.2)
BILIRUB SERPL-MCNC: 0.3 MG/DL (ref 0.2–1.2)
BUN BLD-MCNC: 13 MG/DL (ref 6–20)
BUN/CREAT SERPL: 14.1 (ref 7–25)
CALCIUM SPEC-SCNC: 8.6 MG/DL (ref 8.6–10.5)
CHLORIDE SERPL-SCNC: 99 MMOL/L (ref 98–107)
CO2 SERPL-SCNC: 24.3 MMOL/L (ref 22–29)
CREAT BLD-MCNC: 0.92 MG/DL (ref 0.76–1.27)
DEPRECATED RDW RBC AUTO: 54.2 FL (ref 37–54)
EOSINOPHIL # BLD AUTO: 0.69 10*3/MM3 (ref 0.1–0.3)
EOSINOPHIL NFR BLD AUTO: 7.6 % (ref 0–4)
ERYTHROCYTE [DISTWIDTH] IN BLOOD BY AUTOMATED COUNT: 17.5 % (ref 11.5–14.5)
GFR SERPL CREATININE-BSD FRML MDRD: 85 ML/MIN/1.73
GLOBULIN UR ELPH-MCNC: 3.2 GM/DL
GLUCOSE BLD-MCNC: 113 MG/DL (ref 65–99)
GLUCOSE BLDC GLUCOMTR-MCNC: 104 MG/DL (ref 70–130)
GLUCOSE BLDC GLUCOMTR-MCNC: 121 MG/DL (ref 70–130)
HCT VFR BLD AUTO: 39.9 % (ref 42–52)
HGB BLD-MCNC: 12.6 G/DL (ref 14–18)
IMM GRANULOCYTES # BLD: 0.04 10*3/MM3 (ref 0–0.03)
IMM GRANULOCYTES NFR BLD: 0.4 % (ref 0–0.5)
LYMPHOCYTES # BLD AUTO: 2.15 10*3/MM3 (ref 0.6–4.8)
LYMPHOCYTES NFR BLD AUTO: 23.8 % (ref 20–45)
MCH RBC QN AUTO: 26.6 PG (ref 27–31)
MCHC RBC AUTO-ENTMCNC: 31.6 G/DL (ref 31–37)
MCV RBC AUTO: 84.4 FL (ref 80–94)
MONOCYTES # BLD AUTO: 0.98 10*3/MM3 (ref 0–1)
MONOCYTES NFR BLD AUTO: 10.8 % (ref 3–8)
NEUTROPHILS # BLD AUTO: 5.11 10*3/MM3 (ref 1.5–8.3)
NEUTROPHILS NFR BLD AUTO: 56.6 % (ref 45–70)
NRBC BLD MANUAL-RTO: 0 /100 WBC (ref 0–0)
PLAT MORPH BLD: NORMAL
PLATELET # BLD AUTO: 165 10*3/MM3 (ref 140–500)
PMV BLD AUTO: 10.4 FL (ref 7.4–10.4)
POTASSIUM BLD-SCNC: 4 MMOL/L (ref 3.5–5.2)
PROT SERPL-MCNC: 6.9 G/DL (ref 6–8.5)
PROT SERPL-MCNC: 7 G/DL (ref 6–8.5)
RBC # BLD AUTO: 4.73 10*6/MM3 (ref 4.7–6.1)
RBC MORPH BLD: NORMAL
SODIUM BLD-SCNC: 135 MMOL/L (ref 136–145)
WBC MORPH BLD: NORMAL
WBC NRBC COR # BLD: 9.04 10*3/MM3 (ref 4.8–10.8)

## 2018-11-25 PROCEDURE — 63710000001 CLONIDINE 0.1 MG TABLET: Performed by: INTERNAL MEDICINE

## 2018-11-25 PROCEDURE — 63710000001 PROPRANOLOL 40 MG TABLET: Performed by: INTERNAL MEDICINE

## 2018-11-25 PROCEDURE — 85007 BL SMEAR W/DIFF WBC COUNT: CPT | Performed by: INTERNAL MEDICINE

## 2018-11-25 PROCEDURE — 63710000001 AMLODIPINE 5 MG TABLET: Performed by: INTERNAL MEDICINE

## 2018-11-25 PROCEDURE — A9270 NON-COVERED ITEM OR SERVICE: HCPCS | Performed by: INTERNAL MEDICINE

## 2018-11-25 PROCEDURE — 99239 HOSP IP/OBS DSCHRG MGMT >30: CPT | Performed by: HOSPITALIST

## 2018-11-25 PROCEDURE — 63710000001 HYDROCODONE-ACETAMINOPHEN 10-325 MG TABLET: Performed by: INTERNAL MEDICINE

## 2018-11-25 PROCEDURE — 82962 GLUCOSE BLOOD TEST: CPT

## 2018-11-25 PROCEDURE — 63710000001 FLUOXETINE 20 MG CAPSULE: Performed by: INTERNAL MEDICINE

## 2018-11-25 PROCEDURE — 80053 COMPREHEN METABOLIC PANEL: CPT | Performed by: INTERNAL MEDICINE

## 2018-11-25 PROCEDURE — 76700 US EXAM ABDOM COMPLETE: CPT

## 2018-11-25 PROCEDURE — 25010000002 PIPERACILLIN SOD-TAZOBACTAM PER 1 G: Performed by: INTERNAL MEDICINE

## 2018-11-25 PROCEDURE — 63710000001 HYDROCODONE-ACETAMINOPHEN 5-325 MG TABLET: Performed by: INTERNAL MEDICINE

## 2018-11-25 PROCEDURE — 96366 THER/PROPH/DIAG IV INF ADDON: CPT

## 2018-11-25 PROCEDURE — 63710000001 PANTOPRAZOLE 40 MG TABLET DELAYED-RELEASE: Performed by: INTERNAL MEDICINE

## 2018-11-25 PROCEDURE — 85025 COMPLETE CBC W/AUTO DIFF WBC: CPT | Performed by: INTERNAL MEDICINE

## 2018-11-25 PROCEDURE — 63710000001 MELATONIN 5 MG TABLET: Performed by: INTERNAL MEDICINE

## 2018-11-25 RX ORDER — HYDROCODONE BITARTRATE AND ACETAMINOPHEN 5; 325 MG/1; MG/1
1 TABLET ORAL EVERY 6 HOURS PRN
Qty: 8 TABLET | Refills: 0 | Status: SHIPPED | OUTPATIENT
Start: 2018-11-25 | End: 2018-11-28

## 2018-11-25 RX ORDER — AMOXICILLIN AND CLAVULANATE POTASSIUM 875; 125 MG/1; MG/1
1 TABLET, FILM COATED ORAL 2 TIMES DAILY
Qty: 10 TABLET | Refills: 0 | Status: SHIPPED | OUTPATIENT
Start: 2018-11-25 | End: 2018-11-30

## 2018-11-25 RX ADMIN — FLUOXETINE 40 MG: 20 CAPSULE ORAL at 09:51

## 2018-11-25 RX ADMIN — MELATONIN TAB 5 MG 5 MG: 5 TAB at 01:01

## 2018-11-25 RX ADMIN — PIPERACILLIN SODIUM,TAZOBACTAM SODIUM 3.38 G: 3; .375 INJECTION, POWDER, FOR SOLUTION INTRAVENOUS at 04:26

## 2018-11-25 RX ADMIN — PANTOPRAZOLE SODIUM 40 MG: 40 TABLET, DELAYED RELEASE ORAL at 09:52

## 2018-11-25 RX ADMIN — SODIUM CHLORIDE, PRESERVATIVE FREE 10 ML: 5 INJECTION INTRAVENOUS at 09:53

## 2018-11-25 RX ADMIN — PROPRANOLOL HYDROCHLORIDE 40 MG: 40 TABLET ORAL at 09:51

## 2018-11-25 RX ADMIN — HYDROCODONE BITARTRATE AND ACETAMINOPHEN 1 TABLET: 5; 325 TABLET ORAL at 09:58

## 2018-11-25 RX ADMIN — HYDROCODONE BITARTRATE AND ACETAMINOPHEN 1 TABLET: 10; 325 TABLET ORAL at 04:44

## 2018-11-25 RX ADMIN — AMLODIPINE BESYLATE 5 MG: 5 TABLET ORAL at 09:51

## 2018-11-25 RX ADMIN — HYDROCODONE BITARTRATE AND ACETAMINOPHEN 1 TABLET: 10; 325 TABLET ORAL at 00:36

## 2018-11-25 RX ADMIN — CLONIDINE HYDROCHLORIDE 0.1 MG: 0.1 TABLET ORAL at 09:51

## 2018-11-25 NOTE — PLAN OF CARE
"Problem: Patient Care Overview  Goal: Plan of Care Review  Outcome: Outcome(s) achieved Date Met: 11/25/18 11/25/18 9319   Coping/Psychosocial   Plan of Care Reviewed With patient   OTHER   Outcome Summary PT screen performed. Patient ambulating independently in room upon PT arrival with no assistive device, demonstrating good gait speed and balance. Patient reports he has been ambulating independently while in hospital and has no concerns. Patient expressing frustration with not recieving enough pain medicine, stating \"I just called someone and told them to come pick me up.\" Nurse notified. Skilled PT not indicated at this time.         "

## 2018-11-25 NOTE — PLAN OF CARE
Problem: Patient Care Overview  Goal: Plan of Care Review  Outcome: Ongoing (interventions implemented as appropriate)   11/25/18 0446   Coping/Psychosocial   Plan of Care Reviewed With patient   Plan of Care Review   Progress no change   OTHER   Outcome Summary Pt. complained of R sided rib pain - managed w/Norco; IV abx continued; no falls this shift; resp panel and MRSA swab both negative; fluids d/c       Problem: Fall Risk (Adult)  Goal: Identify Related Risk Factors and Signs and Symptoms  Outcome: Ongoing (interventions implemented as appropriate)   11/25/18 0446   Fall Risk (Adult)   Related Risk Factors (Fall Risk) history of falls;gait/mobility problems   Signs and Symptoms (Fall Risk) presence of risk factors     Goal: Absence of Fall  Outcome: Ongoing (interventions implemented as appropriate)   11/25/18 0446   Fall Risk (Adult)   Absence of Fall making progress toward outcome       Problem: Skin Injury Risk (Adult)  Goal: Identify Related Risk Factors and Signs and Symptoms  Outcome: Ongoing (interventions implemented as appropriate)   11/25/18 0446   Skin Injury Risk (Adult)   Related Risk Factors (Skin Injury Risk) mobility impaired;infection     Goal: Skin Health and Integrity  Outcome: Ongoing (interventions implemented as appropriate)   11/25/18 0446   Skin Injury Risk (Adult)   Skin Health and Integrity making progress toward outcome

## 2018-11-25 NOTE — DISCHARGE SUMMARY
Eliseo Price  1965  [id number]        Hospitalists Discharge Summary    Date of Admission: 11/23/2018  Date of Discharge:  11/25/2018    Primary Discharge Diagnoses:   1. SIRS/Probable sepsis  2. ASHLEY  3. Acute metabolic encephalopathy    Secondary Discharge Diagnoses:   4. Mildly elevated LFTs  5. Microscopic hematuria  6. Bipolar d/o  7. Recurrent falls with right anterior ribs pain from most recent fall at home  8. Hypertension  9. DM-2 with peripheral neuropathy  10. Obesity  11. H/O dyslipidemia  12. H/o GIB   13. Chronic normocytic anemia  14. Benign essential tremor    PCP  Patient Care Team:  Killian Scales MD as PCP - General  Killian Scales MD as PCP - Family Medicine    Consults:   Consults     Date and Time Order Name Status Description    11/23/2018 1812 Inpatient Podiatry Consult            Operations and Procedures Performed:       Xr Ribs Right With Pa Chest    Result Date: 11/23/2018  Narrative: PA CHEST WITH RIGHT RIBS  HISTORY: Fall today  Comparison: Rachelle 10, 2018  FINDINGS: The heart size is normal. Mediastinal structures are midline. Lung volumes are diminished. Mild elevation of the right hemidiaphragm. No acute infiltrates.  No right-sided rib fractures are demonstrated. No pneumothorax. No unexpected radiopaque foreign bodies.      Impression: Negative chest x-ray. Negative right rib series.  This report was finalized on 11/23/2018 4:06 PM by Dr. Jose Dewitt MD.      Xr Sacrum & Coccyx    Result Date: 11/25/2018  Narrative: Sacrum and coccyx.  HISTORY: Fell one day ago but does not remember fall. Complains of tailbone pain.  COMPARISON: None  FINDINGS: No definite fracture or deformity. Minimal degenerative change lower lumbar spine. Bilateral total hip arthroplasties. Soft tissues unremarkable.      Impression: Negative sacrum and coccyx.  This report was finalized on 11/25/2018 6:07 AM by Dr. Kieran Hansen MD.      Xr Foot 3+ View Left    Result Date: 11/23/2018  Narrative:  LEFT FOOT, 3 VIEWS  HISTORY: Cellulitis with history of osteomyelitis  Comparison: 11/15/2018  FINDINGS: Redemonstrated is the transmetatarsal amputation of the fifth toe. There is amputation of the fourth toe.  No air is identified within the soft tissues. No periosteal reaction is seen. No fractures or dislocations.      Impression: No clearly acute radiographic findings. No definite change when compared to exam of 11/15/2018.  This report was finalized on 11/23/2018 4:16 PM by Dr. Jose Dewitt MD.      Xr Foot 3+ View Left    Result Date: 11/15/2018  Narrative: INDICATION: Follow-up amputation. No acute complaint.  COMPARISON: 6/9/2018.  FINDINGS: 4 views of the left foot.  Transmetatarsal amputation of the fifth digit is again seen. Fourth toe is amputated as well. Soft tissues are unremarkable. No acute fracture. No erosive change.      Impression: No acute findings. No interval change.  This report was finalized on 11/15/2018 4:58 PM by Dr. Harrison Garcia MD.      Xr Foot 3+ View Right    Result Date: 11/15/2018  Narrative: INDICATION: Follow-up arthrodesis. New pain since yesterday.  COMPARISON: 9/20/2018.  FINDINGS: 4 views of the right foot.  Hardware is unchanged, including a fractured distal screw on the third metatarsal hardware. Fracture at the base of the third proximal phalanx is not significantly changed. There is a new but subacute fracture involving the base of the fifth proximal phalanx. There is some mild callus formation associated with it. No other fractures are seen. Calcaneal spurs are stable.      Impression:  1. Stable appearance of a fracture at the base of the third proximal phalanx. 2. New but subacute appearing fracture at the base of the fifth proximal phalanx. 3. Stable appearance of hardware.  This report was finalized on 11/15/2018 4:43 PM by Dr. Harrison Garcia MD.      Ct Head Without Contrast    Result Date: 11/23/2018  Narrative: CT OF THE HEAD WITHOUT CONTRAST  HISTORY: Fall  today. Hit head on wall. Now complains of headache and dizziness  TECHNIQUE: CT of the head without contrast. Radiation dose reduction techniques included automated exposure control or exposure modulation based on body size. Radiation audit for CT and nuclear cardiology exams in the last 12 months: 5.  FINDINGS: The ventricles are generous in size for patient's age. Cortical sulci are correspondingly prominent particularly in the frontal lobes bilaterally. There are no extra-axial fluid collections. No significant shift of midline structures.  No parenchymal or subarachnoid hemorrhage. No intraventricular hemorrhage. Basal cisterns are maintained. Fourth ventricle is midline.  Calvarium is intact. Mastoid air cells are clear. Visualized paranasal sinuses are clear. No acute orbital findings. Mild deviation of the nasal septum to the left.      Impression:  1.  No acute intracranial findings. 2.  Cerebral atrophy somewhat out of proportion to the patient's age of 53 years. 3.  Resolution of left maxillary sinus findings prior exam.  This report was finalized on 11/23/2018 3:27 PM by Dr. Jose Dewitt MD.      Ct Cervical Spine Without Contrast    Result Date: 11/23/2018  Narrative: CT OF THE CERVICAL SPINE WITHOUT CONTRAST  HISTORY: 53-year-old male with fall today. Head on wall. Complaint of neck stiffness  TECHNIQUE: CT of the cervical spine without contrast. Radiation dose reduction techniques included automated exposure control or exposure modulation based on body size. Radiation audit for CT and nuclear cardiology exams in the last 12 months: 5.  FINDINGS: The exam is limited due to patient body habitus. The alignment of the cervical spine appears satisfactory. No obvious fractures are seen. The odontoid is intact. The ring of C1 is intact. Prevertebral soft tissues are normal.  Spondylotic changes of the cervical spine demonstrated at C5-6 and C6-7. Facets show no evidence of subluxation. Facet arthropathy  identified. Beam hardening artifact from the patient's right shoulder limits evaluation of the spinal canal.      Impression:  1.  No clearly acute radiographic abnormalities demonstrated on this technically limited study. 2.  Cervical spondylosis primarily at C5-6 and C6-7. 3.  No obvious change from prior study of 3/9/2018. 4.  Clinical aspects of the case will determine if MR of the cervical spine could provide additional information.  This report was finalized on 11/23/2018 3:35 PM by Dr. Jose Dewitt MD.      Us Abdomen Complete    Result Date: 11/25/2018  Narrative: Ultrasound abdomen complete  HISTORY: Epigastric pain for one day. Patient had a full breakfast.  TECHNIQUE: Gray scale and limited Doppler of the abdomen  FINDINGS: The liver is of normal size shape and echogenicity. Gallbladder free of stones or wall thickening. Common bile duct measures 4.2 mm. No ductal dilatation. Pancreas not visualized due to overlying bowel gas. Both kidneys appear normal. The right kidney measures 14.5 cm in length. The left kidney measures 14.1 cm in length. The spleen is moderately enlarged measuring 15.7 cm in length. No focal masses. No free fluid. The visualized abdominal aorta and IVC unremarkable.      Impression: Mild splenomegaly with the spleen measuring up to 15.7 cm in length. No focal splenic lesions identified.  Nonvisualization of pancreas due to overlying bowel gas. The visualized region of the pancreas unremarkable.  The remainder of the abdomen appears normal.  This report was finalized on 11/25/2018 12:49 PM by Dr. Kieran Hansen MD.      Us Ankle / Brachial Indices Extremity Complete    Result Date: 11/24/2018  Narrative: Ultrasound ankle/brachial indices extremity complete.  HISTORY: Left fourth and fifth toe amputations. Nonhealing wound right foot. Patient fell today.  TECHNIQUE: Noninvasive testing was performed of both lower extremities with ankle and toe pressures, pulse volume recordings and Doppler  evaluation.  FINDINGS: Left brachial pressure obtained at 123 mmHg. Right heart pressure could not be obtained due to IV access. Right ankle to brachial indices 1.2 posterior tibial and 1.1 dorsalis pedis. Left ankle brachial indices 1.0 posterior tibial and 1.1 dorsalis pedis. Toe pressures and ratios normal. Doppler demonstrates triphasic and biphasic flow bilaterally. Normal pulse volume recordings.      Impression: Noninvasive testing demonstrates no evidence of significant peripheral vascular disease within either lower extremity.  This report was finalized on 11/24/2018 12:23 PM by Dr. Kieran Hansen MD.      Xr Chest Pa & Lateral    Result Date: 11/25/2018  Narrative: PA and lateral chest  HISTORY: Cough for 7 to 8 days. Patient also fell one day ago but does not remember fall. Right-sided chest and rib pain.  COMPARISON: 11/23/2018  FINDINGS: Lungs are clear. Heart and mediastinum unremarkable. No effusions. Right shoulder arthroplasty. No acute osseous abnormality.      Impression: No active disease.  This report was finalized on 11/25/2018 6:02 AM by Dr. Kieran Hansen MD.        Allergies:  is allergic to hydrocodone-acetaminophen and lisinopril.    Liborio  reviewed    Discharge Medications:     Discharge Medications      New Medications      Instructions Start Date   amoxicillin-clavulanate 875-125 MG per tablet  Commonly known as:  AUGMENTIN   1 tablet, Oral, 2 Times Daily      HYDROcodone-acetaminophen 5-325 MG per tablet  Commonly known as:  NORCO   1 tablet, Oral, Every 6 Hours PRN         Continue These Medications      Instructions Start Date   acetaminophen 325 MG tablet  Commonly known as:  TYLENOL   650 mg, Oral, Every 6 Hours PRN      amLODIPine 5 MG tablet  Commonly known as:  NORVASC   5 mg, Oral, Daily      clonazePAM 0.5 MG tablet  Commonly known as:  KlonoPIN   0.5 mg, Oral, 3 Times Daily PRN      CloNIDine 0.1 MG tablet  Commonly known as:  CATAPRES   0.1 mg, Oral, Every 12 Hours Scheduled       FLUoxetine 20 MG capsule  Commonly known as:  PROzac   40 mg, Oral, Daily      metFORMIN  MG 24 hr tablet  Commonly known as:  GLUCOPHAGE-XR   500 mg, Oral, 2 Times Daily With Meals      pantoprazole 40 MG EC tablet  Commonly known as:  PROTONIX   40 mg, Oral, Daily      primidone 250 MG tablet  Commonly known as:  MYSOLINE   250 mg, Oral, 3 Times Daily      propranolol 40 MG tablet  Commonly known as:  INDERAL   40 mg, Oral, 2 Times Daily      temazepam 15 MG capsule  Commonly known as:  RESTORIL   30 mg, Oral, Nightly PRN         Stop These Medications    losartan 100 MG tablet  Commonly known as:  COZAAR            History of Present Illness: (from H&P)  54 yo WM w/ PMH of Bipolar, CAD, DM, HTN, cellulitis of the lft foot, s/p 5th digit amputation, who has had decreased mental status for the last 4 days similar to presentations prior to other admissions for infections.     Pt complains of increased cough, nausea, malaise, and pluertic rt sided chest pain for the last 4-5 days. With nausea better with popciles, and worse with fatty food. Denies worsening pain, drainage or smell in his lft foot.     Last admission for left foot cellulitis and chronic osteomyelitis was in June.      Hospital Course    1. SIRS/Probable sepsis: On admission patient with mild tachycardia and tachypnea and elevated WBC. No definitive source of infection was identified during his stay.  Patient's CXR and UA were negative. PCT and LA were NL/Neg. Viral panel negative. Dr. Farah with podiatry was consulted as patient with h/o osteomyelitis but these was no physical evidence of significant soft tissue or bone infection, CRP was only mildly elevated and ESR was NL. Dr. Farah saw the patient and I spoke with him on the day of discharge and he did not feel there was any significant infection currently related to the patient's feet. Patient was started on Zosyn and IVFs at admission and clinically improved, will send home to complete a  very short course of po Augmentin and F/U with PCP next week.     2. ASHLEY: Patient received IVFs initially and they were D/Yaron and renal function continued to improve with just good po intake. Patient's Losartan was held here and will continue to hold at discharge. Renal function was near baseline at discharge. Recommend F/U BMP with PCP next week to assure complete resolution.     3. Acute metabolic encephalopathy: Patient was noted as lethargic and oriented only x 1 in ER. Mental status is now back to baseline. Likely secondary to #1 above.     4. Mildly elevated LFTs: Completely resolved at discharge, possibly secondary to #1 above. Abdominal US with splenomegaly which has been seen previously in this patient but otherwise NL.     5. Microscopic hematuria: Trace RBC seen on UA here and last admission. F/U with PCP and possible referral to urology if this persists.     6. Bipolar d/o: No acute issues here. Continued on home Prozac, Clonazepam and Temazepam.     7. Recurrent falls with right anterior rib pain from most recent fall: Head and C-spine CT with nothing acute and X-rays of Ribs and coccyx negative. Due to rib pain recommended continued IS at home, patient with significant bruising of right posterior ribs/chest on exam. PT/OT saw patient here and patient was ambulating independently. They felt patient safe to return home.    8. Hypertension: Losartan held and BP WNL here. Continued on home amlodipine, propranolol and Clonidine. F/U with PCP for monitoring and instruction on if and when to resume Losartan.     9. DM-2 with peripheral neuropathy: No acute issues here resume home metformin at discharge. HbA1C actually good at 5.9.    10. Obesity: Counseled by nutrition. Body mass index is 42.07 kg/m².    11. H/O dyslipidemia: Patient not currently on a statin, defer to PCP for monitoring.     12. H/o GIB: On PPI, no acute issues here.      13. Chronic normocytic anemia: Hb stable here. No acute issues.      14. Benign essential tremor: On home Primidone. No acute issues.      Last Lab Results:   Lab Results (most recent)     Procedure Component Value Units Date/Time    POC Glucose Once [[id number]]  (Normal) Collected:  11/25/18 1114    Specimen:  Blood Updated:  11/25/18 1120     Glucose 121 mg/dL     MRSA Screen Culture - Swab, Nares [[id number]]  (Normal) Collected:  11/24/18 0235    Specimen:  Swab from Nares Updated:  11/25/18 0947     MRSA SCREEN CX No Methicillin Resistant Staphylococcus aureus Isolated at 24 hours    POC Glucose Once [[id number]]  (Normal) Collected:  11/25/18 0812    Specimen:  Blood Updated:  11/25/18 0820     Glucose 104 mg/dL     CBC & Differential [[id number]] Collected:  11/25/18 0407    Specimen:  Blood Updated:  11/25/18 0506    Narrative:       The following orders were created for panel order CBC & Differential.  Procedure                               Abnormality         Status                     ---------                               -----------         ------                     Scan Slide[[id number]]                   Normal              Final result               CBC Auto Differential[[id number]]        Abnormal            Final result                 Please view results for these tests on the individual orders.    Scan Slide [[id number]]  (Normal) Collected:  11/25/18 0407    Specimen:  Blood Updated:  11/25/18 0506     RBC Morphology Normal     WBC Morphology Normal     Platelet Morphology Normal    Comprehensive Metabolic Panel [[id number]]  (Abnormal) Collected:  11/25/18 0407    Specimen:  Blood Updated:  11/25/18 0450     Glucose 113 mg/dL      BUN 13 mg/dL      Creatinine 0.92 mg/dL      Sodium 135 mmol/L      Potassium 4.0 mmol/L      Chloride 99 mmol/L      CO2 24.3 mmol/L      Calcium 8.6 mg/dL      Total Protein 7.0 g/dL      Albumin 3.80 g/dL      ALT (SGPT) 32 U/L      AST (SGOT) 36 U/L      Alkaline Phosphatase 75 U/L      Total Bilirubin 0.3 mg/dL      eGFR Non   Amer 85 mL/min/1.73      Globulin 3.2 gm/dL      A/G Ratio 1.2 g/dL      BUN/Creatinine Ratio 14.1     Anion Gap 11.7 mmol/L     CBC Auto Differential [[id number]]  (Abnormal) Collected:  11/25/18 0407    Specimen:  Blood Updated:  11/25/18 0422     WBC 9.04 10*3/mm3      RBC 4.73 10*6/mm3      Hemoglobin 12.6 g/dL      Hematocrit 39.9 %      MCV 84.4 fL      MCH 26.6 pg      MCHC 31.6 g/dL      RDW 17.5 %      RDW-SD 54.2 fl      MPV 10.4 fL      Platelets 165 10*3/mm3      Neutrophil % 56.6 %      Lymphocyte % 23.8 %      Monocyte % 10.8 %      Eosinophil % 7.6 %      Basophil % 0.8 %      Immature Grans % 0.4 %      Neutrophils, Absolute 5.11 10*3/mm3      Lymphocytes, Absolute 2.15 10*3/mm3      Monocytes, Absolute 0.98 10*3/mm3      Eosinophils, Absolute 0.69 10*3/mm3      Basophils, Absolute 0.07 10*3/mm3      Immature Grans, Absolute 0.04 10*3/mm3      nRBC 0.0 /100 WBC     Hepatic Function Panel [[id number]]  (Abnormal) Collected:  11/24/18 0443    Specimen:  Blood Updated:  11/25/18 0049     Total Protein 6.9 g/dL      Albumin 4.10 g/dL      ALT (SGPT) 35 U/L      AST (SGOT) 50 U/L      Alkaline Phosphatase 80 U/L      Total Bilirubin 0.3 mg/dL      Bilirubin, Direct <0.2 mg/dL      Bilirubin, Indirect -- mg/dL      Comment: Unable to calculate       Respiratory Panel, PCR - Swab, Nasopharynx [[id number]]  (Normal) Collected:  11/24/18 1052    Specimen:  Swab from Nasopharynx Updated:  11/24/18 1903     ADENOVIRUS, PCR Not Detected     Coronavirus 229E Not Detected     Coronavirus HKU1 Not Detected     Coronavirus NL63 Not Detected     Coronavirus OC43 Not Detected     Human Metapneumovirus Not Detected     Human Rhinovirus/Enterovirus Not Detected     Influenza B PCR Not Detected     Parainfluenza Virus 1 Not Detected     Parainfluenza Virus 2 Not Detected     Parainfluenza Virus 3 Not Detected     Parainfluenza Virus 4 Not Detected     Bordetella pertussis pcr Not Detected     Influenza A H1 2009 PCR  Not Detected     Chlamydophila pneumoniae PCR Not Detected     Mycoplasma pneumo by PCR Not Detected     Influenza A PCR Not Detected     Influenza A H3 Not Detected     Influenza A H1 Not Detected     RSV, PCR Not Detected    Blood Culture - Blood, Arm, Right [[id number]] Collected:  11/23/18 1551    Specimen:  Blood from Arm, Right Updated:  11/24/18 1700     Blood Culture No growth at 24 hours    Blood Culture - Blood, Arm, Left [[id number]] Collected:  11/23/18 1646    Specimen:  Blood from Arm, Left Updated:  11/24/18 1700     Blood Culture No growth at 24 hours    Hemoglobin A1c [[id number]]  (Abnormal) Collected:  11/24/18 0442    Specimen:  Blood Updated:  11/24/18 0710     Hemoglobin A1C 5.90 %     Narrative:       Hemoglobin A1C Ranges:    Increased Risk for Diabetes  5.7% to 6.4%  Diabetes                     >= 6.5%  Diabetic Goal                < 7.0%    Basic Metabolic Panel [[id number]]  (Abnormal) Collected:  11/24/18 0443    Specimen:  Blood Updated:  11/24/18 0602     Glucose 114 mg/dL      BUN 20 mg/dL      Creatinine 1.03 mg/dL      Sodium 136 mmol/L      Potassium 3.9 mmol/L      Chloride 98 mmol/L      CO2 25.6 mmol/L      Calcium 8.4 mg/dL      eGFR Non African Amer 75 mL/min/1.73      BUN/Creatinine Ratio 19.4     Anion Gap 12.4 mmol/L     Narrative:       GFR Normal >60  Chronic Kidney Disease <60  Kidney Failure <15    CBC Auto Differential [[id number]]  (Abnormal) Collected:  11/24/18 0442    Specimen:  Blood Updated:  11/24/18 0507     WBC 10.80 10*3/mm3      RBC 4.77 10*6/mm3      Hemoglobin 12.6 g/dL      Hematocrit 40.6 %      MCV 85.1 fL      MCH 26.4 pg      MCHC 31.0 g/dL      RDW 17.9 %      RDW-SD 55.9 fl      MPV 9.3 fL      Platelets 226 10*3/mm3      Neutrophil % 68.2 %      Lymphocyte % 14.6 %      Monocyte % 11.0 %      Eosinophil % 5.1 %      Basophil % 0.7 %      Immature Grans % 0.4 %      Neutrophils, Absolute 7.36 10*3/mm3      Lymphocytes, Absolute 1.58 10*3/mm3       Monocytes, Absolute 1.19 10*3/mm3      Eosinophils, Absolute 0.55 10*3/mm3      Basophils, Absolute 0.08 10*3/mm3      Immature Grans, Absolute 0.04 10*3/mm3      nRBC 0.0 /100 WBC     C-reactive Protein [[id number]]  (Abnormal) Collected:  11/23/18 1400    Specimen:  Blood Updated:  11/23/18 2140     C-Reactive Protein 4.27 mg/dL     Sedimentation Rate [[id number]]  (Normal) Collected:  11/23/18 1600    Specimen:  Blood Updated:  11/23/18 1918     Sed Rate 14 mm/hr     Urinalysis, Microscopic Only - Urine, Catheter [[id number]]  (Abnormal) Collected:  11/23/18 1842    Specimen:  Urine, Catheter Updated:  11/23/18 1909     RBC, UA 0-2 /HPF      WBC, UA None Seen /HPF      Bacteria, UA None Seen /HPF      Squamous Epithelial Cells, UA 0-2 /HPF      Hyaline Casts, UA None Seen /LPF      Methodology Manual Light Microscopy    Urinalysis With Microscopic If Indicated (No Culture) - Urine, Catheter [[id number]]  (Abnormal) Collected:  11/23/18 1842    Specimen:  Urine, Catheter Updated:  11/23/18 1904     Color, UA Yellow     Appearance, UA Slightly Cloudy     pH, UA 5.5     Specific Gravity, UA 1.010     Glucose, UA Negative     Ketones, UA Negative     Bilirubin, UA Negative     Blood, UA Small (1+)     Protein, UA Negative     Leuk Esterase, UA Negative     Nitrite, UA Negative     Urobilinogen, UA 0.2 E.U./dL    Procalcitonin [[id number]]  (Abnormal) Collected:  11/23/18 1600    Specimen:  Blood Updated:  11/23/18 1737     Procalcitonin 0.07 ng/mL     Narrative:       As a Marker for Sepsis (Non-Neonates):   1. <0.5 ng/mL represents a low risk of severe sepsis and/or septic shock.  2. >2 ng/mL represents a high risk of severe sepsis and/or septic shock.    As a Marker for Lower Respiratory Tract Infections that require antibiotic therapy:    PCT on Admission     Antibiotic Therapy       6-12 Hrs later  > 0.5                Strongly Recommended             >0.25 - <0.5         Recommended  0.1 - 0.25           Discouraged               Remeasure/reassess PCT  <0.1                 Strongly Discouraged     Remeasure/reassess PCT                     PCT values of < 0.5 ng/mL do not exclude an infection, because localized infections (without systemic signs) may be associated with such low concentrations, or a systemic infection in its initial stages (< 6 hours). Furthermore, increased PCT can occur without infection. PCT concentrations between 0.5 and 2.0 ng/mL should be interpreted taking into account the patient's history. It is recommended to retest PCT within 6-24 hours if any concentrations < 2 ng/mL are obtained.    Comprehensive Metabolic Panel [[id number]]  (Abnormal) Collected:  11/23/18 1600    Specimen:  Blood Updated:  11/23/18 1715     Glucose 102 mg/dL      BUN 30 mg/dL      Creatinine 1.60 mg/dL      Sodium 130 mmol/L      Potassium 3.7 mmol/L      Chloride 92 mmol/L      CO2 21.9 mmol/L      Calcium 8.8 mg/dL      Total Protein 8.0 g/dL      Albumin 4.10 g/dL      ALT (SGPT) 44 U/L      AST (SGOT) 70 U/L      Comment: Specimen hemolyzed.  Results may be affected.        Alkaline Phosphatase 93 U/L      Total Bilirubin 0.4 mg/dL      eGFR Non African Amer 45 mL/min/1.73      Globulin 3.9 gm/dL      A/G Ratio 1.1 g/dL      BUN/Creatinine Ratio 18.8     Anion Gap 16.1 mmol/L     Lactic Acid, Plasma [[id number]]  (Normal) Collected:  11/23/18 1646    Specimen:  Blood Updated:  11/23/18 1709     Lactate 1.1 mmol/L     CBC & Differential [[id number]] Collected:  11/23/18 1600    Specimen:  Blood Updated:  11/23/18 1651    Narrative:       The following orders were created for panel order CBC & Differential.  Procedure                               Abnormality         Status                     ---------                               -----------         ------                     CBC Auto Differential[[id number]]        Abnormal            Final result                 Please view results for these tests on the individual orders.     CBC Auto Differential [[id number]]  (Abnormal) Collected:  11/23/18 1600    Specimen:  Blood Updated:  11/23/18 1651     WBC 13.21 10*3/mm3      RBC 5.06 10*6/mm3      Hemoglobin 13.3 g/dL      Hematocrit 42.3 %      MCV 83.6 fL      MCH 26.3 pg      MCHC 31.4 g/dL      RDW 17.7 %      RDW-SD 54.2 fl      MPV 9.1 fL      Platelets 250 10*3/mm3      Neutrophil % 75.5 %      Lymphocyte % 11.4 %      Monocyte % 8.3 %      Eosinophil % 3.7 %      Basophil % 0.6 %      Immature Grans % 0.5 %      Neutrophils, Absolute 9.98 10*3/mm3      Lymphocytes, Absolute 1.51 10*3/mm3      Monocytes, Absolute 1.09 10*3/mm3      Eosinophils, Absolute 0.49 10*3/mm3      Basophils, Absolute 0.08 10*3/mm3      Immature Grans, Absolute 0.06 10*3/mm3      nRBC 0.0 /100 WBC         Imaging Results (most recent)     Procedure Component Value Units Date/Time    US Abdomen Complete [[id number]] Collected:  11/25/18 1245     Updated:  11/25/18 1251    Narrative:       Ultrasound abdomen complete     HISTORY: Epigastric pain for one day. Patient had a full breakfast.     TECHNIQUE: Gray scale and limited Doppler of the abdomen     FINDINGS: The liver is of normal size shape and echogenicity.  Gallbladder free of stones or wall thickening. Common bile duct measures  4.2 mm. No ductal dilatation. Pancreas not visualized due to overlying  bowel gas. Both kidneys appear normal. The right kidney measures 14.5 cm  in length. The left kidney measures 14.1 cm in length. The spleen is  moderately enlarged measuring 15.7 cm in length. No focal masses. No  free fluid. The visualized abdominal aorta and IVC unremarkable.       Impression:       Mild splenomegaly with the spleen measuring up to 15.7 cm in  length. No focal splenic lesions identified.     Nonvisualization of pancreas due to overlying bowel gas. The visualized  region of the pancreas unremarkable.     The remainder of the abdomen appears normal.     This report was finalized on 11/25/2018 12:49  PM by Dr. Kieran Hansen MD.       XR Sacrum & Coccyx [[id number]] Collected:  11/25/18 0604     Updated:  11/25/18 0609    Narrative:       Sacrum and coccyx.     HISTORY: Fell one day ago but does not remember fall. Complains of  tailbone pain.     COMPARISON: None     FINDINGS: No definite fracture or deformity. Minimal degenerative change  lower lumbar spine. Bilateral total hip arthroplasties. Soft tissues  unremarkable.       Impression:       Negative sacrum and coccyx.     This report was finalized on 11/25/2018 6:07 AM by Dr. Kieran Hansen MD.       XR Chest PA & Lateral [[id number]] Collected:  11/25/18 0559     Updated:  11/25/18 0604    Narrative:       PA and lateral chest     HISTORY: Cough for 7 to 8 days. Patient also fell one day ago but does  not remember fall. Right-sided chest and rib pain.     COMPARISON: 11/23/2018     FINDINGS: Lungs are clear. Heart and mediastinum unremarkable. No  effusions. Right shoulder arthroplasty. No acute osseous abnormality.       Impression:       No active disease.     This report was finalized on 11/25/2018 6:02 AM by Dr. Kieran Hansen MD.       US Ankle / Brachial Indices Extremity Complete [[id number]] Collected:  11/24/18 1218     Updated:  11/24/18 1225    Narrative:       Ultrasound ankle/brachial indices extremity complete.     HISTORY: Left fourth and fifth toe amputations. Nonhealing wound right  foot. Patient fell today.     TECHNIQUE: Noninvasive testing was performed of both lower extremities  with ankle and toe pressures, pulse volume recordings and Doppler  evaluation.     FINDINGS: Left brachial pressure obtained at 123 mmHg. Right heart  pressure could not be obtained due to IV access. Right ankle to brachial  indices 1.2 posterior tibial and 1.1 dorsalis pedis. Left ankle brachial  indices 1.0 posterior tibial and 1.1 dorsalis pedis. Toe pressures and  ratios normal. Doppler demonstrates triphasic and biphasic flow  bilaterally. Normal pulse volume  recordings.       Impression:       Noninvasive testing demonstrates no evidence of significant  peripheral vascular disease within either lower extremity.     This report was finalized on 11/24/2018 12:23 PM by Dr. Kieran Hansen MD.       XR Foot 3+ View Left [[id number]] Collected:  11/23/18 1614     Updated:  11/23/18 1619    Narrative:       LEFT FOOT, 3 VIEWS     HISTORY:   Cellulitis with history of osteomyelitis     Comparison:  11/15/2018     FINDINGS:  Redemonstrated is the transmetatarsal amputation of the fifth toe. There  is amputation of the fourth toe.     No air is identified within the soft tissues. No periosteal reaction is  seen. No fractures or dislocations.       Impression:       No clearly acute radiographic findings. No definite change  when compared to exam of 11/15/2018.     This report was finalized on 11/23/2018 4:16 PM by Dr. Joes Dewitt MD.       XR Ribs Right With PA Chest [[id number]] Collected:  11/23/18 1602     Updated:  11/23/18 1608    Narrative:       PA CHEST WITH RIGHT RIBS     HISTORY:   Fall today     Comparison:  Rachelle 10, 2018     FINDINGS:  The heart size is normal. Mediastinal structures are midline. Lung  volumes are diminished. Mild elevation of the right hemidiaphragm. No  acute infiltrates.     No right-sided rib fractures are demonstrated. No pneumothorax. No  unexpected radiopaque foreign bodies.       Impression:       Negative chest x-ray. Negative right rib series.     This report was finalized on 11/23/2018 4:06 PM by Dr. Jose Dewitt MD.       CT Cervical Spine Without Contrast [[id number]] Collected:  11/23/18 1530     Updated:  11/23/18 1537    Narrative:       CT OF THE CERVICAL SPINE WITHOUT CONTRAST     HISTORY:  53-year-old male with fall today. Head on wall. Complaint of neck  stiffness     TECHNIQUE:  CT of the cervical spine without contrast. Radiation dose reduction  techniques included automated exposure control or exposure modulation  based on body size.  Radiation audit for CT and nuclear cardiology exams  in the last 12 months: 5.     FINDINGS:  The exam is limited due to patient body habitus. The alignment of the  cervical spine appears satisfactory. No obvious fractures are seen. The  odontoid is intact. The ring of C1 is intact. Prevertebral soft tissues  are normal.     Spondylotic changes of the cervical spine demonstrated at C5-6 and C6-7.  Facets show no evidence of subluxation. Facet arthropathy identified.  Beam hardening artifact from the patient's right shoulder limits  evaluation of the spinal canal.       Impression:          1.  No clearly acute radiographic abnormalities demonstrated on this  technically limited study.  2.  Cervical spondylosis primarily at C5-6 and C6-7.  3.  No obvious change from prior study of 3/9/2018.  4.  Clinical aspects of the case will determine if MR of the cervical  spine could provide additional information.     This report was finalized on 11/23/2018 3:35 PM by Dr. Jose Dewitt MD.       CT Head Without Contrast [[id number]] Collected:  11/23/18 1521     Updated:  11/23/18 1529    Narrative:       CT OF THE HEAD WITHOUT CONTRAST     HISTORY:  Fall today. Hit head on wall. Now complains of headache and dizziness     TECHNIQUE:  CT of the head without contrast. Radiation dose reduction techniques  included automated exposure control or exposure modulation based on body  size. Radiation audit for CT and nuclear cardiology exams in the last 12  months: 5.     FINDINGS:  The ventricles are generous in size for patient's age. Cortical sulci  are correspondingly prominent particularly in the frontal lobes  bilaterally. There are no extra-axial fluid collections. No significant  shift of midline structures.     No parenchymal or subarachnoid hemorrhage. No intraventricular  hemorrhage. Basal cisterns are maintained. Fourth ventricle is midline.     Calvarium is intact. Mastoid air cells are clear. Visualized paranasal  sinuses  are clear. No acute orbital findings. Mild deviation of the  nasal septum to the left.       Impression:          1.  No acute intracranial findings.  2.  Cerebral atrophy somewhat out of proportion to the patient's age of  53 years.  3.  Resolution of left maxillary sinus findings prior exam.     This report was finalized on 11/23/2018 3:27 PM by Dr. Jose Dewitt MD.             PROCEDURES      Condition on Discharge:  stable    Physical Exam at Discharge  Vital Signs  Temp:  [97.4 °F (36.3 °C)-99.8 °F (37.7 °C)] 99.8 °F (37.7 °C)  Heart Rate:  [71-78] 72  Resp:  [14-20] 14  BP: (106-125)/(52-74) 114/69    Physical Exam:  Physical Exam   Constitutional: Patient appears well-developed, obese, and in no acute distress   HEENT:   Head: Normocephalic and atraumatic.   Eyes:  Pupils are equal, round, and reactive to light. EOM are intact. Sclera are anicteric and non-injected.  Mouth and Throat: Patient has moist mucous membranes.    Neck: Neck supple. No JVD present.   Cardiovascular: Regular rate, regular rhythm.  Exam reveals no gallop and no friction rub.  No murmur heard.  Pulmonary/Chest: Lungs are clear to auscultation bilaterally. No respiratory distress. No wheezes. No rhonchi. No rales.   Abdominal: Obese. Soft. Bowel sounds are normal. There is no tenderness.   Extremities: No edema. prior amputation left 5th toe  Neurological: Patient is alert and oriented to person, place, and time.   Skin: Skin is warm. Ecchymosis over posterior right chest and abrasions to bilateral knees that are healing. No cyanosis or erythema. Chronic ulcerations healing on bilateral feet.     Discharge Disposition  Home    Visiting Nurse:    No     Home PT/OT:  No     Home Safety Evaluation:  No     DME  None (patient has walker and wheelchair at home)    Discharge Diet:      Dietary Orders (From admission, onward)    Start     Ordered    11/24/18 1217  Diet Regular; Cardiac, Consistent Carbohydrate  Diet Effective Now     Question  Answer Comment   Diet Texture / Consistency Regular    Common Modifiers Cardiac    Common Modifiers Consistent Carbohydrate        11/24/18 1217          Activity at Discharge:  With walker    Pre-discharge education  Medications and F/U      Follow-up Appointments  No future appointments.  Additional Instructions for the Follow-ups that You Need to Schedule     Discharge Follow-up with PCP   As directed       Currently Documented PCP:    Killian Scales MD    PCP Phone Number:    [phone]     Follow Up Details:  Next week (patient states he has an appt on 12/5/18)         Discharge Follow-up with Specialty: Dr. Farah   As directed      Specialty:  Dr. Farah    Follow Up Details:  At already scheduled F/U with first week of december               Test Results Pending at Discharge   Order Current Status    Blood Culture - Blood, Arm, Left Preliminary result    Blood Culture - Blood, Arm, Right Preliminary result    MRSA Screen Culture - Swab, Nares Preliminary result           Jennifer Paez MD  11/25/18  1:47 PM    Time: Discharge >30 min (Secondary to discussion of patient and plan with patient and his mother and with Dr. Farah, review of chart, discussion with nursing staff, exam, orders and documentation)

## 2018-11-26 ENCOUNTER — READMISSION MANAGEMENT (OUTPATIENT)
Dept: CALL CENTER | Facility: HOSPITAL | Age: 53
End: 2018-11-26

## 2018-11-26 LAB — MRSA SPEC QL CULT: NORMAL

## 2018-11-26 NOTE — OUTREACH NOTE
Prep Survey      Responses   Facility patient discharged from?  [facility]   Is LACE score < 7 ?  Yes   Is patient eligible?  Yes   Discharge diagnosis  sepsis d/t left foot cellulitis, ASHLEY, recurrent falls   Does the patient have one of the following disease processes/diagnoses(primary or secondary)?  Other   Does the patient have Home health ordered?  No   Is there a DME ordered?  No   General alerts for this patient  SEPSIS   Prep survey completed?  Yes          Melissa Hansen RN

## 2018-11-28 LAB
BACTERIA SPEC AEROBE CULT: NORMAL
BACTERIA SPEC AEROBE CULT: NORMAL

## 2018-11-29 ENCOUNTER — READMISSION MANAGEMENT (OUTPATIENT)
Dept: CALL CENTER | Facility: HOSPITAL | Age: 53
End: 2018-11-29

## 2018-11-29 NOTE — OUTREACH NOTE
[facility] < 7 Survey      Responses   Facility patient discharged from?  [facility]   Does the patient have one of the following disease processes/diagnoses(primary or secondary)?  Other   Is there a successful TCM telephone encounter documented?  No   [facility] <7 Attempt successful?  No   Unsuccessful attempts  Attempt 1          Madeline Newsome, RN

## 2018-11-30 ENCOUNTER — READMISSION MANAGEMENT (OUTPATIENT)
Dept: CALL CENTER | Facility: HOSPITAL | Age: 53
End: 2018-11-30

## 2018-11-30 NOTE — OUTREACH NOTE
[facility] < 7 Survey      Responses   Facility patient discharged from?  [facility]   Does the patient have one of the following disease processes/diagnoses(primary or secondary)?  Other   Is there a successful TCM telephone encounter documented?  No   [facility] <7 Attempt successful?  No   Unsuccessful attempts  Attempt 2          Valerie Ruano RN

## 2019-01-02 ENCOUNTER — DOCUMENTATION (OUTPATIENT)
Dept: PHYSICAL THERAPY | Facility: HOSPITAL | Age: 54
End: 2019-01-02

## 2019-01-02 NOTE — THERAPY EVALUATION
Acute Care - Physical Therapy Initial Evaluation Addendum        Patient Name: Eliseo Price  : 1965  MRN: [id number]  Today's Date: 2019                Admit Date: (Not on file)    Visit Dx: No diagnosis found.  Patient Active Problem List   Diagnosis   • Disease of thyroid gland   • Cellulitis of right lower extremity   • Cellulitis   • Infected epidermoid cyst   • Altered mental status, unspecified   • Altered mental status   • Immobility   • Foot fracture, right   • Open dislocation of toe   • Open dislocation of phalanx of foot   • Confusion associated with infection   • Wound dehiscence, surgical, initial encounter   • Postoperative infection   • Coffee ground emesis   • Absolute anemia   • Colon cancer screening   • Cellulitis of left foot   • Laceration of lesser toe of left foot without foreign body without damage to nail   • ASHLEY (acute kidney injury) (CMS/HCC)     Past Medical History:   Diagnosis Date   • Arthritis    • Bipolar disorder (CMS/HCC)    • Cellulitis of lower extremity     RIGHT   • Coronary artery disease    • Diabetes mellitus (CMS/HCC)    • Disease of thyroid gland     nodule on thyroid   • DVT (deep venous thrombosis) (CMS/HCC)    • Fracture of right foot    • Hyperlipidemia    • Hypertension    • Pseudogout    • Septic shock (CMS/HCC) 2017    H/O   • Sleep apnea    • Toxic metabolic encephalopathy 2017    H/O     Past Surgical History:   Procedure Laterality Date   • AMPUTATION DIGIT Left 3/16/2018    Procedure: AMPUTATION LEFT FOURTH TOE;  Surgeon: Anish Farah DPM;  Location: [facility];  Service: Podiatry   • 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   • COLONOSCOPY N/A 2018    Procedure: COLONOSCOPY and polypectomy;  Surgeon: Kaylie Mcfarlane MD;  Location: [facility] OR;  Service: Gastroenterology   • ENDOSCOPY N/A 3/16/2018    Procedure: ESOPHAGOGASTRODUODENOSCOPY with biopsies;  Surgeon: Kaylie Mcfarlane MD;  Location: [facility] OR;  Service: Gastroenterology   •  HARDWARE REMOVAL Right 8/18/2017    Procedure: RIGHT EXTERNAL FIXATOR REMOVAL;  Surgeon: Anish Farah DPM;  Location: [facility] OR;  Service:    • INCISION AND DRAINAGE LEG Left 6/13/2018    Procedure: Left 2nd, 3rd toe laceration irrigation, debridement and multi layer wound closure.;  Surgeon: Anish Farah DPM;  Location: [facility] OR;  Service: Podiatry   • JOINT REPLACEMENT     • KNEE SURGERY     • ORIF FOOT FRACTURE Right 7/29/2017    Procedure: open reduction with percutaneous pinning of midfoot dislocation fracture;  Surgeon: Anish Farah DPM;  Location:  [facility] OR;  Service:    • ORIF FOOT FRACTURE Left 3/5/2018    Procedure: OPEN REDUCTION WITH IRRIGATION AND WOUND CLOSURE LEFT FOURTH TOE;  Surgeon: Anish Farah DPM;  Location: [facility] OR;  Service:    • SHOULDER SURGERY      RIGHT   • TOE FUSION Right 8/18/2017    Procedure: RIGHT  MEDIAL COLUMN ARTHRODESIS, RIGHT TARAL METATARSAL ARTHRODESIS;  Surgeon: Anish Farah DPM;  Location: [facility] OR;  Service:    • TOTAL HIP ARTHROPLASTY          PT ASSESSMENT (last 12 hours)      Physical Therapy Evaluation    No documentation.         Omitted G-codes from Initial Evaluation performed on 6/14/18           Time Calculation:     Therapy Suggested Charges     Code   Minutes Charges    None               PT G-Codes  PT Professional Judgement Used?: Yes  Functional Limitation: Mobility: Walking and moving around  Mobility: Walking and Moving Around Current Status (): At least 1 percent but less than 20 percent impaired, limited or restricted  Mobility: Walking and Moving Around Goal Status (): At least 1 percent but less than 20 percent impaired, limited or restricted  Mobility: Walking and Moving Around Discharge Status (): At least 1 percent but less than 20 percent impaired, limited or restricted      Lois Duke, PT  1/2/2019

## 2019-05-06 DIAGNOSIS — Z98.1 ARTHRODESIS STATUS: Primary | ICD-10-CM

## 2019-05-06 DIAGNOSIS — M25.571 JOINT PAIN OF ANKLE AND FOOT, RIGHT: ICD-10-CM

## 2019-05-10 ENCOUNTER — HOSPITAL ENCOUNTER (OUTPATIENT)
Dept: GENERAL RADIOLOGY | Facility: HOSPITAL | Age: 54
Discharge: HOME | End: 2019-05-10
Admitting: PODIATRIST

## 2019-05-10 ENCOUNTER — HOSPITAL ENCOUNTER (OUTPATIENT)
Dept: GENERAL RADIOLOGY | Facility: HOSPITAL | Age: 54
Discharge: HOME | End: 2019-05-10

## 2019-05-10 PROCEDURE — 73610 X-RAY EXAM OF ANKLE: CPT

## 2019-05-10 PROCEDURE — 73630 X-RAY EXAM OF FOOT: CPT

## 2019-06-25 DIAGNOSIS — R60.0 LOCALIZED EDEMA: ICD-10-CM

## 2019-06-25 DIAGNOSIS — M79.672 LEFT FOOT PAIN: Primary | ICD-10-CM

## 2019-06-27 ENCOUNTER — HOSPITAL ENCOUNTER (OUTPATIENT)
Dept: GENERAL RADIOLOGY | Facility: HOSPITAL | Age: 54
Discharge: HOME | End: 2019-06-27
Admitting: PODIATRIST

## 2019-06-27 PROCEDURE — 73630 X-RAY EXAM OF FOOT: CPT

## 2019-11-10 DIAGNOSIS — Z98.1 ARTHRODESIS STATUS: Primary | ICD-10-CM

## 2019-11-10 DIAGNOSIS — M21.6X1 PRONATION DEFORMITY OF RIGHT FOOT: ICD-10-CM

## 2019-11-10 DIAGNOSIS — T84.223D: ICD-10-CM

## 2019-11-15 ENCOUNTER — HOSPITAL ENCOUNTER (OUTPATIENT)
Dept: GENERAL RADIOLOGY | Facility: HOSPITAL | Age: 54
Discharge: HOME | End: 2019-11-15
Admitting: PODIATRIST

## 2019-11-15 DIAGNOSIS — M21.6X1 PRONATION DEFORMITY OF RIGHT FOOT: ICD-10-CM

## 2019-11-15 DIAGNOSIS — T84.223D: ICD-10-CM

## 2019-11-15 DIAGNOSIS — Z98.1 ARTHRODESIS STATUS: ICD-10-CM

## 2019-11-15 PROCEDURE — 73630 X-RAY EXAM OF FOOT: CPT

## 2020-02-23 ENCOUNTER — APPOINTMENT (OUTPATIENT)
Dept: CT IMAGING | Facility: HOSPITAL | Age: 55
End: 2020-02-23

## 2020-02-23 ENCOUNTER — APPOINTMENT (OUTPATIENT)
Dept: GENERAL RADIOLOGY | Facility: HOSPITAL | Age: 55
End: 2020-02-23

## 2020-02-23 ENCOUNTER — HOSPITAL ENCOUNTER (EMERGENCY)
Facility: HOSPITAL | Age: 55
Discharge: HOME | End: 2020-02-23
Attending: EMERGENCY MEDICINE | Admitting: EMERGENCY MEDICINE

## 2020-02-23 VITALS
TEMPERATURE: 99.6 F | HEIGHT: 72 IN | SYSTOLIC BLOOD PRESSURE: 158 MMHG | RESPIRATION RATE: 18 BRPM | OXYGEN SATURATION: 95 % | WEIGHT: 315 LBS | HEART RATE: 87 BPM | DIASTOLIC BLOOD PRESSURE: 64 MMHG | BODY MASS INDEX: 42.66 KG/M2

## 2020-02-23 DIAGNOSIS — M25.561 ACUTE PAIN OF RIGHT KNEE: Primary | ICD-10-CM
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
## 2020-02-23 PROCEDURE — 99283 EMERGENCY DEPT VISIT LOW MDM: CPT

## 2020-02-23 PROCEDURE — 70450 CT HEAD/BRAIN W/O DYE: CPT

## 2020-02-23 PROCEDURE — 99153 MOD SED SAME PHYS/QHP EA: CPT

## 2020-02-23 PROCEDURE — 73502 X-RAY EXAM HIP UNI 2-3 VIEWS: CPT

## 2020-02-23 PROCEDURE — 99282 EMERGENCY DEPT VISIT SF MDM: CPT | Performed by: EMERGENCY MEDICINE

## 2020-02-23 PROCEDURE — 73560 X-RAY EXAM OF KNEE 1 OR 2: CPT

## 2020-02-23 NOTE — ED PROVIDER NOTES
EMERGENCY DEPARTMENT ENCOUNTER      Room Number: 6/06      HPI:    Chief complaint: Injuries from fall    Location: Nose and both lower extremities    Quality/Severity: Moderate    Timing/Duration: Fall occurred last evening at approximately 2230 hours    Modifying Factors: Difficulty with weightbearing on right lower extremity    Associated Symptoms: Multiple abrasions    Narrative: Pt is a 54 y.o. male who presents complaining of facial pain and lower extremity pain after a fall last evening.  Patient relates that he fell last evening and hit his face on carpeted floor.  Patient states that he is unsure if he had loss of consciousness but does state that he was on the floor for approximately 45 minutes.  Epistaxis at the time of the injury.  Patient now complaining of multiple abrasions to both lower extremities and also pain in her right hip and knee.  Patient relates that he is barely able to bear weight on the right side.      PMD: Killian Scales MD    REVIEW OF SYSTEMS  Review of Systems   Constitutional: Negative for fatigue and fever.   HENT: Negative for congestion.    Respiratory: Negative for cough, shortness of breath and wheezing.    Cardiovascular: Negative for chest pain and palpitations.   Gastrointestinal: Negative for abdominal pain, diarrhea, nausea and vomiting.   Genitourinary: Negative for dysuria, flank pain, hematuria and urgency.   Musculoskeletal: Positive for gait problem. Negative for back pain.        Current right hip and knee pain.   Skin: Positive for wound (Multiple abrasions to both knees and feet). Negative for rash.   Neurological: Positive for numbness (Peripheral neuropathy). Negative for dizziness, weakness and headaches.   Psychiatric/Behavioral: Negative for confusion.   All other systems reviewed and are negative.      PAST MEDICAL HISTORY  Active Ambulatory Problems     Diagnosis Date Noted   • Disease of thyroid gland    • Cellulitis of right lower extremity  04/22/2016   • Cellulitis 04/22/2016   • Infected epidermoid cyst 07/26/2016   • Altered mental status, unspecified 05/29/2017   • Altered mental status 07/22/2017   • Immobility 08/02/2017   • Foot fracture, right 08/19/2017   • Open dislocation of toe 03/04/2018   • Open dislocation of phalanx of foot 03/05/2018   • Confusion associated with infection 03/13/2018   • Wound dehiscence, surgical, initial encounter 03/13/2018   • Postoperative infection 03/13/2018   • Coffee ground emesis 03/16/2018   • Absolute anemia 03/16/2018   • Colon cancer screening 05/25/2018   • Cellulitis of left foot 06/09/2018   • Laceration of lesser toe of left foot without foreign body without damage to nail 06/09/2018   • ASHLEY (acute kidney injury) (CMS/[facility]) 11/23/2018     Resolved Ambulatory Problems     Diagnosis Date Noted   • No Resolved Ambulatory Problems     Past Medical History:   Diagnosis Date   • Arthritis    • Bipolar disorder (CMS/[facility])    • Cellulitis of lower extremity    • Coronary artery disease    • Diabetes mellitus (CMS/[facility])    • DVT (deep venous thrombosis) (CMS/[facility])    • Fracture of right foot    • Hyperlipidemia    • Hypertension    • Pseudogout    • Septic shock (CMS/H[facility]) 07/2017   • Sleep apnea    • Toxic metabolic encephalopathy 07/2017       PAST SURGICAL HISTORY  Past Surgical History:   Procedure Laterality Date   • AMPUTATION DIGIT Left 3/16/2018    Procedure: AMPUTATION LEFT FOURTH TOE;  Surgeon: Anish Farah DPM;  Location: [facility] OR;  Service: Podiatry   • 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   • COLONOSCOPY N/A 7/16/2018    Procedure: COLONOSCOPY and polypectomy;  Surgeon: Kaylie Mcfarlane MD;  Location: [facility] OR;  Service: Gastroenterology   • ENDOSCOPY N/A 3/16/2018    Procedure: ESOPHAGOGASTRODUODENOSCOPY with biopsies;  Surgeon: Kaylie Mcfarlane MD;  Location: [facility] OR;  Service: Gastroenterology   • HARDWARE REMOVAL Right 8/18/2017    Procedure: RIGHT EXTERNAL FIXATOR REMOVAL;  Surgeon:  Anish Farah DPM;  Location: [facility] OR;  Service:    • INCISION AND DRAINAGE LEG Left 6/13/2018    Procedure: Left 2nd, 3rd toe laceration irrigation, debridement and multi layer wound closure.;  Surgeon: Anish Farah DPM;  Location: [facility] OR;  Service: Podiatry   • JOINT REPLACEMENT     • KNEE SURGERY     • ORIF FOOT FRACTURE Right 7/29/2017    Procedure: open reduction with percutaneous pinning of midfoot dislocation fracture;  Surgeon: Anish Farah DPM;  Location: [facility] OR;  Service:    • ORIF FOOT FRACTURE Left 3/5/2018    Procedure: OPEN REDUCTION WITH IRRIGATION AND WOUND CLOSURE LEFT FOURTH TOE;  Surgeon: Anish Farah DPM;  Location: [facility] OR;  Service:    • SHOULDER SURGERY      RIGHT   • TOE FUSION Right 8/18/2017    Procedure: RIGHT  MEDIAL COLUMN ARTHRODESIS, RIGHT TARAL METATARSAL ARTHRODESIS;  Surgeon: Anish Farah DPM;  Location: [facility] OR;  Service:    • TOTAL HIP ARTHROPLASTY         FAMILY HISTORY  Family History   Problem Relation Age of Onset   • Arthritis Mother    • Cancer Mother    • Hyperlipidemia Mother    • Colon polyps Mother    • Arthritis Father    • Cancer Father    • Depression Father    • Hypertension Father    • Mental illness Father    • Cancer Sister    • Arthritis Sister    • Hyperlipidemia Sister    • Cancer Paternal Aunt    • Hypertension Daughter    • Depression Son    • Hyperlipidemia Paternal Grandmother    • Hearing loss Paternal Grandfather    • Hyperlipidemia Paternal Grandfather    • Hypertension Paternal Grandfather    • Colon cancer Maternal Grandmother        SOCIAL HISTORY  Social History     Socioeconomic History   • Marital status:      Spouse name: Not on file   • Number of children: Not on file   • Years of education: Not on file   • Highest education level: Not on file   Tobacco Use   • Smoking status: Never Smoker   • Smokeless tobacco: Never Used   Substance and Sexual Activity   • Alcohol use: Yes     Comment: occ   • Drug use: No    • Sexual activity: Defer       ALLERGIES  Hydrocodone-acetaminophen and Lisinopril    PHYSICAL EXAM  ED Triage Vitals [02/23/20 1430]   Temp Heart Rate Resp BP SpO2   99.6 °F (37.6 °C) 87 18 158/64 95 %      Temp src Heart Rate Source Patient Position BP Location FiO2 (%)   Oral Monitor Sitting Left arm --       Physical Exam   Constitutional: He is oriented to person, place, and time.   The patient is an obese, 54-year-old, white male no acute distress.   HENT:   Head: Normocephalic and atraumatic.   No septal hematoma or dried blood visualized.   Eyes: Pupils are equal, round, and reactive to light. Conjunctivae and EOM are normal.   Neck: Normal range of motion. Neck supple.   Musculoskeletal:   3 cm superficial abrasions noted to both patellas.  Scattered superficial abrasions to both feet and toes on the dorsal aspects.  Prior amputation of left toes 4 and 5.  Neuromuscular vascular exams intact distally.  Examination of the right knee does show slight swelling and a possible small effusion.  Decreased range of motion secondary to pain.  Patient does express some mild pain with internal/external rotation of the right hip.  No shortening of the right lower extremity appreciated.   Neurological: He is alert and oriented to person, place, and time.   Skin: Skin is warm and dry.   Psychiatric: Affect and judgment normal.       LAB RESULTS  Results for orders placed or performed during the hospital encounter of 11/23/18   Blood Culture - Blood, Arm, Right   Result Value Ref Range    Blood Culture No growth at 5 days    Blood Culture - Blood, Arm, Left   Result Value Ref Range    Blood Culture No growth at 5 days    MRSA Screen Culture - Swab, Nares   Result Value Ref Range    MRSA Screen Cx       No Methicillin Resistant Staphylococcus aureus isolated   Respiratory Panel, PCR - Swab, Nasopharynx   Result Value Ref Range    ADENOVIRUS, PCR Not Detected Not Detected    Coronavirus 229E Not Detected Not Detected     Coronavirus HKU1 Not Detected Not Detected    Coronavirus NL63 Not Detected Not Detected    Coronavirus OC43 Not Detected Not Detected    Human Metapneumovirus Not Detected Not Detected    Human Rhinovirus/Enterovirus Not Detected Not Detected    Influenza B PCR Not Detected Not Detected    Parainfluenza Virus 1 Not Detected Not Detected    Parainfluenza Virus 2 Not Detected Not Detected    Parainfluenza Virus 3 Not Detected Not Detected    Parainfluenza Virus 4 Not Detected Not Detected    Bordetella pertussis pcr Not Detected Not Detected    Influenza A H1 2009 PCR Not Detected Not Detected    Chlamydophila pneumoniae PCR Not Detected Not Detected    Mycoplasma pneumo by PCR Not Detected Not Detected    Influenza A PCR Not Detected Not Detected    Influenza A H3 Not Detected Not Detected    Influenza A H1 Not Detected Not Detected    RSV, PCR Not Detected Not Detected   Comprehensive Metabolic Panel   Result Value Ref Range    Glucose 102 (H) 65 - 99 mg/dL    BUN 30 (H) 6 - 20 mg/dL    Creatinine 1.60 (H) 0.76 - 1.27 mg/dL    Sodium 130 (L) 136 - 145 mmol/L    Potassium 3.7 3.5 - 5.2 mmol/L    Chloride 92 (L) 98 - 107 mmol/L    CO2 21.9 (L) 22.0 - 29.0 mmol/L    Calcium 8.8 8.6 - 10.5 mg/dL    Total Protein 8.0 6.0 - 8.5 g/dL    Albumin 4.10 3.50 - 5.20 g/dL    ALT (SGPT) 44 (H) 5 - 41 U/L    AST (SGOT) 70 (H) 5 - 40 U/L    Alkaline Phosphatase 93 40 - 129 U/L    Total Bilirubin 0.4 0.2 - 1.2 mg/dL    eGFR Non African Amer 45 (L) >60 mL/min/1.73    Globulin 3.9 gm/dL    A/G Ratio 1.1 g/dL    BUN/Creatinine Ratio 18.8 7.0 - 25.0    Anion Gap 16.1 mmol/L   Urinalysis With Microscopic If Indicated (No Culture) - Urine, Catheter   Result Value Ref Range    Color, UA Yellow Yellow, Straw    Appearance, UA Slightly Cloudy (A) Clear    pH, UA 5.5 4.5 - 8.0    Specific Gravity, UA 1.010 1.003 - 1.030    Glucose, UA Negative Negative    Ketones, UA Negative Negative, 80 mg/dL (3+), >=160 mg/dL (4+)    Bilirubin, UA  Negative Negative    Blood, UA Small (1+) (A) Negative    Protein, UA Negative Negative    Leuk Esterase, UA Negative Negative    Nitrite, UA Negative Negative    Urobilinogen, UA 0.2 E.U./dL 0.2 - 1.0 E.U./dL   Lactic Acid, Plasma   Result Value Ref Range    Lactate 1.1 0.5 - 2.0 mmol/L   Procalcitonin   Result Value Ref Range    Procalcitonin 0.07 (L) 0.10 - 0.25 ng/mL   CBC Auto Differential   Result Value Ref Range    WBC 13.21 (H) 4.80 - 10.80 10*3/mm3    RBC 5.06 4.70 - 6.10 10*6/mm3    Hemoglobin 13.3 (L) 14.0 - 18.0 g/dL    Hematocrit 42.3 42.0 - 52.0 %    MCV 83.6 80.0 - 94.0 fL    MCH 26.3 (L) 27.0 - 31.0 pg    MCHC 31.4 31.0 - 37.0 g/dL    RDW 17.7 (H) 11.5 - 14.5 %    RDW-SD 54.2 (H) 37.0 - 54.0 fl    MPV 9.1 7.4 - 10.4 fL    Platelets 250 140 - 500 10*3/mm3    Neutrophil % 75.5 (H) 45.0 - 70.0 %    Lymphocyte % 11.4 (L) 20.0 - 45.0 %    Monocyte % 8.3 (H) 3.0 - 8.0 %    Eosinophil % 3.7 0.0 - 4.0 %    Basophil % 0.6 0.0 - 2.0 %    Immature Grans % 0.5 0.0 - 0.5 %    Neutrophils, Absolute 9.98 (H) 1.50 - 8.30 10*3/mm3    Lymphocytes, Absolute 1.51 0.60 - 4.80 10*3/mm3    Monocytes, Absolute 1.09 (H) 0.00 - 1.00 10*3/mm3    Eosinophils, Absolute 0.49 (H) 0.10 - 0.30 10*3/mm3    Basophils, Absolute 0.08 0.00 - 0.20 10*3/mm3    Immature Grans, Absolute 0.06 (H) 0.00 - 0.03 10*3/mm3    nRBC 0.0 0.0 - 0.0 /100 WBC   Sedimentation Rate   Result Value Ref Range    Sed Rate 14 0 - 20 mm/hr   Urinalysis, Microscopic Only - Urine, Catheter   Result Value Ref Range    RBC, UA 0-2 (A) None Seen /HPF    WBC, UA None Seen None Seen /HPF    Bacteria, UA None Seen None Seen /HPF    Squamous Epithelial Cells, UA 0-2 None Seen, 0-2 /HPF    Hyaline Casts, UA None Seen None Seen /LPF    Methodology Manual Light Microscopy    C-reactive Protein   Result Value Ref Range    C-Reactive Protein 4.27 (H) 0.00 - 0.50 mg/dL   Basic Metabolic Panel   Result Value Ref Range    Glucose 114 (H) 65 - 99 mg/dL    BUN 20 6 - 20 mg/dL     Creatinine 1.03 0.76 - 1.27 mg/dL    Sodium 136 136 - 145 mmol/L    Potassium 3.9 3.5 - 5.2 mmol/L    Chloride 98 98 - 107 mmol/L    CO2 25.6 22.0 - 29.0 mmol/L    Calcium 8.4 (L) 8.6 - 10.5 mg/dL    eGFR Non African Amer 75 >60 mL/min/1.73    BUN/Creatinine Ratio 19.4 7.0 - 25.0    Anion Gap 12.4 mmol/L   CBC Auto Differential   Result Value Ref Range    WBC 10.80 4.80 - 10.80 10*3/mm3    RBC 4.77 4.70 - 6.10 10*6/mm3    Hemoglobin 12.6 (L) 14.0 - 18.0 g/dL    Hematocrit 40.6 (L) 42.0 - 52.0 %    MCV 85.1 80.0 - 94.0 fL    MCH 26.4 (L) 27.0 - 31.0 pg    MCHC 31.0 31.0 - 37.0 g/dL    RDW 17.9 (H) 11.5 - 14.5 %    RDW-SD 55.9 (H) 37.0 - 54.0 fl    MPV 9.3 7.4 - 10.4 fL    Platelets 226 140 - 500 10*3/mm3    Neutrophil % 68.2 45.0 - 70.0 %    Lymphocyte % 14.6 (L) 20.0 - 45.0 %    Monocyte % 11.0 (H) 3.0 - 8.0 %    Eosinophil % 5.1 (H) 0.0 - 4.0 %    Basophil % 0.7 0.0 - 2.0 %    Immature Grans % 0.4 0.0 - 0.5 %    Neutrophils, Absolute 7.36 1.50 - 8.30 10*3/mm3    Lymphocytes, Absolute 1.58 0.60 - 4.80 10*3/mm3    Monocytes, Absolute 1.19 (H) 0.00 - 1.00 10*3/mm3    Eosinophils, Absolute 0.55 (H) 0.10 - 0.30 10*3/mm3    Basophils, Absolute 0.08 0.00 - 0.20 10*3/mm3    Immature Grans, Absolute 0.04 (H) 0.00 - 0.03 10*3/mm3    nRBC 0.0 0.0 - 0.0 /100 WBC   Hemoglobin A1c   Result Value Ref Range    Hemoglobin A1C 5.90 (H) 4.80 - 5.60 %   Hepatic Function Panel   Result Value Ref Range    Total Protein 6.9 6.0 - 8.5 g/dL    Albumin 4.10 3.50 - 5.20 g/dL    ALT (SGPT) 35 5 - 41 U/L    AST (SGOT) 50 (H) 5 - 40 U/L    Alkaline Phosphatase 80 40 - 129 U/L    Total Bilirubin 0.3 0.2 - 1.2 mg/dL    Bilirubin, Direct <0.2 (L) 0.2 - 0.3 mg/dL    Bilirubin, Indirect  mg/dL   Comprehensive Metabolic Panel   Result Value Ref Range    Glucose 113 (H) 65 - 99 mg/dL    BUN 13 6 - 20 mg/dL    Creatinine 0.92 0.76 - 1.27 mg/dL    Sodium 135 (L) 136 - 145 mmol/L    Potassium 4.0 3.5 - 5.2 mmol/L    Chloride 99 98 - 107 mmol/L    CO2  24.3 22.0 - 29.0 mmol/L    Calcium 8.6 8.6 - 10.5 mg/dL    Total Protein 7.0 6.0 - 8.5 g/dL    Albumin 3.80 3.50 - 5.20 g/dL    ALT (SGPT) 32 5 - 41 U/L    AST (SGOT) 36 5 - 40 U/L    Alkaline Phosphatase 75 40 - 129 U/L    Total Bilirubin 0.3 0.2 - 1.2 mg/dL    eGFR Non African Amer 85 >60 mL/min/1.73    Globulin 3.2 gm/dL    A/G Ratio 1.2 g/dL    BUN/Creatinine Ratio 14.1 7.0 - 25.0    Anion Gap 11.7 mmol/L   CBC Auto Differential   Result Value Ref Range    WBC 9.04 4.80 - 10.80 10*3/mm3    RBC 4.73 4.70 - 6.10 10*6/mm3    Hemoglobin 12.6 (L) 14.0 - 18.0 g/dL    Hematocrit 39.9 (L) 42.0 - 52.0 %    MCV 84.4 80.0 - 94.0 fL    MCH 26.6 (L) 27.0 - 31.0 pg    MCHC 31.6 31.0 - 37.0 g/dL    RDW 17.5 (H) 11.5 - 14.5 %    RDW-SD 54.2 (H) 37.0 - 54.0 fl    MPV 10.4 7.4 - 10.4 fL    Platelets 165 140 - 500 10*3/mm3    Neutrophil % 56.6 45.0 - 70.0 %    Lymphocyte % 23.8 20.0 - 45.0 %    Monocyte % 10.8 (H) 3.0 - 8.0 %    Eosinophil % 7.6 (H) 0.0 - 4.0 %    Basophil % 0.8 0.0 - 2.0 %    Immature Grans % 0.4 0.0 - 0.5 %    Neutrophils, Absolute 5.11 1.50 - 8.30 10*3/mm3    Lymphocytes, Absolute 2.15 0.60 - 4.80 10*3/mm3    Monocytes, Absolute 0.98 0.00 - 1.00 10*3/mm3    Eosinophils, Absolute 0.69 (H) 0.10 - 0.30 10*3/mm3    Basophils, Absolute 0.07 0.00 - 0.20 10*3/mm3    Immature Grans, Absolute 0.04 (H) 0.00 - 0.03 10*3/mm3    nRBC 0.0 0.0 - 0.0 /100 WBC   Scan Slide   Result Value Ref Range    RBC Morphology Normal Normal    WBC Morphology Normal Normal    Platelet Morphology Normal Normal   POC Glucose Once   Result Value Ref Range    Glucose 104 70 - 130 mg/dL   POC Glucose Once   Result Value Ref Range    Glucose 121 70 - 130 mg/dL         I ordered the above labs and reviewed the results    RADIOLOGY  Xr Knee 1 Or 2 View Right    Result Date: 2/23/2020  Narrative: CR Knee 1 or 2 Vws RT INDICATION: Fall last night. Complaining of right knee pain. COMPARISON: None available. FINDINGS: 2 view(s) of the right knee.  No  fracture, dislocation, or effusion. No bone erosion or destruction. Chondrocalcinosis noted. Modest joint space narrowing. No unexpected foreign body.     Impression: No acute findings. Degenerative changes of the right knee as described. Clinical aspects of the case will determine if MRI of the right knee could provide additional information. Signer Name: Jose Dewitt MD  Signed: 2/23/2020 4:29 PM  Workstation Name: [facility]    Ct Head Without Contrast    Result Date: 2/23/2020  Narrative: CT Head WO HISTORY: Fall last night with head trauma TECHNIQUE: Axial unenhanced head CT. Radiation dose reduction techniques included automated exposure control or exposure modulation based on body size. Count of known CT and cardiac nuc med studies performed in previous 12 months: 0. Time of scan: 4:20 PM COMPARISON: November 23, 2018 FINDINGS: The ventricles remain generous in size. The degree of atrophy is out of proportion to the patient's age of 54 years. Cortical sulci are correspondingly prominent. No extraaxial fluid collections are seen. No parenchymal or subarachnoid hemorrhage is present. Periventricular hypodensities are demonstrated which are nonspecific but likely represent white matter microvascular change in a patient of this age. No CT evidence of mass or acute infarct. The mastoid air cells are clear. The visualized paranasal sinuses are clear. Orbital structures demonstrate no acute findings.     Impression: Impression: 1. No clearly acute intracranial pathology demonstrated. 2. Generalized cerebral atrophy and nonspecific periventricular hypodensities which are thought to represent white matter microvascular change. 3. No significant interval change from prior study. Signer Name: Jose Dewitt MD  Signed: 2/23/2020 4:32 PM  Workstation Name: [facility]    Xr Hip With Or Without Pelvis 2 - 3 View Right    Result Date:  2/23/2020  Narrative: CR Hip Uni Comp Min 2 Vws RT INDICATION: Fall last night. Complaining of right hip pain COMPARISON: None. FINDINGS: AP and frog-leg lateral view(s) of the right hip. Right hip arthroplasty demonstrated. No fracture or dislocation. No bone erosion or destruction.      Impression: No acute radiographic findings. Signer Name: Jose Dewitt MD  Signed: 2/23/2020 4:28 PM  Workstation Name: [email or web address]      I ordered the above radiologic testing and reviewed the results    PROCEDURES  Procedures      PROGRESS AND CONSULTS  ED Course as of Feb 23 1646   Sun Feb 23, 2020   1639 Negative x-rays discussed with patient.  Patient relates that he does have a walker and a wheelchair at home.  Patient was encouraged to use these as necessary and increase activity as tolerated.  Warnings discussed and patient strongly advised to follow-up with Dr. Scales in 1 week if not significantly improved.  Abrasions were cleaned and dressed by the emergency department technician in the usual fashion.    [ML]      ED Course User Index  [ML] Luiz Woodson MD           MEDICAL DECISION MAKING  Results were reviewed/discussed with the patient and they were also made aware of online access. Pt also made aware that some labs, such as cultures, will not be resulted during ER visit and follow up with PMD is necessary.     MDM       DIAGNOSIS  Final diagnoses:   Fall on same level from slipping, tripping or stumbling, initial encounter   Acute pain of right knee       Latest Documented Vital Signs:  As of 4:46 PM  BP- 158/64 HR- 87 Temp- 99.6 °F (37.6 °C) (Oral) O2 sat- 95%    DISPOSITION  Discharged in stable condition       Medication List      New Prescriptions    pentazocine-naloxone 50-0.5 MG per tablet  Commonly known as:  TALWIN NX  Take 1 tablet by mouth Every 6 (Six) Hours As Needed for Moderate Pain .          Follow-up Information     Killian Scales MD In 1 week.     Specialty:  Family Medicine  Why:  If symptoms worsen  Contact information:  [address] [place] [address]  [phone]                      Luiz Woodson MD  02/23/20 [id number]

## 2020-02-28 ENCOUNTER — HOSPITAL ENCOUNTER (EMERGENCY)
Facility: HOSPITAL | Age: 55
Discharge: HOME | End: 2020-02-28
Attending: EMERGENCY MEDICINE | Admitting: EMERGENCY MEDICINE

## 2020-02-28 ENCOUNTER — APPOINTMENT (OUTPATIENT)
Dept: GENERAL RADIOLOGY | Facility: HOSPITAL | Age: 55
End: 2020-02-28

## 2020-02-28 VITALS
RESPIRATION RATE: 18 BRPM | OXYGEN SATURATION: 98 % | WEIGHT: 315 LBS | SYSTOLIC BLOOD PRESSURE: 133 MMHG | DIASTOLIC BLOOD PRESSURE: 91 MMHG | TEMPERATURE: 98.2 F | BODY MASS INDEX: 42.66 KG/M2 | HEART RATE: 76 BPM | HEIGHT: 72 IN

## 2020-02-28 DIAGNOSIS — T14.8XXA INFECTED WOUND: Primary | ICD-10-CM

## 2020-02-28 DIAGNOSIS — L08.9 INFECTED WOUND: Primary | ICD-10-CM

## 2020-02-28 PROCEDURE — 99283 EMERGENCY DEPT VISIT LOW MDM: CPT

## 2020-02-28 PROCEDURE — 99282 EMERGENCY DEPT VISIT SF MDM: CPT | Performed by: PHYSICIAN ASSISTANT

## 2020-02-28 RX ORDER — CEPHALEXIN 500 MG/1
500 CAPSULE ORAL 4 TIMES DAILY
Qty: 40 CAPSULE | Refills: 0 | Status: SHIPPED | OUTPATIENT
Start: 2020-02-28 | End: 2020-03-09

## 2020-02-28 RX ADMIN — MUPIROCIN 1 APPLICATION: 20 OINTMENT TOPICAL at 16:39

## 2020-08-27 ENCOUNTER — HOSPITAL ENCOUNTER (OUTPATIENT)
Dept: GENERAL RADIOLOGY | Facility: HOSPITAL | Age: 55
Discharge: HOME | End: 2020-08-27
Admitting: PODIATRIST

## 2020-08-27 DIAGNOSIS — M79.674 TOE PAIN, RIGHT: Primary | ICD-10-CM

## 2020-08-27 DIAGNOSIS — S91.114A: ICD-10-CM

## 2020-08-27 DIAGNOSIS — M79.674 TOE PAIN, RIGHT: ICD-10-CM

## 2020-08-27 PROCEDURE — 73630 X-RAY EXAM OF FOOT: CPT

## 2020-10-12 ENCOUNTER — APPOINTMENT (OUTPATIENT)
Dept: ULTRASOUND IMAGING | Facility: HOSPITAL | Age: 55
End: 2020-10-12

## 2020-10-12 ENCOUNTER — APPOINTMENT (OUTPATIENT)
Dept: GENERAL RADIOLOGY | Facility: HOSPITAL | Age: 55
End: 2020-10-12

## 2020-10-12 ENCOUNTER — HOSPITAL ENCOUNTER (INPATIENT)
Facility: HOSPITAL | Age: 55
LOS: 8 days | Discharge: NURSING, REHAB OR LONG-TERM CARE | End: 2020-10-20
Attending: EMERGENCY MEDICINE | Admitting: INTERNAL MEDICINE

## 2020-10-12 DIAGNOSIS — T81.49XA SURGICAL WOUND INFECTION: ICD-10-CM

## 2020-10-12 DIAGNOSIS — L03.115 CELLULITIS OF RIGHT LOWER EXTREMITY: ICD-10-CM

## 2020-10-12 DIAGNOSIS — Z89.421 STATUS POST AMPUTATION OF LESSER TOE OF RIGHT FOOT (HCC): ICD-10-CM

## 2020-10-12 DIAGNOSIS — E11.42 TYPE 2 DIABETES MELLITUS WITH DIABETIC POLYNEUROPATHY, WITHOUT LONG-TERM CURRENT USE OF INSULIN (HCC): Chronic | ICD-10-CM

## 2020-10-12 DIAGNOSIS — L02.611 ABSCESS OF FIFTH TOE OF RIGHT FOOT: Primary | ICD-10-CM

## 2020-10-12 DIAGNOSIS — S98.131A AMPUTATION OF FIFTH TOE OF RIGHT FOOT (HCC): ICD-10-CM

## 2020-10-12 DIAGNOSIS — M86.9 OSTEOMYELITIS OF FIFTH TOE OF RIGHT FOOT (HCC): ICD-10-CM

## 2020-10-12 LAB
ALBUMIN SERPL-MCNC: 3.4 G/DL (ref 3.5–5.2)
ALBUMIN/GLOB SERPL: 0.8 G/DL
ALP SERPL-CCNC: 78 U/L (ref 39–117)
ALT SERPL W P-5'-P-CCNC: 17 U/L (ref 1–41)
ANION GAP SERPL CALCULATED.3IONS-SCNC: 11.3 MMOL/L (ref 5–15)
APTT PPP: 40.6 SECONDS (ref 24.3–38.1)
AST SERPL-CCNC: 18 U/L (ref 1–40)
B PARAPERT DNA SPEC QL NAA+PROBE: NOT DETECTED
B PERT DNA SPEC QL NAA+PROBE: NOT DETECTED
BASOPHILS # BLD AUTO: 0.02 10*3/MM3 (ref 0–0.2)
BASOPHILS NFR BLD AUTO: 0.2 % (ref 0–1.5)
BILIRUB SERPL-MCNC: 0.4 MG/DL (ref 0–1.2)
BUN SERPL-MCNC: 8 MG/DL (ref 6–20)
BUN/CREAT SERPL: 15.7 (ref 7–25)
C PNEUM DNA NPH QL NAA+NON-PROBE: NOT DETECTED
CALCIUM SPEC-SCNC: 8.8 MG/DL (ref 8.6–10.5)
CHLORIDE SERPL-SCNC: 93 MMOL/L (ref 98–107)
CO2 SERPL-SCNC: 24.7 MMOL/L (ref 22–29)
CREAT SERPL-MCNC: 0.51 MG/DL (ref 0.76–1.27)
D-LACTATE SERPL-SCNC: 1.3 MMOL/L (ref 0.5–2)
D-LACTATE SERPL-SCNC: 2.1 MMOL/L (ref 0.5–2)
DEPRECATED RDW RBC AUTO: 51.6 FL (ref 37–54)
EOSINOPHIL # BLD AUTO: 0.15 10*3/MM3 (ref 0–0.4)
EOSINOPHIL NFR BLD AUTO: 1.4 % (ref 0.3–6.2)
ERYTHROCYTE [DISTWIDTH] IN BLOOD BY AUTOMATED COUNT: 15.1 % (ref 12.3–15.4)
FLUAV H1 2009 PAND RNA NPH QL NAA+PROBE: NOT DETECTED
FLUAV H1 HA GENE NPH QL NAA+PROBE: NOT DETECTED
FLUAV H3 RNA NPH QL NAA+PROBE: NOT DETECTED
FLUAV SUBTYP SPEC NAA+PROBE: NOT DETECTED
FLUBV RNA ISLT QL NAA+PROBE: NOT DETECTED
GFR SERPL CREATININE-BSD FRML MDRD: >150 ML/MIN/1.73
GLOBULIN UR ELPH-MCNC: 4.1 GM/DL
GLUCOSE BLDC GLUCOMTR-MCNC: 142 MG/DL (ref 70–130)
GLUCOSE BLDC GLUCOMTR-MCNC: 154 MG/DL (ref 70–130)
GLUCOSE SERPL-MCNC: 158 MG/DL (ref 65–99)
HADV DNA SPEC NAA+PROBE: NOT DETECTED
HBA1C MFR BLD: 6.7 % (ref 4.8–5.6)
HCOV 229E RNA SPEC QL NAA+PROBE: NOT DETECTED
HCOV HKU1 RNA SPEC QL NAA+PROBE: NOT DETECTED
HCOV NL63 RNA SPEC QL NAA+PROBE: NOT DETECTED
HCOV OC43 RNA SPEC QL NAA+PROBE: NOT DETECTED
HCT VFR BLD AUTO: 40.7 % (ref 37.5–51)
HGB BLD-MCNC: 13 G/DL (ref 13–17.7)
HMPV RNA NPH QL NAA+NON-PROBE: NOT DETECTED
HPIV1 RNA SPEC QL NAA+PROBE: NOT DETECTED
HPIV2 RNA SPEC QL NAA+PROBE: NOT DETECTED
HPIV3 RNA NPH QL NAA+PROBE: NOT DETECTED
HPIV4 P GENE NPH QL NAA+PROBE: NOT DETECTED
IMM GRANULOCYTES # BLD AUTO: 0.05 10*3/MM3 (ref 0–0.05)
IMM GRANULOCYTES NFR BLD AUTO: 0.5 % (ref 0–0.5)
INR PPP: 1.32 (ref 0.9–1.1)
LACTATE HOLD SPECIMEN: NORMAL
LYMPHOCYTES # BLD AUTO: 0.89 10*3/MM3 (ref 0.7–3.1)
LYMPHOCYTES NFR BLD AUTO: 8.3 % (ref 19.6–45.3)
M PNEUMO IGG SER IA-ACNC: NOT DETECTED
MCH RBC QN AUTO: 29.4 PG (ref 26.6–33)
MCHC RBC AUTO-ENTMCNC: 31.9 G/DL (ref 31.5–35.7)
MCV RBC AUTO: 92.1 FL (ref 79–97)
MONOCYTES # BLD AUTO: 1.16 10*3/MM3 (ref 0.1–0.9)
MONOCYTES NFR BLD AUTO: 10.8 % (ref 5–12)
NEUTROPHILS NFR BLD AUTO: 78.8 % (ref 42.7–76)
NEUTROPHILS NFR BLD AUTO: 8.48 10*3/MM3 (ref 1.7–7)
PLATELET # BLD AUTO: 267 10*3/MM3 (ref 140–450)
PMV BLD AUTO: 9.6 FL (ref 6–12)
POTASSIUM SERPL-SCNC: 4.7 MMOL/L (ref 3.5–5.2)
PROCALCITONIN SERPL-MCNC: 0.08 NG/ML (ref 0–0.25)
PROT SERPL-MCNC: 7.5 G/DL (ref 6–8.5)
PROTHROMBIN TIME: 15.9 SECONDS (ref 12.1–15)
RBC # BLD AUTO: 4.42 10*6/MM3 (ref 4.14–5.8)
RHINOVIRUS RNA SPEC NAA+PROBE: NOT DETECTED
RSV RNA NPH QL NAA+NON-PROBE: NOT DETECTED
SARS-COV-2 RNA NPH QL NAA+NON-PROBE: NOT DETECTED
SODIUM SERPL-SCNC: 129 MMOL/L (ref 136–145)
TSH SERPL DL<=0.05 MIU/L-ACNC: 1.82 UIU/ML (ref 0.27–4.2)
WBC # BLD AUTO: 10.75 10*3/MM3 (ref 3.4–10.8)

## 2020-10-12 PROCEDURE — 99284 EMERGENCY DEPT VISIT MOD MDM: CPT

## 2020-10-12 PROCEDURE — 93971 EXTREMITY STUDY: CPT

## 2020-10-12 PROCEDURE — 87040 BLOOD CULTURE FOR BACTERIA: CPT | Performed by: EMERGENCY MEDICINE

## 2020-10-12 PROCEDURE — 85025 COMPLETE CBC W/AUTO DIFF WBC: CPT | Performed by: EMERGENCY MEDICINE

## 2020-10-12 PROCEDURE — 93010 ELECTROCARDIOGRAM REPORT: CPT | Performed by: INTERNAL MEDICINE

## 2020-10-12 PROCEDURE — 93005 ELECTROCARDIOGRAM TRACING: CPT | Performed by: INTERNAL MEDICINE

## 2020-10-12 PROCEDURE — 0202U NFCT DS 22 TRGT SARS-COV-2: CPT | Performed by: INTERNAL MEDICINE

## 2020-10-12 PROCEDURE — 82962 GLUCOSE BLOOD TEST: CPT

## 2020-10-12 PROCEDURE — 84443 ASSAY THYROID STIM HORMONE: CPT | Performed by: INTERNAL MEDICINE

## 2020-10-12 PROCEDURE — 73630 X-RAY EXAM OF FOOT: CPT

## 2020-10-12 PROCEDURE — 94799 UNLISTED PULMONARY SVC/PX: CPT

## 2020-10-12 PROCEDURE — 80053 COMPREHEN METABOLIC PANEL: CPT | Performed by: EMERGENCY MEDICINE

## 2020-10-12 PROCEDURE — 25010000002 VANCOMYCIN PER 500 MG: Performed by: INTERNAL MEDICINE

## 2020-10-12 PROCEDURE — 85730 THROMBOPLASTIN TIME PARTIAL: CPT | Performed by: EMERGENCY MEDICINE

## 2020-10-12 PROCEDURE — 93970 EXTREMITY STUDY: CPT

## 2020-10-12 PROCEDURE — 85610 PROTHROMBIN TIME: CPT | Performed by: EMERGENCY MEDICINE

## 2020-10-12 PROCEDURE — 87081 CULTURE SCREEN ONLY: CPT | Performed by: INTERNAL MEDICINE

## 2020-10-12 PROCEDURE — 25010000002 CEFEPIME-DEXTROSE 2-5 GM-%(50ML) RECONSTITUTED SOLUTION: Performed by: INTERNAL MEDICINE

## 2020-10-12 PROCEDURE — 93005 ELECTROCARDIOGRAM TRACING: CPT | Performed by: EMERGENCY MEDICINE

## 2020-10-12 PROCEDURE — 25010000002 VANCOMYCIN 1 G RECONSTITUTED SOLUTION 1 EACH VIAL: Performed by: INTERNAL MEDICINE

## 2020-10-12 PROCEDURE — 25010000002 VANCOMYCIN 750 MG RECONSTITUTED SOLUTION 1 EACH VIAL: Performed by: INTERNAL MEDICINE

## 2020-10-12 PROCEDURE — 84145 PROCALCITONIN (PCT): CPT | Performed by: EMERGENCY MEDICINE

## 2020-10-12 PROCEDURE — 71046 X-RAY EXAM CHEST 2 VIEWS: CPT

## 2020-10-12 PROCEDURE — 99284 EMERGENCY DEPT VISIT MOD MDM: CPT | Performed by: EMERGENCY MEDICINE

## 2020-10-12 PROCEDURE — 83036 HEMOGLOBIN GLYCOSYLATED A1C: CPT | Performed by: INTERNAL MEDICINE

## 2020-10-12 PROCEDURE — 83605 ASSAY OF LACTIC ACID: CPT | Performed by: EMERGENCY MEDICINE

## 2020-10-12 PROCEDURE — 25010000002 ENOXAPARIN PER 10 MG: Performed by: INTERNAL MEDICINE

## 2020-10-12 PROCEDURE — 99223 1ST HOSP IP/OBS HIGH 75: CPT | Performed by: INTERNAL MEDICINE

## 2020-10-12 PROCEDURE — 63710000001 INSULIN ASPART PER 5 UNITS: Performed by: INTERNAL MEDICINE

## 2020-10-12 RX ORDER — ACETAMINOPHEN 325 MG/1
650 TABLET ORAL EVERY 4 HOURS PRN
Status: DISCONTINUED | OUTPATIENT
Start: 2020-10-12 | End: 2020-10-20 | Stop reason: HOSPADM

## 2020-10-12 RX ORDER — CEFEPIME HYDROCHLORIDE 2 G/50ML
2 INJECTION, SOLUTION INTRAVENOUS EVERY 8 HOURS
Status: DISCONTINUED | OUTPATIENT
Start: 2020-10-12 | End: 2020-10-13

## 2020-10-12 RX ORDER — CALCIUM CARBONATE 200(500)MG
1 TABLET,CHEWABLE ORAL 2 TIMES DAILY PRN
Status: DISCONTINUED | OUTPATIENT
Start: 2020-10-12 | End: 2020-10-20 | Stop reason: HOSPADM

## 2020-10-12 RX ORDER — CLONAZEPAM 0.5 MG/1
0.5 TABLET ORAL 3 TIMES DAILY PRN
Status: DISCONTINUED | OUTPATIENT
Start: 2020-10-12 | End: 2020-10-13

## 2020-10-12 RX ORDER — SODIUM CHLORIDE 9 MG/ML
125 INJECTION, SOLUTION INTRAVENOUS CONTINUOUS
Status: DISCONTINUED | OUTPATIENT
Start: 2020-10-12 | End: 2020-10-12

## 2020-10-12 RX ORDER — PREGABALIN 50 MG/1
50 CAPSULE ORAL EVERY 12 HOURS SCHEDULED
Status: DISCONTINUED | OUTPATIENT
Start: 2020-10-12 | End: 2020-10-13

## 2020-10-12 RX ORDER — FLUOXETINE HYDROCHLORIDE 20 MG/1
40 CAPSULE ORAL DAILY
Status: DISCONTINUED | OUTPATIENT
Start: 2020-10-12 | End: 2020-10-20 | Stop reason: HOSPADM

## 2020-10-12 RX ORDER — TEMAZEPAM 15 MG/1
30 CAPSULE ORAL NIGHTLY PRN
Status: DISCONTINUED | OUTPATIENT
Start: 2020-10-12 | End: 2020-10-13

## 2020-10-12 RX ORDER — SODIUM CHLORIDE 0.9 % (FLUSH) 0.9 %
10 SYRINGE (ML) INJECTION AS NEEDED
Status: DISCONTINUED | OUTPATIENT
Start: 2020-10-12 | End: 2020-10-20 | Stop reason: HOSPADM

## 2020-10-12 RX ORDER — DEXTROSE MONOHYDRATE 25 G/50ML
25 INJECTION, SOLUTION INTRAVENOUS
Status: DISCONTINUED | OUTPATIENT
Start: 2020-10-12 | End: 2020-10-20 | Stop reason: HOSPADM

## 2020-10-12 RX ORDER — SODIUM CHLORIDE 9 MG/ML
40 INJECTION, SOLUTION INTRAVENOUS AS NEEDED
Status: DISCONTINUED | OUTPATIENT
Start: 2020-10-12 | End: 2020-10-20 | Stop reason: HOSPADM

## 2020-10-12 RX ORDER — OXYCODONE HYDROCHLORIDE AND ACETAMINOPHEN 5; 325 MG/1; MG/1
1 TABLET ORAL EVERY 4 HOURS PRN
Status: DISPENSED | OUTPATIENT
Start: 2020-10-12 | End: 2020-10-19

## 2020-10-12 RX ORDER — ONDANSETRON 4 MG/1
4 TABLET, FILM COATED ORAL EVERY 6 HOURS PRN
Status: DISCONTINUED | OUTPATIENT
Start: 2020-10-12 | End: 2020-10-20 | Stop reason: HOSPADM

## 2020-10-12 RX ORDER — OXYCODONE AND ACETAMINOPHEN 10; 325 MG/1; MG/1
1 TABLET ORAL EVERY 4 HOURS PRN
Status: DISPENSED | OUTPATIENT
Start: 2020-10-12 | End: 2020-10-19

## 2020-10-12 RX ORDER — NICOTINE POLACRILEX 4 MG
15 LOZENGE BUCCAL
Status: DISCONTINUED | OUTPATIENT
Start: 2020-10-12 | End: 2020-10-20 | Stop reason: HOSPADM

## 2020-10-12 RX ORDER — ACETAMINOPHEN 160 MG/5ML
650 SOLUTION ORAL EVERY 4 HOURS PRN
Status: DISCONTINUED | OUTPATIENT
Start: 2020-10-12 | End: 2020-10-20 | Stop reason: HOSPADM

## 2020-10-12 RX ORDER — AMOXICILLIN 250 MG
2 CAPSULE ORAL NIGHTLY PRN
Status: DISCONTINUED | OUTPATIENT
Start: 2020-10-12 | End: 2020-10-20 | Stop reason: HOSPADM

## 2020-10-12 RX ORDER — CHOLECALCIFEROL (VITAMIN D3) 125 MCG
5 CAPSULE ORAL NIGHTLY PRN
Status: DISCONTINUED | OUTPATIENT
Start: 2020-10-12 | End: 2020-10-20 | Stop reason: HOSPADM

## 2020-10-12 RX ORDER — SODIUM CHLORIDE, SODIUM LACTATE, POTASSIUM CHLORIDE, CALCIUM CHLORIDE 600; 310; 30; 20 MG/100ML; MG/100ML; MG/100ML; MG/100ML
75 INJECTION, SOLUTION INTRAVENOUS CONTINUOUS
Status: DISCONTINUED | OUTPATIENT
Start: 2020-10-12 | End: 2020-10-17

## 2020-10-12 RX ORDER — ONDANSETRON 2 MG/ML
4 INJECTION INTRAMUSCULAR; INTRAVENOUS EVERY 6 HOURS PRN
Status: DISCONTINUED | OUTPATIENT
Start: 2020-10-12 | End: 2020-10-20 | Stop reason: HOSPADM

## 2020-10-12 RX ORDER — OMEPRAZOLE 40 MG/1
40 CAPSULE, DELAYED RELEASE ORAL EVERY MORNING
COMMUNITY

## 2020-10-12 RX ORDER — OLANZAPINE 2.5 MG/1
5 TABLET ORAL NIGHTLY
Status: DISCONTINUED | OUTPATIENT
Start: 2020-10-12 | End: 2020-10-20 | Stop reason: HOSPADM

## 2020-10-12 RX ORDER — ACETAMINOPHEN 650 MG/1
650 SUPPOSITORY RECTAL EVERY 4 HOURS PRN
Status: DISCONTINUED | OUTPATIENT
Start: 2020-10-12 | End: 2020-10-20 | Stop reason: HOSPADM

## 2020-10-12 RX ORDER — CEFEPIME HYDROCHLORIDE 2 G/50ML
2 INJECTION, SOLUTION INTRAVENOUS ONCE
Status: COMPLETED | OUTPATIENT
Start: 2020-10-12 | End: 2020-10-12

## 2020-10-12 RX ORDER — SODIUM CHLORIDE 0.9 % (FLUSH) 0.9 %
10 SYRINGE (ML) INJECTION EVERY 12 HOURS SCHEDULED
Status: DISCONTINUED | OUTPATIENT
Start: 2020-10-12 | End: 2020-10-20 | Stop reason: HOSPADM

## 2020-10-12 RX ORDER — PRIMIDONE 250 MG/1
250 TABLET ORAL 3 TIMES DAILY
Status: DISCONTINUED | OUTPATIENT
Start: 2020-10-12 | End: 2020-10-20 | Stop reason: HOSPADM

## 2020-10-12 RX ADMIN — PRIMIDONE 250 MG: 250 TABLET ORAL at 20:33

## 2020-10-12 RX ADMIN — SODIUM CHLORIDE, POTASSIUM CHLORIDE, SODIUM LACTATE AND CALCIUM CHLORIDE 75 ML/HR: 600; 310; 30; 20 INJECTION, SOLUTION INTRAVENOUS at 18:25

## 2020-10-12 RX ADMIN — OXYCODONE HYDROCHLORIDE AND ACETAMINOPHEN 1 TABLET: 10; 325 TABLET ORAL at 15:35

## 2020-10-12 RX ADMIN — ACETAMINOPHEN 650 MG: 325 TABLET, FILM COATED ORAL at 20:33

## 2020-10-12 RX ADMIN — INSULIN ASPART 2 UNITS: 100 INJECTION, SOLUTION INTRAVENOUS; SUBCUTANEOUS at 18:14

## 2020-10-12 RX ADMIN — SODIUM CHLORIDE, PRESERVATIVE FREE 10 ML: 5 INJECTION INTRAVENOUS at 21:00

## 2020-10-12 RX ADMIN — PRIMIDONE 250 MG: 250 TABLET ORAL at 15:35

## 2020-10-12 RX ADMIN — CEFEPIME HYDROCHLORIDE 2 G: 2 INJECTION, SOLUTION INTRAVENOUS at 15:37

## 2020-10-12 RX ADMIN — PREGABALIN 50 MG: 50 CAPSULE ORAL at 20:33

## 2020-10-12 RX ADMIN — TEMAZEPAM 30 MG: 15 CAPSULE ORAL at 23:59

## 2020-10-12 RX ADMIN — CEFEPIME HYDROCHLORIDE 2 G: 2 INJECTION, SOLUTION INTRAVENOUS at 23:59

## 2020-10-12 RX ADMIN — OLANZAPINE 5 MG: 2.5 TABLET, FILM COATED ORAL at 20:33

## 2020-10-12 RX ADMIN — SODIUM CHLORIDE 125 ML/HR: 9 INJECTION, SOLUTION INTRAVENOUS at 12:14

## 2020-10-12 RX ADMIN — VANCOMYCIN HYDROCHLORIDE 2250 MG: 1 INJECTION, POWDER, LYOPHILIZED, FOR SOLUTION INTRAVENOUS at 16:29

## 2020-10-12 RX ADMIN — OXYCODONE HYDROCHLORIDE AND ACETAMINOPHEN 1 TABLET: 10; 325 TABLET ORAL at 21:10

## 2020-10-12 RX ADMIN — ENOXAPARIN SODIUM 40 MG: 40 INJECTION SUBCUTANEOUS at 15:35

## 2020-10-13 ENCOUNTER — ANESTHESIA EVENT (OUTPATIENT)
Dept: PERIOP | Facility: HOSPITAL | Age: 55
End: 2020-10-13

## 2020-10-13 ENCOUNTER — ANESTHESIA (OUTPATIENT)
Dept: PERIOP | Facility: HOSPITAL | Age: 55
End: 2020-10-13

## 2020-10-13 ENCOUNTER — APPOINTMENT (OUTPATIENT)
Dept: MRI IMAGING | Facility: HOSPITAL | Age: 55
End: 2020-10-13

## 2020-10-13 PROBLEM — M86.9 OSTEOMYELITIS OF FIFTH TOE OF RIGHT FOOT (HCC): Status: ACTIVE | Noted: 2020-10-12

## 2020-10-13 LAB
ANION GAP SERPL CALCULATED.3IONS-SCNC: 10.5 MMOL/L (ref 5–15)
BASOPHILS # BLD AUTO: 0.02 10*3/MM3 (ref 0–0.2)
BASOPHILS NFR BLD AUTO: 0.2 % (ref 0–1.5)
BUN SERPL-MCNC: 7 MG/DL (ref 6–20)
BUN/CREAT SERPL: 11.9 (ref 7–25)
CALCIUM SPEC-SCNC: 8.4 MG/DL (ref 8.6–10.5)
CHLORIDE SERPL-SCNC: 99 MMOL/L (ref 98–107)
CO2 SERPL-SCNC: 24.5 MMOL/L (ref 22–29)
CREAT SERPL-MCNC: 0.59 MG/DL (ref 0.76–1.27)
CRP SERPL-MCNC: 19.61 MG/DL (ref 0–0.5)
DEPRECATED RDW RBC AUTO: 52.9 FL (ref 37–54)
EOSINOPHIL # BLD AUTO: 0.2 10*3/MM3 (ref 0–0.4)
EOSINOPHIL NFR BLD AUTO: 2.1 % (ref 0.3–6.2)
ERYTHROCYTE [DISTWIDTH] IN BLOOD BY AUTOMATED COUNT: 15.3 % (ref 12.3–15.4)
ERYTHROCYTE [SEDIMENTATION RATE] IN BLOOD: 59 MM/HR (ref 0–20)
GFR SERPL CREATININE-BSD FRML MDRD: 142 ML/MIN/1.73
GLUCOSE BLDC GLUCOMTR-MCNC: 105 MG/DL (ref 70–130)
GLUCOSE BLDC GLUCOMTR-MCNC: 114 MG/DL (ref 70–130)
GLUCOSE BLDC GLUCOMTR-MCNC: 143 MG/DL (ref 70–130)
GLUCOSE BLDC GLUCOMTR-MCNC: 187 MG/DL (ref 70–130)
GLUCOSE SERPL-MCNC: 127 MG/DL (ref 65–99)
HCT VFR BLD AUTO: 40 % (ref 37.5–51)
HGB BLD-MCNC: 12.7 G/DL (ref 13–17.7)
IMM GRANULOCYTES # BLD AUTO: 0.1 10*3/MM3 (ref 0–0.05)
IMM GRANULOCYTES NFR BLD AUTO: 1.1 % (ref 0–0.5)
LYMPHOCYTES # BLD AUTO: 1.32 10*3/MM3 (ref 0.7–3.1)
LYMPHOCYTES NFR BLD AUTO: 14 % (ref 19.6–45.3)
MCH RBC QN AUTO: 29.7 PG (ref 26.6–33)
MCHC RBC AUTO-ENTMCNC: 31.8 G/DL (ref 31.5–35.7)
MCV RBC AUTO: 93.7 FL (ref 79–97)
MONOCYTES # BLD AUTO: 0.94 10*3/MM3 (ref 0.1–0.9)
MONOCYTES NFR BLD AUTO: 10 % (ref 5–12)
MRSA SPEC QL CULT: NORMAL
NEUTROPHILS NFR BLD AUTO: 6.85 10*3/MM3 (ref 1.7–7)
NEUTROPHILS NFR BLD AUTO: 72.6 % (ref 42.7–76)
PLATELET # BLD AUTO: 249 10*3/MM3 (ref 140–450)
PMV BLD AUTO: 9.4 FL (ref 6–12)
POTASSIUM SERPL-SCNC: 3.9 MMOL/L (ref 3.5–5.2)
RBC # BLD AUTO: 4.27 10*6/MM3 (ref 4.14–5.8)
SODIUM SERPL-SCNC: 134 MMOL/L (ref 136–145)
WBC # BLD AUTO: 9.43 10*3/MM3 (ref 3.4–10.8)

## 2020-10-13 PROCEDURE — 25010000002 PROPOFOL 10 MG/ML EMULSION: Performed by: NURSE ANESTHETIST, CERTIFIED REGISTERED

## 2020-10-13 PROCEDURE — 25010000002 ONDANSETRON PER 1 MG: Performed by: NURSE ANESTHETIST, CERTIFIED REGISTERED

## 2020-10-13 PROCEDURE — 87176 TISSUE HOMOGENIZATION CULTR: CPT | Performed by: PODIATRIST

## 2020-10-13 PROCEDURE — 88311 DECALCIFY TISSUE: CPT | Performed by: PODIATRIST

## 2020-10-13 PROCEDURE — 0 GADOBENATE DIMEGLUMINE 529 MG/ML SOLUTION: Performed by: INTERNAL MEDICINE

## 2020-10-13 PROCEDURE — 25010000002 VANCOMYCIN PER 500 MG: Performed by: INTERNAL MEDICINE

## 2020-10-13 PROCEDURE — 85025 COMPLETE CBC W/AUTO DIFF WBC: CPT | Performed by: INTERNAL MEDICINE

## 2020-10-13 PROCEDURE — 25010000002 VANCOMYCIN 1 G RECONSTITUTED SOLUTION 1 EACH VIAL: Performed by: INTERNAL MEDICINE

## 2020-10-13 PROCEDURE — 82962 GLUCOSE BLOOD TEST: CPT

## 2020-10-13 PROCEDURE — 99233 SBSQ HOSP IP/OBS HIGH 50: CPT | Performed by: INTERNAL MEDICINE

## 2020-10-13 PROCEDURE — A9577 INJ MULTIHANCE: HCPCS | Performed by: INTERNAL MEDICINE

## 2020-10-13 PROCEDURE — 88305 TISSUE EXAM BY PATHOLOGIST: CPT | Performed by: PODIATRIST

## 2020-10-13 PROCEDURE — 25010000002 VANCOMYCIN 750 MG RECONSTITUTED SOLUTION 1 EACH VIAL: Performed by: INTERNAL MEDICINE

## 2020-10-13 PROCEDURE — 87205 SMEAR GRAM STAIN: CPT | Performed by: PODIATRIST

## 2020-10-13 PROCEDURE — 87147 CULTURE TYPE IMMUNOLOGIC: CPT | Performed by: PODIATRIST

## 2020-10-13 PROCEDURE — 73720 MRI LWR EXTREMITY W/O&W/DYE: CPT

## 2020-10-13 PROCEDURE — 94770: CPT

## 2020-10-13 PROCEDURE — 25010000002 CEFEPIME-DEXTROSE 2-5 GM-%(50ML) RECONSTITUTED SOLUTION: Performed by: INTERNAL MEDICINE

## 2020-10-13 PROCEDURE — 25010000002 MIDAZOLAM PER 1MG: Performed by: NURSE ANESTHETIST, CERTIFIED REGISTERED

## 2020-10-13 PROCEDURE — 87070 CULTURE OTHR SPECIMN AEROBIC: CPT | Performed by: PODIATRIST

## 2020-10-13 PROCEDURE — 80048 BASIC METABOLIC PNL TOTAL CA: CPT | Performed by: INTERNAL MEDICINE

## 2020-10-13 PROCEDURE — 25010000003 AMPICILLIN-SULBACTAM PER 1.5 G

## 2020-10-13 PROCEDURE — 0Y6X0Z0 DETACHMENT AT RIGHT 5TH TOE, COMPLETE, OPEN APPROACH: ICD-10-PCS | Performed by: PODIATRIST

## 2020-10-13 PROCEDURE — 85652 RBC SED RATE AUTOMATED: CPT | Performed by: INTERNAL MEDICINE

## 2020-10-13 PROCEDURE — 86140 C-REACTIVE PROTEIN: CPT | Performed by: INTERNAL MEDICINE

## 2020-10-13 RX ORDER — BUPIVACAINE HYDROCHLORIDE 2.5 MG/ML
INJECTION, SOLUTION INFILTRATION; PERINEURAL AS NEEDED
Status: DISCONTINUED | OUTPATIENT
Start: 2020-10-13 | End: 2020-10-13 | Stop reason: HOSPADM

## 2020-10-13 RX ORDER — AMPICILLIN AND SULBACTAM 2; 1 G/1; G/1
INJECTION, POWDER, FOR SOLUTION INTRAMUSCULAR; INTRAVENOUS
Status: COMPLETED
Start: 2020-10-13 | End: 2020-10-13

## 2020-10-13 RX ORDER — ONDANSETRON 2 MG/ML
4 INJECTION INTRAMUSCULAR; INTRAVENOUS ONCE AS NEEDED
Status: COMPLETED | OUTPATIENT
Start: 2020-10-13 | End: 2020-10-13

## 2020-10-13 RX ORDER — SODIUM CHLORIDE 9 MG/ML
INJECTION, SOLUTION INTRAVENOUS
Status: DISPENSED
Start: 2020-10-13 | End: 2020-10-14

## 2020-10-13 RX ORDER — MAGNESIUM HYDROXIDE 1200 MG/15ML
LIQUID ORAL AS NEEDED
Status: DISCONTINUED | OUTPATIENT
Start: 2020-10-13 | End: 2020-10-13 | Stop reason: HOSPADM

## 2020-10-13 RX ORDER — LIDOCAINE HYDROCHLORIDE 10 MG/ML
INJECTION, SOLUTION EPIDURAL; INFILTRATION; INTRACAUDAL; PERINEURAL AS NEEDED
Status: DISCONTINUED | OUTPATIENT
Start: 2020-10-13 | End: 2020-10-13 | Stop reason: HOSPADM

## 2020-10-13 RX ORDER — CLONAZEPAM 0.5 MG/1
0.5 TABLET ORAL 3 TIMES DAILY
Status: DISCONTINUED | OUTPATIENT
Start: 2020-10-13 | End: 2020-10-16

## 2020-10-13 RX ORDER — LIDOCAINE HYDROCHLORIDE 20 MG/ML
INJECTION, SOLUTION INFILTRATION; PERINEURAL AS NEEDED
Status: DISCONTINUED | OUTPATIENT
Start: 2020-10-13 | End: 2020-10-13 | Stop reason: SURG

## 2020-10-13 RX ORDER — DEXMEDETOMIDINE HYDROCHLORIDE 100 UG/ML
INJECTION, SOLUTION INTRAVENOUS AS NEEDED
Status: DISCONTINUED | OUTPATIENT
Start: 2020-10-13 | End: 2020-10-13 | Stop reason: SURG

## 2020-10-13 RX ORDER — FAMOTIDINE 10 MG/ML
20 INJECTION, SOLUTION INTRAVENOUS
Status: COMPLETED | OUTPATIENT
Start: 2020-10-13 | End: 2020-10-13

## 2020-10-13 RX ORDER — SODIUM HYPOCHLORITE 1.25 MG/ML
SOLUTION TOPICAL AS NEEDED
Status: DISCONTINUED | OUTPATIENT
Start: 2020-10-13 | End: 2020-10-13 | Stop reason: HOSPADM

## 2020-10-13 RX ORDER — TEMAZEPAM 15 MG/1
30 CAPSULE ORAL NIGHTLY
Status: DISCONTINUED | OUTPATIENT
Start: 2020-10-13 | End: 2020-10-16

## 2020-10-13 RX ORDER — SODIUM CHLORIDE, SODIUM LACTATE, POTASSIUM CHLORIDE, CALCIUM CHLORIDE 600; 310; 30; 20 MG/100ML; MG/100ML; MG/100ML; MG/100ML
9 INJECTION, SOLUTION INTRAVENOUS CONTINUOUS
Status: DISCONTINUED | OUTPATIENT
Start: 2020-10-13 | End: 2020-10-13

## 2020-10-13 RX ORDER — GABAPENTIN 400 MG/1
800 CAPSULE ORAL 3 TIMES DAILY
Status: DISCONTINUED | OUTPATIENT
Start: 2020-10-13 | End: 2020-10-20 | Stop reason: HOSPADM

## 2020-10-13 RX ORDER — KETAMINE HYDROCHLORIDE 10 MG/ML
INJECTION INTRAMUSCULAR; INTRAVENOUS AS NEEDED
Status: DISCONTINUED | OUTPATIENT
Start: 2020-10-13 | End: 2020-10-13 | Stop reason: SURG

## 2020-10-13 RX ORDER — ONDANSETRON 2 MG/ML
4 INJECTION INTRAMUSCULAR; INTRAVENOUS ONCE AS NEEDED
Status: DISCONTINUED | OUTPATIENT
Start: 2020-10-13 | End: 2020-10-13 | Stop reason: HOSPADM

## 2020-10-13 RX ORDER — PROPOFOL 10 MG/ML
VIAL (ML) INTRAVENOUS AS NEEDED
Status: DISCONTINUED | OUTPATIENT
Start: 2020-10-13 | End: 2020-10-13 | Stop reason: SURG

## 2020-10-13 RX ORDER — SODIUM CHLORIDE, SODIUM LACTATE, POTASSIUM CHLORIDE, CALCIUM CHLORIDE 600; 310; 30; 20 MG/100ML; MG/100ML; MG/100ML; MG/100ML
100 INJECTION, SOLUTION INTRAVENOUS CONTINUOUS
Status: DISCONTINUED | OUTPATIENT
Start: 2020-10-13 | End: 2020-10-15

## 2020-10-13 RX ORDER — MIDAZOLAM HYDROCHLORIDE 2 MG/2ML
1 INJECTION, SOLUTION INTRAMUSCULAR; INTRAVENOUS
Status: DISCONTINUED | OUTPATIENT
Start: 2020-10-13 | End: 2020-10-13 | Stop reason: HOSPADM

## 2020-10-13 RX ADMIN — PROPOFOL 25 MG: 10 INJECTION, EMULSION INTRAVENOUS at 17:45

## 2020-10-13 RX ADMIN — ONDANSETRON 4 MG: 2 INJECTION, SOLUTION INTRAMUSCULAR; INTRAVENOUS at 16:51

## 2020-10-13 RX ADMIN — OXYCODONE HYDROCHLORIDE AND ACETAMINOPHEN 1 TABLET: 10; 325 TABLET ORAL at 13:17

## 2020-10-13 RX ADMIN — PROPOFOL 50 MG: 10 INJECTION, EMULSION INTRAVENOUS at 18:00

## 2020-10-13 RX ADMIN — VANCOMYCIN HYDROCHLORIDE 2250 MG: 1 INJECTION, POWDER, LYOPHILIZED, FOR SOLUTION INTRAVENOUS at 16:05

## 2020-10-13 RX ADMIN — VANCOMYCIN HYDROCHLORIDE 2250 MG: 1 INJECTION, POWDER, LYOPHILIZED, FOR SOLUTION INTRAVENOUS at 06:51

## 2020-10-13 RX ADMIN — KETAMINE HYDROCHLORIDE 30 MG: 10 INJECTION, SOLUTION INTRAMUSCULAR; INTRAVENOUS at 17:35

## 2020-10-13 RX ADMIN — CLONAZEPAM 0.5 MG: 0.5 TABLET ORAL at 20:45

## 2020-10-13 RX ADMIN — PROPOFOL 50 MG: 10 INJECTION, EMULSION INTRAVENOUS at 17:53

## 2020-10-13 RX ADMIN — PROPOFOL 25 MG: 10 INJECTION, EMULSION INTRAVENOUS at 17:56

## 2020-10-13 RX ADMIN — FAMOTIDINE 20 MG: 10 INJECTION INTRAVENOUS at 16:51

## 2020-10-13 RX ADMIN — PROPOFOL 50 MG: 10 INJECTION, EMULSION INTRAVENOUS at 17:38

## 2020-10-13 RX ADMIN — TEMAZEPAM 30 MG: 15 CAPSULE ORAL at 22:55

## 2020-10-13 RX ADMIN — AMPICILLIN SODIUM AND SULBACTAM SODIUM 3 G: 2; 1 INJECTION, POWDER, FOR SOLUTION INTRAMUSCULAR; INTRAVENOUS at 20:52

## 2020-10-13 RX ADMIN — OXYCODONE HYDROCHLORIDE AND ACETAMINOPHEN 1 TABLET: 5; 325 TABLET ORAL at 21:47

## 2020-10-13 RX ADMIN — PROPOFOL 25 MG: 10 INJECTION, EMULSION INTRAVENOUS at 17:41

## 2020-10-13 RX ADMIN — ACETAMINOPHEN 650 MG: 325 TABLET, FILM COATED ORAL at 02:15

## 2020-10-13 RX ADMIN — PROPOFOL 25 MG: 10 INJECTION, EMULSION INTRAVENOUS at 17:49

## 2020-10-13 RX ADMIN — LIDOCAINE HYDROCHLORIDE 100 MG: 20 INJECTION, SOLUTION INFILTRATION; PERINEURAL at 17:31

## 2020-10-13 RX ADMIN — CEFEPIME HYDROCHLORIDE 2 G: 2 INJECTION, SOLUTION INTRAVENOUS at 14:38

## 2020-10-13 RX ADMIN — MIDAZOLAM HYDROCHLORIDE 1 MG: 1 INJECTION, SOLUTION INTRAMUSCULAR; INTRAVENOUS at 17:20

## 2020-10-13 RX ADMIN — PROPOFOL 25 MG: 10 INJECTION, EMULSION INTRAVENOUS at 18:10

## 2020-10-13 RX ADMIN — FLUOXETINE 40 MG: 20 CAPSULE ORAL at 08:13

## 2020-10-13 RX ADMIN — GADOBENATE DIMEGLUMINE 20 ML: 529 INJECTION, SOLUTION INTRAVENOUS at 13:30

## 2020-10-13 RX ADMIN — OLANZAPINE 5 MG: 2.5 TABLET, FILM COATED ORAL at 20:45

## 2020-10-13 RX ADMIN — SODIUM CHLORIDE, POTASSIUM CHLORIDE, SODIUM LACTATE AND CALCIUM CHLORIDE: 600; 310; 30; 20 INJECTION, SOLUTION INTRAVENOUS at 17:28

## 2020-10-13 RX ADMIN — PRIMIDONE 250 MG: 250 TABLET ORAL at 20:45

## 2020-10-13 RX ADMIN — PREGABALIN 50 MG: 50 CAPSULE ORAL at 08:13

## 2020-10-13 RX ADMIN — DEXMEDETOMIDINE HYDROCHLORIDE 20 MCG: 100 INJECTION, SOLUTION, CONCENTRATE INTRAVENOUS at 17:35

## 2020-10-13 RX ADMIN — CLONAZEPAM 0.5 MG: 0.5 TABLET ORAL at 11:29

## 2020-10-13 RX ADMIN — SODIUM CHLORIDE, PRESERVATIVE FREE 10 ML: 5 INJECTION INTRAVENOUS at 08:15

## 2020-10-13 RX ADMIN — AMPICILLIN SODIUM AND SULBACTAM SODIUM 3 G: 2; 1 INJECTION, POWDER, FOR SOLUTION INTRAMUSCULAR; INTRAVENOUS at 20:50

## 2020-10-13 RX ADMIN — GABAPENTIN 800 MG: 400 CAPSULE ORAL at 20:44

## 2020-10-13 RX ADMIN — PROPOFOL 50 MG: 10 INJECTION, EMULSION INTRAVENOUS at 17:35

## 2020-10-13 RX ADMIN — PRIMIDONE 250 MG: 250 TABLET ORAL at 08:13

## 2020-10-13 RX ADMIN — SODIUM CHLORIDE, POTASSIUM CHLORIDE, SODIUM LACTATE AND CALCIUM CHLORIDE 100 ML/HR: 600; 310; 30; 20 INJECTION, SOLUTION INTRAVENOUS at 20:23

## 2020-10-13 RX ADMIN — GABAPENTIN 800 MG: 400 CAPSULE ORAL at 11:29

## 2020-10-13 RX ADMIN — DEXMEDETOMIDINE HYDROCHLORIDE 20 MCG: 100 INJECTION, SOLUTION, CONCENTRATE INTRAVENOUS at 17:50

## 2020-10-13 RX ADMIN — PROPOFOL 50 MG: 10 INJECTION, EMULSION INTRAVENOUS at 18:05

## 2020-10-13 NOTE — ANESTHESIA PREPROCEDURE EVALUATION
Airway   Mallampati: II  TM distance: >3 FB  Neck ROM: full  No difficulty expected  Dental - normal exam         Pulmonary - normal exam   (+) sleep apnea (not using for a year now),   Cardiovascular - normal exam  Exercise tolerance: good (4-7 METS)    Rhythm: regular  Rate: normal     (+) hypertension well controlled,        Neuro/Psych  GI/Hepatic/Renal/Endo    (+)   diabetes mellitus () type 2 well controlled,     Musculoskeletal      Abdominal  - normal exam   Substance History        OB/GYN             Other              Anesthesia Evaluation     Patient summary reviewed and Nursing notes reviewed   no history of anesthetic complications:  NPO Solid Status: > 8 hours  NPO Liquid Status: > 8 hours               Airway   Mallampati: II  TM distance: >3 FB  Neck ROM: limited  Possible difficult intubation  Dental - normal exam     Pulmonary     breath sounds clear to auscultation  (+) sleep apnea on CPAP, decreased breath sounds, Anesthesia Evaluation           Anesthesia Plan     ASA 3      MAC      Anesthetic plan and risks discussed with patient.  Use of blood products discussed with patient .    Anesthesia Evaluation     Patient summary reviewed and Nursing notes reviewed   no history of anesthetic complications:  NPO Solid Status: > 8 hours  NPO Liquid Status: > 8 hours               Airway   Mallampati: II  TM distance: >3 FB  Neck ROM: full  No difficulty expected  Dental - normal exam     Pulmonary     breath sounds clear to auscultation  (+) COPD (pt denies) mild, sleep apnea on CPAP,   Cardiovascular   Exercise tolerance: poor (<4 METS)    ECG reviewed  Rhythm: regular  Rate: normal     (+) hypertension (no meds today) well controlled, past MI (mi 2003) , CAD, angina (no complaints of chest pain recently (several weeks)), DVT ( right leg-treated with blood thinners-2005, none recent) resolved, hyperlipidemia,     ROS comment: RR Interval= 632 ms  ME Interval= 156 ms  QRSD Interval=  98 ms  QT Interval= 384 ms  QTc Interval= 483 ms  Heart Rate= 95 ms  P Axis= 34 deg  QRS Axis= 60 deg  T Wave Axis= 32 deg  I: 40 Axis= 31 deg  T: 40 Axis= 109 deg  ST Axis= 54 deg  SINUS RHYTHM  EARLY PRECORDIAL R/S TRANSITION  BORDERLINE PROLONGED QT INTERVAL - new  Electronically Signed by:  Jb Cerda ([facility]) 05-Mar-2018 09:31:38  Date and Time of Study: 2018-03-04 22:48:28    Neuro/Psych  (+) CVA, tremors, numbness (hands and feet DM neuropathy ), psychiatric history Depression, Bipolar and Anxiety,     GI/Hepatic/Renal/Endo    (+) obesity,  GI bleeding (current, coffee ground emesis), diabetes mellitus type 2 well controlled,     ROS Comment: Disease of thyroid gland nodule on thyroid     No emesis for 2 days    Musculoskeletal      (+) back pain, neck pain, neck stiffness,   Abdominal   (+) obese,    Substance History   Alcohol use: history of, none since 7/07/2007.       OB/GYN negative ob/gyn ROS            Other   (+) arthritis     (-) blood dyscrasia    Other Comment: Toxic metabolic encephalopathy  Pseudogout  Septic shock  ROS/Med Hx Other: Hgb 8.8   PTT slightly elevated   Alb low                        Anesthesia Plan     ASA 3      MAC      intravenous induction   Anesthetic plan and risks discussed with patient.  Use of blood products discussed with patient  Consented to blood products.    Anesthesia Evaluation     Patient summary reviewed and Nursing notes reviewed   no history of anesthetic complications:  NPO Solid Status: > 8 hours  NPO Liquid Status: > 8 hours

## 2020-10-13 NOTE — ANESTHESIA POSTPROCEDURE EVALUATION
Patient: Eliseo Price    Procedure Summary     Date: 10/13/20 Room / Location:  [facility] 1 /  [facility]    Anesthesia Start: 1728 Anesthesia Stop: 1851    Procedure: AMPUTATION OF RIGHT FIFTH TOE AND ALL ASSOCIATED PROCEDURES (Right Foot) Diagnosis:       Osteomyelitis of fifth toe of right foot (CMS/HCC)      (Osteomyelitis of fifth toe of right foot (CMS/HCC) [M86.9])    Surgeon: Anish Farah DPM Provider: Alana Diehl CRNA    Anesthesia Type: MAC ASA Status: Not recorded          Anesthesia Type: MAC    Vitals  Vitals Value Taken Time   /96 10/13/20 1915   Temp 98.7 °F (37.1 °C) 10/13/20 1853   Pulse 112 10/13/20 1915   Resp 18 10/13/20 1910   SpO2 94 % 10/13/20 1922   Vitals shown include unvalidated device data.        Post Anesthesia Care and Evaluation    Patient location during evaluation: PACU  Patient participation: complete - patient participated  Level of consciousness: awake  Pain management: adequate  Airway patency: patent  Anesthetic complications: No anesthetic complications  PONV Status: none  Cardiovascular status: acceptable  Respiratory status: acceptable  Hydration status: acceptable

## 2020-10-14 LAB
ANION GAP SERPL CALCULATED.3IONS-SCNC: 9.3 MMOL/L (ref 5–15)
BASOPHILS # BLD AUTO: 0.05 10*3/MM3 (ref 0–0.2)
BASOPHILS NFR BLD AUTO: 0.5 % (ref 0–1.5)
BUN SERPL-MCNC: 7 MG/DL (ref 6–20)
BUN/CREAT SERPL: 13.7 (ref 7–25)
CALCIUM SPEC-SCNC: 8.7 MG/DL (ref 8.6–10.5)
CHLORIDE SERPL-SCNC: 100 MMOL/L (ref 98–107)
CO2 SERPL-SCNC: 24.7 MMOL/L (ref 22–29)
CREAT SERPL-MCNC: 0.51 MG/DL (ref 0.76–1.27)
CRP SERPL-MCNC: 16.41 MG/DL (ref 0–0.5)
DEPRECATED RDW RBC AUTO: 53.6 FL (ref 37–54)
EOSINOPHIL # BLD AUTO: 0.27 10*3/MM3 (ref 0–0.4)
EOSINOPHIL NFR BLD AUTO: 2.5 % (ref 0.3–6.2)
ERYTHROCYTE [DISTWIDTH] IN BLOOD BY AUTOMATED COUNT: 15.2 % (ref 12.3–15.4)
ERYTHROCYTE [SEDIMENTATION RATE] IN BLOOD: 66 MM/HR (ref 0–20)
GFR SERPL CREATININE-BSD FRML MDRD: >150 ML/MIN/1.73
GLUCOSE BLDC GLUCOMTR-MCNC: 116 MG/DL (ref 70–130)
GLUCOSE BLDC GLUCOMTR-MCNC: 121 MG/DL (ref 70–130)
GLUCOSE BLDC GLUCOMTR-MCNC: 167 MG/DL (ref 70–130)
GLUCOSE BLDC GLUCOMTR-MCNC: 169 MG/DL (ref 70–130)
GLUCOSE BLDC GLUCOMTR-MCNC: 196 MG/DL (ref 70–130)
GLUCOSE SERPL-MCNC: 137 MG/DL (ref 65–99)
HCT VFR BLD AUTO: 40.2 % (ref 37.5–51)
HGB BLD-MCNC: 12.4 G/DL (ref 13–17.7)
IMM GRANULOCYTES # BLD AUTO: 0.1 10*3/MM3 (ref 0–0.05)
IMM GRANULOCYTES NFR BLD AUTO: 0.9 % (ref 0–0.5)
LYMPHOCYTES # BLD AUTO: 1.28 10*3/MM3 (ref 0.7–3.1)
LYMPHOCYTES NFR BLD AUTO: 12.1 % (ref 19.6–45.3)
MCH RBC QN AUTO: 29.5 PG (ref 26.6–33)
MCHC RBC AUTO-ENTMCNC: 30.8 G/DL (ref 31.5–35.7)
MCV RBC AUTO: 95.5 FL (ref 79–97)
MONOCYTES # BLD AUTO: 0.95 10*3/MM3 (ref 0.1–0.9)
MONOCYTES NFR BLD AUTO: 9 % (ref 5–12)
NEUTROPHILS NFR BLD AUTO: 7.94 10*3/MM3 (ref 1.7–7)
NEUTROPHILS NFR BLD AUTO: 75 % (ref 42.7–76)
NRBC BLD AUTO-RTO: 0 /100 WBC (ref 0–0.2)
PLATELET # BLD AUTO: 296 10*3/MM3 (ref 140–450)
PMV BLD AUTO: 9.3 FL (ref 6–12)
POTASSIUM SERPL-SCNC: 4.4 MMOL/L (ref 3.5–5.2)
RBC # BLD AUTO: 4.21 10*6/MM3 (ref 4.14–5.8)
SODIUM SERPL-SCNC: 134 MMOL/L (ref 136–145)
VANCOMYCIN SERPL-MCNC: 9.1 MCG/ML (ref 5–40)
WBC # BLD AUTO: 10.59 10*3/MM3 (ref 3.4–10.8)

## 2020-10-14 PROCEDURE — 85025 COMPLETE CBC W/AUTO DIFF WBC: CPT | Performed by: PODIATRIST

## 2020-10-14 PROCEDURE — 85652 RBC SED RATE AUTOMATED: CPT | Performed by: PODIATRIST

## 2020-10-14 PROCEDURE — 25010000003 AMPICILLIN-SULBACTAM PER 1.5 G

## 2020-10-14 PROCEDURE — 86140 C-REACTIVE PROTEIN: CPT | Performed by: PODIATRIST

## 2020-10-14 PROCEDURE — 25010000002 INFLUENZA VAC SPLIT QUAD 0.5 ML SUSPENSION PREFILLED SYRINGE: Performed by: PODIATRIST

## 2020-10-14 PROCEDURE — 82962 GLUCOSE BLOOD TEST: CPT

## 2020-10-14 PROCEDURE — G0008 ADMIN INFLUENZA VIRUS VAC: HCPCS | Performed by: PODIATRIST

## 2020-10-14 PROCEDURE — 99232 SBSQ HOSP IP/OBS MODERATE 35: CPT | Performed by: INTERNAL MEDICINE

## 2020-10-14 PROCEDURE — 90686 IIV4 VACC NO PRSV 0.5 ML IM: CPT | Performed by: PODIATRIST

## 2020-10-14 PROCEDURE — 25010000003 AMPICILLIN-SULBACTAM PER 1.5 G: Performed by: INTERNAL MEDICINE

## 2020-10-14 PROCEDURE — 80048 BASIC METABOLIC PNL TOTAL CA: CPT | Performed by: PODIATRIST

## 2020-10-14 PROCEDURE — 25010000002 ENOXAPARIN PER 10 MG: Performed by: PODIATRIST

## 2020-10-14 PROCEDURE — 94770: CPT

## 2020-10-14 PROCEDURE — 63710000001 INSULIN ASPART PER 5 UNITS: Performed by: PODIATRIST

## 2020-10-14 PROCEDURE — 94799 UNLISTED PULMONARY SVC/PX: CPT

## 2020-10-14 PROCEDURE — 80202 ASSAY OF VANCOMYCIN: CPT | Performed by: PODIATRIST

## 2020-10-14 RX ORDER — PROPRANOLOL HYDROCHLORIDE 10 MG/1
40 TABLET ORAL 2 TIMES DAILY
Status: DISCONTINUED | OUTPATIENT
Start: 2020-10-14 | End: 2020-10-20 | Stop reason: HOSPADM

## 2020-10-14 RX ORDER — AMLODIPINE BESYLATE 5 MG/1
5 TABLET ORAL DAILY
Status: DISCONTINUED | OUTPATIENT
Start: 2020-10-14 | End: 2020-10-20 | Stop reason: HOSPADM

## 2020-10-14 RX ORDER — SODIUM CHLORIDE 9 MG/ML
INJECTION, SOLUTION INTRAVENOUS
Status: COMPLETED
Start: 2020-10-14 | End: 2020-10-14

## 2020-10-14 RX ORDER — AMPICILLIN AND SULBACTAM 2; 1 G/1; G/1
INJECTION, POWDER, FOR SOLUTION INTRAMUSCULAR; INTRAVENOUS
Status: COMPLETED
Start: 2020-10-14 | End: 2020-10-14

## 2020-10-14 RX ADMIN — OXYCODONE HYDROCHLORIDE AND ACETAMINOPHEN 1 TABLET: 10; 325 TABLET ORAL at 08:40

## 2020-10-14 RX ADMIN — OXYCODONE HYDROCHLORIDE AND ACETAMINOPHEN 1 TABLET: 10; 325 TABLET ORAL at 22:55

## 2020-10-14 RX ADMIN — ENOXAPARIN SODIUM 40 MG: 40 INJECTION SUBCUTANEOUS at 06:28

## 2020-10-14 RX ADMIN — SODIUM CHLORIDE, PRESERVATIVE FREE 10 ML: 5 INJECTION INTRAVENOUS at 08:24

## 2020-10-14 RX ADMIN — FLUOXETINE 40 MG: 20 CAPSULE ORAL at 08:24

## 2020-10-14 RX ADMIN — GABAPENTIN 800 MG: 400 CAPSULE ORAL at 16:14

## 2020-10-14 RX ADMIN — PRIMIDONE 250 MG: 250 TABLET ORAL at 20:49

## 2020-10-14 RX ADMIN — PROPRANOLOL HYDROCHLORIDE 40 MG: 10 TABLET ORAL at 12:32

## 2020-10-14 RX ADMIN — PRIMIDONE 250 MG: 250 TABLET ORAL at 08:24

## 2020-10-14 RX ADMIN — SODIUM CHLORIDE, PRESERVATIVE FREE 10 ML: 5 INJECTION INTRAVENOUS at 20:51

## 2020-10-14 RX ADMIN — PROPRANOLOL HYDROCHLORIDE 40 MG: 10 TABLET ORAL at 20:49

## 2020-10-14 RX ADMIN — SODIUM CHLORIDE, POTASSIUM CHLORIDE, SODIUM LACTATE AND CALCIUM CHLORIDE 100 ML/HR: 600; 310; 30; 20 INJECTION, SOLUTION INTRAVENOUS at 12:34

## 2020-10-14 RX ADMIN — GABAPENTIN 800 MG: 400 CAPSULE ORAL at 20:50

## 2020-10-14 RX ADMIN — INSULIN ASPART 2 UNITS: 100 INJECTION, SOLUTION INTRAVENOUS; SUBCUTANEOUS at 12:33

## 2020-10-14 RX ADMIN — CLONAZEPAM 0.5 MG: 0.5 TABLET ORAL at 20:49

## 2020-10-14 RX ADMIN — OLANZAPINE 5 MG: 2.5 TABLET, FILM COATED ORAL at 20:49

## 2020-10-14 RX ADMIN — GABAPENTIN 800 MG: 400 CAPSULE ORAL at 08:24

## 2020-10-14 RX ADMIN — TEMAZEPAM 30 MG: 15 CAPSULE ORAL at 20:50

## 2020-10-14 RX ADMIN — OXYCODONE HYDROCHLORIDE AND ACETAMINOPHEN 1 TABLET: 10; 325 TABLET ORAL at 16:14

## 2020-10-14 RX ADMIN — CLONAZEPAM 0.5 MG: 0.5 TABLET ORAL at 08:24

## 2020-10-14 RX ADMIN — PRIMIDONE 250 MG: 250 TABLET ORAL at 16:14

## 2020-10-14 RX ADMIN — AMPICILLIN SODIUM AND SULBACTAM SODIUM 3 G: 2; 1 INJECTION, POWDER, FOR SOLUTION INTRAMUSCULAR; INTRAVENOUS at 14:36

## 2020-10-14 RX ADMIN — AMPICILLIN SODIUM AND SULBACTAM SODIUM 3 G: 2; 1 INJECTION, POWDER, FOR SOLUTION INTRAMUSCULAR; INTRAVENOUS at 10:25

## 2020-10-14 RX ADMIN — AMLODIPINE BESYLATE 5 MG: 5 TABLET ORAL at 12:32

## 2020-10-14 RX ADMIN — SODIUM CHLORIDE: 9 INJECTION, SOLUTION INTRAVENOUS at 05:31

## 2020-10-14 RX ADMIN — ENOXAPARIN SODIUM 40 MG: 40 INJECTION SUBCUTANEOUS at 16:15

## 2020-10-14 RX ADMIN — CLONAZEPAM 0.5 MG: 0.5 TABLET ORAL at 16:14

## 2020-10-14 RX ADMIN — AMPICILLIN SODIUM AND SULBACTAM SODIUM 3 G: 2; 1 INJECTION, POWDER, FOR SOLUTION INTRAMUSCULAR; INTRAVENOUS at 02:26

## 2020-10-14 RX ADMIN — INFLUENZA VIRUS VACCINE 0.5 ML: 15; 15; 15; 15 SUSPENSION INTRAMUSCULAR at 16:15

## 2020-10-14 RX ADMIN — AMPICILLIN SODIUM AND SULBACTAM SODIUM 3 G: 2; 1 INJECTION, POWDER, FOR SOLUTION INTRAMUSCULAR; INTRAVENOUS at 20:50

## 2020-10-14 RX ADMIN — AMPICILLIN SODIUM AND SULBACTAM SODIUM 3 G: 2; 1 INJECTION, POWDER, FOR SOLUTION INTRAMUSCULAR; INTRAVENOUS at 02:23

## 2020-10-15 LAB
ANION GAP SERPL CALCULATED.3IONS-SCNC: 10.2 MMOL/L (ref 5–15)
BACTERIA SPEC AEROBE CULT: ABNORMAL
BASOPHILS # BLD AUTO: 0.06 10*3/MM3 (ref 0–0.2)
BASOPHILS NFR BLD AUTO: 0.7 % (ref 0–1.5)
BUN SERPL-MCNC: 7 MG/DL (ref 6–20)
BUN/CREAT SERPL: 14.3 (ref 7–25)
CALCIUM SPEC-SCNC: 8.8 MG/DL (ref 8.6–10.5)
CHLORIDE SERPL-SCNC: 99 MMOL/L (ref 98–107)
CO2 SERPL-SCNC: 26.8 MMOL/L (ref 22–29)
CREAT SERPL-MCNC: 0.49 MG/DL (ref 0.76–1.27)
DEPRECATED RDW RBC AUTO: 54 FL (ref 37–54)
EOSINOPHIL # BLD AUTO: 0.4 10*3/MM3 (ref 0–0.4)
EOSINOPHIL NFR BLD AUTO: 4.6 % (ref 0.3–6.2)
ERYTHROCYTE [DISTWIDTH] IN BLOOD BY AUTOMATED COUNT: 15.1 % (ref 12.3–15.4)
GFR SERPL CREATININE-BSD FRML MDRD: >150 ML/MIN/1.73
GLUCOSE BLDC GLUCOMTR-MCNC: 123 MG/DL (ref 70–130)
GLUCOSE BLDC GLUCOMTR-MCNC: 127 MG/DL (ref 70–130)
GLUCOSE BLDC GLUCOMTR-MCNC: 143 MG/DL (ref 70–130)
GLUCOSE BLDC GLUCOMTR-MCNC: 91 MG/DL (ref 70–130)
GLUCOSE SERPL-MCNC: 111 MG/DL (ref 65–99)
GRAM STN SPEC: ABNORMAL
GRAM STN SPEC: ABNORMAL
HCT VFR BLD AUTO: 39.7 % (ref 37.5–51)
HGB BLD-MCNC: 12.1 G/DL (ref 13–17.7)
IMM GRANULOCYTES # BLD AUTO: 0.06 10*3/MM3 (ref 0–0.05)
IMM GRANULOCYTES NFR BLD AUTO: 0.7 % (ref 0–0.5)
LYMPHOCYTES # BLD AUTO: 1.38 10*3/MM3 (ref 0.7–3.1)
LYMPHOCYTES NFR BLD AUTO: 16 % (ref 19.6–45.3)
MCH RBC QN AUTO: 29.5 PG (ref 26.6–33)
MCHC RBC AUTO-ENTMCNC: 30.5 G/DL (ref 31.5–35.7)
MCV RBC AUTO: 96.8 FL (ref 79–97)
MONOCYTES # BLD AUTO: 0.94 10*3/MM3 (ref 0.1–0.9)
MONOCYTES NFR BLD AUTO: 10.9 % (ref 5–12)
NEUTROPHILS NFR BLD AUTO: 5.8 10*3/MM3 (ref 1.7–7)
NEUTROPHILS NFR BLD AUTO: 67.1 % (ref 42.7–76)
NRBC BLD AUTO-RTO: 0 /100 WBC (ref 0–0.2)
PLATELET # BLD AUTO: 269 10*3/MM3 (ref 140–450)
PMV BLD AUTO: 9.6 FL (ref 6–12)
POTASSIUM SERPL-SCNC: 4.5 MMOL/L (ref 3.5–5.2)
RBC # BLD AUTO: 4.1 10*6/MM3 (ref 4.14–5.8)
SODIUM SERPL-SCNC: 136 MMOL/L (ref 136–145)
STREP GROUPING: ABNORMAL
WBC # BLD AUTO: 8.64 10*3/MM3 (ref 3.4–10.8)

## 2020-10-15 PROCEDURE — 94799 UNLISTED PULMONARY SVC/PX: CPT

## 2020-10-15 PROCEDURE — 80048 BASIC METABOLIC PNL TOTAL CA: CPT | Performed by: PODIATRIST

## 2020-10-15 PROCEDURE — 25010000002 ENOXAPARIN PER 10 MG: Performed by: PODIATRIST

## 2020-10-15 PROCEDURE — 85025 COMPLETE CBC W/AUTO DIFF WBC: CPT | Performed by: PODIATRIST

## 2020-10-15 PROCEDURE — 82962 GLUCOSE BLOOD TEST: CPT

## 2020-10-15 PROCEDURE — 99232 SBSQ HOSP IP/OBS MODERATE 35: CPT | Performed by: INTERNAL MEDICINE

## 2020-10-15 PROCEDURE — 97161 PT EVAL LOW COMPLEX 20 MIN: CPT

## 2020-10-15 PROCEDURE — 94770: CPT

## 2020-10-15 PROCEDURE — 97165 OT EVAL LOW COMPLEX 30 MIN: CPT

## 2020-10-15 PROCEDURE — 25010000003 AMPICILLIN-SULBACTAM PER 1.5 G: Performed by: INTERNAL MEDICINE

## 2020-10-15 RX ADMIN — OXYCODONE HYDROCHLORIDE AND ACETAMINOPHEN 1 TABLET: 10; 325 TABLET ORAL at 09:12

## 2020-10-15 RX ADMIN — SODIUM CHLORIDE, PRESERVATIVE FREE 10 ML: 5 INJECTION INTRAVENOUS at 08:55

## 2020-10-15 RX ADMIN — OLANZAPINE 5 MG: 2.5 TABLET, FILM COATED ORAL at 20:47

## 2020-10-15 RX ADMIN — FLUOXETINE 40 MG: 20 CAPSULE ORAL at 08:52

## 2020-10-15 RX ADMIN — AMPICILLIN SODIUM AND SULBACTAM SODIUM 3 G: 2; 1 INJECTION, POWDER, FOR SOLUTION INTRAMUSCULAR; INTRAVENOUS at 08:55

## 2020-10-15 RX ADMIN — PRIMIDONE 250 MG: 250 TABLET ORAL at 20:46

## 2020-10-15 RX ADMIN — SODIUM CHLORIDE, PRESERVATIVE FREE 10 ML: 5 INJECTION INTRAVENOUS at 20:48

## 2020-10-15 RX ADMIN — CLONAZEPAM 0.5 MG: 0.5 TABLET ORAL at 20:47

## 2020-10-15 RX ADMIN — PRIMIDONE 250 MG: 250 TABLET ORAL at 15:16

## 2020-10-15 RX ADMIN — TEMAZEPAM 30 MG: 15 CAPSULE ORAL at 20:46

## 2020-10-15 RX ADMIN — GABAPENTIN 800 MG: 400 CAPSULE ORAL at 20:47

## 2020-10-15 RX ADMIN — GABAPENTIN 800 MG: 400 CAPSULE ORAL at 15:16

## 2020-10-15 RX ADMIN — AMPICILLIN SODIUM AND SULBACTAM SODIUM 3 G: 2; 1 INJECTION, POWDER, FOR SOLUTION INTRAMUSCULAR; INTRAVENOUS at 21:00

## 2020-10-15 RX ADMIN — ENOXAPARIN SODIUM 40 MG: 40 INJECTION SUBCUTANEOUS at 06:43

## 2020-10-15 RX ADMIN — SODIUM CHLORIDE, POTASSIUM CHLORIDE, SODIUM LACTATE AND CALCIUM CHLORIDE 100 ML/HR: 600; 310; 30; 20 INJECTION, SOLUTION INTRAVENOUS at 01:07

## 2020-10-15 RX ADMIN — PROPRANOLOL HYDROCHLORIDE 40 MG: 10 TABLET ORAL at 08:52

## 2020-10-15 RX ADMIN — MELATONIN TAB 5 MG 5 MG: 5 TAB at 20:47

## 2020-10-15 RX ADMIN — AMLODIPINE BESYLATE 5 MG: 5 TABLET ORAL at 08:52

## 2020-10-15 RX ADMIN — AMPICILLIN SODIUM AND SULBACTAM SODIUM 3 G: 2; 1 INJECTION, POWDER, FOR SOLUTION INTRAMUSCULAR; INTRAVENOUS at 01:21

## 2020-10-15 RX ADMIN — OXYCODONE HYDROCHLORIDE AND ACETAMINOPHEN 1 TABLET: 10; 325 TABLET ORAL at 20:45

## 2020-10-15 RX ADMIN — PRIMIDONE 250 MG: 250 TABLET ORAL at 08:52

## 2020-10-15 RX ADMIN — PROPRANOLOL HYDROCHLORIDE 40 MG: 10 TABLET ORAL at 20:47

## 2020-10-15 RX ADMIN — GABAPENTIN 800 MG: 400 CAPSULE ORAL at 08:52

## 2020-10-15 RX ADMIN — ENOXAPARIN SODIUM 40 MG: 40 INJECTION SUBCUTANEOUS at 17:06

## 2020-10-15 RX ADMIN — AMPICILLIN SODIUM AND SULBACTAM SODIUM 3 G: 2; 1 INJECTION, POWDER, FOR SOLUTION INTRAMUSCULAR; INTRAVENOUS at 15:16

## 2020-10-15 RX ADMIN — CLONAZEPAM 0.5 MG: 0.5 TABLET ORAL at 08:51

## 2020-10-15 RX ADMIN — CLONAZEPAM 0.5 MG: 0.5 TABLET ORAL at 15:16

## 2020-10-16 ENCOUNTER — ANESTHESIA EVENT (OUTPATIENT)
Dept: PERIOP | Facility: HOSPITAL | Age: 55
End: 2020-10-16

## 2020-10-16 ENCOUNTER — ANESTHESIA (OUTPATIENT)
Dept: PERIOP | Facility: HOSPITAL | Age: 55
End: 2020-10-16

## 2020-10-16 PROBLEM — Z89.421 STATUS POST AMPUTATION OF LESSER TOE OF RIGHT FOOT (HCC): Status: ACTIVE | Noted: 2020-10-12

## 2020-10-16 PROBLEM — T81.49XA SURGICAL WOUND INFECTION: Status: ACTIVE | Noted: 2020-10-12

## 2020-10-16 LAB
ANION GAP SERPL CALCULATED.3IONS-SCNC: 9 MMOL/L (ref 5–15)
BASOPHILS # BLD AUTO: 0.03 10*3/MM3 (ref 0–0.2)
BASOPHILS NFR BLD AUTO: 0.5 % (ref 0–1.5)
BUN SERPL-MCNC: 8 MG/DL (ref 6–20)
BUN/CREAT SERPL: 19 (ref 7–25)
CALCIUM SPEC-SCNC: 8.7 MG/DL (ref 8.6–10.5)
CHLORIDE SERPL-SCNC: 98 MMOL/L (ref 98–107)
CO2 SERPL-SCNC: 26 MMOL/L (ref 22–29)
CREAT SERPL-MCNC: 0.42 MG/DL (ref 0.76–1.27)
CYTO UR: NORMAL
DEPRECATED RDW RBC AUTO: 49.9 FL (ref 37–54)
EOSINOPHIL # BLD AUTO: 0.36 10*3/MM3 (ref 0–0.4)
EOSINOPHIL NFR BLD AUTO: 6.4 % (ref 0.3–6.2)
ERYTHROCYTE [DISTWIDTH] IN BLOOD BY AUTOMATED COUNT: 14.6 % (ref 12.3–15.4)
GFR SERPL CREATININE-BSD FRML MDRD: >150 ML/MIN/1.73
GLUCOSE BLDC GLUCOMTR-MCNC: 104 MG/DL (ref 70–130)
GLUCOSE BLDC GLUCOMTR-MCNC: 110 MG/DL (ref 70–130)
GLUCOSE BLDC GLUCOMTR-MCNC: 115 MG/DL (ref 70–130)
GLUCOSE BLDC GLUCOMTR-MCNC: 186 MG/DL (ref 70–130)
GLUCOSE SERPL-MCNC: 131 MG/DL (ref 65–99)
HCT VFR BLD AUTO: 36.4 % (ref 37.5–51)
HGB BLD-MCNC: 11.6 G/DL (ref 13–17.7)
IMM GRANULOCYTES # BLD AUTO: 0.04 10*3/MM3 (ref 0–0.05)
IMM GRANULOCYTES NFR BLD AUTO: 0.7 % (ref 0–0.5)
LAB AP CASE REPORT: NORMAL
LYMPHOCYTES # BLD AUTO: 1.09 10*3/MM3 (ref 0.7–3.1)
LYMPHOCYTES NFR BLD AUTO: 19.5 % (ref 19.6–45.3)
MCH RBC QN AUTO: 29.8 PG (ref 26.6–33)
MCHC RBC AUTO-ENTMCNC: 31.9 G/DL (ref 31.5–35.7)
MCV RBC AUTO: 93.6 FL (ref 79–97)
MONOCYTES # BLD AUTO: 0.57 10*3/MM3 (ref 0.1–0.9)
MONOCYTES NFR BLD AUTO: 10.2 % (ref 5–12)
NEUTROPHILS NFR BLD AUTO: 3.5 10*3/MM3 (ref 1.7–7)
NEUTROPHILS NFR BLD AUTO: 62.7 % (ref 42.7–76)
NRBC BLD AUTO-RTO: 0 /100 WBC (ref 0–0.2)
PATH REPORT.FINAL DX SPEC: NORMAL
PATH REPORT.GROSS SPEC: NORMAL
PLATELET # BLD AUTO: 281 10*3/MM3 (ref 140–450)
PMV BLD AUTO: 9.4 FL (ref 6–12)
POTASSIUM SERPL-SCNC: 4 MMOL/L (ref 3.5–5.2)
RBC # BLD AUTO: 3.89 10*6/MM3 (ref 4.14–5.8)
SARS-COV-2 RNA PNL SPEC NAA+PROBE: NOT DETECTED
SODIUM SERPL-SCNC: 133 MMOL/L (ref 136–145)
WBC # BLD AUTO: 5.59 10*3/MM3 (ref 3.4–10.8)

## 2020-10-16 PROCEDURE — 25010000003 AMPICILLIN-SULBACTAM PER 1.5 G: Performed by: PODIATRIST

## 2020-10-16 PROCEDURE — 82962 GLUCOSE BLOOD TEST: CPT

## 2020-10-16 PROCEDURE — 25010000002 ENOXAPARIN PER 10 MG: Performed by: PODIATRIST

## 2020-10-16 PROCEDURE — 0HBMXZZ EXCISION OF RIGHT FOOT SKIN, EXTERNAL APPROACH: ICD-10-PCS | Performed by: PODIATRIST

## 2020-10-16 PROCEDURE — 87176 TISSUE HOMOGENIZATION CULTR: CPT | Performed by: PODIATRIST

## 2020-10-16 PROCEDURE — 87205 SMEAR GRAM STAIN: CPT | Performed by: PODIATRIST

## 2020-10-16 PROCEDURE — 87635 SARS-COV-2 COVID-19 AMP PRB: CPT | Performed by: PODIATRIST

## 2020-10-16 PROCEDURE — 99232 SBSQ HOSP IP/OBS MODERATE 35: CPT | Performed by: INTERNAL MEDICINE

## 2020-10-16 PROCEDURE — 85025 COMPLETE CBC W/AUTO DIFF WBC: CPT | Performed by: PODIATRIST

## 2020-10-16 PROCEDURE — 94799 UNLISTED PULMONARY SVC/PX: CPT

## 2020-10-16 PROCEDURE — 87070 CULTURE OTHR SPECIMN AEROBIC: CPT | Performed by: PODIATRIST

## 2020-10-16 PROCEDURE — 63710000001 DIPHENHYDRAMINE PER 50 MG: Performed by: HOSPITALIST

## 2020-10-16 PROCEDURE — 25010000003 AMPICILLIN-SULBACTAM PER 1.5 G: Performed by: INTERNAL MEDICINE

## 2020-10-16 PROCEDURE — 80048 BASIC METABOLIC PNL TOTAL CA: CPT | Performed by: PODIATRIST

## 2020-10-16 PROCEDURE — 25010000002 PROPOFOL 10 MG/ML EMULSION: Performed by: NURSE ANESTHETIST, CERTIFIED REGISTERED

## 2020-10-16 PROCEDURE — 25010000002 ONDANSETRON PER 1 MG: Performed by: NURSE ANESTHETIST, CERTIFIED REGISTERED

## 2020-10-16 RX ORDER — DIPHENHYDRAMINE HCL 50 MG
50 CAPSULE ORAL EVERY 6 HOURS PRN
Status: DISCONTINUED | OUTPATIENT
Start: 2020-10-16 | End: 2020-10-20 | Stop reason: HOSPADM

## 2020-10-16 RX ORDER — OXYCODONE HYDROCHLORIDE AND ACETAMINOPHEN 5; 325 MG/1; MG/1
1 TABLET ORAL ONCE AS NEEDED
Status: COMPLETED | OUTPATIENT
Start: 2020-10-16 | End: 2020-10-16

## 2020-10-16 RX ORDER — MAGNESIUM HYDROXIDE 1200 MG/15ML
LIQUID ORAL AS NEEDED
Status: DISCONTINUED | OUTPATIENT
Start: 2020-10-16 | End: 2020-10-16 | Stop reason: HOSPADM

## 2020-10-16 RX ORDER — ONDANSETRON 2 MG/ML
4 INJECTION INTRAMUSCULAR; INTRAVENOUS ONCE AS NEEDED
Status: DISCONTINUED | OUTPATIENT
Start: 2020-10-16 | End: 2020-10-16

## 2020-10-16 RX ORDER — ONDANSETRON 2 MG/ML
4 INJECTION INTRAMUSCULAR; INTRAVENOUS ONCE AS NEEDED
Status: COMPLETED | OUTPATIENT
Start: 2020-10-16 | End: 2020-10-16

## 2020-10-16 RX ORDER — BUPIVACAINE HYDROCHLORIDE 2.5 MG/ML
INJECTION, SOLUTION INFILTRATION; PERINEURAL AS NEEDED
Status: DISCONTINUED | OUTPATIENT
Start: 2020-10-16 | End: 2020-10-16 | Stop reason: HOSPADM

## 2020-10-16 RX ORDER — LIDOCAINE HYDROCHLORIDE 20 MG/ML
INJECTION, SOLUTION INFILTRATION; PERINEURAL AS NEEDED
Status: DISCONTINUED | OUTPATIENT
Start: 2020-10-16 | End: 2020-10-16 | Stop reason: SURG

## 2020-10-16 RX ORDER — LIDOCAINE HYDROCHLORIDE 10 MG/ML
INJECTION, SOLUTION EPIDURAL; INFILTRATION; INTRACAUDAL; PERINEURAL AS NEEDED
Status: DISCONTINUED | OUTPATIENT
Start: 2020-10-16 | End: 2020-10-16 | Stop reason: HOSPADM

## 2020-10-16 RX ORDER — SODIUM CHLORIDE, SODIUM LACTATE, POTASSIUM CHLORIDE, CALCIUM CHLORIDE 600; 310; 30; 20 MG/100ML; MG/100ML; MG/100ML; MG/100ML
100 INJECTION, SOLUTION INTRAVENOUS CONTINUOUS
Status: DISCONTINUED | OUTPATIENT
Start: 2020-10-16 | End: 2020-10-16

## 2020-10-16 RX ORDER — PROPOFOL 10 MG/ML
VIAL (ML) INTRAVENOUS AS NEEDED
Status: DISCONTINUED | OUTPATIENT
Start: 2020-10-16 | End: 2020-10-16 | Stop reason: SURG

## 2020-10-16 RX ORDER — CLONAZEPAM 0.5 MG/1
0.5 TABLET ORAL 3 TIMES DAILY PRN
Status: DISCONTINUED | OUTPATIENT
Start: 2020-10-16 | End: 2020-10-20 | Stop reason: HOSPADM

## 2020-10-16 RX ORDER — TEMAZEPAM 15 MG/1
30 CAPSULE ORAL NIGHTLY PRN
Status: DISCONTINUED | OUTPATIENT
Start: 2020-10-16 | End: 2020-10-20 | Stop reason: HOSPADM

## 2020-10-16 RX ORDER — DEXMEDETOMIDINE HYDROCHLORIDE 100 UG/ML
INJECTION, SOLUTION INTRAVENOUS AS NEEDED
Status: DISCONTINUED | OUTPATIENT
Start: 2020-10-16 | End: 2020-10-16 | Stop reason: SURG

## 2020-10-16 RX ORDER — FAMOTIDINE 10 MG/ML
20 INJECTION, SOLUTION INTRAVENOUS
Status: COMPLETED | OUTPATIENT
Start: 2020-10-16 | End: 2020-10-16

## 2020-10-16 RX ORDER — KETAMINE HYDROCHLORIDE 100 MG/ML
INJECTION INTRAMUSCULAR; INTRAVENOUS AS NEEDED
Status: DISCONTINUED | OUTPATIENT
Start: 2020-10-16 | End: 2020-10-16 | Stop reason: SURG

## 2020-10-16 RX ADMIN — PROPOFOL 50 MG: 10 INJECTION, EMULSION INTRAVENOUS at 14:18

## 2020-10-16 RX ADMIN — ONDANSETRON 4 MG: 2 INJECTION, SOLUTION INTRAMUSCULAR; INTRAVENOUS at 13:25

## 2020-10-16 RX ADMIN — PROPRANOLOL HYDROCHLORIDE 40 MG: 10 TABLET ORAL at 08:54

## 2020-10-16 RX ADMIN — PROPOFOL 40 MG: 10 INJECTION, EMULSION INTRAVENOUS at 14:29

## 2020-10-16 RX ADMIN — AMLODIPINE BESYLATE 5 MG: 5 TABLET ORAL at 08:55

## 2020-10-16 RX ADMIN — DIPHENHYDRAMINE HYDROCHLORIDE 50 MG: 50 CAPSULE ORAL at 20:33

## 2020-10-16 RX ADMIN — FLUOXETINE 40 MG: 20 CAPSULE ORAL at 08:55

## 2020-10-16 RX ADMIN — PROPOFOL 40 MG: 10 INJECTION, EMULSION INTRAVENOUS at 14:22

## 2020-10-16 RX ADMIN — AMPICILLIN SODIUM AND SULBACTAM SODIUM 3 G: 2; 1 INJECTION, POWDER, FOR SOLUTION INTRAMUSCULAR; INTRAVENOUS at 14:14

## 2020-10-16 RX ADMIN — PROPOFOL 40 MG: 10 INJECTION, EMULSION INTRAVENOUS at 14:32

## 2020-10-16 RX ADMIN — DEXMEDETOMIDINE HYDROCHLORIDE 10 MCG: 100 INJECTION, SOLUTION, CONCENTRATE INTRAVENOUS at 14:20

## 2020-10-16 RX ADMIN — AMPICILLIN SODIUM AND SULBACTAM SODIUM 3 G: 2; 1 INJECTION, POWDER, FOR SOLUTION INTRAMUSCULAR; INTRAVENOUS at 20:00

## 2020-10-16 RX ADMIN — ENOXAPARIN SODIUM 40 MG: 40 INJECTION SUBCUTANEOUS at 16:53

## 2020-10-16 RX ADMIN — FAMOTIDINE 20 MG: 10 INJECTION INTRAVENOUS at 13:26

## 2020-10-16 RX ADMIN — PRIMIDONE 250 MG: 250 TABLET ORAL at 16:53

## 2020-10-16 RX ADMIN — PRIMIDONE 250 MG: 250 TABLET ORAL at 20:00

## 2020-10-16 RX ADMIN — KETAMINE HYDROCHLORIDE 20 MG: 100 INJECTION INTRAMUSCULAR; INTRAVENOUS at 14:18

## 2020-10-16 RX ADMIN — AMPICILLIN SODIUM AND SULBACTAM SODIUM 3 G: 2; 1 INJECTION, POWDER, FOR SOLUTION INTRAMUSCULAR; INTRAVENOUS at 02:00

## 2020-10-16 RX ADMIN — OXYCODONE HYDROCHLORIDE AND ACETAMINOPHEN 1 TABLET: 5; 325 TABLET ORAL at 15:25

## 2020-10-16 RX ADMIN — GABAPENTIN 800 MG: 400 CAPSULE ORAL at 16:53

## 2020-10-16 RX ADMIN — SODIUM CHLORIDE, POTASSIUM CHLORIDE, SODIUM LACTATE AND CALCIUM CHLORIDE 75 ML/HR: 600; 310; 30; 20 INJECTION, SOLUTION INTRAVENOUS at 13:27

## 2020-10-16 RX ADMIN — GABAPENTIN 800 MG: 400 CAPSULE ORAL at 08:55

## 2020-10-16 RX ADMIN — PROPOFOL 40 MG: 10 INJECTION, EMULSION INTRAVENOUS at 14:36

## 2020-10-16 RX ADMIN — CLONAZEPAM 0.5 MG: 0.5 TABLET ORAL at 08:55

## 2020-10-16 RX ADMIN — KETAMINE HYDROCHLORIDE 10 MG: 100 INJECTION INTRAMUSCULAR; INTRAVENOUS at 14:32

## 2020-10-16 RX ADMIN — OXYCODONE HYDROCHLORIDE AND ACETAMINOPHEN 1 TABLET: 10; 325 TABLET ORAL at 09:00

## 2020-10-16 RX ADMIN — SODIUM CHLORIDE, PRESERVATIVE FREE 10 ML: 5 INJECTION INTRAVENOUS at 20:00

## 2020-10-16 RX ADMIN — SODIUM CHLORIDE, POTASSIUM CHLORIDE, SODIUM LACTATE AND CALCIUM CHLORIDE 75 ML/HR: 600; 310; 30; 20 INJECTION, SOLUTION INTRAVENOUS at 06:25

## 2020-10-16 RX ADMIN — AMPICILLIN SODIUM AND SULBACTAM SODIUM 3 G: 2; 1 INJECTION, POWDER, FOR SOLUTION INTRAMUSCULAR; INTRAVENOUS at 08:54

## 2020-10-16 RX ADMIN — SODIUM CHLORIDE, PRESERVATIVE FREE 10 ML: 5 INJECTION INTRAVENOUS at 08:55

## 2020-10-16 RX ADMIN — GABAPENTIN 800 MG: 400 CAPSULE ORAL at 20:00

## 2020-10-16 RX ADMIN — OLANZAPINE 5 MG: 2.5 TABLET, FILM COATED ORAL at 20:00

## 2020-10-16 RX ADMIN — DEXMEDETOMIDINE HYDROCHLORIDE 10 MCG: 100 INJECTION, SOLUTION, CONCENTRATE INTRAVENOUS at 14:26

## 2020-10-16 RX ADMIN — DEXMEDETOMIDINE HYDROCHLORIDE 10 MCG: 100 INJECTION, SOLUTION, CONCENTRATE INTRAVENOUS at 14:14

## 2020-10-16 RX ADMIN — CLONAZEPAM 0.5 MG: 0.5 TABLET ORAL at 16:53

## 2020-10-16 RX ADMIN — LIDOCAINE HYDROCHLORIDE 100 MG: 20 INJECTION, SOLUTION INFILTRATION; PERINEURAL at 14:18

## 2020-10-16 RX ADMIN — PROPRANOLOL HYDROCHLORIDE 40 MG: 10 TABLET ORAL at 20:00

## 2020-10-16 RX ADMIN — PRIMIDONE 250 MG: 250 TABLET ORAL at 08:55

## 2020-10-16 RX ADMIN — PROPOFOL 40 MG: 10 INJECTION, EMULSION INTRAVENOUS at 14:25

## 2020-10-16 RX ADMIN — DEXMEDETOMIDINE HYDROCHLORIDE 10 MCG: 100 INJECTION, SOLUTION, CONCENTRATE INTRAVENOUS at 14:35

## 2020-10-16 NOTE — ANESTHESIA PREPROCEDURE EVALUATION
" Anesthesia Evaluation     Patient summary reviewed and Nursing notes reviewed   NPO Solid Status: > 8 hours  NPO Liquid Status: > 8 hours           Airway   Mallampati: III  TM distance: <3 FB  Neck ROM: full  No difficulty expected  Dental      Pulmonary - normal exam   (+) COPD, sleep apnea on CPAP,   Cardiovascular - normal exam    Rhythm: regular  Rate: normal    (+) hypertension well controlled, CAD, DVT resolved, hyperlipidemia,       Neuro/Psych  (+) numbness, psychiatric history Anxiety, Depression and Bipolar,     GI/Hepatic/Renal/Endo    (+)  GI bleeding resolved, renal disease ARF, diabetes mellitus poorly controlled,     Musculoskeletal     Abdominal   (+) obese,    Substance History      OB/GYN negative ob/gyn ROS         Other   arthritis,                      Anesthesia Plan    ASA 3     general and MAC   (\"Everything went very well last time\")  intravenous induction     Anesthetic plan, all risks, benefits, and alternatives have been provided, discussed and informed consent has been obtained with: patient.  Use of blood products discussed with patient  Consented to blood products.     "

## 2020-10-16 NOTE — ANESTHESIA POSTPROCEDURE EVALUATION
Patient: Eliseo Price    Procedure Summary     Date: 10/16/20 Room / Location:  [facility] 4 /  [facility]    Anesthesia Start: 1412 Anesthesia Stop: 1501    Procedure: DEBRIDEMENT RIGHT  FOOT SURGICAL WOUND (Right Foot) Diagnosis:       Status post amputation of lesser toe of right foot (CMS/HCC)      Surgical wound infection      (Status post amputation of lesser toe of right foot (CMS/HCC) [Z89.421])      (Surgical wound infection [T81.49XA])    Surgeon: Anish Farah DPM Provider: Shatne Mayers CRNA    Anesthesia Type: general, MAC ASA Status: 3          Anesthesia Type: general, MAC    Vitals  Vitals Value Taken Time   /90 10/16/20 1503   Temp     Pulse 74 10/16/20 1503   Resp 18 10/16/20 1503   SpO2 99 % 10/16/20 1503           Post Anesthesia Care and Evaluation    Patient location during evaluation: bedside  Patient participation: complete - patient participated  Level of consciousness: awake and alert  Pain score: 0  Pain management: adequate  Airway patency: patent  Anesthetic complications: No anesthetic complications  PONV Status: none  Cardiovascular status: acceptable  Respiratory status: acceptable  Hydration status: acceptable  No anesthesia care post op

## 2020-10-17 LAB
ANION GAP SERPL CALCULATED.3IONS-SCNC: 9.8 MMOL/L (ref 5–15)
BACTERIA SPEC AEROBE CULT: NORMAL
BACTERIA SPEC AEROBE CULT: NORMAL
BASOPHILS # BLD AUTO: 0.04 10*3/MM3 (ref 0–0.2)
BASOPHILS NFR BLD AUTO: 0.9 % (ref 0–1.5)
BUN SERPL-MCNC: 7 MG/DL (ref 6–20)
BUN/CREAT SERPL: 12.3 (ref 7–25)
CALCIUM SPEC-SCNC: 8.6 MG/DL (ref 8.6–10.5)
CHLORIDE SERPL-SCNC: 98 MMOL/L (ref 98–107)
CO2 SERPL-SCNC: 29.2 MMOL/L (ref 22–29)
CREAT SERPL-MCNC: 0.57 MG/DL (ref 0.76–1.27)
DEPRECATED RDW RBC AUTO: 48.6 FL (ref 37–54)
EOSINOPHIL # BLD AUTO: 0.3 10*3/MM3 (ref 0–0.4)
EOSINOPHIL NFR BLD AUTO: 6.4 % (ref 0.3–6.2)
ERYTHROCYTE [DISTWIDTH] IN BLOOD BY AUTOMATED COUNT: 14.4 % (ref 12.3–15.4)
GFR SERPL CREATININE-BSD FRML MDRD: 148 ML/MIN/1.73
GLUCOSE BLDC GLUCOMTR-MCNC: 100 MG/DL (ref 70–130)
GLUCOSE BLDC GLUCOMTR-MCNC: 146 MG/DL (ref 70–130)
GLUCOSE BLDC GLUCOMTR-MCNC: 82 MG/DL (ref 70–130)
GLUCOSE BLDC GLUCOMTR-MCNC: 93 MG/DL (ref 70–130)
GLUCOSE SERPL-MCNC: 111 MG/DL (ref 65–99)
HCT VFR BLD AUTO: 37.5 % (ref 37.5–51)
HGB BLD-MCNC: 12 G/DL (ref 13–17.7)
IMM GRANULOCYTES # BLD AUTO: 0.03 10*3/MM3 (ref 0–0.05)
IMM GRANULOCYTES NFR BLD AUTO: 0.6 % (ref 0–0.5)
LYMPHOCYTES # BLD AUTO: 1.09 10*3/MM3 (ref 0.7–3.1)
LYMPHOCYTES NFR BLD AUTO: 23.2 % (ref 19.6–45.3)
MCH RBC QN AUTO: 29.6 PG (ref 26.6–33)
MCHC RBC AUTO-ENTMCNC: 32 G/DL (ref 31.5–35.7)
MCV RBC AUTO: 92.4 FL (ref 79–97)
MONOCYTES # BLD AUTO: 0.48 10*3/MM3 (ref 0.1–0.9)
MONOCYTES NFR BLD AUTO: 10.2 % (ref 5–12)
NEUTROPHILS NFR BLD AUTO: 2.75 10*3/MM3 (ref 1.7–7)
NEUTROPHILS NFR BLD AUTO: 58.7 % (ref 42.7–76)
NRBC BLD AUTO-RTO: 0 /100 WBC (ref 0–0.2)
PLATELET # BLD AUTO: 258 10*3/MM3 (ref 140–450)
PMV BLD AUTO: 9.5 FL (ref 6–12)
POTASSIUM SERPL-SCNC: 5 MMOL/L (ref 3.5–5.2)
RBC # BLD AUTO: 4.06 10*6/MM3 (ref 4.14–5.8)
SODIUM SERPL-SCNC: 137 MMOL/L (ref 136–145)
WBC # BLD AUTO: 4.69 10*3/MM3 (ref 3.4–10.8)

## 2020-10-17 PROCEDURE — 82962 GLUCOSE BLOOD TEST: CPT

## 2020-10-17 PROCEDURE — 25010000003 AMPICILLIN-SULBACTAM PER 1.5 G: Performed by: PODIATRIST

## 2020-10-17 PROCEDURE — 25010000002 CEFTRIAXONE SODIUM-DEXTROSE 2-2.22 GM-%(50ML) RECONSTITUTED SOLUTION: Performed by: INTERNAL MEDICINE

## 2020-10-17 PROCEDURE — 80048 BASIC METABOLIC PNL TOTAL CA: CPT | Performed by: PODIATRIST

## 2020-10-17 PROCEDURE — 85025 COMPLETE CBC W/AUTO DIFF WBC: CPT | Performed by: PODIATRIST

## 2020-10-17 PROCEDURE — 99232 SBSQ HOSP IP/OBS MODERATE 35: CPT | Performed by: HOSPITALIST

## 2020-10-17 PROCEDURE — 63710000001 DIPHENHYDRAMINE PER 50 MG: Performed by: HOSPITALIST

## 2020-10-17 PROCEDURE — 25010000002 ENOXAPARIN PER 10 MG: Performed by: PODIATRIST

## 2020-10-17 RX ORDER — FLUCONAZOLE 100 MG/1
200 TABLET ORAL EVERY 24 HOURS
Status: DISCONTINUED | OUTPATIENT
Start: 2020-10-17 | End: 2020-10-20 | Stop reason: HOSPADM

## 2020-10-17 RX ORDER — CEFTRIAXONE 2 G/50ML
2 INJECTION, SOLUTION INTRAVENOUS EVERY 24 HOURS
Status: DISCONTINUED | OUTPATIENT
Start: 2020-10-17 | End: 2020-10-20 | Stop reason: HOSPADM

## 2020-10-17 RX ADMIN — OXYCODONE HYDROCHLORIDE AND ACETAMINOPHEN 1 TABLET: 10; 325 TABLET ORAL at 03:08

## 2020-10-17 RX ADMIN — PRIMIDONE 250 MG: 250 TABLET ORAL at 15:01

## 2020-10-17 RX ADMIN — DIPHENHYDRAMINE HYDROCHLORIDE 50 MG: 50 CAPSULE ORAL at 03:14

## 2020-10-17 RX ADMIN — ENOXAPARIN SODIUM 40 MG: 40 INJECTION SUBCUTANEOUS at 18:10

## 2020-10-17 RX ADMIN — ENOXAPARIN SODIUM 40 MG: 40 INJECTION SUBCUTANEOUS at 05:33

## 2020-10-17 RX ADMIN — GABAPENTIN 800 MG: 400 CAPSULE ORAL at 09:02

## 2020-10-17 RX ADMIN — DIPHENHYDRAMINE HYDROCHLORIDE 50 MG: 50 CAPSULE ORAL at 15:01

## 2020-10-17 RX ADMIN — OXYCODONE HYDROCHLORIDE AND ACETAMINOPHEN 1 TABLET: 10; 325 TABLET ORAL at 09:02

## 2020-10-17 RX ADMIN — PRIMIDONE 250 MG: 250 TABLET ORAL at 09:03

## 2020-10-17 RX ADMIN — FLUCONAZOLE 200 MG: 100 TABLET ORAL at 19:48

## 2020-10-17 RX ADMIN — FLUOXETINE 40 MG: 20 CAPSULE ORAL at 09:03

## 2020-10-17 RX ADMIN — CLONAZEPAM 0.5 MG: 0.5 TABLET ORAL at 16:25

## 2020-10-17 RX ADMIN — GABAPENTIN 800 MG: 400 CAPSULE ORAL at 15:01

## 2020-10-17 RX ADMIN — DIPHENHYDRAMINE HYDROCHLORIDE 50 MG: 50 CAPSULE ORAL at 09:03

## 2020-10-17 RX ADMIN — SODIUM CHLORIDE, PRESERVATIVE FREE 10 ML: 5 INJECTION INTRAVENOUS at 09:05

## 2020-10-17 RX ADMIN — CAMPHOR AND MENTHOL: 5; 5 LOTION TOPICAL at 21:19

## 2020-10-17 RX ADMIN — CEFTRIAXONE 2 G: 2 INJECTION, SOLUTION INTRAVENOUS at 19:47

## 2020-10-17 RX ADMIN — PRIMIDONE 250 MG: 250 TABLET ORAL at 21:14

## 2020-10-17 RX ADMIN — DIPHENHYDRAMINE HYDROCHLORIDE 50 MG: 50 CAPSULE ORAL at 21:13

## 2020-10-17 RX ADMIN — OXYCODONE HYDROCHLORIDE AND ACETAMINOPHEN 1 TABLET: 10; 325 TABLET ORAL at 21:13

## 2020-10-17 RX ADMIN — SODIUM CHLORIDE, PRESERVATIVE FREE 10 ML: 5 INJECTION INTRAVENOUS at 21:19

## 2020-10-17 RX ADMIN — CAMPHOR AND MENTHOL 0.5 %: 5; 5 LOTION TOPICAL at 12:31

## 2020-10-17 RX ADMIN — GABAPENTIN 800 MG: 400 CAPSULE ORAL at 21:13

## 2020-10-17 RX ADMIN — AMPICILLIN SODIUM AND SULBACTAM SODIUM 3 G: 2; 1 INJECTION, POWDER, FOR SOLUTION INTRAMUSCULAR; INTRAVENOUS at 01:15

## 2020-10-17 RX ADMIN — SODIUM CHLORIDE, POTASSIUM CHLORIDE, SODIUM LACTATE AND CALCIUM CHLORIDE 75 ML/HR: 600; 310; 30; 20 INJECTION, SOLUTION INTRAVENOUS at 00:24

## 2020-10-17 RX ADMIN — OXYCODONE HYDROCHLORIDE AND ACETAMINOPHEN 1 TABLET: 10; 325 TABLET ORAL at 16:25

## 2020-10-17 RX ADMIN — PROPRANOLOL HYDROCHLORIDE 40 MG: 10 TABLET ORAL at 21:14

## 2020-10-17 RX ADMIN — PROPRANOLOL HYDROCHLORIDE 40 MG: 10 TABLET ORAL at 09:02

## 2020-10-17 RX ADMIN — OLANZAPINE 5 MG: 2.5 TABLET, FILM COATED ORAL at 21:14

## 2020-10-17 RX ADMIN — AMPICILLIN SODIUM AND SULBACTAM SODIUM 3 G: 2; 1 INJECTION, POWDER, FOR SOLUTION INTRAMUSCULAR; INTRAVENOUS at 09:03

## 2020-10-17 RX ADMIN — TEMAZEPAM 30 MG: 15 CAPSULE ORAL at 21:14

## 2020-10-17 RX ADMIN — AMLODIPINE BESYLATE 5 MG: 5 TABLET ORAL at 09:03

## 2020-10-18 LAB
ANION GAP SERPL CALCULATED.3IONS-SCNC: 9 MMOL/L (ref 5–15)
BACTERIA SPEC AEROBE CULT: ABNORMAL
BASOPHILS # BLD AUTO: 0.05 10*3/MM3 (ref 0–0.2)
BASOPHILS NFR BLD AUTO: 0.9 % (ref 0–1.5)
BUN SERPL-MCNC: 8 MG/DL (ref 6–20)
BUN/CREAT SERPL: 12.7 (ref 7–25)
CALCIUM SPEC-SCNC: 8.6 MG/DL (ref 8.6–10.5)
CHLORIDE SERPL-SCNC: 96 MMOL/L (ref 98–107)
CO2 SERPL-SCNC: 29 MMOL/L (ref 22–29)
CREAT SERPL-MCNC: 0.63 MG/DL (ref 0.76–1.27)
DEPRECATED RDW RBC AUTO: 48.2 FL (ref 37–54)
EOSINOPHIL # BLD AUTO: 0.29 10*3/MM3 (ref 0–0.4)
EOSINOPHIL NFR BLD AUTO: 5.4 % (ref 0.3–6.2)
ERYTHROCYTE [DISTWIDTH] IN BLOOD BY AUTOMATED COUNT: 14.2 % (ref 12.3–15.4)
GFR SERPL CREATININE-BSD FRML MDRD: 132 ML/MIN/1.73
GLUCOSE BLDC GLUCOMTR-MCNC: 107 MG/DL (ref 70–130)
GLUCOSE BLDC GLUCOMTR-MCNC: 142 MG/DL (ref 70–130)
GLUCOSE BLDC GLUCOMTR-MCNC: 155 MG/DL (ref 70–130)
GLUCOSE BLDC GLUCOMTR-MCNC: 95 MG/DL (ref 70–130)
GLUCOSE SERPL-MCNC: 166 MG/DL (ref 65–99)
GRAM STN SPEC: ABNORMAL
GRAM STN SPEC: ABNORMAL
HCT VFR BLD AUTO: 41.7 % (ref 37.5–51)
HGB BLD-MCNC: 13.1 G/DL (ref 13–17.7)
IMM GRANULOCYTES # BLD AUTO: 0.04 10*3/MM3 (ref 0–0.05)
IMM GRANULOCYTES NFR BLD AUTO: 0.7 % (ref 0–0.5)
LYMPHOCYTES # BLD AUTO: 1.08 10*3/MM3 (ref 0.7–3.1)
LYMPHOCYTES NFR BLD AUTO: 20.1 % (ref 19.6–45.3)
MCH RBC QN AUTO: 29.3 PG (ref 26.6–33)
MCHC RBC AUTO-ENTMCNC: 31.4 G/DL (ref 31.5–35.7)
MCV RBC AUTO: 93.3 FL (ref 79–97)
MONOCYTES # BLD AUTO: 0.44 10*3/MM3 (ref 0.1–0.9)
MONOCYTES NFR BLD AUTO: 8.2 % (ref 5–12)
NEUTROPHILS NFR BLD AUTO: 3.46 10*3/MM3 (ref 1.7–7)
NEUTROPHILS NFR BLD AUTO: 64.7 % (ref 42.7–76)
NRBC BLD AUTO-RTO: 0 /100 WBC (ref 0–0.2)
PLATELET # BLD AUTO: 331 10*3/MM3 (ref 140–450)
PMV BLD AUTO: 8.8 FL (ref 6–12)
POTASSIUM SERPL-SCNC: 4.2 MMOL/L (ref 3.5–5.2)
RBC # BLD AUTO: 4.47 10*6/MM3 (ref 4.14–5.8)
SODIUM SERPL-SCNC: 134 MMOL/L (ref 136–145)
WBC # BLD AUTO: 5.36 10*3/MM3 (ref 3.4–10.8)

## 2020-10-18 PROCEDURE — 63710000001 DIPHENHYDRAMINE PER 50 MG: Performed by: HOSPITALIST

## 2020-10-18 PROCEDURE — 85025 COMPLETE CBC W/AUTO DIFF WBC: CPT | Performed by: PODIATRIST

## 2020-10-18 PROCEDURE — 80048 BASIC METABOLIC PNL TOTAL CA: CPT | Performed by: PODIATRIST

## 2020-10-18 PROCEDURE — 82962 GLUCOSE BLOOD TEST: CPT

## 2020-10-18 PROCEDURE — 63710000001 INSULIN ASPART PER 5 UNITS: Performed by: PODIATRIST

## 2020-10-18 PROCEDURE — 25010000002 ENOXAPARIN PER 10 MG: Performed by: PODIATRIST

## 2020-10-18 PROCEDURE — 99231 SBSQ HOSP IP/OBS SF/LOW 25: CPT | Performed by: HOSPITALIST

## 2020-10-18 PROCEDURE — 94799 UNLISTED PULMONARY SVC/PX: CPT

## 2020-10-18 PROCEDURE — 25010000002 CEFTRIAXONE SODIUM-DEXTROSE 2-2.22 GM-%(50ML) RECONSTITUTED SOLUTION: Performed by: INTERNAL MEDICINE

## 2020-10-18 RX ADMIN — TEMAZEPAM 30 MG: 15 CAPSULE ORAL at 20:37

## 2020-10-18 RX ADMIN — PRIMIDONE 250 MG: 250 TABLET ORAL at 16:06

## 2020-10-18 RX ADMIN — DIPHENHYDRAMINE HYDROCHLORIDE 50 MG: 50 CAPSULE ORAL at 08:58

## 2020-10-18 RX ADMIN — ENOXAPARIN SODIUM 40 MG: 40 INJECTION SUBCUTANEOUS at 05:29

## 2020-10-18 RX ADMIN — SODIUM CHLORIDE, PRESERVATIVE FREE 10 ML: 5 INJECTION INTRAVENOUS at 23:14

## 2020-10-18 RX ADMIN — OLANZAPINE 5 MG: 2.5 TABLET, FILM COATED ORAL at 20:37

## 2020-10-18 RX ADMIN — AMLODIPINE BESYLATE 5 MG: 5 TABLET ORAL at 08:58

## 2020-10-18 RX ADMIN — PRIMIDONE 250 MG: 250 TABLET ORAL at 20:37

## 2020-10-18 RX ADMIN — PRIMIDONE 250 MG: 250 TABLET ORAL at 08:58

## 2020-10-18 RX ADMIN — GABAPENTIN 800 MG: 400 CAPSULE ORAL at 08:58

## 2020-10-18 RX ADMIN — GABAPENTIN 800 MG: 400 CAPSULE ORAL at 16:06

## 2020-10-18 RX ADMIN — OXYCODONE HYDROCHLORIDE AND ACETAMINOPHEN 1 TABLET: 10; 325 TABLET ORAL at 23:14

## 2020-10-18 RX ADMIN — CEFTRIAXONE 2 G: 2 INJECTION, SOLUTION INTRAVENOUS at 23:14

## 2020-10-18 RX ADMIN — CLONAZEPAM 0.5 MG: 0.5 TABLET ORAL at 20:36

## 2020-10-18 RX ADMIN — PROPRANOLOL HYDROCHLORIDE 40 MG: 10 TABLET ORAL at 08:58

## 2020-10-18 RX ADMIN — PROPRANOLOL HYDROCHLORIDE 40 MG: 10 TABLET ORAL at 20:37

## 2020-10-18 RX ADMIN — INSULIN ASPART 2 UNITS: 100 INJECTION, SOLUTION INTRAVENOUS; SUBCUTANEOUS at 11:34

## 2020-10-18 RX ADMIN — OXYCODONE HYDROCHLORIDE AND ACETAMINOPHEN 1 TABLET: 10; 325 TABLET ORAL at 16:06

## 2020-10-18 RX ADMIN — DIPHENHYDRAMINE HYDROCHLORIDE 50 MG: 50 CAPSULE ORAL at 23:14

## 2020-10-18 RX ADMIN — FLUCONAZOLE 200 MG: 100 TABLET ORAL at 20:30

## 2020-10-18 RX ADMIN — DIPHENHYDRAMINE HYDROCHLORIDE 50 MG: 50 CAPSULE ORAL at 16:06

## 2020-10-18 RX ADMIN — CLONAZEPAM 0.5 MG: 0.5 TABLET ORAL at 16:10

## 2020-10-18 RX ADMIN — ENOXAPARIN SODIUM 40 MG: 40 INJECTION SUBCUTANEOUS at 16:10

## 2020-10-18 RX ADMIN — OXYCODONE HYDROCHLORIDE AND ACETAMINOPHEN 1 TABLET: 10; 325 TABLET ORAL at 09:01

## 2020-10-18 RX ADMIN — MELATONIN TAB 5 MG 5 MG: 5 TAB at 20:37

## 2020-10-18 RX ADMIN — CAMPHOR AND MENTHOL: 5; 5 LOTION TOPICAL at 20:38

## 2020-10-18 RX ADMIN — SODIUM CHLORIDE, PRESERVATIVE FREE 10 ML: 5 INJECTION INTRAVENOUS at 09:02

## 2020-10-18 RX ADMIN — GABAPENTIN 800 MG: 400 CAPSULE ORAL at 20:36

## 2020-10-18 RX ADMIN — FLUOXETINE 40 MG: 20 CAPSULE ORAL at 08:58

## 2020-10-19 LAB
ANION GAP SERPL CALCULATED.3IONS-SCNC: 11.3 MMOL/L (ref 5–15)
BASOPHILS # BLD AUTO: 0.05 10*3/MM3 (ref 0–0.2)
BASOPHILS NFR BLD AUTO: 0.8 % (ref 0–1.5)
BUN SERPL-MCNC: 10 MG/DL (ref 6–20)
BUN/CREAT SERPL: 15.4 (ref 7–25)
CALCIUM SPEC-SCNC: 8.7 MG/DL (ref 8.6–10.5)
CHLORIDE SERPL-SCNC: 98 MMOL/L (ref 98–107)
CO2 SERPL-SCNC: 26.7 MMOL/L (ref 22–29)
CREAT SERPL-MCNC: 0.65 MG/DL (ref 0.76–1.27)
DEPRECATED RDW RBC AUTO: 49 FL (ref 37–54)
EOSINOPHIL # BLD AUTO: 0.29 10*3/MM3 (ref 0–0.4)
EOSINOPHIL NFR BLD AUTO: 4.8 % (ref 0.3–6.2)
ERYTHROCYTE [DISTWIDTH] IN BLOOD BY AUTOMATED COUNT: 14.4 % (ref 12.3–15.4)
GFR SERPL CREATININE-BSD FRML MDRD: 127 ML/MIN/1.73
GLUCOSE BLDC GLUCOMTR-MCNC: 105 MG/DL (ref 70–130)
GLUCOSE BLDC GLUCOMTR-MCNC: 115 MG/DL (ref 70–130)
GLUCOSE BLDC GLUCOMTR-MCNC: 131 MG/DL (ref 70–130)
GLUCOSE BLDC GLUCOMTR-MCNC: 97 MG/DL (ref 70–130)
GLUCOSE SERPL-MCNC: 111 MG/DL (ref 65–99)
HCT VFR BLD AUTO: 41.2 % (ref 37.5–51)
HGB BLD-MCNC: 12.8 G/DL (ref 13–17.7)
IMM GRANULOCYTES # BLD AUTO: 0.04 10*3/MM3 (ref 0–0.05)
IMM GRANULOCYTES NFR BLD AUTO: 0.7 % (ref 0–0.5)
LYMPHOCYTES # BLD AUTO: 1.42 10*3/MM3 (ref 0.7–3.1)
LYMPHOCYTES NFR BLD AUTO: 23.7 % (ref 19.6–45.3)
MCH RBC QN AUTO: 28.9 PG (ref 26.6–33)
MCHC RBC AUTO-ENTMCNC: 31.1 G/DL (ref 31.5–35.7)
MCV RBC AUTO: 93 FL (ref 79–97)
MONOCYTES # BLD AUTO: 0.77 10*3/MM3 (ref 0.1–0.9)
MONOCYTES NFR BLD AUTO: 12.9 % (ref 5–12)
NEUTROPHILS NFR BLD AUTO: 3.42 10*3/MM3 (ref 1.7–7)
NEUTROPHILS NFR BLD AUTO: 57.1 % (ref 42.7–76)
NRBC BLD AUTO-RTO: 0 /100 WBC (ref 0–0.2)
PLATELET # BLD AUTO: 354 10*3/MM3 (ref 140–450)
PMV BLD AUTO: 8.8 FL (ref 6–12)
POTASSIUM SERPL-SCNC: 4.1 MMOL/L (ref 3.5–5.2)
RBC # BLD AUTO: 4.43 10*6/MM3 (ref 4.14–5.8)
SODIUM SERPL-SCNC: 136 MMOL/L (ref 136–145)
WBC # BLD AUTO: 5.99 10*3/MM3 (ref 3.4–10.8)

## 2020-10-19 PROCEDURE — C1751 CATH, INF, PER/CENT/MIDLINE: HCPCS

## 2020-10-19 PROCEDURE — 25010000002 CEFTRIAXONE SODIUM-DEXTROSE 2-2.22 GM-%(50ML) RECONSTITUTED SOLUTION: Performed by: HOSPITALIST

## 2020-10-19 PROCEDURE — 02HV33Z INSERTION OF INFUSION DEVICE INTO SUPERIOR VENA CAVA, PERCUTANEOUS APPROACH: ICD-10-PCS | Performed by: HOSPITALIST

## 2020-10-19 PROCEDURE — 80048 BASIC METABOLIC PNL TOTAL CA: CPT | Performed by: PODIATRIST

## 2020-10-19 PROCEDURE — 82962 GLUCOSE BLOOD TEST: CPT

## 2020-10-19 PROCEDURE — 63710000001 DIPHENHYDRAMINE PER 50 MG: Performed by: HOSPITALIST

## 2020-10-19 PROCEDURE — 99232 SBSQ HOSP IP/OBS MODERATE 35: CPT | Performed by: HOSPITALIST

## 2020-10-19 PROCEDURE — 25010000002 ENOXAPARIN PER 10 MG: Performed by: PODIATRIST

## 2020-10-19 PROCEDURE — 85025 COMPLETE CBC W/AUTO DIFF WBC: CPT | Performed by: PODIATRIST

## 2020-10-19 RX ORDER — SODIUM CHLORIDE 0.9 % (FLUSH) 0.9 %
10 SYRINGE (ML) INJECTION EVERY 12 HOURS SCHEDULED
Status: DISCONTINUED | OUTPATIENT
Start: 2020-10-19 | End: 2020-10-20 | Stop reason: HOSPADM

## 2020-10-19 RX ORDER — SODIUM CHLORIDE 0.9 % (FLUSH) 0.9 %
10 SYRINGE (ML) INJECTION AS NEEDED
Status: DISCONTINUED | OUTPATIENT
Start: 2020-10-19 | End: 2020-10-20 | Stop reason: HOSPADM

## 2020-10-19 RX ORDER — SODIUM CHLORIDE 0.9 % (FLUSH) 0.9 %
20 SYRINGE (ML) INJECTION AS NEEDED
Status: DISCONTINUED | OUTPATIENT
Start: 2020-10-19 | End: 2020-10-20 | Stop reason: HOSPADM

## 2020-10-19 RX ADMIN — GABAPENTIN 800 MG: 400 CAPSULE ORAL at 08:36

## 2020-10-19 RX ADMIN — CLONAZEPAM 0.5 MG: 0.5 TABLET ORAL at 14:56

## 2020-10-19 RX ADMIN — ACETAMINOPHEN 650 MG: 325 TABLET, FILM COATED ORAL at 20:10

## 2020-10-19 RX ADMIN — PROPRANOLOL HYDROCHLORIDE 40 MG: 10 TABLET ORAL at 08:36

## 2020-10-19 RX ADMIN — PRIMIDONE 250 MG: 250 TABLET ORAL at 15:40

## 2020-10-19 RX ADMIN — ENOXAPARIN SODIUM 40 MG: 40 INJECTION SUBCUTANEOUS at 05:40

## 2020-10-19 RX ADMIN — OXYCODONE HYDROCHLORIDE AND ACETAMINOPHEN 1 TABLET: 10; 325 TABLET ORAL at 05:39

## 2020-10-19 RX ADMIN — CEFTRIAXONE 2 G: 2 INJECTION, SOLUTION INTRAVENOUS at 18:37

## 2020-10-19 RX ADMIN — GABAPENTIN 800 MG: 400 CAPSULE ORAL at 15:40

## 2020-10-19 RX ADMIN — PRIMIDONE 250 MG: 250 TABLET ORAL at 08:36

## 2020-10-19 RX ADMIN — AMLODIPINE BESYLATE 5 MG: 5 TABLET ORAL at 08:36

## 2020-10-19 RX ADMIN — SODIUM CHLORIDE, PRESERVATIVE FREE 10 ML: 5 INJECTION INTRAVENOUS at 20:08

## 2020-10-19 RX ADMIN — SODIUM CHLORIDE, PRESERVATIVE FREE 10 ML: 5 INJECTION INTRAVENOUS at 08:36

## 2020-10-19 RX ADMIN — OXYCODONE HYDROCHLORIDE AND ACETAMINOPHEN 1 TABLET: 10; 325 TABLET ORAL at 12:19

## 2020-10-19 RX ADMIN — FLUCONAZOLE 200 MG: 100 TABLET ORAL at 18:36

## 2020-10-19 RX ADMIN — PROPRANOLOL HYDROCHLORIDE 40 MG: 10 TABLET ORAL at 20:07

## 2020-10-19 RX ADMIN — OLANZAPINE 5 MG: 2.5 TABLET, FILM COATED ORAL at 20:07

## 2020-10-19 RX ADMIN — PRIMIDONE 250 MG: 250 TABLET ORAL at 20:07

## 2020-10-19 RX ADMIN — ENOXAPARIN SODIUM 40 MG: 40 INJECTION SUBCUTANEOUS at 18:36

## 2020-10-19 RX ADMIN — DIPHENHYDRAMINE HYDROCHLORIDE 50 MG: 50 CAPSULE ORAL at 05:40

## 2020-10-19 RX ADMIN — FLUOXETINE 40 MG: 20 CAPSULE ORAL at 08:36

## 2020-10-19 RX ADMIN — DIPHENHYDRAMINE HYDROCHLORIDE 50 MG: 50 CAPSULE ORAL at 20:10

## 2020-10-19 RX ADMIN — SODIUM CHLORIDE, PRESERVATIVE FREE 10 ML: 5 INJECTION INTRAVENOUS at 18:37

## 2020-10-19 RX ADMIN — GABAPENTIN 800 MG: 400 CAPSULE ORAL at 20:07

## 2020-10-20 ENCOUNTER — HOSPITAL ENCOUNTER (INPATIENT)
Facility: HOSPITAL | Age: 55
LOS: 20 days | Discharge: HOME HEALTH | End: 2020-11-09
Attending: INTERNAL MEDICINE | Admitting: INTERNAL MEDICINE

## 2020-10-20 VITALS
HEIGHT: 71 IN | BODY MASS INDEX: 44.1 KG/M2 | WEIGHT: 315 LBS | DIASTOLIC BLOOD PRESSURE: 96 MMHG | RESPIRATION RATE: 16 BRPM | SYSTOLIC BLOOD PRESSURE: 172 MMHG | OXYGEN SATURATION: 96 % | TEMPERATURE: 98.2 F | HEART RATE: 80 BPM

## 2020-10-20 DIAGNOSIS — M86.9 OSTEOMYELITIS OF FIFTH TOE OF RIGHT FOOT (HCC): ICD-10-CM

## 2020-10-20 DIAGNOSIS — E11.42 TYPE 2 DIABETES MELLITUS WITH DIABETIC POLYNEUROPATHY, WITHOUT LONG-TERM CURRENT USE OF INSULIN (HCC): Chronic | ICD-10-CM

## 2020-10-20 DIAGNOSIS — F31.9 BIPOLAR 1 DISORDER (HCC): Primary | ICD-10-CM

## 2020-10-20 PROBLEM — M86.171 OSTEOMYELITIS OF FOOT, RIGHT, ACUTE (HCC): Status: ACTIVE | Noted: 2020-10-20

## 2020-10-20 LAB
ALBUMIN SERPL-MCNC: 3.6 G/DL (ref 3.5–5.2)
ANION GAP SERPL CALCULATED.3IONS-SCNC: 9.9 MMOL/L (ref 5–15)
BASOPHILS # BLD AUTO: 0.08 10*3/MM3 (ref 0–0.2)
BASOPHILS NFR BLD AUTO: 1.1 % (ref 0–1.5)
BUN SERPL-MCNC: 12 MG/DL (ref 6–20)
BUN/CREAT SERPL: 16.9 (ref 7–25)
CALCIUM SPEC-SCNC: 8.9 MG/DL (ref 8.6–10.5)
CHLORIDE SERPL-SCNC: 98 MMOL/L (ref 98–107)
CO2 SERPL-SCNC: 25.1 MMOL/L (ref 22–29)
CREAT SERPL-MCNC: 0.71 MG/DL (ref 0.76–1.27)
CRP SERPL-MCNC: 1.84 MG/DL (ref 0–0.5)
DEPRECATED RDW RBC AUTO: 49.5 FL (ref 37–54)
EOSINOPHIL # BLD AUTO: 0.33 10*3/MM3 (ref 0–0.4)
EOSINOPHIL NFR BLD AUTO: 4.7 % (ref 0.3–6.2)
ERYTHROCYTE [DISTWIDTH] IN BLOOD BY AUTOMATED COUNT: 14.6 % (ref 12.3–15.4)
ERYTHROCYTE [SEDIMENTATION RATE] IN BLOOD: 57 MM/HR (ref 0–20)
GFR SERPL CREATININE-BSD FRML MDRD: 115 ML/MIN/1.73
GLUCOSE BLDC GLUCOMTR-MCNC: 115 MG/DL (ref 70–130)
GLUCOSE BLDC GLUCOMTR-MCNC: 117 MG/DL (ref 70–130)
GLUCOSE BLDC GLUCOMTR-MCNC: 117 MG/DL (ref 70–130)
GLUCOSE BLDC GLUCOMTR-MCNC: 187 MG/DL (ref 70–130)
GLUCOSE SERPL-MCNC: 135 MG/DL (ref 65–99)
HCT VFR BLD AUTO: 43.2 % (ref 37.5–51)
HGB BLD-MCNC: 13.6 G/DL (ref 13–17.7)
IMM GRANULOCYTES # BLD AUTO: 0.05 10*3/MM3 (ref 0–0.05)
IMM GRANULOCYTES NFR BLD AUTO: 0.7 % (ref 0–0.5)
LYMPHOCYTES # BLD AUTO: 1.52 10*3/MM3 (ref 0.7–3.1)
LYMPHOCYTES NFR BLD AUTO: 21.8 % (ref 19.6–45.3)
MCH RBC QN AUTO: 29.2 PG (ref 26.6–33)
MCHC RBC AUTO-ENTMCNC: 31.5 G/DL (ref 31.5–35.7)
MCV RBC AUTO: 92.9 FL (ref 79–97)
MONOCYTES # BLD AUTO: 0.92 10*3/MM3 (ref 0.1–0.9)
MONOCYTES NFR BLD AUTO: 13.2 % (ref 5–12)
NEUTROPHILS NFR BLD AUTO: 4.08 10*3/MM3 (ref 1.7–7)
NEUTROPHILS NFR BLD AUTO: 58.5 % (ref 42.7–76)
NRBC BLD AUTO-RTO: 0 /100 WBC (ref 0–0.2)
PHOSPHATE SERPL-MCNC: 3.8 MG/DL (ref 2.5–4.5)
PLATELET # BLD AUTO: 401 10*3/MM3 (ref 140–450)
PMV BLD AUTO: 8.9 FL (ref 6–12)
POTASSIUM SERPL-SCNC: 4.3 MMOL/L (ref 3.5–5.2)
PROCALCITONIN SERPL-MCNC: 0.03 NG/ML (ref 0–0.25)
RBC # BLD AUTO: 4.65 10*6/MM3 (ref 4.14–5.8)
SODIUM SERPL-SCNC: 133 MMOL/L (ref 136–145)
WBC # BLD AUTO: 6.98 10*3/MM3 (ref 3.4–10.8)

## 2020-10-20 PROCEDURE — 82962 GLUCOSE BLOOD TEST: CPT

## 2020-10-20 PROCEDURE — 86140 C-REACTIVE PROTEIN: CPT | Performed by: INTERNAL MEDICINE

## 2020-10-20 PROCEDURE — 25010000002 CEFTRIAXONE SODIUM-DEXTROSE 2-2.22 GM-%(50ML) RECONSTITUTED SOLUTION: Performed by: INTERNAL MEDICINE

## 2020-10-20 PROCEDURE — 84145 PROCALCITONIN (PCT): CPT | Performed by: INTERNAL MEDICINE

## 2020-10-20 PROCEDURE — 94799 UNLISTED PULMONARY SVC/PX: CPT

## 2020-10-20 PROCEDURE — 25010000002 ENOXAPARIN PER 10 MG: Performed by: PODIATRIST

## 2020-10-20 PROCEDURE — 80069 RENAL FUNCTION PANEL: CPT | Performed by: HOSPITALIST

## 2020-10-20 PROCEDURE — 85025 COMPLETE CBC W/AUTO DIFF WBC: CPT | Performed by: HOSPITALIST

## 2020-10-20 PROCEDURE — 99306 1ST NF CARE HIGH MDM 50: CPT | Performed by: INTERNAL MEDICINE

## 2020-10-20 PROCEDURE — U0004 COV-19 TEST NON-CDC HGH THRU: HCPCS | Performed by: INTERNAL MEDICINE

## 2020-10-20 PROCEDURE — 25010000002 ENOXAPARIN PER 10 MG: Performed by: INTERNAL MEDICINE

## 2020-10-20 PROCEDURE — 85652 RBC SED RATE AUTOMATED: CPT | Performed by: INTERNAL MEDICINE

## 2020-10-20 PROCEDURE — 99239 HOSP IP/OBS DSCHRG MGMT >30: CPT | Performed by: INTERNAL MEDICINE

## 2020-10-20 RX ORDER — NICOTINE 21 MG/24HR
1 PATCH, TRANSDERMAL 24 HOURS TRANSDERMAL EVERY 24 HOURS
Status: DISCONTINUED | OUTPATIENT
Start: 2020-10-20 | End: 2020-10-20

## 2020-10-20 RX ORDER — FLUCONAZOLE 200 MG/1
200 TABLET ORAL EVERY 24 HOURS
Qty: 4 TABLET | Refills: 0 | Status: SHIPPED | OUTPATIENT
Start: 2020-10-20 | End: 2020-11-09 | Stop reason: HOSPADM

## 2020-10-20 RX ORDER — AMLODIPINE BESYLATE 5 MG/1
5 TABLET ORAL DAILY
Status: DISCONTINUED | OUTPATIENT
Start: 2020-10-20 | End: 2020-10-20

## 2020-10-20 RX ORDER — GABAPENTIN 400 MG/1
800 CAPSULE ORAL 3 TIMES DAILY
Start: 2020-10-20

## 2020-10-20 RX ORDER — TEMAZEPAM 15 MG/1
30 CAPSULE ORAL NIGHTLY PRN
Status: DISCONTINUED | OUTPATIENT
Start: 2020-10-20 | End: 2020-11-09 | Stop reason: HOSPADM

## 2020-10-20 RX ORDER — FLUCONAZOLE 100 MG/1
200 TABLET ORAL EVERY 24 HOURS
Status: COMPLETED | OUTPATIENT
Start: 2020-10-20 | End: 2020-10-23

## 2020-10-20 RX ORDER — CLONAZEPAM 0.5 MG/1
0.5 TABLET ORAL 3 TIMES DAILY PRN
Status: DISCONTINUED | OUTPATIENT
Start: 2020-10-20 | End: 2020-10-22

## 2020-10-20 RX ORDER — DEXTROSE MONOHYDRATE 25 G/50ML
25 INJECTION, SOLUTION INTRAVENOUS
Status: DISCONTINUED | OUTPATIENT
Start: 2020-10-20 | End: 2020-11-09 | Stop reason: HOSPADM

## 2020-10-20 RX ORDER — BISACODYL 10 MG
10 SUPPOSITORY, RECTAL RECTAL DAILY PRN
Status: DISCONTINUED | OUTPATIENT
Start: 2020-10-20 | End: 2020-11-09 | Stop reason: HOSPADM

## 2020-10-20 RX ORDER — OLANZAPINE 5 MG/1
5 TABLET ORAL NIGHTLY
Start: 2020-10-20 | End: 2020-11-09 | Stop reason: HOSPADM

## 2020-10-20 RX ORDER — OLANZAPINE 5 MG/1
5 TABLET ORAL NIGHTLY
Status: DISCONTINUED | OUTPATIENT
Start: 2020-10-20 | End: 2020-10-22

## 2020-10-20 RX ORDER — PRIMIDONE 250 MG/1
250 TABLET ORAL 3 TIMES DAILY
Status: DISCONTINUED | OUTPATIENT
Start: 2020-10-20 | End: 2020-10-22

## 2020-10-20 RX ORDER — FLUOXETINE HYDROCHLORIDE 20 MG/1
40 CAPSULE ORAL DAILY
Status: DISCONTINUED | OUTPATIENT
Start: 2020-10-20 | End: 2020-10-20

## 2020-10-20 RX ORDER — FLUOXETINE HYDROCHLORIDE 20 MG/1
40 CAPSULE ORAL DAILY
Status: DISCONTINUED | OUTPATIENT
Start: 2020-10-21 | End: 2020-11-09 | Stop reason: HOSPADM

## 2020-10-20 RX ORDER — AMOXICILLIN 250 MG
2 CAPSULE ORAL NIGHTLY PRN
Start: 2020-10-20 | End: 2021-01-14

## 2020-10-20 RX ORDER — AMOXICILLIN 250 MG
2 CAPSULE ORAL NIGHTLY PRN
Status: DISCONTINUED | OUTPATIENT
Start: 2020-10-20 | End: 2020-11-09 | Stop reason: HOSPADM

## 2020-10-20 RX ORDER — HEPARIN SODIUM 5000 [USP'U]/ML
5000 INJECTION, SOLUTION INTRAVENOUS; SUBCUTANEOUS EVERY 12 HOURS SCHEDULED
Status: DISCONTINUED | OUTPATIENT
Start: 2020-10-20 | End: 2020-10-20

## 2020-10-20 RX ORDER — ACETAMINOPHEN 160 MG/5ML
325 SOLUTION ORAL EVERY 4 HOURS PRN
Status: DISCONTINUED | OUTPATIENT
Start: 2020-10-20 | End: 2020-11-09 | Stop reason: HOSPADM

## 2020-10-20 RX ORDER — CEFTRIAXONE 2 G/50ML
2 INJECTION, SOLUTION INTRAVENOUS EVERY 24 HOURS
Qty: 1350 ML | Refills: 0 | Status: SHIPPED | OUTPATIENT
Start: 2020-10-20 | End: 2020-11-16

## 2020-10-20 RX ORDER — CEFTRIAXONE 2 G/50ML
2 INJECTION, SOLUTION INTRAVENOUS EVERY 24 HOURS
Status: DISCONTINUED | OUTPATIENT
Start: 2020-10-20 | End: 2020-11-09 | Stop reason: HOSPADM

## 2020-10-20 RX ORDER — PANTOPRAZOLE SODIUM 40 MG/1
40 TABLET, DELAYED RELEASE ORAL EVERY MORNING
Status: DISCONTINUED | OUTPATIENT
Start: 2020-10-21 | End: 2020-11-09 | Stop reason: HOSPADM

## 2020-10-20 RX ORDER — GABAPENTIN 400 MG/1
800 CAPSULE ORAL 3 TIMES DAILY
Status: DISCONTINUED | OUTPATIENT
Start: 2020-10-20 | End: 2020-11-09 | Stop reason: HOSPADM

## 2020-10-20 RX ORDER — ACETAMINOPHEN 650 MG/1
650 SUPPOSITORY RECTAL EVERY 4 HOURS PRN
Status: DISCONTINUED | OUTPATIENT
Start: 2020-10-20 | End: 2020-11-09 | Stop reason: HOSPADM

## 2020-10-20 RX ORDER — PROPRANOLOL HYDROCHLORIDE 40 MG/1
40 TABLET ORAL 2 TIMES DAILY
Status: DISCONTINUED | OUTPATIENT
Start: 2020-10-20 | End: 2020-11-09 | Stop reason: HOSPADM

## 2020-10-20 RX ORDER — AMLODIPINE BESYLATE 5 MG/1
5 TABLET ORAL DAILY
Status: DISCONTINUED | OUTPATIENT
Start: 2020-10-21 | End: 2020-11-09 | Stop reason: HOSPADM

## 2020-10-20 RX ORDER — NICOTINE POLACRILEX 4 MG
15 LOZENGE BUCCAL
Status: DISCONTINUED | OUTPATIENT
Start: 2020-10-20 | End: 2020-11-09 | Stop reason: HOSPADM

## 2020-10-20 RX ORDER — CLONIDINE HYDROCHLORIDE 0.1 MG/1
0.1 TABLET ORAL EVERY 12 HOURS SCHEDULED
Status: DISCONTINUED | OUTPATIENT
Start: 2020-10-20 | End: 2020-11-09 | Stop reason: HOSPADM

## 2020-10-20 RX ORDER — ACETAMINOPHEN 325 MG/1
325 TABLET ORAL EVERY 4 HOURS PRN
Status: DISCONTINUED | OUTPATIENT
Start: 2020-10-20 | End: 2020-11-09 | Stop reason: HOSPADM

## 2020-10-20 RX ADMIN — PRIMIDONE 250 MG: 250 TABLET ORAL at 17:00

## 2020-10-20 RX ADMIN — SODIUM CHLORIDE, PRESERVATIVE FREE 10 ML: 5 INJECTION INTRAVENOUS at 08:37

## 2020-10-20 RX ADMIN — GABAPENTIN 800 MG: 400 CAPSULE ORAL at 08:35

## 2020-10-20 RX ADMIN — OLANZAPINE 5 MG: 5 TABLET, FILM COATED ORAL at 20:59

## 2020-10-20 RX ADMIN — ENOXAPARIN SODIUM 40 MG: 40 INJECTION SUBCUTANEOUS at 17:15

## 2020-10-20 RX ADMIN — ACETAMINOPHEN 650 MG: 325 TABLET, FILM COATED ORAL at 02:34

## 2020-10-20 RX ADMIN — ACETAMINOPHEN 650 MG: 325 TABLET, FILM COATED ORAL at 14:13

## 2020-10-20 RX ADMIN — GABAPENTIN 800 MG: 400 CAPSULE ORAL at 17:00

## 2020-10-20 RX ADMIN — PROPRANOLOL HYDROCHLORIDE 40 MG: 10 TABLET ORAL at 08:35

## 2020-10-20 RX ADMIN — FLUOXETINE 40 MG: 20 CAPSULE ORAL at 08:35

## 2020-10-20 RX ADMIN — ACETAMINOPHEN 325 MG: 325 TABLET, FILM COATED ORAL at 20:59

## 2020-10-20 RX ADMIN — CLONIDINE HYDROCHLORIDE 0.1 MG: 0.1 TABLET ORAL at 21:13

## 2020-10-20 RX ADMIN — PROPRANOLOL HYDROCHLORIDE 40 MG: 40 TABLET ORAL at 21:01

## 2020-10-20 RX ADMIN — GABAPENTIN 800 MG: 400 CAPSULE ORAL at 21:10

## 2020-10-20 RX ADMIN — CLONAZEPAM 0.5 MG: 0.5 TABLET ORAL at 15:24

## 2020-10-20 RX ADMIN — CEFTRIAXONE 2 G: 2 INJECTION, SOLUTION INTRAVENOUS at 18:31

## 2020-10-20 RX ADMIN — ENOXAPARIN SODIUM 40 MG: 40 INJECTION SUBCUTANEOUS at 05:50

## 2020-10-20 RX ADMIN — PRIMIDONE 250 MG: 250 TABLET ORAL at 08:35

## 2020-10-20 RX ADMIN — CLONAZEPAM 0.5 MG: 0.5 TABLET ORAL at 02:34

## 2020-10-20 RX ADMIN — PRIMIDONE 250 MG: 250 TABLET ORAL at 21:10

## 2020-10-20 RX ADMIN — AMLODIPINE BESYLATE 5 MG: 5 TABLET ORAL at 08:35

## 2020-10-20 RX ADMIN — TEMAZEPAM 30 MG: 15 CAPSULE ORAL at 20:59

## 2020-10-20 RX ADMIN — FLUCONAZOLE 200 MG: 100 TABLET ORAL at 18:31

## 2020-10-21 ENCOUNTER — APPOINTMENT (OUTPATIENT)
Dept: ULTRASOUND IMAGING | Facility: HOSPITAL | Age: 55
End: 2020-10-21

## 2020-10-21 LAB
GLUCOSE BLDC GLUCOMTR-MCNC: 116 MG/DL (ref 70–130)
GLUCOSE BLDC GLUCOMTR-MCNC: 119 MG/DL (ref 70–130)
GLUCOSE BLDC GLUCOMTR-MCNC: 141 MG/DL (ref 70–130)

## 2020-10-21 PROCEDURE — 97161 PT EVAL LOW COMPLEX 20 MIN: CPT

## 2020-10-21 PROCEDURE — 97165 OT EVAL LOW COMPLEX 30 MIN: CPT

## 2020-10-21 PROCEDURE — 25010000002 CEFTRIAXONE SODIUM-DEXTROSE 2-2.22 GM-%(50ML) RECONSTITUTED SOLUTION: Performed by: INTERNAL MEDICINE

## 2020-10-21 PROCEDURE — 82962 GLUCOSE BLOOD TEST: CPT

## 2020-10-21 PROCEDURE — 93923 UPR/LXTR ART STDY 3+ LVLS: CPT

## 2020-10-21 PROCEDURE — 63710000001 DIPHENHYDRAMINE PER 50 MG: Performed by: NURSE PRACTITIONER

## 2020-10-21 PROCEDURE — 25010000002 ENOXAPARIN PER 10 MG: Performed by: INTERNAL MEDICINE

## 2020-10-21 RX ORDER — DIPHENHYDRAMINE HCL 25 MG
25 CAPSULE ORAL EVERY 6 HOURS PRN
Status: DISCONTINUED | OUTPATIENT
Start: 2020-10-21 | End: 2020-11-09 | Stop reason: HOSPADM

## 2020-10-21 RX ADMIN — ENOXAPARIN SODIUM 40 MG: 40 INJECTION SUBCUTANEOUS at 18:32

## 2020-10-21 RX ADMIN — TUBERCULIN PURIFIED PROTEIN DERIVATIVE 5 UNITS: 5 INJECTION INTRADERMAL at 05:43

## 2020-10-21 RX ADMIN — PRIMIDONE 250 MG: 250 TABLET ORAL at 20:52

## 2020-10-21 RX ADMIN — PRIMIDONE 250 MG: 250 TABLET ORAL at 09:20

## 2020-10-21 RX ADMIN — CLONIDINE HYDROCHLORIDE 0.1 MG: 0.1 TABLET ORAL at 09:21

## 2020-10-21 RX ADMIN — PRIMIDONE 250 MG: 250 TABLET ORAL at 15:50

## 2020-10-21 RX ADMIN — TEMAZEPAM 30 MG: 15 CAPSULE ORAL at 20:52

## 2020-10-21 RX ADMIN — PROPRANOLOL HYDROCHLORIDE 40 MG: 40 TABLET ORAL at 20:52

## 2020-10-21 RX ADMIN — ACETAMINOPHEN 325 MG: 325 TABLET, FILM COATED ORAL at 20:57

## 2020-10-21 RX ADMIN — FLUCONAZOLE 200 MG: 100 TABLET ORAL at 18:31

## 2020-10-21 RX ADMIN — PROPRANOLOL HYDROCHLORIDE 40 MG: 40 TABLET ORAL at 09:20

## 2020-10-21 RX ADMIN — OLANZAPINE 5 MG: 5 TABLET, FILM COATED ORAL at 20:52

## 2020-10-21 RX ADMIN — ENOXAPARIN SODIUM 40 MG: 40 INJECTION SUBCUTANEOUS at 05:43

## 2020-10-21 RX ADMIN — CLONIDINE HYDROCHLORIDE 0.1 MG: 0.1 TABLET ORAL at 20:52

## 2020-10-21 RX ADMIN — AMLODIPINE BESYLATE 5 MG: 5 TABLET ORAL at 09:20

## 2020-10-21 RX ADMIN — PANTOPRAZOLE SODIUM 40 MG: 40 TABLET, DELAYED RELEASE ORAL at 06:29

## 2020-10-21 RX ADMIN — GABAPENTIN 800 MG: 400 CAPSULE ORAL at 20:52

## 2020-10-21 RX ADMIN — DIPHENHYDRAMINE HYDROCHLORIDE 25 MG: 25 CAPSULE ORAL at 06:30

## 2020-10-21 RX ADMIN — CLONAZEPAM 0.5 MG: 0.5 TABLET ORAL at 15:50

## 2020-10-21 RX ADMIN — GABAPENTIN 800 MG: 400 CAPSULE ORAL at 09:20

## 2020-10-21 RX ADMIN — ACETAMINOPHEN 325 MG: 325 TABLET, FILM COATED ORAL at 05:42

## 2020-10-21 RX ADMIN — CEFTRIAXONE 2 G: 2 INJECTION, SOLUTION INTRAVENOUS at 18:31

## 2020-10-21 RX ADMIN — FLUOXETINE 40 MG: 20 CAPSULE ORAL at 09:21

## 2020-10-21 RX ADMIN — CLONAZEPAM 0.5 MG: 0.5 TABLET ORAL at 05:42

## 2020-10-21 RX ADMIN — GABAPENTIN 800 MG: 400 CAPSULE ORAL at 15:50

## 2020-10-22 LAB
GLUCOSE BLDC GLUCOMTR-MCNC: 113 MG/DL (ref 70–130)
GLUCOSE BLDC GLUCOMTR-MCNC: 120 MG/DL (ref 70–130)
GLUCOSE BLDC GLUCOMTR-MCNC: 129 MG/DL (ref 70–130)
GLUCOSE BLDC GLUCOMTR-MCNC: 151 MG/DL (ref 70–130)
SARS-COV-2 RNA RESP QL NAA+PROBE: NOT DETECTED

## 2020-10-22 PROCEDURE — 82962 GLUCOSE BLOOD TEST: CPT

## 2020-10-22 PROCEDURE — 25010000002 ENOXAPARIN PER 10 MG: Performed by: INTERNAL MEDICINE

## 2020-10-22 PROCEDURE — 25010000002 CEFTRIAXONE SODIUM-DEXTROSE 2-2.22 GM-%(50ML) RECONSTITUTED SOLUTION: Performed by: INTERNAL MEDICINE

## 2020-10-22 RX ORDER — QUETIAPINE FUMARATE 100 MG/1
200 TABLET, FILM COATED ORAL NIGHTLY
Status: DISCONTINUED | OUTPATIENT
Start: 2020-10-22 | End: 2020-11-09 | Stop reason: HOSPADM

## 2020-10-22 RX ORDER — CLONAZEPAM 0.5 MG/1
0.5 TABLET ORAL 3 TIMES DAILY PRN
Status: DISCONTINUED | OUTPATIENT
Start: 2020-10-22 | End: 2020-11-09 | Stop reason: HOSPADM

## 2020-10-22 RX ORDER — OXYCODONE AND ACETAMINOPHEN 7.5; 325 MG/1; MG/1
1 TABLET ORAL EVERY 6 HOURS PRN
Status: DISCONTINUED | OUTPATIENT
Start: 2020-10-22 | End: 2020-11-09 | Stop reason: HOSPADM

## 2020-10-22 RX ADMIN — QUETIAPINE FUMARATE 200 MG: 100 TABLET ORAL at 21:01

## 2020-10-22 RX ADMIN — CLONAZEPAM 0.5 MG: 0.5 TABLET ORAL at 21:02

## 2020-10-22 RX ADMIN — FLUOXETINE 40 MG: 20 CAPSULE ORAL at 10:11

## 2020-10-22 RX ADMIN — ENOXAPARIN SODIUM 40 MG: 40 INJECTION SUBCUTANEOUS at 06:09

## 2020-10-22 RX ADMIN — CLONAZEPAM 0.5 MG: 0.5 TABLET ORAL at 13:49

## 2020-10-22 RX ADMIN — PRIMIDONE 250 MG: 250 TABLET ORAL at 10:11

## 2020-10-22 RX ADMIN — PROPRANOLOL HYDROCHLORIDE 40 MG: 40 TABLET ORAL at 21:01

## 2020-10-22 RX ADMIN — ENOXAPARIN SODIUM 40 MG: 40 INJECTION SUBCUTANEOUS at 18:37

## 2020-10-22 RX ADMIN — GABAPENTIN 800 MG: 400 CAPSULE ORAL at 21:01

## 2020-10-22 RX ADMIN — GABAPENTIN 800 MG: 400 CAPSULE ORAL at 16:56

## 2020-10-22 RX ADMIN — CLONIDINE HYDROCHLORIDE 0.1 MG: 0.1 TABLET ORAL at 21:02

## 2020-10-22 RX ADMIN — PROPRANOLOL HYDROCHLORIDE 40 MG: 40 TABLET ORAL at 10:11

## 2020-10-22 RX ADMIN — CEFTRIAXONE 2 G: 2 INJECTION, SOLUTION INTRAVENOUS at 18:37

## 2020-10-22 RX ADMIN — AMLODIPINE BESYLATE 5 MG: 5 TABLET ORAL at 10:11

## 2020-10-22 RX ADMIN — OXYCODONE HYDROCHLORIDE AND ACETAMINOPHEN 1 TABLET: 7.5; 325 TABLET ORAL at 21:02

## 2020-10-22 RX ADMIN — GABAPENTIN 800 MG: 400 CAPSULE ORAL at 10:11

## 2020-10-22 RX ADMIN — ACETAMINOPHEN 325 MG: 325 TABLET, FILM COATED ORAL at 10:17

## 2020-10-22 RX ADMIN — OXYCODONE HYDROCHLORIDE AND ACETAMINOPHEN 1 TABLET: 7.5; 325 TABLET ORAL at 13:49

## 2020-10-22 RX ADMIN — ACETAMINOPHEN 325 MG: 325 TABLET, FILM COATED ORAL at 06:09

## 2020-10-22 RX ADMIN — FLUCONAZOLE 200 MG: 100 TABLET ORAL at 18:37

## 2020-10-22 RX ADMIN — PANTOPRAZOLE SODIUM 40 MG: 40 TABLET, DELAYED RELEASE ORAL at 06:09

## 2020-10-22 RX ADMIN — CLONAZEPAM 0.5 MG: 0.5 TABLET ORAL at 06:09

## 2020-10-22 RX ADMIN — TEMAZEPAM 30 MG: 15 CAPSULE ORAL at 21:01

## 2020-10-22 RX ADMIN — CLONIDINE HYDROCHLORIDE 0.1 MG: 0.1 TABLET ORAL at 10:11

## 2020-10-22 NOTE — ANESTHESIA PREPROCEDURE EVALUATION
Anesthesia Evaluation     Patient summary reviewed and Nursing notes reviewed   no history of anesthetic complications:  NPO Solid Status: > 8 hours  NPO Liquid Status: > 8 hours           Airway   Mallampati: II  TM distance: >3 FB  Neck ROM: full  No difficulty expected  Dental      Pulmonary     breath sounds clear to auscultation  (+) COPD (pt denies), sleep apnea on CPAP,   Cardiovascular   Exercise tolerance: poor (<4 METS)    ECG reviewed  Rhythm: regular  Rate: normal    (+) hypertension well controlled, past MI (2009? pt states due to running out of HTN meds) , CAD, angina (pt states non for abt 10wks), DVT resolved, hyperlipidemia,     ROS comment: RR Interval= 632 ms  HI Interval= 156 ms  QRSD Interval= 98 ms  QT Interval= 384 ms  QTc Interval= 483 ms  Heart Rate= 95 ms  P Axis= 34 deg  QRS Axis= 60 deg  T Wave Axis= 32 deg  I: 40 Axis= 31 deg  T: 40 Axis= 109 deg  ST Axis= 54 deg  SINUS RHYTHM  EARLY PRECORDIAL R/S TRANSITION  BORDERLINE PROLONGED QT INTERVAL - new  Electronically Signed by:  Jb Cerda ([facility]) 05-Mar-2018 09:31:38  Date and Time of Study: 2018-03-04 22:48:28    Neuro/Psych  (+) CVA, tremors, numbness (hands and feet DM neuropathy ), psychiatric history Depression, Bipolar and Anxiety,     GI/Hepatic/Renal/Endo    (+) obesity,   diabetes mellitus type 2 well controlled,     ROS Comment: Disease of thyroid gland nodule on thyroid     Musculoskeletal     (+) neck pain, neck stiffness,   Abdominal   (+) obese,    Substance History      OB/GYN          Other   arthritis,        Other Comment: Toxic metabolic encephalopathy  Pseudogout  Septic shock                    Anesthesia Plan    ASA 3     MAC     intravenous induction     Anesthetic plan, all risks, benefits, and alternatives have been provided, discussed and informed consent has been obtained with: patient.  Use of blood products discussed with patient  Consented to blood products.

## 2020-10-23 LAB
GLUCOSE BLDC GLUCOMTR-MCNC: 105 MG/DL (ref 70–130)
GLUCOSE BLDC GLUCOMTR-MCNC: 115 MG/DL (ref 70–130)
GLUCOSE BLDC GLUCOMTR-MCNC: 131 MG/DL (ref 70–130)

## 2020-10-23 PROCEDURE — 82962 GLUCOSE BLOOD TEST: CPT

## 2020-10-23 PROCEDURE — 25010000002 ALTEPLASE 2 MG RECONSTITUTED SOLUTION 1 EACH VIAL: Performed by: NURSE PRACTITIONER

## 2020-10-23 PROCEDURE — 25010000002 ENOXAPARIN PER 10 MG: Performed by: INTERNAL MEDICINE

## 2020-10-23 PROCEDURE — 63710000001 DIPHENHYDRAMINE PER 50 MG: Performed by: NURSE PRACTITIONER

## 2020-10-23 PROCEDURE — 25010000002 CEFTRIAXONE SODIUM-DEXTROSE 2-2.22 GM-%(50ML) RECONSTITUTED SOLUTION: Performed by: INTERNAL MEDICINE

## 2020-10-23 RX ADMIN — GABAPENTIN 800 MG: 400 CAPSULE ORAL at 09:02

## 2020-10-23 RX ADMIN — PANTOPRAZOLE SODIUM 40 MG: 40 TABLET, DELAYED RELEASE ORAL at 05:27

## 2020-10-23 RX ADMIN — CEFTRIAXONE 2 G: 2 INJECTION, SOLUTION INTRAVENOUS at 18:13

## 2020-10-23 RX ADMIN — PROPRANOLOL HYDROCHLORIDE 40 MG: 40 TABLET ORAL at 09:03

## 2020-10-23 RX ADMIN — AMLODIPINE BESYLATE 5 MG: 5 TABLET ORAL at 09:03

## 2020-10-23 RX ADMIN — CLONAZEPAM 0.5 MG: 0.5 TABLET ORAL at 15:17

## 2020-10-23 RX ADMIN — PROPRANOLOL HYDROCHLORIDE 40 MG: 40 TABLET ORAL at 21:16

## 2020-10-23 RX ADMIN — OXYCODONE HYDROCHLORIDE AND ACETAMINOPHEN 1 TABLET: 7.5; 325 TABLET ORAL at 21:16

## 2020-10-23 RX ADMIN — DIPHENHYDRAMINE HYDROCHLORIDE 25 MG: 25 CAPSULE ORAL at 21:27

## 2020-10-23 RX ADMIN — CLONAZEPAM 0.5 MG: 0.5 TABLET ORAL at 05:26

## 2020-10-23 RX ADMIN — OXYCODONE HYDROCHLORIDE AND ACETAMINOPHEN 1 TABLET: 7.5; 325 TABLET ORAL at 13:01

## 2020-10-23 RX ADMIN — ENOXAPARIN SODIUM 40 MG: 40 INJECTION SUBCUTANEOUS at 18:13

## 2020-10-23 RX ADMIN — QUETIAPINE FUMARATE 200 MG: 100 TABLET ORAL at 21:17

## 2020-10-23 RX ADMIN — ALTEPLASE: 2.2 INJECTION, POWDER, LYOPHILIZED, FOR SOLUTION INTRAVENOUS at 10:12

## 2020-10-23 RX ADMIN — FLUCONAZOLE 200 MG: 100 TABLET ORAL at 18:14

## 2020-10-23 RX ADMIN — TEMAZEPAM 30 MG: 15 CAPSULE ORAL at 21:17

## 2020-10-23 RX ADMIN — GABAPENTIN 800 MG: 400 CAPSULE ORAL at 15:17

## 2020-10-23 RX ADMIN — FLUOXETINE 40 MG: 20 CAPSULE ORAL at 09:03

## 2020-10-23 RX ADMIN — CLONIDINE HYDROCHLORIDE 0.1 MG: 0.1 TABLET ORAL at 21:24

## 2020-10-23 RX ADMIN — OXYCODONE HYDROCHLORIDE AND ACETAMINOPHEN 1 TABLET: 7.5; 325 TABLET ORAL at 05:27

## 2020-10-23 RX ADMIN — ENOXAPARIN SODIUM 40 MG: 40 INJECTION SUBCUTANEOUS at 05:26

## 2020-10-23 RX ADMIN — CLONIDINE HYDROCHLORIDE 0.1 MG: 0.1 TABLET ORAL at 09:03

## 2020-10-23 RX ADMIN — GABAPENTIN 800 MG: 400 CAPSULE ORAL at 21:16

## 2020-10-24 LAB
GLUCOSE BLDC GLUCOMTR-MCNC: 105 MG/DL (ref 70–130)
GLUCOSE BLDC GLUCOMTR-MCNC: 123 MG/DL (ref 70–130)

## 2020-10-24 PROCEDURE — U0004 COV-19 TEST NON-CDC HGH THRU: HCPCS | Performed by: INTERNAL MEDICINE

## 2020-10-24 PROCEDURE — 25010000002 ENOXAPARIN PER 10 MG: Performed by: INTERNAL MEDICINE

## 2020-10-24 PROCEDURE — 82962 GLUCOSE BLOOD TEST: CPT

## 2020-10-24 PROCEDURE — 25010000002 CEFTRIAXONE SODIUM-DEXTROSE 2-2.22 GM-%(50ML) RECONSTITUTED SOLUTION: Performed by: INTERNAL MEDICINE

## 2020-10-24 PROCEDURE — 63710000001 DIPHENHYDRAMINE PER 50 MG: Performed by: NURSE PRACTITIONER

## 2020-10-24 RX ADMIN — ENOXAPARIN SODIUM 40 MG: 40 INJECTION SUBCUTANEOUS at 18:41

## 2020-10-24 RX ADMIN — CEFTRIAXONE 2 G: 2 INJECTION, SOLUTION INTRAVENOUS at 18:41

## 2020-10-24 RX ADMIN — CLONAZEPAM 0.5 MG: 0.5 TABLET ORAL at 13:02

## 2020-10-24 RX ADMIN — CLONIDINE HYDROCHLORIDE 0.1 MG: 0.1 TABLET ORAL at 09:19

## 2020-10-24 RX ADMIN — FLUOXETINE 40 MG: 20 CAPSULE ORAL at 09:19

## 2020-10-24 RX ADMIN — GABAPENTIN 800 MG: 400 CAPSULE ORAL at 15:32

## 2020-10-24 RX ADMIN — TEMAZEPAM 30 MG: 15 CAPSULE ORAL at 20:14

## 2020-10-24 RX ADMIN — OXYCODONE HYDROCHLORIDE AND ACETAMINOPHEN 1 TABLET: 7.5; 325 TABLET ORAL at 05:46

## 2020-10-24 RX ADMIN — QUETIAPINE FUMARATE 200 MG: 100 TABLET ORAL at 20:14

## 2020-10-24 RX ADMIN — OXYCODONE HYDROCHLORIDE AND ACETAMINOPHEN 1 TABLET: 7.5; 325 TABLET ORAL at 20:14

## 2020-10-24 RX ADMIN — PANTOPRAZOLE SODIUM 40 MG: 40 TABLET, DELAYED RELEASE ORAL at 06:02

## 2020-10-24 RX ADMIN — CLONIDINE HYDROCHLORIDE 0.1 MG: 0.1 TABLET ORAL at 20:14

## 2020-10-24 RX ADMIN — AMLODIPINE BESYLATE 5 MG: 5 TABLET ORAL at 09:19

## 2020-10-24 RX ADMIN — GABAPENTIN 800 MG: 400 CAPSULE ORAL at 20:14

## 2020-10-24 RX ADMIN — GABAPENTIN 800 MG: 400 CAPSULE ORAL at 09:19

## 2020-10-24 RX ADMIN — PROPRANOLOL HYDROCHLORIDE 40 MG: 40 TABLET ORAL at 20:14

## 2020-10-24 RX ADMIN — PROPRANOLOL HYDROCHLORIDE 40 MG: 40 TABLET ORAL at 09:19

## 2020-10-24 RX ADMIN — OXYCODONE HYDROCHLORIDE AND ACETAMINOPHEN 1 TABLET: 7.5; 325 TABLET ORAL at 13:03

## 2020-10-24 RX ADMIN — DIPHENHYDRAMINE HYDROCHLORIDE 25 MG: 25 CAPSULE ORAL at 15:32

## 2020-10-24 RX ADMIN — CLONAZEPAM 0.5 MG: 0.5 TABLET ORAL at 05:47

## 2020-10-24 RX ADMIN — ENOXAPARIN SODIUM 40 MG: 40 INJECTION SUBCUTANEOUS at 05:46

## 2020-10-25 LAB
GLUCOSE BLDC GLUCOMTR-MCNC: 108 MG/DL (ref 70–130)
GLUCOSE BLDC GLUCOMTR-MCNC: 117 MG/DL (ref 70–130)

## 2020-10-25 PROCEDURE — 25010000002 ENOXAPARIN PER 10 MG: Performed by: INTERNAL MEDICINE

## 2020-10-25 PROCEDURE — 82962 GLUCOSE BLOOD TEST: CPT

## 2020-10-25 PROCEDURE — 25010000002 CEFTRIAXONE SODIUM-DEXTROSE 2-2.22 GM-%(50ML) RECONSTITUTED SOLUTION: Performed by: INTERNAL MEDICINE

## 2020-10-25 PROCEDURE — 63710000001 DIPHENHYDRAMINE PER 50 MG: Performed by: NURSE PRACTITIONER

## 2020-10-25 RX ADMIN — CLONIDINE HYDROCHLORIDE 0.1 MG: 0.1 TABLET ORAL at 21:43

## 2020-10-25 RX ADMIN — CLONIDINE HYDROCHLORIDE 0.1 MG: 0.1 TABLET ORAL at 09:40

## 2020-10-25 RX ADMIN — ENOXAPARIN SODIUM 40 MG: 40 INJECTION SUBCUTANEOUS at 17:10

## 2020-10-25 RX ADMIN — OXYCODONE HYDROCHLORIDE AND ACETAMINOPHEN 1 TABLET: 7.5; 325 TABLET ORAL at 21:44

## 2020-10-25 RX ADMIN — CEFTRIAXONE 2 G: 2 INJECTION, SOLUTION INTRAVENOUS at 17:10

## 2020-10-25 RX ADMIN — CLONAZEPAM 0.5 MG: 0.5 TABLET ORAL at 10:46

## 2020-10-25 RX ADMIN — GABAPENTIN 800 MG: 400 CAPSULE ORAL at 17:10

## 2020-10-25 RX ADMIN — TEMAZEPAM 30 MG: 15 CAPSULE ORAL at 21:43

## 2020-10-25 RX ADMIN — PROPRANOLOL HYDROCHLORIDE 40 MG: 40 TABLET ORAL at 09:40

## 2020-10-25 RX ADMIN — FLUOXETINE 40 MG: 20 CAPSULE ORAL at 09:40

## 2020-10-25 RX ADMIN — CLONAZEPAM 0.5 MG: 0.5 TABLET ORAL at 21:43

## 2020-10-25 RX ADMIN — GABAPENTIN 800 MG: 400 CAPSULE ORAL at 21:43

## 2020-10-25 RX ADMIN — QUETIAPINE FUMARATE 200 MG: 100 TABLET ORAL at 21:43

## 2020-10-25 RX ADMIN — PROPRANOLOL HYDROCHLORIDE 40 MG: 40 TABLET ORAL at 21:43

## 2020-10-25 RX ADMIN — AMLODIPINE BESYLATE 5 MG: 5 TABLET ORAL at 09:40

## 2020-10-25 RX ADMIN — OXYCODONE HYDROCHLORIDE AND ACETAMINOPHEN 1 TABLET: 7.5; 325 TABLET ORAL at 13:42

## 2020-10-25 RX ADMIN — DIPHENHYDRAMINE HYDROCHLORIDE 25 MG: 25 CAPSULE ORAL at 21:43

## 2020-10-25 RX ADMIN — GABAPENTIN 800 MG: 400 CAPSULE ORAL at 09:40

## 2020-10-25 RX ADMIN — ENOXAPARIN SODIUM 40 MG: 40 INJECTION SUBCUTANEOUS at 05:01

## 2020-10-25 RX ADMIN — PANTOPRAZOLE SODIUM 40 MG: 40 TABLET, DELAYED RELEASE ORAL at 06:03

## 2020-10-25 RX ADMIN — OXYCODONE HYDROCHLORIDE AND ACETAMINOPHEN 1 TABLET: 7.5; 325 TABLET ORAL at 04:37

## 2020-10-25 RX ADMIN — DIPHENHYDRAMINE HYDROCHLORIDE 25 MG: 25 CAPSULE ORAL at 04:37

## 2020-10-26 LAB
BASOPHILS # BLD AUTO: 0.08 10*3/MM3 (ref 0–0.2)
BASOPHILS NFR BLD AUTO: 1.4 % (ref 0–1.5)
CRP SERPL-MCNC: 0.75 MG/DL (ref 0–0.5)
DEPRECATED RDW RBC AUTO: 49.4 FL (ref 37–54)
EOSINOPHIL # BLD AUTO: 0.26 10*3/MM3 (ref 0–0.4)
EOSINOPHIL NFR BLD AUTO: 4.5 % (ref 0.3–6.2)
ERYTHROCYTE [DISTWIDTH] IN BLOOD BY AUTOMATED COUNT: 14.4 % (ref 12.3–15.4)
ERYTHROCYTE [SEDIMENTATION RATE] IN BLOOD: 38 MM/HR (ref 0–20)
GLUCOSE BLDC GLUCOMTR-MCNC: 106 MG/DL (ref 70–130)
HCT VFR BLD AUTO: 42.5 % (ref 37.5–51)
HGB BLD-MCNC: 13.5 G/DL (ref 13–17.7)
IMM GRANULOCYTES # BLD AUTO: 0.02 10*3/MM3 (ref 0–0.05)
IMM GRANULOCYTES NFR BLD AUTO: 0.3 % (ref 0–0.5)
LYMPHOCYTES # BLD AUTO: 1.48 10*3/MM3 (ref 0.7–3.1)
LYMPHOCYTES NFR BLD AUTO: 25.5 % (ref 19.6–45.3)
MCH RBC QN AUTO: 29.5 PG (ref 26.6–33)
MCHC RBC AUTO-ENTMCNC: 31.8 G/DL (ref 31.5–35.7)
MCV RBC AUTO: 92.8 FL (ref 79–97)
MONOCYTES # BLD AUTO: 0.54 10*3/MM3 (ref 0.1–0.9)
MONOCYTES NFR BLD AUTO: 9.3 % (ref 5–12)
NEUTROPHILS NFR BLD AUTO: 3.42 10*3/MM3 (ref 1.7–7)
NEUTROPHILS NFR BLD AUTO: 59 % (ref 42.7–76)
NRBC BLD AUTO-RTO: 0 /100 WBC (ref 0–0.2)
PLATELET # BLD AUTO: 295 10*3/MM3 (ref 140–450)
PMV BLD AUTO: 8.7 FL (ref 6–12)
RBC # BLD AUTO: 4.58 10*6/MM3 (ref 4.14–5.8)
WBC # BLD AUTO: 5.8 10*3/MM3 (ref 3.4–10.8)

## 2020-10-26 PROCEDURE — 86140 C-REACTIVE PROTEIN: CPT | Performed by: NURSE PRACTITIONER

## 2020-10-26 PROCEDURE — 85025 COMPLETE CBC W/AUTO DIFF WBC: CPT | Performed by: NURSE PRACTITIONER

## 2020-10-26 PROCEDURE — 25010000002 ENOXAPARIN PER 10 MG: Performed by: INTERNAL MEDICINE

## 2020-10-26 PROCEDURE — 25010000002 CEFTRIAXONE SODIUM-DEXTROSE 2-2.22 GM-%(50ML) RECONSTITUTED SOLUTION: Performed by: INTERNAL MEDICINE

## 2020-10-26 PROCEDURE — 85652 RBC SED RATE AUTOMATED: CPT | Performed by: NURSE PRACTITIONER

## 2020-10-26 PROCEDURE — 63710000001 DIPHENHYDRAMINE PER 50 MG: Performed by: NURSE PRACTITIONER

## 2020-10-26 PROCEDURE — 99232 SBSQ HOSP IP/OBS MODERATE 35: CPT | Performed by: NURSE PRACTITIONER

## 2020-10-26 PROCEDURE — 82962 GLUCOSE BLOOD TEST: CPT

## 2020-10-26 RX ORDER — METFORMIN HYDROCHLORIDE 500 MG/1
500 TABLET, EXTENDED RELEASE ORAL
Status: DISCONTINUED | OUTPATIENT
Start: 2020-10-26 | End: 2020-11-09 | Stop reason: HOSPADM

## 2020-10-26 RX ORDER — PRIMIDONE 250 MG/1
250 TABLET ORAL 3 TIMES DAILY
Status: DISCONTINUED | OUTPATIENT
Start: 2020-10-26 | End: 2020-11-09 | Stop reason: HOSPADM

## 2020-10-26 RX ADMIN — DIPHENHYDRAMINE HYDROCHLORIDE 25 MG: 25 CAPSULE ORAL at 10:16

## 2020-10-26 RX ADMIN — CLONAZEPAM 0.5 MG: 0.5 TABLET ORAL at 20:37

## 2020-10-26 RX ADMIN — OXYCODONE HYDROCHLORIDE AND ACETAMINOPHEN 1 TABLET: 7.5; 325 TABLET ORAL at 14:46

## 2020-10-26 RX ADMIN — ENOXAPARIN SODIUM 40 MG: 40 INJECTION SUBCUTANEOUS at 06:08

## 2020-10-26 RX ADMIN — CLONAZEPAM 0.5 MG: 0.5 TABLET ORAL at 10:16

## 2020-10-26 RX ADMIN — ENOXAPARIN SODIUM 40 MG: 40 INJECTION SUBCUTANEOUS at 17:07

## 2020-10-26 RX ADMIN — METFORMIN HYDROCHLORIDE 500 MG: 500 TABLET, EXTENDED RELEASE ORAL at 08:59

## 2020-10-26 RX ADMIN — AMLODIPINE BESYLATE 5 MG: 5 TABLET ORAL at 08:59

## 2020-10-26 RX ADMIN — PROPRANOLOL HYDROCHLORIDE 40 MG: 40 TABLET ORAL at 08:59

## 2020-10-26 RX ADMIN — GABAPENTIN 800 MG: 400 CAPSULE ORAL at 20:37

## 2020-10-26 RX ADMIN — PROPRANOLOL HYDROCHLORIDE 40 MG: 40 TABLET ORAL at 20:37

## 2020-10-26 RX ADMIN — DIPHENHYDRAMINE HYDROCHLORIDE 25 MG: 25 CAPSULE ORAL at 20:37

## 2020-10-26 RX ADMIN — OXYCODONE HYDROCHLORIDE AND ACETAMINOPHEN 1 TABLET: 7.5; 325 TABLET ORAL at 06:08

## 2020-10-26 RX ADMIN — CLONIDINE HYDROCHLORIDE 0.1 MG: 0.1 TABLET ORAL at 08:59

## 2020-10-26 RX ADMIN — TEMAZEPAM 30 MG: 15 CAPSULE ORAL at 20:36

## 2020-10-26 RX ADMIN — FLUOXETINE 40 MG: 20 CAPSULE ORAL at 08:58

## 2020-10-26 RX ADMIN — PANTOPRAZOLE SODIUM 40 MG: 40 TABLET, DELAYED RELEASE ORAL at 06:08

## 2020-10-26 RX ADMIN — GABAPENTIN 800 MG: 400 CAPSULE ORAL at 17:07

## 2020-10-26 RX ADMIN — CLONIDINE HYDROCHLORIDE 0.1 MG: 0.1 TABLET ORAL at 20:37

## 2020-10-26 RX ADMIN — CEFTRIAXONE 2 G: 2 INJECTION, SOLUTION INTRAVENOUS at 17:08

## 2020-10-26 RX ADMIN — GABAPENTIN 800 MG: 400 CAPSULE ORAL at 08:59

## 2020-10-26 RX ADMIN — QUETIAPINE FUMARATE 200 MG: 100 TABLET ORAL at 20:36

## 2020-10-26 RX ADMIN — PRIMIDONE 250 MG: 250 TABLET ORAL at 20:37

## 2020-10-26 RX ADMIN — OXYCODONE HYDROCHLORIDE AND ACETAMINOPHEN 1 TABLET: 7.5; 325 TABLET ORAL at 20:37

## 2020-10-27 LAB
GLUCOSE BLDC GLUCOMTR-MCNC: 101 MG/DL (ref 70–130)
GLUCOSE BLDC GLUCOMTR-MCNC: 84 MG/DL (ref 70–130)
SARS-COV-2 RNA RESP QL NAA+PROBE: NOT DETECTED

## 2020-10-27 PROCEDURE — 25010000002 ENOXAPARIN PER 10 MG: Performed by: INTERNAL MEDICINE

## 2020-10-27 PROCEDURE — 82962 GLUCOSE BLOOD TEST: CPT

## 2020-10-27 PROCEDURE — 25010000002 CEFTRIAXONE SODIUM-DEXTROSE 2-2.22 GM-%(50ML) RECONSTITUTED SOLUTION: Performed by: INTERNAL MEDICINE

## 2020-10-27 PROCEDURE — U0004 COV-19 TEST NON-CDC HGH THRU: HCPCS | Performed by: PODIATRIST

## 2020-10-27 RX ADMIN — AMLODIPINE BESYLATE 5 MG: 5 TABLET ORAL at 09:22

## 2020-10-27 RX ADMIN — FLUOXETINE 40 MG: 20 CAPSULE ORAL at 09:22

## 2020-10-27 RX ADMIN — CLONIDINE HYDROCHLORIDE 0.1 MG: 0.1 TABLET ORAL at 22:27

## 2020-10-27 RX ADMIN — CLONIDINE HYDROCHLORIDE 0.1 MG: 0.1 TABLET ORAL at 09:22

## 2020-10-27 RX ADMIN — GABAPENTIN 800 MG: 400 CAPSULE ORAL at 16:57

## 2020-10-27 RX ADMIN — PRIMIDONE 250 MG: 250 TABLET ORAL at 09:22

## 2020-10-27 RX ADMIN — TEMAZEPAM 30 MG: 15 CAPSULE ORAL at 22:27

## 2020-10-27 RX ADMIN — GABAPENTIN 800 MG: 400 CAPSULE ORAL at 22:27

## 2020-10-27 RX ADMIN — OXYCODONE HYDROCHLORIDE AND ACETAMINOPHEN 1 TABLET: 7.5; 325 TABLET ORAL at 06:14

## 2020-10-27 RX ADMIN — ENOXAPARIN SODIUM 40 MG: 40 INJECTION SUBCUTANEOUS at 16:58

## 2020-10-27 RX ADMIN — GABAPENTIN 800 MG: 400 CAPSULE ORAL at 09:22

## 2020-10-27 RX ADMIN — PRIMIDONE 250 MG: 250 TABLET ORAL at 22:27

## 2020-10-27 RX ADMIN — METFORMIN HYDROCHLORIDE 500 MG: 500 TABLET, EXTENDED RELEASE ORAL at 09:22

## 2020-10-27 RX ADMIN — PROPRANOLOL HYDROCHLORIDE 40 MG: 40 TABLET ORAL at 09:22

## 2020-10-27 RX ADMIN — QUETIAPINE FUMARATE 200 MG: 100 TABLET ORAL at 22:28

## 2020-10-27 RX ADMIN — OXYCODONE HYDROCHLORIDE AND ACETAMINOPHEN 1 TABLET: 7.5; 325 TABLET ORAL at 14:39

## 2020-10-27 RX ADMIN — PRIMIDONE 250 MG: 250 TABLET ORAL at 16:58

## 2020-10-27 RX ADMIN — CEFTRIAXONE 2 G: 2 INJECTION, SOLUTION INTRAVENOUS at 17:00

## 2020-10-27 RX ADMIN — CLONAZEPAM 0.5 MG: 0.5 TABLET ORAL at 22:27

## 2020-10-27 RX ADMIN — PROPRANOLOL HYDROCHLORIDE 40 MG: 40 TABLET ORAL at 22:28

## 2020-10-27 RX ADMIN — OXYCODONE HYDROCHLORIDE AND ACETAMINOPHEN 1 TABLET: 7.5; 325 TABLET ORAL at 22:28

## 2020-10-27 RX ADMIN — PANTOPRAZOLE SODIUM 40 MG: 40 TABLET, DELAYED RELEASE ORAL at 06:14

## 2020-10-27 RX ADMIN — ENOXAPARIN SODIUM 40 MG: 40 INJECTION SUBCUTANEOUS at 06:14

## 2020-10-27 RX ADMIN — CLONAZEPAM 0.5 MG: 0.5 TABLET ORAL at 10:41

## 2020-10-28 LAB
GLUCOSE BLDC GLUCOMTR-MCNC: 98 MG/DL (ref 70–130)
SARS-COV-2 RNA RESP QL NAA+PROBE: NOT DETECTED

## 2020-10-28 PROCEDURE — 25010000002 ENOXAPARIN PER 10 MG: Performed by: INTERNAL MEDICINE

## 2020-10-28 PROCEDURE — 25010000002 CEFTRIAXONE SODIUM-DEXTROSE 2-2.22 GM-%(50ML) RECONSTITUTED SOLUTION: Performed by: INTERNAL MEDICINE

## 2020-10-28 PROCEDURE — 63710000001 DIPHENHYDRAMINE PER 50 MG: Performed by: NURSE PRACTITIONER

## 2020-10-28 PROCEDURE — 82962 GLUCOSE BLOOD TEST: CPT

## 2020-10-28 RX ADMIN — PROPRANOLOL HYDROCHLORIDE 40 MG: 40 TABLET ORAL at 08:01

## 2020-10-28 RX ADMIN — GABAPENTIN 800 MG: 400 CAPSULE ORAL at 08:01

## 2020-10-28 RX ADMIN — METFORMIN HYDROCHLORIDE 500 MG: 500 TABLET, EXTENDED RELEASE ORAL at 08:01

## 2020-10-28 RX ADMIN — PANTOPRAZOLE SODIUM 40 MG: 40 TABLET, DELAYED RELEASE ORAL at 06:23

## 2020-10-28 RX ADMIN — PRIMIDONE 250 MG: 250 TABLET ORAL at 21:34

## 2020-10-28 RX ADMIN — CEFTRIAXONE 2 G: 2 INJECTION, SOLUTION INTRAVENOUS at 18:39

## 2020-10-28 RX ADMIN — FLUOXETINE 40 MG: 20 CAPSULE ORAL at 08:01

## 2020-10-28 RX ADMIN — QUETIAPINE FUMARATE 200 MG: 100 TABLET ORAL at 21:34

## 2020-10-28 RX ADMIN — TEMAZEPAM 30 MG: 15 CAPSULE ORAL at 21:34

## 2020-10-28 RX ADMIN — GABAPENTIN 800 MG: 400 CAPSULE ORAL at 21:34

## 2020-10-28 RX ADMIN — OXYCODONE HYDROCHLORIDE AND ACETAMINOPHEN 1 TABLET: 7.5; 325 TABLET ORAL at 21:34

## 2020-10-28 RX ADMIN — CLONAZEPAM 0.5 MG: 0.5 TABLET ORAL at 21:34

## 2020-10-28 RX ADMIN — CLONIDINE HYDROCHLORIDE 0.1 MG: 0.1 TABLET ORAL at 08:01

## 2020-10-28 RX ADMIN — PRIMIDONE 250 MG: 250 TABLET ORAL at 08:01

## 2020-10-28 RX ADMIN — ENOXAPARIN SODIUM 40 MG: 40 INJECTION SUBCUTANEOUS at 06:23

## 2020-10-28 RX ADMIN — PROPRANOLOL HYDROCHLORIDE 40 MG: 40 TABLET ORAL at 21:34

## 2020-10-28 RX ADMIN — DIPHENHYDRAMINE HYDROCHLORIDE 25 MG: 25 CAPSULE ORAL at 08:01

## 2020-10-28 RX ADMIN — PRIMIDONE 250 MG: 250 TABLET ORAL at 15:53

## 2020-10-28 RX ADMIN — GABAPENTIN 800 MG: 400 CAPSULE ORAL at 15:53

## 2020-10-28 RX ADMIN — ENOXAPARIN SODIUM 40 MG: 40 INJECTION SUBCUTANEOUS at 18:39

## 2020-10-28 RX ADMIN — AMLODIPINE BESYLATE 5 MG: 5 TABLET ORAL at 08:01

## 2020-10-28 RX ADMIN — CLONAZEPAM 0.5 MG: 0.5 TABLET ORAL at 11:26

## 2020-10-28 RX ADMIN — CLONIDINE HYDROCHLORIDE 0.1 MG: 0.1 TABLET ORAL at 21:34

## 2020-10-28 RX ADMIN — OXYCODONE HYDROCHLORIDE AND ACETAMINOPHEN 1 TABLET: 7.5; 325 TABLET ORAL at 07:51

## 2020-10-28 RX ADMIN — OXYCODONE HYDROCHLORIDE AND ACETAMINOPHEN 1 TABLET: 7.5; 325 TABLET ORAL at 15:53

## 2020-10-29 LAB
GLUCOSE BLDC GLUCOMTR-MCNC: 111 MG/DL (ref 70–130)
GLUCOSE BLDC GLUCOMTR-MCNC: 134 MG/DL (ref 70–130)

## 2020-10-29 PROCEDURE — 25010000002 ALTEPLASE 2 MG RECONSTITUTED SOLUTION 1 EACH VIAL: Performed by: NURSE PRACTITIONER

## 2020-10-29 PROCEDURE — 25010000002 ENOXAPARIN PER 10 MG: Performed by: INTERNAL MEDICINE

## 2020-10-29 PROCEDURE — 25010000002 CEFTRIAXONE SODIUM-DEXTROSE 2-2.22 GM-%(50ML) RECONSTITUTED SOLUTION: Performed by: INTERNAL MEDICINE

## 2020-10-29 PROCEDURE — 63710000001 DIPHENHYDRAMINE PER 50 MG: Performed by: NURSE PRACTITIONER

## 2020-10-29 PROCEDURE — 82962 GLUCOSE BLOOD TEST: CPT

## 2020-10-29 RX ADMIN — CLONIDINE HYDROCHLORIDE 0.1 MG: 0.1 TABLET ORAL at 08:53

## 2020-10-29 RX ADMIN — METFORMIN HYDROCHLORIDE 500 MG: 500 TABLET, EXTENDED RELEASE ORAL at 08:53

## 2020-10-29 RX ADMIN — ENOXAPARIN SODIUM 40 MG: 40 INJECTION SUBCUTANEOUS at 17:51

## 2020-10-29 RX ADMIN — PRIMIDONE 250 MG: 250 TABLET ORAL at 15:06

## 2020-10-29 RX ADMIN — PRIMIDONE 250 MG: 250 TABLET ORAL at 08:54

## 2020-10-29 RX ADMIN — OXYCODONE HYDROCHLORIDE AND ACETAMINOPHEN 1 TABLET: 7.5; 325 TABLET ORAL at 15:06

## 2020-10-29 RX ADMIN — FLUOXETINE 40 MG: 20 CAPSULE ORAL at 08:53

## 2020-10-29 RX ADMIN — AMLODIPINE BESYLATE 5 MG: 5 TABLET ORAL at 08:53

## 2020-10-29 RX ADMIN — CLONAZEPAM 0.5 MG: 0.5 TABLET ORAL at 08:53

## 2020-10-29 RX ADMIN — GABAPENTIN 800 MG: 400 CAPSULE ORAL at 08:53

## 2020-10-29 RX ADMIN — GABAPENTIN 800 MG: 400 CAPSULE ORAL at 20:34

## 2020-10-29 RX ADMIN — DIPHENHYDRAMINE HYDROCHLORIDE 25 MG: 25 CAPSULE ORAL at 18:40

## 2020-10-29 RX ADMIN — CLONAZEPAM 0.5 MG: 0.5 TABLET ORAL at 20:34

## 2020-10-29 RX ADMIN — PROPRANOLOL HYDROCHLORIDE 40 MG: 40 TABLET ORAL at 20:34

## 2020-10-29 RX ADMIN — CEFTRIAXONE 2 G: 2 INJECTION, SOLUTION INTRAVENOUS at 17:52

## 2020-10-29 RX ADMIN — ENOXAPARIN SODIUM 40 MG: 40 INJECTION SUBCUTANEOUS at 06:18

## 2020-10-29 RX ADMIN — TEMAZEPAM 30 MG: 15 CAPSULE ORAL at 20:33

## 2020-10-29 RX ADMIN — PROPRANOLOL HYDROCHLORIDE 40 MG: 40 TABLET ORAL at 08:54

## 2020-10-29 RX ADMIN — GABAPENTIN 800 MG: 400 CAPSULE ORAL at 15:06

## 2020-10-29 RX ADMIN — CLONIDINE HYDROCHLORIDE 0.1 MG: 0.1 TABLET ORAL at 20:34

## 2020-10-29 RX ADMIN — PRIMIDONE 250 MG: 250 TABLET ORAL at 20:34

## 2020-10-29 RX ADMIN — PANTOPRAZOLE SODIUM 40 MG: 40 TABLET, DELAYED RELEASE ORAL at 06:18

## 2020-10-29 RX ADMIN — ALTEPLASE: 2.2 INJECTION, POWDER, LYOPHILIZED, FOR SOLUTION INTRAVENOUS at 15:24

## 2020-10-29 RX ADMIN — OXYCODONE HYDROCHLORIDE AND ACETAMINOPHEN 1 TABLET: 7.5; 325 TABLET ORAL at 06:19

## 2020-10-29 RX ADMIN — QUETIAPINE FUMARATE 200 MG: 100 TABLET ORAL at 20:34

## 2020-10-29 RX ADMIN — DIPHENHYDRAMINE HYDROCHLORIDE 25 MG: 25 CAPSULE ORAL at 06:18

## 2020-10-30 LAB
ANION GAP SERPL CALCULATED.3IONS-SCNC: 8 MMOL/L (ref 5–15)
BUN SERPL-MCNC: 10 MG/DL (ref 6–20)
BUN/CREAT SERPL: 18.2 (ref 7–25)
CALCIUM SPEC-SCNC: 9.1 MG/DL (ref 8.6–10.5)
CHLORIDE SERPL-SCNC: 99 MMOL/L (ref 98–107)
CO2 SERPL-SCNC: 28 MMOL/L (ref 22–29)
CREAT SERPL-MCNC: 0.55 MG/DL (ref 0.76–1.27)
GFR SERPL CREATININE-BSD FRML MDRD: >150 ML/MIN/1.73
GLUCOSE BLDC GLUCOMTR-MCNC: 100 MG/DL (ref 70–130)
GLUCOSE SERPL-MCNC: 110 MG/DL (ref 65–99)
POTASSIUM SERPL-SCNC: 4.1 MMOL/L (ref 3.5–5.2)
SODIUM SERPL-SCNC: 135 MMOL/L (ref 136–145)

## 2020-10-30 PROCEDURE — U0004 COV-19 TEST NON-CDC HGH THRU: HCPCS | Performed by: HOSPITALIST

## 2020-10-30 PROCEDURE — 80048 BASIC METABOLIC PNL TOTAL CA: CPT | Performed by: PODIATRIST

## 2020-10-30 PROCEDURE — 25010000002 CEFTRIAXONE SODIUM-DEXTROSE 2-2.22 GM-%(50ML) RECONSTITUTED SOLUTION: Performed by: INTERNAL MEDICINE

## 2020-10-30 PROCEDURE — 82962 GLUCOSE BLOOD TEST: CPT

## 2020-10-30 PROCEDURE — 25010000002 ENOXAPARIN PER 10 MG: Performed by: INTERNAL MEDICINE

## 2020-10-30 PROCEDURE — 94799 UNLISTED PULMONARY SVC/PX: CPT

## 2020-10-30 RX ADMIN — METFORMIN HYDROCHLORIDE 500 MG: 500 TABLET, EXTENDED RELEASE ORAL at 08:46

## 2020-10-30 RX ADMIN — CEFTRIAXONE 2 G: 2 INJECTION, SOLUTION INTRAVENOUS at 17:35

## 2020-10-30 RX ADMIN — PRIMIDONE 250 MG: 250 TABLET ORAL at 15:54

## 2020-10-30 RX ADMIN — PROPRANOLOL HYDROCHLORIDE 40 MG: 40 TABLET ORAL at 08:46

## 2020-10-30 RX ADMIN — AMLODIPINE BESYLATE 5 MG: 5 TABLET ORAL at 08:46

## 2020-10-30 RX ADMIN — OXYCODONE HYDROCHLORIDE AND ACETAMINOPHEN 1 TABLET: 7.5; 325 TABLET ORAL at 15:51

## 2020-10-30 RX ADMIN — CLONAZEPAM 0.5 MG: 0.5 TABLET ORAL at 08:46

## 2020-10-30 RX ADMIN — PRIMIDONE 250 MG: 250 TABLET ORAL at 08:46

## 2020-10-30 RX ADMIN — CLONIDINE HYDROCHLORIDE 0.1 MG: 0.1 TABLET ORAL at 20:01

## 2020-10-30 RX ADMIN — GABAPENTIN 800 MG: 400 CAPSULE ORAL at 15:54

## 2020-10-30 RX ADMIN — ENOXAPARIN SODIUM 40 MG: 40 INJECTION SUBCUTANEOUS at 06:07

## 2020-10-30 RX ADMIN — GABAPENTIN 800 MG: 400 CAPSULE ORAL at 08:46

## 2020-10-30 RX ADMIN — CLONIDINE HYDROCHLORIDE 0.1 MG: 0.1 TABLET ORAL at 08:46

## 2020-10-30 RX ADMIN — PROPRANOLOL HYDROCHLORIDE 40 MG: 40 TABLET ORAL at 20:01

## 2020-10-30 RX ADMIN — QUETIAPINE FUMARATE 200 MG: 100 TABLET ORAL at 20:01

## 2020-10-30 RX ADMIN — PANTOPRAZOLE SODIUM 40 MG: 40 TABLET, DELAYED RELEASE ORAL at 06:07

## 2020-10-30 RX ADMIN — TEMAZEPAM 30 MG: 15 CAPSULE ORAL at 20:01

## 2020-10-30 RX ADMIN — OXYCODONE HYDROCHLORIDE AND ACETAMINOPHEN 1 TABLET: 7.5; 325 TABLET ORAL at 21:57

## 2020-10-30 RX ADMIN — FLUOXETINE 40 MG: 20 CAPSULE ORAL at 08:46

## 2020-10-30 RX ADMIN — ENOXAPARIN SODIUM 40 MG: 40 INJECTION SUBCUTANEOUS at 17:35

## 2020-10-30 RX ADMIN — PRIMIDONE 250 MG: 250 TABLET ORAL at 20:01

## 2020-10-30 RX ADMIN — OXYCODONE HYDROCHLORIDE AND ACETAMINOPHEN 1 TABLET: 7.5; 325 TABLET ORAL at 06:07

## 2020-10-30 RX ADMIN — CLONAZEPAM 0.5 MG: 0.5 TABLET ORAL at 20:01

## 2020-10-30 RX ADMIN — GABAPENTIN 800 MG: 400 CAPSULE ORAL at 20:01

## 2020-10-31 LAB
GLUCOSE BLDC GLUCOMTR-MCNC: 93 MG/DL (ref 70–130)
SARS-COV-2 RNA RESP QL NAA+PROBE: NOT DETECTED

## 2020-10-31 PROCEDURE — 25010000002 ENOXAPARIN PER 10 MG: Performed by: INTERNAL MEDICINE

## 2020-10-31 PROCEDURE — 82962 GLUCOSE BLOOD TEST: CPT

## 2020-10-31 PROCEDURE — 25010000002 CEFTRIAXONE SODIUM-DEXTROSE 2-2.22 GM-%(50ML) RECONSTITUTED SOLUTION: Performed by: INTERNAL MEDICINE

## 2020-10-31 PROCEDURE — 94799 UNLISTED PULMONARY SVC/PX: CPT

## 2020-10-31 PROCEDURE — 63710000001 DIPHENHYDRAMINE PER 50 MG: Performed by: NURSE PRACTITIONER

## 2020-10-31 RX ADMIN — QUETIAPINE FUMARATE 200 MG: 100 TABLET ORAL at 20:19

## 2020-10-31 RX ADMIN — PRIMIDONE 250 MG: 250 TABLET ORAL at 15:32

## 2020-10-31 RX ADMIN — GABAPENTIN 800 MG: 400 CAPSULE ORAL at 20:19

## 2020-10-31 RX ADMIN — CLONIDINE HYDROCHLORIDE 0.1 MG: 0.1 TABLET ORAL at 08:58

## 2020-10-31 RX ADMIN — OXYCODONE HYDROCHLORIDE AND ACETAMINOPHEN 1 TABLET: 7.5; 325 TABLET ORAL at 12:18

## 2020-10-31 RX ADMIN — OXYCODONE HYDROCHLORIDE AND ACETAMINOPHEN 1 TABLET: 7.5; 325 TABLET ORAL at 20:19

## 2020-10-31 RX ADMIN — AMLODIPINE BESYLATE 5 MG: 5 TABLET ORAL at 08:58

## 2020-10-31 RX ADMIN — PROPRANOLOL HYDROCHLORIDE 40 MG: 40 TABLET ORAL at 08:58

## 2020-10-31 RX ADMIN — CLONAZEPAM 0.5 MG: 0.5 TABLET ORAL at 08:58

## 2020-10-31 RX ADMIN — FLUOXETINE 40 MG: 20 CAPSULE ORAL at 08:58

## 2020-10-31 RX ADMIN — ENOXAPARIN SODIUM 40 MG: 40 INJECTION SUBCUTANEOUS at 17:56

## 2020-10-31 RX ADMIN — CEFTRIAXONE 2 G: 2 INJECTION, SOLUTION INTRAVENOUS at 17:56

## 2020-10-31 RX ADMIN — PRIMIDONE 250 MG: 250 TABLET ORAL at 08:58

## 2020-10-31 RX ADMIN — TEMAZEPAM 30 MG: 15 CAPSULE ORAL at 20:19

## 2020-10-31 RX ADMIN — PANTOPRAZOLE SODIUM 40 MG: 40 TABLET, DELAYED RELEASE ORAL at 06:03

## 2020-10-31 RX ADMIN — OXYCODONE HYDROCHLORIDE AND ACETAMINOPHEN 1 TABLET: 7.5; 325 TABLET ORAL at 05:58

## 2020-10-31 RX ADMIN — CLONIDINE HYDROCHLORIDE 0.1 MG: 0.1 TABLET ORAL at 20:19

## 2020-10-31 RX ADMIN — CLONAZEPAM 0.5 MG: 0.5 TABLET ORAL at 15:33

## 2020-10-31 RX ADMIN — METFORMIN HYDROCHLORIDE 500 MG: 500 TABLET, EXTENDED RELEASE ORAL at 08:58

## 2020-10-31 RX ADMIN — GABAPENTIN 800 MG: 400 CAPSULE ORAL at 08:58

## 2020-10-31 RX ADMIN — PRIMIDONE 250 MG: 250 TABLET ORAL at 20:19

## 2020-10-31 RX ADMIN — DIPHENHYDRAMINE HYDROCHLORIDE 25 MG: 25 CAPSULE ORAL at 10:38

## 2020-10-31 RX ADMIN — ENOXAPARIN SODIUM 40 MG: 40 INJECTION SUBCUTANEOUS at 05:58

## 2020-10-31 RX ADMIN — PROPRANOLOL HYDROCHLORIDE 40 MG: 40 TABLET ORAL at 20:19

## 2020-10-31 RX ADMIN — GABAPENTIN 800 MG: 400 CAPSULE ORAL at 15:32

## 2020-11-01 LAB — GLUCOSE BLDC GLUCOMTR-MCNC: 88 MG/DL (ref 70–130)

## 2020-11-01 PROCEDURE — 82962 GLUCOSE BLOOD TEST: CPT

## 2020-11-01 PROCEDURE — 25010000002 ENOXAPARIN PER 10 MG: Performed by: INTERNAL MEDICINE

## 2020-11-01 PROCEDURE — 25010000002 CEFTRIAXONE SODIUM-DEXTROSE 2-2.22 GM-%(50ML) RECONSTITUTED SOLUTION: Performed by: INTERNAL MEDICINE

## 2020-11-01 PROCEDURE — 94799 UNLISTED PULMONARY SVC/PX: CPT

## 2020-11-01 RX ADMIN — QUETIAPINE FUMARATE 200 MG: 100 TABLET ORAL at 22:40

## 2020-11-01 RX ADMIN — GABAPENTIN 800 MG: 400 CAPSULE ORAL at 22:40

## 2020-11-01 RX ADMIN — PROPRANOLOL HYDROCHLORIDE 40 MG: 40 TABLET ORAL at 22:40

## 2020-11-01 RX ADMIN — PRIMIDONE 250 MG: 250 TABLET ORAL at 16:11

## 2020-11-01 RX ADMIN — PRIMIDONE 250 MG: 250 TABLET ORAL at 09:06

## 2020-11-01 RX ADMIN — GABAPENTIN 800 MG: 400 CAPSULE ORAL at 16:11

## 2020-11-01 RX ADMIN — FLUOXETINE 40 MG: 20 CAPSULE ORAL at 09:07

## 2020-11-01 RX ADMIN — OXYCODONE HYDROCHLORIDE AND ACETAMINOPHEN 1 TABLET: 7.5; 325 TABLET ORAL at 06:19

## 2020-11-01 RX ADMIN — ENOXAPARIN SODIUM 40 MG: 40 INJECTION SUBCUTANEOUS at 06:19

## 2020-11-01 RX ADMIN — CLONAZEPAM 0.5 MG: 0.5 TABLET ORAL at 09:06

## 2020-11-01 RX ADMIN — CEFTRIAXONE 2 G: 2 INJECTION, SOLUTION INTRAVENOUS at 17:50

## 2020-11-01 RX ADMIN — TEMAZEPAM 30 MG: 15 CAPSULE ORAL at 22:40

## 2020-11-01 RX ADMIN — PROPRANOLOL HYDROCHLORIDE 40 MG: 40 TABLET ORAL at 09:06

## 2020-11-01 RX ADMIN — PANTOPRAZOLE SODIUM 40 MG: 40 TABLET, DELAYED RELEASE ORAL at 06:19

## 2020-11-01 RX ADMIN — OXYCODONE HYDROCHLORIDE AND ACETAMINOPHEN 1 TABLET: 7.5; 325 TABLET ORAL at 22:40

## 2020-11-01 RX ADMIN — OXYCODONE HYDROCHLORIDE AND ACETAMINOPHEN 1 TABLET: 7.5; 325 TABLET ORAL at 14:23

## 2020-11-01 RX ADMIN — CLONAZEPAM 0.5 MG: 0.5 TABLET ORAL at 22:40

## 2020-11-01 RX ADMIN — CLONAZEPAM 0.5 MG: 0.5 TABLET ORAL at 14:23

## 2020-11-01 RX ADMIN — CLONIDINE HYDROCHLORIDE 0.1 MG: 0.1 TABLET ORAL at 22:40

## 2020-11-01 RX ADMIN — GABAPENTIN 800 MG: 400 CAPSULE ORAL at 09:07

## 2020-11-01 RX ADMIN — AMLODIPINE BESYLATE 5 MG: 5 TABLET ORAL at 09:07

## 2020-11-01 RX ADMIN — ENOXAPARIN SODIUM 40 MG: 40 INJECTION SUBCUTANEOUS at 17:50

## 2020-11-01 RX ADMIN — CLONIDINE HYDROCHLORIDE 0.1 MG: 0.1 TABLET ORAL at 09:07

## 2020-11-01 RX ADMIN — PRIMIDONE 250 MG: 250 TABLET ORAL at 22:41

## 2020-11-01 RX ADMIN — METFORMIN HYDROCHLORIDE 500 MG: 500 TABLET, EXTENDED RELEASE ORAL at 09:07

## 2020-11-01 NOTE — PLAN OF CARE
Problem: Pain Acute  Goal: Optimal Pain Control  Outcome: Ongoing, Progressing     Problem: Skin Injury Risk Increased  Goal: Skin Health and Integrity  Outcome: Ongoing, Progressing   Goal Outcome Evaluation:  Plan of Care Reviewed With: patient  Progress: improving

## 2020-11-01 NOTE — NURSING NOTE
Trying to contact Dr. Farah because patient state Dr. Farah told him that he will have CT angiogram on Monday but there is not order for CT angiogram for tomorrow.

## 2020-11-01 NOTE — PLAN OF CARE
Problem: Fall Injury Risk  Goal: Absence of Fall and Fall-Related Injury  Outcome: Ongoing, Progressing  Intervention: Promote Injury-Free Environment  Recent Flowsheet Documentation  Taken 10/31/2020 2049 by Concepción Carnes LPN  Safety Promotion/Fall Prevention:   gait belt   nonskid shoes/slippers when out of bed   safety round/check completed   Goal Outcome Evaluation:  Plan of Care Reviewed With: patient  Progress: improving

## 2020-11-02 ENCOUNTER — APPOINTMENT (OUTPATIENT)
Dept: CT IMAGING | Facility: HOSPITAL | Age: 55
End: 2020-11-02

## 2020-11-02 LAB
BASOPHILS # BLD AUTO: 0.06 10*3/MM3 (ref 0–0.2)
BASOPHILS NFR BLD AUTO: 1.1 % (ref 0–1.5)
CRP SERPL-MCNC: 0.31 MG/DL (ref 0–0.5)
DEPRECATED RDW RBC AUTO: 45.3 FL (ref 37–54)
EOSINOPHIL # BLD AUTO: 0.25 10*3/MM3 (ref 0–0.4)
EOSINOPHIL NFR BLD AUTO: 4.7 % (ref 0.3–6.2)
ERYTHROCYTE [DISTWIDTH] IN BLOOD BY AUTOMATED COUNT: 13.4 % (ref 12.3–15.4)
ERYTHROCYTE [SEDIMENTATION RATE] IN BLOOD: 15 MM/HR (ref 0–20)
GLUCOSE BLDC GLUCOMTR-MCNC: 105 MG/DL (ref 70–130)
HCT VFR BLD AUTO: 43.3 % (ref 37.5–51)
HGB BLD-MCNC: 13.8 G/DL (ref 13–17.7)
IMM GRANULOCYTES # BLD AUTO: 0.01 10*3/MM3 (ref 0–0.05)
IMM GRANULOCYTES NFR BLD AUTO: 0.2 % (ref 0–0.5)
LYMPHOCYTES # BLD AUTO: 1.62 10*3/MM3 (ref 0.7–3.1)
LYMPHOCYTES NFR BLD AUTO: 30.7 % (ref 19.6–45.3)
MCH RBC QN AUTO: 28.9 PG (ref 26.6–33)
MCHC RBC AUTO-ENTMCNC: 31.9 G/DL (ref 31.5–35.7)
MCV RBC AUTO: 90.6 FL (ref 79–97)
MONOCYTES # BLD AUTO: 0.54 10*3/MM3 (ref 0.1–0.9)
MONOCYTES NFR BLD AUTO: 10.2 % (ref 5–12)
NEUTROPHILS NFR BLD AUTO: 2.79 10*3/MM3 (ref 1.7–7)
NEUTROPHILS NFR BLD AUTO: 53.1 % (ref 42.7–76)
NRBC BLD AUTO-RTO: 0 /100 WBC (ref 0–0.2)
PLATELET # BLD AUTO: 195 10*3/MM3 (ref 140–450)
PMV BLD AUTO: 8.9 FL (ref 6–12)
RBC # BLD AUTO: 4.78 10*6/MM3 (ref 4.14–5.8)
WBC # BLD AUTO: 5.27 10*3/MM3 (ref 3.4–10.8)

## 2020-11-02 PROCEDURE — 85652 RBC SED RATE AUTOMATED: CPT | Performed by: NURSE PRACTITIONER

## 2020-11-02 PROCEDURE — 25010000002 CEFTRIAXONE SODIUM-DEXTROSE 2-2.22 GM-%(50ML) RECONSTITUTED SOLUTION: Performed by: INTERNAL MEDICINE

## 2020-11-02 PROCEDURE — 85025 COMPLETE CBC W/AUTO DIFF WBC: CPT | Performed by: NURSE PRACTITIONER

## 2020-11-02 PROCEDURE — 25010000002 ENOXAPARIN PER 10 MG: Performed by: INTERNAL MEDICINE

## 2020-11-02 PROCEDURE — 86140 C-REACTIVE PROTEIN: CPT | Performed by: NURSE PRACTITIONER

## 2020-11-02 PROCEDURE — 0 IOPAMIDOL PER 1 ML: Performed by: INTERNAL MEDICINE

## 2020-11-02 PROCEDURE — 82962 GLUCOSE BLOOD TEST: CPT

## 2020-11-02 PROCEDURE — 75635 CT ANGIO ABDOMINAL ARTERIES: CPT

## 2020-11-02 RX ADMIN — OXYCODONE HYDROCHLORIDE AND ACETAMINOPHEN 1 TABLET: 7.5; 325 TABLET ORAL at 09:17

## 2020-11-02 RX ADMIN — PRIMIDONE 250 MG: 250 TABLET ORAL at 09:10

## 2020-11-02 RX ADMIN — TEMAZEPAM 30 MG: 15 CAPSULE ORAL at 21:32

## 2020-11-02 RX ADMIN — CLONIDINE HYDROCHLORIDE 0.1 MG: 0.1 TABLET ORAL at 09:10

## 2020-11-02 RX ADMIN — PROPRANOLOL HYDROCHLORIDE 40 MG: 40 TABLET ORAL at 09:10

## 2020-11-02 RX ADMIN — OXYCODONE HYDROCHLORIDE AND ACETAMINOPHEN 1 TABLET: 7.5; 325 TABLET ORAL at 15:27

## 2020-11-02 RX ADMIN — OXYCODONE HYDROCHLORIDE AND ACETAMINOPHEN 1 TABLET: 7.5; 325 TABLET ORAL at 21:33

## 2020-11-02 RX ADMIN — FLUOXETINE 40 MG: 20 CAPSULE ORAL at 09:10

## 2020-11-02 RX ADMIN — CLONAZEPAM 0.5 MG: 0.5 TABLET ORAL at 15:27

## 2020-11-02 RX ADMIN — GABAPENTIN 800 MG: 400 CAPSULE ORAL at 09:10

## 2020-11-02 RX ADMIN — ENOXAPARIN SODIUM 40 MG: 40 INJECTION SUBCUTANEOUS at 17:09

## 2020-11-02 RX ADMIN — PRIMIDONE 250 MG: 250 TABLET ORAL at 15:27

## 2020-11-02 RX ADMIN — GABAPENTIN 800 MG: 400 CAPSULE ORAL at 15:27

## 2020-11-02 RX ADMIN — CEFTRIAXONE 2 G: 2 INJECTION, SOLUTION INTRAVENOUS at 17:09

## 2020-11-02 RX ADMIN — PROPRANOLOL HYDROCHLORIDE 40 MG: 40 TABLET ORAL at 21:32

## 2020-11-02 RX ADMIN — CLONIDINE HYDROCHLORIDE 0.1 MG: 0.1 TABLET ORAL at 21:32

## 2020-11-02 RX ADMIN — PRIMIDONE 250 MG: 250 TABLET ORAL at 21:33

## 2020-11-02 RX ADMIN — GABAPENTIN 800 MG: 400 CAPSULE ORAL at 21:31

## 2020-11-02 RX ADMIN — IOPAMIDOL 150 ML: 755 INJECTION, SOLUTION INTRAVENOUS at 16:30

## 2020-11-02 RX ADMIN — ENOXAPARIN SODIUM 40 MG: 40 INJECTION SUBCUTANEOUS at 06:11

## 2020-11-02 RX ADMIN — PANTOPRAZOLE SODIUM 40 MG: 40 TABLET, DELAYED RELEASE ORAL at 06:11

## 2020-11-02 RX ADMIN — AMLODIPINE BESYLATE 5 MG: 5 TABLET ORAL at 09:10

## 2020-11-02 RX ADMIN — CLONAZEPAM 0.5 MG: 0.5 TABLET ORAL at 09:11

## 2020-11-02 RX ADMIN — METFORMIN HYDROCHLORIDE 500 MG: 500 TABLET, EXTENDED RELEASE ORAL at 09:10

## 2020-11-02 RX ADMIN — QUETIAPINE FUMARATE 200 MG: 100 TABLET ORAL at 21:32

## 2020-11-02 RX ADMIN — CLONAZEPAM 0.5 MG: 0.5 TABLET ORAL at 23:22

## 2020-11-02 NOTE — PROGRESS NOTES
Adult Nutrition  Assessment/PES    Patient Name:  Eliseo Price  YOB: 1965  MRN: [id number]  Admit Date:  10/20/2020    Assessment Date:  11/2/2020    Spoke w MD about adding Elkin to aid with wound healing twice a day  Will cont to follow.     Reason for Assessment     Row Name 11/02/20 1141          Reason for Assessment    Reason For Assessment  follow-up protocol     Diagnosis  diabetes diagnosis/complications;surgery/postoperative complications;infection/sepsis Rehab post surgery R Toe OM, VAC, DM Bipolar         Nutrition/Diet History     Row Name 11/02/20 1142          Nutrition/Diet History    Typical Food/Fluid Intake  Spoke w pt via phone. Reports he likes more fiber at home. Reports he told ladies to provide him choice 1 at [employer] meals for menu. Reports RN told him maybe he needed more pro this weekend to help wound heal. Reports good po intake. Willing to do any supplement if needed.         Anthropometrics     Row Name 11/02/20 1142 11/02/20 0600       Anthropometrics    Weight  -- 303.5#  (!) 138 kg (303 lb 9 oz)       Body Mass Index (BMI)    BMI Assessment  BMI 40 or greater: obesity grade III  --        Labs/Tests/Procedures/Meds     Row Name 11/02/20 1143          Labs/Procedures/Meds    Lab Results Reviewed  reviewed     Lab Results Comments  glu         Diagnostic Tests/Procedures    Diagnostic Test/Procedure Reviewed  reviewed        Medications    Pertinent Medications Reviewed  reviewed     Pertinent Medications Comments  glucophage         Physical Findings     Row Name 11/02/20 1143          Physical Findings    Skin  non-healing wound(s) VAC           Nutrition Prescription Ordered     Row Name 11/02/20 1143          Nutrition Prescription PO    Supplement  Elkin     Supplement Frequency  2 times a day     Common Modifiers  Cardiac;Consistent Carbohydrate         Evaluation of Received Nutrient/Fluid Intake     Row Name 11/02/20 1144          Fluid Intake Evaluation     Oral Fluid (mL)  1317 ave x 3, 67%        PO Evaluation    Number of Meals  9     % PO Intake  92               Problem/Interventions:  Problem 1     Row Name 11/02/20 1145          Nutrition Diagnoses Problem 1    Problem 1  Overweight/Obesity     Etiology (related to)  Medical Diagnosis;Functional Diagnosis     Signs/Symptoms (evidenced by)  Report/Observation;BMI     BMI  Greater than 40         Problem 2     Row Name 11/02/20 1144          Nutrition Diagnoses Problem 2    Problem 2  Increased Nutrient Needs     Etiology (related to)  Medical Diagnosis     Signs/Symptoms (evidenced by)  Report/Observation             Intervention Goal     Row Name 11/02/20 1145          Intervention Goal    General  Meet nutritional needs for age/condition     PO  Maintain intake;PO intake (%)     PO Intake %  75 % or greater     Weight  Appropriate weight loss         Nutrition Intervention     Row Name 11/02/20 1145          Nutrition Intervention    RD/Tech Action  Interview for preference;Encourage intake;Recommend/ordered;Follow Tx progress;Supplement provided     Recommended/Ordered  Supplement         Nutrition Prescription     Row Name 11/02/20 1145          Nutrition Prescription PO    PO Prescription  Begin/change supplement     Supplement  Elkin     Supplement Frequency  2 times a day         Education/Evaluation     Row Name 11/02/20 1146          Education    Education  Other (comment) pt willing to do supplement to heal wound        Monitor/Evaluation    Monitor  Per protocol;I&O;PO intake;Supplement intake;Pertinent labs;Weight;Skin status           Electronically signed by:  Aleksandra Rowe RD  11/02/20 11:46 EST

## 2020-11-02 NOTE — PLAN OF CARE
Goal Outcome Evaluation:  Plan of Care Reviewed With: patient  Progress: improving  Outcome Summary: No new cough, SOB, fever, sore throat, or loss of taste or smell this shift. PICC line dressing changed.

## 2020-11-02 NOTE — NURSING NOTE
11/02/20 1721   Wound 10/12/20 1008 Right great toe Diabetic Ulcer   Placement Date/Time: 10/12/20 1008   Present on Hospital Admission: Yes  Side: Right  Location: great toe  Primary Wound Type: Diabetic Ulcer  Wound Outcome: Amputation   Dressing Appearance intact;dry   Closure None   Base clean;granulating;moist;red   Red (%), Wound Tissue Color 100   Periwound intact;dry   Periwound Temperature warm   Edges open   Wound Length (cm) 1.2 cm   Wound Width (cm) 0.5 cm   Wound Depth (cm) 0.5 cm   Drainage Characteristics/Odor serosanguineous   Drainage Amount scant   Care, Wound cleansed with;sterile normal saline;irrigated with   Dressing Care dressing changed;foam;transparent film;other (see comments)  (wound vac dsg changed)   Periwound Care dry periwound area maintained;barrier film applied;other (see comments)  (sure prep spray applied)   NPWT (Negative Pressure Wound Therapy) 10/16/20 1714 right foot   Placement Date/Time: 10/16/20 1714   Location: right foot  Additional Comments: Placed in surgery per DR. Farah   Therapy Setting continuous therapy   Dressing foam, black;transparent dressing   Pressure Setting 125 mmHg   Sponges Inserted 1   Sponges Removed 1   Finger sweep complete Yes     Seen for weekly [facility] skin assessment on rehab unit today. Right foot wound vac dsg changed. Wound progressing very well since my last assessment. Would expect wound vac until end of week then switch to an alginate or collagen dressing. Wound is stable and clean, without any s/s of infection. Pt tolerated dressing change well and vac with a good seal set at 125 mm Hg continuous pressure. Will plan to see Wednesday for next dressing change.

## 2020-11-02 NOTE — PLAN OF CARE
Goal Outcome Evaluation:  Plan of Care Reviewed With: patient  Progress: improving   Patient polite and cooperative with care. 0 complaints of new or worsening cough, shortness of air, sore throat, loss of taste or smell. Continues on iv antibiotics related to infection of foot. Wound vac intact and functioning at 125mmhg. Safety measures in place.

## 2020-11-03 LAB — GLUCOSE BLDC GLUCOMTR-MCNC: 88 MG/DL (ref 70–130)

## 2020-11-03 PROCEDURE — 25010000002 CEFTRIAXONE SODIUM-DEXTROSE 2-2.22 GM-%(50ML) RECONSTITUTED SOLUTION: Performed by: INTERNAL MEDICINE

## 2020-11-03 PROCEDURE — 25010000002 ENOXAPARIN PER 10 MG: Performed by: INTERNAL MEDICINE

## 2020-11-03 PROCEDURE — 82962 GLUCOSE BLOOD TEST: CPT

## 2020-11-03 PROCEDURE — U0004 COV-19 TEST NON-CDC HGH THRU: HCPCS | Performed by: HOSPITALIST

## 2020-11-03 RX ADMIN — PRIMIDONE 250 MG: 250 TABLET ORAL at 08:41

## 2020-11-03 RX ADMIN — ENOXAPARIN SODIUM 40 MG: 40 INJECTION SUBCUTANEOUS at 17:06

## 2020-11-03 RX ADMIN — OXYCODONE HYDROCHLORIDE AND ACETAMINOPHEN 1 TABLET: 7.5; 325 TABLET ORAL at 15:18

## 2020-11-03 RX ADMIN — GABAPENTIN 800 MG: 400 CAPSULE ORAL at 21:31

## 2020-11-03 RX ADMIN — PRIMIDONE 250 MG: 250 TABLET ORAL at 15:18

## 2020-11-03 RX ADMIN — CLONIDINE HYDROCHLORIDE 0.1 MG: 0.1 TABLET ORAL at 08:41

## 2020-11-03 RX ADMIN — GABAPENTIN 800 MG: 400 CAPSULE ORAL at 15:18

## 2020-11-03 RX ADMIN — TEMAZEPAM 30 MG: 15 CAPSULE ORAL at 21:30

## 2020-11-03 RX ADMIN — ENOXAPARIN SODIUM 40 MG: 40 INJECTION SUBCUTANEOUS at 06:16

## 2020-11-03 RX ADMIN — OXYCODONE HYDROCHLORIDE AND ACETAMINOPHEN 1 TABLET: 7.5; 325 TABLET ORAL at 21:30

## 2020-11-03 RX ADMIN — PRIMIDONE 250 MG: 250 TABLET ORAL at 21:30

## 2020-11-03 RX ADMIN — CLONIDINE HYDROCHLORIDE 0.1 MG: 0.1 TABLET ORAL at 21:31

## 2020-11-03 RX ADMIN — CLONAZEPAM 0.5 MG: 0.5 TABLET ORAL at 08:41

## 2020-11-03 RX ADMIN — AMLODIPINE BESYLATE 5 MG: 5 TABLET ORAL at 08:41

## 2020-11-03 RX ADMIN — PROPRANOLOL HYDROCHLORIDE 40 MG: 40 TABLET ORAL at 21:31

## 2020-11-03 RX ADMIN — FLUOXETINE 40 MG: 20 CAPSULE ORAL at 08:41

## 2020-11-03 RX ADMIN — OXYCODONE HYDROCHLORIDE AND ACETAMINOPHEN 1 TABLET: 7.5; 325 TABLET ORAL at 08:41

## 2020-11-03 RX ADMIN — PROPRANOLOL HYDROCHLORIDE 40 MG: 40 TABLET ORAL at 08:41

## 2020-11-03 RX ADMIN — PANTOPRAZOLE SODIUM 40 MG: 40 TABLET, DELAYED RELEASE ORAL at 06:16

## 2020-11-03 RX ADMIN — CLONAZEPAM 0.5 MG: 0.5 TABLET ORAL at 15:18

## 2020-11-03 RX ADMIN — GABAPENTIN 800 MG: 400 CAPSULE ORAL at 08:41

## 2020-11-03 RX ADMIN — QUETIAPINE FUMARATE 200 MG: 100 TABLET ORAL at 21:31

## 2020-11-03 RX ADMIN — CEFTRIAXONE 2 G: 2 INJECTION, SOLUTION INTRAVENOUS at 17:07

## 2020-11-03 RX ADMIN — CLONAZEPAM 0.5 MG: 0.5 TABLET ORAL at 22:28

## 2020-11-03 NOTE — PLAN OF CARE
Goal Outcome Evaluation:  Plan of Care Reviewed With: patient  Progress: improving  Outcome Summary: Patient w/o cough-no c/o SOA- no c/o Sore throat-no c/o loss of taste or smell

## 2020-11-03 NOTE — NURSING NOTE
Alivia reviewed-ongoing  Patient w/o cough- no c/o SOA- no c/o sore throat-no c/o loss of taste or smell

## 2020-11-03 NOTE — NURSING NOTE
Patient alert and oriented x4- V/S stable - PRN pain med given - improvement on ambulation; Patient verbalizes understanding in regards to safety measures/to call for assistance before ambulation-Patient in a pleasant mood-celebrates his birthday--today; will continue to monitor for any changes-

## 2020-11-03 NOTE — PLAN OF CARE
Goal Outcome Evaluation:  Plan of Care Reviewed With: patient  Progress: improving     Problem: Fall Injury Risk  Goal: Absence of Fall and Fall-Related Injury  Intervention: Identify and Manage Contributors to Fall Injury Risk  Recent Flowsheet Documentation  Taken 11/2/2020 2131 by Marisol Mccoy RN  Medication Review/Management: medications reviewed  Intervention: Promote Injury-Free Environment  Recent Flowsheet Documentation  Taken 11/2/2020 2131 by Marisol Mccoy RN  Safety Promotion/Fall Prevention:   assistive device/personal items within reach   fall prevention program maintained   nonskid shoes/slippers when out of bed   safety round/check completed

## 2020-11-04 LAB
GLUCOSE BLDC GLUCOMTR-MCNC: 88 MG/DL (ref 70–130)
SARS-COV-2 RNA RESP QL NAA+PROBE: NOT DETECTED

## 2020-11-04 PROCEDURE — 63710000001 DIPHENHYDRAMINE PER 50 MG: Performed by: NURSE PRACTITIONER

## 2020-11-04 PROCEDURE — 25010000002 CEFTRIAXONE SODIUM-DEXTROSE 2-2.22 GM-%(50ML) RECONSTITUTED SOLUTION: Performed by: INTERNAL MEDICINE

## 2020-11-04 PROCEDURE — 25010000002 ENOXAPARIN PER 10 MG: Performed by: INTERNAL MEDICINE

## 2020-11-04 PROCEDURE — 82962 GLUCOSE BLOOD TEST: CPT

## 2020-11-04 RX ADMIN — PROPRANOLOL HYDROCHLORIDE 40 MG: 40 TABLET ORAL at 10:16

## 2020-11-04 RX ADMIN — OXYCODONE HYDROCHLORIDE AND ACETAMINOPHEN 1 TABLET: 7.5; 325 TABLET ORAL at 21:39

## 2020-11-04 RX ADMIN — ENOXAPARIN SODIUM 40 MG: 40 INJECTION SUBCUTANEOUS at 06:38

## 2020-11-04 RX ADMIN — CLONAZEPAM 0.5 MG: 0.5 TABLET ORAL at 10:16

## 2020-11-04 RX ADMIN — TEMAZEPAM 30 MG: 15 CAPSULE ORAL at 21:40

## 2020-11-04 RX ADMIN — CLONAZEPAM 0.5 MG: 0.5 TABLET ORAL at 18:00

## 2020-11-04 RX ADMIN — PROPRANOLOL HYDROCHLORIDE 40 MG: 40 TABLET ORAL at 21:40

## 2020-11-04 RX ADMIN — OXYCODONE HYDROCHLORIDE AND ACETAMINOPHEN 1 TABLET: 7.5; 325 TABLET ORAL at 10:20

## 2020-11-04 RX ADMIN — Medication: at 15:00

## 2020-11-04 RX ADMIN — PANTOPRAZOLE SODIUM 40 MG: 40 TABLET, DELAYED RELEASE ORAL at 06:38

## 2020-11-04 RX ADMIN — GABAPENTIN 800 MG: 400 CAPSULE ORAL at 21:39

## 2020-11-04 RX ADMIN — FLUOXETINE 40 MG: 20 CAPSULE ORAL at 10:16

## 2020-11-04 RX ADMIN — PRIMIDONE 250 MG: 250 TABLET ORAL at 21:39

## 2020-11-04 RX ADMIN — PRIMIDONE 250 MG: 250 TABLET ORAL at 10:16

## 2020-11-04 RX ADMIN — ENOXAPARIN SODIUM 40 MG: 40 INJECTION SUBCUTANEOUS at 18:00

## 2020-11-04 RX ADMIN — PRIMIDONE 250 MG: 250 TABLET ORAL at 16:05

## 2020-11-04 RX ADMIN — QUETIAPINE FUMARATE 200 MG: 100 TABLET ORAL at 21:39

## 2020-11-04 RX ADMIN — CLONIDINE HYDROCHLORIDE 0.1 MG: 0.1 TABLET ORAL at 21:39

## 2020-11-04 RX ADMIN — DIPHENHYDRAMINE HYDROCHLORIDE 25 MG: 25 CAPSULE ORAL at 21:57

## 2020-11-04 RX ADMIN — AMLODIPINE BESYLATE 5 MG: 5 TABLET ORAL at 10:16

## 2020-11-04 RX ADMIN — CEFTRIAXONE 2 G: 2 INJECTION, SOLUTION INTRAVENOUS at 18:00

## 2020-11-04 RX ADMIN — GABAPENTIN 800 MG: 400 CAPSULE ORAL at 10:15

## 2020-11-04 RX ADMIN — CLONIDINE HYDROCHLORIDE 0.1 MG: 0.1 TABLET ORAL at 10:15

## 2020-11-04 RX ADMIN — GABAPENTIN 800 MG: 400 CAPSULE ORAL at 16:05

## 2020-11-04 NOTE — NURSING NOTE
11/04/20 1551   Wound 10/12/20 1008 Right great toe Diabetic Ulcer   Placement Date/Time: 10/12/20 1008   Present on Hospital Admission: Yes  Side: Right  Location: great toe  Primary Wound Type: Diabetic Ulcer  Wound Outcome: Amputation   Dressing Appearance dry;intact   Closure None   Base clean;moist;red;granulating;exposed structure   Red (%), Wound Tissue Color 100   Periwound intact;dry   Drainage Characteristics/Odor serosanguineous   Drainage Amount scant   Care, Wound cleansed with;irrigated with;sterile normal saline   Dressing Care dressing changed;collagen;foam;transparent film;other (see comments)  (Applied Jovita Ag to wound base folllowed w black vac foam)   Periwound Care moisturizer applied;dry periwound area maintained;barrier film applied   NPWT (Negative Pressure Wound Therapy) 10/16/20 1714 right foot   Placement Date/Time: 10/16/20 1714   Location: right foot  Additional Comments: Placed in surgery per DR. Farah   Therapy Setting continuous therapy   Dressing foam, black;transparent dressing   Pressure Setting 125 mmHg   Sponges Inserted 1   Sponges Removed 1   Finger sweep complete Yes     [facility] follow up for right foot wound assessment and wound care. Dr. Farah at bedside and suggested adding a collagen dressing, Jovita Ag, to wound base prior to vac sponge. Wound is progressing well. Tiny area of bone can be visualized at base but wound is granulating and without purulence or necrosis. Vac cannister changed per protocol and only a scant amt of ss drainage was noted in canister. Unable to measure. Pt tolerated wound care well.  Bilateral feet and heels are extremely dry, flaky and callused. Aquaphor ordered to aid in moisturizing.  Will plan to see Friday for next scheduled wound vac dsg change.

## 2020-11-04 NOTE — NURSING NOTE
Careplan reviewed-ongoing  Patient without cough-no c/o SOA- no c/o Sore throat- no c/o loss of taste or smell

## 2020-11-05 LAB — GLUCOSE BLDC GLUCOMTR-MCNC: 91 MG/DL (ref 70–130)

## 2020-11-05 PROCEDURE — 25010000002 CEFTRIAXONE SODIUM-DEXTROSE 2-2.22 GM-%(50ML) RECONSTITUTED SOLUTION: Performed by: INTERNAL MEDICINE

## 2020-11-05 PROCEDURE — 99307 SBSQ NF CARE SF MDM 10: CPT | Performed by: INTERNAL MEDICINE

## 2020-11-05 PROCEDURE — 25010000002 ENOXAPARIN PER 10 MG: Performed by: INTERNAL MEDICINE

## 2020-11-05 PROCEDURE — 82962 GLUCOSE BLOOD TEST: CPT

## 2020-11-05 RX ADMIN — PANTOPRAZOLE SODIUM 40 MG: 40 TABLET, DELAYED RELEASE ORAL at 06:22

## 2020-11-05 RX ADMIN — PRIMIDONE 250 MG: 250 TABLET ORAL at 08:31

## 2020-11-05 RX ADMIN — PRIMIDONE 250 MG: 250 TABLET ORAL at 21:59

## 2020-11-05 RX ADMIN — CEFTRIAXONE 2 G: 2 INJECTION, SOLUTION INTRAVENOUS at 18:05

## 2020-11-05 RX ADMIN — METFORMIN HYDROCHLORIDE 500 MG: 500 TABLET, EXTENDED RELEASE ORAL at 08:31

## 2020-11-05 RX ADMIN — CLONAZEPAM 0.5 MG: 0.5 TABLET ORAL at 21:59

## 2020-11-05 RX ADMIN — PROPRANOLOL HYDROCHLORIDE 40 MG: 40 TABLET ORAL at 08:31

## 2020-11-05 RX ADMIN — TEMAZEPAM 30 MG: 15 CAPSULE ORAL at 21:59

## 2020-11-05 RX ADMIN — PROPRANOLOL HYDROCHLORIDE 40 MG: 40 TABLET ORAL at 21:59

## 2020-11-05 RX ADMIN — OXYCODONE HYDROCHLORIDE AND ACETAMINOPHEN 1 TABLET: 7.5; 325 TABLET ORAL at 08:31

## 2020-11-05 RX ADMIN — CLONIDINE HYDROCHLORIDE 0.1 MG: 0.1 TABLET ORAL at 08:31

## 2020-11-05 RX ADMIN — CLONAZEPAM 0.5 MG: 0.5 TABLET ORAL at 04:08

## 2020-11-05 RX ADMIN — ENOXAPARIN SODIUM 40 MG: 40 INJECTION SUBCUTANEOUS at 06:22

## 2020-11-05 RX ADMIN — GABAPENTIN 800 MG: 400 CAPSULE ORAL at 08:30

## 2020-11-05 RX ADMIN — FLUOXETINE 40 MG: 20 CAPSULE ORAL at 08:30

## 2020-11-05 RX ADMIN — QUETIAPINE FUMARATE 200 MG: 100 TABLET ORAL at 21:59

## 2020-11-05 RX ADMIN — CLONIDINE HYDROCHLORIDE 0.1 MG: 0.1 TABLET ORAL at 21:59

## 2020-11-05 RX ADMIN — OXYCODONE HYDROCHLORIDE AND ACETAMINOPHEN 1 TABLET: 7.5; 325 TABLET ORAL at 21:59

## 2020-11-05 RX ADMIN — GABAPENTIN 800 MG: 400 CAPSULE ORAL at 21:59

## 2020-11-05 RX ADMIN — GABAPENTIN 800 MG: 400 CAPSULE ORAL at 16:12

## 2020-11-05 RX ADMIN — ENOXAPARIN SODIUM 40 MG: 40 INJECTION SUBCUTANEOUS at 18:05

## 2020-11-05 RX ADMIN — Medication: at 08:33

## 2020-11-05 RX ADMIN — PRIMIDONE 250 MG: 250 TABLET ORAL at 16:12

## 2020-11-05 RX ADMIN — AMLODIPINE BESYLATE 5 MG: 5 TABLET ORAL at 08:30

## 2020-11-05 RX ADMIN — OXYCODONE HYDROCHLORIDE AND ACETAMINOPHEN 1 TABLET: 7.5; 325 TABLET ORAL at 16:13

## 2020-11-05 NOTE — PLAN OF CARE
Goal Outcome Evaluation:  Plan of Care Reviewed With: patient  Progress: improving   Patient polite and cooperative with care. 0 complaints of new or worsening cough, shortness of air, sore throat, loss of taste or smell. Continues on iv antibiotics related to infection of foot. 0 adverse reactions. Rash has improved. Wound vac intact and functioning at 125mmhg. Safety measures in place.

## 2020-11-05 NOTE — PROGRESS NOTES
"SERVICE: [facility] HOSPITALIST    CHIEF COMPLAINT: SNF weekly f/u, Rt Foot osteo    SUBJECTIVE:        OBJECTIVE:    /81   Pulse 68   Temp 98.2 °F (36.8 °C) (Oral)   Resp 18   Ht 180.3 cm (70.98\")   Wt (!) 138 kg (303 lb 3 oz)   SpO2 95%   BMI 42.31 kg/m²     MEDS/LABS REVIEWED AND ORDERED    amLODIPine, 5 mg, Oral, Daily  Aquaphor Advanced Therapy, , Topical, Q24H  cefTRIAXone Sodium-Dextrose, 2 g, Intravenous, Q24H  cloNIDine, 0.1 mg, Oral, Q12H  enoxaparin, 40 mg, Subcutaneous, Q12H  FLUoxetine, 40 mg, Oral, Daily  gabapentin, 800 mg, Oral, TID  metFORMIN ER, 500 mg, Oral, Daily With Breakfast  pantoprazole, 40 mg, Oral, QAM  primidone, 250 mg, Oral, TID  propranolol, 40 mg, Oral, BID  QUEtiapine, 200 mg, Oral, Nightly        Physical Exam    LAB/DIAGNOSTICS:    Lab Results (last 24 hours)     Procedure Component Value Units Date/Time    POC Glucose Once [[id number]]  (Normal) Collected: 11/05/20 0624    Specimen: Blood Updated: 11/05/20 0631     Glucose 91 mg/dL            No radiology results for the last day      ASSESSMENT/PLAN:    Aftercare for: Subacute versus chronic osteomyelitis of right fifth phalanx, associated w/ pathologic fracture, probable septic arthritis & infectious tenosynovitis of the fifth flexor digitorum longus tendon, with overlying cellulitis, and surrounding tissue necrosis  - Pt should be NWB on rt foot, up in chair w/ feet elevated  - On IV abx, and wound vac, IV antibiotics are currently sent to and on 11/16  - Inflammatory markers have normalized    DM w/ Severe peripheral Neuropathy  - Complains of throbbing numbness on ambulation or dressing changes, is already on 800mg gabapentin 3 times daily, stated that Lyrica did not work well for him in the past, already on several other serotonergic modifying medications, would not like to start duloxetine    H/o Bipolar  -Affect improved after starting his home Seroquel that the patient did not report on " admission to acute  -Continue home Prozac    Essential hypertension  -On Norvasc 5 mg, Catapres 0.1 mg twice daily, propanolol 40 mg twice daily  -BPs with reasonable control, mostly in the 120s to 140s, continue current regimen    Restless legs  -On home primidone, noted interaction with Seroquel, that may make Seroquel less effective, but patient states that he was stable on this regimen    H/o DVT

## 2020-11-06 LAB — GLUCOSE BLDC GLUCOMTR-MCNC: 96 MG/DL (ref 70–130)

## 2020-11-06 PROCEDURE — 25010000002 CEFTRIAXONE SODIUM-DEXTROSE 2-2.22 GM-%(50ML) RECONSTITUTED SOLUTION: Performed by: INTERNAL MEDICINE

## 2020-11-06 PROCEDURE — 82962 GLUCOSE BLOOD TEST: CPT

## 2020-11-06 PROCEDURE — 25010000002 ENOXAPARIN PER 10 MG: Performed by: INTERNAL MEDICINE

## 2020-11-06 PROCEDURE — U0004 COV-19 TEST NON-CDC HGH THRU: HCPCS | Performed by: HOSPITALIST

## 2020-11-06 RX ADMIN — PRIMIDONE 250 MG: 250 TABLET ORAL at 09:26

## 2020-11-06 RX ADMIN — CLONAZEPAM 0.5 MG: 0.5 TABLET ORAL at 09:31

## 2020-11-06 RX ADMIN — ENOXAPARIN SODIUM 40 MG: 40 INJECTION SUBCUTANEOUS at 18:09

## 2020-11-06 RX ADMIN — GABAPENTIN 800 MG: 400 CAPSULE ORAL at 09:25

## 2020-11-06 RX ADMIN — PRIMIDONE 250 MG: 250 TABLET ORAL at 20:53

## 2020-11-06 RX ADMIN — PRIMIDONE 250 MG: 250 TABLET ORAL at 16:05

## 2020-11-06 RX ADMIN — CLONAZEPAM 0.5 MG: 0.5 TABLET ORAL at 20:53

## 2020-11-06 RX ADMIN — CLONIDINE HYDROCHLORIDE 0.1 MG: 0.1 TABLET ORAL at 09:25

## 2020-11-06 RX ADMIN — PROPRANOLOL HYDROCHLORIDE 40 MG: 40 TABLET ORAL at 09:26

## 2020-11-06 RX ADMIN — PANTOPRAZOLE SODIUM 40 MG: 40 TABLET, DELAYED RELEASE ORAL at 06:01

## 2020-11-06 RX ADMIN — FLUOXETINE 40 MG: 20 CAPSULE ORAL at 09:25

## 2020-11-06 RX ADMIN — PROPRANOLOL HYDROCHLORIDE 40 MG: 40 TABLET ORAL at 20:48

## 2020-11-06 RX ADMIN — GABAPENTIN 800 MG: 400 CAPSULE ORAL at 20:52

## 2020-11-06 RX ADMIN — QUETIAPINE FUMARATE 200 MG: 100 TABLET ORAL at 20:52

## 2020-11-06 RX ADMIN — AMLODIPINE BESYLATE 5 MG: 5 TABLET ORAL at 09:25

## 2020-11-06 RX ADMIN — CEFTRIAXONE 2 G: 2 INJECTION, SOLUTION INTRAVENOUS at 18:10

## 2020-11-06 RX ADMIN — OXYCODONE HYDROCHLORIDE AND ACETAMINOPHEN 1 TABLET: 7.5; 325 TABLET ORAL at 20:54

## 2020-11-06 RX ADMIN — GABAPENTIN 800 MG: 400 CAPSULE ORAL at 16:04

## 2020-11-06 RX ADMIN — METFORMIN HYDROCHLORIDE 500 MG: 500 TABLET, EXTENDED RELEASE ORAL at 09:25

## 2020-11-06 RX ADMIN — TEMAZEPAM 30 MG: 15 CAPSULE ORAL at 20:53

## 2020-11-06 RX ADMIN — CLONIDINE HYDROCHLORIDE 0.1 MG: 0.1 TABLET ORAL at 20:53

## 2020-11-06 RX ADMIN — OXYCODONE HYDROCHLORIDE AND ACETAMINOPHEN 1 TABLET: 7.5; 325 TABLET ORAL at 09:31

## 2020-11-06 RX ADMIN — ENOXAPARIN SODIUM 40 MG: 40 INJECTION SUBCUTANEOUS at 05:57

## 2020-11-06 NOTE — PLAN OF CARE
Goal Outcome Evaluation:  Plan of Care Reviewed With: patient  Progress: improving   Patient resting in bed with yes closed. Polite and cooperative with care. Patient asking if he is going today on Friday. He stated he told Dr Farah he wanted to go home Friday. 0 complaints of new or worsening cough, shortness of air, sore throat, loss of taste or smell noted.  Continues on IV antibiotics related to right foot infection. Wound vac intact and functioning at 125mmhg. Safety measures in place.

## 2020-11-06 NOTE — NURSING NOTE
Jennifer manager spoke with patient need IV antibiotic and dressing change daily due to weekend coming up and unable to find IV outpatient therapy during the weekend patient will be discharge Monday until all the coordination with IV therapy as outpatient will be arregment

## 2020-11-06 NOTE — NURSING NOTE
Dr. Farah removed wound vac from right foot wound and would like to begin Jovita Ag collagen dressing with daily dsg changes moving forward. Pt will likely be discharged home this weekend. Dr. Farah provided wound care using Jovita. Next dsg change will be due tomorrow, 11/7.

## 2020-11-07 LAB
GLUCOSE BLDC GLUCOMTR-MCNC: 93 MG/DL (ref 70–130)
SARS-COV-2 RNA RESP QL NAA+PROBE: NOT DETECTED

## 2020-11-07 PROCEDURE — 82962 GLUCOSE BLOOD TEST: CPT

## 2020-11-07 PROCEDURE — 25010000002 CEFTRIAXONE SODIUM-DEXTROSE 2-2.22 GM-%(50ML) RECONSTITUTED SOLUTION: Performed by: INTERNAL MEDICINE

## 2020-11-07 PROCEDURE — 25010000002 ENOXAPARIN PER 10 MG: Performed by: INTERNAL MEDICINE

## 2020-11-07 RX ADMIN — PRIMIDONE 250 MG: 250 TABLET ORAL at 21:14

## 2020-11-07 RX ADMIN — CLONAZEPAM 0.5 MG: 0.5 TABLET ORAL at 21:14

## 2020-11-07 RX ADMIN — PROPRANOLOL HYDROCHLORIDE 40 MG: 40 TABLET ORAL at 21:14

## 2020-11-07 RX ADMIN — CLONIDINE HYDROCHLORIDE 0.1 MG: 0.1 TABLET ORAL at 21:13

## 2020-11-07 RX ADMIN — GABAPENTIN 800 MG: 400 CAPSULE ORAL at 16:53

## 2020-11-07 RX ADMIN — PROPRANOLOL HYDROCHLORIDE 40 MG: 40 TABLET ORAL at 08:56

## 2020-11-07 RX ADMIN — GABAPENTIN 800 MG: 400 CAPSULE ORAL at 21:13

## 2020-11-07 RX ADMIN — METFORMIN HYDROCHLORIDE 500 MG: 500 TABLET, EXTENDED RELEASE ORAL at 08:56

## 2020-11-07 RX ADMIN — OXYCODONE HYDROCHLORIDE AND ACETAMINOPHEN 1 TABLET: 7.5; 325 TABLET ORAL at 21:13

## 2020-11-07 RX ADMIN — CLONIDINE HYDROCHLORIDE 0.1 MG: 0.1 TABLET ORAL at 08:56

## 2020-11-07 RX ADMIN — QUETIAPINE FUMARATE 200 MG: 100 TABLET ORAL at 21:13

## 2020-11-07 RX ADMIN — PRIMIDONE 250 MG: 250 TABLET ORAL at 08:56

## 2020-11-07 RX ADMIN — ENOXAPARIN SODIUM 40 MG: 40 INJECTION SUBCUTANEOUS at 06:51

## 2020-11-07 RX ADMIN — CEFTRIAXONE 2 G: 2 INJECTION, SOLUTION INTRAVENOUS at 16:54

## 2020-11-07 RX ADMIN — CLONAZEPAM 0.5 MG: 0.5 TABLET ORAL at 12:27

## 2020-11-07 RX ADMIN — AMLODIPINE BESYLATE 5 MG: 5 TABLET ORAL at 08:57

## 2020-11-07 RX ADMIN — PANTOPRAZOLE SODIUM 40 MG: 40 TABLET, DELAYED RELEASE ORAL at 06:51

## 2020-11-07 RX ADMIN — GABAPENTIN 800 MG: 400 CAPSULE ORAL at 08:57

## 2020-11-07 RX ADMIN — TEMAZEPAM 30 MG: 15 CAPSULE ORAL at 21:13

## 2020-11-07 RX ADMIN — FLUOXETINE 40 MG: 20 CAPSULE ORAL at 08:56

## 2020-11-07 RX ADMIN — PRIMIDONE 250 MG: 250 TABLET ORAL at 16:53

## 2020-11-07 RX ADMIN — Medication: at 08:57

## 2020-11-07 RX ADMIN — ENOXAPARIN SODIUM 40 MG: 40 INJECTION SUBCUTANEOUS at 16:53

## 2020-11-07 NOTE — PLAN OF CARE
Problem: Fall Injury Risk  Goal: Absence of Fall and Fall-Related Injury  Outcome: Ongoing, Progressing     Problem: Pain Acute  Goal: Optimal Pain Control  Outcome: Ongoing, Progressing   Goal Outcome Evaluation:  Plan of Care Reviewed With: patient  Progress: improving

## 2020-11-07 NOTE — PLAN OF CARE
Goal Outcome Evaluation:  Plan of Care Reviewed With: patient  Progress: improving     Pt sitting up in chair most day, pt with no complaints, Pt ate well, Tx to Right foot little toe area without complaints of pain or discomfort, Pt took all meds without difficulty.    Pt with no new or worsened cough, no new shortness of air, no sore throat, no loss of taste or smell.

## 2020-11-07 NOTE — PLAN OF CARE
Goal Outcome Evaluation:  Plan of Care Reviewed With: patient  Progress: improving  Outcome Summary: patient has no c/o or s/s of cough, soa, sore throat or loss of taste or smell.  safety measures in place.

## 2020-11-08 LAB — GLUCOSE BLDC GLUCOMTR-MCNC: 98 MG/DL (ref 70–130)

## 2020-11-08 PROCEDURE — 25010000002 ENOXAPARIN PER 10 MG: Performed by: INTERNAL MEDICINE

## 2020-11-08 PROCEDURE — 82962 GLUCOSE BLOOD TEST: CPT

## 2020-11-08 PROCEDURE — 25010000002 CEFTRIAXONE SODIUM-DEXTROSE 2-2.22 GM-%(50ML) RECONSTITUTED SOLUTION: Performed by: INTERNAL MEDICINE

## 2020-11-08 RX ADMIN — METFORMIN HYDROCHLORIDE 500 MG: 500 TABLET, EXTENDED RELEASE ORAL at 08:52

## 2020-11-08 RX ADMIN — PROPRANOLOL HYDROCHLORIDE 40 MG: 40 TABLET ORAL at 08:52

## 2020-11-08 RX ADMIN — Medication: at 08:53

## 2020-11-08 RX ADMIN — QUETIAPINE FUMARATE 200 MG: 100 TABLET ORAL at 22:11

## 2020-11-08 RX ADMIN — GABAPENTIN 800 MG: 400 CAPSULE ORAL at 22:11

## 2020-11-08 RX ADMIN — CLONAZEPAM 0.5 MG: 0.5 TABLET ORAL at 13:26

## 2020-11-08 RX ADMIN — PRIMIDONE 250 MG: 250 TABLET ORAL at 08:52

## 2020-11-08 RX ADMIN — CLONIDINE HYDROCHLORIDE 0.1 MG: 0.1 TABLET ORAL at 22:12

## 2020-11-08 RX ADMIN — GABAPENTIN 800 MG: 400 CAPSULE ORAL at 08:52

## 2020-11-08 RX ADMIN — TEMAZEPAM 30 MG: 15 CAPSULE ORAL at 22:26

## 2020-11-08 RX ADMIN — PRIMIDONE 250 MG: 250 TABLET ORAL at 22:12

## 2020-11-08 RX ADMIN — FLUOXETINE 40 MG: 20 CAPSULE ORAL at 08:52

## 2020-11-08 RX ADMIN — CLONIDINE HYDROCHLORIDE 0.1 MG: 0.1 TABLET ORAL at 08:52

## 2020-11-08 RX ADMIN — ENOXAPARIN SODIUM 40 MG: 40 INJECTION SUBCUTANEOUS at 06:08

## 2020-11-08 RX ADMIN — PANTOPRAZOLE SODIUM 40 MG: 40 TABLET, DELAYED RELEASE ORAL at 06:08

## 2020-11-08 RX ADMIN — AMLODIPINE BESYLATE 5 MG: 5 TABLET ORAL at 08:52

## 2020-11-08 RX ADMIN — ENOXAPARIN SODIUM 40 MG: 40 INJECTION SUBCUTANEOUS at 17:30

## 2020-11-08 RX ADMIN — OXYCODONE HYDROCHLORIDE AND ACETAMINOPHEN 1 TABLET: 7.5; 325 TABLET ORAL at 13:26

## 2020-11-08 RX ADMIN — OXYCODONE HYDROCHLORIDE AND ACETAMINOPHEN 1 TABLET: 7.5; 325 TABLET ORAL at 22:12

## 2020-11-08 RX ADMIN — PROPRANOLOL HYDROCHLORIDE 40 MG: 40 TABLET ORAL at 22:12

## 2020-11-08 RX ADMIN — GABAPENTIN 800 MG: 400 CAPSULE ORAL at 17:30

## 2020-11-08 RX ADMIN — CEFTRIAXONE 2 G: 2 INJECTION, SOLUTION INTRAVENOUS at 17:31

## 2020-11-08 RX ADMIN — CLONAZEPAM 0.5 MG: 0.5 TABLET ORAL at 22:12

## 2020-11-08 RX ADMIN — PRIMIDONE 250 MG: 250 TABLET ORAL at 17:30

## 2020-11-08 NOTE — PLAN OF CARE
Goal Outcome Evaluation:  Plan of Care Reviewed With: patient  Progress: improving       Pt was up in his chair for breakfast, pt stated that he would like to speak with Jennifer regarding whether he should go home or stay for another week, will let her know Monday; pt had no complaints this am.    Pt had no new or worsened cough, no shortness of air, no sore throat, and no loss of taste or smell on am assessment.

## 2020-11-08 NOTE — PLAN OF CARE
Goal Outcome Evaluation:  Plan of Care Reviewed With: patient  Progress: improving  Outcome Summary: patient has no c/o or s/s of cough, soa, sore throat or loss of taste or smell.  safety measures in place.    Pt ready for surgery

## 2020-11-09 VITALS
HEIGHT: 71 IN | WEIGHT: 305.13 LBS | RESPIRATION RATE: 18 BRPM | DIASTOLIC BLOOD PRESSURE: 77 MMHG | HEART RATE: 71 BPM | BODY MASS INDEX: 42.72 KG/M2 | OXYGEN SATURATION: 95 % | SYSTOLIC BLOOD PRESSURE: 112 MMHG | TEMPERATURE: 98.2 F

## 2020-11-09 LAB
BASOPHILS # BLD AUTO: 0.04 10*3/MM3 (ref 0–0.2)
BASOPHILS NFR BLD AUTO: 0.8 % (ref 0–1.5)
CRP SERPL-MCNC: 0.31 MG/DL (ref 0–0.5)
DEPRECATED RDW RBC AUTO: 44.6 FL (ref 37–54)
EOSINOPHIL # BLD AUTO: 0.41 10*3/MM3 (ref 0–0.4)
EOSINOPHIL NFR BLD AUTO: 8.3 % (ref 0.3–6.2)
ERYTHROCYTE [DISTWIDTH] IN BLOOD BY AUTOMATED COUNT: 13.3 % (ref 12.3–15.4)
ERYTHROCYTE [SEDIMENTATION RATE] IN BLOOD: 11 MM/HR (ref 0–20)
GLUCOSE BLDC GLUCOMTR-MCNC: 103 MG/DL (ref 70–130)
HCT VFR BLD AUTO: 42.4 % (ref 37.5–51)
HGB BLD-MCNC: 13.5 G/DL (ref 13–17.7)
IMM GRANULOCYTES # BLD AUTO: 0.01 10*3/MM3 (ref 0–0.05)
IMM GRANULOCYTES NFR BLD AUTO: 0.2 % (ref 0–0.5)
LYMPHOCYTES # BLD AUTO: 1.6 10*3/MM3 (ref 0.7–3.1)
LYMPHOCYTES NFR BLD AUTO: 32.5 % (ref 19.6–45.3)
MCH RBC QN AUTO: 28.7 PG (ref 26.6–33)
MCHC RBC AUTO-ENTMCNC: 31.8 G/DL (ref 31.5–35.7)
MCV RBC AUTO: 90 FL (ref 79–97)
MONOCYTES # BLD AUTO: 0.61 10*3/MM3 (ref 0.1–0.9)
MONOCYTES NFR BLD AUTO: 12.4 % (ref 5–12)
NEUTROPHILS NFR BLD AUTO: 2.25 10*3/MM3 (ref 1.7–7)
NEUTROPHILS NFR BLD AUTO: 45.8 % (ref 42.7–76)
NRBC BLD AUTO-RTO: 0 /100 WBC (ref 0–0.2)
PLATELET # BLD AUTO: 146 10*3/MM3 (ref 140–450)
PMV BLD AUTO: 8.9 FL (ref 6–12)
RBC # BLD AUTO: 4.71 10*6/MM3 (ref 4.14–5.8)
WBC # BLD AUTO: 4.92 10*3/MM3 (ref 3.4–10.8)

## 2020-11-09 PROCEDURE — 86140 C-REACTIVE PROTEIN: CPT | Performed by: NURSE PRACTITIONER

## 2020-11-09 PROCEDURE — 25010000002 CEFTRIAXONE SODIUM-DEXTROSE 2-2.22 GM-%(50ML) RECONSTITUTED SOLUTION: Performed by: INTERNAL MEDICINE

## 2020-11-09 PROCEDURE — 85652 RBC SED RATE AUTOMATED: CPT | Performed by: NURSE PRACTITIONER

## 2020-11-09 PROCEDURE — 25010000002 ENOXAPARIN PER 10 MG: Performed by: INTERNAL MEDICINE

## 2020-11-09 PROCEDURE — 85025 COMPLETE CBC W/AUTO DIFF WBC: CPT | Performed by: NURSE PRACTITIONER

## 2020-11-09 PROCEDURE — 82962 GLUCOSE BLOOD TEST: CPT

## 2020-11-09 PROCEDURE — 99315 NF DSCHRG MGMT 30 MIN/LESS: CPT | Performed by: NURSE PRACTITIONER

## 2020-11-09 RX ORDER — METFORMIN HYDROCHLORIDE 500 MG/1
500 TABLET, EXTENDED RELEASE ORAL
Qty: 30 TABLET | Refills: 1 | Status: SHIPPED | OUTPATIENT
Start: 2020-11-10 | End: 2021-07-31 | Stop reason: HOSPADM

## 2020-11-09 RX ORDER — CLONAZEPAM 0.5 MG/1
0.5 TABLET ORAL 3 TIMES DAILY PRN
Start: 2020-11-09

## 2020-11-09 RX ORDER — BISACODYL 10 MG
10 SUPPOSITORY, RECTAL RECTAL DAILY PRN
Start: 2020-11-09 | End: 2021-01-14

## 2020-11-09 RX ORDER — QUETIAPINE FUMARATE 200 MG/1
200 TABLET, FILM COATED ORAL NIGHTLY
Qty: 30 TABLET | Refills: 1 | Status: SHIPPED | OUTPATIENT
Start: 2020-11-09 | End: 2023-02-06 | Stop reason: DRUGHIGH

## 2020-11-09 RX ORDER — OXYCODONE AND ACETAMINOPHEN 7.5; 325 MG/1; MG/1
1 TABLET ORAL EVERY 6 HOURS PRN
Qty: 12 TABLET | Refills: 0 | Status: SHIPPED | OUTPATIENT
Start: 2020-11-09 | End: 2020-11-12

## 2020-11-09 RX ADMIN — ENOXAPARIN SODIUM 40 MG: 40 INJECTION SUBCUTANEOUS at 06:41

## 2020-11-09 RX ADMIN — CEFTRIAXONE 2 G: 2 INJECTION, SOLUTION INTRAVENOUS at 14:54

## 2020-11-09 RX ADMIN — FLUOXETINE 40 MG: 20 CAPSULE ORAL at 09:52

## 2020-11-09 RX ADMIN — AMLODIPINE BESYLATE 5 MG: 5 TABLET ORAL at 09:52

## 2020-11-09 RX ADMIN — PRIMIDONE 250 MG: 250 TABLET ORAL at 09:53

## 2020-11-09 RX ADMIN — METFORMIN HYDROCHLORIDE 500 MG: 500 TABLET, EXTENDED RELEASE ORAL at 09:52

## 2020-11-09 RX ADMIN — CLONIDINE HYDROCHLORIDE 0.1 MG: 0.1 TABLET ORAL at 09:52

## 2020-11-09 RX ADMIN — PANTOPRAZOLE SODIUM 40 MG: 40 TABLET, DELAYED RELEASE ORAL at 06:41

## 2020-11-09 RX ADMIN — PROPRANOLOL HYDROCHLORIDE 40 MG: 40 TABLET ORAL at 09:52

## 2020-11-09 RX ADMIN — Medication: at 09:53

## 2020-11-09 RX ADMIN — GABAPENTIN 800 MG: 400 CAPSULE ORAL at 09:52

## 2020-11-09 NOTE — PROGRESS NOTES
To be discharged home today where he lives alone. [facility] will be seeing for PT, OT, and nursing for IV antibiotics and wound care. Receiving IV Rocephin 2 grams IV every 24 hours till 11/16/2020. Follow up with PCP,  on November 13, 2020 at 3:50PM. Follow up with podiatrist,  on November 16, 2020 at 3:30PM. He has a quad cane, straight cane, glucometer, wheelchair, rolling walker and grab bars. No other DME needed.

## 2020-11-09 NOTE — PROGRESS NOTES
Foot/Ankle Progress Note    Subjective     Post-Operative Day: 27. S/P R 5th toe amputation.  Post-Operative Day: 24. S/P R 5th toe amputation wound debridement and VAC placement    Systemic or Specific Complaints: No pedal complaints. Doing well with R foot local wound care. Anxious for D/C home today. Home health in place for wound care and IV antibiotic therapy.    Past Medical History:   Diagnosis Date   • Amputated toe of left foot (CMS/[facility])    • Arthritis    • Bipolar disorder (CMS/[facility])    • Cellulitis of lower extremity     RIGHT   • Coronary artery disease    • Diabetes mellitus (CMS/[facility])    • Disease of thyroid gland     nodule on thyroid   • DVT (deep venous thrombosis) (CMS/[facility])    • Fracture of right foot    • Hyperlipidemia    • Hypertension    • Pseudogout    • Septic shock (CMS/[facility]) 07/2017    H/O   • Sleep apnea    • Toxic metabolic encephalopathy 07/2017    H/O       Past Surgical History:   Procedure Laterality Date   • AMPUTATION DIGIT Left 3/16/2018    Procedure: AMPUTATION LEFT FOURTH TOE;  Surgeon: Anish Farah DPM;  Location: [facility] OR;  Service: Podiatry   • AMPUTATION DIGIT Right 10/13/2020    Procedure: AMPUTATION OF RIGHT FIFTH TOE AND ALL ASSOCIATED PROCEDURES;  Surgeon: Anish Farah DPM;  Location: [facility] OR;  Service: Podiatry;  Laterality: Right;  AMPUTATION OF RIGHT FIFTH TOE    • 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   • COLONOSCOPY N/A 7/16/2018    Procedure: COLONOSCOPY and polypectomy;  Surgeon: Kaylie Mcfarlane MD;  Location: [facility] OR;  Service: Gastroenterology   • ENDOSCOPY N/A 3/16/2018    Procedure: ESOPHAGOGASTRODUODENOSCOPY with biopsies;  Surgeon: Kaylie Mcfarlane MD;  Location: [facility] OR;  Service: Gastroenterology   • HARDWARE REMOVAL Right 8/18/2017    Procedure: RIGHT EXTERNAL FIXATOR REMOVAL;  Surgeon: Anish Farah DPM;  Location: [facility] OR;  Service:    • INCISION AND DRAINAGE LEG Left 6/13/2018    Procedure: Left 2nd, 3rd toe laceration  irrigation, debridement and multi layer wound closure.;  Surgeon: Anish Farah DPM;  Location: [facility] OR;  Service: Podiatry   • JOINT REPLACEMENT     • KNEE SURGERY     • LEG DEBRIDEMENT Right 10/16/2020    Procedure: DEBRIDEMENT RIGHT  FOOT SURGICAL WOUND;  Surgeon: Anish Farah DPM;  Location: [facility] OR;  Service: Podiatry;  Laterality: Right;  DEBRIDEMENT RIGHT FOOT SURGICAL WOUND   • ORIF FOOT FRACTURE Right 7/29/2017    Procedure: open reduction with percutaneous pinning of midfoot dislocation fracture;  Surgeon: Anish Farah DPM;  Location:  [facility] OR;  Service:    • ORIF FOOT FRACTURE Left 3/5/2018    Procedure: OPEN REDUCTION WITH IRRIGATION AND WOUND CLOSURE LEFT FOURTH TOE;  Surgeon: Anish Farah DPM;  Location: [facility] OR;  Service:    • SHOULDER SURGERY      RIGHT   • TOE FUSION Right 8/18/2017    Procedure: RIGHT  MEDIAL COLUMN ARTHRODESIS, RIGHT TARAL METATARSAL ARTHRODESIS;  Surgeon: Anish Farah DPM;  Location: [facility] OR;  Service:    • TOTAL HIP ARTHROPLASTY           Current Facility-Administered Medications:   •  acetaminophen (TYLENOL) tablet 325 mg, 325 mg, Oral, Q4H PRN, 325 mg at 10/22/20 1017 **OR** acetaminophen (TYLENOL) 160 MG/5ML solution 325 mg, 325 mg, Oral, Q4H PRN **OR** acetaminophen (TYLENOL) suppository 650 mg, 650 mg, Rectal, Q4H PRN, Walter Reno MD  •  amLODIPine (NORVASC) tablet 5 mg, 5 mg, Oral, Daily, Walter Reno MD, 5 mg at 11/09/20 0952  •  Aquaphor Advanced Therapy ointment, , Topical, Q24H, Anish Farah DPM  •  bisacodyl (DULCOLAX) suppository 10 mg, 10 mg, Rectal, Daily PRN, Walter Reno MD  •  camphor-menthol (SARNA) 0.5-0.5 % lotion, , Topical, PRN, Anish Farah DPM  •  cefTRIAXone (ROCEPHIN) IVPB 2 g/50ml dextrose (premix), 2 g, Intravenous, Q24H, Walter Reno MD, Last Rate: 100 mL/hr at 11/08/20 1731, 2 g at 11/08/20 1731  •  clonazePAM (KlonoPIN) tablet 0.5 mg, 0.5 mg, Oral, TID PRN, Ciera Henriquez MD, 0.5 mg at 11/08/20 2212  •   cloNIDine (CATAPRES) tablet 0.1 mg, 0.1 mg, Oral, Q12H, Walter Reno MD, 0.1 mg at 11/09/20 0952  •  dextrose (D50W) 25 g/ 50mL Intravenous Solution 25 g, 25 g, Intravenous, Q15 Min PRN, Walter Reno MD  •  dextrose (GLUTOSE) oral gel 15 g, 15 g, Oral, Q15 Min PRN, Walter Reno MD  •  diphenhydrAMINE (BENADRYL) capsule 25 mg, 25 mg, Oral, Q6H PRN, Sarah Gómez APRN, 25 mg at 11/04/20 2157  •  enoxaparin (LOVENOX) syringe 40 mg, 40 mg, Subcutaneous, Q12H, Walter Reno MD, 40 mg at 11/09/20 0641  •  FLUoxetine (PROzac) capsule 40 mg, 40 mg, Oral, Daily, Walter Reno MD, 40 mg at 11/09/20 0952  •  gabapentin (NEURONTIN) capsule 800 mg, 800 mg, Oral, TID, Walter Reno MD, 800 mg at 11/09/20 0952  •  glucagon (GLUCAGEN) injection 1 mg, 1 mg, Subcutaneous, Q15 Min PRN, Walter Reno MD  •  metFORMIN ER (GLUCOPHAGE-XR) 24 hr tablet 500 mg, 500 mg, Oral, Daily With Breakfast, Sarah Gómez APRN, 500 mg at 11/09/20 0952  •  oxyCODONE-acetaminophen (PERCOCET) 7.5-325 MG per tablet 1 tablet, 1 tablet, Oral, Q6H PRN, Ciera Henriquez MD, 1 tablet at 11/08/20 2212  •  pantoprazole (PROTONIX) EC tablet 40 mg, 40 mg, Oral, QAM, Walter Reno MD, 40 mg at 11/09/20 0641  •  primidone (MYSOLINE) tablet 250 mg, 250 mg, Oral, TID, Ciera Henriquez MD, 250 mg at 11/09/20 0953  •  propranolol (INDERAL) tablet 40 mg, 40 mg, Oral, BID, Walter Reno MD, 40 mg at 11/09/20 0952  •  QUEtiapine (SEROquel) tablet 200 mg, 200 mg, Oral, Nightly, Ciera Henriquez MD, 200 mg at 11/08/20 2211  •  sennosides-docusate (PERICOLACE) 8.6-50 MG per tablet 2 tablet, 2 tablet, Oral, Nightly PRN, Walter Reno MD  •  temazepam (RESTORIL) capsule 30 mg, 30 mg, Oral, Nightly PRN, Walter Reno MD, 30 mg at 11/08/20 2226     Allergies   Allergen Reactions   • Lisinopril Cough     COUGHING     Objective     Vital signs in last 24 hours:  Temp:  [98.2 °F (36.8 °C)-98.3 °F (36.8 °C)] 98.2 °F (36.8 °C)  Heart Rate:  [71] 71  Resp:  [18] 18  BP:  (112-135)/(77-87) 112/77    General: alert, appears stated age, cooperative and no distress   Neurovascular: Markedly diminished pedal sensation c/w loss of protective sensation and diabetic neuropathy.  Pedal pulses are palpable B/L. Pedal skin temp is cool, L cooler than right. No pedal edema present.   Wound: Open R 5th toe amputation wound measures 1.7x0.6x0.8cm with viable granular base. No purulent drainage or foul odor. No pedal cellulitis is noted. Epithelialized scab on R hallus IPJ dorsally. Dry scabs on dorsal L hallux and 2nd toes.   Range of Motion: Decreased R midtarsal, tarsal-metatarsal joint motion due to arthrodesis. Digital clawtoes and hallux malleus noted B/L. Partial L 4th, 5th ray amputation noted.   DVT Exam: No calf pain or swelling to suggest DVT.     Data Review  CBC:  Results from last 7 days   Lab Units 11/09/20  0629   WBC 10*3/mm3 4.92   RBC 10*6/mm3 4.71   HEMOGLOBIN g/dL 13.5   HEMATOCRIT % 42.4   PLATELETS 10*3/mm3 146       Assessment/Plan   Post-Operative Day: 27. S/P R 5th toe amputation.  Post-Operative Day: 24. S/P R 5th toe amputation wound debridement and VAC placement    Pain Relief: Continue current pain management.    Activity: Ambulatory w/ walker and surgical shoe on R foot; will order shoe prior to D/C.    Weight Bearing: May be full weight on R foot in surgical shoe.    Wound Care: Every other day Jovita + gauze dressing R foot.    IV antibiotics: 2 gm Rocephin q 24 hrs until 11/16/2020, per Dr. Arce.     LOS: 20 days     Anish Farah DPM    Date: 11/9/2020  Time: 13:23 EST

## 2020-11-09 NOTE — NURSING NOTE
No new or worsened cough, new SOA, sore throat, loss of taste or smell noted on assessment or reported by patient.

## 2020-11-09 NOTE — DISCHARGE SUMMARY
"Eliseo Price  1965  [id number]    Hospitalists Discharge Summary    Date of Admission: 10/20/2020  Date of Discharge:  11/9/2020    History of Present Illness from [facility] on admit:   \"53 yo WM w/ PMH Bipolar, recurrent cellulitis, DM, HTN, ADOLPH & h/o multiple prior amputations for frx's and dislocations in the past.  Patient known to me from previous admissions.  Review of notes from March 2018 and November 2018, patient does not appear to be as confused as he typically is on admission.  Though he does have an odd and tangential affect that makes getting a history from him difficult, and this does appear to be his baseline.  As for pain, patient did not appear to be any acute distress, when he was being examined he was undergoing lower extremity ultrasound.  He only complained of a moderate to severe burning sensation in the bilateral feet.     Patient states that last night he was carrying his water out in front of him, when he slipped on a wet spot causing him to dislocate his big toe.  It was blue, and minimal amount of tissue connection to his foot, discussion with both the ED physician and Dr. Farah with podiatry indicated that this is likely nonviable, and he is a very poor candidate for reattachment surgery even if we felt the toe was viable.     Patient also complains of a erythema and discomfort on his right lower extremity, has been following with Dr. Farah in the office for this, but has a history of noncompliance and variable no-shows.     Patient denies any fever or chills, nausea vomiting or diarrhea, chest pain or shortness of breath.      Noted that the patient stated that he did have fever to the ED physician, which he denied to this provider.  He also told the ED physician that a few nights ago his toe got \"hung up on some furniture\", which wasn't the history he gave during exam this afternoon.\"  Primary Discharge diagnoses/Hospital Course Summary:   Aftercare 2/2 OM right fifth toe, s/p " amputation/wound debridement and vac placement:  Recurrent cellulitis:  Ischemic left foot: podiatry/ID followed   Continue NWB right foot  Wound vac now discontinued  Continues Rocephin per ID recommendation, home to continue by   Chronic left foot ischemia being managed by podiatry  CTA with runoff showed nothing significant, see full report below  F/U Dr. Farah 11/16/2020 at 3:30 pm  F/U Killian Scales MD 11/13/2020 at 3:50 pm      Right hip pain with h/o RTHA:  Follows with Dr. Goodwin outpatient, will need close f/u after d/c  No acute findings here    Secondary Discharge Diagnoses:    DM2 in obese with peripheral neuropathy: A1C 6.7% on 10/12/20  Body mass index is 42.58 kg/m².  Glucose has remained at goal off insulin  Continues metformin  mg daily, accuchecks once daily  Continues gabapentin 800 mg tid  F/U Killian Scales MD as above    HTN:   BP overall near goal on clonidine 0.1 mg every 12 hours, Inderal 40 mg twice daily, amlodipine 5 mg daily  Continue to log BP at home and bring to appointment with PCP     Bipolar disorder:   Restless leg syndrome:  No acute issues here on Seroquel 200 mg nightly, Restoril 30 mg nightly, Prozac 40 mg daily    Chronic essential tremors: No acute issues on propranolol     ADOLPH: Has not worn CPAP in quite a while    PCP  Patient Care Team:  Killian Scales MD as PCP - General  Killian Scales MD as PCP - Family Medicine    Consults:   Consults     Date and Time Order Name Status Description    10/13/2020 1721 Inpatient Infectious Diseases Consult Completed         Operations and Procedures Performed:     Xr Chest 2 View    Result Date: 10/12/2020  Narrative: PA AND LATERAL CHEST, 10/12/2020 11:23 AM  HISTORY: Fever   shortness of air today  COMPARISON: 11/24/2018  TECHNIQUE: PA and lateral upright chest series.  FINDINGS: Heart size and pulmonary vascularity are normal. The lungs are expanded and clear. No visible pulmonary infiltrate or pleural  effusion.       Impression: Negative chest.  This report was finalized on 10/12/2020 11:37 AM by Dr. Jordan Frazier MD.      Xr Foot 3+ View Right    Result Date: 10/12/2020  Narrative: XR FOOT 3+ VW RIGHT-: 10/12/2020  INDICATION: Fifth toe injury with previous fracture in this region. Injury happened today.  COMPARISON: 08/27/2020.  FINDINGS: 3 views of the right foot. Postoperative changes involving the first tarsal metatarsal region and second and third tarsometatarsal regions are stable. Marked degenerative change of the tarsometatarsal joints is present. There is a fracture through the proximal medial base of the second proximal phalanx. This is slightly displaced and unchanged in appearance from 08/27/2020. There is a fracture through the distal third of the proximal phalanx of the fifth digit with up to 5 mm of displacement. The distal portion of the bone appears be fragmented. The distal phalanx. Intact.      Impression: No significant change in oblique slightly displaced fractures of the proximal medial margin of the second proximal phalanx  The fracture through the distal half of the fifth proximal phalanx is still present and there is now a 5 mm gap between the the bone.  This report was finalized on 10/12/2020 11:43 AM by Dr. Jordan Frazier MD.      Ct Angio Abdominal Aorta Bilateral Iliofem Runoff    Result Date: 11/2/2020  Narrative: CT ANGIO ABDOMINAL AORTA BILAT ILIOFEM RUNOFF W WO CONTRAST INDICATION:  Right foot wound. Claudication and lower extremity ischemia. Abnormal pressures within the lower extremities. TECHNIQUE: CT of the abdominal aortogram with iliofemoral runoff without and with Isovue-370 IV contrast. Coronal and sagittal reconstructions were obtained.  Radiation dose reduction techniques included automated exposure control or exposure modulation based on body size. Count of known CT and cardiac nuc med studies performed in previous 12 months: 1.  COMPARISON:  CT abdomen and pelvis 7/24/2017  FINDINGS: Vasculature: No evidence of abdominal aortic aneurysm. No significant aortic or iliofemoral stenosis. Mesenteric and renal arteries are unremarkable. The SFAs appear patent bilaterally without significant stenosis. The popliteal arteries are patent. The major arteries within the calf are patent with three-vessel runoff to the ankle bilaterally. Abdomen/pelvis: Solid organs appear normal. The visualized GI tract unremarkable. No free air or free fluid. Prominent vessel in the upper anterior abdomen extending from the left upper quadrant towards the ricarda hepatis and may represent collateral flow secondary to a thrombosed splenic and/or portal vein. This remains relatively unchanged since 2017. Soft tissues: Intramuscular lipoma right rectus femoris muscle. Bilateral total hip arthroplasties without visible complication.     Impression: No significant aortic, iliofemoral or peripheral vascular disease. 3 vessel runoff to both ankles. 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 Signer Name: CE Hansen MD  Signed: 11/2/2020 6:04 PM  Workstation Name: [email or web address]  [facility]    Mri Foot Right With & Without Contrast    Result Date: 10/13/2020  Narrative: MRI Foot RT WO W INDICATION:  54-year-old male with right foot swelling and chronic diabetic wound on the fifth and first toes. Forefoot wounds have been present for over 6 months. Known open fracture/dislocation of the fifth toe. Order suspecting underlying osteomyelitis of the fifth digit and requesting assessment for forefoot abscess. TECHNIQUE: MRI of the right midfoot and forefoot with and without 20 cc MultiHance IV contrast. COMPARISON:  Right foot x-rays 10/12/2020, 8/27/2020, and 11/15/2019 FINDINGS: There is a pathologic fracture involving the head/neck of the fifth proximal phalanx. There is  destruction, marrow replacement, and enhancement involving the distal fracture fragment and base and shaft of the fifth proximal phalanx, consistent with osteomyelitis. There is diffuse skin thickening throughout the fifth toe and lateral aspect of the forefoot. Mild subcutaneous soft tissue edema throughout the fifth toe. There is a geographic area of nonenhancing soft tissue involving the fifth toe and lateral aspect of the forefoot along the plantar aspect of the distal fifth metatarsal. This is concerning for soft tissue necrosis. Complete rupture of the flexor digitorum longus tendon of the fifth toe with rim-enhancing fluid extending proximally along the tendon sheath, concerning for infectious tenosynovitis. No other drainable soft tissue fluid collections. Small effusion of the fifth metatarsal phalangeal joint. Septic arthritis is not excluded. Generalized edema throughout the intrinsic musculature of the foot. This is nonspecific and may be secondary to infectious myositis versus chronic diabetic neuropathic changes. There is an intra-articular fracture involving the medial base of the second proximal phalanx. Moderate arthrosis of the first metatarsal phalangeal joint. Extensive postsurgical changes from partial midfoot fusion. Advanced arthrosis of the fourth and fifth tarsometatarsal joints. Moderately extensive subcutaneous soft tissue edema along the dorsum of the foot.     Impression: 1. Pathologic fracture involving the head/neck of the fifth proximal phalanx with osteomyelitis of the fracture fragments of the fifth proximal phalanx. 2. Small effusion of the fifth metatarsal phalangeal joint. Septic arthritis is not excluded. 3. Soft tissue cellulitis throughout the fifth toe with a geographic area of nonenhancing soft tissue throughout the fifth toe and lateral aspect of the forefoot, detailed above. This is concerning for soft tissue necrosis. 4. Complete rupture of the flexor digitorum longus  tendon of the fifth toe with rim-enhancing fluid extending proximally along the tendon sheath, concerning for infectious tenosynovitis. No other drainable soft tissue fluid collections. 5. Intra-articular fracture involving the medial base of the second proximal phalanx. 6. Extensive postsurgical changes from partial midfoot fusion. Advanced arthrosis of the fourth and fifth tarsometatarsal joints. Moderate arthrosis of the first metatarsal phalangeal joint. 7. Generalized edema throughout the intrinsic musculature of the foot. This is nonspecific and may be secondary to infectious myositis versus chronic diabetic neuropathic change. 8. Extensive subcutaneous soft tissue edema throughout the dorsum of the foot. Signer Name: Larry Dewitt MD  Signed: 10/13/2020 2:06 PM  Workstation Name: [id number]  [facility] Ankle / Brachial Indices Extremity Complete    Result Date: 10/21/2020  Narrative: RESTING ANKLE-BRACHIAL INDEX MEASUREMENT, 10/21/2020  HISTORY: 54-year-old male hospital inpatient with diabetic foot wound and osteomyelitis. Evaluate for arterial vascular insufficiency.  TECHNIQUE: Cuff pressure measurements were obtained at brachial, ankle and great toe levels, including separate posterior tibial and dorsalis pedis measurements at each ankle. Pulse volume recordings and segmental Doppler waveforms were also obtained at ankle and toe levels.  FINDINGS: There is no abnormal decrease in measured ankle pressures. High indices are noted bilaterally, likely reflecting poorly compressible vessels in the setting of chronic diabetic arteriopathy. Resting ankle brachial indices are as follows:  RIGHT LEG MANISHA: *  DP: 1.62. *  PT: 1.74. *  Great toe: 1.15.  LEFT LEG MANISHA: *  DP: 1.72. *  PT: 1.86. *  Great toe: 0.88.  Great toe pressures and waveforms are within normal limits. Right and left brachial pressures are equivalent.      Impression: 1.  No evidence of significant lower extremity  arterial occlusive disease. 2.  Ankle brachial indices are high bilaterally, likely reflecting incompletely compressible vessels in the setting of chronic diabetic arteriopathy. Normal pulse volume recordings and Doppler waveforms are maintained bilaterally.  This report was finalized on 10/21/2020 1:13 PM by Dr. Bigg Del Angel MD.      Us Venous Doppler Lower Extremity Right (duplex)    Result Date: 10/12/2020  Narrative: VENOUS DOPPLER ULTRASOUND, RIGHT LOWER EXTREMITY, 10/12/2020  HISTORY: Right leg and great toe of chronic nature  COMPARISON: None  TECHNIQUE: Venous Doppler ultrasound examination of the right leg was performed using grey-scale, spectral Doppler, and color flow Doppler ultrasound imaging.  FINDINGS: The examination is negative.  There is no evidence of deep venous thrombosis from the groin to the lower calf. The greater saphenous vein is also patent.      Impression: Negative examination.  No evidence of right lower extremity DVT.  This report was finalized on 10/12/2020 11:21 AM by Dr. Jordan Frazier MD.      Us Venous Doppler Lower Extremity Bilateral (duplex)    Result Date: 10/12/2020  Narrative: VENOUS DOPPLER ULTRASOUND, BILATERAL LOWER EXTREMITIES, 10/12/2020  HISTORY: Bilateral leg swelling right greater than left with chronic nature  COMPARISON: Right leg was done earlier today  TECHNIQUE: Venous Doppler ultrasound examination of both legs was performed using grey-scale, spectral Doppler, and color flow Doppler ultrasound imaging.  FINDINGS: The examination is negative.  There is no evidence of deep venous thrombosis from the groin to the lower calf bilaterally. The greater saphenous veins are also patent.      Impression: Negative examination.  No evidence of lower extremity DVT on the right or left.  This report was finalized on 10/12/2020 4:02 PM by Dr. Jordan Frazier MD.      Xr Hip With Or Without Pelvis 2 - 3 View Right    Result Date: 10/26/2020  Narrative: XR HIP W OR WO PELVIS 2-3  VIEW RIGHT-: 10/26/2020 8:17 AM  INDICATION: Right hip pain 2 weeks previous hip replacement .  COMPARISON: 02/23/2020.  FINDINGS: AP and frog-leg lateral view(s) of the right hip.  No fracture or dislocation. No bone erosion or destruction. A right total hip prosthesis present.      Impression: Right total hip replacement. Otherwise negative right hip  This report was finalized on 10/26/2020 8:55 AM by Dr. Jordan Frazier MD.      Allergies:  is allergic to lisinopril.    Liborio  Gabapentin  10/2020 per report reviewed by me    Discharge Medications:     Discharge Medications      New Medications      Instructions Start Date   Aquaphor Advanced Therapy ointment ointment   1 application, Topical, Every 24 Hours Scheduled   Start Date: November 10, 2020     bisacodyl 10 MG suppository  Commonly known as: DULCOLAX   10 mg, Rectal, Daily PRN      camphor-menthol 0.5-0.5 % lotion  Commonly known as: SARNA   Topical, As Needed      metFORMIN  MG 24 hr tablet  Commonly known as: GLUCOPHAGE-XR   500 mg, Oral, Daily With Breakfast   Start Date: November 10, 2020     oxyCODONE-acetaminophen 7.5-325 MG per tablet  Commonly known as: PERCOCET   1 tablet, Oral, Every 6 Hours PRN      QUEtiapine 200 MG tablet  Commonly known as: SEROquel   200 mg, Oral, Nightly         Changes to Medications      Instructions Start Date   acetaminophen 325 MG tablet  Commonly known as: TYLENOL  What changed: how much to take   650 mg, Oral, Every 6 Hours PRN      clonazePAM 0.5 MG tablet  Commonly known as: KlonoPIN  What changed: reasons to take this   0.5 mg, Oral, 3 Times Daily PRN         Continue These Medications      Instructions Start Date   amLODIPine 5 MG tablet  Commonly known as: NORVASC   5 mg, Oral, Daily      cefTRIAXone Sodium-Dextrose 2-2.22 GM-%(50ML) IVPB   2 g, Intravenous, Every 24 Hours      cloNIDine 0.1 MG tablet  Commonly known as: CATAPRES   0.1 mg, Oral, Every 12 Hours Scheduled      enoxaparin 40 MG/0.4ML solution  syringe  Commonly known as: LOVENOX   40 mg, Subcutaneous, Every 12 Hours      FLUoxetine 20 MG capsule  Commonly known as: PROzac   40 mg, Oral, Daily      gabapentin 400 MG capsule  Commonly known as: NEURONTIN   800 mg, Oral, 3 Times Daily      omeprazole 40 MG capsule  Commonly known as: priLOSEC   40 mg, Oral, Daily      primidone 250 MG tablet  Commonly known as: MYSOLINE   250 mg, Oral, 3 Times Daily      propranolol 40 MG tablet  Commonly known as: INDERAL   40 mg, Oral, 2 Times Daily      sennosides-docusate 8.6-50 MG per tablet  Commonly known as: PERICOLACE   2 tablets, Oral, Nightly PRN      temazepam 15 MG capsule  Commonly known as: RESTORIL   30 mg, Oral, Nightly PRN         Stop These Medications    fluconazole 200 MG tablet  Commonly known as: DIFLUCAN     JANUMET PO     OLANZapine 5 MG tablet  Commonly known as: zyPREXA            Last Lab Results:   Lab Results (most recent)     Procedure Component Value Units Date/Time    POC Glucose Once [[id number]]  (Normal) Collected: 11/09/20 0708    Specimen: Blood Updated: 11/09/20 0716     Glucose 103 mg/dL     Sedimentation Rate [[id number]]  (Normal) Collected: 11/09/20 0629    Specimen: Blood Updated: 11/09/20 0634     Sed Rate 11 mm/hr     C-reactive Protein [[id number]] Collected: 11/09/20 0630    Specimen: Blood Updated: 11/09/20 0633    CBC & Differential [[id number]]  (Abnormal) Collected: 11/09/20 0629    Specimen: Blood Updated: 11/09/20 0632    Narrative:      The following orders were created for panel order CBC & Differential.  Procedure                               Abnormality         Status                     ---------                               -----------         ------                     CBC Auto Differential[[id number]]        Abnormal            Final result                 Please view results for these tests on the individual orders.    CBC Auto Differential [[id number]]  (Abnormal) Collected: 11/09/20 0629    Specimen: Blood Updated:  11/09/20 0632     WBC 4.92 10*3/mm3      RBC 4.71 10*6/mm3      Hemoglobin 13.5 g/dL      Hematocrit 42.4 %      MCV 90.0 fL      MCH 28.7 pg      MCHC 31.8 g/dL      RDW 13.3 %      RDW-SD 44.6 fl      MPV 8.9 fL      Platelets 146 10*3/mm3      Neutrophil % 45.8 %      Lymphocyte % 32.5 %      Monocyte % 12.4 %      Eosinophil % 8.3 %      Basophil % 0.8 %      Immature Grans % 0.2 %      Neutrophils, Absolute 2.25 10*3/mm3      Lymphocytes, Absolute 1.60 10*3/mm3      Monocytes, Absolute 0.61 10*3/mm3      Eosinophils, Absolute 0.41 10*3/mm3      Basophils, Absolute 0.04 10*3/mm3      Immature Grans, Absolute 0.01 10*3/mm3      nRBC 0.0 /100 WBC     POC Glucose Once [[id number]]  (Normal) Collected: 11/08/20 0607    Specimen: Blood Updated: 11/08/20 0613     Glucose 98 mg/dL     COVID PRE-OP / PRE-PROCEDURE SCREENING ORDER (NO ISOLATION) - Swab, Nasopharynx [[id number]] Collected: 11/06/20 1329    Specimen: Swab from Nasopharynx Updated: 11/07/20 1503    Narrative:      The following orders were created for panel order COVID PRE-OP / PRE-PROCEDURE SCREENING ORDER (NO ISOLATION) - Swab, Nasopharynx.  Procedure                               Abnormality         Status                     ---------                               -----------         ------                     COVID-19,[facility], NP ...[[id number]]                      Final result                 Please view results for these tests on the individual orders.    COVID-19,[facility], NP SWAB IN LEXAR VIRAL TRANSPORT MEDIA 24-30 HR TAT - Swab, Nasopharynx [[id number]] Collected: 11/06/20 1329    Specimen: Swab from Nasopharynx Updated: 11/07/20 1503     SARS-CoV-2 ANTHONY Not Detected    COVID PRE-OP / PRE-PROCEDURE SCREENING ORDER (NO ISOLATION) - Swab, Nasopharynx [[id number]] Collected: 11/03/20 1148    Specimen: Swab from Nasopharynx Updated: 11/04/20 1527    Narrative:      The following orders were created for panel order COVID PRE-OP / PRE-PROCEDURE  SCREENING ORDER (NO ISOLATION) - Swab, Nasopharynx.  Procedure                               Abnormality         Status                     ---------                               -----------         ------                     COVID-19,[facility], NP ...[[id number]]                      Final result                 Please view results for these tests on the individual orders.    COVID-19,[facility], NP SWAB IN LEXAR VIRAL TRANSPORT MEDIA 24-30 HR TAT - Swab, Nasopharynx [[id number]] Collected: 11/03/20 1148    Specimen: Swab from Nasopharynx Updated: 11/04/20 1527     SARS-CoV-2 ANTHONY Not Detected    C-reactive Protein [[id number]]  (Normal) Collected: 11/02/20 0614    Specimen: Blood Updated: 11/02/20 1050     C-Reactive Protein 0.31 mg/dL     Sedimentation Rate [[id number]]  (Normal) Collected: 11/02/20 0613    Specimen: Blood Updated: 11/02/20 0631     Sed Rate 15 mm/hr     CBC & Differential [[id number]]  (Normal) Collected: 11/02/20 0613    Specimen: Blood Updated: 11/02/20 0617    Narrative:      The following orders were created for panel order CBC & Differential.  Procedure                               Abnormality         Status                     ---------                               -----------         ------                     CBC Auto Differential[[id number]]        Normal              Final result                 Please view results for these tests on the individual orders.    CBC Auto Differential [[id number]]  (Normal) Collected: 11/02/20 0613    Specimen: Blood Updated: 11/02/20 0617     WBC 5.27 10*3/mm3      RBC 4.78 10*6/mm3      Hemoglobin 13.8 g/dL      Hematocrit 43.3 %      MCV 90.6 fL      MCH 28.9 pg      MCHC 31.9 g/dL      RDW 13.4 %      RDW-SD 45.3 fl      MPV 8.9 fL      Platelets 195 10*3/mm3      Neutrophil % 53.1 %      Lymphocyte % 30.7 %      Monocyte % 10.2 %      Eosinophil % 4.7 %      Basophil % 1.1 %      Immature Grans % 0.2 %      Neutrophils, Absolute 2.79 10*3/mm3       Lymphocytes, Absolute 1.62 10*3/mm3      Monocytes, Absolute 0.54 10*3/mm3      Eosinophils, Absolute 0.25 10*3/mm3      Basophils, Absolute 0.06 10*3/mm3      Immature Grans, Absolute 0.01 10*3/mm3      nRBC 0.0 /100 WBC     Basic Metabolic Panel [[id number]]  (Abnormal) Collected: 10/30/20 1059    Specimen: Blood Updated: 10/30/20 1139     Glucose 110 mg/dL      BUN 10 mg/dL      Creatinine 0.55 mg/dL      Sodium 135 mmol/L      Potassium 4.1 mmol/L      Chloride 99 mmol/L      CO2 28.0 mmol/L      Calcium 9.1 mg/dL      eGFR Non African Amer >150 mL/min/1.73      BUN/Creatinine Ratio 18.2     Anion Gap 8.0 mmol/L     Narrative:      GFR Normal >60  Chronic Kidney Disease <60  Kidney Failure <15          Imaging Results (Most Recent)     Procedure Component Value Units Date/Time    CT Angio Abdominal Aorta Bilateral Iliofem Runoff [[id number]] Collected: 11/02/20 1804     Updated: 11/02/20 1806    Narrative:      CT ANGIO ABDOMINAL AORTA BILAT ILIOFEM RUNOFF W WO CONTRAST    INDICATION:    Right foot wound. Claudication and lower extremity ischemia. Abnormal pressures within the lower extremities.    TECHNIQUE:   CT of the abdominal aortogram with iliofemoral runoff without and with Isovue-370 IV contrast. Coronal and sagittal reconstructions were obtained.  Radiation dose reduction techniques included automated exposure control or exposure modulation based on  body size. Count of known CT and cardiac nuc med studies performed in previous 12 months: 1.      COMPARISON:    CT abdomen and pelvis 7/24/2017    FINDINGS:  Vasculature: No evidence of abdominal aortic aneurysm. No significant aortic or iliofemoral stenosis. Mesenteric and renal arteries are unremarkable. The SFAs appear patent bilaterally without significant stenosis. The popliteal arteries are patent. The  major arteries within the calf are patent with three-vessel runoff to the ankle bilaterally.    Abdomen/pelvis: Solid organs appear normal. The  visualized GI tract unremarkable. No free air or free fluid. Prominent vessel in the upper anterior abdomen extending from the left upper quadrant towards the ricarda hepatis and may represent collateral flow  secondary to a thrombosed splenic and/or portal vein. This remains relatively unchanged since 2017.    Soft tissues: Intramuscular lipoma right rectus femoris muscle. Bilateral total hip arthroplasties without visible complication.      Impression:      No significant aortic, iliofemoral or peripheral vascular disease. 3 vessel runoff to both ankles.    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    Signer Name: CE Hansen MD   Signed: 11/2/2020 6:04 PM   Workstation Name: [id number]    [facility]    XR Hip With or Without Pelvis 2 - 3 View Right [[id number]] Collected: 10/26/20 0855     Updated: 10/26/20 0857    Narrative:      XR HIP W OR WO PELVIS 2-3 VIEW RIGHT-: 10/26/2020 8:17 AM     INDICATION:   Right hip pain 2 weeks previous hip replacement .     COMPARISON:   02/23/2020.     FINDINGS:  AP and frog-leg lateral view(s) of the right hip.  No fracture or  dislocation. No bone erosion or destruction. A right total hip  prosthesis present.       Impression:      Right total hip replacement. Otherwise negative right hip     This report was finalized on 10/26/2020 8:55 AM by Dr. Jordan Frazier MD.       US Ankle / Brachial Indices Extremity Complete [[id number]] Collected: 10/21/20 1308     Updated: 10/21/20 1315    Narrative:      RESTING ANKLE-BRACHIAL INDEX MEASUREMENT, 10/21/2020     HISTORY:  54-year-old male hospital inpatient with diabetic foot wound and  osteomyelitis. Evaluate for arterial vascular insufficiency.     TECHNIQUE:  Cuff pressure measurements were obtained at brachial, ankle and great  toe levels, including separate posterior tibial and  dorsalis pedis  measurements at each ankle. Pulse volume recordings and segmental  Doppler waveforms were also obtained at ankle and toe levels.     FINDINGS:  There is no abnormal decrease in measured ankle pressures. High indices  are noted bilaterally, likely reflecting poorly compressible vessels in  the setting of chronic diabetic arteriopathy. Resting ankle brachial  indices are as follows:     RIGHT LEG MANISHA:  *  DP: 1.62.  *  PT: 1.74.  *  Great toe: 1.15.     LEFT LEG MANISHA:  *  DP: 1.72.  *  PT: 1.86.  *  Great toe: 0.88.     Great toe pressures and waveforms are within normal limits. Right and  left brachial pressures are equivalent.       Impression:      1.  No evidence of significant lower extremity arterial occlusive  disease.  2.  Ankle brachial indices are high bilaterally, likely reflecting  incompletely compressible vessels in the setting of chronic diabetic  arteriopathy. Normal pulse volume recordings and Doppler waveforms are  maintained bilaterally.     This report was finalized on 10/21/2020 1:13 PM by Dr. Bigg Del Angel MD.           PROCEDURES : none    Condition on Discharge: Stable    Physical Exam at Discharge  Vital Signs  Temp:  [98.2 °F (36.8 °C)-98.3 °F (36.8 °C)] 98.2 °F (36.8 °C)  Heart Rate:  [71] 71  Resp:  [18] 18  BP: (112-135)/(77-87) 112/77   Body mass index is 42.58 kg/m².    Physical Exam  Vitals signs reviewed.   Constitutional:       General: He is not in acute distress.     Appearance: He is obese. He is not ill-appearing.   HENT:      Head: Normocephalic and atraumatic.   Eyes:      Extraocular Movements: Extraocular movements intact.      Pupils: Pupils are equal, round, and reactive to light.   Cardiovascular:      Rate and Rhythm: Normal rate and regular rhythm.   Pulmonary:      Effort: Pulmonary effort is normal. No respiratory distress.      Breath sounds: Normal breath sounds. No wheezing or rales.   Abdominal:      General: Abdomen is flat. Bowel sounds are  normal. There is no distension.      Palpations: Abdomen is soft.      Tenderness: There is no abdominal tenderness. There is no guarding.   Musculoskeletal:         General: No swelling.      Comments: Right foot with dressing/ace, skin not visualized   Skin:     General: Skin is warm and dry.   Neurological:      General: No focal deficit present.      Mental Status: He is alert and oriented to person, place, and time.   Psychiatric:         Mood and Affect: Mood normal.       Discharge Disposition  Home    Visiting Nurse:    Yes     Home PT/OT:  Yes     Home Safety Evaluation:  Yes     DME  Already has    Discharge Diet:      Dietary Orders (From admission, onward)     Start     Ordered    11/02/20 1800  Dietary Nutrition Supplement: Elkin  2 Times Daily     Comments: Mix with beverage of choice   Question:  Select Supplement:  Answer:  Elkin    11/02/20 1011    10/20/20 1617  Diet Regular; Cardiac, Consistent Carbohydrate  Diet Effective Now     Question Answer Comment   Diet Texture / Consistency Regular    Common Modifiers Cardiac    Common Modifiers Consistent Carbohydrate        10/20/20 1617                Activity at Discharge:  As per Dr. Farah    Pre-discharge education  Diabetic, Wound Care, Injectables, medications, follow-up      Follow-up Appointments  No future appointments.  Additional Instructions for the Follow-ups that You Need to Schedule     Discharge Follow-up with PCP   As directed       Currently Documented PCP:    Killian Scales MD    PCP Phone Number:    [phone]     Follow Up Details: 11/13/2020 at 3:50 PM         Discharge Follow-up with Specified Provider: Dr. Farah   As directed      To: Dr. Farah    Follow Up Details: 11/16/2020 at 3:30 PM               Test Results Pending at Discharge: to be f/u by Killian Scales MD   Pending Labs     Order Current Status    C-reactive Protein In process           Sarah Gómez, MAICO  11/09/20  14:29 EST    Time: Discharge 30 min  (if over 30 minutes give explanation as to why it took greater than 30 minutes)

## 2020-11-09 NOTE — PLAN OF CARE
Goal Outcome Evaluation:  Plan of Care Reviewed With: patient  Progress: improving       Pt was up in his chair for breakfast, pt had no complaints and is looking forward to going home.     Pt had no new or worsened cough, no shortness of air, no sore throat, and no loss of taste or smell on am assessment.

## 2020-11-12 NOTE — PROGRESS NOTES
"Foot/Ankle Progress Note    Subjective     Post-Operative Day: 1. status post-right 5th toe amputation.    Systemic or Specific Complaints: Right 5th toe amputation wound doing reasonably well and he is without complaints of significant op site pain.  Complaint of right hip pain where he had aspiration done last week. No fever, chills or other constitutional symptoms of infection. No c/o SOB, CP, GI symptoms.    Objective   /92 (BP Location: Right arm, Patient Position: Lying)   Pulse 112   Temp 98.7 °F (37.1 °C) (Oral)   Resp 20   Ht 180.3 cm (71\")   Wt (!) 149 kg (328 lb 12.8 oz)   SpO2 96%   BMI 45.86 kg/m²      Examination limited to right lower extremity. Findings are as follows.  General: alert, appears stated age, cooperative and no distress   Neurovascular: The right foot is warm and pedal pulses are palpable. There is moderate pedal selling.  Pedal sensation markedly diminished to light touch.   Wound: Open surgical wound at 5th toe amputation site. Wound measures 3.4x2.5x3.7cm. While no foul odor or expressible purulence is present there is nonviable appearing adipose tissue present along the plantar and medial wound margins. The metatarsal head is visible within the wound and appears viable. Dull dorsal erythema and calor remains, c/w improving but persistent cellulitis. The leg cellulitis appears to be resolving.   Range of Motion: Decreased ankle and pedal join ROM due to prior medial column arthrodesis. Plantar-medial prominence at the navicular tuberosity representing the proximal end of surgical plate and prominent screw head. 5th toe is surgically absent. Remaining toes are semi-rigidly contracted. Hallus malleus deformity also present.   DVT Exam: No calf pain or swelling.     Data Review  Results for CHARLES MUJICA (MRN [id number]) as of 11/11/2020 23:52   Ref. Range 10/14/2020 03:16   Glucose Latest Ref Range: 65 - 99 mg/dL 137 (H)   Sodium Latest Ref Range: 136 - 145 mmol/L 134 (L) "   Potassium Latest Ref Range: 3.5 - 5.2 mmol/L 4.4   CO2 Latest Ref Range: 22.0 - 29.0 mmol/L 24.7   Chloride Latest Ref Range: 98 - 107 mmol/L 100   Anion Gap Latest Ref Range: 5.0 - 15.0 mmol/L 9.3   Creatinine Latest Ref Range: 0.76 - 1.27 mg/dL 0.51 (L)   BUN Latest Ref Range: 6 - 20 mg/dL 7   BUN/Creatinine Ratio Latest Ref Range: 7.0 - 25.0  13.7   Calcium Latest Ref Range: 8.6 - 10.5 mg/dL 8.7   eGFR Non  Am Latest Ref Range: >60 mL/min/1.73 >150   C-Reactive Protein Latest Ref Range: 0.00 - 0.50 mg/dL 16.41 (H)   WBC Latest Ref Range: 3.40 - 10.80 10*3/mm3 10.59   RBC Latest Ref Range: 4.14 - 5.80 10*6/mm3 4.21   Hemoglobin Latest Ref Range: 13.0 - 17.7 g/dL 12.4 (L)   Hematocrit Latest Ref Range: 37.5 - 51.0 % 40.2   RDW Latest Ref Range: 12.3 - 15.4 % 15.2   MCV Latest Ref Range: 79.0 - 97.0 fL 95.5   MCH Latest Ref Range: 26.6 - 33.0 pg 29.5   MCHC Latest Ref Range: 31.5 - 35.7 g/dL 30.8 (L)   MPV Latest Ref Range: 6.0 - 12.0 fL 9.3   Platelets Latest Ref Range: 140 - 450 10*3/mm3 296   RDW-SD Latest Ref Range: 37.0 - 54.0 fl 53.6   Neutrophil Rel % Latest Ref Range: 42.7 - 76.0 % 75.0   Lymphocyte Rel % Latest Ref Range: 19.6 - 45.3 % 12.1 (L)   Monocyte Rel % Latest Ref Range: 5.0 - 12.0 % 9.0   Eosinophil Rel % Latest Ref Range: 0.3 - 6.2 % 2.5   Basophil Rel % Latest Ref Range: 0.0 - 1.5 % 0.5   Immature Granulocyte Rel % Latest Ref Range: 0.0 - 0.5 % 0.9 (H)   Neutrophils Absolute Latest Ref Range: 1.70 - 7.00 10*3/mm3 7.94 (H)   Lymphocytes Absolute Latest Ref Range: 0.70 - 3.10 10*3/mm3 1.28   Monocytes Absolute Latest Ref Range: 0.10 - 0.90 10*3/mm3 0.95 (H)   Eosinophils Absolute Latest Ref Range: 0.00 - 0.40 10*3/mm3 0.27   Basophils Absolute Latest Ref Range: 0.00 - 0.20 10*3/mm3 0.05   Immature Grans, Absolute Latest Ref Range: 0.00 - 0.05 10*3/mm3 0.10 (H)   nRBC Latest Ref Range: 0.0 - 0.2 /100 WBC 0.0   Sed Rate Latest Ref Range: 0 - 20 mm/hr 66 (H)   Vancomycin Random Latest Ref  Range: 5.00 - 40.00 mcg/mL 9.10     Assessment/Plan     Status post-right 5th toe amputation - POD #1.    Pain Relief: Adequately controlled.    Wound Care: Surgical dressing is clean and dry. The dressing is left in place.    Activity: Bedrest w/ BRP. Up in chair w/ feet elevated    Weight Bearing: NWB R foot - use walker for mobility assist.    Plan: I will return tomorrow for dressing change and wound inspection. Will make further plans based upon what the wound looks like. Wound culture from intra-op is pending. Continue current IV antibiotics.     Anish Farah DPM    Date: 11/11/2020  Time: 23:45 EST

## 2020-12-10 ENCOUNTER — HOSPITAL ENCOUNTER (EMERGENCY)
Facility: HOSPITAL | Age: 55
Discharge: HOME | End: 2020-12-10
Attending: EMERGENCY MEDICINE | Admitting: EMERGENCY MEDICINE

## 2020-12-10 VITALS
TEMPERATURE: 98.7 F | HEIGHT: 72 IN | BODY MASS INDEX: 42.66 KG/M2 | WEIGHT: 315 LBS | RESPIRATION RATE: 17 BRPM | DIASTOLIC BLOOD PRESSURE: 87 MMHG | SYSTOLIC BLOOD PRESSURE: 102 MMHG | OXYGEN SATURATION: 94 % | HEART RATE: 84 BPM

## 2020-12-10 DIAGNOSIS — L03.115 CELLULITIS OF RIGHT FOOT: Primary | ICD-10-CM

## 2020-12-10 LAB
ALBUMIN SERPL-MCNC: 3.9 G/DL (ref 3.5–5.2)
ALBUMIN/GLOB SERPL: 1.1 G/DL
ALP SERPL-CCNC: 93 U/L (ref 39–117)
ALT SERPL W P-5'-P-CCNC: 15 U/L (ref 1–41)
ANION GAP SERPL CALCULATED.3IONS-SCNC: 12.3 MMOL/L (ref 5–15)
AST SERPL-CCNC: 17 U/L (ref 1–40)
BASOPHILS # BLD AUTO: 0.02 10*3/MM3 (ref 0–0.2)
BASOPHILS NFR BLD AUTO: 0.2 % (ref 0–1.5)
BILIRUB SERPL-MCNC: 0.2 MG/DL (ref 0–1.2)
BUN SERPL-MCNC: 10 MG/DL (ref 6–20)
BUN/CREAT SERPL: 12.7 (ref 7–25)
CALCIUM SPEC-SCNC: 7.9 MG/DL (ref 8.6–10.5)
CHLORIDE SERPL-SCNC: 92 MMOL/L (ref 98–107)
CO2 SERPL-SCNC: 23.7 MMOL/L (ref 22–29)
CREAT SERPL-MCNC: 0.79 MG/DL (ref 0.76–1.27)
DEPRECATED RDW RBC AUTO: 46.5 FL (ref 37–54)
EOSINOPHIL # BLD AUTO: 0.4 10*3/MM3 (ref 0–0.4)
EOSINOPHIL NFR BLD AUTO: 4.9 % (ref 0.3–6.2)
ERYTHROCYTE [DISTWIDTH] IN BLOOD BY AUTOMATED COUNT: 13.9 % (ref 12.3–15.4)
GFR SERPL CREATININE-BSD FRML MDRD: 101 ML/MIN/1.73
GLOBULIN UR ELPH-MCNC: 3.5 GM/DL
GLUCOSE SERPL-MCNC: 128 MG/DL (ref 65–99)
HCT VFR BLD AUTO: 46.3 % (ref 37.5–51)
HGB BLD-MCNC: 14.7 G/DL (ref 13–17.7)
IMM GRANULOCYTES # BLD AUTO: 0.03 10*3/MM3 (ref 0–0.05)
IMM GRANULOCYTES NFR BLD AUTO: 0.4 % (ref 0–0.5)
LYMPHOCYTES # BLD AUTO: 1.81 10*3/MM3 (ref 0.7–3.1)
LYMPHOCYTES NFR BLD AUTO: 22.1 % (ref 19.6–45.3)
MCH RBC QN AUTO: 28.6 PG (ref 26.6–33)
MCHC RBC AUTO-ENTMCNC: 31.7 G/DL (ref 31.5–35.7)
MCV RBC AUTO: 90.1 FL (ref 79–97)
MONOCYTES # BLD AUTO: 0.91 10*3/MM3 (ref 0.1–0.9)
MONOCYTES NFR BLD AUTO: 11.1 % (ref 5–12)
NEUTROPHILS NFR BLD AUTO: 5.02 10*3/MM3 (ref 1.7–7)
NEUTROPHILS NFR BLD AUTO: 61.3 % (ref 42.7–76)
PLATELET # BLD AUTO: 233 10*3/MM3 (ref 140–450)
PMV BLD AUTO: 8.9 FL (ref 6–12)
POTASSIUM SERPL-SCNC: 5 MMOL/L (ref 3.5–5.2)
PROT SERPL-MCNC: 7.4 G/DL (ref 6–8.5)
RBC # BLD AUTO: 5.14 10*6/MM3 (ref 4.14–5.8)
SODIUM SERPL-SCNC: 128 MMOL/L (ref 136–145)
WBC # BLD AUTO: 8.19 10*3/MM3 (ref 3.4–10.8)

## 2020-12-10 PROCEDURE — 99283 EMERGENCY DEPT VISIT LOW MDM: CPT

## 2020-12-10 PROCEDURE — 96372 THER/PROPH/DIAG INJ SC/IM: CPT

## 2020-12-10 PROCEDURE — 85025 COMPLETE CBC W/AUTO DIFF WBC: CPT | Performed by: EMERGENCY MEDICINE

## 2020-12-10 PROCEDURE — 99282 EMERGENCY DEPT VISIT SF MDM: CPT | Performed by: EMERGENCY MEDICINE

## 2020-12-10 PROCEDURE — 63710000001 ONDANSETRON ODT 4 MG TABLET DISPERSIBLE: Performed by: EMERGENCY MEDICINE

## 2020-12-10 PROCEDURE — 80053 COMPREHEN METABOLIC PANEL: CPT | Performed by: EMERGENCY MEDICINE

## 2020-12-10 RX ORDER — ONDANSETRON 4 MG/1
8 TABLET, ORALLY DISINTEGRATING ORAL ONCE
Status: COMPLETED | OUTPATIENT
Start: 2020-12-10 | End: 2020-12-10

## 2020-12-10 RX ORDER — CLINDAMYCIN HYDROCHLORIDE 300 MG/1
300 CAPSULE ORAL 4 TIMES DAILY
Qty: 40 CAPSULE | Refills: 0 | Status: ON HOLD | OUTPATIENT
Start: 2020-12-10 | End: 2021-07-29

## 2020-12-10 RX ORDER — ONDANSETRON 4 MG/1
4 TABLET, ORALLY DISINTEGRATING ORAL EVERY 6 HOURS PRN
Qty: 30 TABLET | Refills: 0 | Status: SHIPPED | OUTPATIENT
Start: 2020-12-10 | End: 2021-07-31 | Stop reason: HOSPADM

## 2020-12-10 RX ORDER — CLINDAMYCIN PHOSPHATE 150 MG/ML
600 INJECTION, SOLUTION INTRAVENOUS ONCE
Status: COMPLETED | OUTPATIENT
Start: 2020-12-10 | End: 2020-12-10

## 2020-12-10 RX ADMIN — CLINDAMYCIN PHOSPHATE 600 MG: 150 INJECTION, SOLUTION INTRAVENOUS at 14:43

## 2020-12-10 RX ADMIN — ONDANSETRON 8 MG: 4 TABLET, ORALLY DISINTEGRATING ORAL at 13:33

## 2020-12-10 NOTE — ED NOTES
Pharmacy called to prepare clindamycin. Will await medication.      Meera Osorio RN  12/10/20 3713

## 2020-12-10 NOTE — ED PROVIDER NOTES
Subjective     Toe Pain  Location:  Rt 4th toe  Quality:  Redness/pain  Severity:  Moderate  Onset quality:  Gradual  Timing:  Constant  Progression:  Worsening  Context:  Says he recently had rt 5th toe amputation, today with nausea and rt 4th toe pain and reness  Relieved by:  Noth  Worsened by:  Noth  Ineffective treatments:  None  Associated symptoms: nausea    Associated symptoms: no abdominal pain, no congestion, no cough, no fever, no shortness of breath, no vomiting and no wheezing        Review of Systems   Constitutional: Negative for fever.   HENT: Negative for congestion.    Respiratory: Negative for cough, shortness of breath and wheezing.    Gastrointestinal: Positive for nausea. Negative for abdominal pain and vomiting.   All other systems reviewed and are negative.      Past Medical History:   Diagnosis Date   • Amputated toe of left foot (CMS/[facility])    • Arthritis    • Bipolar disorder (CMS/[facility])    • Cellulitis of lower extremity     RIGHT   • Coronary artery disease    • Diabetes mellitus (CMS/[facility])    • Disease of thyroid gland     nodule on thyroid   • DVT (deep venous thrombosis) (CMS/[facility])    • Fracture of right foot    • Hyperlipidemia    • Hypertension    • Pseudogout    • Septic shock (CMS/[facility]) 07/2017    H/O   • Sleep apnea    • Toxic metabolic encephalopathy 07/2017    H/O       Allergies   Allergen Reactions   • Lisinopril Cough     COUGHING       Past Surgical History:   Procedure Laterality Date   • AMPUTATION DIGIT Left 3/16/2018    Procedure: AMPUTATION LEFT FOURTH TOE;  Surgeon: Anish Farah DPM;  Location:  [facility];  Service: Podiatry   • AMPUTATION DIGIT Right 10/13/2020    Procedure: AMPUTATION OF RIGHT FIFTH TOE AND ALL ASSOCIATED PROCEDURES;  Surgeon: Anish Farah DPM;  Location:  [facility];  Service: Podiatry;  Laterality: Right;  AMPUTATION OF RIGHT FIFTH TOE    • 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   • COLONOSCOPY N/A 7/16/2018    Procedure: COLONOSCOPY and  polypectomy;  Surgeon: Kaylie Mcfarlane MD;  Location:  [facility];  Service: Gastroenterology   • ENDOSCOPY N/A 3/16/2018    Procedure: ESOPHAGOGASTRODUODENOSCOPY with biopsies;  Surgeon: Kaylie Mcfarlane MD;  Location:  [facility];  Service: Gastroenterology   • HARDWARE REMOVAL Right 8/18/2017    Procedure: RIGHT EXTERNAL FIXATOR REMOVAL;  Surgeon: Anish Farah DPM;  Location:  [facility];  Service:    • INCISION AND DRAINAGE LEG Left 6/13/2018    Procedure: Left 2nd, 3rd toe laceration irrigation, debridement and multi layer wound closure.;  Surgeon: Anish Farah DPM;  Location:  [facility];  Service: Podiatry   • JOINT REPLACEMENT     • KNEE SURGERY     • LEG DEBRIDEMENT Right 10/16/2020    Procedure: DEBRIDEMENT RIGHT  FOOT SURGICAL WOUND;  Surgeon: Anish Farah DPM;  Location:  [facility];  Service: Podiatry;  Laterality: Right;  DEBRIDEMENT RIGHT FOOT SURGICAL WOUND   • ORIF FOOT FRACTURE Right 7/29/2017    Procedure: open reduction with percutaneous pinning of midfoot dislocation fracture;  Surgeon: Anish Farah DPM;  Location:  [facility];  Service:    • ORIF FOOT FRACTURE Left 3/5/2018    Procedure: OPEN REDUCTION WITH IRRIGATION AND WOUND CLOSURE LEFT FOURTH TOE;  Surgeon: Anish Farah DPM;  Location:  [facility];  Service:    • SHOULDER SURGERY      RIGHT   • TOE FUSION Right 8/18/2017    Procedure: RIGHT  MEDIAL COLUMN ARTHRODESIS, RIGHT TARAL METATARSAL ARTHRODESIS;  Surgeon: Anish Farah DPM;  Location:  [facility];  Service:    • TOTAL HIP ARTHROPLASTY         Family History   Problem Relation Age of Onset   • Arthritis Mother    • Cancer Mother    • Hyperlipidemia Mother    • Colon polyps Mother    • Arthritis Father    • Cancer Father    • Depression Father    • Hypertension Father    • Mental illness Father    • Cancer Sister    • Arthritis Sister    • Hyperlipidemia Sister    • Cancer Paternal Aunt    • Hypertension Daughter    • Depression Son    • Hyperlipidemia Paternal Grandmother    •  Hearing loss Paternal Grandfather    • Hyperlipidemia Paternal Grandfather    • Hypertension Paternal Grandfather    • Colon cancer Maternal Grandmother        Social History     Socioeconomic History   • Marital status:      Spouse name: Not on file   • Number of children: Not on file   • Years of education: Not on file   • Highest education level: Not on file   Tobacco Use   • Smoking status: Never Smoker   • Smokeless tobacco: Never Used   Substance and Sexual Activity   • Alcohol use: Yes     Comment: occ   • Drug use: No   • Sexual activity: Defer           Objective   Physical Exam  Vitals signs and nursing note reviewed.   Constitutional:       General: He is not in acute distress.     Appearance: He is well-developed. He is not ill-appearing.   HENT:      Head: Normocephalic and atraumatic.   Eyes:      Extraocular Movements: Extraocular movements intact.      Pupils: Pupils are equal, round, and reactive to light.   Cardiovascular:      Rate and Rhythm: Normal rate.      Heart sounds: No murmur.   Pulmonary:      Effort: Pulmonary effort is normal.      Breath sounds: Normal breath sounds. No wheezing or rhonchi.   Abdominal:      General: Abdomen is flat. There is no distension. There are no signs of injury.      Palpations: Abdomen is soft.      Tenderness: There is no abdominal tenderness.   Musculoskeletal: Normal range of motion.         General: Swelling and tenderness present.      Right lower leg: Edema present.      Comments: Rt foot/4th toe erythema/edema   Skin:     Comments: Rt foot/4th toe erythema   Neurological:      General: No focal deficit present.      Mental Status: He is alert.   Psychiatric:         Mood and Affect: Mood normal.         Procedures           ED Course              reeval, vss, appears well, ok with plan to rx and f/u podiatry                             MDM    Final diagnoses:   Cellulitis of right foot            Neno Edmonds MD  12/10/20 9250        Neno Edmonds MD  12/10/20 1509

## 2020-12-30 DIAGNOSIS — S91.114A: Primary | ICD-10-CM

## 2020-12-30 DIAGNOSIS — S91.114A: ICD-10-CM

## 2020-12-30 DIAGNOSIS — L03.031 CELLULITIS OF TOE, RIGHT: ICD-10-CM

## 2021-01-03 ENCOUNTER — HOSPITAL ENCOUNTER (OUTPATIENT)
Dept: GENERAL RADIOLOGY | Facility: HOSPITAL | Age: 56
Discharge: HOME | End: 2021-01-03
Admitting: PODIATRIST

## 2021-01-03 DIAGNOSIS — S91.114A: ICD-10-CM

## 2021-01-03 DIAGNOSIS — L03.031 CELLULITIS OF TOE, RIGHT: ICD-10-CM

## 2021-01-03 PROCEDURE — 73630 X-RAY EXAM OF FOOT: CPT

## 2021-01-12 PROBLEM — S91.114A: Status: ACTIVE | Noted: 2021-01-12

## 2021-01-12 PROBLEM — S92.501B: Status: ACTIVE | Noted: 2021-01-12

## 2021-01-14 ENCOUNTER — APPOINTMENT (OUTPATIENT)
Dept: PREADMISSION TESTING | Facility: HOSPITAL | Age: 56
End: 2021-01-14

## 2021-01-14 VITALS
DIASTOLIC BLOOD PRESSURE: 76 MMHG | SYSTOLIC BLOOD PRESSURE: 119 MMHG | OXYGEN SATURATION: 96 % | RESPIRATION RATE: 16 BRPM | HEIGHT: 72 IN | WEIGHT: 314.1 LBS | BODY MASS INDEX: 42.54 KG/M2 | HEART RATE: 72 BPM

## 2021-01-14 DIAGNOSIS — L03.031 CELLULITIS OF TOE, RIGHT: ICD-10-CM

## 2021-01-14 LAB
ANION GAP SERPL CALCULATED.3IONS-SCNC: 8.7 MMOL/L (ref 5–15)
BASOPHILS # BLD AUTO: 0.06 10*3/MM3 (ref 0–0.2)
BASOPHILS NFR BLD AUTO: 0.8 % (ref 0–1.5)
BUN SERPL-MCNC: 16 MG/DL (ref 6–20)
BUN/CREAT SERPL: 21.9 (ref 7–25)
CALCIUM SPEC-SCNC: 8.9 MG/DL (ref 8.6–10.5)
CHLORIDE SERPL-SCNC: 101 MMOL/L (ref 98–107)
CO2 SERPL-SCNC: 24.3 MMOL/L (ref 22–29)
CREAT SERPL-MCNC: 0.73 MG/DL (ref 0.76–1.27)
DEPRECATED RDW RBC AUTO: 48.1 FL (ref 37–54)
EOSINOPHIL # BLD AUTO: 0.32 10*3/MM3 (ref 0–0.4)
EOSINOPHIL NFR BLD AUTO: 4.4 % (ref 0.3–6.2)
ERYTHROCYTE [DISTWIDTH] IN BLOOD BY AUTOMATED COUNT: 14.6 % (ref 12.3–15.4)
ERYTHROCYTE [SEDIMENTATION RATE] IN BLOOD: 7 MM/HR (ref 0–20)
GFR SERPL CREATININE-BSD FRML MDRD: 111 ML/MIN/1.73
GLUCOSE SERPL-MCNC: 111 MG/DL (ref 65–99)
HBA1C MFR BLD: 6.2 % (ref 4.8–5.6)
HCT VFR BLD AUTO: 45.5 % (ref 37.5–51)
HGB BLD-MCNC: 14.3 G/DL (ref 13–17.7)
IMM GRANULOCYTES # BLD AUTO: 0.03 10*3/MM3 (ref 0–0.05)
IMM GRANULOCYTES NFR BLD AUTO: 0.4 % (ref 0–0.5)
LYMPHOCYTES # BLD AUTO: 1.41 10*3/MM3 (ref 0.7–3.1)
LYMPHOCYTES NFR BLD AUTO: 19.2 % (ref 19.6–45.3)
MCH RBC QN AUTO: 28 PG (ref 26.6–33)
MCHC RBC AUTO-ENTMCNC: 31.4 G/DL (ref 31.5–35.7)
MCV RBC AUTO: 89.2 FL (ref 79–97)
MONOCYTES # BLD AUTO: 0.72 10*3/MM3 (ref 0.1–0.9)
MONOCYTES NFR BLD AUTO: 9.8 % (ref 5–12)
NEUTROPHILS NFR BLD AUTO: 4.79 10*3/MM3 (ref 1.7–7)
NEUTROPHILS NFR BLD AUTO: 65.4 % (ref 42.7–76)
NRBC BLD AUTO-RTO: 0 /100 WBC (ref 0–0.2)
PLATELET # BLD AUTO: 212 10*3/MM3 (ref 140–450)
PMV BLD AUTO: 9.5 FL (ref 6–12)
POTASSIUM SERPL-SCNC: 4.8 MMOL/L (ref 3.5–5.2)
RBC # BLD AUTO: 5.1 10*6/MM3 (ref 4.14–5.8)
SODIUM SERPL-SCNC: 134 MMOL/L (ref 136–145)
WBC # BLD AUTO: 7.33 10*3/MM3 (ref 3.4–10.8)

## 2021-01-14 PROCEDURE — 83036 HEMOGLOBIN GLYCOSYLATED A1C: CPT | Performed by: PODIATRIST

## 2021-01-14 PROCEDURE — 99220 PR INITIAL OBSERVATION CARE/DAY 70 MINUTES: CPT | Performed by: INTERNAL MEDICINE

## 2021-01-14 PROCEDURE — 85652 RBC SED RATE AUTOMATED: CPT | Performed by: PODIATRIST

## 2021-01-14 PROCEDURE — 36415 COLL VENOUS BLD VENIPUNCTURE: CPT

## 2021-01-14 PROCEDURE — 80048 BASIC METABOLIC PNL TOTAL CA: CPT | Performed by: PODIATRIST

## 2021-01-14 PROCEDURE — 85025 COMPLETE CBC W/AUTO DIFF WBC: CPT | Performed by: PODIATRIST

## 2021-01-14 RX ORDER — ACETAMINOPHEN 325 MG/1
650 TABLET ORAL EVERY 6 HOURS PRN
COMMUNITY
End: 2022-07-19

## 2021-01-14 RX ORDER — SITAGLIPTIN AND METFORMIN HYDROCHLORIDE 1000; 50 MG/1; MG/1
1 TABLET, FILM COATED ORAL 2 TIMES DAILY WITH MEALS
COMMUNITY

## 2021-01-14 RX ORDER — IBUPROFEN 600 MG/1
600 TABLET ORAL EVERY 6 HOURS PRN
COMMUNITY
End: 2021-07-31 | Stop reason: HOSPADM

## 2021-01-14 NOTE — DISCHARGE INSTRUCTIONS
PRE-ADMISSION TESTING INSTRUCTIONS FOR ADULTS    Take these medications the morning of surgery with a small sip of water:  Amlodipine, clonidine, gabapentin, omeprazole, primidone, propranolol, fluoxetine, and clonazepam if needed      No aspirin, advil, aleve, ibuprofen, naproxen, diet pills, decongestants, or herbal/vitamins for a week prior to surgery. Stop ibuprofen today    General Instructions:    • Do not eat solid food after midnight the night before surgery.  No gum, mints, or hard candy after midnight the night before surgery.  • You may drink clear liquids the day of surgery up until 2 hours before your arrival time.  (9:30 am)  • Clear liquids are liquids you can see through. Nothing RED in color.    Plain water    Sports drinks  Sodas     Gelatin (Jell-O)  Fruit juices without pulp such as white grape juice and apple juice  Popsicles that contain no fruit or yogurt  Tea or coffee (no cream or milk added)    • It is beneficial for you to have a clear drink that contains carbohydrates just before you leave your house and before your fasting time begins.  We suggest a 20 ounce bottle of Gatorade or Powerade for non-diabetic patients or a 20 ounce bottle of G2 or Powerade Zero for diabetic patients.   (9:30 am)    • Patients who avoid smoking, chewing tobacco and alcohol for 4 weeks prior to surgery have a reduced risk of post-operative complications.  If at all possible, quit smoking as many days before surgery as you can.    • Do not smoke, use chewing tobacco or drink alcohol the day of surgery    • Bring your C-PAP/ BI-PAP machine if you use one.  • Wear clean comfortable clothes and socks.  • Do not wear contact lenses, lotion, deodorant, or make-up.  Bring a case for your glasses if applicable. You may brush your teeth the morning of surgery.  • You may wear dentures/partials, do not put adhesive/glue on them.  • Bring crutches or walker if applicable.  • Leave all other jewelry and valuables at  home.      Preventing a Surgical Site Infection:    • Shower the night before and on the morning of surgery using the chlorhexidine soap you were given.  Use a clean washcloth with the soap.  Place clean sheets on your bed after showering the night before surgery. Do not use the CHG soap on your hair, face, or private areas. Wash your body gently for five (5) minutes. Do not scrub your skin.  Dry with a clean towel and dress in clean clothing.    • Do not shave the surgical area for 10 days-2 weeks prior to surgery  because the razor can irritate skin and make it easier to develop an infection.  • Make sure you, your family, and all healthcare providers clean their hands with soap and water or an alcohol based hand  before caring for you or your wound.      Day of surgery:    Your surgeon’s office will advise you of your arrival time for the day of surgery.    Upon arrival, a Pre-op nurse and Anesthesia provider will review your health history, obtain vital signs, and answer questions you may have.  The only belongings needed at this time will be your home medications and if applicable your C-PAP/BI-PAP machine.  If you are staying overnight your family can leave the rest of your belongings in the car and bring them to your room later.  A Pre-op nurse will start an IV and you may receive medication in preparation for surgery, including something to help you relax.  Your family will be able to see you in the Pre-op area.  While you are in surgery your family should notify the waiting room  if they leave the waiting room area and provide a contact phone number.    IF you have any questions, you can call the Pre-Admission Department at [phone] or your surgeon's office.  Notify your surgeon if  you become sick, have a fever, productive cough, or cannot be here the day of surgery    Please be aware that surgery does come with discomfort.  We want to make every effort to control your discomfort  so please discuss any uncontrolled symptoms with your nurse.   Your doctor will most likely have prescribed pain medications.      If you are going home after surgery, you will receive individualized written care instructions before being discharged.  A responsible adult (over the age of 18) must drive you to and from the hospital on the day of your surgery and stay with you for 24 hours after anesthesia.    If you are staying overnight following surgery, you will be transported to your hospital room following the recovery period.  [facility] has all private rooms.    You may receive a survey regarding the care you received. Your feedback is very important and will be used to collect the necessary data to help us to continue to provide excellent care.     Deductibles and co-payments are collected on the day of service. Please be prepared to pay the required co-pay, deductible or deposit on the day of service as defined by your plan.

## 2021-01-14 NOTE — PAT
Pt here for PAT visit.  Pre-op tests completed, chg soap given, and instructions reviewed.  Instructed clears until 9:30 am dos, voiced understanding.  Dr Henriquez to see patient for H&P.  COVID scheduled for 1/16.

## 2021-01-16 ENCOUNTER — LAB (OUTPATIENT)
Dept: LAB | Facility: HOSPITAL | Age: 56
End: 2021-01-16

## 2021-01-16 DIAGNOSIS — S92.501B: ICD-10-CM

## 2021-01-16 DIAGNOSIS — S91.114A: ICD-10-CM

## 2021-01-16 LAB — SARS-COV-2 ORF1AB RESP QL NAA+PROBE: NOT DETECTED

## 2021-01-16 PROCEDURE — C9803 HOPD COVID-19 SPEC COLLECT: HCPCS

## 2021-01-16 PROCEDURE — U0004 COV-19 TEST NON-CDC HGH THRU: HCPCS | Performed by: OBSTETRICS & GYNECOLOGY

## 2021-01-18 ENCOUNTER — ANESTHESIA EVENT (OUTPATIENT)
Dept: PERIOP | Facility: HOSPITAL | Age: 56
End: 2021-01-18

## 2021-01-19 ENCOUNTER — ANESTHESIA (OUTPATIENT)
Dept: PERIOP | Facility: HOSPITAL | Age: 56
End: 2021-01-19

## 2021-01-19 ENCOUNTER — HOSPITAL ENCOUNTER (OUTPATIENT)
Facility: HOSPITAL | Age: 56
Discharge: HOME | End: 2021-01-19
Attending: PODIATRIST | Admitting: PODIATRIST

## 2021-01-19 VITALS
HEIGHT: 72 IN | TEMPERATURE: 98.2 F | HEART RATE: 67 BPM | SYSTOLIC BLOOD PRESSURE: 124 MMHG | OXYGEN SATURATION: 95 % | DIASTOLIC BLOOD PRESSURE: 89 MMHG | WEIGHT: 304.6 LBS | BODY MASS INDEX: 41.26 KG/M2 | RESPIRATION RATE: 18 BRPM

## 2021-01-19 DIAGNOSIS — S92.501B: ICD-10-CM

## 2021-01-19 DIAGNOSIS — S91.114A: ICD-10-CM

## 2021-01-19 LAB — GLUCOSE BLDC GLUCOMTR-MCNC: 119 MG/DL (ref 70–130)

## 2021-01-19 PROCEDURE — 82962 GLUCOSE BLOOD TEST: CPT

## 2021-01-19 PROCEDURE — 25010000002 MIDAZOLAM PER 1MG: Performed by: NURSE ANESTHETIST, CERTIFIED REGISTERED

## 2021-01-19 PROCEDURE — 88311 DECALCIFY TISSUE: CPT | Performed by: PODIATRIST

## 2021-01-19 PROCEDURE — 25010000002 DIPHENHYDRAMINE PER 50 MG: Performed by: NURSE ANESTHETIST, CERTIFIED REGISTERED

## 2021-01-19 PROCEDURE — 99213 OFFICE O/P EST LOW 20 MIN: CPT | Performed by: INTERNAL MEDICINE

## 2021-01-19 PROCEDURE — 88305 TISSUE EXAM BY PATHOLOGIST: CPT | Performed by: PODIATRIST

## 2021-01-19 PROCEDURE — 25010000002 PROPOFOL 10 MG/ML EMULSION: Performed by: NURSE ANESTHETIST, CERTIFIED REGISTERED

## 2021-01-19 PROCEDURE — 25010000003 LIDOCAINE 1 % SOLUTION 10 ML VIAL: Performed by: PODIATRIST

## 2021-01-19 PROCEDURE — 25010000002 DEXAMETHASONE PER 1 MG: Performed by: NURSE ANESTHETIST, CERTIFIED REGISTERED

## 2021-01-19 PROCEDURE — 25010000002 ONDANSETRON PER 1 MG: Performed by: NURSE ANESTHETIST, CERTIFIED REGISTERED

## 2021-01-19 RX ORDER — SODIUM CHLORIDE 9 MG/ML
40 INJECTION, SOLUTION INTRAVENOUS AS NEEDED
Status: DISCONTINUED | OUTPATIENT
Start: 2021-01-19 | End: 2021-01-19 | Stop reason: HOSPADM

## 2021-01-19 RX ORDER — FAMOTIDINE 10 MG/ML
20 INJECTION, SOLUTION INTRAVENOUS
Status: COMPLETED | OUTPATIENT
Start: 2021-01-19 | End: 2021-01-19

## 2021-01-19 RX ORDER — PROPOFOL 10 MG/ML
VIAL (ML) INTRAVENOUS CONTINUOUS PRN
Status: DISCONTINUED | OUTPATIENT
Start: 2021-01-19 | End: 2021-01-19 | Stop reason: SURG

## 2021-01-19 RX ORDER — PROPOFOL 10 MG/ML
VIAL (ML) INTRAVENOUS AS NEEDED
Status: DISCONTINUED | OUTPATIENT
Start: 2021-01-19 | End: 2021-01-19 | Stop reason: SURG

## 2021-01-19 RX ORDER — GLYCOPYRROLATE 0.2 MG/ML
INJECTION INTRAMUSCULAR; INTRAVENOUS AS NEEDED
Status: DISCONTINUED | OUTPATIENT
Start: 2021-01-19 | End: 2021-01-19 | Stop reason: SURG

## 2021-01-19 RX ORDER — SODIUM CHLORIDE 0.9 % (FLUSH) 0.9 %
10 SYRINGE (ML) INJECTION AS NEEDED
Status: DISCONTINUED | OUTPATIENT
Start: 2021-01-19 | End: 2021-01-19 | Stop reason: HOSPADM

## 2021-01-19 RX ORDER — CEFAZOLIN SODIUM IN 0.9 % NACL 3 G/100 ML
3 INTRAVENOUS SOLUTION, PIGGYBACK (ML) INTRAVENOUS ONCE
Status: COMPLETED | OUTPATIENT
Start: 2021-01-19 | End: 2021-01-19

## 2021-01-19 RX ORDER — DIPHENHYDRAMINE HYDROCHLORIDE 50 MG/ML
12.5 INJECTION INTRAMUSCULAR; INTRAVENOUS ONCE
Status: COMPLETED | OUTPATIENT
Start: 2021-01-19 | End: 2021-01-19

## 2021-01-19 RX ORDER — SODIUM CHLORIDE 0.9 % (FLUSH) 0.9 %
3 SYRINGE (ML) INJECTION EVERY 12 HOURS SCHEDULED
Status: DISCONTINUED | OUTPATIENT
Start: 2021-01-19 | End: 2021-01-19 | Stop reason: HOSPADM

## 2021-01-19 RX ORDER — SODIUM CHLORIDE 0.9 % (FLUSH) 0.9 %
10 SYRINGE (ML) INJECTION EVERY 12 HOURS SCHEDULED
Status: DISCONTINUED | OUTPATIENT
Start: 2021-01-19 | End: 2021-01-19 | Stop reason: HOSPADM

## 2021-01-19 RX ORDER — ONDANSETRON 2 MG/ML
4 INJECTION INTRAMUSCULAR; INTRAVENOUS ONCE AS NEEDED
Status: COMPLETED | OUTPATIENT
Start: 2021-01-19 | End: 2021-01-19

## 2021-01-19 RX ORDER — DEXAMETHASONE SODIUM PHOSPHATE 4 MG/ML
8 INJECTION, SOLUTION INTRA-ARTICULAR; INTRALESIONAL; INTRAMUSCULAR; INTRAVENOUS; SOFT TISSUE ONCE AS NEEDED
Status: COMPLETED | OUTPATIENT
Start: 2021-01-19 | End: 2021-01-19

## 2021-01-19 RX ORDER — LIDOCAINE HYDROCHLORIDE 10 MG/ML
0.5 INJECTION, SOLUTION EPIDURAL; INFILTRATION; INTRACAUDAL; PERINEURAL ONCE AS NEEDED
Status: DISCONTINUED | OUTPATIENT
Start: 2021-01-19 | End: 2021-01-19 | Stop reason: HOSPADM

## 2021-01-19 RX ORDER — DEXMEDETOMIDINE HYDROCHLORIDE 100 UG/ML
INJECTION, SOLUTION INTRAVENOUS AS NEEDED
Status: DISCONTINUED | OUTPATIENT
Start: 2021-01-19 | End: 2021-01-19 | Stop reason: SURG

## 2021-01-19 RX ORDER — SODIUM CHLORIDE, SODIUM LACTATE, POTASSIUM CHLORIDE, CALCIUM CHLORIDE 600; 310; 30; 20 MG/100ML; MG/100ML; MG/100ML; MG/100ML
9 INJECTION, SOLUTION INTRAVENOUS CONTINUOUS PRN
Status: DISCONTINUED | OUTPATIENT
Start: 2021-01-19 | End: 2021-01-19 | Stop reason: HOSPADM

## 2021-01-19 RX ORDER — MAGNESIUM HYDROXIDE 1200 MG/15ML
LIQUID ORAL AS NEEDED
Status: DISCONTINUED | OUTPATIENT
Start: 2021-01-19 | End: 2021-01-19 | Stop reason: HOSPADM

## 2021-01-19 RX ORDER — MIDAZOLAM HYDROCHLORIDE 2 MG/2ML
1 INJECTION, SOLUTION INTRAMUSCULAR; INTRAVENOUS
Status: DISCONTINUED | OUTPATIENT
Start: 2021-01-19 | End: 2021-01-19 | Stop reason: HOSPADM

## 2021-01-19 RX ORDER — HYDROCODONE BITARTRATE AND ACETAMINOPHEN 5; 325 MG/1; MG/1
1-2 TABLET ORAL EVERY 4 HOURS PRN
Qty: 40 TABLET | Refills: 0 | Status: SHIPPED | OUTPATIENT
Start: 2021-01-19 | End: 2022-07-19

## 2021-01-19 RX ADMIN — ONDANSETRON 4 MG: 2 INJECTION, SOLUTION INTRAMUSCULAR; INTRAVENOUS at 12:34

## 2021-01-19 RX ADMIN — DEXMEDETOMIDINE HYDROCHLORIDE 8 MCG: 100 INJECTION, SOLUTION, CONCENTRATE INTRAVENOUS at 14:01

## 2021-01-19 RX ADMIN — DIPHENHYDRAMINE HYDROCHLORIDE 12.5 MG: 50 INJECTION, SOLUTION INTRAMUSCULAR; INTRAVENOUS at 12:35

## 2021-01-19 RX ADMIN — CEFAZOLIN 3 G: 1 INJECTION, POWDER, FOR SOLUTION INTRAMUSCULAR; INTRAVENOUS; PARENTERAL at 13:42

## 2021-01-19 RX ADMIN — PROPOFOL 30 MG: 10 INJECTION, EMULSION INTRAVENOUS at 14:11

## 2021-01-19 RX ADMIN — DEXMEDETOMIDINE HYDROCHLORIDE 8 MCG: 100 INJECTION, SOLUTION, CONCENTRATE INTRAVENOUS at 13:50

## 2021-01-19 RX ADMIN — PROPOFOL 30 MG: 10 INJECTION, EMULSION INTRAVENOUS at 13:59

## 2021-01-19 RX ADMIN — PROPOFOL 25 MCG/KG/MIN: 10 INJECTION, EMULSION INTRAVENOUS at 13:43

## 2021-01-19 RX ADMIN — DEXMEDETOMIDINE HYDROCHLORIDE 8 MCG: 100 INJECTION, SOLUTION, CONCENTRATE INTRAVENOUS at 13:54

## 2021-01-19 RX ADMIN — DEXMEDETOMIDINE HYDROCHLORIDE 8 MCG: 100 INJECTION, SOLUTION, CONCENTRATE INTRAVENOUS at 13:49

## 2021-01-19 RX ADMIN — MIDAZOLAM HYDROCHLORIDE 1 MG: 1 INJECTION, SOLUTION INTRAMUSCULAR; INTRAVENOUS at 12:35

## 2021-01-19 RX ADMIN — SODIUM CHLORIDE, POTASSIUM CHLORIDE, SODIUM LACTATE AND CALCIUM CHLORIDE 9 ML/HR: 600; 310; 30; 20 INJECTION, SOLUTION INTRAVENOUS at 12:36

## 2021-01-19 RX ADMIN — FAMOTIDINE 20 MG: 10 INJECTION INTRAVENOUS at 12:35

## 2021-01-19 RX ADMIN — DEXMEDETOMIDINE HYDROCHLORIDE 8 MCG: 100 INJECTION, SOLUTION, CONCENTRATE INTRAVENOUS at 13:46

## 2021-01-19 RX ADMIN — DEXMEDETOMIDINE HYDROCHLORIDE 8 MCG: 100 INJECTION, SOLUTION, CONCENTRATE INTRAVENOUS at 13:44

## 2021-01-19 RX ADMIN — DEXAMETHASONE SODIUM PHOSPHATE 8 MG: 4 INJECTION, SOLUTION INTRAMUSCULAR; INTRAVENOUS at 12:35

## 2021-01-19 RX ADMIN — GLYCOPYRROLATE 0.1 MG: 0.2 INJECTION INTRAMUSCULAR; INTRAVENOUS at 13:43

## 2021-01-19 NOTE — ANESTHESIA PREPROCEDURE EVALUATION
Anesthesia Evaluation     Patient summary reviewed and Nursing notes reviewed   NPO Solid Status: > 8 hours  NPO Liquid Status: > 8 hours           Airway   Mallampati: III  TM distance: >3 FB  Neck ROM: full  Large neck circumference and Possible difficult intubation  Dental - normal exam     Pulmonary     breath sounds clear to auscultation  (+) COPD mild, sleep apnea, decreased breath sounds,   Cardiovascular - normal exam    ECG reviewed  Beta blocker given within 24 hours of surgery and Beta blocker not taken-may be given intraoperatively  Rhythm: regular  Rate: normal    (+) hypertension poorly controlled 2 medications or greater, past MI (2009)  >12 months, CAD, PVD, DVT (2003) resolved,       Neuro/Psych  (+) psychiatric history Bipolar,     (-) CVA (2006, 2015 memory problems)Numbness: bilat. feet.  GI/Hepatic/Renal/Endo    (+) obesity, morbid obesity, GERD poorly controlled, PUD, GI bleeding upper resolved, diabetes mellitus type 2 well controlled,     Musculoskeletal     Abdominal   (+) obese,    Substance History - negative use     OB/GYN          Other   arthritis,                      Anesthesia Plan    ASA 3     general   (Consented for GA and MAC)  intravenous induction     Anesthetic plan, all risks, benefits, and alternatives have been provided, discussed and informed consent has been obtained with: patient.  Use of blood products discussed with patient  Consented to blood products.

## 2021-01-19 NOTE — ANESTHESIA POSTPROCEDURE EVALUATION
Patient: Eliseo Price    Procedure Summary     Date: 01/19/21 Room / Location:  [facility] /  [facility]    Anesthesia Start: 1337 Anesthesia Stop: 1435    Procedure: AMPUTATION RIGHT FOURTH TOE (Right Foot) Diagnosis:       Laceration of fourth toe of right foot with complication, initial encounter      Open fracture of phalanx of right fourth toe, initial encounter      (Laceration of fourth toe of right foot with complication, initial encounter [S91.114A])      (Open fracture of phalanx of right fourth toe, initial encounter [S92.501B])    Surgeon: Anish Farah DPM Provider: Ken Branch CRNA    Anesthesia Type: general ASA Status: 3          Anesthesia Type: general    Vitals  Vitals Value Taken Time   /82 01/19/21 1432   Temp 98.2 °F (36.8 °C) 01/19/21 1432   Pulse 72 01/19/21 1432   Resp 18 01/19/21 1432   SpO2 93 % 01/19/21 1432           Post Anesthesia Care and Evaluation    Patient location during evaluation: PHASE II  Patient participation: complete - patient participated  Level of consciousness: awake and alert  Pain score: 0  Pain management: adequate  Airway patency: patent  Anesthetic complications: No anesthetic complications  PONV Status: none  Cardiovascular status: acceptable  Respiratory status: acceptable  Hydration status: acceptable

## 2021-01-22 LAB
CYTO UR: NORMAL
LAB AP CASE REPORT: NORMAL
PATH REPORT.FINAL DX SPEC: NORMAL
PATH REPORT.GROSS SPEC: NORMAL

## 2021-07-22 PROBLEM — M20.5X1 CLAW TOE, ACQUIRED, RIGHT: Status: ACTIVE | Noted: 2021-07-22

## 2021-07-22 PROBLEM — E11.621 DIABETIC ULCER OF TOE OF RIGHT FOOT WITH FAT LAYER EXPOSED (HCC): Status: ACTIVE | Noted: 2021-07-22

## 2021-07-22 PROBLEM — L97.512 DIABETIC ULCER OF TOE OF RIGHT FOOT WITH FAT LAYER EXPOSED: Status: ACTIVE | Noted: 2021-07-22

## 2021-07-28 ENCOUNTER — ANESTHESIA EVENT (OUTPATIENT)
Dept: PERIOP | Facility: HOSPITAL | Age: 56
End: 2021-07-28

## 2021-07-28 ENCOUNTER — PRE-ADMISSION TESTING (OUTPATIENT)
Dept: PREADMISSION TESTING | Facility: HOSPITAL | Age: 56
End: 2021-07-28

## 2021-07-28 VITALS
TEMPERATURE: 96.4 F | SYSTOLIC BLOOD PRESSURE: 129 MMHG | WEIGHT: 315 LBS | RESPIRATION RATE: 18 BRPM | DIASTOLIC BLOOD PRESSURE: 90 MMHG | BODY MASS INDEX: 42.66 KG/M2 | OXYGEN SATURATION: 96 % | HEIGHT: 72 IN | HEART RATE: 93 BPM

## 2021-07-28 DIAGNOSIS — L97.512 DIABETIC ULCER OF TOE OF RIGHT FOOT WITH FAT LAYER EXPOSED (HCC): ICD-10-CM

## 2021-07-28 DIAGNOSIS — M20.5X1 ACQUIRED CLAW TOE, RIGHT: ICD-10-CM

## 2021-07-28 DIAGNOSIS — E11.621 DIABETIC ULCER OF TOE OF RIGHT FOOT WITH FAT LAYER EXPOSED (HCC): ICD-10-CM

## 2021-07-28 LAB
ANION GAP SERPL CALCULATED.3IONS-SCNC: 13 MMOL/L (ref 5–15)
BUN SERPL-MCNC: 14 MG/DL (ref 6–20)
BUN/CREAT SERPL: 20 (ref 7–25)
CALCIUM SPEC-SCNC: 9.2 MG/DL (ref 8.6–10.5)
CHLORIDE SERPL-SCNC: 100 MMOL/L (ref 98–107)
CO2 SERPL-SCNC: 22 MMOL/L (ref 22–29)
CREAT SERPL-MCNC: 0.7 MG/DL (ref 0.76–1.27)
DEPRECATED RDW RBC AUTO: 55.5 FL (ref 37–54)
ERYTHROCYTE [DISTWIDTH] IN BLOOD BY AUTOMATED COUNT: 16.7 % (ref 12.3–15.4)
GFR SERPL CREATININE-BSD FRML MDRD: 116 ML/MIN/1.73
GLUCOSE SERPL-MCNC: 129 MG/DL (ref 65–99)
HBA1C MFR BLD: 5.7 % (ref 4.8–5.6)
HCT VFR BLD AUTO: 47.6 % (ref 37.5–51)
HGB BLD-MCNC: 14.6 G/DL (ref 13–17.7)
MCH RBC QN AUTO: 27.7 PG (ref 26.6–33)
MCHC RBC AUTO-ENTMCNC: 30.7 G/DL (ref 31.5–35.7)
MCV RBC AUTO: 90.3 FL (ref 79–97)
PLATELET # BLD AUTO: 154 10*3/MM3 (ref 140–450)
PMV BLD AUTO: 9.6 FL (ref 6–12)
POTASSIUM SERPL-SCNC: 5.1 MMOL/L (ref 3.5–5.2)
RBC # BLD AUTO: 5.27 10*6/MM3 (ref 4.14–5.8)
SARS-COV-2 RNA PNL SPEC NAA+PROBE: NOT DETECTED
SODIUM SERPL-SCNC: 135 MMOL/L (ref 136–145)
WBC # BLD AUTO: 8.3 10*3/MM3 (ref 3.4–10.8)

## 2021-07-28 PROCEDURE — 83036 HEMOGLOBIN GLYCOSYLATED A1C: CPT | Performed by: PODIATRIST

## 2021-07-28 PROCEDURE — 36415 COLL VENOUS BLD VENIPUNCTURE: CPT

## 2021-07-28 PROCEDURE — 93010 ELECTROCARDIOGRAM REPORT: CPT | Performed by: INTERNAL MEDICINE

## 2021-07-28 PROCEDURE — C9803 HOPD COVID-19 SPEC COLLECT: HCPCS

## 2021-07-28 PROCEDURE — 93005 ELECTROCARDIOGRAM TRACING: CPT

## 2021-07-28 PROCEDURE — 80048 BASIC METABOLIC PNL TOTAL CA: CPT | Performed by: PODIATRIST

## 2021-07-28 PROCEDURE — 99213 OFFICE O/P EST LOW 20 MIN: CPT | Performed by: HOSPITALIST

## 2021-07-28 PROCEDURE — 85027 COMPLETE CBC AUTOMATED: CPT | Performed by: PODIATRIST

## 2021-07-28 PROCEDURE — 87635 SARS-COV-2 COVID-19 AMP PRB: CPT | Performed by: PODIATRIST

## 2021-07-28 RX ORDER — LOSARTAN POTASSIUM 100 MG/1
100 TABLET ORAL EVERY MORNING
COMMUNITY

## 2021-07-29 ENCOUNTER — APPOINTMENT (OUTPATIENT)
Dept: GENERAL RADIOLOGY | Facility: HOSPITAL | Age: 56
End: 2021-07-29

## 2021-07-29 ENCOUNTER — ANESTHESIA (OUTPATIENT)
Dept: PERIOP | Facility: HOSPITAL | Age: 56
End: 2021-07-29

## 2021-07-29 ENCOUNTER — HOSPITAL ENCOUNTER (OUTPATIENT)
Facility: HOSPITAL | Age: 56
Setting detail: OBSERVATION
Discharge: HOME | End: 2021-07-31
Attending: PODIATRIST | Admitting: INTERNAL MEDICINE

## 2021-07-29 ENCOUNTER — HOSPITAL ENCOUNTER (INPATIENT)
Facility: HOSPITAL | Age: 56
End: 2021-07-29
Attending: INTERNAL MEDICINE | Admitting: INTERNAL MEDICINE

## 2021-07-29 DIAGNOSIS — M86.9 OSTEOMYELITIS OF THIRD TOE OF RIGHT FOOT (HCC): Primary | ICD-10-CM

## 2021-07-29 DIAGNOSIS — L97.512 DIABETIC ULCER OF TOE OF RIGHT FOOT WITH FAT LAYER EXPOSED (HCC): ICD-10-CM

## 2021-07-29 DIAGNOSIS — M20.5X1 ACQUIRED CLAW TOE, RIGHT: ICD-10-CM

## 2021-07-29 DIAGNOSIS — L03.031 CELLULITIS OF RIGHT TOE: ICD-10-CM

## 2021-07-29 DIAGNOSIS — L97.516 DIABETIC ULCER OF TOE OF RIGHT FOOT ASSOCIATED WITH TYPE 2 DIABETES MELLITUS, WITH BONE INVOLVEMENT WITHOUT EVIDENCE OF NECROSIS (HCC): ICD-10-CM

## 2021-07-29 DIAGNOSIS — E11.621 DIABETIC ULCER OF TOE OF RIGHT FOOT ASSOCIATED WITH TYPE 2 DIABETES MELLITUS, WITH BONE INVOLVEMENT WITHOUT EVIDENCE OF NECROSIS (HCC): ICD-10-CM

## 2021-07-29 DIAGNOSIS — E11.621 DIABETIC ULCER OF TOE OF RIGHT FOOT WITH FAT LAYER EXPOSED (HCC): ICD-10-CM

## 2021-07-29 PROBLEM — E11.610 CHARCOT FOOT DUE TO DIABETES MELLITUS (HCC): Chronic | Status: ACTIVE | Noted: 2021-07-29

## 2021-07-29 LAB
BASOPHILS # BLD AUTO: 0.03 10*3/MM3 (ref 0–0.2)
BASOPHILS NFR BLD AUTO: 0.3 % (ref 0–1.5)
CRP SERPL-MCNC: 1.89 MG/DL (ref 0–0.5)
DEPRECATED RDW RBC AUTO: 53.2 FL (ref 37–54)
EOSINOPHIL # BLD AUTO: 0.06 10*3/MM3 (ref 0–0.4)
EOSINOPHIL NFR BLD AUTO: 0.7 % (ref 0.3–6.2)
ERYTHROCYTE [DISTWIDTH] IN BLOOD BY AUTOMATED COUNT: 16.7 % (ref 12.3–15.4)
ERYTHROCYTE [SEDIMENTATION RATE] IN BLOOD: 12 MM/HR (ref 0–20)
GLUCOSE BLDC GLUCOMTR-MCNC: 123 MG/DL (ref 70–130)
GLUCOSE BLDC GLUCOMTR-MCNC: 146 MG/DL (ref 70–130)
GLUCOSE BLDC GLUCOMTR-MCNC: 169 MG/DL (ref 70–130)
HCT VFR BLD AUTO: 44.5 % (ref 37.5–51)
HGB BLD-MCNC: 14.2 G/DL (ref 13–17.7)
IMM GRANULOCYTES # BLD AUTO: 0.03 10*3/MM3 (ref 0–0.05)
IMM GRANULOCYTES NFR BLD AUTO: 0.3 % (ref 0–0.5)
LYMPHOCYTES # BLD AUTO: 0.85 10*3/MM3 (ref 0.7–3.1)
LYMPHOCYTES NFR BLD AUTO: 9.7 % (ref 19.6–45.3)
MCH RBC QN AUTO: 28.3 PG (ref 26.6–33)
MCHC RBC AUTO-ENTMCNC: 31.9 G/DL (ref 31.5–35.7)
MCV RBC AUTO: 88.6 FL (ref 79–97)
MONOCYTES # BLD AUTO: 0.29 10*3/MM3 (ref 0.1–0.9)
MONOCYTES NFR BLD AUTO: 3.3 % (ref 5–12)
NEUTROPHILS NFR BLD AUTO: 7.5 10*3/MM3 (ref 1.7–7)
NEUTROPHILS NFR BLD AUTO: 85.7 % (ref 42.7–76)
NRBC BLD AUTO-RTO: 0 /100 WBC (ref 0–0.2)
PLATELET # BLD AUTO: 164 10*3/MM3 (ref 140–450)
PMV BLD AUTO: 8.7 FL (ref 6–12)
QT INTERVAL: 345 MS
RBC # BLD AUTO: 5.02 10*6/MM3 (ref 4.14–5.8)
WBC # BLD AUTO: 8.76 10*3/MM3 (ref 3.4–10.8)

## 2021-07-29 PROCEDURE — 85025 COMPLETE CBC W/AUTO DIFF WBC: CPT | Performed by: PODIATRIST

## 2021-07-29 PROCEDURE — 87147 CULTURE TYPE IMMUNOLOGIC: CPT | Performed by: INTERNAL MEDICINE

## 2021-07-29 PROCEDURE — 63710000001 CLONIDINE 0.2 MG TABLET: Performed by: INTERNAL MEDICINE

## 2021-07-29 PROCEDURE — 82962 GLUCOSE BLOOD TEST: CPT

## 2021-07-29 PROCEDURE — 63710000001 GABAPENTIN 400 MG CAPSULE: Performed by: INTERNAL MEDICINE

## 2021-07-29 PROCEDURE — 63710000001 CLONAZEPAM 0.5 MG TABLET: Performed by: PODIATRIST

## 2021-07-29 PROCEDURE — 73630 X-RAY EXAM OF FOOT: CPT

## 2021-07-29 PROCEDURE — 25010000002 ONDANSETRON PER 1 MG: Performed by: NURSE ANESTHETIST, CERTIFIED REGISTERED

## 2021-07-29 PROCEDURE — G0463 HOSPITAL OUTPT CLINIC VISIT: HCPCS | Performed by: PODIATRIST

## 2021-07-29 PROCEDURE — 63710000001 LINAGLIPTIN 5 MG TABLET: Performed by: INTERNAL MEDICINE

## 2021-07-29 PROCEDURE — 87081 CULTURE SCREEN ONLY: CPT | Performed by: INTERNAL MEDICINE

## 2021-07-29 PROCEDURE — A9270 NON-COVERED ITEM OR SERVICE: HCPCS | Performed by: INTERNAL MEDICINE

## 2021-07-29 PROCEDURE — 87075 CULTR BACTERIA EXCEPT BLOOD: CPT | Performed by: INTERNAL MEDICINE

## 2021-07-29 PROCEDURE — 86140 C-REACTIVE PROTEIN: CPT | Performed by: INTERNAL MEDICINE

## 2021-07-29 PROCEDURE — 25010000002 DEXAMETHASONE PER 1 MG: Performed by: NURSE ANESTHETIST, CERTIFIED REGISTERED

## 2021-07-29 PROCEDURE — 94799 UNLISTED PULMONARY SVC/PX: CPT

## 2021-07-29 PROCEDURE — 87070 CULTURE OTHR SPECIMN AEROBIC: CPT | Performed by: INTERNAL MEDICINE

## 2021-07-29 PROCEDURE — G0378 HOSPITAL OBSERVATION PER HR: HCPCS

## 2021-07-29 PROCEDURE — 87205 SMEAR GRAM STAIN: CPT | Performed by: INTERNAL MEDICINE

## 2021-07-29 PROCEDURE — 63710000001 HYDROCODONE-ACETAMINOPHEN 5-325 MG TABLET: Performed by: INTERNAL MEDICINE

## 2021-07-29 PROCEDURE — 25010000002 CEFEPIME-DEXTROSE 2-5 GM-%(50ML) RECONSTITUTED SOLUTION: Performed by: INTERNAL MEDICINE

## 2021-07-29 PROCEDURE — A9270 NON-COVERED ITEM OR SERVICE: HCPCS | Performed by: PODIATRIST

## 2021-07-29 PROCEDURE — 63710000001 PRIMIDONE 250 MG TABLET: Performed by: INTERNAL MEDICINE

## 2021-07-29 PROCEDURE — 63710000001 AMLODIPINE 5 MG TABLET: Performed by: INTERNAL MEDICINE

## 2021-07-29 PROCEDURE — 85652 RBC SED RATE AUTOMATED: CPT | Performed by: INTERNAL MEDICINE

## 2021-07-29 RX ORDER — SODIUM CHLORIDE 0.9 % (FLUSH) 0.9 %
10 SYRINGE (ML) INJECTION AS NEEDED
Status: DISCONTINUED | OUTPATIENT
Start: 2021-07-29 | End: 2021-07-31 | Stop reason: HOSPADM

## 2021-07-29 RX ORDER — CLONIDINE HYDROCHLORIDE 0.2 MG/1
0.1 TABLET ORAL EVERY 12 HOURS SCHEDULED
Status: DISCONTINUED | OUTPATIENT
Start: 2021-07-29 | End: 2021-07-31 | Stop reason: HOSPADM

## 2021-07-29 RX ORDER — FLUOXETINE HYDROCHLORIDE 20 MG/1
40 CAPSULE ORAL EVERY MORNING
Status: DISCONTINUED | OUTPATIENT
Start: 2021-07-30 | End: 2021-07-31 | Stop reason: HOSPADM

## 2021-07-29 RX ORDER — CLINDAMYCIN PHOSPHATE 900 MG/50ML
900 INJECTION INTRAVENOUS ONCE
Status: DISCONTINUED | OUTPATIENT
Start: 2021-07-29 | End: 2021-07-29

## 2021-07-29 RX ORDER — PANTOPRAZOLE SODIUM 40 MG/1
40 TABLET, DELAYED RELEASE ORAL EVERY MORNING
Status: DISCONTINUED | OUTPATIENT
Start: 2021-07-30 | End: 2021-07-31 | Stop reason: HOSPADM

## 2021-07-29 RX ORDER — DEXAMETHASONE SODIUM PHOSPHATE 4 MG/ML
8 INJECTION, SOLUTION INTRA-ARTICULAR; INTRALESIONAL; INTRAMUSCULAR; INTRAVENOUS; SOFT TISSUE ONCE AS NEEDED
Status: COMPLETED | OUTPATIENT
Start: 2021-07-29 | End: 2021-07-29

## 2021-07-29 RX ORDER — SODIUM CHLORIDE 9 MG/ML
40 INJECTION, SOLUTION INTRAVENOUS AS NEEDED
Status: DISCONTINUED | OUTPATIENT
Start: 2021-07-29 | End: 2021-07-31 | Stop reason: HOSPADM

## 2021-07-29 RX ORDER — NICOTINE POLACRILEX 4 MG
15 LOZENGE BUCCAL
Status: DISCONTINUED | OUTPATIENT
Start: 2021-07-29 | End: 2021-07-31 | Stop reason: HOSPADM

## 2021-07-29 RX ORDER — CEFEPIME HYDROCHLORIDE 2 G/50ML
2 INJECTION, SOLUTION INTRAVENOUS ONCE
Status: COMPLETED | OUTPATIENT
Start: 2021-07-29 | End: 2021-07-29

## 2021-07-29 RX ORDER — HEPARIN SODIUM 5000 [USP'U]/ML
5000 INJECTION, SOLUTION INTRAVENOUS; SUBCUTANEOUS EVERY 12 HOURS SCHEDULED
Status: DISCONTINUED | OUTPATIENT
Start: 2021-07-29 | End: 2021-07-31 | Stop reason: HOSPADM

## 2021-07-29 RX ORDER — LOSARTAN POTASSIUM 50 MG/1
100 TABLET ORAL EVERY MORNING
Status: DISCONTINUED | OUTPATIENT
Start: 2021-07-30 | End: 2021-07-31 | Stop reason: HOSPADM

## 2021-07-29 RX ORDER — CEFEPIME HYDROCHLORIDE 2 G/50ML
2 INJECTION, SOLUTION INTRAVENOUS EVERY 8 HOURS
Status: DISCONTINUED | OUTPATIENT
Start: 2021-07-29 | End: 2021-07-31 | Stop reason: HOSPADM

## 2021-07-29 RX ORDER — ACETAMINOPHEN 325 MG/1
325 TABLET ORAL EVERY 4 HOURS PRN
Status: DISCONTINUED | OUTPATIENT
Start: 2021-07-29 | End: 2021-07-31 | Stop reason: HOSPADM

## 2021-07-29 RX ORDER — CLONAZEPAM 0.5 MG/1
1 TABLET ORAL ONCE
Status: DISCONTINUED | OUTPATIENT
Start: 2021-07-29 | End: 2021-07-29

## 2021-07-29 RX ORDER — CLONAZEPAM 0.5 MG/1
0.5 TABLET ORAL 3 TIMES DAILY PRN
Status: DISCONTINUED | OUTPATIENT
Start: 2021-07-29 | End: 2021-07-31 | Stop reason: HOSPADM

## 2021-07-29 RX ORDER — IBUPROFEN 400 MG/1
600 TABLET ORAL EVERY 6 HOURS PRN
Status: DISCONTINUED | OUTPATIENT
Start: 2021-07-29 | End: 2021-07-31 | Stop reason: HOSPADM

## 2021-07-29 RX ORDER — ACETAMINOPHEN 160 MG/5ML
325 SOLUTION ORAL EVERY 4 HOURS PRN
Status: DISCONTINUED | OUTPATIENT
Start: 2021-07-29 | End: 2021-07-31 | Stop reason: HOSPADM

## 2021-07-29 RX ORDER — ONDANSETRON 4 MG/1
4 TABLET, ORALLY DISINTEGRATING ORAL EVERY 6 HOURS PRN
Status: DISCONTINUED | OUTPATIENT
Start: 2021-07-29 | End: 2021-07-31 | Stop reason: HOSPADM

## 2021-07-29 RX ORDER — SODIUM CHLORIDE, SODIUM LACTATE, POTASSIUM CHLORIDE, CALCIUM CHLORIDE 600; 310; 30; 20 MG/100ML; MG/100ML; MG/100ML; MG/100ML
9 INJECTION, SOLUTION INTRAVENOUS CONTINUOUS
Status: DISCONTINUED | OUTPATIENT
Start: 2021-07-29 | End: 2021-07-31 | Stop reason: HOSPADM

## 2021-07-29 RX ORDER — ACETAMINOPHEN 650 MG/1
650 SUPPOSITORY RECTAL EVERY 4 HOURS PRN
Status: DISCONTINUED | OUTPATIENT
Start: 2021-07-29 | End: 2021-07-31 | Stop reason: HOSPADM

## 2021-07-29 RX ORDER — PRIMIDONE 250 MG/1
250 TABLET ORAL 3 TIMES DAILY
Status: DISCONTINUED | OUTPATIENT
Start: 2021-07-29 | End: 2021-07-31 | Stop reason: HOSPADM

## 2021-07-29 RX ORDER — TEMAZEPAM 15 MG/1
30 CAPSULE ORAL NIGHTLY PRN
Status: DISCONTINUED | OUTPATIENT
Start: 2021-07-29 | End: 2021-07-31 | Stop reason: HOSPADM

## 2021-07-29 RX ORDER — GABAPENTIN 400 MG/1
800 CAPSULE ORAL 3 TIMES DAILY
Status: DISCONTINUED | OUTPATIENT
Start: 2021-07-29 | End: 2021-07-31 | Stop reason: HOSPADM

## 2021-07-29 RX ORDER — ONDANSETRON 2 MG/ML
4 INJECTION INTRAMUSCULAR; INTRAVENOUS ONCE AS NEEDED
Status: COMPLETED | OUTPATIENT
Start: 2021-07-29 | End: 2021-07-29

## 2021-07-29 RX ORDER — CLONAZEPAM 0.5 MG/1
1 TABLET ORAL ONCE
Status: COMPLETED | OUTPATIENT
Start: 2021-07-29 | End: 2021-07-29

## 2021-07-29 RX ORDER — SODIUM CHLORIDE 0.9 % (FLUSH) 0.9 %
10 SYRINGE (ML) INJECTION EVERY 12 HOURS SCHEDULED
Status: DISCONTINUED | OUTPATIENT
Start: 2021-07-29 | End: 2021-07-31 | Stop reason: HOSPADM

## 2021-07-29 RX ORDER — QUETIAPINE FUMARATE 100 MG/1
200 TABLET, FILM COATED ORAL NIGHTLY
Status: DISCONTINUED | OUTPATIENT
Start: 2021-07-29 | End: 2021-07-31 | Stop reason: HOSPADM

## 2021-07-29 RX ORDER — AMLODIPINE BESYLATE 5 MG/1
5 TABLET ORAL DAILY
Status: DISCONTINUED | OUTPATIENT
Start: 2021-07-29 | End: 2021-07-31 | Stop reason: HOSPADM

## 2021-07-29 RX ORDER — LIDOCAINE HYDROCHLORIDE 10 MG/ML
0.5 INJECTION, SOLUTION EPIDURAL; INFILTRATION; INTRACAUDAL; PERINEURAL ONCE AS NEEDED
Status: DISCONTINUED | OUTPATIENT
Start: 2021-07-29 | End: 2021-07-29

## 2021-07-29 RX ORDER — PROPRANOLOL HYDROCHLORIDE 40 MG/1
40 TABLET ORAL 2 TIMES DAILY
Status: DISCONTINUED | OUTPATIENT
Start: 2021-07-29 | End: 2021-07-31 | Stop reason: HOSPADM

## 2021-07-29 RX ORDER — MIDAZOLAM HYDROCHLORIDE 2 MG/2ML
1 INJECTION, SOLUTION INTRAMUSCULAR; INTRAVENOUS
Status: DISCONTINUED | OUTPATIENT
Start: 2021-07-29 | End: 2021-07-29

## 2021-07-29 RX ORDER — HYDROCODONE BITARTRATE AND ACETAMINOPHEN 5; 325 MG/1; MG/1
1 TABLET ORAL EVERY 4 HOURS PRN
Status: DISCONTINUED | OUTPATIENT
Start: 2021-07-29 | End: 2021-07-31 | Stop reason: HOSPADM

## 2021-07-29 RX ORDER — FAMOTIDINE 10 MG/ML
20 INJECTION, SOLUTION INTRAVENOUS
Status: COMPLETED | OUTPATIENT
Start: 2021-07-29 | End: 2021-07-29

## 2021-07-29 RX ORDER — DEXTROSE MONOHYDRATE 25 G/50ML
25 INJECTION, SOLUTION INTRAVENOUS
Status: DISCONTINUED | OUTPATIENT
Start: 2021-07-29 | End: 2021-07-31 | Stop reason: HOSPADM

## 2021-07-29 RX ADMIN — SODIUM CHLORIDE, PRESERVATIVE FREE 10 ML: 5 INJECTION INTRAVENOUS at 20:17

## 2021-07-29 RX ADMIN — SODIUM CHLORIDE, POTASSIUM CHLORIDE, SODIUM LACTATE AND CALCIUM CHLORIDE 9 ML/HR: 600; 310; 30; 20 INJECTION, SOLUTION INTRAVENOUS at 10:40

## 2021-07-29 RX ADMIN — TEMAZEPAM 30 MG: 15 CAPSULE ORAL at 20:26

## 2021-07-29 RX ADMIN — FAMOTIDINE 20 MG: 10 INJECTION, SOLUTION INTRAVENOUS at 11:28

## 2021-07-29 RX ADMIN — HYDROCODONE BITARTRATE AND ACETAMINOPHEN 1 TABLET: 5; 325 TABLET ORAL at 15:08

## 2021-07-29 RX ADMIN — PROPRANOLOL HYDROCHLORIDE 40 MG: 40 TABLET ORAL at 20:14

## 2021-07-29 RX ADMIN — PRIMIDONE 250 MG: 250 TABLET ORAL at 15:08

## 2021-07-29 RX ADMIN — ONDANSETRON 4 MG: 2 INJECTION INTRAMUSCULAR; INTRAVENOUS at 11:28

## 2021-07-29 RX ADMIN — QUETIAPINE FUMARATE 200 MG: 100 TABLET ORAL at 20:14

## 2021-07-29 RX ADMIN — CLONIDINE HYDROCHLORIDE 0.1 MG: 0.2 TABLET ORAL at 15:07

## 2021-07-29 RX ADMIN — CLONAZEPAM 1 MG: 0.5 TABLET ORAL at 13:45

## 2021-07-29 RX ADMIN — GABAPENTIN 800 MG: 400 CAPSULE ORAL at 15:08

## 2021-07-29 RX ADMIN — GABAPENTIN 800 MG: 400 CAPSULE ORAL at 20:14

## 2021-07-29 RX ADMIN — PRIMIDONE 250 MG: 250 TABLET ORAL at 20:14

## 2021-07-29 RX ADMIN — CEFEPIME HYDROCHLORIDE 2 G: 2 INJECTION, SOLUTION INTRAVENOUS at 13:05

## 2021-07-29 RX ADMIN — HYDROCODONE BITARTRATE AND ACETAMINOPHEN 1 TABLET: 5; 325 TABLET ORAL at 20:26

## 2021-07-29 RX ADMIN — AMLODIPINE BESYLATE 5 MG: 5 TABLET ORAL at 15:08

## 2021-07-29 RX ADMIN — DEXAMETHASONE SODIUM PHOSPHATE 8 MG: 4 INJECTION, SOLUTION INTRAMUSCULAR; INTRAVENOUS at 11:29

## 2021-07-29 RX ADMIN — CEFEPIME HYDROCHLORIDE 2 G: 2 INJECTION, SOLUTION INTRAVENOUS at 20:14

## 2021-07-29 RX ADMIN — LINAGLIPTIN 5 MG: 5 TABLET, FILM COATED ORAL at 15:08

## 2021-07-29 NOTE — ANESTHESIA PREPROCEDURE EVALUATION
Anesthesia Evaluation     Patient summary reviewed and Nursing notes reviewed   NPO Solid Status: > 8 hours  NPO Liquid Status: > 8 hours           Airway   Mallampati: III  TM distance: >3 FB  Neck ROM: full  Large neck circumference and Possible difficult intubation  Dental - normal exam     Pulmonary     breath sounds clear to auscultation  (+) COPD mild, sleep apnea ( noncompliant with cpap), decreased breath sounds,   Cardiovascular - normal exam  Exercise tolerance: good (4-7 METS)    ECG reviewed  Beta blocker given within 24 hours of surgery and Beta blocker not taken-may be given intraoperatively  Rhythm: regular  Rate: normal    (+) hypertension poorly controlled 2 medications or greater, past MI (2009. medically managed)  >12 months, CAD, PVD, DVT (2003) resolved,       Neuro/Psych  (+) tremors, numbness (bilat. feet ), psychiatric history Bipolar, Anxiety and Depression,     (-) CVA (2006, 2015 memory problems)  GI/Hepatic/Renal/Endo    (+) obesity, morbid obesity, GERD poorly controlled, PUD, GI bleeding upper resolved, diabetes mellitus type 2 well controlled,     Musculoskeletal     Abdominal   (+) obese,    Substance History - negative use     OB/GYN          Other   arthritis,      ROS/Med Hx Other: ECG 12 Lead  Order: [id number]  Status:  Final result   Visible to patient:  No (not released) Next appt:  None  0 Result Notes  Component   Ref Range & Units 7/28/21 1328  QT Interval   ms 345     Narrative & Impression      HEART RATE= 89  bpm  RR Interval= 676  ms  CA Interval= 178  ms  P Horizontal Axis= -18  deg  P Front Axis= 38  deg  QRSD Interval= 98  ms  QT Interval= 345  ms  QRS Axis= 61  deg  T Wave Axis= 35  deg  - NORMAL ECG -  Sinus rhythm  NO SIGNIFICANT CHANGE FROM PREVIOUS ECG  Electronically Signed By: Jb Cerda ([facility]) 29-Jul-2021 08:27:34  Date and Time of Study: 2021-07-28 13:28:48    Specimen Collected: 07/28/21 13:28                          Anesthesia Plan    ASA 3     MAC and  general   (Consented for GA and MAC)  intravenous induction     Anesthetic plan, all risks, benefits, and alternatives have been provided, discussed and informed consent has been obtained with: patient.  Use of blood products discussed with patient  Consented to blood products.   Plan discussed with CRNA.

## 2021-07-30 ENCOUNTER — ANESTHESIA (OUTPATIENT)
Dept: PERIOP | Facility: HOSPITAL | Age: 56
End: 2021-07-30

## 2021-07-30 ENCOUNTER — ANESTHESIA EVENT (OUTPATIENT)
Dept: PERIOP | Facility: HOSPITAL | Age: 56
End: 2021-07-30

## 2021-07-30 LAB
ANION GAP SERPL CALCULATED.3IONS-SCNC: 6.9 MMOL/L (ref 5–15)
BASOPHILS # BLD AUTO: 0.04 10*3/MM3 (ref 0–0.2)
BASOPHILS NFR BLD AUTO: 0.5 % (ref 0–1.5)
BUN SERPL-MCNC: 10 MG/DL (ref 6–20)
BUN/CREAT SERPL: 16.7 (ref 7–25)
CALCIUM SPEC-SCNC: 8.7 MG/DL (ref 8.6–10.5)
CHLORIDE SERPL-SCNC: 100 MMOL/L (ref 98–107)
CO2 SERPL-SCNC: 30.1 MMOL/L (ref 22–29)
CREAT SERPL-MCNC: 0.6 MG/DL (ref 0.76–1.27)
DEPRECATED RDW RBC AUTO: 53.9 FL (ref 37–54)
EOSINOPHIL # BLD AUTO: 0.13 10*3/MM3 (ref 0–0.4)
EOSINOPHIL NFR BLD AUTO: 1.7 % (ref 0.3–6.2)
ERYTHROCYTE [DISTWIDTH] IN BLOOD BY AUTOMATED COUNT: 16.7 % (ref 12.3–15.4)
GFR SERPL CREATININE-BSD FRML MDRD: 139 ML/MIN/1.73
GLUCOSE BLDC GLUCOMTR-MCNC: 100 MG/DL (ref 70–130)
GLUCOSE BLDC GLUCOMTR-MCNC: 109 MG/DL (ref 70–130)
GLUCOSE BLDC GLUCOMTR-MCNC: 190 MG/DL (ref 70–130)
GLUCOSE SERPL-MCNC: 93 MG/DL (ref 65–99)
HCT VFR BLD AUTO: 41.2 % (ref 37.5–51)
HGB BLD-MCNC: 12.8 G/DL (ref 13–17.7)
IMM GRANULOCYTES # BLD AUTO: 0.04 10*3/MM3 (ref 0–0.05)
IMM GRANULOCYTES NFR BLD AUTO: 0.5 % (ref 0–0.5)
LYMPHOCYTES # BLD AUTO: 1.31 10*3/MM3 (ref 0.7–3.1)
LYMPHOCYTES NFR BLD AUTO: 17 % (ref 19.6–45.3)
MCH RBC QN AUTO: 27.5 PG (ref 26.6–33)
MCHC RBC AUTO-ENTMCNC: 31.1 G/DL (ref 31.5–35.7)
MCV RBC AUTO: 88.4 FL (ref 79–97)
MONOCYTES # BLD AUTO: 0.88 10*3/MM3 (ref 0.1–0.9)
MONOCYTES NFR BLD AUTO: 11.4 % (ref 5–12)
MRSA SPEC QL CULT: NORMAL
NEUTROPHILS NFR BLD AUTO: 5.31 10*3/MM3 (ref 1.7–7)
NEUTROPHILS NFR BLD AUTO: 68.9 % (ref 42.7–76)
NRBC BLD AUTO-RTO: 0 /100 WBC (ref 0–0.2)
PLATELET # BLD AUTO: 161 10*3/MM3 (ref 140–450)
PMV BLD AUTO: 9.6 FL (ref 6–12)
POTASSIUM SERPL-SCNC: 4.2 MMOL/L (ref 3.5–5.2)
RBC # BLD AUTO: 4.66 10*6/MM3 (ref 4.14–5.8)
SODIUM SERPL-SCNC: 137 MMOL/L (ref 136–145)
WBC # BLD AUTO: 7.71 10*3/MM3 (ref 3.4–10.8)

## 2021-07-30 PROCEDURE — 25010000002 FENTANYL CITRATE (PF) 50 MCG/ML SOLUTION: Performed by: NURSE ANESTHETIST, CERTIFIED REGISTERED

## 2021-07-30 PROCEDURE — G0378 HOSPITAL OBSERVATION PER HR: HCPCS

## 2021-07-30 PROCEDURE — 87070 CULTURE OTHR SPECIMN AEROBIC: CPT | Performed by: PODIATRIST

## 2021-07-30 PROCEDURE — 25010000002 CEFEPIME-DEXTROSE 2-5 GM-%(50ML) RECONSTITUTED SOLUTION: Performed by: PODIATRIST

## 2021-07-30 PROCEDURE — 25010000002 HYDROMORPHONE PER 4 MG: Performed by: NURSE ANESTHETIST, CERTIFIED REGISTERED

## 2021-07-30 PROCEDURE — 88305 TISSUE EXAM BY PATHOLOGIST: CPT | Performed by: PODIATRIST

## 2021-07-30 PROCEDURE — 25010000002 HEPARIN (PORCINE) PER 1000 UNITS: Performed by: PODIATRIST

## 2021-07-30 PROCEDURE — 25010000002 MIDAZOLAM PER 1MG: Performed by: NURSE ANESTHETIST, CERTIFIED REGISTERED

## 2021-07-30 PROCEDURE — 80048 BASIC METABOLIC PNL TOTAL CA: CPT | Performed by: INTERNAL MEDICINE

## 2021-07-30 PROCEDURE — 25010000002 PROPOFOL 10 MG/ML EMULSION: Performed by: NURSE ANESTHETIST, CERTIFIED REGISTERED

## 2021-07-30 PROCEDURE — 87205 SMEAR GRAM STAIN: CPT | Performed by: PODIATRIST

## 2021-07-30 PROCEDURE — 88311 DECALCIFY TISSUE: CPT | Performed by: PODIATRIST

## 2021-07-30 PROCEDURE — 87176 TISSUE HOMOGENIZATION CULTR: CPT | Performed by: PODIATRIST

## 2021-07-30 PROCEDURE — 99214 OFFICE O/P EST MOD 30 MIN: CPT | Performed by: INTERNAL MEDICINE

## 2021-07-30 PROCEDURE — 82962 GLUCOSE BLOOD TEST: CPT

## 2021-07-30 PROCEDURE — 25010000003 LIDOCAINE 1 % SOLUTION 10 ML VIAL: Performed by: PODIATRIST

## 2021-07-30 PROCEDURE — 25010000002 CEFEPIME-DEXTROSE 2-5 GM-%(50ML) RECONSTITUTED SOLUTION: Performed by: INTERNAL MEDICINE

## 2021-07-30 PROCEDURE — 25010000002 ONDANSETRON PER 1 MG: Performed by: NURSE ANESTHETIST, CERTIFIED REGISTERED

## 2021-07-30 PROCEDURE — 85025 COMPLETE CBC W/AUTO DIFF WBC: CPT | Performed by: INTERNAL MEDICINE

## 2021-07-30 PROCEDURE — 87147 CULTURE TYPE IMMUNOLOGIC: CPT | Performed by: PODIATRIST

## 2021-07-30 RX ORDER — PROPOFOL 10 MG/ML
VIAL (ML) INTRAVENOUS AS NEEDED
Status: DISCONTINUED | OUTPATIENT
Start: 2021-07-30 | End: 2021-07-30 | Stop reason: SURG

## 2021-07-30 RX ORDER — DEXMEDETOMIDINE HYDROCHLORIDE 100 UG/ML
INJECTION, SOLUTION INTRAVENOUS AS NEEDED
Status: DISCONTINUED | OUTPATIENT
Start: 2021-07-30 | End: 2021-07-30 | Stop reason: SURG

## 2021-07-30 RX ORDER — FENTANYL CITRATE 50 UG/ML
25 INJECTION, SOLUTION INTRAMUSCULAR; INTRAVENOUS
Status: DISCONTINUED | OUTPATIENT
Start: 2021-07-30 | End: 2021-07-30 | Stop reason: HOSPADM

## 2021-07-30 RX ORDER — CELECOXIB 100 MG/1
400 CAPSULE ORAL ONCE
Status: COMPLETED | OUTPATIENT
Start: 2021-07-30 | End: 2021-07-30

## 2021-07-30 RX ORDER — HYDROMORPHONE HYDROCHLORIDE 1 MG/ML
0.5 INJECTION, SOLUTION INTRAMUSCULAR; INTRAVENOUS; SUBCUTANEOUS
Status: DISCONTINUED | OUTPATIENT
Start: 2021-07-30 | End: 2021-07-30 | Stop reason: HOSPADM

## 2021-07-30 RX ORDER — FENTANYL CITRATE 50 UG/ML
INJECTION, SOLUTION INTRAMUSCULAR; INTRAVENOUS AS NEEDED
Status: DISCONTINUED | OUTPATIENT
Start: 2021-07-30 | End: 2021-07-30 | Stop reason: SURG

## 2021-07-30 RX ORDER — HYDROCODONE BITARTRATE AND ACETAMINOPHEN 5; 325 MG/1; MG/1
1 TABLET ORAL ONCE AS NEEDED
Status: DISCONTINUED | OUTPATIENT
Start: 2021-07-30 | End: 2021-07-30 | Stop reason: HOSPADM

## 2021-07-30 RX ORDER — KETAMINE HYDROCHLORIDE 10 MG/ML
INJECTION INTRAMUSCULAR; INTRAVENOUS AS NEEDED
Status: DISCONTINUED | OUTPATIENT
Start: 2021-07-30 | End: 2021-07-30 | Stop reason: SURG

## 2021-07-30 RX ORDER — LIDOCAINE HYDROCHLORIDE 20 MG/ML
INJECTION, SOLUTION INFILTRATION; PERINEURAL AS NEEDED
Status: DISCONTINUED | OUTPATIENT
Start: 2021-07-30 | End: 2021-07-30 | Stop reason: SURG

## 2021-07-30 RX ORDER — ACETAMINOPHEN 500 MG
1000 TABLET ORAL ONCE
Status: COMPLETED | OUTPATIENT
Start: 2021-07-30 | End: 2021-07-30

## 2021-07-30 RX ORDER — HYDROCODONE BITARTRATE AND ACETAMINOPHEN 7.5; 325 MG/1; MG/1
1 TABLET ORAL EVERY 4 HOURS PRN
Status: DISCONTINUED | OUTPATIENT
Start: 2021-07-30 | End: 2021-07-30 | Stop reason: HOSPADM

## 2021-07-30 RX ORDER — ONDANSETRON 2 MG/ML
4 INJECTION INTRAMUSCULAR; INTRAVENOUS ONCE AS NEEDED
Status: DISCONTINUED | OUTPATIENT
Start: 2021-07-30 | End: 2021-07-30 | Stop reason: HOSPADM

## 2021-07-30 RX ORDER — GABAPENTIN 300 MG/1
900 CAPSULE ORAL ONCE
Status: COMPLETED | OUTPATIENT
Start: 2021-07-30 | End: 2021-07-30

## 2021-07-30 RX ORDER — ONDANSETRON 2 MG/ML
4 INJECTION INTRAMUSCULAR; INTRAVENOUS ONCE AS NEEDED
Status: COMPLETED | OUTPATIENT
Start: 2021-07-30 | End: 2021-07-30

## 2021-07-30 RX ORDER — HYDROMORPHONE HYDROCHLORIDE 1 MG/ML
0.2 INJECTION, SOLUTION INTRAMUSCULAR; INTRAVENOUS; SUBCUTANEOUS
Status: DISCONTINUED | OUTPATIENT
Start: 2021-07-30 | End: 2021-07-30 | Stop reason: HOSPADM

## 2021-07-30 RX ORDER — MIDAZOLAM HYDROCHLORIDE 2 MG/2ML
1 INJECTION, SOLUTION INTRAMUSCULAR; INTRAVENOUS
Status: DISCONTINUED | OUTPATIENT
Start: 2021-07-30 | End: 2021-07-30 | Stop reason: HOSPADM

## 2021-07-30 RX ORDER — FAMOTIDINE 10 MG/ML
20 INJECTION, SOLUTION INTRAVENOUS
Status: COMPLETED | OUTPATIENT
Start: 2021-07-30 | End: 2021-07-30

## 2021-07-30 RX ORDER — SODIUM CHLORIDE, SODIUM LACTATE, POTASSIUM CHLORIDE, CALCIUM CHLORIDE 600; 310; 30; 20 MG/100ML; MG/100ML; MG/100ML; MG/100ML
100 INJECTION, SOLUTION INTRAVENOUS CONTINUOUS
Status: DISCONTINUED | OUTPATIENT
Start: 2021-07-30 | End: 2021-07-31 | Stop reason: HOSPADM

## 2021-07-30 RX ORDER — MIDAZOLAM HYDROCHLORIDE 2 MG/2ML
INJECTION, SOLUTION INTRAMUSCULAR; INTRAVENOUS AS NEEDED
Status: DISCONTINUED | OUTPATIENT
Start: 2021-07-30 | End: 2021-07-30 | Stop reason: SURG

## 2021-07-30 RX ORDER — MAGNESIUM HYDROXIDE 1200 MG/15ML
LIQUID ORAL AS NEEDED
Status: DISCONTINUED | OUTPATIENT
Start: 2021-07-30 | End: 2021-07-30 | Stop reason: HOSPADM

## 2021-07-30 RX ADMIN — CEFEPIME HYDROCHLORIDE 2 G: 2 INJECTION, SOLUTION INTRAVENOUS at 20:32

## 2021-07-30 RX ADMIN — MIDAZOLAM HYDROCHLORIDE 0.5 MG: 1 INJECTION, SOLUTION INTRAMUSCULAR; INTRAVENOUS at 18:40

## 2021-07-30 RX ADMIN — SODIUM CHLORIDE, PRESERVATIVE FREE 10 ML: 5 INJECTION INTRAVENOUS at 08:03

## 2021-07-30 RX ADMIN — ONDANSETRON 4 MG: 2 INJECTION INTRAMUSCULAR; INTRAVENOUS at 16:41

## 2021-07-30 RX ADMIN — CEFEPIME HYDROCHLORIDE 2 G: 2 INJECTION, SOLUTION INTRAVENOUS at 03:46

## 2021-07-30 RX ADMIN — PROPRANOLOL HYDROCHLORIDE 40 MG: 40 TABLET ORAL at 08:04

## 2021-07-30 RX ADMIN — FENTANYL CITRATE 25 MCG: 50 INJECTION INTRAMUSCULAR; INTRAVENOUS at 18:15

## 2021-07-30 RX ADMIN — KETAMINE HYDROCHLORIDE 20 MG: 10 INJECTION, SOLUTION INTRAMUSCULAR; INTRAVENOUS at 18:07

## 2021-07-30 RX ADMIN — SODIUM CHLORIDE, POTASSIUM CHLORIDE, SODIUM LACTATE AND CALCIUM CHLORIDE 100 ML/HR: 600; 310; 30; 20 INJECTION, SOLUTION INTRAVENOUS at 20:28

## 2021-07-30 RX ADMIN — DEXMEDETOMIDINE 8 MCG: 100 INJECTION, SOLUTION, CONCENTRATE INTRAVENOUS at 18:15

## 2021-07-30 RX ADMIN — AMLODIPINE BESYLATE 5 MG: 5 TABLET ORAL at 08:03

## 2021-07-30 RX ADMIN — GABAPENTIN 800 MG: 400 CAPSULE ORAL at 20:30

## 2021-07-30 RX ADMIN — KETAMINE HYDROCHLORIDE 10 MG: 10 INJECTION, SOLUTION INTRAMUSCULAR; INTRAVENOUS at 18:20

## 2021-07-30 RX ADMIN — PANTOPRAZOLE SODIUM 40 MG: 40 TABLET, DELAYED RELEASE ORAL at 08:02

## 2021-07-30 RX ADMIN — PROPRANOLOL HYDROCHLORIDE 40 MG: 40 TABLET ORAL at 20:33

## 2021-07-30 RX ADMIN — HYDROCODONE BITARTRATE AND ACETAMINOPHEN 1 TABLET: 5; 325 TABLET ORAL at 05:05

## 2021-07-30 RX ADMIN — GABAPENTIN 800 MG: 400 CAPSULE ORAL at 08:02

## 2021-07-30 RX ADMIN — PRIMIDONE 250 MG: 250 TABLET ORAL at 08:02

## 2021-07-30 RX ADMIN — ACETAMINOPHEN 1000 MG: 500 TABLET, FILM COATED ORAL at 16:37

## 2021-07-30 RX ADMIN — PRIMIDONE 250 MG: 250 TABLET ORAL at 20:32

## 2021-07-30 RX ADMIN — CEFEPIME HYDROCHLORIDE 2 G: 2 INJECTION, SOLUTION INTRAVENOUS at 12:10

## 2021-07-30 RX ADMIN — LIDOCAINE HYDROCHLORIDE 80 MG: 20 INJECTION, SOLUTION INFILTRATION; PERINEURAL at 17:52

## 2021-07-30 RX ADMIN — KETAMINE HYDROCHLORIDE 10 MG: 10 INJECTION, SOLUTION INTRAMUSCULAR; INTRAVENOUS at 18:25

## 2021-07-30 RX ADMIN — KETAMINE HYDROCHLORIDE 10 MG: 10 INJECTION, SOLUTION INTRAMUSCULAR; INTRAVENOUS at 18:16

## 2021-07-30 RX ADMIN — DEXMEDETOMIDINE 10 MCG: 100 INJECTION, SOLUTION, CONCENTRATE INTRAVENOUS at 18:02

## 2021-07-30 RX ADMIN — KETAMINE HYDROCHLORIDE 20 MG: 10 INJECTION, SOLUTION INTRAMUSCULAR; INTRAVENOUS at 17:54

## 2021-07-30 RX ADMIN — SODIUM CHLORIDE, PRESERVATIVE FREE 10 ML: 5 INJECTION INTRAVENOUS at 20:35

## 2021-07-30 RX ADMIN — PROPOFOL 10 MG: 10 INJECTION, EMULSION INTRAVENOUS at 18:16

## 2021-07-30 RX ADMIN — FENTANYL CITRATE 25 MCG: 50 INJECTION INTRAMUSCULAR; INTRAVENOUS at 17:57

## 2021-07-30 RX ADMIN — QUETIAPINE FUMARATE 200 MG: 100 TABLET ORAL at 20:30

## 2021-07-30 RX ADMIN — FAMOTIDINE 20 MG: 10 INJECTION, SOLUTION INTRAVENOUS at 16:41

## 2021-07-30 RX ADMIN — DEXMEDETOMIDINE 4 MCG: 100 INJECTION, SOLUTION, CONCENTRATE INTRAVENOUS at 18:19

## 2021-07-30 RX ADMIN — MIDAZOLAM HYDROCHLORIDE 0.5 MG: 1 INJECTION, SOLUTION INTRAMUSCULAR; INTRAVENOUS at 18:24

## 2021-07-30 RX ADMIN — PROPOFOL 30 MG: 10 INJECTION, EMULSION INTRAVENOUS at 17:55

## 2021-07-30 RX ADMIN — CLONIDINE HYDROCHLORIDE 0.1 MG: 0.2 TABLET ORAL at 20:30

## 2021-07-30 RX ADMIN — HYDROMORPHONE HYDROCHLORIDE 0.5 MG: 1 INJECTION, SOLUTION INTRAMUSCULAR; INTRAVENOUS; SUBCUTANEOUS at 16:44

## 2021-07-30 RX ADMIN — DEXMEDETOMIDINE 10 MCG: 100 INJECTION, SOLUTION, CONCENTRATE INTRAVENOUS at 17:53

## 2021-07-30 RX ADMIN — GABAPENTIN 900 MG: 300 CAPSULE ORAL at 16:36

## 2021-07-30 RX ADMIN — LINAGLIPTIN 5 MG: 5 TABLET, FILM COATED ORAL at 08:02

## 2021-07-30 RX ADMIN — MIDAZOLAM HYDROCHLORIDE 1 MG: 1 INJECTION, SOLUTION INTRAMUSCULAR; INTRAVENOUS at 17:28

## 2021-07-30 RX ADMIN — HEPARIN SODIUM 5000 UNITS: 5000 INJECTION INTRAVENOUS; SUBCUTANEOUS at 20:32

## 2021-07-30 RX ADMIN — PROPOFOL 100 MCG/KG/MIN: 10 INJECTION, EMULSION INTRAVENOUS at 17:53

## 2021-07-30 RX ADMIN — HYDROCODONE BITARTRATE AND ACETAMINOPHEN 1 TABLET: 5; 325 TABLET ORAL at 23:48

## 2021-07-30 RX ADMIN — CELECOXIB 400 MG: 100 CAPSULE ORAL at 16:36

## 2021-07-30 RX ADMIN — LOSARTAN POTASSIUM 100 MG: 50 TABLET, FILM COATED ORAL at 08:02

## 2021-07-30 RX ADMIN — CLONIDINE HYDROCHLORIDE 0.1 MG: 0.2 TABLET ORAL at 08:02

## 2021-07-30 RX ADMIN — TEMAZEPAM 30 MG: 15 CAPSULE ORAL at 22:33

## 2021-07-30 RX ADMIN — FLUOXETINE 40 MG: 20 CAPSULE ORAL at 08:03

## 2021-07-30 RX ADMIN — PROPOFOL 10 MG: 10 INJECTION, EMULSION INTRAVENOUS at 18:20

## 2021-07-30 RX ADMIN — CLONAZEPAM 0.5 MG: 0.5 TABLET ORAL at 22:34

## 2021-07-30 RX ADMIN — PROPOFOL 30 MG: 10 INJECTION, EMULSION INTRAVENOUS at 17:57

## 2021-07-30 NOTE — ANESTHESIA POSTPROCEDURE EVALUATION
Patient: Eliseo Price    Procedure Summary     Date: 07/30/21 Room / Location:  [facility] /  [facility]    Anesthesia Start: 1746 Anesthesia Stop: 1847    Procedure: AMPUTATION RIGHT THIRD TOE (Right Foot) Diagnosis:       Osteomyelitis of third toe of right foot (CMS/HCC)      Cellulitis of right toe      Diabetic ulcer of toe of right foot associated with type 2 diabetes mellitus, with bone involvement without evidence of necrosis (CMS/HCC)      (Osteomyelitis of third toe of right foot (CMS/HCC) [M86.9])      (Cellulitis of right toe [L03.031])      (Diabetic ulcer of toe of right foot associated with type 2 diabetes mellitus, with bone involvement without evidence of necrosis (CMS/HCC) [E11.621, L97.516])    Surgeons: Anish Farah DPM Provider: Lindsey Leon CRNA    Anesthesia Type: MAC, general ASA Status: 3          Anesthesia Type: MAC, general    Vitals  Vitals Value Taken Time   /88 07/30/21 1920   Temp 97.7 °F (36.5 °C) 07/30/21 1857   Pulse 75 07/30/21 1926   Resp 12 07/30/21 1920   SpO2 89 % 07/30/21 1926   Vitals shown include unvalidated device data.        Post Anesthesia Care and Evaluation    Patient location during evaluation: bedside  Patient participation: complete - patient participated  Level of consciousness: awake and alert  Pain score: 1  Pain management: adequate  Airway patency: patent  Anesthetic complications: No anesthetic complications  PONV Status: none  Cardiovascular status: acceptable  Respiratory status: acceptable  Hydration status: acceptable  No anesthesia care post op

## 2021-07-30 NOTE — ANESTHESIA PREPROCEDURE EVALUATION
Anesthesia Evaluation     Patient summary reviewed and Nursing notes reviewed   no history of anesthetic complications:  NPO Solid Status: > 8 hours  NPO Liquid Status: > 8 hours           Airway   Mallampati: III  TM distance: >3 FB  Neck ROM: full  Possible difficult intubation and Large neck circumference  Dental          Pulmonary - normal exam   (+) COPD mild, sleep apnea (non compliant),   (-) shortness of breath  Cardiovascular - normal exam    ECG reviewed    (+) hypertension poorly controlled 2 medications or greater, CAD, PVD, DVT resolved, hyperlipidemia,   (-) angina      Neuro/Psych  (+) CVA (memory) residual symptoms, tremors, numbness, psychiatric history Bipolar,     GI/Hepatic/Renal/Endo    (+)  GERD poorly controlled, PUD, GI bleeding upper resolved, renal disease ARF, diabetes mellitus type 2 poorly controlled,     Musculoskeletal     Abdominal  - normal exam   Substance History   (+) alcohol use,      OB/GYN negative ob/gyn ROS         Other   arthritis, autoimmune disease rheumatoid arthritis,      (-) history of cancer                  Anesthesia Plan    ASA 3     MAC and general   (Discussed mac to GA. )  intravenous induction     Use of blood products discussed with patient  Consented to blood products.

## 2021-07-31 VITALS
DIASTOLIC BLOOD PRESSURE: 79 MMHG | TEMPERATURE: 97.7 F | RESPIRATION RATE: 18 BRPM | HEIGHT: 72 IN | WEIGHT: 315 LBS | HEART RATE: 72 BPM | BODY MASS INDEX: 42.66 KG/M2 | OXYGEN SATURATION: 96 % | SYSTOLIC BLOOD PRESSURE: 134 MMHG

## 2021-07-31 LAB
ANION GAP SERPL CALCULATED.3IONS-SCNC: 6.6 MMOL/L (ref 5–15)
BACTERIA SPEC AEROBE CULT: ABNORMAL
BACTERIA SPEC AEROBE CULT: ABNORMAL
BASOPHILS # BLD AUTO: 0.01 10*3/MM3 (ref 0–0.2)
BASOPHILS NFR BLD AUTO: 0.2 % (ref 0–1.5)
BUN SERPL-MCNC: 10 MG/DL (ref 6–20)
BUN/CREAT SERPL: 15.2 (ref 7–25)
CALCIUM SPEC-SCNC: 8.7 MG/DL (ref 8.6–10.5)
CHLORIDE SERPL-SCNC: 101 MMOL/L (ref 98–107)
CO2 SERPL-SCNC: 31.4 MMOL/L (ref 22–29)
CREAT SERPL-MCNC: 0.66 MG/DL (ref 0.76–1.27)
DEPRECATED RDW RBC AUTO: 56.6 FL (ref 37–54)
EOSINOPHIL # BLD AUTO: 0.22 10*3/MM3 (ref 0–0.4)
EOSINOPHIL NFR BLD AUTO: 5.2 % (ref 0.3–6.2)
ERYTHROCYTE [DISTWIDTH] IN BLOOD BY AUTOMATED COUNT: 16.6 % (ref 12.3–15.4)
GFR SERPL CREATININE-BSD FRML MDRD: 124 ML/MIN/1.73
GLUCOSE BLDC GLUCOMTR-MCNC: 122 MG/DL (ref 70–130)
GLUCOSE BLDC GLUCOMTR-MCNC: 86 MG/DL (ref 70–130)
GLUCOSE SERPL-MCNC: 102 MG/DL (ref 65–99)
GRAM STN SPEC: ABNORMAL
GRAM STN SPEC: ABNORMAL
HCT VFR BLD AUTO: 44.2 % (ref 37.5–51)
HGB BLD-MCNC: 13.7 G/DL (ref 13–17.7)
IMM GRANULOCYTES # BLD AUTO: 0.02 10*3/MM3 (ref 0–0.05)
IMM GRANULOCYTES NFR BLD AUTO: 0.5 % (ref 0–0.5)
LYMPHOCYTES # BLD AUTO: 1.02 10*3/MM3 (ref 0.7–3.1)
LYMPHOCYTES NFR BLD AUTO: 24.1 % (ref 19.6–45.3)
MCH RBC QN AUTO: 28.4 PG (ref 26.6–33)
MCHC RBC AUTO-ENTMCNC: 31 G/DL (ref 31.5–35.7)
MCV RBC AUTO: 91.7 FL (ref 79–97)
MONOCYTES # BLD AUTO: 0.58 10*3/MM3 (ref 0.1–0.9)
MONOCYTES NFR BLD AUTO: 13.7 % (ref 5–12)
NEUTROPHILS NFR BLD AUTO: 2.38 10*3/MM3 (ref 1.7–7)
NEUTROPHILS NFR BLD AUTO: 56.3 % (ref 42.7–76)
PLATELET # BLD AUTO: 124 10*3/MM3 (ref 140–450)
PMV BLD AUTO: 9.1 FL (ref 6–12)
POTASSIUM SERPL-SCNC: 4.8 MMOL/L (ref 3.5–5.2)
RBC # BLD AUTO: 4.82 10*6/MM3 (ref 4.14–5.8)
SODIUM SERPL-SCNC: 139 MMOL/L (ref 136–145)
WBC # BLD AUTO: 4.23 10*3/MM3 (ref 3.4–10.8)

## 2021-07-31 PROCEDURE — 80048 BASIC METABOLIC PNL TOTAL CA: CPT | Performed by: PODIATRIST

## 2021-07-31 PROCEDURE — 25010000002 HEPARIN (PORCINE) PER 1000 UNITS: Performed by: PODIATRIST

## 2021-07-31 PROCEDURE — 99217 PR OBSERVATION CARE DISCHARGE MANAGEMENT: CPT | Performed by: HOSPITALIST

## 2021-07-31 PROCEDURE — 85025 COMPLETE CBC W/AUTO DIFF WBC: CPT | Performed by: PODIATRIST

## 2021-07-31 PROCEDURE — G0378 HOSPITAL OBSERVATION PER HR: HCPCS

## 2021-07-31 PROCEDURE — 82962 GLUCOSE BLOOD TEST: CPT

## 2021-07-31 PROCEDURE — 25010000002 CEFEPIME-DEXTROSE 2-5 GM-%(50ML) RECONSTITUTED SOLUTION: Performed by: PODIATRIST

## 2021-07-31 RX ORDER — CEPHALEXIN 500 MG/1
500 CAPSULE ORAL 3 TIMES DAILY
Qty: 15 CAPSULE | Refills: 0 | Status: SHIPPED | OUTPATIENT
Start: 2021-07-31 | End: 2021-08-05

## 2021-07-31 RX ADMIN — GABAPENTIN 800 MG: 400 CAPSULE ORAL at 08:22

## 2021-07-31 RX ADMIN — HYDROCODONE BITARTRATE AND ACETAMINOPHEN 1 TABLET: 5; 325 TABLET ORAL at 08:22

## 2021-07-31 RX ADMIN — HEPARIN SODIUM 5000 UNITS: 5000 INJECTION INTRAVENOUS; SUBCUTANEOUS at 08:22

## 2021-07-31 RX ADMIN — WHITE PETROLATUM 41 % TOPICAL OINTMENT 1 APPLICATION: OINTMENT at 08:23

## 2021-07-31 RX ADMIN — CLONIDINE HYDROCHLORIDE 0.1 MG: 0.2 TABLET ORAL at 08:22

## 2021-07-31 RX ADMIN — PROPRANOLOL HYDROCHLORIDE 40 MG: 40 TABLET ORAL at 08:23

## 2021-07-31 RX ADMIN — FLUOXETINE 40 MG: 20 CAPSULE ORAL at 06:10

## 2021-07-31 RX ADMIN — LINAGLIPTIN 5 MG: 5 TABLET, FILM COATED ORAL at 08:23

## 2021-07-31 RX ADMIN — AMLODIPINE BESYLATE 5 MG: 5 TABLET ORAL at 08:22

## 2021-07-31 RX ADMIN — SODIUM CHLORIDE, PRESERVATIVE FREE 10 ML: 5 INJECTION INTRAVENOUS at 08:24

## 2021-07-31 RX ADMIN — PRIMIDONE 250 MG: 250 TABLET ORAL at 08:22

## 2021-07-31 RX ADMIN — CEFEPIME HYDROCHLORIDE 2 G: 2 INJECTION, SOLUTION INTRAVENOUS at 04:00

## 2021-07-31 RX ADMIN — SODIUM CHLORIDE, PRESERVATIVE FREE 10 ML: 5 INJECTION INTRAVENOUS at 08:23

## 2021-07-31 RX ADMIN — LOSARTAN POTASSIUM 100 MG: 50 TABLET, FILM COATED ORAL at 06:10

## 2021-07-31 RX ADMIN — PANTOPRAZOLE SODIUM 40 MG: 40 TABLET, DELAYED RELEASE ORAL at 06:10

## 2021-08-01 ENCOUNTER — READMISSION MANAGEMENT (OUTPATIENT)
Dept: CALL CENTER | Facility: HOSPITAL | Age: 56
End: 2021-08-01

## 2021-08-01 NOTE — OUTREACH NOTE
Prep Survey      Responses   [facility] patient discharged from?  [place]   Is LACE score < 7 ?  No   Emergency Room discharge w/ pulse ox?  No   Eligibility  Readm Mgmt   Discharge diagnosis  Right 3rd toe ulcersurgical care of a diabetic ulcer of the right 3rd toe as well as flexor tenotomy to repair an acquired claw toe.     Does the patient have one of the following disease processes/diagnoses(primary or secondary)?  General Surgery   Does the patient have Home health ordered?  No   Is there a DME ordered?  No   Prep survey completed?  Yes          Erin Alvarado RN

## 2021-08-01 NOTE — CASE MANAGEMENT/SOCIAL WORK
Case Management Discharge Note      Final Note: dc home    Provided Post Acute Provider List?: Refused  Refused Provider List Comment: declined  Provided Post Acute Provider Quality & Resource List?: Refused  Refused Quality and Resource List Comment: declined    Selected Continued Care - Discharged on 7/31/2021 Admission date: 7/29/2021 - Discharge disposition: Home or Self Care    Destination    No services have been selected for the patient.              Durable Medical Equipment    No services have been selected for the patient.              Dialysis/Infusion    No services have been selected for the patient.              Home Medical Care    No services have been selected for the patient.              Therapy    No services have been selected for the patient.              Community Resources    No services have been selected for the patient.              Community & DME    No services have been selected for the patient.                       Final Discharge Disposition Code: 01 - home or self-care

## 2021-08-02 LAB
BACTERIA SPEC AEROBE CULT: ABNORMAL
GRAM STN SPEC: ABNORMAL
GRAM STN SPEC: ABNORMAL
STREP GROUPING: ABNORMAL

## 2021-08-03 LAB
BACTERIA SPEC ANAEROBE CULT: ABNORMAL
LAB AP CASE REPORT: NORMAL
PATH REPORT.FINAL DX SPEC: NORMAL
PATH REPORT.GROSS SPEC: NORMAL

## 2021-08-05 ENCOUNTER — READMISSION MANAGEMENT (OUTPATIENT)
Dept: CALL CENTER | Facility: HOSPITAL | Age: 56
End: 2021-08-05

## 2021-08-05 NOTE — OUTREACH NOTE
General Surgery Week 1 Survey      Responses   [facility] patient discharged from?  [place]   Does the patient have one of the following disease processes/diagnoses(primary or secondary)?  General Surgery   Week 1 attempt successful?  Yes   Call start time  [phone]   Call end time  [phone]   Discharge diagnosis  Right 3rd toe ulcersurgical care of a diabetic ulcer of the right 3rd toe as well as flexor tenotomy to repair an acquired claw toe.     Person spoke with today (if not patient) and relationship  Ann-Marie-daughter    Meds reviewed with patient/caregiver?  Yes   Is the patient having any side effects they believe may be caused by any medication additions or changes?  No   Does the patient have all medications related to this admission filled (includes all antibiotics, pain medications, etc.)  Yes   Is the patient taking all medications as directed (includes completed medication regime)?  Yes   Does the patient have a follow up appointment scheduled with their surgeon?  Yes [8/5/21]   Has the patient kept scheduled appointments due by today?  N/A   Comments  Encouraged PCP appt    Psychosocial issues?  No   Did the patient receive a copy of their discharge instructions?  -- [unsure]   Nursing interventions  Reviewed instructions with patient   What is the patient's perception of their health status since discharge?  Improving   Nursing interventions  Nurse provided patient education   Is the patient /caregiver able to teach back basic post-op care?  Keep incision areas clean,dry and protected, Drive as instructed by MD in discharge instructions   Is the patient/caregiver able to teach back signs and symptoms of incisional infection?  Fever, Increased drainage or bleeding   Is the patient/caregiver able to teach back steps to recovery at home?  Rest and rebuild strength, gradually increase activity, Set small, achievable goals for return to baseline health, Eat a well-balance diet   If the patient is a current  smoker, are they able to teach back resources for cessation?  Not a smoker   Is the patient/caregiver able to teach back the hierarchy of who to call/visit for symptoms/problems? PCP, Specialist, Home health nurse, Urgent Care, ED, 911  Yes   Week 1 call completed?  Yes          Eileen Santillan RN

## 2021-08-18 ENCOUNTER — READMISSION MANAGEMENT (OUTPATIENT)
Dept: CALL CENTER | Facility: HOSPITAL | Age: 56
End: 2021-08-18

## 2021-08-18 NOTE — OUTREACH NOTE
General Surgery Week 3 Survey      Responses   [facility] patient discharged from?  [place]   Does the patient have one of the following disease processes/diagnoses(primary or secondary)?  General Surgery   Week 3 attempt successful?  No   Unsuccessful attempts  Attempt 1          Demi Souza RN

## 2021-08-24 ENCOUNTER — READMISSION MANAGEMENT (OUTPATIENT)
Dept: CALL CENTER | Facility: HOSPITAL | Age: 56
End: 2021-08-24

## 2021-08-24 NOTE — OUTREACH NOTE
General Surgery Week 3 Survey      Responses   [facility] [place] patient discharged from?  [place]   Does the patient have one of the following disease processes/diagnoses(primary or secondary)?  General Surgery   Week 3 attempt successful?  No   Unsuccessful attempts  Attempt 2          Ankita Thorpe RN

## 2021-11-24 PROBLEM — M20.5X1 ACQUIRED CLAW TOE, RIGHT: Status: ACTIVE | Noted: 2021-11-24

## 2022-07-19 ENCOUNTER — APPOINTMENT (OUTPATIENT)
Dept: CT IMAGING | Facility: HOSPITAL | Age: 57
End: 2022-07-19

## 2022-07-19 ENCOUNTER — APPOINTMENT (OUTPATIENT)
Dept: GENERAL RADIOLOGY | Facility: HOSPITAL | Age: 57
End: 2022-07-19

## 2022-07-19 ENCOUNTER — HOSPITAL ENCOUNTER (OUTPATIENT)
Facility: HOSPITAL | Age: 57
Setting detail: OBSERVATION
Discharge: LEFT BEFORE DISCHARGE | End: 2022-07-21
Attending: EMERGENCY MEDICINE | Admitting: HOSPITALIST

## 2022-07-19 DIAGNOSIS — R29.898 WEAKNESS OF BOTH LOWER EXTREMITIES: Primary | ICD-10-CM

## 2022-07-19 LAB
ALBUMIN SERPL-MCNC: 4 G/DL (ref 3.5–5.2)
ALBUMIN/GLOB SERPL: 1.3 G/DL
ALP SERPL-CCNC: 89 U/L (ref 39–117)
ALT SERPL W P-5'-P-CCNC: 22 U/L (ref 1–41)
ANION GAP SERPL CALCULATED.3IONS-SCNC: 11.7 MMOL/L (ref 5–15)
AST SERPL-CCNC: 38 U/L (ref 1–40)
BASOPHILS # BLD AUTO: 0.01 10*3/MM3 (ref 0–0.2)
BASOPHILS NFR BLD AUTO: 0.1 % (ref 0–1.5)
BILIRUB SERPL-MCNC: 0.4 MG/DL (ref 0–1.2)
BILIRUB UR QL STRIP: NEGATIVE
BUN SERPL-MCNC: 31 MG/DL (ref 6–20)
BUN/CREAT SERPL: 17.9 (ref 7–25)
CALCIUM SPEC-SCNC: 9 MG/DL (ref 8.6–10.5)
CHLORIDE SERPL-SCNC: 100 MMOL/L (ref 98–107)
CLARITY UR: CLEAR
CO2 SERPL-SCNC: 23.3 MMOL/L (ref 22–29)
COLOR UR: YELLOW
CREAT SERPL-MCNC: 1.73 MG/DL (ref 0.76–1.27)
DEPRECATED RDW RBC AUTO: 47.7 FL (ref 37–54)
EGFRCR SERPLBLD CKD-EPI 2021: 45.2 ML/MIN/1.73
EOSINOPHIL # BLD AUTO: 0.14 10*3/MM3 (ref 0–0.4)
EOSINOPHIL NFR BLD AUTO: 1.8 % (ref 0.3–6.2)
ERYTHROCYTE [DISTWIDTH] IN BLOOD BY AUTOMATED COUNT: 14.3 % (ref 12.3–15.4)
GLOBULIN UR ELPH-MCNC: 3.2 GM/DL
GLUCOSE BLDC GLUCOMTR-MCNC: 109 MG/DL (ref 70–130)
GLUCOSE BLDC GLUCOMTR-MCNC: 117 MG/DL (ref 70–130)
GLUCOSE SERPL-MCNC: 130 MG/DL (ref 65–99)
GLUCOSE UR STRIP-MCNC: NEGATIVE MG/DL
HCT VFR BLD AUTO: 47.2 % (ref 37.5–51)
HGB BLD-MCNC: 15.4 G/DL (ref 13–17.7)
HGB UR QL STRIP.AUTO: NEGATIVE
IMM GRANULOCYTES # BLD AUTO: 0.01 10*3/MM3 (ref 0–0.05)
IMM GRANULOCYTES NFR BLD AUTO: 0.1 % (ref 0–0.5)
KETONES UR QL STRIP: NEGATIVE
LEUKOCYTE ESTERASE UR QL STRIP.AUTO: NEGATIVE
LYMPHOCYTES # BLD AUTO: 0.68 10*3/MM3 (ref 0.7–3.1)
LYMPHOCYTES NFR BLD AUTO: 8.5 % (ref 19.6–45.3)
MCH RBC QN AUTO: 29.6 PG (ref 26.6–33)
MCHC RBC AUTO-ENTMCNC: 32.6 G/DL (ref 31.5–35.7)
MCV RBC AUTO: 90.6 FL (ref 79–97)
MONOCYTES # BLD AUTO: 0.7 10*3/MM3 (ref 0.1–0.9)
MONOCYTES NFR BLD AUTO: 8.8 % (ref 5–12)
NEUTROPHILS NFR BLD AUTO: 6.44 10*3/MM3 (ref 1.7–7)
NEUTROPHILS NFR BLD AUTO: 80.7 % (ref 42.7–76)
NITRITE UR QL STRIP: NEGATIVE
PH UR STRIP.AUTO: 5.5 [PH] (ref 4.5–8)
PLATELET # BLD AUTO: 168 10*3/MM3 (ref 140–450)
PMV BLD AUTO: 9.1 FL (ref 6–12)
POTASSIUM SERPL-SCNC: 4.6 MMOL/L (ref 3.5–5.2)
PROT SERPL-MCNC: 7.2 G/DL (ref 6–8.5)
PROT UR QL STRIP: NEGATIVE
RBC # BLD AUTO: 5.21 10*6/MM3 (ref 4.14–5.8)
SARS-COV-2 RNA PNL SPEC NAA+PROBE: NOT DETECTED
SODIUM SERPL-SCNC: 135 MMOL/L (ref 136–145)
SP GR UR STRIP: <=1.005 (ref 1–1.03)
UROBILINOGEN UR QL STRIP: NORMAL
WBC NRBC COR # BLD: 7.98 10*3/MM3 (ref 3.4–10.8)

## 2022-07-19 PROCEDURE — 87635 SARS-COV-2 COVID-19 AMP PRB: CPT | Performed by: EMERGENCY MEDICINE

## 2022-07-19 PROCEDURE — 70496 CT ANGIOGRAPHY HEAD: CPT

## 2022-07-19 PROCEDURE — 99204 OFFICE O/P NEW MOD 45 MIN: CPT | Performed by: PSYCHIATRY & NEUROLOGY

## 2022-07-19 PROCEDURE — 85025 COMPLETE CBC W/AUTO DIFF WBC: CPT | Performed by: EMERGENCY MEDICINE

## 2022-07-19 PROCEDURE — 70498 CT ANGIOGRAPHY NECK: CPT

## 2022-07-19 PROCEDURE — 72131 CT LUMBAR SPINE W/O DYE: CPT

## 2022-07-19 PROCEDURE — 94799 UNLISTED PULMONARY SVC/PX: CPT

## 2022-07-19 PROCEDURE — 70450 CT HEAD/BRAIN W/O DYE: CPT

## 2022-07-19 PROCEDURE — 93005 ELECTROCARDIOGRAM TRACING: CPT | Performed by: EMERGENCY MEDICINE

## 2022-07-19 PROCEDURE — 81003 URINALYSIS AUTO W/O SCOPE: CPT | Performed by: EMERGENCY MEDICINE

## 2022-07-19 PROCEDURE — 36415 COLL VENOUS BLD VENIPUNCTURE: CPT

## 2022-07-19 PROCEDURE — 73562 X-RAY EXAM OF KNEE 3: CPT

## 2022-07-19 PROCEDURE — P9612 CATHETERIZE FOR URINE SPEC: HCPCS

## 2022-07-19 PROCEDURE — 99225 PR SBSQ OBSERVATION CARE/DAY 25 MINUTES: CPT | Performed by: INTERNAL MEDICINE

## 2022-07-19 PROCEDURE — 71045 X-RAY EXAM CHEST 1 VIEW: CPT

## 2022-07-19 PROCEDURE — 99284 EMERGENCY DEPT VISIT MOD MDM: CPT

## 2022-07-19 PROCEDURE — 80053 COMPREHEN METABOLIC PANEL: CPT | Performed by: EMERGENCY MEDICINE

## 2022-07-19 PROCEDURE — 94761 N-INVAS EAR/PLS OXIMETRY MLT: CPT

## 2022-07-19 PROCEDURE — 0 IOPAMIDOL PER 1 ML: Performed by: EMERGENCY MEDICINE

## 2022-07-19 PROCEDURE — 93010 ELECTROCARDIOGRAM REPORT: CPT | Performed by: INTERNAL MEDICINE

## 2022-07-19 PROCEDURE — C9803 HOPD COVID-19 SPEC COLLECT: HCPCS

## 2022-07-19 PROCEDURE — 82962 GLUCOSE BLOOD TEST: CPT

## 2022-07-19 RX ORDER — SODIUM CHLORIDE 9 MG/ML
125 INJECTION, SOLUTION INTRAVENOUS CONTINUOUS
Status: DISCONTINUED | OUTPATIENT
Start: 2022-07-19 | End: 2022-07-19

## 2022-07-19 RX ORDER — PANTOPRAZOLE SODIUM 40 MG/1
40 TABLET, DELAYED RELEASE ORAL EVERY MORNING
Status: DISCONTINUED | OUTPATIENT
Start: 2022-07-20 | End: 2022-07-21 | Stop reason: HOSPADM

## 2022-07-19 RX ORDER — PRIMIDONE 250 MG/1
250 TABLET ORAL 3 TIMES DAILY
Status: DISCONTINUED | OUTPATIENT
Start: 2022-07-19 | End: 2022-07-21 | Stop reason: HOSPADM

## 2022-07-19 RX ORDER — DEXTROSE MONOHYDRATE 25 G/50ML
25 INJECTION, SOLUTION INTRAVENOUS
Status: DISCONTINUED | OUTPATIENT
Start: 2022-07-19 | End: 2022-07-21 | Stop reason: HOSPADM

## 2022-07-19 RX ORDER — ASPIRIN 300 MG/1
300 SUPPOSITORY RECTAL DAILY
Status: CANCELLED | OUTPATIENT
Start: 2022-07-19

## 2022-07-19 RX ORDER — NICOTINE POLACRILEX 4 MG
15 LOZENGE BUCCAL
Status: DISCONTINUED | OUTPATIENT
Start: 2022-07-19 | End: 2022-07-21 | Stop reason: HOSPADM

## 2022-07-19 RX ORDER — SODIUM CHLORIDE 0.9 % (FLUSH) 0.9 %
10 SYRINGE (ML) INJECTION EVERY 12 HOURS SCHEDULED
Status: DISCONTINUED | OUTPATIENT
Start: 2022-07-19 | End: 2022-07-21 | Stop reason: HOSPADM

## 2022-07-19 RX ORDER — SODIUM CHLORIDE 0.9 % (FLUSH) 0.9 %
10 SYRINGE (ML) INJECTION AS NEEDED
Status: DISCONTINUED | OUTPATIENT
Start: 2022-07-19 | End: 2022-07-21 | Stop reason: HOSPADM

## 2022-07-19 RX ORDER — PROPRANOLOL HYDROCHLORIDE 40 MG/1
40 TABLET ORAL 2 TIMES DAILY
Status: DISCONTINUED | OUTPATIENT
Start: 2022-07-19 | End: 2022-07-21 | Stop reason: HOSPADM

## 2022-07-19 RX ORDER — INSULIN ASPART 100 [IU]/ML
0-9 INJECTION, SOLUTION INTRAVENOUS; SUBCUTANEOUS
Status: DISCONTINUED | OUTPATIENT
Start: 2022-07-19 | End: 2022-07-21 | Stop reason: HOSPADM

## 2022-07-19 RX ORDER — ATORVASTATIN CALCIUM 40 MG/1
80 TABLET, FILM COATED ORAL NIGHTLY
Status: DISCONTINUED | OUTPATIENT
Start: 2022-07-19 | End: 2022-07-21 | Stop reason: HOSPADM

## 2022-07-19 RX ORDER — FLUOXETINE HYDROCHLORIDE 20 MG/1
40 CAPSULE ORAL EVERY MORNING
Status: DISCONTINUED | OUTPATIENT
Start: 2022-07-20 | End: 2022-07-21 | Stop reason: HOSPADM

## 2022-07-19 RX ORDER — ASPIRIN 325 MG
325 TABLET, DELAYED RELEASE (ENTERIC COATED) ORAL DAILY
Status: DISCONTINUED | OUTPATIENT
Start: 2022-07-19 | End: 2022-07-21 | Stop reason: HOSPADM

## 2022-07-19 RX ORDER — ASPIRIN 81 MG/1
81 TABLET, CHEWABLE ORAL DAILY
Status: CANCELLED | OUTPATIENT
Start: 2022-07-19

## 2022-07-19 RX ORDER — GABAPENTIN 400 MG/1
800 CAPSULE ORAL 3 TIMES DAILY
Status: DISCONTINUED | OUTPATIENT
Start: 2022-07-19 | End: 2022-07-21 | Stop reason: HOSPADM

## 2022-07-19 RX ORDER — SODIUM CHLORIDE 9 MG/ML
40 INJECTION, SOLUTION INTRAVENOUS AS NEEDED
Status: DISCONTINUED | OUTPATIENT
Start: 2022-07-19 | End: 2022-07-21 | Stop reason: HOSPADM

## 2022-07-19 RX ORDER — QUETIAPINE FUMARATE 100 MG/1
200 TABLET, FILM COATED ORAL NIGHTLY
Status: DISCONTINUED | OUTPATIENT
Start: 2022-07-19 | End: 2022-07-21 | Stop reason: HOSPADM

## 2022-07-19 RX ADMIN — ASPIRIN 325 MG: 325 TABLET, COATED ORAL at 22:01

## 2022-07-19 RX ADMIN — SODIUM CHLORIDE 125 ML/HR: 9 INJECTION, SOLUTION INTRAVENOUS at 16:10

## 2022-07-19 RX ADMIN — WHITE PETROLATUM 41 % TOPICAL OINTMENT 1 APPLICATION: OINTMENT at 22:00

## 2022-07-19 RX ADMIN — GABAPENTIN 800 MG: 400 CAPSULE ORAL at 22:00

## 2022-07-19 RX ADMIN — PRIMIDONE 250 MG: 250 TABLET ORAL at 22:01

## 2022-07-19 RX ADMIN — IOPAMIDOL 100 ML: 755 INJECTION, SOLUTION INTRAVENOUS at 15:04

## 2022-07-19 RX ADMIN — PROPRANOLOL HYDROCHLORIDE 40 MG: 40 TABLET ORAL at 22:00

## 2022-07-19 RX ADMIN — SODIUM CHLORIDE 500 ML: 9 INJECTION, SOLUTION INTRAVENOUS at 15:25

## 2022-07-19 RX ADMIN — QUETIAPINE FUMARATE 200 MG: 100 TABLET ORAL at 22:00

## 2022-07-19 RX ADMIN — ATORVASTATIN CALCIUM 80 MG: 40 TABLET, FILM COATED ORAL at 22:01

## 2022-07-19 NOTE — ED PROVIDER NOTES
Subjective   History of Present Illness  56-year-old male with amputation of all his toes presents to the emergency room complaining of difficulty walking today with a fall hitting his head twice and complaining of worse back pain than normal after the fall.  He says that he always has difficulty walking and that his difficulty walking is not abnormal for him.  Denies numbness or tingling anywhere says he can move everything.  Review of Systems   Constitutional: Negative for chills and fever.   HENT: Negative.    Respiratory: Negative for chest tightness and shortness of breath.    Cardiovascular: Negative for chest pain and leg swelling.   Gastrointestinal: Negative for abdominal pain, nausea and vomiting.   Genitourinary: Negative.    Musculoskeletal: Positive for back pain and gait problem.   Neurological: Positive for weakness. Negative for dizziness and numbness.       Past Medical History:   Diagnosis Date   • Amputated toe of left foot ([facility])     4 and 5   • Amputated toe of right foot ([facility])     5th   • Anesthesia complication     DIFFICULT TO PUT TO SLEEP   • Arthritis    • Bipolar disorder ([facility])    • Cellulitis of lower extremity     RIGHT LOWER LEG   • COPD (chronic obstructive pulmonary disease) ([facility])    • Coronary artery disease 2009    HEART ATTACK   • Diabetes mellitus ([facility])    • Disease of thyroid gland     nodule on thyroid   • DVT (deep venous thrombosis) ([facility]) 2005    right leg   • Fracture of right foot    • Gastric ulcer    • Hyperlipidemia    • Hypertension    • MRSA infection 2003    history of left leg after surgery   • Neuropathy     hands & feet   • Pseudogout    • RLS (restless legs syndrome)    • Septic shock ([facility]) 07/2017    H/O   • Sleep apnea     no machine, CANT TOLERATE   • Stroke ([facility])     x2, no residual   • Toxic metabolic encephalopathy 07/2017    H/O   • Tremors of nervous system        Allergies   Allergen Reactions   • Lisinopril Cough     COUGHING   • Penicillins Rash        Past Surgical History:   Procedure Laterality Date   • AMPUTATION DIGIT Left 3/16/2018    Procedure: AMPUTATION LEFT FOURTH TOE;  Surgeon: Anish Farah DPM;  Location: [facility] OR;  Service: Podiatry   • AMPUTATION DIGIT Right 10/13/2020    Procedure: AMPUTATION OF RIGHT FIFTH TOE AND ALL ASSOCIATED PROCEDURES;  Surgeon: Anish Farah DPM;  Location: [facility] OR;  Service: Podiatry;  Laterality: Right;  AMPUTATION OF RIGHT FIFTH TOE    • AMPUTATION DIGIT Right 1/19/2021    Procedure: AMPUTATION RIGHT FOURTH TOE;  Surgeon: Anish Farah DPM;  Location: [facility] OR;  Service: Podiatry;  Laterality: Right;   • AMPUTATION DIGIT Right 7/30/2021    Procedure: AMPUTATION RIGHT THIRD TOE;  Surgeon: Anish Farah DPM;  Location: [facility] OR;  Service: Podiatry;  Laterality: Right;  RIGHT 3rd toe amputation    • 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   • COLONOSCOPY N/A 7/16/2018    Procedure: COLONOSCOPY and polypectomy;  Surgeon: Kaylie Mcfarlane MD;  Location: [facility] OR;  Service: Gastroenterology   • ENDOSCOPY N/A 3/16/2018    Procedure: ESOPHAGOGASTRODUODENOSCOPY with biopsies;  Surgeon: Kaylie Mcfarlane MD;  Location: [facility] OR;  Service: Gastroenterology   • FINGER TENDON REPAIR Right     little finger   • HARDWARE REMOVAL Right 8/18/2017    Procedure: RIGHT EXTERNAL FIXATOR REMOVAL;  Surgeon: Anish Farah DPM;  Location: [facility] OR;  Service:    • INCISION AND DRAINAGE LEG Left 6/13/2018    Procedure: Left 2nd, 3rd toe laceration irrigation, debridement and multi layer wound closure.;  Surgeon: Anish Farah DPM;  Location: [facility] OR;  Service: Podiatry   • JOINT REPLACEMENT     • KNEE SURGERY      scope left 6 times, right once   • LEG DEBRIDEMENT Right 10/16/2020    Procedure: DEBRIDEMENT RIGHT  FOOT SURGICAL WOUND;  Surgeon: Anish Farah DPM;  Location: [facility] OR;  Service: Podiatry;  Laterality: Right;  DEBRIDEMENT RIGHT FOOT SURGICAL WOUND   • NERVE TRANSFER Right     elbow   • ORIF  FOOT FRACTURE Right 7/29/2017    Procedure: open reduction with percutaneous pinning of midfoot dislocation fracture;  Surgeon: Anish Farah DPM;  Location:  [facility];  Service:    • ORIF FOOT FRACTURE Left 3/5/2018    Procedure: OPEN REDUCTION WITH IRRIGATION AND WOUND CLOSURE LEFT FOURTH TOE;  Surgeon: Anish Farah DPM;  Location:  [facility];  Service:    • TENDON LENGTHENING/SHORTENING Right     PSOAS   • TOE FUSION Right 8/18/2017    Procedure: RIGHT  MEDIAL COLUMN ARTHRODESIS, RIGHT TARAL METATARSAL ARTHRODESIS;  Surgeon: Anish Farah DPM;  Location:  [facility] OR;  Service:    • TOTAL HIP ARTHROPLASTY Bilateral    • TOTAL SHOULDER ARTHROPLASTY Right        Family History   Problem Relation Age of Onset   • Arthritis Mother    • Cancer Mother    • Hyperlipidemia Mother    • Colon polyps Mother    • Arthritis Father    • Cancer Father    • Depression Father    • Hypertension Father    • Mental illness Father    • Cancer Sister    • Arthritis Sister    • Hyperlipidemia Sister    • Cancer Paternal Aunt    • Hypertension Daughter    • Depression Son    • Hyperlipidemia Paternal Grandmother    • Hearing loss Paternal Grandfather    • Hyperlipidemia Paternal Grandfather    • Hypertension Paternal Grandfather    • Colon cancer Maternal Grandmother    • Malig Hyperthermia Neg Hx        Social History     Socioeconomic History   • Marital status:    Tobacco Use   • Smoking status: Never Smoker   • Smokeless tobacco: Never Used   Vaping Use   • Vaping Use: Never used   Substance and Sexual Activity   • Alcohol use: Yes     Comment: RARE   • Drug use: No   • Sexual activity: Defer           Objective   Physical Exam  INITIAL VITAL SIGNS: Reviewed by me.  Pulse ox normal  GENERAL: Alert and interactive. No acute distress.  HEAD: Head is normocephalic.  EYES: EOMI. PERRL. No scleral icterus. No conjunctival injection.  ENT: Moist mucous membranes.   NECK: Supple. Full range of motion.  RESPIRATORY: No  tachypnea. Clear breath sounds bilaterally. No wheezing. No rales. No rhonchi.  CV: Regular rate and rhythm. No murmurs. No rubs or gallops.  ABDOMEN: Soft, nondistended, nontender. No guarding. No rebound. No masses.   BACK:  No obvious deformity.  EXTREMITIES: No deformity. No clubbing or cyanosis. No edema.   SKIN: Warm and dry. No diaphoresis. No obvious rashes.   NEUROLOGIC: Alert and oriented.  Able to move all extremities, left leg seems weaker than right.  Denies any sensory deficits. patient unable to stand,    Procedures           ED Course  ED Course as of 07/19/22 1530   Tue Jul 19, 2022   1439 patient initially said he normally has gait problems he now is saying that his gait problems are different than he normal and that he has no difficulty standing up at baseline.  Discussed with daughter who confirms that he does have gait difficulties as he has all of his toes amputated but he is normally able to stand up and walk on his own.  Given the fact that he is unable to stand up on his own due to left-sided weakness, will work him up for stroke.  Now that he saying he has his new weakness try to pin down his last known normal and he says sometime before midnight. [RO]   1509 Discussed with Dr. Petit of stroke team, he is not convinced that head CT looks like potential early acute infarct especially given the story.  He would like the nursing staff to call him when patient is back from imaging and he will evaluate patient at that point. [RO]   1529 Patient back from CT, Dr. Petit is without the patient's and he will call back when he is ready to evaluate the patient.  I have discussed patient briefly with Dr. Castillo who is happy to do what ever Dr. Petit recommends.  Care to Dr. Cade at shift turnover. [RO]      ED Course User Index  [RO] Harrison Snowden MD      15:57 EDT, 07/19/22: The EKG was obtained at 1554 and read by me at 1556.  The EKG shows a normal sinus rhythm with rate of 100.  There is a  normal axis with no hypertrophy.  The MO, QRS, and QT intervals are unremarkable.  There is no ectopy.  There is no acute ST elevation or depression.          16:33 EDT, 07/19/22:  I spoke with Dr. Petit, on-call for stroke neurology.  He feels that the patient can be admitted here.  He would like the patient to get an MRI of the head and neck.  He will follow the patient here in the hospital.    16:34 EDT, 07/19/22:  I spoke with Dr. Villarreal, on-call for the hospitalist, he will admit the patient.                           MDM    Final diagnoses:   Weakness of both lower extremities       ED Disposition  ED Disposition     None          No follow-up provider specified.       Medication List      No changes were made to your prescriptions during this visit.          Landon Cade MD  07/19/22 5701

## 2022-07-19 NOTE — H&P
[facility] MEDICAL GROUP HOSPITALIST     PCP: Killian Scales MD    CODE status: Full    CHIEF COMPLAINT: Trouble walking    HISTORY OF PRESENT ILLNESS:    Patient is a 56-year-old male with past medical history of left and right foot toe amputations, COPD, type 2 diabetes mellitus with peripheral neuropathy and obesity, patient has had previous MI, depression, bipolar disorder, arthritis, and previous CVAs.  He presents to the [facility] ED today complaining of difficulty walking.  He reportedly fell and hit his head twice.  He reports his back hurts worse than normal after his fall.  Patient is a poor historian and therefore ED provider contacted patient's daughter to confirm that he normally does have some trouble ambulating however is able to stand on his own, today he was not able to accomplish this task.  He generally feels weak.  He says his legs feel stiff and sore.       Past Medical History:   Diagnosis Date   • Amputated toe of left foot ([facility])     4 and 5   • Amputated toe of right foot ([facility])     5th   • Anesthesia complication     DIFFICULT TO PUT TO SLEEP   • Arthritis    • Bipolar disorder ([facility])    • Cellulitis of lower extremity     RIGHT LOWER LEG   • COPD (chronic obstructive pulmonary disease) ([facility])    • Coronary artery disease 2009    HEART ATTACK   • Diabetes mellitus ([facility])    • Disease of thyroid gland     nodule on thyroid   • DVT (deep venous thrombosis) ([facility]) 2005    right leg   • Fracture of right foot    • Gastric ulcer    • Hyperlipidemia    • Hypertension    • MRSA infection 2003    history of left leg after surgery   • Neuropathy     hands & feet   • Pseudogout    • RLS (restless legs syndrome)    • Septic shock ([facility]) 07/2017    H/O   • Sleep apnea     no machine, CANT TOLERATE   • Stroke ([facility])     x2, no residual   • Toxic metabolic encephalopathy 07/2017    H/O   • Tremors of nervous system      Past Surgical History:   Procedure Laterality Date   • AMPUTATION  DIGIT Left 3/16/2018    Procedure: AMPUTATION LEFT FOURTH TOE;  Surgeon: Anish Farah DPM;  Location: [facility] OR;  Service: Podiatry   • AMPUTATION DIGIT Right 10/13/2020    Procedure: AMPUTATION OF RIGHT FIFTH TOE AND ALL ASSOCIATED PROCEDURES;  Surgeon: Anish Farah DPM;  Location:  [facility] OR;  Service: Podiatry;  Laterality: Right;  AMPUTATION OF RIGHT FIFTH TOE    • AMPUTATION DIGIT Right 1/19/2021    Procedure: AMPUTATION RIGHT FOURTH TOE;  Surgeon: Anish Farah DPM;  Location:  [facility] OR;  Service: Podiatry;  Laterality: Right;   • AMPUTATION DIGIT Right 7/30/2021    Procedure: AMPUTATION RIGHT THIRD TOE;  Surgeon: Anish Farah DPM;  Location:  [facility] OR;  Service: Podiatry;  Laterality: Right;  RIGHT 3rd toe amputation    • 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   • COLONOSCOPY N/A 7/16/2018    Procedure: COLONOSCOPY and polypectomy;  Surgeon: Kaylie Mcfarlane MD;  Location: [facility] OR;  Service: Gastroenterology   • ENDOSCOPY N/A 3/16/2018    Procedure: ESOPHAGOGASTRODUODENOSCOPY with biopsies;  Surgeon: Kaylie Mcfarlane MD;  Location: [facility] OR;  Service: Gastroenterology   • FINGER TENDON REPAIR Right     little finger   • HARDWARE REMOVAL Right 8/18/2017    Procedure: RIGHT EXTERNAL FIXATOR REMOVAL;  Surgeon: Anish Farah DPM;  Location: [facility] OR;  Service:    • INCISION AND DRAINAGE LEG Left 6/13/2018    Procedure: Left 2nd, 3rd toe laceration irrigation, debridement and multi layer wound closure.;  Surgeon: Anish Farah DPM;  Location: [facility] OR;  Service: Podiatry   • JOINT REPLACEMENT     • KNEE SURGERY      scope left 6 times, right once   • LEG DEBRIDEMENT Right 10/16/2020    Procedure: DEBRIDEMENT RIGHT  FOOT SURGICAL WOUND;  Surgeon: Anish Farah DPM;  Location: [facility] OR;  Service: Podiatry;  Laterality: Right;  DEBRIDEMENT RIGHT FOOT SURGICAL WOUND   • NERVE TRANSFER Right     elbow   • ORIF FOOT FRACTURE Right 7/29/2017    Procedure: open reduction with  percutaneous pinning of midfoot dislocation fracture;  Surgeon: Anish Farah DPM;  Location:  [facility] OR;  Service:    • ORIF FOOT FRACTURE Left 3/5/2018    Procedure: OPEN REDUCTION WITH IRRIGATION AND WOUND CLOSURE LEFT FOURTH TOE;  Surgeon: Anish Farah DPM;  Location:  [facility] OR;  Service:    • TENDON LENGTHENING/SHORTENING Right     PSOAS   • TOE FUSION Right 8/18/2017    Procedure: RIGHT  MEDIAL COLUMN ARTHRODESIS, RIGHT TARAL METATARSAL ARTHRODESIS;  Surgeon: Anish Farah DPM;  Location:  [facility] OR;  Service:    • TOTAL HIP ARTHROPLASTY Bilateral    • TOTAL SHOULDER ARTHROPLASTY Right      Family History   Problem Relation Age of Onset   • Arthritis Mother    • Cancer Mother    • Hyperlipidemia Mother    • Colon polyps Mother    • Arthritis Father    • Cancer Father    • Depression Father    • Hypertension Father    • Mental illness Father    • Cancer Sister    • Arthritis Sister    • Hyperlipidemia Sister    • Cancer Paternal Aunt    • Hypertension Daughter    • Depression Son    • Hyperlipidemia Paternal Grandmother    • Hearing loss Paternal Grandfather    • Hyperlipidemia Paternal Grandfather    • Hypertension Paternal Grandfather    • Colon cancer Maternal Grandmother    • Malig Hyperthermia Neg Hx      Social History     Tobacco Use   • Smoking status: Never Smoker   • Smokeless tobacco: Never Used   Vaping Use   • Vaping Use: Never used   Substance Use Topics   • Alcohol use: Yes     Comment: RARE   • Drug use: No     (Not in a hospital admission)    Allergies:  Lisinopril and Penicillins    Immunization History   Administered Date(s) Administered   • COVID-19 ([facility]) 05/05/2021   • FluLaval/Fluarix/Fluzone >6 10/14/2020   • Fluzone High Dose =>65 Years ([employer] ONLY) 09/01/2015   • PPD Test 08/02/2017, 10/21/2020   • Pneumococcal, Unspecified 09/01/2015   • Tdap 07/22/2016       REVIEW OF SYSTEMS:  Please see the above history of present illness for pertinent positives and negatives.  The  "remainder of the patient's systems have been reviewed and are negative.     Vital Signs  Temp:  [98.4 °F (36.9 °C)] 98.4 °F (36.9 °C)  Heart Rate:  [101-103] 101  Resp:  [20] 20  BP: (133-135)/(83-94) 135/94  Flowsheet Rows    Flowsheet Row First Filed Value   Admission Height 180.3 cm (71\") Documented at 07/19/2022 1350   Admission Weight 145 kg (320 lb) Documented at 07/19/2022 1350             Physical Exam:  General: Patient is awake and alert.  Obese male. No acute distress noted.   HENT: Head is atraumatic, normocephalic. Hearing is grossly intact. Nose is without obvious congestion and appears patent. Neck is supple and trachea is midline.   Eyes: Vision is grossly intact. Pupils appear equal and round.   Cardiovascular: Heart has regular rate and rhythm with no murmurs, rubs or gallops noted.   Respiratory: Lungs are clear to ausculation without wheezes, rhonchi or rales.   Abdominal/GI: Soft, nontender, bowel sounds present. No rebound or guarding present.   Extremities: No peripheral edema noted.   Musculoskeletal: Spontaneous movement of bilateral upper and lower extremities against gravity noted. No signs of injury or deformity noted.   Skin: Warm and dry.   Psych: Mood and affect are appropriate. Cooperative with exam.   Neuro: Cranial nerves II through XII intact bilaterally, patient has difficulty with finger-to-nose coordination bilaterally.  He has 5 out of 5 strength in bilateral upper extremities with normal light touch sensation.  In the lower extremities he has difficulty with heel-to-shin coordination, he has 3 out of 5 muscle strength in bilateral lower extremities, light touch sensation is intact.        Results Review:    I reviewed the patient's new clinical results.  Lab Results (most recent)     Procedure Component Value Units Date/Time    Urinalysis With Microscopic If Indicated (No Culture) - Urine, Catheter [[id number]]  (Normal) Collected: 07/19/22 1429    Specimen: Urine, Catheter " Updated: 07/19/22 1444     Color, UA Yellow     Appearance, UA Clear     pH, UA 5.5     Specific Gravity, UA <=1.005     Glucose, UA Negative     Ketones, UA Negative     Bilirubin, UA Negative     Blood, UA Negative     Protein, UA Negative     Leuk Esterase, UA Negative     Nitrite, UA Negative     Urobilinogen, UA 0.2 E.U./dL    Narrative:      Urine microscopic not indicated.    Comprehensive Metabolic Panel [[id number]]  (Abnormal) Collected: 07/19/22 1415    Specimen: Blood Updated: 07/19/22 1444     Glucose 130 mg/dL      BUN 31 mg/dL      Creatinine 1.73 mg/dL      Sodium 135 mmol/L      Potassium 4.6 mmol/L      Comment: Slight hemolysis detected by analyzer. Results may be affected.        Chloride 100 mmol/L      CO2 23.3 mmol/L      Calcium 9.0 mg/dL      Total Protein 7.2 g/dL      Albumin 4.00 g/dL      ALT (SGPT) 22 U/L      AST (SGOT) 38 U/L      Alkaline Phosphatase 89 U/L      Total Bilirubin 0.4 mg/dL      Globulin 3.2 gm/dL      A/G Ratio 1.3 g/dL      BUN/Creatinine Ratio 17.9     Anion Gap 11.7 mmol/L      eGFR 45.2 mL/min/1.73      Comment: National Kidney Foundation and American Society of Nephrology (ASN) Task Force recommended calculation based on the Chronic Kidney Disease Epidemiology Collaboration (CKD-EPI) equation refit without adjustment for race.       Narrative:      GFR Normal >60  Chronic Kidney Disease <60  Kidney Failure <15      CBC & Differential [[id number]]  (Abnormal) Collected: 07/19/22 1415    Specimen: Blood Updated: 07/19/22 1425    Narrative:      The following orders were created for panel order CBC & Differential.  Procedure                               Abnormality         Status                     ---------                               -----------         ------                     CBC Auto Differential[[id number]]        Abnormal            Final result                 Please view results for these tests on the individual orders.    CBC Auto Differential  [[id number]]  (Abnormal) Collected: 07/19/22 1415    Specimen: Blood Updated: 07/19/22 1425     WBC 7.98 10*3/mm3      RBC 5.21 10*6/mm3      Hemoglobin 15.4 g/dL      Hematocrit 47.2 %      MCV 90.6 fL      MCH 29.6 pg      MCHC 32.6 g/dL      RDW 14.3 %      RDW-SD 47.7 fl      MPV 9.1 fL      Platelets 168 10*3/mm3      Neutrophil % 80.7 %      Lymphocyte % 8.5 %      Monocyte % 8.8 %      Eosinophil % 1.8 %      Basophil % 0.1 %      Immature Grans % 0.1 %      Neutrophils, Absolute 6.44 10*3/mm3      Lymphocytes, Absolute 0.68 10*3/mm3      Monocytes, Absolute 0.70 10*3/mm3      Eosinophils, Absolute 0.14 10*3/mm3      Basophils, Absolute 0.01 10*3/mm3      Immature Grans, Absolute 0.01 10*3/mm3           Imaging Results (Most Recent)     Procedure Component Value Units Date/Time    CT Angiogram Neck [[id number]] Resulted: 07/19/22 1503     Updated: 07/19/22 1609    CT Angiogram Head [[id number]] Resulted: 07/19/22 1504     Updated: 07/19/22 1609    CT Lumbar Spine Without Contrast [[id number]] Collected: 07/19/22 1533     Updated: 07/19/22 1535    Narrative:      CT Spine Lumbar WO    INDICATION:    Low back pain after fall, unsteady on feet, weakness in legs    TECHNIQUE:   CT of the lumbar spine without IV contrast. Coronal and sagittal reconstructions were obtained.  Radiation dose reduction techniques included automated exposure control or exposure modulation based on body size. Count of known CT and cardiac nuc med  studies performed in previous 12 months: 0.     COMPARISON:    None available.    FINDINGS:    Trace anterior listhesis L4 on L5 due to facet degeneration. Sagittal alignment otherwise anatomic. Small Schmorl's node at the upper endplate of L2. Vertebral body heights are maintained. No destructive bony lesion. No acute displaced fracture or  dislocation. Multilevel degenerative changes in the lumbar spine without high-grade spinal canal narrowing. Would favor that a combination of unroofing of the  posterior disc and facet degeneration causes mild to moderate circumferential spinal canal  narrowing at L4-L5 as well as at least moderate foraminal narrowing. Favor severe foraminal narrowing at L5-S1 predominantly due to disc bulge and facet degeneration. Lesser degrees of degenerative change at other lumbar levels.    Marked urinary bladder distention is inadequately assessed by this study.      Impression:      No acute bony injury in the lumbar spine. Slight anterior listhesis with advanced facet degeneration at L4-L5. Bilateral foraminal narrowing at both L4-5 and L5-S1.    Signer Name: CHARLES HOANG MD   Signed: 7/19/2022 3:33 PM   Workstation Name: [id number]    [facility]    XR Knee 3 View Left [[id number]] Collected: 07/19/22 1529     Updated: 07/19/22 1533    Narrative:      LEFT KNEE, 07/19/2022         HISTORY:  56-year-old male in the ED undergoing stroke protocol assessment. He  also fell this morning, injuring knee. Skin abrasion.     TECHNIQUE:  Three-view left knee series.     FINDINGS:  No fracture, dislocation or other acute osseous abnormality is  demonstrated.     Moderate tricompartment degenerative arthropathy. No definite knee joint  effusion. Chronic appearing capsular calcification along the lateral  aspect of the knee.       Impression:      1. No acute osseous abnormality.  2. Moderate degenerative arthropathy.     This report was finalized on 7/19/2022 3:31 PM by Dr. Bigg Del Angel MD.       XR Chest 1 View [[id number]] Collected: 07/19/22 1528     Updated: 07/19/22 1531    Narrative:      CHEST X-RAY, 07/19/2022         HISTORY:  56-year-old male in the ED undergoing stroke protocol assessment. Some  shortness of air. No chest pain.     TECHNIQUE:  AP portable chest x-ray.     FINDINGS:  Lung volumes are low, but the lungs appear clear. No visible pulmonary  infiltrate or pleural effusion. Heart size and pulmonary vascularity are  normal.       Impression:       No active disease.     This report was finalized on 7/19/2022 3:29 PM by Dr. Bigg Del Angel MD.       CT Head Without Contrast Stroke Protocol [[id number]] Collected: 07/19/22 1459     Updated: 07/19/22 1507    Narrative:      CT HEAD, NONCONTRAST, STROKE PROTOCOL, 07/19/2022     HISTORY:  56-year-old male presenting to the ED with new weakness, inability to  stand upon awakening. Last known well last evening. Stroke assessment.     TECHNIQUE:    CT imaging of the head without IV contrast. Radiation dose reduction  techniques included automated exposure control or exposure modulation  based on body size. Radiation audit for CT and nuclear cardiology exams  in the last 12 months: 0.  *  ED arrival time, 13:45.  *  Stroke CT ordered received, 14:45.  *  CT scan time, 14:50.  *  Stat telephone report to the ordering provider, 14:57.     FINDINGS:    The exam shows subtle loss of attenuation of the right insular cortex  and adjacent white matter when compared with the contralateral left  side. This can be an early sign of subacute MCA infarct. No additional  finding for early ischemic insult is visible.     No evidence of acute intracranial hemorrhage.     Mild to moderate generalized cerebral volume loss and diffuse chronic  small vessel type white matter changes, advanced for the patient's age.  This is stable since the prior study.     No intracranial mass, mass effect, cerebral edema, extra-axial fluid  collection or progressive ventricular enlargement. Visualized upper  paranasal sinuses and mastoid air spaces are clear.       Impression:      1.  Potential subacute infarct involving the right insular cortex.  Consider MRI for further assessment for acute ischemic insult.  2.  No evidence of intracranial hemorrhage.  3.  Stable diffuse chronic changes as noted above, somewhat advanced for  the patient's age.           CRITICAL RESULT REPORTING: I have discussed the findings by telephone  with the ordering ED  provider, Harrison Snowden MD at 14:57 hours on  07/19/2022.     This report was finalized on 7/19/2022 3:04 PM by Dr. Bigg Del Angel MD.           Reviewed    ECG/EMG Results (most recent)     Procedure Component Value Units Date/Time    ECG 12 Lead [[id number]] Collected: 07/19/22 1554     Updated: 07/19/22 1555     QT Interval 352 ms     Narrative:      HEART RATE= 100  bpm  RR Interval= 604  ms  RI Interval= 177  ms  P Horizontal Axis= -5  deg  P Front Axis= 40  deg  QRSD Interval= 93  ms  QT Interval= 352  ms  QRS Axis= 65  deg  T Wave Axis= 3  deg  - OTHERWISE NORMAL ECG -  Sinus tachycardia  Electronically Signed By:   Date and Time of Study: 2022-07-19 15:54:49        Reviewed    Assessment & Plan     Acute ataxia with concern for possible CVA  -CT head is unremarkable, as evaluated by stroke neurologist  -Stroke neurology consulted from ED, recommends MRI of the brain and thoracic spine  -CTA head and neck show no stenosis   -Patient will be n.p.o. until swallow eval performed  -Allow permissive hypertension  -We will place on 325 mg of aspirin daily as well as atorvastatin 80 mg nightly  -Telemetry monitoring, neurochecks  -PT/OT/ consulted    Chronic stable conditions to be managed with home medications include the following conditions listed below. Also please note: Every effort was made to accurately obtain patient's home medications. This was done utilizing extensive chart review, ED documentation, recent pharmacy records, and where possible thorough discussion with the patient if medications were known by the patient. I have reviewed and edited the patient's home medications as per my efforts above and current med list can be found within home medications portion of this electronic medical record and is accurate as possible as of 07/19/22     Hypertension-as above we will allow permissive hypertension, however we will continue propranolol, while holding clonidine and losartan.      GERD-continue home Protonix    Diabetes mellitus type 2 in obese with peripheral neuropathy-continue gabapentin, placed on sliding scale insulin with Accu-Cheks, hold home Janumet.     Depression-continue Prozac and Seroquel    DVT PPX: SCDs        I discussed the patient's findings and my recommendations with patient.     Luiz Castillo DO  07/19/22  16:38 EDT    Time:41 minutes     As of April 2021, as required by the Federal 21st Century Cures Act, medical records (including provider notes and laboratory/imaging results) are to be made available to patients and/or their designees as soon as the documents are signed/resulted. While the intention is to ensure transparency and to engage patients in their healthcare, this immediate access may create unintended consequences because this document uses language intended for communication between medical providers for interpretation with the entirety of the patient's clinical picture in mind. It is recommended that patients and/or their designees review all available information with their primary or specialist providers for explanation and to avoid misinterpretation of this information.

## 2022-07-19 NOTE — ED NOTES
Pt is a poor historian. Pt stated that he called EMS related to not being able to get up off of the floor. Pt stated that he always has gait problems related to multiple leg surgeries but pt stated that today his weakness is worse than normal. Pt stated that he slept in his chair last night and was unable to get up so he called EMS. Pt stated that his L leg is weaker than the R leg but that they are both worse than normal. Pt stated that he typically can walk around his house with a cane. The pt lives at home alone. This RN and multiple other staff assisted the pt to a chair so that he could urinate and the pt was completely immobile. Pt was unable to bear weight on both of his legs. MD Snowden spoke with the pts daughter and she stated that this mobility issue is completely new and that the pt can typically walk with a cane without assistance. Pt is complaining of back pain, bilateral knee pain and he stated that this AM he hit his head at some point. Pt denies dizziness or syncope. Pt unsure of when he was last completely normal, and the pts daughter has not seen him in a couple of days.

## 2022-07-19 NOTE — CONSULTS
[facility]   Teleneurology Note    Patient Name: Eliseo Price  : 1965  MRN: [id number]  Primary Care Physician: Killian Scales MD  Referring Site: [place]  Location of Neurologist: [place]    Subjective   Teleneurology Initial Data                 Date Last Known Well: 22 Time Last Known Well: 2200     History     Chief Complaint: Left more than right lower extremity weakness  HPI: 56-year-old right-handed white male with known diagnosis of hypertension, diabetes, hyperlipidemia, stroke (no residuals), coronary artery disease, peripheral arterial disease status post all the toes amputation, not on any antithrombotics, who woke up this morning with left more than right lower extremity weakness, because of which he almost had a fall.  Denies any upper extremity symptoms, any facial symptoms, any trouble swallowing or any vision changes.  Patient denies any similar symptoms in the past, but he does say that he has had weakness in his legs.  He does have chronic back pain and neck pain, for which he is on pain medications.  He also has history of peripheral neuropathy.    Stroke Risk Factors/ Pertinent Data     Stroke risk factors: diabetes, hypertension, obesity/ physical inactivity, prior stroke/ TIA, dyslipidemia, coronary artery disease  Anticoagulants prior to arrival: none  Antiplatelets prior to arrival: none     Pre- Stroke Modified Ferris Scale     Pre-Stroke Modified Alecia Scale: 0 - No Symptoms at all.    NIH Stroke Scale           Interval: baseline  1a. Level of Consciousness: 0-->Alert, keenly responsive  1b. LOC Questions: 0-->Answers both questions correctly  1c. LOC Commands: 0-->Performs both tasks correctly  2. Best Gaze: 0-->Normal  3. Visual: 0-->No visual loss  4. Facial Palsy: 0-->Normal symmetrical movements  5a. Motor Arm, Left: 0-->No drift, limb holds 90 (or 45) degrees for full 10 secs  5b. Motor Arm, Right: 0-->No drift, limb holds 90 (or 45) degrees for full 10  secs  6a. Motor Leg, Left: 2-->Some effort against gravity, leg falls to bed by 5 secs, but has some effort against gravity  6b. Motor Leg, Right: 2-->Some effort against gravity, leg falls to bed by 5 secs, but has some effort against gravity  7. Limb Ataxia: 0-->Absent  8. Sensory: 1-->Mild-to-moderate sensory loss, patient feels pinprick is less sharp or is dull on the affected side, or there is a loss of superficial pain with pinprick, but patient is aware of being touched  9. Best Language: 0-->No aphasia, normal  10. Dysarthria: 0-->Normal  11. Extinction and Inattention (formerly Neglect): 0-->No abnormality  Total (NIH Stroke Scale): 5     Review of Systems     Review of Systems   Constitutional: Negative.    HENT: Negative.    Eyes: Negative.    Respiratory: Negative.    Cardiovascular: Negative.    Gastrointestinal: Negative.    Endocrine: Negative.    Genitourinary: Negative.    Musculoskeletal: Positive for back pain and neck pain.   Skin: Negative.    Allergic/Immunologic: Negative.    Psychiatric/Behavioral: Negative.        Objective   Exam     Exam performed with the help of support staff from the referring site  Neurological Exam  Mental Status  Awake, alert and oriented to person, place and time. Speech is normal. Language is fluent with no aphasia.    Cranial Nerves  CN II: Visual fields full to confrontation.  CN III, IV, VI: Extraocular movements intact bilaterally.  CN V: Facial sensation is normal.  CN VII: Full and symmetric facial movement.  CN VIII: Equal hearing bilaterally.  CN IX, X: Palate elevates symmetrically  CN XI: Shoulder shrug strength is normal.  CN XII: Tongue midline without atrophy or fasciculations.    Motor  Normal muscle bulk throughout. No fasciculations present.  Both upper extremity seems equal, 5/5  Bilateral lower extremity is 3/5, unable to maintain against gravity more than few seconds.    Sensory  Sensation: Decreased to light touch in stocking distribution.      Reflexes  Patient did have bilateral knee reflexes present, although it was diminished 1/4  Other reflexes were not checked  Reflexes checked with the help of the ER team..    Coordination    No obvious dysmetria.    Gait    Not assessed.      Result Review    Results          Personal review of CNS imaging:(Official report by radiologist pending)  Imaging  CT Imaging Review:  (CT head shows no acute finding, no hemorrhage.  CT angiogram of head and neck shows no significant stenosis/occlusion.)    Thrombolytic   Thrombolytics: not applicable     Assessment & Plan   Assessment/ Plan     Assessment:  Acute Stroke Evaluation:  (Bilateral lower extremity weakness/paraparesis.)   Essential hypertension  Diabetes mellitus type 2 in obese  Coronary artery disease      Plan:      Final Impression and Plan: Patient woke up with left more than right lower extremity weakness.  There is a possibility that this could be a stroke, but less likely to be vascular cause.  We will recommend getting MRI brain without contrast, will also do MRI of the T-spine without contrast, as he has only lower extremity weakness, with sparing upper extremity symptoms.  His reflexes seems diminished but present, which makes AIDP less likely.  Patient denies any bowel/bladder incontinence.  If the MRI T-spine and MRI brain is negative, and if his symptoms are worsening without any improvement, may recommend doing lumbar puncture.  In the meantime, give him aspirin 325 mg, Lipitor 80 mg daily.    Disposition     Disposition: The patient will remain at the referring institution for further evaluation and management    Medical Decision Making  Medical Data Reviewed: Data reviewed including: clinical labs, radiology and/or medical tests, Independent review of CNS images  Length of visit: 50 minutes    Dionte STOVALL MD, saw the patient on   at   for an initial in-patient or emergency room telememedicine face to face consult using interactive  technology for 50 minutes. The location of the patient was [place]. I was located at [place]. I was assisted with the exam by  .    I have proceeded with this evaluation at the request of the referring practitioner as it is felt to be an emergency setting and no appropriate specialist is available to perform this evaluation. The [facility] has reported that this is the correct patient and has obtained consent from the patient/surrogate to perform this telemedicine evaluation(including obtaining history, performing examination and reviewing data provided by the patient an/or originating site of care provider)    I have introduced myself to the patient, provided my credentials, disclosed my location, and determined that, based on review of the patient's chart and discussion with the patient's primary team, telemedicine via a HIPAA compliant, real-time, face-to-face two-way, interactive audio and video platform is an appropriate and effective means of providing the service.    The patient/surrogate has a right to refuse this evaluation as they have been explained risks including potential loss of confidentiality, benefits, alternatives, and the potential need for subsequent face-to-face care. In this evaluation, we will be providing recommendations only.  The ultimate decision to follow or not to follow these recommendations will be left to the bedside treating/requesting practitioner.    The patient/surrogate has been notified that other healthcare professionals including technical person may be involved in this A/V evaluation.  All laws concerning confidentiality and patient access to medical records and copies of medical records apply to telemedicine.  The patient/surrogate has received the originating [facility] Notice of Privacy Practices.    Dionte Petit MD

## 2022-07-20 ENCOUNTER — APPOINTMENT (OUTPATIENT)
Dept: MRI IMAGING | Facility: HOSPITAL | Age: 57
End: 2022-07-20

## 2022-07-20 ENCOUNTER — APPOINTMENT (OUTPATIENT)
Dept: CT IMAGING | Facility: HOSPITAL | Age: 57
End: 2022-07-20

## 2022-07-20 LAB
ANION GAP SERPL CALCULATED.3IONS-SCNC: 13.7 MMOL/L (ref 5–15)
BUN SERPL-MCNC: 24 MG/DL (ref 6–20)
BUN/CREAT SERPL: 16.3 (ref 7–25)
CALCIUM SPEC-SCNC: 8.7 MG/DL (ref 8.6–10.5)
CHLORIDE SERPL-SCNC: 97 MMOL/L (ref 98–107)
CHOLEST SERPL-MCNC: 175 MG/DL (ref 0–200)
CO2 SERPL-SCNC: 25.3 MMOL/L (ref 22–29)
CREAT SERPL-MCNC: 1.47 MG/DL (ref 0.76–1.27)
EGFRCR SERPLBLD CKD-EPI 2021: 54.9 ML/MIN/1.73
GLUCOSE BLDC GLUCOMTR-MCNC: 113 MG/DL (ref 70–130)
GLUCOSE BLDC GLUCOMTR-MCNC: 122 MG/DL (ref 70–130)
GLUCOSE BLDC GLUCOMTR-MCNC: 129 MG/DL (ref 70–130)
GLUCOSE SERPL-MCNC: 115 MG/DL (ref 65–99)
HBA1C MFR BLD: 6 % (ref 4.8–5.6)
HDLC SERPL-MCNC: 43 MG/DL (ref 40–60)
LDLC SERPL CALC-MCNC: 85 MG/DL (ref 0–100)
LDLC/HDLC SERPL: 1.74 {RATIO}
POTASSIUM SERPL-SCNC: 4.1 MMOL/L (ref 3.5–5.2)
QT INTERVAL: 352 MS
SODIUM SERPL-SCNC: 136 MMOL/L (ref 136–145)
TRIGL SERPL-MCNC: 285 MG/DL (ref 0–150)
VLDLC SERPL-MCNC: 47 MG/DL (ref 5–40)

## 2022-07-20 PROCEDURE — 72146 MRI CHEST SPINE W/O DYE: CPT

## 2022-07-20 PROCEDURE — 99225 PR SBSQ OBSERVATION CARE/DAY 25 MINUTES: CPT | Performed by: INTERNAL MEDICINE

## 2022-07-20 PROCEDURE — 70450 CT HEAD/BRAIN W/O DYE: CPT

## 2022-07-20 PROCEDURE — 99214 OFFICE O/P EST MOD 30 MIN: CPT | Performed by: PSYCHIATRY & NEUROLOGY

## 2022-07-20 PROCEDURE — 97161 PT EVAL LOW COMPLEX 20 MIN: CPT

## 2022-07-20 PROCEDURE — 70551 MRI BRAIN STEM W/O DYE: CPT

## 2022-07-20 PROCEDURE — 80048 BASIC METABOLIC PNL TOTAL CA: CPT | Performed by: INTERNAL MEDICINE

## 2022-07-20 PROCEDURE — 83036 HEMOGLOBIN GLYCOSYLATED A1C: CPT | Performed by: INTERNAL MEDICINE

## 2022-07-20 PROCEDURE — 94761 N-INVAS EAR/PLS OXIMETRY MLT: CPT

## 2022-07-20 PROCEDURE — 97166 OT EVAL MOD COMPLEX 45 MIN: CPT

## 2022-07-20 PROCEDURE — 82962 GLUCOSE BLOOD TEST: CPT

## 2022-07-20 PROCEDURE — 94799 UNLISTED PULMONARY SVC/PX: CPT

## 2022-07-20 PROCEDURE — 80061 LIPID PANEL: CPT | Performed by: INTERNAL MEDICINE

## 2022-07-20 RX ORDER — SODIUM CHLORIDE 9 MG/ML
200 INJECTION, SOLUTION INTRAVENOUS CONTINUOUS
Status: DISCONTINUED | OUTPATIENT
Start: 2022-07-20 | End: 2022-07-21

## 2022-07-20 RX ADMIN — PRIMIDONE 250 MG: 250 TABLET ORAL at 20:14

## 2022-07-20 RX ADMIN — GABAPENTIN 800 MG: 400 CAPSULE ORAL at 20:14

## 2022-07-20 RX ADMIN — ATORVASTATIN CALCIUM 80 MG: 40 TABLET, FILM COATED ORAL at 20:14

## 2022-07-20 RX ADMIN — PRIMIDONE 250 MG: 250 TABLET ORAL at 08:14

## 2022-07-20 RX ADMIN — MICONAZOLE NITRATE: 2 POWDER TOPICAL at 11:18

## 2022-07-20 RX ADMIN — PRIMIDONE 250 MG: 250 TABLET ORAL at 15:55

## 2022-07-20 RX ADMIN — WHITE PETROLATUM 41 % TOPICAL OINTMENT 1 APPLICATION: OINTMENT at 08:19

## 2022-07-20 RX ADMIN — FLUOXETINE 40 MG: 20 CAPSULE ORAL at 06:32

## 2022-07-20 RX ADMIN — QUETIAPINE FUMARATE 200 MG: 100 TABLET ORAL at 20:14

## 2022-07-20 RX ADMIN — MICONAZOLE NITRATE: 2 POWDER TOPICAL at 20:14

## 2022-07-20 RX ADMIN — PROPRANOLOL HYDROCHLORIDE 40 MG: 40 TABLET ORAL at 20:14

## 2022-07-20 RX ADMIN — SODIUM CHLORIDE 200 ML/HR: 9 INJECTION, SOLUTION INTRAVENOUS at 08:19

## 2022-07-20 RX ADMIN — PANTOPRAZOLE SODIUM 40 MG: 40 TABLET, DELAYED RELEASE ORAL at 06:33

## 2022-07-20 RX ADMIN — ASPIRIN 325 MG: 325 TABLET, COATED ORAL at 08:14

## 2022-07-20 RX ADMIN — Medication 10 ML: at 08:19

## 2022-07-20 RX ADMIN — GABAPENTIN 800 MG: 400 CAPSULE ORAL at 08:14

## 2022-07-20 RX ADMIN — PROPRANOLOL HYDROCHLORIDE 40 MG: 40 TABLET ORAL at 08:14

## 2022-07-20 RX ADMIN — GABAPENTIN 800 MG: 400 CAPSULE ORAL at 15:55

## 2022-07-20 NOTE — CASE MANAGEMENT/SOCIAL WORK
Discharge Planning Assessment  EVONNE Epstein     Patient Name: Eliseo Price  MRN: [id number]  Today's Date: 7/20/2022    Admit Date: 7/19/2022     Discharge Needs Assessment     Row Name 07/20/22 1308       Living Environment    People in Home alone    Current Living Arrangements home  single story house with four steps to gain entry    Duration at Residence 15 Y    Potentially Unsafe Housing Conditions --  none    Primary Care Provided by self    Provides Primary Care For no one    Caregiving Concerns caring for self due to weakness    Family Caregiver if Needed child(mohan), adult;sibling(s)    Family Caregiver Names Ann-Marie, daughter and Bere, sister    Quality of Family Relationships unable to assess    Able to Return to Prior Arrangements yes    Living Arrangement Comments Patient states he lives alone in a single story house with four steps to gain entry       Resource/Environmental Concerns    Resource/Environmental Concerns none    Transportation Concerns none       Transition Planning    Patient/Family Anticipates Transition to inpatient rehabilitation facility    Patient/Family Anticipated Services at Transition rehabilitation services    Transportation Anticipated family or friend will provide  patient states that his sister would be able to provide ride at discharge       Discharge Needs Assessment    Readmission Within the Last 30 Days no previous admission in last 30 days    Current Outpatient/Agency/Support Group --  none    Equipment Currently Used at Home --  pt reports to CM that he does not use any equipment at home.    Concerns to be Addressed discharge planning;adjustment to diagnosis/illness    Concerns Comments pt states due to his weakness he might need STR at home and will go by PT/OT recommendations    Anticipated Changes Related to Illness inability to care for self    Equipment Needed After Discharge --  pt states he is unsure if he will need any equipment at discharge     Outpatient/Agency/Support Group Needs skilled nursing facility    Discharge Facility/Level of Care Needs rehabilitation facility    Provided Post Acute Provider List? Yes    Post Acute Provider List Inpatient Rehab    Provided Post Acute Provider Quality & Resource List? Yes    Post Acute Provider Quality and Resource List Inpatient Rehab    Delivered To Patient    Method of Delivery In person    Patient's Choice of Community Agency(s) pt states he has used [facility] in the past and would use them again if needed    Current Discharge Risk dependent with mobility/activities of daily living    Discharge Coordination/Progress Patient states he would like to return home with HH but is open to STR if recommended. Referrals made to [facility] and [facility] ([place], [place]).               Discharge Plan     Row Name 07/20/22 7459       Plan    Plan Home with HH vs STR    Patient/Family in Agreement with Plan yes    Plan Comments Into room and introduced self and role of CM. Discussed discharge disposition with patient at bedside. Patient is currently resting in bed with no complaints and states he has worked with PT this morning. Patient confirms that the info on his face sheet is correct and that he see's Dr. Killian Scales as PCP. He states that he uses [facility] in pharmacy for his maintenance meds and uses [facility] in [place] for one time scripts and states he currently has no problem picking up or paying for his meds. He also states that he does have a living will and it is on file here. Patient states he lives alone in a single story house with four steps to gain entry and up until yesterday when he became weak had no issues maneuvering the steps or within the home. He states that he is independent with his ADL's and drives but that his sister will be able to provide ride at discharge. He also states that he currently does not use any DME at home and is unsure if he will need any equipment at discharge. Patient  states he has used [facility] in the past and would use them again if needed. CM discussed the need for STR at discharge and he states he would like to return home with home health but is open to STR if recommended by PT. CM provided patient with a list of STR faculties and he would like referrals to [facility] and [facility]. Patient had no other questions or concerns regarding discharge plans. Referrals put in EPIC for [facility] and [facility] and also spoke with Denise at [facility] and she states she will look at clinicals and call CM back regarding acceptance and bed availability. Name and number placed on white board in room. CM will continue to follow for needs.              Continued Care and Services - Admitted Since 7/19/2022     Destination     Service Provider Request Status Selected Services Address Phone Fax Patient Preferred    [facility] [place]  Pending - Request Sent N/A [address] [place] [address] [phone] --    [facility]  Pending - [facility] Sent N/A [address] [facility] [facility] [address] [phone] --                 Demographic Summary     Row Name 07/20/22 130       General Information    Admission Type inpatient    Arrived From home    Required Notices Provided Important Message from Medicare    Referral Source admission list    Reason for Consult discharge planning    Preferred Language English       Contact Information    Permission Granted to Share Info With                Functional Status    No documentation.                Psychosocial    No documentation.                Abuse/Neglect    No documentation.                Legal    No documentation.                Substance Abuse    No documentation.                Patient Forms    No documentation.                   Shayy Carrillo RN

## 2022-07-20 NOTE — PLAN OF CARE
Goal Outcome Evaluation:  Plan of Care Reviewed With: patient           Outcome Evaluation: No neuro deficits noted. On RA. MRI planned for this morning. VSS.

## 2022-07-20 NOTE — PLAN OF CARE
Goal Outcome Evaluation:  Plan of Care Reviewed With: patient           Outcome Evaluation: PT Evaluation Complete: Patient performs supine to sit transfer with min A, sit to supine transfer with SBA and sit to/from stand transfers with mod A x 2. Deferred forward gait due to recent fall with nursing staff. Patient able to take two side steps along EOB with FWW with min A x 2. Patient very shaky during side steps. Patient with chronic B LE diminished sensation due to PN, all toes ampuated previously. Patient would benefit from physical therapy to maximize functional independence. At this time, patient would not be safe to return home alone, recommend STR. Patient receptive to plan.

## 2022-07-20 NOTE — PLAN OF CARE
Goal Outcome Evaluation:  Plan of Care Reviewed With: patient           Outcome Evaluation: OT evaluation completed. pt required min assist for supine to sit transfer to EOB and SBA for sit to supine transfer. pt required mod assist x 2 for functional transfer from EOB with RW. pt able to perform sidesteps x 2 towards HOB with min assist and RW. anticipate pt will require max assist x 2 for lb adls. pt would benefit from skilled OT services to address adl retraining, balance and functional transfers. OT will see pt during hospitalizaion with anticipation discharge to SNF for further rehab and medical management

## 2022-07-20 NOTE — DISCHARGE PLACEMENT REQUEST
"Charles Mujica (56 y.o. Male)             Date of Birth   1965    Social Security Number       Address   [address] [place] [address]    Home Phone   [phone]    MRN   [id number]       [place]    Marital Status                               Admission Date   7/19/22    Admission Type   Emergency    Admitting Provider   Luiz Castillo DO    Attending Provider   Luiz Castillo DO    Department, Room/Bed   [facility] MED SURG, 1415/1       Discharge Date       Discharge Disposition       Discharge Destination                               Attending Provider: Luiz Castillo DO    Allergies: Lisinopril, Penicillins    Isolation: None   Infection: None   Code Status: CPR   Advance Care Planning Activity    Ht: 180.3 cm (71\")   Wt: 145 kg (320 lb)    Admission Cmt: None   Principal Problem: None                Active Insurance as of 7/19/2022     Primary Coverage     Payor Plan Insurance Group Employer/Plan Group    HUMANA MEDICARE REPLACEMENT HUMANA MEDICARE REPLACEMENT [id number]     Payor Plan Address Payor Plan Phone Number Payor Plan Fax Number Effective Dates    [address] [address] [phone]  12/1/2010 - None Entered    [facility] [address]       Subscriber Name Subscriber Birth Date Member ID       CHARLES MUJICA 1965 [id number]                 Emergency Contacts      (Rel.) Home Phone Work Phone Mobile Phone    Ann-Marie Mujica (Daughter) -- -- [phone]    Bere Contreras (Sister) -- -- [phone]    DeeLiliane (Mother) [phone] -- --              "

## 2022-07-20 NOTE — THERAPY EVALUATION
Acute Care - Occupational Therapy Initial Evaluation   Rachel Barr     Patient Name: Eliseo Price  : 1965  MRN: [id number]  Today's Date: 2022  Onset of Illness/Injury or Date of Surgery: 22  Date of Referral to OT: 22  Referring Physician: Dr Castillo    Admit Date: 2022       ICD-10-CM ICD-9-CM   1. Weakness of both lower extremities  R29.898 729.89     Patient Active Problem List   Diagnosis   • Disease of thyroid gland   • Cellulitis of right lower extremity   • Cellulitis   • Infected epidermoid cyst   • Altered mental status, unspecified   • Altered mental status   • Immobility   • Foot fracture, right   • Open dislocation of toe   • Open dislocation of phalanx of foot   • Confusion associated with infection   • Wound dehiscence, surgical, initial encounter   • Postoperative infection   • Coffee ground emesis   • Absolute anemia   • Colon cancer screening   • Cellulitis of left foot   • Laceration of lesser toe of left foot without foreign body without damage to nail   • ASHLEY (acute kidney injury) ([facility])   • Osteomyelitis of fifth toe of right foot ([facility])   • Status post amputation of lesser toe of right foot ([facility])   • Surgical wound infection   • Essential hypertension   • Mixed hyperlipidemia   • Type 2 diabetes mellitus with diabetic polyneuropathy, without long-term current use of insulin ([facility])   • Ischemic foot left   • Osteomyelitis of foot, right, acute ([facility])   • Open fracture of phalanx of right fourth toe   • Laceration of fourth toe of right foot with complication   • Open fracture of fourth toe of right foot   • Claw toe, acquired, right   • Diabetic ulcer of toe of right foot with fat layer exposed ([facility])   • Charcot foot due to diabetes mellitus ([facility])   • Osteomyelitis of third toe of right foot ([facility])   • Acquired claw toe, right   • Weakness of both lower extremities     Past Medical History:   Diagnosis Date   • Amputated toe of left foot ([facility])     4 and 5   •  Amputated toe of right foot ([facility])     5th   • Anesthesia complication     DIFFICULT TO PUT TO SLEEP   • Arthritis    • Bipolar disorder ([facility])    • Cellulitis of lower extremity     RIGHT LOWER LEG   • COPD (chronic obstructive pulmonary disease) ([facility])    • Coronary artery disease 2009    HEART ATTACK   • Diabetes mellitus ([facility])    • Disease of thyroid gland     nodule on thyroid   • DVT (deep venous thrombosis) ([facility]) 2005    right leg   • Fracture of right foot    • Gastric ulcer    • Hyperlipidemia    • Hypertension    • MRSA infection 2003    history of left leg after surgery   • Neuropathy     hands & feet   • Pseudogout    • RLS (restless legs syndrome)    • Septic shock ([facility]) 07/2017    H/O   • Sleep apnea     no machine, CANT TOLERATE   • Stroke ([facility])     x2, no residual   • Toxic metabolic encephalopathy 07/2017    H/O   • Tremors of nervous system      Past Surgical History:   Procedure Laterality Date   • AMPUTATION DIGIT Left 3/16/2018    Procedure: AMPUTATION LEFT FOURTH TOE;  Surgeon: Anish Farah DPM;  Location: [facility] OR;  Service: Podiatry   • AMPUTATION DIGIT Right 10/13/2020    Procedure: AMPUTATION OF RIGHT FIFTH TOE AND ALL ASSOCIATED PROCEDURES;  Surgeon: Anish Farah DPM;  Location: [facility] OR;  Service: Podiatry;  Laterality: Right;  AMPUTATION OF RIGHT FIFTH TOE    • AMPUTATION DIGIT Right 1/19/2021    Procedure: AMPUTATION RIGHT FOURTH TOE;  Surgeon: Anish Farah DPM;  Location: [facility] OR;  Service: Podiatry;  Laterality: Right;   • AMPUTATION DIGIT Right 7/30/2021    Procedure: AMPUTATION RIGHT THIRD TOE;  Surgeon: Anish Farah DPM;  Location: [facility] OR;  Service: Podiatry;  Laterality: Right;  RIGHT 3rd toe amputation    • 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   • COLONOSCOPY N/A 7/16/2018    Procedure: COLONOSCOPY and polypectomy;  Surgeon: Kaylie Mcfarlane MD;  Location: [facility] OR;  Service: Gastroenterology   • ENDOSCOPY N/A 3/16/2018    Procedure:  ESOPHAGOGASTRODUODENOSCOPY with biopsies;  Surgeon: Kaylie Mcfarlane MD;  Location: [facility] OR;  Service: Gastroenterology   • FINGER TENDON REPAIR Right     little finger   • HARDWARE REMOVAL Right 8/18/2017    Procedure: RIGHT EXTERNAL FIXATOR REMOVAL;  Surgeon: Anish Farah DPM;  Location: [facility] OR;  Service:    • INCISION AND DRAINAGE LEG Left 6/13/2018    Procedure: Left 2nd, 3rd toe laceration irrigation, debridement and multi layer wound closure.;  Surgeon: Anish Farah DPM;  Location: [facility] OR;  Service: Podiatry   • JOINT REPLACEMENT     • KNEE SURGERY      scope left 6 times, right once   • LEG DEBRIDEMENT Right 10/16/2020    Procedure: DEBRIDEMENT RIGHT  FOOT SURGICAL WOUND;  Surgeon: Anish Farah DPM;  Location: [facility] OR;  Service: Podiatry;  Laterality: Right;  DEBRIDEMENT RIGHT FOOT SURGICAL WOUND   • NERVE TRANSFER Right     elbow   • ORIF FOOT FRACTURE Right 7/29/2017    Procedure: open reduction with percutaneous pinning of midfoot dislocation fracture;  Surgeon: Anish Farah DPM;  Location: [facility] OR;  Service:    • ORIF FOOT FRACTURE Left 3/5/2018    Procedure: OPEN REDUCTION WITH IRRIGATION AND WOUND CLOSURE LEFT FOURTH TOE;  Surgeon: Anish Farah DPM;  Location: [facility] OR;  Service:    • TENDON LENGTHENING/SHORTENING Right     PSOAS   • TOE FUSION Right 8/18/2017    Procedure: RIGHT  MEDIAL COLUMN ARTHRODESIS, RIGHT TARAL METATARSAL ARTHRODESIS;  Surgeon: Anish Farah DPM;  Location: [facility] OR;  Service:    • TOTAL HIP ARTHROPLASTY Bilateral    • TOTAL SHOULDER ARTHROPLASTY Right          OT ASSESSMENT FLOWSHEET (last 12 hours)     OT Evaluation and Treatment     Row Name 07/20/22 1045                   OT Time and Intention    Subjective Information complains of;weakness;pain  -JJ        Document Type evaluation  -JJ        Mode of Treatment occupational therapy  -JJ        Patient Effort adequate  -JJ        Symptoms Noted During/After Treatment none  -JJ                   General Information    Patient Profile Reviewed yes  -JJ        Onset of Illness/Injury or Date of Surgery 07/19/22  -JJ        Referring Physician Dr Castillo  -JJ        General Observations of Patient pt supine in bed, agreed to evaluation  -JJ        Prior Level of Function --  see pertinent hx of current problem  -JJ        Equipment Currently Used at Home walker, rolling;cane, straight;wheelchair  -JJ        Pertinent History of Current Functional Problem pt with recent falls x 2 at home with co difficulty ambulating and B LE weakness. pt admitted to rule out CVA. pt with chronic peripheral neuropathy in B LE with ampuations of all of his toes bilaterally. pt states at baseline he ambulates without assistive device, I with adls and driving.  -JJ        Existing Precautions/Restrictions fall  -JJ        Limitations/Impairments insensate body part  -JJ        Risks Reviewed patient:;increased discomfort;LOB  -JJ        Benefits Reviewed patient:;improve function;increase independence;increase strength;increase balance  -JJ        Barriers to Rehab previous functional deficit;impaired sensation  -JJ                  Living Environment    Current Living Arrangements home  -JJ        People in Home alone  -JJ                  Home Main Entrance    Number of Stairs, Main Entrance four  -JJ        Stair Railings, Main Entrance railings on both sides of stairs  -JJ                  Stairs Within Home, Primary    Stairs, Within Home, Primary 1 story home  -JJ                  Pain Assessment    Pretreatment Pain Rating 8/10  -JJ        Posttreatment Pain Rating 8/10  -JJ        Pain Location - back  -JJ        Pre/Posttreatment Pain Comment pt with chronic back pain however states right now it is worse than normal  -JJ        Pain Intervention(s) Repositioned  -JJ                  Cognition    Orientation Status (Cognition) oriented x 3  -JJ        Follows Commands (Cognition) WFL  -JJ        Personal Safety  Interventions gait belt;nonskid shoes/slippers when out of bed  -                  Range of Motion Comprehensive    General Range of Motion bilateral upper extremity ROM WNL  -                  Strength (Manual Muscle Testing)    Strength (Manual Muscle Testing) bilateral upper extremities;strength is WFL  -                  Sensory Assessment (Somatosensory)    Sensory Assessment (Somatosensory) --  pt with chronic B LE peripheral neuropathy, pt states decreased sensation B LE belwo the knees  -                  Activities of Daily Living    BADL Assessment/Intervention bathing;lower body dressing  -                  Bathing Assessment/Intervention    [place] Level (Bathing) lower body;maximum assist (25% patient effort)  -                  Lower Body Dressing Assessment/Training    [place] Level (Lower Body Dressing) lower body dressing skills;maximum assist (25% patient effort)  -                  Bed Mobility    Bed Mobility supine-sit;sit-supine  -        Supine-Sit [place] (Bed Mobility) minimum assist (75% patient effort)  -        Sit-Supine [place] (Bed Mobility) standby assist  -        Assistive Device (Bed Mobility) bed rails;head of bed elevated  -                  Functional Mobility    Functional Mobility- Comment pt able to perform 2 side steps towards HOB with RW with min assist x 2.  -                  Transfer Assessment/Treatment    Transfers sit-stand transfer;stand-sit transfer  -        Comment, (Transfers) pt required verbal cues for hand placement  -                  Transfers    Sit-Stand [place] (Transfers) moderate assist (50% patient effort);verbal cues;2 person assist  -        Stand-Sit [place] (Transfers) moderate assist (50% patient effort);verbal cues;2 person assist  -                  Sit-Stand Transfer    Assistive Device (Sit-Stand Transfers) walker, front-wheeled  -                  Stand-Sit Transfer    Assistive  Device (Stand-Sit Transfers) walker, front-wheeled  -J                  Safety Issues, Functional Mobility    Safety Issues Affecting Function (Mobility) impulsivity;judgment;positioning of assistive device;problem-solving;sequencing abilities  -                  Balance    Comment, Balance pt required CGA for static standing balance with RW  -J                  Plan of Care Review    Plan of Care Reviewed With patient  -JJ        Outcome Evaluation OT evaluation completed. pt required min assist for supine to sit transfer to EOB and SBA for sit to supine transfer. pt required mod assist x 2 for functional transfer from EOB with RW. pt able to perform sidesteps x 2 towards HOB with min assist and RW. anticipate pt will require max assist x 2 for lb adls. pt would benefit from skilled OT services to address adl retraining, balance and functional transfers. OT will see pt during hospitalizaion with anticipation discharge to SNF for further rehab and medical management  -                  Positioning and Restraints    Pre-Treatment Position in bed  -J        Post Treatment Position bed  -J        In Bed supine;notified nsg;call light within reach;encouraged to call for assist  -                  Therapy Assessment/Plan (OT)    Date of Referral to OT 07/19/22  -        Patient/Family Therapy Goal Statement (OT) pt states plan is for rehab prior to return home  -        OT Diagnosis decreased adl sp fall, weakness  -        Rehab Potential (OT) good, to achieve stated therapy goals  -        Criteria for Skilled Therapeutic Interventions Met (OT) yes;skilled treatment is necessary  -        Therapy Frequency (OT) 5 times/wk  -        Predicted Duration of Therapy Intervention (OT) x 1 week  -        Problem List (OT) balance;coordination;mobility;strength;pain  -        Activity Limitations Related to Problem List (OT) unable to ambulate safely;unable to transfer safely;BADLs not performed  adequately or safely;IADLs not performed adequately or safely  -JJ        Planned Therapy Interventions (OT) BADL retraining;functional balance retraining;transfer/mobility retraining;patient/caregiver education/training  -J                  Evaluation Complexity (OT)    Overall Complexity of Evaluation (OT) moderate complexity  -J                  Therapy Plan Review/Discharge Plan (OT)    Anticipated Discharge Disposition (OT) [facility] nursing facility  -JJ                  Transfer Goal 1 (OT)    Activity/Assistive Device (Transfer Goal 1, OT) toilet;commode, 3-in-1;walker, rolling  -JJ        Ness Level/Cues Needed (Transfer Goal 1, OT) minimum assist (75% or more patient effort)  -JJ        Time Frame (Transfer Goal 1, OT) 1 week  -JJ        Strategies/Barriers (Transfers Goal 1, OT) pain, weakness  -JJ        Progress/Outcome (Transfer Goal 1, OT) --  new goal  -JJ                  Dressing Goal 1 (OT)    Activity/Device (Dressing Goal 1, OT) lower body dressing  with long handled ae if needed  -JJ        Ness/Cues Needed (Dressing Goal 1, OT) contact guard required  -JJ        Time Frame (Dressing Goal 1, OT) 1 week  -JJ        Strategies/Barriers (Dressing Goal 1, OT) pain, weakness  -JJ        Progress/Outcome (Dressing Goal 1, OT) --  new goal  -JJ                  Balance Goal 1 (OT)    Activity/Assistive Device (Balance Goal 1, OT) standing, dynamic;walker, rolling  -JJ        Ness Level/Cues Needed (Balance Goal 1, OT) contact guard assist  x 2-3 minutes to increase I with adls  -JJ        Time Frame (Balance Goal 1, OT) 1 week  -JJ        Barriers (Balance Goal 1, OT) pain, weakness  -JJ        Progress/Outcomes (Balance Goal 1, OT) --  new goal  -JJ              User Key  (r) = Recorded By, (t) = Taken By, (c) = Cosigned By    Initials Name Effective Dates    Tracey Pearce, OTR 06/16/21 -                  Occupational Therapy Education                 Title: PT OT  SLP Therapies (In Progress)     Topic: Occupational Therapy (In Progress)     Point: ADL training (Not Started)     Description:   Instruct learner(s) on proper safety adaptation and remediation techniques during self care or transfers.   Instruct in proper use of assistive devices.              Learner Progress:  Not documented in this visit.          Point: Precautions (Done)     Description:   Instruct learner(s) on prescribed precautions during self-care and functional transfers.              Learning Progress Summary           Patient Acceptance, E,TB, VU by DANIELITO at 7/20/2022 2679    Comment: pt educated on adls, balance and safety with functional transfers and mobility                               User Key     Initials Effective Dates Name Provider Type Discipline    DANIELITO 06/16/21 -  Tracey Looney, OTR Occupational Therapist OT                  OT Recommendation and Plan  Planned Therapy Interventions (OT): BADL retraining, functional balance retraining, transfer/mobility retraining, patient/caregiver education/training  Therapy Frequency (OT): 5 times/wk  Plan of Care Review  Plan of Care Reviewed With: patient  Outcome Evaluation: OT evaluation completed. pt required min assist for supine to sit transfer to EOB and SBA for sit to supine transfer. pt required mod assist x 2 for functional transfer from EOB with RW. pt able to perform sidesteps x 2 towards HOB with min assist and RW. anticipate pt will require max assist x 2 for lb adls. pt would benefit from skilled OT services to address adl retraining, balance and functional transfers. OT will see pt during hospitalizaion with anticipation discharge to SNF for further rehab and medical management  Plan of Care Reviewed With: patient  Outcome Evaluation: OT evaluation completed. pt required min assist for supine to sit transfer to EOB and SBA for sit to supine transfer. pt required mod assist x 2 for functional transfer from EOB with RW. pt able to  perform sidesteps x 2 towards HOB with min assist and RW. anticipate pt will require max assist x 2 for lb adls. pt would benefit from skilled OT services to address adl retraining, balance and functional transfers. OT will see pt during hospitalizaion with anticipation discharge to SNF for further rehab and medical management     Outcome Measures     Row Name 07/20/22 1045             How much help from another is currently needed...    Putting on and taking off regular lower body clothing? 2  -JJ      Bathing (including washing, rinsing, and drying) 2  -JJ      Toileting (which includes using toilet bed pan or urinal) 2  -JJ      Putting on and taking off regular upper body clothing 3  -JJ      Taking care of personal grooming (such as brushing teeth) 4  -JJ      Eating meals 4  -JJ      AM-PAC 6 Clicks Score (OT) 17  -              Functional Assessment    Outcome Measure Options AM-PAC 6 Clicks Daily Activity (OT)  -            User Key  (r) = Recorded By, (t) = Taken By, (c) = Cosigned By    Initials Name Provider Type    Tracey Pearce OTR Occupational Therapist                Time Calculation:    Time Calculation- OT     Row Name 07/20/22 1322             Time Calculation- OT    OT Start Time 1045  -            User Key  (r) = Recorded By, (t) = Taken By, (c) = Cosigned By    Initials Name Provider Type    Tracey Pearce OTR Occupational Therapist              Therapy Charges for Today     Code Description Service Date Service Provider Modifiers Qty    [id number]  OT EVAL MOD COMPLEXITY 2 7/20/2022 Tracey Looney OTR GO 1               REINA Ugarte  7/20/2022

## 2022-07-20 NOTE — PROGRESS NOTES
SERVICE: [facility] HOSPITALIST    CONSULTANTS: Stroke neurology    CHIEF COMPLAINT: Trouble walking    SUBJECTIVE: Patient seen and examined at bedside.  At time of my examination of patient I have been informed of a assisted fall from nursing staff.  Patient reports to me that he is sorry for demanding that nursing staff assist him to the restroom.  He expresses that the fall surprised him as he thought he had adequate strength and balance to get to the restroom.  Nursing staff informed me that patient initially stood and headed towards bathroom with assistance but could not make it and then went to turn back to the bed and slowly collapsed with assistance from the nurse.  Patient had no obvious injuries, other than skin laceration of his knee.  Patient also reports no pain in his hips or other joints from fall.  He reports trouble sleeping last night but is otherwise without acute complaint.     OBJECTIVE:    Physical exam is largely unchanged from previous exam, except where documented below, examination is accurate as of 7/20/2022    Physical Exam:  General: Patient is awake and alert.  Obese male, no acute distress noted.   HENT: Head is atraumatic, normocephalic. Hearing is grossly intact. Nose is without obvious congestion and appears patent. Neck is supple and trachea is midline.   Eyes: Vision is grossly intact. Pupils appear equal and round.   Cardiovascular: Heart has regular rate and rhythm with no murmurs, rubs or gallops noted.   Respiratory: Lungs are clear to ausculation without wheezes, rhonchi or rales.   Abdominal/GI: Soft, nontender, bowel sounds present. No rebound or guarding present.   Extremities: No peripheral edema noted.  All toes removed from bilateral feet previously.  Skin tear on right knee.   Musculoskeletal: Spontaneous movement of bilateral upper and lower extremities against gravity noted. No signs of injury or deformity noted.   Skin: Warm and dry.   Psych: Mood  "and affect are appropriate. Cooperative with exam.   Neuro: No facial asymmetry noted. No focal deficits noted, hearing and vision are grossly intact.     /84 (BP Location: Left arm, Patient Position: Lying)   Pulse 89   Temp 97.9 °F (36.6 °C) (Oral)   Resp 18   Ht 180.3 cm (71\")   Wt (!) 145 kg (320 lb)   SpO2 96%   BMI 44.63 kg/m²     MEDS/LABS REVIEWED AND ORDERED    Aquaphor Advanced Therapy, 1 application, Topical, Q24H  aspirin, 325 mg, Oral, Daily  atorvastatin, 80 mg, Oral, Nightly  FLUoxetine, 40 mg, Oral, QAM  gabapentin, 800 mg, Oral, TID  Insulin Aspart, 0-9 Units, Subcutaneous, TID AC  miconazole, , Topical, Q12H  pantoprazole, 40 mg, Oral, QAM  primidone, 250 mg, Oral, TID  propranolol, 40 mg, Oral, BID  QUEtiapine, 200 mg, Oral, Nightly  sodium chloride, 10 mL, Intravenous, Q12H          LAB/DIAGNOSTICS:    Lab Results (last 24 hours)     Procedure Component Value Units Date/Time    Lipid Panel [[id number]]  (Abnormal) Collected: 07/20/22 0347    Specimen: Blood Updated: 07/20/22 0429     Total Cholesterol 175 mg/dL      Triglycerides 285 mg/dL      HDL Cholesterol 43 mg/dL      LDL Cholesterol  85 mg/dL      VLDL Cholesterol 47 mg/dL      LDL/HDL Ratio 1.74    Narrative:      Cholesterol Reference Ranges  (U.S. Department of Health and Human Services ATP III Classifications)    Desirable          <200 mg/dL  Borderline High    200-239 mg/dL  High Risk          >240 mg/dL      Triglyceride Reference Ranges  (U.S. Department of Health and Human Services ATP III Classifications)    Normal           <150 mg/dL  Borderline High  150-199 mg/dL  High             200-499 mg/dL  Very High        >500 mg/dL    HDL Reference Ranges  (U.S. Department of Health and Human Services ATP III Classifications)    Low     <40 mg/dl (major risk factor for CHD)  High    >60 mg/dl ('negative' risk factor for CHD)        LDL Reference Ranges  (U.S. Department of Health and Human Services ATP III " Classifications)    Optimal          <100 mg/dL  Near Optimal     100-129 mg/dL  Borderline High  130-159 mg/dL  High             160-189 mg/dL  Very High        >189 mg/dL    Hemoglobin A1c [[id number]]  (Abnormal) Collected: 07/20/22 0347    Specimen: Blood Updated: 07/20/22 0421     Hemoglobin A1C 6.00 %     Narrative:      Hemoglobin A1C Ranges:    Increased Risk for Diabetes  5.7% to 6.4%  Diabetes                     >= 6.5%  Diabetic Goal                < 7.0%    POC Glucose Once [[id number]]  (Normal) Collected: 07/19/22 1933    Specimen: Blood Updated: 07/19/22 1939     Glucose 109 mg/dL      Comment: Meter: [id number] : [id number] Jose L DICKSON       POC Glucose Once [[id number]]  (Normal) Collected: 07/19/22 1854    Specimen: Blood Updated: 07/19/22 1902     Glucose 117 mg/dL      Comment: Meter: [id number] : [id number] Luis Carlos DICKSON       COVID PRE-OP / PRE-PROCEDURE SCREENING ORDER (NO ISOLATION) - Swab, Nasal Cavity [[id number]]  (Normal) Collected: 07/19/22 1710    Specimen: Swab from Nasal Cavity Updated: 07/19/22 1806    Narrative:      The following orders were created for panel order COVID PRE-OP / PRE-PROCEDURE SCREENING ORDER (NO ISOLATION) - Swab, Nasal Cavity.  Procedure                               Abnormality         Status                     ---------                               -----------         ------                     COVID-19,[employer] IN-DIONNA...[[id number]]  Normal              Final result                 Please view results for these tests on the individual orders.    COVID-19,[employer] IN-HOUSE,Nasal Swab No Transport Media 3-4 HR TAT - Swab, Nasal Cavity [[id number]]  (Normal) Collected: 07/19/22 1710    Specimen: Swab from Nasal Cavity Updated: 07/19/22 1806     COVID19 Not Detected    Narrative:      Fact sheet for providers: https://www.fda.gov/media/995210/download     Fact sheet for patients: https://www.fda.gov/media/469129/download    Test performed by  PCR.    Consider negative results in combination with clinical observations, patient history, and epidemiological information.    Urinalysis With Microscopic If Indicated (No Culture) - Urine, Catheter [[id number]]  (Normal) Collected: 07/19/22 1429    Specimen: Urine, Catheter Updated: 07/19/22 1444     Color, UA Yellow     Appearance, UA Clear     pH, UA 5.5     Specific Gravity, UA <=1.005     Glucose, UA Negative     Ketones, UA Negative     Bilirubin, UA Negative     Blood, UA Negative     Protein, UA Negative     Leuk Esterase, UA Negative     Nitrite, UA Negative     Urobilinogen, UA 0.2 E.U./dL    Narrative:      Urine microscopic not indicated.    Comprehensive Metabolic Panel [[id number]]  (Abnormal) Collected: 07/19/22 1415    Specimen: Blood Updated: 07/19/22 1444     Glucose 130 mg/dL      BUN 31 mg/dL      Creatinine 1.73 mg/dL      Sodium 135 mmol/L      Potassium 4.6 mmol/L      Comment: Slight hemolysis detected by analyzer. Results may be affected.        Chloride 100 mmol/L      CO2 23.3 mmol/L      Calcium 9.0 mg/dL      Total Protein 7.2 g/dL      Albumin 4.00 g/dL      ALT (SGPT) 22 U/L      AST (SGOT) 38 U/L      Alkaline Phosphatase 89 U/L      Total Bilirubin 0.4 mg/dL      Globulin 3.2 gm/dL      A/G Ratio 1.3 g/dL      BUN/Creatinine Ratio 17.9     Anion Gap 11.7 mmol/L      eGFR 45.2 mL/min/1.73      Comment: National Kidney Foundation and American Society of Nephrology (ASN) Task Force recommended calculation based on the Chronic Kidney Disease Epidemiology Collaboration (CKD-EPI) equation refit without adjustment for race.       Narrative:      GFR Normal >60  Chronic Kidney Disease <60  Kidney Failure <15      CBC & Differential [[id number]]  (Abnormal) Collected: 07/19/22 1415    Specimen: Blood Updated: 07/19/22 1425    Narrative:      The following orders were created for panel order CBC & Differential.  Procedure                               Abnormality         Status                      ---------                               -----------         ------                     CBC Auto Differential[[id number]]        Abnormal            Final result                 Please view results for these tests on the individual orders.    CBC Auto Differential [[id number]]  (Abnormal) Collected: 07/19/22 1415    Specimen: Blood Updated: 07/19/22 1425     WBC 7.98 10*3/mm3      RBC 5.21 10*6/mm3      Hemoglobin 15.4 g/dL      Hematocrit 47.2 %      MCV 90.6 fL      MCH 29.6 pg      MCHC 32.6 g/dL      RDW 14.3 %      RDW-SD 47.7 fl      MPV 9.1 fL      Platelets 168 10*3/mm3      Neutrophil % 80.7 %      Lymphocyte % 8.5 %      Monocyte % 8.8 %      Eosinophil % 1.8 %      Basophil % 0.1 %      Immature Grans % 0.1 %      Neutrophils, Absolute 6.44 10*3/mm3      Lymphocytes, Absolute 0.68 10*3/mm3      Monocytes, Absolute 0.70 10*3/mm3      Eosinophils, Absolute 0.14 10*3/mm3      Basophils, Absolute 0.01 10*3/mm3      Immature Grans, Absolute 0.01 10*3/mm3         ECG 12 Lead   Preliminary Result   HEART RATE= 100  bpm   RR Interval= 604  ms   MS Interval= 177  ms   P Horizontal Axis= -5  deg   P Front Axis= 40  deg   QRSD Interval= 93  ms   QT Interval= 352  ms   QRS Axis= 65  deg   T Wave Axis= 3  deg   - OTHERWISE NORMAL ECG -   Sinus tachycardia   Electronically Signed By:    Date and Time of Study: 2022-07-19 15:54:49          XR Knee 3 View Left    Result Date: 7/19/2022  1. No acute osseous abnormality. 2. Moderate degenerative arthropathy.  This report was finalized on 7/19/2022 3:31 PM by Dr. Bigg Del Angel MD.      CT Angiogram Neck    Result Date: 7/19/2022  1. By NASCET criteria, 0% stenosis at either carotid bifurcation. 2. No central intracranial vascular cut off or focal central stenosis. Signer Name: Holli Mesa MD  Signed: 7/19/2022 4:47 PM  Workstation [facility]: [facility]  [facility]    CT Lumbar Spine Without Contrast    Result Date: 7/19/2022  No acute bony injury in  the lumbar spine. Slight anterior listhesis with advanced facet degeneration at L4-L5. Bilateral foraminal narrowing at both L4-5 and L5-S1. Signer Name: CHARLES HOANG MD  Signed: 7/19/2022 3:33 PM  Workstation Name: [id number]  [facility]    XR Chest 1 View    Result Date: 7/19/2022  No active disease.  This report was finalized on 7/19/2022 3:29 PM by Dr. Bigg Del Angel MD.      CT Angiogram Head    Result Date: 7/19/2022  1. By NASCET criteria, 0% stenosis at either carotid bifurcation. 2. No central intracranial vascular cut off or focal central stenosis. Signer Name: Holli Mesa MD  Signed: 7/19/2022 4:47 PM  Workstation Name: [id number]  [facility]    CT Head Without Contrast Stroke Protocol    Result Date: 7/19/2022  1.  Potential subacute infarct involving the right insular cortex. Consider MRI for further assessment for acute ischemic insult. 2.  No evidence of intracranial hemorrhage. 3.  Stable diffuse chronic changes as noted above, somewhat advanced for the patient's age.    CRITICAL RESULT REPORTING: I have discussed the findings by telephone with the ordering ED provider, Harrison Snowden MD at 14:57 hours on 07/19/2022.  This report was finalized on 7/19/2022 3:04 PM by Dr. Bigg Del Angel MD.          ASSESSMENT/PLAN:  Please not portions of this assessment/plan may have been copied and pasted, but I have personally seen this patient and reviewed each line of this assessment and plan for accuracy and made updates to reflect my necessary changes on 7/20/2022     Acute ataxia with concern for possible CVA  -CT head, CTA head and neck showed no acute issues  -Stroke neurology following, MRI brain and thoracic spine reviewed by stroke neurology, no concerns for CVA at this time.   -Stroke neurology recommends patient work with PT  -Continue 325 mg of aspirin daily as well as atorvastatin 80 mg nightly.   -Lipid panel shows elevated triglycerides.  A1c is  "6.   -PT/OT/CM following    Hypertension-as above we will allow permissive hypertension, however we will continue propranolol, while holding clonidine and losartan.      GERD-continue home Protonix     Diabetes mellitus type 2 in obese with peripheral neuropathy-continue gabapentin, placed on sliding scale insulin with Accu-Cheks, hold home Janumet.      Depression-continue Prozac and Seroquel     DVT PPX: SCDs      PLAN FOR DISPOSITION: Anticipate discharge to short-term rehab when able.     Luiz Castillo DO  Hospitalist, [facility]  07/20/22  07:36 EDT    As of April 2021, as required by the Federal 21st Century Cures Act, medical records (including provider notes and laboratory/imaging results) are to be made available to patients and/or their designees as soon as the documents are signed/resulted. While the intention is to ensure transparency and to engage patients in their healthcare, this immediate access may create unintended consequences because this document uses language intended for communication between medical providers for interpretation with the entirety of the patient's clinical picture in mind. It is recommended that patients and/or their designees review all available information with their primary or specialist providers for explanation and to avoid misinterpretation of this information.    \"Dictated utilizing Dragon dictation\"    "

## 2022-07-20 NOTE — NURSING NOTE
"Pt had another fall today, not observed, states he fell forward out of the bed reaching for cell phone and hit his forehead but \"not very hard.\" No bruising or new injury noted. Discussed with Dr Castillo, awaiting CT head.   "

## 2022-07-20 NOTE — THERAPY EVALUATION
Patient Name: Eliseo Price  : 1965    MRN: [id number]                              Today's Date: 2022       Admit Date: 2022    Visit Dx:     ICD-10-CM ICD-9-CM   1. Weakness of both lower extremities  R29.898 729.89     Patient Active Problem List   Diagnosis   • Disease of thyroid gland   • Cellulitis of right lower extremity   • Cellulitis   • Infected epidermoid cyst   • Altered mental status, unspecified   • Altered mental status   • Immobility   • Foot fracture, right   • Open dislocation of toe   • Open dislocation of phalanx of foot   • Confusion associated with infection   • Wound dehiscence, surgical, initial encounter   • Postoperative infection   • Coffee ground emesis   • Absolute anemia   • Colon cancer screening   • Cellulitis of left foot   • Laceration of lesser toe of left foot without foreign body without damage to nail   • ASHLEY (acute kidney injury) ([facility])   • Osteomyelitis of fifth toe of right foot ([facility])   • Status post amputation of lesser toe of right foot ([facility])   • Surgical wound infection   • Essential hypertension   • Mixed hyperlipidemia   • Type 2 diabetes mellitus with diabetic polyneuropathy, without long-term current use of insulin ([facility])   • Ischemic foot left   • Osteomyelitis of foot, right, acute ([facility])   • Open fracture of phalanx of right fourth toe   • Laceration of fourth toe of right foot with complication   • Open fracture of fourth toe of right foot   • Claw toe, acquired, right   • Diabetic ulcer of toe of right foot with fat layer exposed ([facility])   • Charcot foot due to diabetes mellitus ([facility])   • Osteomyelitis of third toe of right foot ([facility])   • Acquired claw toe, right   • Weakness of both lower extremities     Past Medical History:   Diagnosis Date   • Amputated toe of left foot ([facility])     4 and 5   • Amputated toe of right foot ([facility])     5th   • Anesthesia complication     DIFFICULT TO PUT TO SLEEP   • Arthritis    • Bipolar disorder ([facility])    • Cellulitis  of lower extremity     RIGHT LOWER LEG   • COPD (chronic obstructive pulmonary disease) ([facility])    • Coronary artery disease 2009    HEART ATTACK   • Diabetes mellitus ([facility])    • Disease of thyroid gland     nodule on thyroid   • DVT (deep venous thrombosis) ([facility]) 2005    right leg   • Fracture of right foot    • Gastric ulcer    • Hyperlipidemia    • Hypertension    • MRSA infection 2003    history of left leg after surgery   • Neuropathy     hands & feet   • Pseudogout    • RLS (restless legs syndrome)    • Septic shock ([facility]) 07/2017    H/O   • Sleep apnea     no machine, CANT TOLERATE   • Stroke ([facility])     x2, no residual   • Toxic metabolic encephalopathy 07/2017    H/O   • Tremors of nervous system      Past Surgical History:   Procedure Laterality Date   • AMPUTATION DIGIT Left 3/16/2018    Procedure: AMPUTATION LEFT FOURTH TOE;  Surgeon: Anish Farah DPM;  Location: [facility] OR;  Service: Podiatry   • AMPUTATION DIGIT Right 10/13/2020    Procedure: AMPUTATION OF RIGHT FIFTH TOE AND ALL ASSOCIATED PROCEDURES;  Surgeon: Anish Farah DPM;  Location: [facility] OR;  Service: Podiatry;  Laterality: Right;  AMPUTATION OF RIGHT FIFTH TOE    • AMPUTATION DIGIT Right 1/19/2021    Procedure: AMPUTATION RIGHT FOURTH TOE;  Surgeon: Anish Farah DPM;  Location: [facility] OR;  Service: Podiatry;  Laterality: Right;   • AMPUTATION DIGIT Right 7/30/2021    Procedure: AMPUTATION RIGHT THIRD TOE;  Surgeon: Anish Farah DPM;  Location: [facility] OR;  Service: Podiatry;  Laterality: Right;  RIGHT 3rd toe amputation    • 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   • COLONOSCOPY N/A 7/16/2018    Procedure: COLONOSCOPY and polypectomy;  Surgeon: Kaylie Mcfarlane MD;  Location: [facility] OR;  Service: Gastroenterology   • ENDOSCOPY N/A 3/16/2018    Procedure: ESOPHAGOGASTRODUODENOSCOPY with biopsies;  Surgeon: Kaylie Mcfarlane MD;  Location: [facility] OR;  Service: Gastroenterology   • FINGER TENDON REPAIR Right     little finger   •  HARDWARE REMOVAL Right 8/18/2017    Procedure: RIGHT EXTERNAL FIXATOR REMOVAL;  Surgeon: Anish Farah DPM;  Location: [facility] OR;  Service:    • INCISION AND DRAINAGE LEG Left 6/13/2018    Procedure: Left 2nd, 3rd toe laceration irrigation, debridement and multi layer wound closure.;  Surgeon: Anish Farah DPM;  Location: [facility] OR;  Service: Podiatry   • JOINT REPLACEMENT     • KNEE SURGERY      scope left 6 times, right once   • LEG DEBRIDEMENT Right 10/16/2020    Procedure: DEBRIDEMENT RIGHT  FOOT SURGICAL WOUND;  Surgeon: Anish Farah DPM;  Location: [facility] OR;  Service: Podiatry;  Laterality: Right;  DEBRIDEMENT RIGHT FOOT SURGICAL WOUND   • NERVE TRANSFER Right     elbow   • ORIF FOOT FRACTURE Right 7/29/2017    Procedure: open reduction with percutaneous pinning of midfoot dislocation fracture;  Surgeon: Anish Farah DPM;  Location: [facility] OR;  Service:    • ORIF FOOT FRACTURE Left 3/5/2018    Procedure: OPEN REDUCTION WITH IRRIGATION AND WOUND CLOSURE LEFT FOURTH TOE;  Surgeon: Anish Farah DPM;  Location: [facility] OR;  Service:    • TENDON LENGTHENING/SHORTENING Right     PSOAS   • TOE FUSION Right 8/18/2017    Procedure: RIGHT  MEDIAL COLUMN ARTHRODESIS, RIGHT TARAL METATARSAL ARTHRODESIS;  Surgeon: Anish Farah DPM;  Location: [facility] OR;  Service:    • TOTAL HIP ARTHROPLASTY Bilateral    • TOTAL SHOULDER ARTHROPLASTY Right       General Information     Row Name 07/20/22 1116          Physical Therapy Time and Intention    Document Type evaluation  -BP     Mode of Treatment physical therapy  -BP     Row Name 07/20/22 1116          General Information    Patient Profile Reviewed yes  Patient presents to the ED s/p fall x 2 and difficulty ambulating. Patient admitted to rule out CVA.Patient with chronic PN in B LE's with numbness distal to the knees. Patient has previously had all toes in B feet amputated.  -BP     Prior Level of Function --  Pt reports prior to fall, he was independent  with all moblity without use of an AD. Patient reports he does drive and manages all meals  -BP     Existing Precautions/Restrictions fall  -BP     Barriers to Rehab previous functional deficit;impaired sensation  -BP     Row Name 07/20/22 1116          Living Environment    People in Home alone  -BP     Row Name 07/20/22 1116          Home Main Entrance    Number of Stairs, Main Entrance four  -BP     Stair Railings, Main Entrance railings on both sides of stairs  -BP     Row Name 07/20/22 1116          Stairs Within Home, Primary    Stairs, Within Home, Primary one story  -BP     Row Name 07/20/22 1116          Cognition    Orientation Status (Cognition) oriented x 3  -BP     Row Name 07/20/22 1116          Safety Issues, Functional Mobility    Safety Issues Affecting Function (Mobility) impulsivity;judgment;positioning of assistive device;problem-solving;sequencing abilities  -BP     Comment, Safety Issues/Impairments (Mobility) chronic B LE numbess secondary to PN.  -BP           User Key  (r) = Recorded By, (t) = Taken By, (c) = Cosigned By    Initials Name Provider Type    BP Lois Duke, PT Physical Therapist               Mobility     Row Name 07/20/22 1131          Bed Mobility    Bed Mobility supine-sit;sit-supine  -BP     Supine-Sit [place] (Bed Mobility) minimum assist (75% patient effort)  -BP     Sit-Supine [place] (Bed Mobility) standby assist  -BP     Assistive Device (Bed Mobility) bed rails;head of bed elevated  -BP     Comment, (Bed Mobility) Pt requires min A for supine to sit and SBA for sit to supine.  -BP     Row Name 07/20/22 1131          Transfers    Comment, (Transfers) verbal cues for hand placement.  -BP     Row Name 07/20/22 1131          Sit-Stand Transfer    Sit-Stand [place] (Transfers) moderate assist (50% patient effort);2 person assist  -BP     Assistive Device (Sit-Stand Transfers) walker, front-wheeled  -BP     Row Name 07/20/22 1131          Gait/Stairs  (Locomotion)    Comment, (Gait/Stairs) Per patient and nursing staff, patient had just had a fall while ambulating back from the bathroom. Deferred gait training due to recent fall. Patient able to take two side steps along EOB with min A x 2 and use of FWW. Patient very unsteady during side steps  -BP           User Key  (r) = Recorded By, (t) = Taken By, (c) = Cosigned By    Initials Name Provider Type    Lois Longoria PT Physical Therapist               Obj/Interventions     Row Name 07/20/22 1139          Range of Motion Comprehensive    Comment, General Range of Motion B LE AROM WFL  -     Row Name 07/20/22 1139          Strength Comprehensive (MMT)    Comment, General Manual Muscle Testing (MMT) Assessment R LE gross MMT 4+/5. L LE gross MMT 4/5. Patient requires encouragement to provide maximal effort during MMT  -     Row Name 07/20/22 1134          Balance    Comment, Balance static sitting balance-SBA. Static standing-CGA with device  -     Row Name 07/20/22 1138          Sensory Assessment (Somatosensory)    Sensory Assessment (Somatosensory) --  chronic PN in B LE's. Patient with all toes amputated. Diminished sensation distal to B knees.  -BP           User Key  (r) = Recorded By, (t) = Taken By, (c) = Cosigned By    Initials Name Provider Type    Lois Longoria PT Physical Therapist               Goals/Plan     Row Name 07/20/22 7756          Bed Mobility Goal 1 (PT)    Activity/Assistive Device (Bed Mobility Goal 1, PT) bed mobility activities, all  -BP     [place] Level/Cues Needed (Bed Mobility Goal 1, PT) supervision required  -BP     Time Frame (Bed Mobility Goal 1, PT) 5 days  -BP     Progress/Outcomes (Bed Mobility Goal 1, PT) new goal  -BP     Row Name 07/20/22 0464          Transfer Goal 1 (PT)    Activity/Assistive Device (Transfer Goal 1, PT) transfers, all;walker, rolling  -BP     [place] Level/Cues Needed (Transfer Goal 1, PT) supervision required  -BP      Time Frame (Transfer Goal 1, PT) 5 days  -BP     Progress/Outcome (Transfer Goal 1, PT) new goal  -BP     Row Name 07/20/22 1153          Gait Training Goal 1 (PT)    Activity/Assistive Device (Gait Training Goal 1, PT) gait (walking locomotion);walker, rolling  -BP     [place] Level (Gait Training Goal 1, PT) supervision required  -BP     Distance (Gait Training Goal 1, PT) 50  -BP     Time Frame (Gait Training Goal 1, PT) 5 days  -BP     Progress/Outcome (Gait Training Goal 1, PT) new goal  -BP     Row Name 07/20/22 1156          Therapy Assessment/Plan (PT)    Planned Therapy Interventions (PT) balance training;bed mobility training;gait training;home exercise program;strengthening;transfer training  -BP           User Key  (r) = Recorded By, (t) = Taken By, (c) = Cosigned By    Initials Name Provider Type    BP Lois Duke, PT Physical Therapist               Clinical Impression     Row Name 07/20/22 1140          Pain    Pretreatment Pain Rating 8/10  -BP     Posttreatment Pain Rating 8/10  -BP     Pain Location - back  -BP     Pre/Posttreatment Pain Comment Patient with chronic back pain however states it is worse than normal currently  -BP     Pain Intervention(s) Repositioned  -BP     Additional Documentation Pain Scale: Numbers Pre/Post-Treatment (Group)  -BP     Row Name 07/20/22 5796          Plan of Care Review    Plan of Care Reviewed With patient  -BP     Outcome Evaluation PT Evaluation Complete: Patient performs supine to sit transfer with min A, sit to supine transfer with SBA and sit to/from stand transfers with mod A x 2. Deferred forward gait due to recent fall with nursing staff. Patient able to take two side steps along EOB with FWW with min A x 2. Patient very shaky during side steps. Patient with chronic B LE diminished sensation due to PN, all toes ampuated previously. Patient would benefit from physical therapy to maximize functional independence. At this time, patient would not  be safe to return home alone, recommend STR. Patient receptive to plan.  -BP     Row Name 07/20/22 1149          Therapy Assessment/Plan (PT)    Rehab Potential (PT) good, to achieve stated therapy goals  -BP     Criteria for Skilled Interventions Met (PT) yes;meets criteria  -BP     Therapy Frequency (PT) 5 times/wk  -BP     Row Name 07/20/22 1149          Positioning and Restraints    Pre-Treatment Position in bed  -BP     Post Treatment Position bed  -BP     In Bed notified nsg;supine;call light within reach;encouraged to call for assist  -BP           User Key  (r) = Recorded By, (t) = Taken By, (c) = Cosigned By    Initials Name Provider Type    Lois Longoria, PT Physical Therapist               Outcome Measures     Row Name 07/20/22 1155          How much help from another person do you currently need...    Turning from your back to your side while in flat bed without using bedrails? 3  -BP     Moving from lying on back to sitting on the side of a flat bed without bedrails? 3  -BP     Moving to and from a bed to a chair (including a wheelchair)? 3  -BP     Standing up from a chair using your arms (e.g., wheelchair, bedside chair)? 1  -BP     Climbing 3-5 steps with a railing? 1  -BP     To walk in hospital room? 1  -BP     AM-PAC 6 Clicks Score (PT) 12  -BP     Highest level of mobility 4 --> Transferred to chair/commode  -BP     Row Name 07/20/22 1155          Functional Assessment    Outcome Measure Options AM-PAC 6 Clicks Basic Mobility (PT)  -BP           User Key  (r) = Recorded By, (t) = Taken By, (c) = Cosigned By    Initials Name Provider Type    Lois Longoria, PT Physical Therapist                             Physical Therapy Education                 Title: PT OT SLP Therapies (In Progress)     Topic: Physical Therapy (In Progress)     Point: Mobility training (Done)     Learning Progress Summary           Patient Acceptance, E,TB, VU,NR by  at 7/20/2022 1156                   Point:  Home exercise program (Not Started)     Learner Progress:  Not documented in this visit.                      User Key     Initials Effective Dates Name Provider Type Discipline    BP 06/16/21 -  Lois Duke PT Physical Therapist PT              PT Recommendation and Plan  Planned Therapy Interventions (PT): balance training, bed mobility training, gait training, home exercise program, strengthening, transfer training  Plan of Care Reviewed With: patient  Outcome Evaluation: PT Evaluation Complete: Patient performs supine to sit transfer with min A, sit to supine transfer with SBA and sit to/from stand transfers with mod A x 2. Deferred forward gait due to recent fall with nursing staff. Patient able to take two side steps along EOB with FWW with min A x 2. Patient very shaky during side steps. Patient with chronic B LE diminished sensation due to PN, all toes ampuated previously. Patient would benefit from physical therapy to maximize functional independence. At this time, patient would not be safe to return home alone, recommend STR. Patient receptive to plan.     Time Calculation:    PT Charges     Row Name 07/20/22 1156             Time Calculation    Start Time 1045  -BP      Stop Time 1115  -BP      Time Calculation (min) 30 min  -BP      PT Received On 07/20/22  -BP      PT - Next Appointment 07/21/22  -BP            User Key  (r) = Recorded By, (t) = Taken By, (c) = Cosigned By    Initials Name Provider Type    BP Lois Duke PT Physical Therapist              Therapy Charges for Today     Code Description Service Date Service Provider Modifiers Qty    [id number] HC PT EVAL LOW COMPLEXITY 2 7/20/2022 Lois Duke PT GP 1          PT G-Codes  Outcome Measure Options: AM-PAC 6 Clicks Basic Mobility (PT)  AM-PAC 6 Clicks Score (PT): 12    Lois Duke PT  7/20/2022

## 2022-07-21 VITALS
HEIGHT: 71 IN | SYSTOLIC BLOOD PRESSURE: 163 MMHG | BODY MASS INDEX: 44.1 KG/M2 | TEMPERATURE: 98.2 F | WEIGHT: 315 LBS | DIASTOLIC BLOOD PRESSURE: 99 MMHG | RESPIRATION RATE: 19 BRPM | HEART RATE: 76 BPM | OXYGEN SATURATION: 92 %

## 2022-07-21 LAB
ANION GAP SERPL CALCULATED.3IONS-SCNC: 8 MMOL/L (ref 5–15)
BUN SERPL-MCNC: 16 MG/DL (ref 6–20)
BUN/CREAT SERPL: 18.6 (ref 7–25)
CALCIUM SPEC-SCNC: 8.4 MG/DL (ref 8.6–10.5)
CHLORIDE SERPL-SCNC: 106 MMOL/L (ref 98–107)
CO2 SERPL-SCNC: 24 MMOL/L (ref 22–29)
CREAT SERPL-MCNC: 0.86 MG/DL (ref 0.76–1.27)
EGFRCR SERPLBLD CKD-EPI 2021: 100.4 ML/MIN/1.73
GLUCOSE BLDC GLUCOMTR-MCNC: 121 MG/DL (ref 70–130)
GLUCOSE BLDC GLUCOMTR-MCNC: 144 MG/DL (ref 70–130)
GLUCOSE BLDC GLUCOMTR-MCNC: 98 MG/DL (ref 70–130)
GLUCOSE SERPL-MCNC: 110 MG/DL (ref 65–99)
POTASSIUM SERPL-SCNC: 4.4 MMOL/L (ref 3.5–5.2)
SODIUM SERPL-SCNC: 138 MMOL/L (ref 136–145)

## 2022-07-21 PROCEDURE — 94799 UNLISTED PULMONARY SVC/PX: CPT

## 2022-07-21 PROCEDURE — G0378 HOSPITAL OBSERVATION PER HR: HCPCS

## 2022-07-21 PROCEDURE — 97110 THERAPEUTIC EXERCISES: CPT

## 2022-07-21 PROCEDURE — 82962 GLUCOSE BLOOD TEST: CPT

## 2022-07-21 PROCEDURE — 97530 THERAPEUTIC ACTIVITIES: CPT

## 2022-07-21 PROCEDURE — 80048 BASIC METABOLIC PNL TOTAL CA: CPT | Performed by: INTERNAL MEDICINE

## 2022-07-21 PROCEDURE — 94761 N-INVAS EAR/PLS OXIMETRY MLT: CPT

## 2022-07-21 PROCEDURE — 99225 PR SBSQ OBSERVATION CARE/DAY 25 MINUTES: CPT | Performed by: HOSPITALIST

## 2022-07-21 RX ADMIN — PANTOPRAZOLE SODIUM 40 MG: 40 TABLET, DELAYED RELEASE ORAL at 07:11

## 2022-07-21 RX ADMIN — GABAPENTIN 800 MG: 400 CAPSULE ORAL at 09:22

## 2022-07-21 RX ADMIN — FLUOXETINE 40 MG: 20 CAPSULE ORAL at 07:11

## 2022-07-21 RX ADMIN — Medication 10 ML: at 09:23

## 2022-07-21 RX ADMIN — PRIMIDONE 250 MG: 250 TABLET ORAL at 15:49

## 2022-07-21 RX ADMIN — GABAPENTIN 800 MG: 400 CAPSULE ORAL at 15:49

## 2022-07-21 RX ADMIN — MICONAZOLE NITRATE: 2 POWDER TOPICAL at 09:22

## 2022-07-21 RX ADMIN — ASPIRIN 325 MG: 325 TABLET, COATED ORAL at 09:22

## 2022-07-21 RX ADMIN — PROPRANOLOL HYDROCHLORIDE 40 MG: 40 TABLET ORAL at 09:22

## 2022-07-21 RX ADMIN — PRIMIDONE 250 MG: 250 TABLET ORAL at 09:22

## 2022-07-21 NOTE — THERAPY TREATMENT NOTE
Acute Care - Physical Therapy Treatment Note  EVONNE Epstein     Patient Name: Eliseo Price  : 1965  MRN: [id number]  Today's Date: 2022   Onset of Illness/Injury or Date of Surgery: 22  Visit Dx:     ICD-10-CM ICD-9-CM   1. Weakness of both lower extremities  R29.898 729.89     Patient Active Problem List   Diagnosis   • Disease of thyroid gland   • Cellulitis of right lower extremity   • Cellulitis   • Infected epidermoid cyst   • Altered mental status, unspecified   • Altered mental status   • Immobility   • Foot fracture, right   • Open dislocation of toe   • Open dislocation of phalanx of foot   • Confusion associated with infection   • Wound dehiscence, surgical, initial encounter   • Postoperative infection   • Coffee ground emesis   • Absolute anemia   • Colon cancer screening   • Cellulitis of left foot   • Laceration of lesser toe of left foot without foreign body without damage to nail   • ASHLEY (acute kidney injury) (Formerly [facility])   • Osteomyelitis of fifth toe of right foot (Formerly [facility])   • Status post amputation of lesser toe of right foot ([facility]ly [facility])   • Surgical wound infection   • Essential hypertension   • Mixed hyperlipidemia   • Type 2 diabetes mellitus with diabetic polyneuropathy, without long-term current use of insulin ([facility]ly [facility])   • Ischemic foot left   • Osteomyelitis of foot, right, acute ([facility]ly [facility])   • Open fracture of phalanx of right fourth toe   • Laceration of fourth toe of right foot with complication   • Open fracture of fourth toe of right foot   • Claw toe, acquired, right   • Diabetic ulcer of toe of right foot with fat layer exposed ([facility])   • Charcot foot due to diabetes mellitus ([facility])   • Osteomyelitis of third toe of right foot ([facility])   • Acquired claw toe, right   • Weakness of both lower extremities     Past Medical History:   Diagnosis Date   • Amputated toe of left foot (Formerly [facility])     4 and 5   • Amputated toe of right foot (Formerly [facility])     5th   • Anesthesia complication     DIFFICULT TO PUT TO SLEEP    • Arthritis    • Bipolar disorder ([facility])    • Cellulitis of lower extremity     RIGHT LOWER LEG   • COPD (chronic obstructive pulmonary disease) ([facility])    • Coronary artery disease 2009    HEART ATTACK   • Diabetes mellitus ([facility])    • Disease of thyroid gland     nodule on thyroid   • DVT (deep venous thrombosis) ([facility]) 2005    right leg   • Fracture of right foot    • Gastric ulcer    • Hyperlipidemia    • Hypertension    • MRSA infection 2003    history of left leg after surgery   • Neuropathy     hands & feet   • Pseudogout    • RLS (restless legs syndrome)    • Septic shock ([facility]) 07/2017    H/O   • Sleep apnea     no machine, CANT TOLERATE   • Stroke ([facility])     x2, no residual   • Toxic metabolic encephalopathy 07/2017    H/O   • Tremors of nervous system      Past Surgical History:   Procedure Laterality Date   • AMPUTATION DIGIT Left 3/16/2018    Procedure: AMPUTATION LEFT FOURTH TOE;  Surgeon: Anish Farah DPM;  Location: [facility] OR;  Service: Podiatry   • AMPUTATION DIGIT Right 10/13/2020    Procedure: AMPUTATION OF RIGHT FIFTH TOE AND ALL ASSOCIATED PROCEDURES;  Surgeon: Anish Farah DPM;  Location: [facility] OR;  Service: Podiatry;  Laterality: Right;  AMPUTATION OF RIGHT FIFTH TOE    • AMPUTATION DIGIT Right 1/19/2021    Procedure: AMPUTATION RIGHT FOURTH TOE;  Surgeon: Anish Farah DPM;  Location: [facility] OR;  Service: Podiatry;  Laterality: Right;   • AMPUTATION DIGIT Right 7/30/2021    Procedure: AMPUTATION RIGHT THIRD TOE;  Surgeon: Anish Farah DPM;  Location: [facility] OR;  Service: Podiatry;  Laterality: Right;  RIGHT 3rd toe amputation    • 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   • COLONOSCOPY N/A 7/16/2018    Procedure: COLONOSCOPY and polypectomy;  Surgeon: Kaylie Mcfarlane MD;  Location: [facility] OR;  Service: Gastroenterology   • ENDOSCOPY N/A 3/16/2018    Procedure: ESOPHAGOGASTRODUODENOSCOPY with biopsies;  Surgeon: Kaylie Mcfarlane MD;  Location: [facility] OR;  Service:  Gastroenterology   • FINGER TENDON REPAIR Right     little finger   • HARDWARE REMOVAL Right 8/18/2017    Procedure: RIGHT EXTERNAL FIXATOR REMOVAL;  Surgeon: Anish Farah DPM;  Location: [facility] OR;  Service:    • INCISION AND DRAINAGE LEG Left 6/13/2018    Procedure: Left 2nd, 3rd toe laceration irrigation, debridement and multi layer wound closure.;  Surgeon: Anish Farah DPM;  Location: [facility] OR;  Service: Podiatry   • JOINT REPLACEMENT     • KNEE SURGERY      scope left 6 times, right once   • LEG DEBRIDEMENT Right 10/16/2020    Procedure: DEBRIDEMENT RIGHT  FOOT SURGICAL WOUND;  Surgeon: Anish Farah DPM;  Location: [facility] OR;  Service: Podiatry;  Laterality: Right;  DEBRIDEMENT RIGHT FOOT SURGICAL WOUND   • NERVE TRANSFER Right     elbow   • ORIF FOOT FRACTURE Right 7/29/2017    Procedure: open reduction with percutaneous pinning of midfoot dislocation fracture;  Surgeon: Anish Farah DPM;  Location: [facility] OR;  Service:    • ORIF FOOT FRACTURE Left 3/5/2018    Procedure: OPEN REDUCTION WITH IRRIGATION AND WOUND CLOSURE LEFT FOURTH TOE;  Surgeon: Anish Farah DPM;  Location: [facility] OR;  Service:    • TENDON LENGTHENING/SHORTENING Right     PSOAS   • TOE FUSION Right 8/18/2017    Procedure: RIGHT  MEDIAL COLUMN ARTHRODESIS, RIGHT TARAL METATARSAL ARTHRODESIS;  Surgeon: Anish Farah DPM;  Location: [facility] OR;  Service:    • TOTAL HIP ARTHROPLASTY Bilateral    • TOTAL SHOULDER ARTHROPLASTY Right      PT Assessment (last 12 hours)     PT Evaluation and Treatment     Row Name 07/21/22 2823          Physical Therapy Time and Intention    Subjective Information no complaints  -MH     Document Type therapy note (daily note)  -     Mode of Treatment physical therapy  -     Patient Effort good  -     Row Name 07/21/22 133          General Information    General Observations of Patient Pt found standing in the bathroom at the sink after having taken himself to the bathroom.  He had  removed his gown and telemetry (informed PCA of removal)  -     Existing Precautions/Restrictions fall  -     Row Name 07/21/22 1330          Pain    Pre/Posttreatment Pain Comment No complaints  -     Row Name 07/21/22 1330          Cognition    Personal Safety Interventions gait belt;nonskid shoes/slippers when out of bed  -     Row Name 07/21/22 1330          Bed Mobility    Comment, (Bed Mobility) Pt performed supine to/from sit with supervision - pt needing to be supine to button his pants  -     Row Name 07/21/22 1330          Transfers    Sit-Stand Roberts (Transfers) standby assist;verbal cues  -     Stand-Sit Roberts (Transfers) verbal cues;contact guard  -     Row Name 07/21/22 1330          Sit-Stand Transfer    Assistive Device (Sit-Stand Transfers) walker, front-wheeled  -     Row Name 07/21/22 1330          Stand-Sit Transfer    Assistive Device (Stand-Sit Transfers) walker, front-wheeled  -     Row Name 07/21/22 1330          Gait/Stairs (Locomotion)    Roberts Level (Gait) contact guard  2nd person for safety due to history of falls  -     Assistive Device (Gait) walker, 4-wheeled  -     Distance in Feet (Gait) 15  -     Deviations/Abnormal Patterns (Gait) base of support, narrow;gait speed decreased  -     Bilateral Gait Deviations forward flexed posture  -     Row Name             Wound 07/21/22 1130 Right anterior fifth toe    Wound - Properties Group Placement Date: 07/21/22  -DP Placement Time: 1130  -DP Present on Hospital Admission: Y  -DP Side: Right  -DP Orientation: anterior  -DP Location: fifth toe  -DP     Retired Wound - Properties Group Placement Date: 07/21/22  -DP Placement Time: 1130  -DP Present on Hospital Admission: Y  -DP Side: Right  -DP Orientation: anterior  -DP Location: fifth toe  -DP     Retired Wound - Properties Group Date first assessed: 07/21/22  -DP Time first assessed: 1130  -DP Present on Hospital Admission: Y  -DP Side:  Right  -DP Location: fifth toe  -DP     Row Name             Wound 07/21/22 1135 Right anterior knee    Wound - Properties Group Placement Date: 07/21/22  -DP Placement Time: 1135  -DP Present on Hospital Admission: Y  -DP Side: Right  -DP Orientation: anterior  -DP Location: knee  -DP     Retired Wound - Properties Group Placement Date: 07/21/22  -DP Placement Time: 1135  -DP Present on Hospital Admission: Y  -DP Side: Right  -DP Orientation: anterior  -DP Location: knee  -DP     Retired Wound - Properties Group Date first assessed: 07/21/22  -DP Time first assessed: 1135  -DP Present on Hospital Admission: Y  -DP Side: Right  -DP Location: knee  -DP     Row Name                                   Row Name 07/21/22 1330          Plan of Care Review    Outcome Evaluation PT:  Pt found standing in the bathroom brushing his teeth at the sink after having taken himself to the bathroom.  He had removed his gown and telemetry (informed PCA of removal) and donned shoes, socks and shorts; Pt performed supine to/from sit with supervision - pt needing to be supine to button his shorts.  He then ambulated 15 ft with CGA and FWW - 2nd person (A) for safety due to pt's history of fall.  Pt stood at walker and performed 10 reps each of calf raises and marching.  Pt returned to recliner with CGA.  -     Row Name 07/21/22 1330          Positioning and Restraints    Pre-Treatment Position bathroom  -     Post Treatment Position chair  -     In Chair notified nsg;sitting;call light within reach;encouraged to call for assist;exit alarm on  -           User Key  (r) = Recorded By, (t) = Taken By, (c) = Cosigned By    Initials Name Provider Type     Everett Sutton PTA Physical Therapist Assistant    Asiya Vela, RN Registered Nurse                Physical Therapy Education                 Title: PT OT SLP Therapies (In Progress)     Topic: Physical Therapy (In Progress)     Point: Mobility training (Done)     Learning  Progress Summary           Patient Acceptance, E,TB, VU,NR by  at 7/20/2022 1156   Family Acceptance, TB, VU,DU,NR by  at 7/21/2022 1449                   Point: Home exercise program (Not Started)     Learner Progress:  Not documented in this visit.                      User Key     Initials Effective Dates Name Provider Type Discipline     06/16/21 -  Everett Sutton, PTA Physical Therapist Assistant PT     06/16/21 -  Lois Duke, PT Physical Therapist PT              PT Recommendation and Plan     Outcome Evaluation: PT:  Pt found standing in the bathroom brushing his teeth at the sink after having taken himself to the bathroom.  He had removed his gown and telemetry (informed PCA of removal) and donned shoes, socks and shorts; Pt performed supine to/from sit with supervision - pt needing to be supine to button his shorts.  He then ambulated 15 ft with CGA and FWW - 2nd person (A) for safety due to pt's history of fall.  Pt stood at walker and performed 10 reps each of calf raises and marching.  Pt returned to recliner with CGA.   Outcome Measures     Row Name 07/21/22 1330 07/21/22 0900 07/20/22 1045       How much help from another person do you currently need...    Turning from your back to your side while in flat bed without using bedrails? 4  -MH -- --    Moving from lying on back to sitting on the side of a flat bed without bedrails? 4  -MH -- --    Moving to and from a bed to a chair (including a wheelchair)? 3  -MH -- --    Standing up from a chair using your arms (e.g., wheelchair, bedside chair)? 3  -MH -- --    Climbing 3-5 steps with a railing? 2  -MH -- --    To walk in hospital room? 3  -MH -- --    AM-PAC 6 Clicks Score (PT) 19  -MH -- --       How much help from another is currently needed...    Putting on and taking off regular lower body clothing? -- 2  -JJ 2  -JJ    Bathing (including washing, rinsing, and drying) -- 2  -JJ 2  -JJ    Toileting (which includes using toilet bed pan  or urinal) -- 2  -JJ 2  -JJ    Putting on and taking off regular upper body clothing -- 3  -JJ 3  -JJ    Taking care of personal grooming (such as brushing teeth) -- 4  -JJ 4  -JJ    Eating meals -- 4  -JJ 4  -JJ    AM-PAC 6 Clicks Score (OT) -- 17  -JJ 17  -JJ       Functional Assessment    Outcome Measure Options AM-PAC 6 Clicks Basic Mobility (PT)  - -- AM-PAC 6 Clicks Daily Activity (OT)  -J          User Key  (r) = Recorded By, (t) = Taken By, (c) = Cosigned By    Initials Name Provider Type    Everett Swift PTA Physical Therapist Assistant    Tracey Pearce, OTR Occupational Therapist                 Time Calculation:    PT Charges     Row Name 07/21/22 1451             Time Calculation    Start Time 1330  -MH      Stop Time 1344  -MH      Time Calculation (min) 14 min  -MH              Timed Charges    23150 - PT Therapeutic Exercise Minutes 14  -MH              Total Minutes    Timed Charges Total Minutes 14  -MH       Total Minutes 14  -MH            User Key  (r) = Recorded By, (t) = Taken By, (c) = Cosigned By    Initials Name Provider Type     Everett Sutton PTA Physical Therapist Assistant              Therapy Charges for Today     Code Description Service Date Service Provider Modifiers Qty    [id number] HC PT THER PROC EA 15 MIN 7/21/2022 Everett Sutton PTA GP 1          PT G-Codes  Outcome Measure Options: AM-PAC 6 Clicks Basic Mobility (PT)  AM-PAC 6 Clicks Score (PT): 19  AM-PAC 6 Clicks Score (OT): 17    Everett Sutton PTA  7/21/2022

## 2022-07-21 NOTE — PROGRESS NOTES
"Hospitalist Team      Patient Care Team:  Killian Scales MD as PCP - General  Killian Scales MD as PCP - Family Medicine      Chief Complaint: Follow-up Placement    Subjective    Mr. Price has been aggitated quite a bit this afternoon, and has fallen.  He did not strike his head.  He denies chest pain and dyspnea.  He is tolerating PO.    Objective    Vital Signs  Temp:  [96.8 °F (36 °C)-98.2 °F (36.8 °C)] 98.2 °F (36.8 °C)  Heart Rate:  [76-88] 76  Resp:  [17-19] 19  BP: (145-168)/(86-99) 163/99  Oxygen Therapy  SpO2: 92 %  Pulse Oximetry Type: Intermittent  Device (Oxygen Therapy): room air  Flow (L/min): 2}    Flowsheet Rows    Flowsheet Row First Filed Value   Admission Height 180.3 cm (71\") Documented at 07/19/2022 1350   Admission Weight 145 kg (320 lb) Documented at 07/19/2022 1350        Physical Exam:    General: Appears stated age in mild distress.  HEENT: NC/AT  Lungs: Breath sounds are diminished.  Respirations are non-labored.  CV: Regular rate and rhythm.  No murmurs appreciated.  Radial pulses are 2+ and symmetric.  Abdomen: Morbid obese, soft and non-tender w/ active bowel sounds.  MSK: No C/C/E.  Neuro: CN II-XII grossly intact.  Psych: Normal affect, but irritable.    Results Review:     I reviewed the patient's new clinical results.    Lab Results (last 24 hours)     Procedure Component Value Units Date/Time    POC Glucose Once [[id number]]  (Abnormal) Collected: 07/21/22 1138    Specimen: Blood Updated: 07/21/22 1154     Glucose 144 mg/dL      Comment: Meter: [id number] : [id number] DICKSON       POC Glucose Once [[id number]]  (Normal) Collected: 07/21/22 0724    Specimen: Blood Updated: 07/21/22 0742     Glucose 98 mg/dL      Comment: Meter: [id number] : [id number] DICKSON       Basic Metabolic Panel [[id number]]  (Abnormal) Collected: 07/21/22 0428    Specimen: Blood Updated: 07/21/22 0507     Glucose 110 mg/dL      BUN 16 mg/dL      Creatinine 0.86 mg/dL      " Sodium 138 mmol/L      Potassium 4.4 mmol/L      Comment: Specimen hemolyzed.  Results may be affected.        Chloride 106 mmol/L      CO2 24.0 mmol/L      Calcium 8.4 mg/dL      BUN/Creatinine Ratio 18.6     Anion Gap 8.0 mmol/L      eGFR 100.4 mL/min/1.73      Comment: National Kidney Foundation and American Society of Nephrology (ASN) Task Force recommended calculation based on the Chronic Kidney Disease Epidemiology Collaboration (CKD-EPI) equation refit without adjustment for race.       Narrative:      GFR Normal >60  Chronic Kidney Disease <60  Kidney Failure <15      POC Glucose Once [[id number]]  (Normal) Collected: 07/20/22 1646    Specimen: Blood Updated: 07/20/22 1706     Glucose 122 mg/dL      Comment: Meter: [id number] : [id number] Mert Patel NURSING ASSISTANT             Imaging Results (Last 24 Hours)     Procedure Component Value Units Date/Time    SCANNED - IMAGING [[id number]] Resulted: 07/19/22     Updated: 07/21/22 0923    CT Head Without Contrast [[id number]] Collected: 07/20/22 1923     Updated: 07/20/22 1925    Narrative:      CT Head WO    HISTORY:   Fall from bed today    TECHNIQUE:   Axial unenhanced head CT. Radiation dose reduction techniques included automated exposure control or exposure modulation based on body size. Count of known CT and cardiac nuc med studies performed in previous 12 months: 4.     Time of scan: 7:11 PM    COMPARISON:   7/19/2022    FINDINGS:     The ventricles are mildly generous in size. Cortical sulci are correspondingly prominent. The degree of atrophy is out of proportion to the patient's age of 56 years. No extraaxial fluid collections are seen.    No parenchymal or subarachnoid hemorrhage is present. Periventricular hypodensities are demonstrated which are nonspecific but likely represent white matter microvascular change in a patient of this age. No CT evidence of mass or acute infarct.    The mastoid air cells are clear. The visualized paranasal sinuses  are clear.  Orbital structures demonstrate no acute findings.      Impression:      Impression:  1. No clearly acute intracranial pathology demonstrated.  2. Cerebral atrophy and nonspecific periventricular hypodensities which likely represent white matter microvascular change. The degree of atrophy is out of proportion to the patient's age of 56 years.  3. No significant change from study of 7/19/2022.    Signer Name: Jose Dewitt MD   Signed: 7/20/2022 7:23 PM   Workstation Name: [facility]    [facility] reviewed personally by physician.    Medication Review:   I have reviewed the patient's current medication list    Current Facility-Administered Medications:   •  Aquaphor Advanced Therapy ointment 1 application, 1 application, Topical, Q24H, Luiz Castillo DO, 1 application at 07/20/22 0819  •  aspirin EC tablet 325 mg, 325 mg, Oral, Daily, Luiz Castillo DO, 325 mg at 07/21/22 0922  •  atorvastatin (LIPITOR) tablet 80 mg, 80 mg, Oral, Nightly, Luiz Castillo DO, 80 mg at 07/20/22 2014  •  dextrose (D50W) (25 g/50 mL) IV injection 25 g, 25 g, Intravenous, Q15 Min PRN, Luiz Castillo, DO  •  dextrose (GLUTOSE) oral gel 15 g, 15 g, Oral, Q15 Min PRN, Luiz Castillo, DO  •  FLUoxetine (PROzac) capsule 40 mg, 40 mg, Oral, QAM, Luiz Castillo, DO, 40 mg at 07/21/22 0711  •  gabapentin (NEURONTIN) capsule 800 mg, 800 mg, Oral, TID, Luiz Castillo DO, 800 mg at 07/21/22 1549  •  glucagon (GLUCAGEN) injection 1 mg, 1 mg, Intramuscular, Q15 Min PRN, Luiz Castillo, DO  •  Insulin Aspart (novoLOG) injection 0-9 Units, 0-9 Units, Subcutaneous, TID AC, Luiz Castillo, DO  •  miconazole (MICOTIN) 2 % powder, , Topical, Q12H, Luiz Castillo DO, Given at 07/21/22 0922  •  pantoprazole (PROTONIX) EC tablet 40 mg, 40 mg, Oral, QAM, Luiz Castillo DO, 40 mg at 07/21/22 0711  •  primidone (MYSOLINE) tablet 250 mg, 250 mg, Oral,  TID, Luiz Castillo DO, 250 mg at 07/21/22 1549  •  propranolol (INDERAL) tablet 40 mg, 40 mg, Oral, BID, Luiz Castillo DO, 40 mg at 07/21/22 0922  •  QUEtiapine (SEROquel) tablet 200 mg, 200 mg, Oral, Nightly, Luiz Castillo DO, 200 mg at 07/20/22 2014  •  [COMPLETED] Insert peripheral IV, , , Once **AND** sodium chloride 0.9 % flush 10 mL, 10 mL, Intravenous, PRN, Harrison Snowden MD  •  sodium chloride 0.9 % flush 10 mL, 10 mL, Intravenous, PRN, Luiz Castillo DO  •  sodium chloride 0.9 % flush 10 mL, 10 mL, Intravenous, PRN, Luiz Castillo DO  •  sodium chloride 0.9 % flush 10 mL, 10 mL, Intravenous, Q12H, Luiz Castillo DO, 10 mL at 07/21/22 0923  •  sodium chloride 0.9 % infusion 40 mL, 40 mL, Intravenous, PRN, Luiz Castillo DO      Assessment & Plan     1.  Ataxia: No evidence of acute CVA.  I suspect this is due to his neuropathy.    2.  Essential Hypertension: Not at goal on exam and trend is up.  I suspect some of this may be due to agitation.  He could experience rebound HTN due to Clonidine w/d, however, so will resume.    3.  Diabetes Mellitus, Type 2 in Morbidly Obese w/ Peripheral Neuropathy: No acute issues.  Bedsides and A.M. at goal.    4.  Depression: Asked to give Seroquel a little early due to his agitation.      Plan for disposition: Await pre-cert    Mariano Brown MD  07/21/22  16:01 EDT

## 2022-07-21 NOTE — PLAN OF CARE
Goal Outcome Evaluation:              Outcome Evaluation: PT:  Pt found standing in the bathroom brushing his teeth at the sink after having taken himself to the bathroom.  He had removed his gown and telemetry (informed PCA of removal) and donned shoes, socks and shorts; Pt performed supine to/from sit with supervision - pt needing to be supine to button his shorts.  He then ambulated 15 ft with CGA and FWW - 2nd person (A) for safety due to pt's history of fall.  Pt stood at walker and performed 10 reps each of calf raises and marching.  Pt returned to recliner with CGA.

## 2022-07-21 NOTE — PLAN OF CARE
Goal Outcome Evaluation:  Plan of Care Reviewed With: patient           Outcome Evaluation: OT: pt required CGA for supine to sit transfer to EOB with use of bed rail and HOB elevated. pt required min assist x 2 for functional transfers from EOB with RW. pt required CGA x 2 for functional mobility in room x 20 feet with RW. pt required cues for safety and sequencing throughtout session. pt impulsive  with decreased safety awareness with all mobility. pt left in room with exit alarm in place and RN in room. cont to recommend SNF at discharge

## 2022-07-21 NOTE — PLAN OF CARE
Goal Outcome Evaluation:  Plan of Care Reviewed With: patient           Outcome Evaluation: Pt VSS this shift. Pt awaiting precert for [facility]. Safety precautions remain in place for patient. Per patient request keys and money given back to patient by security. Wound care consulted and new orders placed for dressing changes every other day. Pt up in chair watching TV at this time with no needs.

## 2022-07-21 NOTE — THERAPY TREATMENT NOTE
Acute Care - Occupational Therapy Treatment Note  EVONNE Epstein     Patient Name: Eliseo Price  : 1965  MRN: [id number]  Today's Date: 2022  Onset of Illness/Injury or Date of Surgery: 22  Date of Referral to OT: 22  Referring Physician: Dr Castillo    Admit Date: 2022       ICD-10-CM ICD-9-CM   1. Weakness of both lower extremities  R29.898 729.89     Patient Active Problem List   Diagnosis   • Disease of thyroid gland   • Cellulitis of right lower extremity   • Cellulitis   • Infected epidermoid cyst   • Altered mental status, unspecified   • Altered mental status   • Immobility   • Foot fracture, right   • Open dislocation of toe   • Open dislocation of phalanx of foot   • Confusion associated with infection   • Wound dehiscence, surgical, initial encounter   • Postoperative infection   • Coffee ground emesis   • Absolute anemia   • Colon cancer screening   • Cellulitis of left foot   • Laceration of lesser toe of left foot without foreign body without damage to nail   • ASHLEY (acute kidney injury) ([facility])   • Osteomyelitis of fifth toe of right foot ([facility])   • Status post amputation of lesser toe of right foot ([facility])   • Surgical wound infection   • Essential hypertension   • Mixed hyperlipidemia   • Type 2 diabetes mellitus with diabetic polyneuropathy, without long-term current use of insulin ([facility])   • Ischemic foot left   • Osteomyelitis of foot, right, acute ([facility])   • Open fracture of phalanx of right fourth toe   • Laceration of fourth toe of right foot with complication   • Open fracture of fourth toe of right foot   • Claw toe, acquired, right   • Diabetic ulcer of toe of right foot with fat layer exposed ([facility])   • Charcot foot due to diabetes mellitus ([facility])   • Osteomyelitis of third toe of right foot ([facility])   • Acquired claw toe, right   • Weakness of both lower extremities     Past Medical History:   Diagnosis Date   • Amputated toe of left foot ([facility])     4 and 5   • Amputated  toe of right foot ([facility])     5th   • Anesthesia complication     DIFFICULT TO PUT TO SLEEP   • Arthritis    • Bipolar disorder ([facility])    • Cellulitis of lower extremity     RIGHT LOWER LEG   • COPD (chronic obstructive pulmonary disease) ([facility])    • Coronary artery disease 2009    HEART ATTACK   • Diabetes mellitus ([facility])    • Disease of thyroid gland     nodule on thyroid   • DVT (deep venous thrombosis) ([facility]) 2005    right leg   • Fracture of right foot    • Gastric ulcer    • Hyperlipidemia    • Hypertension    • MRSA infection 2003    history of left leg after surgery   • Neuropathy     hands & feet   • Pseudogout    • RLS (restless legs syndrome)    • Septic shock ([facility]) 07/2017    H/O   • Sleep apnea     no machine, CANT TOLERATE   • Stroke ([facility])     x2, no residual   • Toxic metabolic encephalopathy 07/2017    H/O   • Tremors of nervous system      Past Surgical History:   Procedure Laterality Date   • AMPUTATION DIGIT Left 3/16/2018    Procedure: AMPUTATION LEFT FOURTH TOE;  Surgeon: Anish Farah DPM;  Location: [facility] OR;  Service: Podiatry   • AMPUTATION DIGIT Right 10/13/2020    Procedure: AMPUTATION OF RIGHT FIFTH TOE AND ALL ASSOCIATED PROCEDURES;  Surgeon: Anish Farah DPM;  Location: [facility] OR;  Service: Podiatry;  Laterality: Right;  AMPUTATION OF RIGHT FIFTH TOE    • AMPUTATION DIGIT Right 1/19/2021    Procedure: AMPUTATION RIGHT FOURTH TOE;  Surgeon: Anish Farah DPM;  Location: [facility] OR;  Service: Podiatry;  Laterality: Right;   • AMPUTATION DIGIT Right 7/30/2021    Procedure: AMPUTATION RIGHT THIRD TOE;  Surgeon: Anish Farah DPM;  Location: [facility] OR;  Service: Podiatry;  Laterality: Right;  RIGHT 3rd toe amputation    • 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   • COLONOSCOPY N/A 7/16/2018    Procedure: COLONOSCOPY and polypectomy;  Surgeon: Kaylie Mcfarlane MD;  Location: [facility] OR;  Service: Gastroenterology   • ENDOSCOPY N/A 3/16/2018    Procedure:  ESOPHAGOGASTRODUODENOSCOPY with biopsies;  Surgeon: Kaylie Mcfarlane MD;  Location: [facility] OR;  Service: Gastroenterology   • FINGER TENDON REPAIR Right     little finger   • HARDWARE REMOVAL Right 8/18/2017    Procedure: RIGHT EXTERNAL FIXATOR REMOVAL;  Surgeon: Anish Farah DPM;  Location: [facility] OR;  Service:    • INCISION AND DRAINAGE LEG Left 6/13/2018    Procedure: Left 2nd, 3rd toe laceration irrigation, debridement and multi layer wound closure.;  Surgeon: Anish Farah DPM;  Location: [facility] OR;  Service: Podiatry   • JOINT REPLACEMENT     • KNEE SURGERY      scope left 6 times, right once   • LEG DEBRIDEMENT Right 10/16/2020    Procedure: DEBRIDEMENT RIGHT  FOOT SURGICAL WOUND;  Surgeon: Anish Farah DPM;  Location: [facility] OR;  Service: Podiatry;  Laterality: Right;  DEBRIDEMENT RIGHT FOOT SURGICAL WOUND   • NERVE TRANSFER Right     elbow   • ORIF FOOT FRACTURE Right 7/29/2017    Procedure: open reduction with percutaneous pinning of midfoot dislocation fracture;  Surgeon: Anish Farah DPM;  Location: [facility] OR;  Service:    • ORIF FOOT FRACTURE Left 3/5/2018    Procedure: OPEN REDUCTION WITH IRRIGATION AND WOUND CLOSURE LEFT FOURTH TOE;  Surgeon: Anish Farah DPM;  Location: [facility] OR;  Service:    • TENDON LENGTHENING/SHORTENING Right     PSOAS   • TOE FUSION Right 8/18/2017    Procedure: RIGHT  MEDIAL COLUMN ARTHRODESIS, RIGHT TARAL METATARSAL ARTHRODESIS;  Surgeon: Anish Farah DPM;  Location: [facility] OR;  Service:    • TOTAL HIP ARTHROPLASTY Bilateral    • TOTAL SHOULDER ARTHROPLASTY Right          OT ASSESSMENT FLOWSHEET (last 12 hours)     OT Evaluation and Treatment     Row Name 07/21/22 0906                   OT Time and Intention    Subjective Information complains of;weakness  -JJ        Document Type therapy note (daily note)  -JJ        Mode of Treatment occupational therapy  -JJ        Patient Effort good  -JJ        Symptoms Noted During/After Treatment none  -JJ                   General Information    General Observations of Patient pt supine in bed, agreed to treatment  -        Existing Precautions/Restrictions fall  -                  Pain Assessment    Pretreatment Pain Rating --  pt with no co pain  -                  Cognition    Personal Safety Interventions gait belt;nonskid shoes/slippers when out of bed  -                  Upper Body Dressing Assessment/Training    Comment, (Upper Body Dressing) pt required min assist to [facility] gow from EOB  -                  Bed Mobility    Supine-Sit [place] (Bed Mobility) contact guard;verbal cues  -        Assistive Device (Bed Mobility) bed rails;head of bed elevated  -                  Functional Mobility    Functional Mobility- Ind. Level contact guard assist;2 person assist required;verbal cues required  -        Functional Mobility- Device walker, front-wheeled  -        Functional Mobility-Distance (Feet) 20  -        Functional Mobility- Comment pt performed functional mobility from bed to doorway and back to end of the bed with CGA x 2 and RW. pts L LE unsteady, required cues for safety througout  mobility  -                  Transfer Assessment/Treatment    Transfers sit-stand transfer;stand-sit transfer  -        Comment, (Transfers) pt required verbal cues for safety. pt used B hands on RW to stand despite cues for hand placement. pt leaned forward and utilizied therapists leg to push off of with elbow to achieve upright standing posture  -                  Transfers    Sit-Stand [place] (Transfers) 2 person assist;minimum assist (75% patient effort)  -        Stand-Sit [place] (Transfers) minimum assist (75% patient effort);2 person assist;verbal cues  -                  Sit-Stand Transfer    Assistive Device (Sit-Stand Transfers) walker, front-wheeled  -                  Stand-Sit Transfer    Assistive Device (Stand-Sit Transfers) walker, front-wheeled  -                   Safety Issues, Functional Mobility    Safety Issues Affecting Function (Mobility) impulsivity;judgment;insight into deficits/self-awareness;safety precaution awareness  -JJ                  Balance    Comment, Balance static standing balance CGA with RW  -JJ                  Plan of Care Review    Plan of Care Reviewed With patient  -JJ        Outcome Evaluation OT: pt required CGA for supine to sit transfer to EOB with use of bed rail and HOB elevated. pt required min assist x 2 for functional transfers from EOB with RW. pt required CGA x 2 for functional mobility in room x 20 feet with RW. pt required cues for safety and sequencing throughtout session. pt impulsive  with decreased safety awareness with all mobility. pt left in room with exit alarm in place and RN in room. cont to recommend SNF at discharge  -JJ                  Positioning and Restraints    Pre-Treatment Position in bed  -JJ        Post Treatment Position chair  -JJ        In Chair sitting;call light within reach;encouraged to call for assist;exit alarm on;with nsg  -J                  Progress Summary (OT)    Progress Toward Functional Goals (OT) progress toward functional goals as expected  -J        Daily Progress Summary (OT) cont poc  -JJ                  Therapy Plan Review/Discharge Plan (OT)    Anticipated Discharge Disposition (OT) skilled nursing facility  -J              User Key  (r) = Recorded By, (t) = Taken By, (c) = Cosigned By    Initials Name Effective Dates    Tracey Pearce, OTR 06/16/21 -                  Occupational Therapy Education                 Title: PT OT SLP Therapies (In Progress)     Topic: Occupational Therapy (In Progress)     Point: ADL training (Not Started)     Description:   Instruct learner(s) on proper safety adaptation and remediation techniques during self care or transfers.   Instruct in proper use of assistive devices.              Learner Progress:  Not documented in this  visit.          Point: Precautions (Done)     Description:   Instruct learner(s) on prescribed precautions during self-care and functional transfers.              Learning Progress Summary           Patient Acceptance, E,TB, VU by DANIELITO at 7/21/2022 0954    Comment: pt educated on benefits of activity, safety with functional transfers and mobility and adls    Acceptance, E,TB, VU by DANIELITO at 7/20/2022 1319    Comment: pt educated on adls, balance and safety with functional transfers and mobility                               User Key     Initials Effective Dates Name Provider Type Discipline    DANIELITO 06/16/21 -  Tracey Looney, OTR Occupational Therapist OT                  OT Recommendation and Plan  Planned Therapy Interventions (OT): BADL retraining, functional balance retraining, transfer/mobility retraining, patient/caregiver education/training  Therapy Frequency (OT): 5 times/wk  Progress Toward Functional Goals (OT): progress toward functional goals as expected  Plan of Care Review  Plan of Care Reviewed With: patient  Outcome Evaluation: OT: pt required CGA for supine to sit transfer to EOB with use of bed rail and HOB elevated. pt required min assist x 2 for functional transfers from EOB with RW. pt required CGA x 2 for functional mobility in room x 20 feet with RW. pt required cues for safety and sequencing throughtout session. pt impulsive  with decreased safety awareness with all mobility. pt left in room with exit alarm in place and RN in room. cont to recommend SNF at discharge  Plan of Care Reviewed With: patient  Outcome Evaluation: OT: pt required CGA for supine to sit transfer to EOB with use of bed rail and HOB elevated. pt required min assist x 2 for functional transfers from EOB with RW. pt required CGA x 2 for functional mobility in room x 20 feet with RW. pt required cues for safety and sequencing throughtout session. pt impulsive  with decreased safety awareness with all mobility. pt left in  room with exit alarm in place and RN in room. cont to recommend SNF at discharge     Outcome Measures     Row Name 07/21/22 0900 07/20/22 1045          How much help from another is currently needed...    Putting on and taking off regular lower body clothing? 2  -JJ 2  -JJ     Bathing (including washing, rinsing, and drying) 2  -JJ 2  -JJ     Toileting (which includes using toilet bed pan or urinal) 2  -JJ 2  -JJ     Putting on and taking off regular upper body clothing 3  -JJ 3  -JJ     Taking care of personal grooming (such as brushing teeth) 4  -JJ 4  -JJ     Eating meals 4  -JJ 4  -JJ     AM-PAC 6 Clicks Score (OT) 17  -JJ 17  -JJ            Functional Assessment    Outcome Measure Options -- AM-PAC 6 Clicks Daily Activity (OT)  -J           User Key  (r) = Recorded By, (t) = Taken By, (c) = Cosigned By    Initials Name Provider Type    Tracey Pearce OTR Occupational Therapist                Time Calculation:    Time Calculation- OT     Row Name 07/21/22 0957 07/21/22 0956          Time Calculation- OT    OT Start Time 0906  -SD 0906  -J     OT Stop Time -- 0922  -JJ     OT Time Calculation (min) -- 16 min  -DANIELITO           User Key  (r) = Recorded By, (t) = Taken By, (c) = Cosigned By    Initials Name Provider Type    Vamshi Morales OTR Occupational Therapist    Tracey Pearce OTR Occupational Therapist              Therapy Charges for Today     Code Description Service Date Service Provider Modifiers Qty    [id number]  OT EVAL MOD COMPLEXITY 2 7/20/2022 Tracey Looney OTR GO 1    [id number]  OT THERAPEUTIC ACT EA 15 MIN 7/21/2022 Tracey Looney OTR GO 1               REINA Ugarte  7/21/2022

## 2022-07-21 NOTE — NURSING NOTE
This nurse called to room by aide. Aide states she was at nurses station and patients alarm sounded. When aide and transporter got in room patient was laying on back on the ground. When this nurse enters room patient was sitting in chair. No injuries were noted. Pt reports no pain. Pt states he was looking for his wallet in his bag and lost him balance. Staff reports patient was on floor laying on back when they entered room and was assisted X2 into chair. MD, , and charge nurse made aware of fall. No new orders at this time. Pt refused to move from chair. Chair alarm was in place and reset. Pt continued to be monitored by video.

## 2022-07-21 NOTE — PLAN OF CARE
Goal Outcome Evaluation:  Plan of Care Reviewed With: patient           Outcome Evaluation: Patient on 2Lnc while sleeping. IV fluids continue.

## 2022-07-21 NOTE — CASE MANAGEMENT/SOCIAL WORK
Continued Stay Note  EVONNE Epstein     Patient Name: Eliseo Price  MRN: [id number]  Today's Date: 7/21/2022    Admit Date: 7/19/2022     Discharge Plan     Row Name 07/21/22 1142       Plan    Plan KATHIA Sanderson    Patient/Family in Agreement with Plan yes    Plan Comments Followed up with patient today and informed him that [facility] can accept for STR and he is agreeable to this discharge plan. Called and informed Denise of such and she states they will start the pre cert and let CM know when they get it. CM updated Dr. Brown of such. CM will continue to follow for needs.               Discharge Codes    No documentation.                     Shayy Carrillo RN

## 2022-07-21 NOTE — DISCHARGE SUMMARY
Eliseo Price  1965  [id number]    Hospitalists Discharge Summary    Date of Admission: 7/19/2022  Date of Discharge:  7/21/2022    Discharge Diagnoses/Hospital Course:    1.  Ataxia: No evidence of acute CVA.  I suspect this is due to his neuropathy.     2.  Essential Hypertension: Not at goal on exam and trend is up.  I suspect some of this may be due to agitation.  He could experience rebound HTN due to Clonidine w/d, however, so will resume.     3.  Diabetes Mellitus, Type 2 in Morbidly Obese w/ Peripheral Neuropathy: No acute issues.  Bedsides and A.M. at goal.     4.  Depression: Asked to give Seroquel a little early due to his agitation.    Mr. Price signed-out AMA.  Please see nurse's note for details.     History of Present Illness (taken from H&P):  Patient is a 56-year-old male with past medical history of left and right foot toe amputations, COPD, type 2 diabetes mellitus with peripheral neuropathy and obesity, patient has had previous MI, depression, bipolar disorder, arthritis, and previous CVAs.  He presents to the [facility] ED today complaining of difficulty walking.  He reportedly fell and hit his head twice.  He reports his back hurts worse than normal after his fall.  Patient is a poor historian and therefore ED provider contacted patient's daughter to confirm that he normally does have some trouble ambulating however is able to stand on his own, today he was not able to accomplish this task.  He generally feels weak.  He says his legs feel stiff and sore.     PCP  Patient Care Team:  Killian Scales MD as PCP - General  Killian Scales MD as PCP - Family Medicine    Consults:   Consults     Date and Time Order Name Status Description    7/19/2022  6:32 PM Inpatient Neurology Consult Stroke            Operations and Procedures Performed:       XR Knee 3 View Left    Result Date: 7/19/2022  Narrative: LEFT KNEE, 07/19/2022     HISTORY: 56-year-old male in the ED  undergoing stroke protocol assessment. He also fell this morning, injuring knee. Skin abrasion.  TECHNIQUE: Three-view left knee series.  FINDINGS: No fracture, dislocation or other acute osseous abnormality is demonstrated.  Moderate tricompartment degenerative arthropathy. No definite knee joint effusion. Chronic appearing capsular calcification along the lateral aspect of the knee.      Impression: 1. No acute osseous abnormality. 2. Moderate degenerative arthropathy.  This report was finalized on 7/19/2022 3:31 PM by Dr. Bigg Del Angel MD.      CT Head Without Contrast    Result Date: 7/20/2022  Narrative: CT Head WO HISTORY: Fall from bed today TECHNIQUE: Axial unenhanced head CT. Radiation dose reduction techniques included automated exposure control or exposure modulation based on body size. Count of known CT and cardiac nuc med studies performed in previous 12 months: 4. Time of scan: 7:11 PM COMPARISON: 7/19/2022 FINDINGS: The ventricles are mildly generous in size. Cortical sulci are correspondingly prominent. The degree of atrophy is out of proportion to the patient's age of 56 years. No extraaxial fluid collections are seen. No parenchymal or subarachnoid hemorrhage is present. Periventricular hypodensities are demonstrated which are nonspecific but likely represent white matter microvascular change in a patient of this age. No CT evidence of mass or acute infarct. The mastoid air cells are clear. The visualized paranasal sinuses are clear. Orbital structures demonstrate no acute findings.     Impression: Impression: 1. No clearly acute intracranial pathology demonstrated. 2. Cerebral atrophy and nonspecific periventricular hypodensities which likely represent white matter microvascular change. The degree of atrophy is out of proportion to the patient's age of 56 years. 3. No significant change from study of 7/19/2022. Signer Name: Jose Dewitt MD  Signed: 7/20/2022 7:23 PM  Workstation Name:  [email or web address]  [facility]    CT Angiogram Neck    Result Date: 7/19/2022  Narrative: INDICATION: Left-sided weakness and unsteadiness on feet. TECHNIQUE: CT angiogram of the head and neck with IV contrast. Contrast dose recorded in the patient's chart. 3-D reformatted images were acquired and reviewed. Evaluation for a significant carotid arterial stenosis is based on the NASCET criteria. Radiation dose reduction techniques included automated exposure control or exposure modulation based on body size. Radiation audit for number of CT and nuclear cardiology exams performed in the last year:  0.  COMPARISON: Earlier noncontrast head CT. FINDINGS: CTA neck:  The aortic arch branch pattern is normal. There is no hemodynamically significant stenosis of great vessel origins from the arch. The right carotid system is widely patent. There is 0% stenosis by NASCET criteria. The left carotid system is widely patent. There is 0% stenosis by NASCET criteria. Both vertebral arteries are patent. Both supply the basilar. They are codominant. CTA head:  Bilateral carotid siphons are widely patent. The right A1 vessel is mildly hypoplastic. There is an anterior communicating artery present. There is no focal central stenosis. There is no central vascular cut off. No intracranial aneurysm is suspected. The dural venous sinuses are grossly patent. No significant sized posterior communicator is seen on either side.     Impression: 1. By NASCET criteria, 0% stenosis at either carotid bifurcation. 2. No central intracranial vascular cut off or focal central stenosis. Signer Name: Holli Mesa MD  Signed: 7/19/2022 4:47 PM  Workstation Name: [email or web address]  [facility]    CT Lumbar Spine Without Contrast    Result Date: 7/19/2022  Narrative: CT Spine Lumbar WO INDICATION:  Low back pain after fall, unsteady on feet, weakness in legs TECHNIQUE: CT of the lumbar spine without IV contrast. Coronal  and sagittal reconstructions were obtained.  Radiation dose reduction techniques included automated exposure control or exposure modulation based on body size. Count of known CT and cardiac nuc med studies performed in previous 12 months: 0. COMPARISON:  None available. FINDINGS: Trace anterior listhesis L4 on L5 due to facet degeneration. Sagittal alignment otherwise anatomic. Small Schmorl's node at the upper endplate of L2. Vertebral body heights are maintained. No destructive bony lesion. No acute displaced fracture or dislocation. Multilevel degenerative changes in the lumbar spine without high-grade spinal canal narrowing. Would favor that a combination of unroofing of the posterior disc and facet degeneration causes mild to moderate circumferential spinal canal narrowing at L4-L5 as well as at least moderate foraminal narrowing. Favor severe foraminal narrowing at L5-S1 predominantly due to disc bulge and facet degeneration. Lesser degrees of degenerative change at other lumbar levels. Marked urinary bladder distention is inadequately assessed by this study.     Impression: No acute bony injury in the lumbar spine. Slight anterior listhesis with advanced facet degeneration at L4-L5. Bilateral foraminal narrowing at both L4-5 and L5-S1. Signer Name: CHARLES HOANG MD  Signed: 7/19/2022 3:33 PM  Workstation Name: [id number]  [facility]    MRI Brain Without Contrast    Result Date: 7/20/2022  Narrative: MRI Brain WO INDICATION: New onset inability to walk. History of stroke and bipolar disease. TECHNIQUE: MRI of the brain without IV contrast. COMPARISON:  Head CT from 7/19/2022. FINDINGS: There is no abnormally restricted diffusion. No evidence for a recent infarct. There is no MRI evidence for intracranial hemorrhage. No extra-axial fluid collection. There is a small chronic peripheral again at the right inferolateral cerebellar hemisphere. Its fairly linear but measures up to about 2 cm in  largest length. The basilar cisterns are patent. There is no Chiari I malformation. The major arterial intracranial flow voids are maintained. There is generalized atrophy for age group with mild prominence of perivascular spaces. There is mild white matter signal abnormality that is nonspecific but likely due to small vessel disease, or less likely severe long-standing migraine. It is most confluent in the periatrial white matter. There is no intracranial mass effect. The mastoid air cells are clear. Mild paranasal sinus mucosal thickening without air-fluid level.     Impression: 1. No evidence for acute intracranial infarct. 2. Mild generalized atrophy for age group with overall mild to moderate probable sequelae of small vessel disease. Please correlate for risk factors. Signer Name: Holli Mesa MD  Signed: 7/20/2022 9:58 AM  Workstation Name: [id number]  [facility] [place]    MRI Thoracic Spine Without Contrast    Result Date: 7/20/2022  Narrative: MRI Spine Thoracic WO HISTORY: New onset inability to walk. History of stroke and bipolar disorder. TECHNIQUE: Multiplanar multisequence imaging of the thoracic spine acquired without IV contrast utilizing a standard protocol. COMPARISON: None FINDINGS: Sagittal alignment is normal. There is multilevel mild intervertebral disc desiccation and small Schmorl's node formation. There are minor marrow endplate degenerative changes and there is multiple level mild to moderate anterior endplate spondylosis. There is no Chiari I malformation on the sagittal counting series. The thoracic cord is normal in size. There is no reproducible focus of cord signal abnormality allowing for motion. Level by level: At T6-7 there is minimal left paramedian bulging with mild flattening of left anterior thecal sac. At T7-8 there is minimal posterior disc bulging with mild flattening of the anterior thecal sac. At T9-10 there is a small left central disc protrusion with mild  deformity of the cord anteriorly but no significant canal stenosis. Multiple level facet degenerative changes present. No definite foraminal impingement. OTHER: Unremarkable     Impression: 1.  There is no evidence for thoracic canal stenosis. There is no reproducible focus of thoracic cord signal intensity abnormality. Mild thoracic degenerative changes are discussed above. Signer Name: Holli Mesa MD  Signed: 7/20/2022 10:39 AM  Workstation Name: [id number]  [facility]    XR Chest 1 View    Result Date: 7/19/2022  Narrative: CHEST X-RAY, 07/19/2022     HISTORY: 56-year-old male in the ED undergoing stroke protocol assessment. Some shortness of air. No chest pain.  TECHNIQUE: AP portable chest x-ray.  FINDINGS: Lung volumes are low, but the lungs appear clear. No visible pulmonary infiltrate or pleural effusion. Heart size and pulmonary vascularity are normal.      Impression: No active disease.  This report was finalized on 7/19/2022 3:29 PM by Dr. Bigg Del Angel MD.      CT Angiogram Head    Result Date: 7/19/2022  Narrative: INDICATION: Left-sided weakness and unsteadiness on feet. TECHNIQUE: CT angiogram of the head and neck with IV contrast. Contrast dose recorded in the patient's chart. 3-D reformatted images were acquired and reviewed. Evaluation for a significant carotid arterial stenosis is based on the NASCET criteria. Radiation dose reduction techniques included automated exposure control or exposure modulation based on body size. Radiation audit for number of CT and nuclear cardiology exams performed in the last year:  0.  COMPARISON: Earlier noncontrast head CT. FINDINGS: CTA neck:  The aortic arch branch pattern is normal. There is no hemodynamically significant stenosis of great vessel origins from the arch. The right carotid system is widely patent. There is 0% stenosis by NASCET criteria. The left carotid system is widely patent. There is 0% stenosis by NASCET criteria.  Both vertebral arteries are patent. Both supply the basilar. They are codominant. CTA head:  Bilateral carotid siphons are widely patent. The right A1 vessel is mildly hypoplastic. There is an anterior communicating artery present. There is no focal central stenosis. There is no central vascular cut off. No intracranial aneurysm is suspected. The dural venous sinuses are grossly patent. No significant sized posterior communicator is seen on either side.     Impression: 1. By NASCET criteria, 0% stenosis at either carotid bifurcation. 2. No central intracranial vascular cut off or focal central stenosis. Signer Name: Holli Mesa MD  Signed: 7/19/2022 4:47 PM  Workstation Name: [email or web address]  [facility]    CT Head Without Contrast Stroke Protocol    Result Date: 7/19/2022  Narrative: CT HEAD, NONCONTRAST, STROKE PROTOCOL, 07/19/2022  HISTORY: 56-year-old male presenting to the ED with new weakness, inability to stand upon awakening. Last known well last evening. Stroke assessment.  TECHNIQUE:  CT imaging of the head without IV contrast. Radiation dose reduction techniques included automated exposure control or exposure modulation based on body size. Radiation audit for CT and nuclear cardiology exams in the last 12 months: 0. *  ED arrival time, 13:45. *  Stroke CT ordered received, 14:45. *  CT scan time, 14:50. *  Stat telephone report to the ordering provider, 14:57.  FINDINGS:  The exam shows subtle loss of attenuation of the right insular cortex and adjacent white matter when compared with the contralateral left side. This can be an early sign of subacute MCA infarct. No additional finding for early ischemic insult is visible.  No evidence of acute intracranial hemorrhage.  Mild to moderate generalized cerebral volume loss and diffuse chronic small vessel type white matter changes, advanced for the patient's age. This is stable since the prior study.  No intracranial mass, mass effect,  cerebral edema, extra-axial fluid collection or progressive ventricular enlargement. Visualized upper paranasal sinuses and mastoid air spaces are clear.      Impression: 1.  Potential subacute infarct involving the right insular cortex. Consider MRI for further assessment for acute ischemic insult. 2.  No evidence of intracranial hemorrhage. 3.  Stable diffuse chronic changes as noted above, somewhat advanced for the patient's age.    CRITICAL RESULT REPORTING: I have discussed the findings by telephone with the ordering ED provider, Harrison Snowden MD at 14:57 hours on 07/19/2022.  This report was finalized on 7/19/2022 3:04 PM by Dr. Bigg Del Angel MD.        Allergies:  is allergic to lisinopril and penicillins.    Liborio  reviewed    Discharge Medications:     Discharge Medications      ASK your doctor about these medications      Instructions Start Date   Aquaphor Advanced Therapy ointment ointment   1 application, Topical, Every 24 Hours Scheduled      clonazePAM 0.5 MG tablet  Commonly known as: KlonoPIN   0.5 mg, Oral, 3 Times Daily PRN      cloNIDine 0.1 MG tablet  Commonly known as: CATAPRES   0.1 mg, Oral, Every 12 Hours Scheduled      FLUoxetine 20 MG capsule  Commonly known as: PROzac   40 mg, Oral, Every Morning      gabapentin 400 MG capsule  Commonly known as: NEURONTIN   800 mg, Oral, 3 Times Daily      Janumet  MG per tablet  Generic drug: sitaGLIPtin-metFORMIN   1 tablet, Oral, 2 Times Daily With Meals      losartan 100 MG tablet  Commonly known as: COZAAR   100 mg, Oral, Every Morning      omeprazole 40 MG capsule  Commonly known as: priLOSEC   40 mg, Oral, Every Morning      primidone 250 MG tablet  Commonly known as: MYSOLINE   250 mg, Oral, 3 Times Daily      propranolol 40 MG tablet  Commonly known as: INDERAL   40 mg, Oral, 2 Times Daily      QUEtiapine 200 MG tablet  Commonly known as: SEROquel   200 mg, Oral, Nightly             Last Lab Results:   Lab Results (most recent)      Procedure Component Value Units Date/Time    POC Glucose Once [[id number]]  (Normal) Collected: 07/21/22 1649    Specimen: Blood Updated: 07/21/22 1703     Glucose 121 mg/dL      Comment: Meter: [id number] : [id number] Mynor Webb CNA       POC Glucose Once [[id number]]  (Abnormal) Collected: 07/21/22 1138    Specimen: Blood Updated: 07/21/22 1154     Glucose 144 mg/dL      Comment: Meter: [id number] : [id number] Luis Carlos DICKSON       Basic Metabolic Panel [[id number]]  (Abnormal) Collected: 07/21/22 0428    Specimen: Blood Updated: 07/21/22 0507     Glucose 110 mg/dL      BUN 16 mg/dL      Creatinine 0.86 mg/dL      Sodium 138 mmol/L      Potassium 4.4 mmol/L      Comment: Specimen hemolyzed.  Results may be affected.        Chloride 106 mmol/L      CO2 24.0 mmol/L      Calcium 8.4 mg/dL      BUN/Creatinine Ratio 18.6     Anion Gap 8.0 mmol/L      eGFR 100.4 mL/min/1.73      Comment: National Kidney Foundation and American Society of Nephrology (ASN) Task Force recommended calculation based on the Chronic Kidney Disease Epidemiology Collaboration (CKD-EPI) equation refit without adjustment for race.       Narrative:      GFR Normal >60  Chronic Kidney Disease <60  Kidney Failure <15      Basic Metabolic Panel [[id number]]  (Abnormal) Collected: 07/20/22 0347    Specimen: Blood Updated: 07/20/22 0758     Glucose 115 mg/dL      BUN 24 mg/dL      Creatinine 1.47 mg/dL      Sodium 136 mmol/L      Potassium 4.1 mmol/L      Chloride 97 mmol/L      CO2 25.3 mmol/L      Calcium 8.7 mg/dL      BUN/Creatinine Ratio 16.3     Anion Gap 13.7 mmol/L      eGFR 54.9 mL/min/1.73      Comment: National Kidney Foundation and American Society of Nephrology (ASN) Task Force recommended calculation based on the Chronic Kidney Disease Epidemiology Collaboration (CKD-EPI) equation refit without adjustment for race.       Narrative:      GFR Normal >60  Chronic Kidney Disease <60  Kidney Failure <15      Lipid Panel [[id number]]   (Abnormal) Collected: 07/20/22 0347    Specimen: Blood Updated: 07/20/22 0429     Total Cholesterol 175 mg/dL      Triglycerides 285 mg/dL      HDL Cholesterol 43 mg/dL      LDL Cholesterol  85 mg/dL      VLDL Cholesterol 47 mg/dL      LDL/HDL Ratio 1.74    Narrative:      Cholesterol Reference Ranges  (U.S. Department of Health and Human Services ATP III Classifications)    Desirable          <200 mg/dL  Borderline High    200-239 mg/dL  High Risk          >240 mg/dL      Triglyceride Reference Ranges  (U.S. Department of Health and Human Services ATP III Classifications)    Normal           <150 mg/dL  Borderline High  150-199 mg/dL  High             200-499 mg/dL  Very High        >500 mg/dL    HDL Reference Ranges  (U.S. Department of Health and Human Services ATP III Classifications)    Low     <40 mg/dl (major risk factor for CHD)  High    >60 mg/dl ('negative' risk factor for CHD)        LDL Reference Ranges  (U.S. Department of Health and Human Services ATP III Classifications)    Optimal          <100 mg/dL  Near Optimal     100-129 mg/dL  Borderline High  130-159 mg/dL  High             160-189 mg/dL  Very High        >189 mg/dL    Hemoglobin A1c [[id number]]  (Abnormal) Collected: 07/20/22 0347    Specimen: Blood Updated: 07/20/22 0421     Hemoglobin A1C 6.00 %     Narrative:      Hemoglobin A1C Ranges:    Increased Risk for Diabetes  5.7% to 6.4%  Diabetes                     >= 6.5%  Diabetic Goal                < 7.0%    COVID PRE-OP / PRE-PROCEDURE SCREENING ORDER (NO ISOLATION) - Swab, Nasal Cavity [[id number]]  (Normal) Collected: 07/19/22 1710    Specimen: Swab from Nasal Cavity Updated: 07/19/22 1806    Narrative:      The following orders were created for panel order COVID PRE-OP / PRE-PROCEDURE SCREENING ORDER (NO ISOLATION) - Swab, Nasal Cavity.  Procedure                               Abnormality         Status                     ---------                               -----------          ------                     COVID-19,[employer] IN-DIONNA...[[id number]]  Normal              Final result                 Please view results for these tests on the individual orders.    COVID-19,[employer] IN-HOUSE,Nasal Swab No Transport Media 3-4 HR TAT - Swab, Nasal Cavity [[id number]]  (Normal) Collected: 07/19/22 1710    Specimen: Swab from Nasal Cavity Updated: 07/19/22 1806     COVID19 Not Detected    Narrative:      Fact sheet for providers: https://www.fda.gov/media/416077/download     Fact sheet for patients: https://www.fda.gov/media/620554/download    Test performed by PCR.    Consider negative results in combination with clinical observations, patient history, and epidemiological information.    Urinalysis With Microscopic If Indicated (No Culture) - Urine, Catheter [[id number]]  (Normal) Collected: 07/19/22 1429    Specimen: Urine, Catheter Updated: 07/19/22 1444     Color, UA Yellow     Appearance, UA Clear     pH, UA 5.5     Specific Gravity, UA <=1.005     Glucose, UA Negative     Ketones, UA Negative     Bilirubin, UA Negative     Blood, UA Negative     Protein, UA Negative     Leuk Esterase, UA Negative     Nitrite, UA Negative     Urobilinogen, UA 0.2 E.U./dL    Narrative:      Urine microscopic not indicated.    Comprehensive Metabolic Panel [[id number]]  (Abnormal) Collected: 07/19/22 1415    Specimen: Blood Updated: 07/19/22 1444     Glucose 130 mg/dL      BUN 31 mg/dL      Creatinine 1.73 mg/dL      Sodium 135 mmol/L      Potassium 4.6 mmol/L      Comment: Slight hemolysis detected by analyzer. Results may be affected.        Chloride 100 mmol/L      CO2 23.3 mmol/L      Calcium 9.0 mg/dL      Total Protein 7.2 g/dL      Albumin 4.00 g/dL      ALT (SGPT) 22 U/L      AST (SGOT) 38 U/L      Alkaline Phosphatase 89 U/L      Total Bilirubin 0.4 mg/dL      Globulin 3.2 gm/dL      A/G Ratio 1.3 g/dL      BUN/Creatinine Ratio 17.9     Anion Gap 11.7 mmol/L      eGFR 45.2 mL/min/1.73      Comment: National  Kidney Foundation and American Society of Nephrology (ASN) Task Force recommended calculation based on the Chronic Kidney Disease Epidemiology Collaboration (CKD-EPI) equation refit without adjustment for race.       Narrative:      GFR Normal >60  Chronic Kidney Disease <60  Kidney Failure <15      CBC & Differential [[id number]]  (Abnormal) Collected: 07/19/22 1415    Specimen: Blood Updated: 07/19/22 1425    Narrative:      The following orders were created for panel order CBC & Differential.  Procedure                               Abnormality         Status                     ---------                               -----------         ------                     CBC Auto Differential[[id number]]        Abnormal            Final result                 Please view results for these tests on the individual orders.    CBC Auto Differential [[id number]]  (Abnormal) Collected: 07/19/22 1415    Specimen: Blood Updated: 07/19/22 1425     WBC 7.98 10*3/mm3      RBC 5.21 10*6/mm3      Hemoglobin 15.4 g/dL      Hematocrit 47.2 %      MCV 90.6 fL      MCH 29.6 pg      MCHC 32.6 g/dL      RDW 14.3 %      RDW-SD 47.7 fl      MPV 9.1 fL      Platelets 168 10*3/mm3      Neutrophil % 80.7 %      Lymphocyte % 8.5 %      Monocyte % 8.8 %      Eosinophil % 1.8 %      Basophil % 0.1 %      Immature Grans % 0.1 %      Neutrophils, Absolute 6.44 10*3/mm3      Lymphocytes, Absolute 0.68 10*3/mm3      Monocytes, Absolute 0.70 10*3/mm3      Eosinophils, Absolute 0.14 10*3/mm3      Basophils, Absolute 0.01 10*3/mm3      Immature Grans, Absolute 0.01 10*3/mm3         Imaging Results (Most Recent)     Procedure Component Value Units Date/Time    SCANNED - IMAGING [[id number]] Resulted: 07/19/22     Updated: 07/21/22 0923    CT Head Without Contrast [[id number]] Collected: 07/20/22 1923     Updated: 07/20/22 1925    Narrative:      CT Head WO    HISTORY:   Fall from bed today    TECHNIQUE:   Axial unenhanced head CT. Radiation dose reduction  techniques included automated exposure control or exposure modulation based on body size. Count of known CT and cardiac nuc med studies performed in previous 12 months: 4.     Time of scan: 7:11 PM    COMPARISON:   7/19/2022    FINDINGS:     The ventricles are mildly generous in size. Cortical sulci are correspondingly prominent. The degree of atrophy is out of proportion to the patient's age of 56 years. No extraaxial fluid collections are seen.    No parenchymal or subarachnoid hemorrhage is present. Periventricular hypodensities are demonstrated which are nonspecific but likely represent white matter microvascular change in a patient of this age. No CT evidence of mass or acute infarct.    The mastoid air cells are clear. The visualized paranasal sinuses are clear.  Orbital structures demonstrate no acute findings.      Impression:      Impression:  1. No clearly acute intracranial pathology demonstrated.  2. Cerebral atrophy and nonspecific periventricular hypodensities which likely represent white matter microvascular change. The degree of atrophy is out of proportion to the patient's age of 56 years.  3. No significant change from study of 7/19/2022.    Signer Name: Jose Dewitt MD   Signed: 7/20/2022 7:23 PM   Workstation Name: [id number]    [facility]    MRI Thoracic Spine Without Contrast [[id number]] Collected: 07/20/22 1039     Updated: 07/20/22 1041    Narrative:      MRI Spine Thoracic WO    HISTORY:  New onset inability to walk. History of stroke and bipolar disorder.    TECHNIQUE:  Multiplanar multisequence imaging of the thoracic spine acquired without IV contrast utilizing a standard protocol.    COMPARISON: None    FINDINGS:    Sagittal alignment is normal. There is multilevel mild intervertebral disc desiccation and small Schmorl's node formation. There are minor marrow endplate degenerative changes and there is multiple level mild to moderate anterior endplate  spondylosis.  There is no Chiari I malformation on the sagittal counting series. The thoracic cord is normal in size. There is no reproducible focus of cord signal abnormality allowing for motion.    Level by level:    At T6-7 there is minimal left paramedian bulging with mild flattening of left anterior thecal sac. At T7-8 there is minimal posterior disc bulging with mild flattening of the anterior thecal sac. At T9-10 there is a small left central disc protrusion  with mild deformity of the cord anteriorly but no significant canal stenosis. Multiple level facet degenerative changes present. No definite foraminal impingement.    OTHER: Unremarkable      Impression:      1.  There is no evidence for thoracic canal stenosis. There is no reproducible focus of thoracic cord signal intensity abnormality. Mild thoracic degenerative changes are discussed above.    Signer Name: Holli Mesa MD   Signed: 7/20/2022 10:39 AM   Workstation Name: [id number]    [facility]    MRI Brain Without Contrast [[id number]] Collected: 07/20/22 0958     Updated: 07/20/22 1001    Narrative:      MRI Brain WO    INDICATION:   New onset inability to walk. History of stroke and bipolar disease.    TECHNIQUE:   MRI of the brain without IV contrast.    COMPARISON:    Head CT from 7/19/2022.    FINDINGS:  There is no abnormally restricted diffusion. No evidence for a recent infarct. There is no MRI evidence for intracranial hemorrhage. No extra-axial fluid collection. There is a small chronic peripheral again at the right inferolateral cerebellar  hemisphere. Its fairly linear but measures up to about 2 cm in largest length. The basilar cisterns are patent. There is no Chiari I malformation.    The major arterial intracranial flow voids are maintained. There is generalized atrophy for age group with mild prominence of perivascular spaces. There is mild white matter signal abnormality that is nonspecific but likely due to  small vessel disease,  or less likely severe long-standing migraine. It is most confluent in the periatrial white matter. There is no intracranial mass effect. The mastoid air cells are clear. Mild paranasal sinus mucosal thickening without air-fluid level.      Impression:        1. No evidence for acute intracranial infarct.  2. Mild generalized atrophy for age group with overall mild to moderate probable sequelae of small vessel disease. Please correlate for risk factors.     Signer Name: Holli Mesa MD   Signed: 7/20/2022 9:58 AM   Workstation Name: [id number]    [facility]    CT Angiogram Neck [[id number]] Collected: 07/19/22 1647     Updated: 07/19/22 1649    Narrative:      INDICATION:   Left-sided weakness and unsteadiness on feet.    TECHNIQUE:   CT angiogram of the head and neck with IV contrast. Contrast dose recorded in the patient's chart. 3-D reformatted images were acquired and reviewed. Evaluation for a significant carotid arterial stenosis is based on the NASCET criteria. Radiation dose  reduction techniques included automated exposure control or exposure modulation based on body size. Radiation audit for number of CT and nuclear cardiology exams performed in the last year:  0.      COMPARISON:   Earlier noncontrast head CT.    FINDINGS:   CTA neck:  The aortic arch branch pattern is normal. There is no hemodynamically significant stenosis of great vessel origins from the arch.    The right carotid system is widely patent. There is 0% stenosis by NASCET criteria. The left carotid system is widely patent. There is 0% stenosis by NASCET criteria.    Both vertebral arteries are patent. Both supply the basilar. They are codominant.    CTA head:  Bilateral carotid siphons are widely patent. The right A1 vessel is mildly hypoplastic. There is an anterior communicating artery present. There is no focal central stenosis. There is no central vascular cut off. No intracranial aneurysm  is  suspected. The dural venous sinuses are grossly patent. No significant sized posterior communicator is seen on either side.      Impression:        1. By NASCET criteria, 0% stenosis at either carotid bifurcation.  2. No central intracranial vascular cut off or focal central stenosis.    Signer Name: Holli Mesa MD   Signed: 7/19/2022 4:47 PM   Workstation Name: [email or web address]    [facility]    CT Angiogram Head [[id number]] Collected: 07/19/22 1647     Updated: 07/19/22 1649    Narrative:      INDICATION:   Left-sided weakness and unsteadiness on feet.    TECHNIQUE:   CT angiogram of the head and neck with IV contrast. Contrast dose recorded in the patient's chart. 3-D reformatted images were acquired and reviewed. Evaluation for a significant carotid arterial stenosis is based on the NASCET criteria. Radiation dose  reduction techniques included automated exposure control or exposure modulation based on body size. Radiation audit for number of CT and nuclear cardiology exams performed in the last year:  0.      COMPARISON:   Earlier noncontrast head CT.    FINDINGS:   CTA neck:  The aortic arch branch pattern is normal. There is no hemodynamically significant stenosis of great vessel origins from the arch.    The right carotid system is widely patent. There is 0% stenosis by NASCET criteria. The left carotid system is widely patent. There is 0% stenosis by NASCET criteria.    Both vertebral arteries are patent. Both supply the basilar. They are codominant.    CTA head:  Bilateral carotid siphons are widely patent. The right A1 vessel is mildly hypoplastic. There is an anterior communicating artery present. There is no focal central stenosis. There is no central vascular cut off. No intracranial aneurysm is  suspected. The dural venous sinuses are grossly patent. No significant sized posterior communicator is seen on either side.      Impression:        1. By NASCET criteria, 0% stenosis at  either carotid bifurcation.  2. No central intracranial vascular cut off or focal central stenosis.    Signer Name: Holli Mesa MD   Signed: 7/19/2022 4:47 PM   Workstation Name: [email or web address]    [facility]    CT Lumbar Spine Without Contrast [[id number]] Collected: 07/19/22 1533     Updated: 07/19/22 1535    Narrative:      CT Spine Lumbar WO    INDICATION:    Low back pain after fall, unsteady on feet, weakness in legs    TECHNIQUE:   CT of the lumbar spine without IV contrast. Coronal and sagittal reconstructions were obtained.  Radiation dose reduction techniques included automated exposure control or exposure modulation based on body size. Count of known CT and cardiac nuc med  studies performed in previous 12 months: 0.     COMPARISON:    None available.    FINDINGS:    Trace anterior listhesis L4 on L5 due to facet degeneration. Sagittal alignment otherwise anatomic. Small Schmorl's node at the upper endplate of L2. Vertebral body heights are maintained. No destructive bony lesion. No acute displaced fracture or  dislocation. Multilevel degenerative changes in the lumbar spine without high-grade spinal canal narrowing. Would favor that a combination of unroofing of the posterior disc and facet degeneration causes mild to moderate circumferential spinal canal  narrowing at L4-L5 as well as at least moderate foraminal narrowing. Favor severe foraminal narrowing at L5-S1 predominantly due to disc bulge and facet degeneration. Lesser degrees of degenerative change at other lumbar levels.    Marked urinary bladder distention is inadequately assessed by this study.      Impression:      No acute bony injury in the lumbar spine. Slight anterior listhesis with advanced facet degeneration at L4-L5. Bilateral foraminal narrowing at both L4-5 and L5-S1.    Signer Name: CHARLES HOANG MD   Signed: 7/19/2022 3:33 PM   Workstation Name: [id number]    [facility]    XR Knee 3 View Left  [[id number]] Collected: 07/19/22 1529     Updated: 07/19/22 1533    Narrative:      LEFT KNEE, 07/19/2022         HISTORY:  56-year-old male in the ED undergoing stroke protocol assessment. He  also fell this morning, injuring knee. Skin abrasion.     TECHNIQUE:  Three-view left knee series.     FINDINGS:  No fracture, dislocation or other acute osseous abnormality is  demonstrated.     Moderate tricompartment degenerative arthropathy. No definite knee joint  effusion. Chronic appearing capsular calcification along the lateral  aspect of the knee.       Impression:      1. No acute osseous abnormality.  2. Moderate degenerative arthropathy.     This report was finalized on 7/19/2022 3:31 PM by Dr. Bigg Del Angel MD.       XR Chest 1 View [[id number]] Collected: 07/19/22 1528     Updated: 07/19/22 1531    Narrative:      CHEST X-RAY, 07/19/2022         HISTORY:  56-year-old male in the ED undergoing stroke protocol assessment. Some  shortness of air. No chest pain.     TECHNIQUE:  AP portable chest x-ray.     FINDINGS:  Lung volumes are low, but the lungs appear clear. No visible pulmonary  infiltrate or pleural effusion. Heart size and pulmonary vascularity are  normal.       Impression:      No active disease.     This report was finalized on 7/19/2022 3:29 PM by Dr. Bigg Del Angel MD.       CT Head Without Contrast Stroke Protocol [[id number]] Collected: 07/19/22 1459     Updated: 07/19/22 1507    Narrative:      CT HEAD, NONCONTRAST, STROKE PROTOCOL, 07/19/2022     HISTORY:  56-year-old male presenting to the ED with new weakness, inability to  stand upon awakening. Last known well last evening. Stroke assessment.     TECHNIQUE:    CT imaging of the head without IV contrast. Radiation dose reduction  techniques included automated exposure control or exposure modulation  based on body size. Radiation audit for CT and nuclear cardiology exams  in the last 12 months: 0.  *  ED arrival time, 13:45.  *  Stroke CT  ordered received, 14:45.  *  CT scan time, 14:50.  *  Stat telephone report to the ordering provider, 14:57.     FINDINGS:    The exam shows subtle loss of attenuation of the right insular cortex  and adjacent white matter when compared with the contralateral left  side. This can be an early sign of subacute MCA infarct. No additional  finding for early ischemic insult is visible.     No evidence of acute intracranial hemorrhage.     Mild to moderate generalized cerebral volume loss and diffuse chronic  small vessel type white matter changes, advanced for the patient's age.  This is stable since the prior study.     No intracranial mass, mass effect, cerebral edema, extra-axial fluid  collection or progressive ventricular enlargement. Visualized upper  paranasal sinuses and mastoid air spaces are clear.       Impression:      1.  Potential subacute infarct involving the right insular cortex.  Consider MRI for further assessment for acute ischemic insult.  2.  No evidence of intracranial hemorrhage.  3.  Stable diffuse chronic changes as noted above, somewhat advanced for  the patient's age.           CRITICAL RESULT REPORTING: I have discussed the findings by telephone  with the ordering ED provider, Harrison Snowden MD at 14:57 hours on  07/19/2022.     This report was finalized on 7/19/2022 3:04 PM by Dr. Bigg Del Angel MD.             PROCEDURES      Condition on Discharge:  Fair    Physical Exam at Discharge    See exam from Daily Progress Note    Discharge Disposition  AMA    Visiting Nurse:    AMA    Home PT/OT:  [place]    Home Safety Evaluation:  [facility]    [facility]  [facility]    Discharge Diet:      Dietary Orders (From admission, onward)     Start     Ordered    07/19/22 1954  Diet Regular; Cardiac, Consistent Carbohydrate  Diet Effective Now        Question Answer Comment   Diet Texture / Consistency Regular    Common Modifiers Cardiac    Common Modifiers Consistent Carbohydrate        07/19/22 1953                 Activity at Discharge:  AMA      Follow-up Appointments  No future appointments.      Test Results Pending at Discharge       Mariano Brown MD  07/21/22  17:59 EDT

## 2022-07-21 NOTE — NURSING NOTE
Patient states he had wallet on admission. Security contacted and brought belongings patient had locked in safe which was cash, change, and keys. Pt reports he had wallet on admission and that's what the du was in. Manager, Evelyn contacted and made aware. Manager contacted admitting nurses and both nurses stated that he did not have wallet on admission he only had belongings that were locked in safe.

## 2022-07-21 NOTE — NURSING NOTE
"Pt stated he was not staying here waiting on insurance to approve his stay at [facility]. States he was \"sick and tired of waiting on us and not being kept in the loop\". This nurse explains to patient that there was no new information and I had explained several times this shift that we were awaiting precert. Pt made aware of risks of leaving AMA. Pt signed AMA form. This nurse and aide offered to take patient out in wheelchair. Pt refused and stated \" I can walk just fine without your help\". Pt walked to front of hospital and stated he would wait for his sister to pick him up there.   "

## 2022-07-22 NOTE — CASE MANAGEMENT/SOCIAL WORK
Case Management Discharge Note      Final Note: Patient left AMA.    Provided Post Acute Provider List?: Yes  Post Acute Provider List: Inpatient Rehab  Provided Post Acute Provider Quality & Resource List?: Yes  Post Acute Provider Quality and Resource List: Inpatient Rehab  Delivered To: Patient  Method of Delivery: In person    Selected Continued Care - Discharged on 7/21/2022 Admission date: 7/19/2022 - Discharge disposition: Left Against Medical Advice    Destination    No services have been selected for the patient.              Durable Medical Equipment    No services have been selected for the patient.              Dialysis/Infusion    No services have been selected for the patient.              Home Medical Care    No services have been selected for the patient.              Therapy    No services have been selected for the patient.              Community Resources    No services have been selected for the patient.              Community & DME    No services have been selected for the patient.                       Final Discharge Disposition Code: 07 - left AMA

## 2022-09-12 ENCOUNTER — OFFICE VISIT (OUTPATIENT)
Dept: ORTHOPEDIC SURGERY | Facility: CLINIC | Age: 57
End: 2022-09-12

## 2022-09-12 VITALS
WEIGHT: 288 LBS | HEART RATE: 99 BPM | HEIGHT: 71 IN | SYSTOLIC BLOOD PRESSURE: 109 MMHG | DIASTOLIC BLOOD PRESSURE: 76 MMHG | BODY MASS INDEX: 40.32 KG/M2

## 2022-09-12 DIAGNOSIS — M17.12 PRIMARY OSTEOARTHRITIS OF LEFT KNEE: Primary | ICD-10-CM

## 2022-09-12 PROBLEM — E66.01 MORBID OBESITY (HCC): Status: ACTIVE | Noted: 2022-09-12

## 2022-09-12 PROCEDURE — 99203 OFFICE O/P NEW LOW 30 MIN: CPT | Performed by: ORTHOPAEDIC SURGERY

## 2022-09-12 PROCEDURE — 20610 DRAIN/INJ JOINT/BURSA W/O US: CPT | Performed by: ORTHOPAEDIC SURGERY

## 2022-09-12 RX ORDER — DOXEPIN HYDROCHLORIDE 75 MG/1
CAPSULE ORAL
COMMUNITY

## 2022-09-12 RX ORDER — TRIAMTERENE AND HYDROCHLOROTHIAZIDE 37.5; 25 MG/1; MG/1
TABLET ORAL
COMMUNITY
Start: 2022-09-10

## 2022-09-12 RX ORDER — ACETAMINOPHEN 325 MG/1
TABLET ORAL EVERY 6 HOURS
COMMUNITY

## 2022-09-12 RX ORDER — SITAGLIPTIN AND METFORMIN HYDROCHLORIDE 1000; 50 MG/1; MG/1
TABLET, FILM COATED, EXTENDED RELEASE ORAL
COMMUNITY

## 2022-09-12 RX ORDER — TEMAZEPAM 15 MG/1
CAPSULE ORAL
COMMUNITY

## 2022-09-12 RX ORDER — CALCIUM CITRATE/VITAMIN D3 200MG-6.25
TABLET ORAL
COMMUNITY
Start: 2022-08-02

## 2022-09-12 RX ORDER — CELECOXIB 200 MG/1
CAPSULE ORAL
COMMUNITY
Start: 2022-08-19

## 2022-09-12 RX ORDER — AMLODIPINE BESYLATE 5 MG/1
TABLET ORAL
COMMUNITY
Start: 2022-07-12

## 2022-09-12 RX ORDER — GLUCOSAM/CHON-MSM1/C/MANG/BOSW 500-416.6
TABLET ORAL
COMMUNITY
Start: 2022-08-26

## 2022-09-12 RX ADMIN — TRIAMCINOLONE ACETONIDE 80 MG: 40 INJECTION, SUSPENSION INTRA-ARTICULAR; INTRAMUSCULAR at 14:51

## 2022-09-12 RX ADMIN — LIDOCAINE HYDROCHLORIDE 8 ML: 10 INJECTION, SOLUTION EPIDURAL; INFILTRATION; INTRACAUDAL; PERINEURAL at 14:51

## 2022-09-12 NOTE — PROGRESS NOTES
Subjective:     Patient ID: Eliseo Price is a 56 y.o. male.    Chief Complaint:  Left knee pain, new patient  History of Present Illness  Eliseo Price presents to clinic today for evaluation of left knee pain that started after a fall back on 06/20/2022. Since that time, he has noted moderate pain, 6 to 7 out of 10, constant to severe, throbbing and stabbing in nature with associated swelling, stiffness, and giving way of his knee, worse with standing, walking, and climbing stairs in particular. He can not get into a comfortable position. He notes minimal improvement with bracing, use of cane or walker, weight loss, oral steroids, as well as use of ibuprofen and Celebrex for the last 20+ years. He did have prior arthroscopy to his knee back in 2001.    The patient notes that he has had knee problems for a long time, but it got worse after a fall in 06/2022. He localizes his pain to the anterolateral and posterior aspect of his knee. He has tried Celebrex or ibuprofen, but he has to be careful because he has active ulcers. The patient notes that Tylenol would not work. He has had Synvisc injections in the past, which were helpful. He notes that the cortisone injection was helpful for a period of time. The patient notes that he has had so many falls that he does not have any toes left on either side. He notes that all of his toes are gone. He notes that Dr. Anish Farah did an artery test to make sure his blood supply was okay.     Social History     Occupational History   • Not on file   Tobacco Use   • Smoking status: Never Smoker   • Smokeless tobacco: Never Used   Vaping Use   • Vaping Use: Never used   Substance and Sexual Activity   • Alcohol use: Yes     Comment: RARE   • Drug use: No   • Sexual activity: Defer      Past Medical History:   Diagnosis Date   • Amputated toe of left foot (HCC)     4 and 5   • Amputated toe of right foot (HCC)     5th   • Anesthesia complication     DIFFICULT TO PUT TO SLEEP   •  Arthritis    • Bipolar disorder ([facility])    • Cellulitis of lower extremity     RIGHT LOWER LEG   • COPD (chronic obstructive pulmonary disease) ([facility])    • Coronary artery disease 2009    HEART ATTACK   • Diabetes mellitus ([facility])    • Disease of thyroid gland     nodule on thyroid   • DVT (deep venous thrombosis) ([facility]) 2005    right leg   • Fracture of right foot    • Gastric ulcer    • Hyperlipidemia    • Hypertension    • MRSA infection 2003    history of left leg after surgery   • Neuropathy     hands & feet   • Pseudogout    • RLS (restless legs syndrome)    • Septic shock ([facility]) 07/2017    H/O   • Sleep apnea     no machine, CANT TOLERATE   • Stroke ([facility])     x2, no residual   • Toxic metabolic encephalopathy 07/2017    H/O   • Tremors of nervous system      Past Surgical History:   Procedure Laterality Date   • AMPUTATION DIGIT Left 3/16/2018    Procedure: AMPUTATION LEFT FOURTH TOE;  Surgeon: Anish Farah DPM;  Location: [facility] OR;  Service: Podiatry   • AMPUTATION DIGIT Right 10/13/2020    Procedure: AMPUTATION OF RIGHT FIFTH TOE AND ALL ASSOCIATED PROCEDURES;  Surgeon: Anish Farah DPM;  Location: [facility] OR;  Service: Podiatry;  Laterality: Right;  AMPUTATION OF RIGHT FIFTH TOE    • AMPUTATION DIGIT Right 1/19/2021    Procedure: AMPUTATION RIGHT FOURTH TOE;  Surgeon: Anish Farah DPM;  Location: [facility] OR;  Service: Podiatry;  Laterality: Right;   • AMPUTATION DIGIT Right 7/30/2021    Procedure: AMPUTATION RIGHT THIRD TOE;  Surgeon: Anish Farah DPM;  Location: [facility] OR;  Service: Podiatry;  Laterality: Right;  RIGHT 3rd toe amputation    • 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   • COLONOSCOPY N/A 7/16/2018    Procedure: COLONOSCOPY and polypectomy;  Surgeon: Kaylie Mcfarlane MD;  Location: [facility] OR;  Service: Gastroenterology   • ENDOSCOPY N/A 3/16/2018    Procedure: ESOPHAGOGASTRODUODENOSCOPY with biopsies;  Surgeon: Kalyie Mcfarlane MD;  Location: [facility] OR;  Service: Gastroenterology    • FINGER TENDON REPAIR Right     little finger   • HARDWARE REMOVAL Right 8/18/2017    Procedure: RIGHT EXTERNAL FIXATOR REMOVAL;  Surgeon: Anish Farah DPM;  Location:  [facility] OR;  Service:    • INCISION AND DRAINAGE LEG Left 6/13/2018    Procedure: Left 2nd, 3rd toe laceration irrigation, debridement and multi layer wound closure.;  Surgeon: Anish Farah DPM;  Location:  [facility] OR;  Service: Podiatry   • JOINT REPLACEMENT     • KNEE SURGERY      scope left 6 times, right once   • LEG DEBRIDEMENT Right 10/16/2020    Procedure: DEBRIDEMENT RIGHT  FOOT SURGICAL WOUND;  Surgeon: Anish Farah DPM;  Location:  [facility] OR;  Service: Podiatry;  Laterality: Right;  DEBRIDEMENT RIGHT FOOT SURGICAL WOUND   • NERVE TRANSFER Right     elbow   • ORIF FOOT FRACTURE Right 7/29/2017    Procedure: open reduction with percutaneous pinning of midfoot dislocation fracture;  Surgeon: Anish Farah DPM;  Location:  [facility] OR;  Service:    • ORIF FOOT FRACTURE Left 3/5/2018    Procedure: OPEN REDUCTION WITH IRRIGATION AND WOUND CLOSURE LEFT FOURTH TOE;  Surgeon: Anish Farah DPM;  Location:  [facility] OR;  Service:    • TENDON LENGTHENING/SHORTENING Right     PSOAS   • TOE FUSION Right 8/18/2017    Procedure: RIGHT  MEDIAL COLUMN ARTHRODESIS, RIGHT TARAL METATARSAL ARTHRODESIS;  Surgeon: Anish Farah DPM;  Location:  [facility] OR;  Service:    • TOTAL HIP ARTHROPLASTY Bilateral    • TOTAL SHOULDER ARTHROPLASTY Right        Family History   Problem Relation Age of Onset   • Arthritis Mother    • Cancer Mother    • Hyperlipidemia Mother    • Colon polyps Mother    • Arthritis Father    • Cancer Father    • Depression Father    • Hypertension Father    • Mental illness Father    • Cancer Sister    • Arthritis Sister    • Hyperlipidemia Sister    • Cancer Paternal Aunt    • Hypertension Daughter    • Depression Son    • Hyperlipidemia Paternal Grandmother    • Hearing loss Paternal Grandfather    • Hyperlipidemia Paternal  "Grandfather    • Hypertension Paternal Grandfather    • Colon cancer Maternal Grandmother    • Malig Hyperthermia Neg Hx          Review of Systems        Objective:  Vitals:    09/12/22 1351   BP: 109/76   Pulse: 99   Weight: 131 kg (288 lb)   Height: 180.3 cm (71\")         09/12/22  1351   Weight: 131 kg (288 lb)     Body mass index is 40.17 kg/m².  Physical Exam    Vital signs reviewed.   General: No acute distress, alert and oriented  Eyes: conjunctiva clear; pupils equally round and reactive  ENT: external ears and nose atraumatic; oropharynx clear  CV: no peripheral edema  Resp: normal respiratory effort  Skin: no rashes or wounds; normal turgor  Psych: mood and affect appropriate; recent and remote memory intact          Left Knee Exam     Comments:  ROM 2 to 120 degrees  4/5 strength on flexion  4/5 strength on extension  Maximal tenderness lateral and medial joint line.  Moderate tenderness medial and lateral patellar sac.  Positive varus patella compression test.  Effusion- Moderate  Stable varus and valgus at 2 and 3  Active patellar compression test- Positive    Log roll- Negative  Stinchfield- Negative    Decreased sensation bilateral lower extremities with palpable dorsalis pedis pulse    Over the anterior aspect of the left knee, the patient does have a region of ecchymosis and skin thinning which he states has been chronic in nature since 2012 after a fall.               Imaging:  XR Knee 3 View Left    Result Date: 7/19/2022  1. No acute osseous abnormality. 2. Moderate degenerative arthropathy.  This report was finalized on 7/19/2022 3:31 PM by Dr. Bigg Del Angel MD.      Review of nonweightbearing x-rays left knee as noted above from July 2022 including review of images as well as radiology report indicates moderate medial and patellofemoral compartment joint space narrowing with reactive osteophyte formation most notably along the medial compartment, no evidence of fracture, dislocation, " subluxation    Assessment:        1. Primary osteoarthritis of left knee         Large Joint Arthrocentesis: L knee  Date/Time: 9/12/2022 2:51 PM  Consent given by: patient  Site marked: site marked  Timeout: Immediately prior to procedure a time out was called to verify the correct patient, procedure, equipment, support staff and site/side marked as required   Supporting Documentation  Indications: pain   Procedure Details  Location: knee - L knee  Preparation: Patient was prepped and draped in the usual sterile fashion  Needle size: 22 G  Approach: anterolateral  Medications administered: 80 mg triamcinolone acetonide 40 MG/ML; 8 mL lidocaine PF 1% 1 %  Patient tolerance: patient tolerated the procedure well with no immediate complications          Plan:        1. Discussed treatment options at length with patient at today's visit. He does have moderate to advanced arthritis, which is difficult to exactly quantify at this point in time since we do not have any standing x-rays, but certainly is present and he has had response positively to prior cortisone injection. No significant improvement with previous viscosupplement. We discussed the options at length. At this point in time, he would like to proceed with intra-articular cortisone injection today.  2. Patient would like to proceed with cortisone injection today to the left knee. Recommended limited use of affected extremity for the next 24 hours to only essential activities other than work on general active and passive motion. Recommended supplementing with ice and soft tissue massage. Discussed with patient that they should see results in 5-7 days, if no improvement in 5-6 weeks I have asked them to call the office to review other options. Patient should call office immediately if they notice redness, warmth, fevers, chills, or residual numbness or tingling for greater than 6 hours after injection.  3. We will plan on seeing him back in 6 weeks for follow-up  with repeat standing x-rays of his left knee at that time. If he gets incomplete or insufficient relief with the injection, then we will likely talk about options for proceeding with total knee arthroplasty. I did express to him that he is at moderate to high risk given his underlying diabetes as well as wound issues over the anterior aspect of his left knee, though he has no evidence of open wound or infection at this time. He understood this risk. All questions answered and was in agreement with current plan.      Eliseo Price was in agreement with plan and had all questions answered.     Orders:  Orders Placed This Encounter   Procedures   • Large Joint Arthrocentesis: L knee       Medications:  No orders of the defined types were placed in this encounter.      Followup:  Return in about 6 weeks (around 10/24/2022) for xrays needed at follow up.    Diagnoses and all orders for this visit:    1. Primary osteoarthritis of left knee (Primary)    Other orders  -     Large Joint Arthrocentesis: L knee          Dictated utilizing Dragon dictation     Transcribed from ambient dictation for Valentin Barfield MD by Twyla Toribio..  09/12/22   20:45 EDT    Patient verbalized consent to the visit recording.  I have personally performed the services described in this document as transcribed by the above individual, and it is both accurate and complete.  Valentin Barfield MD  9/26/2022  08:04 EDT

## 2022-09-16 ENCOUNTER — PATIENT ROUNDING (BHMG ONLY) (OUTPATIENT)
Dept: ORTHOPEDIC SURGERY | Facility: CLINIC | Age: 57
End: 2022-09-16

## 2022-09-16 NOTE — PROGRESS NOTES
September 16, 2022    Hello, may I speak with Eliseo Price?    My name is ric       I am  with [facility]  [address]  [place] [address] [phone].    Before we get started may I verify your date of birth? 1965    I am calling to officially welcome you to our practice and ask about your recent visit. Is this a good time to talk? yes    Tell me about your visit with us. What things went well?  everything was fine.       We're always looking for ways to make our patients' experiences even better. Do you have recommendations on ways we may improve?  no    Overall were you satisfied with your first visit to our practice? yes     I appreciate you taking the time to speak with me today. Is there anything else I can do for you? no      Thank you, and have a great day.

## 2022-09-26 RX ORDER — LIDOCAINE HYDROCHLORIDE 10 MG/ML
8 INJECTION, SOLUTION EPIDURAL; INFILTRATION; INTRACAUDAL; PERINEURAL
Status: COMPLETED | OUTPATIENT
Start: 2022-09-12 | End: 2022-09-12

## 2022-09-26 RX ORDER — TRIAMCINOLONE ACETONIDE 40 MG/ML
80 INJECTION, SUSPENSION INTRA-ARTICULAR; INTRAMUSCULAR
Status: COMPLETED | OUTPATIENT
Start: 2022-09-12 | End: 2022-09-12

## 2022-10-24 ENCOUNTER — OFFICE VISIT (OUTPATIENT)
Dept: ORTHOPEDIC SURGERY | Facility: CLINIC | Age: 57
End: 2022-10-24

## 2022-10-24 VITALS — BODY MASS INDEX: 41.3 KG/M2 | WEIGHT: 295 LBS | HEIGHT: 71 IN

## 2022-10-24 DIAGNOSIS — M17.12 PRIMARY OSTEOARTHRITIS OF LEFT KNEE: Primary | ICD-10-CM

## 2022-10-24 PROCEDURE — 73562 X-RAY EXAM OF KNEE 3: CPT | Performed by: ORTHOPAEDIC SURGERY

## 2022-10-24 PROCEDURE — 99212 OFFICE O/P EST SF 10 MIN: CPT | Performed by: ORTHOPAEDIC SURGERY

## 2022-10-24 NOTE — PROGRESS NOTES
Subjective:     Patient ID: Eliseo Price is a 56 y.o. male.    Chief Complaint:  Follow-up left knee pain, DJD  Last visit-9/12/2022-left knee cortisone injection    History of Present Illness  Eliseo Price returns to the clinic for evaluation of left knee pain. He is doing fairly well with his knee. He has had 6 weeks of good relief. He is starting to get some recurrence of pain primarily to the medial and anterior aspect of his left knee. He rates it as a 4 to 5 out of 10, aching in nature. He denies any locking or catching.    The patient has had gel injections in the pat which were not effective.      Social History     Occupational History   • Not on file   Tobacco Use   • Smoking status: Never   • Smokeless tobacco: Never   Vaping Use   • Vaping Use: Never used   Substance and Sexual Activity   • Alcohol use: Yes     Comment: RARE   • Drug use: No   • Sexual activity: Defer      Past Medical History:   Diagnosis Date   • Amputated toe of left foot ([facility])     4 and 5   • Amputated toe of right foot ([facility])     5th   • Anesthesia complication     DIFFICULT TO PUT TO SLEEP   • Arthritis    • Bipolar disorder ([facility])    • Cellulitis of lower extremity     RIGHT LOWER LEG   • COPD (chronic obstructive pulmonary disease) ([facility])    • Coronary artery disease 2009    HEART ATTACK   • Diabetes mellitus ([facility])    • Disease of thyroid gland     nodule on thyroid   • DVT (deep venous thrombosis) ([facility]) 2005    right leg   • Fracture of right foot    • Gastric ulcer    • Hyperlipidemia    • Hypertension    • MRSA infection 2003    history of left leg after surgery   • Neuropathy     hands & feet   • Pseudogout    • RLS (restless legs syndrome)    • Septic shock ([facility]) 07/2017    H/O   • Sleep apnea     no machine, CANT TOLERATE   • Stroke ([facility])     x2, no residual   • Toxic metabolic encephalopathy 07/2017    H/O   • Tremors of nervous system      Past Surgical History:   Procedure Laterality Date   • AMPUTATION DIGIT Left  3/16/2018    Procedure: AMPUTATION LEFT FOURTH TOE;  Surgeon: Anish Farah DPM;  Location: [facility] OR;  Service: Podiatry   • AMPUTATION DIGIT Right 10/13/2020    Procedure: AMPUTATION OF RIGHT FIFTH TOE AND ALL ASSOCIATED PROCEDURES;  Surgeon: Anish Farah DPM;  Location:  [facility] OR;  Service: Podiatry;  Laterality: Right;  AMPUTATION OF RIGHT FIFTH TOE    • AMPUTATION DIGIT Right 1/19/2021    Procedure: AMPUTATION RIGHT FOURTH TOE;  Surgeon: Anish Farah DPM;  Location:  [facility] OR;  Service: Podiatry;  Laterality: Right;   • AMPUTATION DIGIT Right 7/30/2021    Procedure: AMPUTATION RIGHT THIRD TOE;  Surgeon: Anish Farah DPM;  Location:  [facility] OR;  Service: Podiatry;  Laterality: Right;  RIGHT 3rd toe amputation    • 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   • COLONOSCOPY N/A 7/16/2018    Procedure: COLONOSCOPY and polypectomy;  Surgeon: Kaylie Mcfarlane MD;  Location: [facility] OR;  Service: Gastroenterology   • ENDOSCOPY N/A 3/16/2018    Procedure: ESOPHAGOGASTRODUODENOSCOPY with biopsies;  Surgeon: Kaylie Mcfarlane MD;  Location: [facility] OR;  Service: Gastroenterology   • FINGER TENDON REPAIR Right     little finger   • HARDWARE REMOVAL Right 8/18/2017    Procedure: RIGHT EXTERNAL FIXATOR REMOVAL;  Surgeon: Anish Farah DPM;  Location: [facility] OR;  Service:    • INCISION AND DRAINAGE LEG Left 6/13/2018    Procedure: Left 2nd, 3rd toe laceration irrigation, debridement and multi layer wound closure.;  Surgeon: Anish Farah DPM;  Location: [facility] OR;  Service: Podiatry   • JOINT REPLACEMENT     • KNEE SURGERY      scope left 6 times, right once   • LEG DEBRIDEMENT Right 10/16/2020    Procedure: DEBRIDEMENT RIGHT  FOOT SURGICAL WOUND;  Surgeon: Anish Farah DPM;  Location: [facility] OR;  Service: Podiatry;  Laterality: Right;  DEBRIDEMENT RIGHT FOOT SURGICAL WOUND   • NERVE TRANSFER Right     elbow   • ORIF FOOT FRACTURE Right 7/29/2017    Procedure: open reduction with percutaneous pinning  "of midfoot dislocation fracture;  Surgeon: Anish Farah DPM;  Location:  [facility] OR;  Service:    • ORIF FOOT FRACTURE Left 3/5/2018    Procedure: OPEN REDUCTION WITH IRRIGATION AND WOUND CLOSURE LEFT FOURTH TOE;  Surgeon: Anish Farah DPM;  Location:  [facility] OR;  Service:    • TENDON LENGTHENING/SHORTENING Right     PSOAS   • TOE FUSION Right 8/18/2017    Procedure: RIGHT  MEDIAL COLUMN ARTHRODESIS, RIGHT TARAL METATARSAL ARTHRODESIS;  Surgeon: Anish Farah DPM;  Location:  [facility] OR;  Service:    • TOTAL HIP ARTHROPLASTY Bilateral    • TOTAL SHOULDER ARTHROPLASTY Right        Family History   Problem Relation Age of Onset   • Arthritis Mother    • Cancer Mother    • Hyperlipidemia Mother    • Colon polyps Mother    • Arthritis Father    • Cancer Father    • Depression Father    • Hypertension Father    • Mental illness Father    • Cancer Sister    • Arthritis Sister    • Hyperlipidemia Sister    • Cancer Paternal Aunt    • Hypertension Daughter    • Depression Son    • Hyperlipidemia Paternal Grandmother    • Hearing loss Paternal Grandfather    • Hyperlipidemia Paternal Grandfather    • Hypertension Paternal Grandfather    • Colon cancer Maternal Grandmother    • Malig Hyperthermia Neg Hx          Review of Systems        Objective:  Vitals:    10/24/22 1321   Weight: 134 kg (295 lb)   Height: 180.3 cm (71\")         10/24/22  1321   Weight: 134 kg (295 lb)     Body mass index is 41.14 kg/m².  Physical Exam    Vital signs reviewed.   General: No acute distress, alert and oriented  Eyes: conjunctiva clear; pupils equally round and reactive  ENT: external ears and nose atraumatic; oropharynx clear  CV: no peripheral edema  Resp: normal respiratory effort  Skin: no rashes or wounds; normal turgor  Psych: mood and affect appropriate; recent and remote memory intact          Ortho Exam       Left Knee-    Active ROM 3 to 120 degrees  4/5 strength on flexion  4/5 strength on extension  Maximal tenderness to " palpation along the medial joint line as well as medial and lateral patellar facet.    Effusion- Moderate  Stable to varus and valgus stress at 3 and 30 degrees  Active patellar compression test- Positive    Imaging:  Left Knee X-Ray  Indication: Pain    AP, Lateral, and Kenly views    Findings:  No fracture  No bony lesion  Normal soft tissues  Moderate tricompartmental osteoarthritis with joint space narrowing noted, no evidence of bone-on-bone articulation, moderate reactive osteophyte formation particular along medial lateral aspects of patellofemoral joint    Compared to prior outside x-rays      Assessment:        1. Primary osteoarthritis of left knee           Plan:        1. Discussed treatment options at length with patient at today's visit. The patient overall is doing fairly well at this point in time. He is getting some recurrence of pain, but he would like to continue with treatment with injections at this point in time.  2. He will continue working on hip, core, and quad strengthening as well as weight loss endeavors over the next 6 weeks. I did discuss with him that his BMI is elevated and if we have to proceed with total knee arthroplasty, we would need to work on that before we could proceed. He understood the importance of this.  3. I will plan to see him back in 6 weeks for reevaluation and consideration for injection or surgical treatment options.       Eliseo Price was in agreement with plan and had all questions answered.     Orders:  Orders Placed This Encounter   Procedures   • XR Knee 3+ View With Kenly Left       Medications:  No orders of the defined types were placed in this encounter.      Followup:  No follow-ups on file.    Diagnoses and all orders for this visit:    1. Primary osteoarthritis of left knee (Primary)  -     XR Knee 3+ View With Kenly Left        Transcribed from ambient dictation for Valentin Barfield MD by Twyla Toribio.  10/24/22   15:03 EDT

## 2023-02-06 ENCOUNTER — OFFICE VISIT (OUTPATIENT)
Dept: ORTHOPEDIC SURGERY | Facility: CLINIC | Age: 58
End: 2023-02-06
Payer: MEDICARE

## 2023-02-06 VITALS — WEIGHT: 287 LBS | HEIGHT: 71 IN | BODY MASS INDEX: 40.18 KG/M2

## 2023-02-06 DIAGNOSIS — M17.12 PRIMARY OSTEOARTHRITIS OF LEFT KNEE: Primary | ICD-10-CM

## 2023-02-06 PROCEDURE — 99212 OFFICE O/P EST SF 10 MIN: CPT | Performed by: ORTHOPAEDIC SURGERY

## 2023-02-06 RX ORDER — FLUOXETINE HYDROCHLORIDE 40 MG/1
CAPSULE ORAL
COMMUNITY
Start: 2022-12-21

## 2023-02-06 RX ORDER — COLCHICINE 0.6 MG/1
TABLET ORAL
COMMUNITY
Start: 2022-11-29

## 2023-02-06 RX ORDER — GABAPENTIN 800 MG/1
TABLET ORAL
COMMUNITY
Start: 2022-11-29

## 2023-02-06 RX ORDER — LORAZEPAM 0.5 MG/1
TABLET ORAL
COMMUNITY

## 2023-02-06 RX ORDER — QUETIAPINE 300 MG/1
TABLET, FILM COATED, EXTENDED RELEASE ORAL
COMMUNITY
Start: 2022-12-22

## 2023-02-06 NOTE — PROGRESS NOTES
Subjective:     Patient ID: Eliseo Price is a 57 y.o. male.    Chief Complaint:  Follow-up left knee pain, DJD  Last injection-9/14/2022    History of Present Illness  Eliseo Price returns to clinic today for evaluation of left knee pain.    The patient is doing fairly well at this point in time. He did have a period of time where his pain did significantly increase to his left knee about 2 months ago, but since then his pain has subsided once again. He is still noticing significant improvement with his injection. The patient rates his residual pain as a 3 to 4 out of 10, achy in nature, localized primarily to the medial aspect of his left knee. He denies any associated numbness or tingling.       Social History     Occupational History   • Not on file   Tobacco Use   • Smoking status: Never   • Smokeless tobacco: Never   Vaping Use   • Vaping Use: Never used   Substance and Sexual Activity   • Alcohol use: Yes     Comment: RARE   • Drug use: No   • Sexual activity: Defer      Past Medical History:   Diagnosis Date   • Amputated toe of left foot ([facility])     4 and 5   • Amputated toe of right foot ([facility])     5th   • Anesthesia complication     DIFFICULT TO PUT TO SLEEP   • Arthritis    • Bipolar disorder ([facility])    • Cellulitis of lower extremity     RIGHT LOWER LEG   • COPD (chronic obstructive pulmonary disease) ([facility])    • Coronary artery disease 2009    HEART ATTACK   • Diabetes mellitus ([facility])    • Disease of thyroid gland     nodule on thyroid   • DVT (deep venous thrombosis) ([facility]) 2005    right leg   • Fracture of right foot    • Gastric ulcer    • Hyperlipidemia    • Hypertension    • MRSA infection 2003    history of left leg after surgery   • Neuropathy     hands & feet   • Pseudogout    • RLS (restless legs syndrome)    • Septic shock ([facility]) 07/2017    H/O   • Sleep apnea     no machine, CANT TOLERATE   • Stroke ([facility])     x2, no residual   • Toxic metabolic encephalopathy 07/2017    H/O   • Tremors of nervous  system      Past Surgical History:   Procedure Laterality Date   • AMPUTATION DIGIT Left 3/16/2018    Procedure: AMPUTATION LEFT FOURTH TOE;  Surgeon: Anish Farah DPM;  Location: [facility] OR;  Service: Podiatry   • AMPUTATION DIGIT Right 10/13/2020    Procedure: AMPUTATION OF RIGHT FIFTH TOE AND ALL ASSOCIATED PROCEDURES;  Surgeon: Anish Farah DPM;  Location: [facility] OR;  Service: Podiatry;  Laterality: Right;  AMPUTATION OF RIGHT FIFTH TOE    • AMPUTATION DIGIT Right 1/19/2021    Procedure: AMPUTATION RIGHT FOURTH TOE;  Surgeon: Anish Farah DPM;  Location: [facility] OR;  Service: Podiatry;  Laterality: Right;   • AMPUTATION DIGIT Right 7/30/2021    Procedure: AMPUTATION RIGHT THIRD TOE;  Surgeon: Anish Farah DPM;  Location: [facility] OR;  Service: Podiatry;  Laterality: Right;  RIGHT 3rd toe amputation    • 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   • COLONOSCOPY N/A 7/16/2018    Procedure: COLONOSCOPY and polypectomy;  Surgeon: Kaylie Mcfarlane MD;  Location: [facility] OR;  Service: Gastroenterology   • ENDOSCOPY N/A 3/16/2018    Procedure: ESOPHAGOGASTRODUODENOSCOPY with biopsies;  Surgeon: Kaylie Mcfarlane MD;  Location: [facility] OR;  Service: Gastroenterology   • FINGER TENDON REPAIR Right     little finger   • HARDWARE REMOVAL Right 8/18/2017    Procedure: RIGHT EXTERNAL FIXATOR REMOVAL;  Surgeon: Anish Farah DPM;  Location: [facility] OR;  Service:    • INCISION AND DRAINAGE LEG Left 6/13/2018    Procedure: Left 2nd, 3rd toe laceration irrigation, debridement and multi layer wound closure.;  Surgeon: Anish Farah DPM;  Location: [facility] OR;  Service: Podiatry   • JOINT REPLACEMENT     • KNEE SURGERY      scope left 6 times, right once   • LEG DEBRIDEMENT Right 10/16/2020    Procedure: DEBRIDEMENT RIGHT  FOOT SURGICAL WOUND;  Surgeon: Anish Farah DPM;  Location: [facility] OR;  Service: Podiatry;  Laterality: Right;  DEBRIDEMENT RIGHT FOOT SURGICAL WOUND   • NERVE TRANSFER Right     elbow   •  "ORIF FOOT FRACTURE Right 7/29/2017    Procedure: open reduction with percutaneous pinning of midfoot dislocation fracture;  Surgeon: Anish Farah DPM;  Location:  [facility] OR;  Service:    • ORIF FOOT FRACTURE Left 3/5/2018    Procedure: OPEN REDUCTION WITH IRRIGATION AND WOUND CLOSURE LEFT FOURTH TOE;  Surgeon: Anish Farah DPM;  Location:  [facility] OR;  Service:    • TENDON LENGTHENING/SHORTENING Right     PSOAS   • TOE FUSION Right 8/18/2017    Procedure: RIGHT  MEDIAL COLUMN ARTHRODESIS, RIGHT TARAL METATARSAL ARTHRODESIS;  Surgeon: Anish Farah DPM;  Location:  [facility] OR;  Service:    • TOTAL HIP ARTHROPLASTY Bilateral    • TOTAL SHOULDER ARTHROPLASTY Right        Family History   Problem Relation Age of Onset   • Arthritis Mother    • Cancer Mother    • Hyperlipidemia Mother    • Colon polyps Mother    • Arthritis Father    • Cancer Father    • Depression Father    • Hypertension Father    • Mental illness Father    • Cancer Sister    • Arthritis Sister    • Hyperlipidemia Sister    • Cancer Paternal Aunt    • Hypertension Daughter    • Depression Son    • Hyperlipidemia Paternal Grandmother    • Hearing loss Paternal Grandfather    • Hyperlipidemia Paternal Grandfather    • Hypertension Paternal Grandfather    • Colon cancer Maternal Grandmother    • Malig Hyperthermia Neg Hx          Review of Systems        Objective:  Vitals:    02/06/23 1429   Weight: 130 kg (287 lb)   Height: 180.3 cm (71\")         02/06/23  1429   Weight: 130 kg (287 lb)     Body mass index is 40.03 kg/m².  Physical Exam    Vital signs reviewed.   General: No acute distress, alert and oriented  Eyes: conjunctiva clear; pupils equally round and reactive  ENT: external ears and nose atraumatic; oropharynx clear  CV: no peripheral edema  Resp: normal respiratory effort  Skin: no rashes or wounds; normal turgor  Psych: mood and affect appropriate; recent and remote memory intact          Ortho Exam         Left Knee-     Active Range " of Motion- 3 to 125 degrees  4+/5 strength on flexion  4+/5 strength on extension  Maximal tenderness to palpation along the medial joint line, moderate tenderness medial and lateral patellar facet.   Effusion- Moderate  Stable to varus and valgus stress at 3 and 30 degrees  Active patellar compression test- Positive    Imaging:  None today  Assessment:        1. Primary osteoarthritis of left knee           Plan:        1. Discussed treatment options at length with patient at today's visit.  2. The patient is doing fairly well at this point in time. We did discuss the option for repeat injection. He wants to hold off on that at this time given the fact that his pain is fairly well controlled.  3. He will continue working on his weight with increase in activity as tolerated as well as dieting.  4. He will continue working on home strengthening exercise program.  5. I will follow up with him as needed if he does get recurrence of pain.      Eliseo Price was in agreement with plan and had all questions answered.     Orders:  No orders of the defined types were placed in this encounter.      Medications:  No orders of the defined types were placed in this encounter.      Followup:  Return if symptoms worsen or fail to improve.    Diagnoses and all orders for this visit:    1. Primary osteoarthritis of left knee (Primary)          Dictated utilizing Dragon dictation     Transcribed from ambient dictation for Valentin Barfield MD by Marie Heath.  02/06/23   15:53 EST    Patient or patient representative verbalized consent to the visit recording.  I have personally performed the services described in this document as transcribed by the above individual, and it is both accurate and complete.

## 2023-03-03 ENCOUNTER — TELEPHONE (OUTPATIENT)
Dept: ORTHOPEDIC SURGERY | Facility: CLINIC | Age: 58
End: 2023-03-03

## 2023-03-03 NOTE — TELEPHONE ENCOUNTER
Caller: Eliseo Price    Relationship: Self    Best call back number:     What was the call regarding: INJECTION SCHEDULING    Do you require a callback: YES

## 2023-03-03 NOTE — TELEPHONE ENCOUNTER
CALLED PATIENT, NO ANSWER, JUST NEEDS NEXT AVAILABLE [facility] APPT WITH DR ROSALES FOR L KNEE PAIN, HUB OKAY TO SCHEDULE

## 2023-03-03 NOTE — TELEPHONE ENCOUNTER
Provider: DR. ROSALES  Caller: CHARLES MUJICA  Relationship to Patient: SELF  Phone Number: [phone]  Reason for Call: PATIENT CALLED WANTING TO SCHEDULE INJECTION FOR THE LEFT KNEE. LAST INJECTION  09/12/22. HE IS WANTING URGENT SCHEDULING IF POSSIBLE. PLEASE ADVISE.

## 2023-04-10 ENCOUNTER — CLINICAL SUPPORT (OUTPATIENT)
Dept: ORTHOPEDIC SURGERY | Facility: CLINIC | Age: 58
End: 2023-04-10
Payer: MEDICARE

## 2023-04-10 VITALS — BODY MASS INDEX: 41.3 KG/M2 | HEIGHT: 71 IN | WEIGHT: 295 LBS

## 2023-04-10 DIAGNOSIS — M17.12 PRIMARY OSTEOARTHRITIS OF LEFT KNEE: Primary | ICD-10-CM

## 2023-04-10 PROCEDURE — 20610 DRAIN/INJ JOINT/BURSA W/O US: CPT | Performed by: ORTHOPAEDIC SURGERY

## 2023-04-10 RX ORDER — LIDOCAINE HYDROCHLORIDE 10 MG/ML
8 INJECTION, SOLUTION EPIDURAL; INFILTRATION; INTRACAUDAL; PERINEURAL
Status: COMPLETED | OUTPATIENT
Start: 2023-04-10 | End: 2023-04-10

## 2023-04-10 RX ORDER — TRIAMCINOLONE ACETONIDE 40 MG/ML
80 INJECTION, SUSPENSION INTRA-ARTICULAR; INTRAMUSCULAR
Status: COMPLETED | OUTPATIENT
Start: 2023-04-10 | End: 2023-04-10

## 2023-04-10 RX ADMIN — TRIAMCINOLONE ACETONIDE 80 MG: 40 INJECTION, SUSPENSION INTRA-ARTICULAR; INTRAMUSCULAR at 14:14

## 2023-04-10 RX ADMIN — LIDOCAINE HYDROCHLORIDE 8 ML: 10 INJECTION, SOLUTION EPIDURAL; INFILTRATION; INTRACAUDAL; PERINEURAL at 14:14

## 2023-04-10 NOTE — PROGRESS NOTES
Large Joint Arthrocentesis: L knee  Date/Time: 4/10/2023 2:14 PM  Consent given by: patient  Site marked: site marked  Timeout: Immediately prior to procedure a time out was called to verify the correct patient, procedure, equipment, support staff and site/side marked as required   Supporting Documentation  Indications: pain   Procedure Details  Location: knee - L knee  Preparation: Patient was prepped and draped in the usual sterile fashion  Needle size: 22 G  Approach: anterolateral  Medications administered: 8 mL lidocaine PF 1% 1 %; 80 mg triamcinolone acetonide 40 MG/ML  Patient tolerance: patient tolerated the procedure well with no immediate complications          Cc: Left knee DJD    Patient presents to clinic today for left knee intra-articular cortisone injection(s). I explained details of injections as well as risks, benefits and alternatives with the patient today, had all questions answered, wished to proceed with injections. Patient was instructed to watch for signs or symptoms of infection including redness, swelling, warmth to the touch, or significant increased pain and to contact our office immediately if any of these issues were noted.

## 2023-07-01 NOTE — PLAN OF CARE
Goal Outcome Evaluation:  Plan of Care Reviewed With: patient        Progress: improving  Outcome Evaluation: VSS, on room air, SR on tele. IVF infusing. MRI brain and thoracic completed. Pt had assisted fall today with right knee skin tear, otherwise no injury.   35.3

## 2023-10-17 ENCOUNTER — HOSPITAL ENCOUNTER (OUTPATIENT)
Dept: GENERAL RADIOLOGY | Facility: CLINIC | Age: 58
Discharge: HOME | End: 2023-10-17
Payer: MEDICARE

## 2023-10-17 ENCOUNTER — LAB (OUTPATIENT)
Dept: LAB | Facility: HOSPITAL | Age: 58
End: 2023-10-17
Payer: MEDICARE

## 2023-10-17 ENCOUNTER — HOSPITAL ENCOUNTER (OUTPATIENT)
Dept: GENERAL RADIOLOGY | Facility: HOSPITAL | Age: 58
Discharge: HOME | End: 2023-10-17
Payer: MEDICARE

## 2023-10-17 DIAGNOSIS — L97.512 RIGHT FOOT ULCER, WITH FAT LAYER EXPOSED: ICD-10-CM

## 2023-10-17 DIAGNOSIS — L97.509 TYPE 2 DIABETES MELLITUS WITH FOOT ULCER, UNSPECIFIED WHETHER LONG TERM INSULIN USE: ICD-10-CM

## 2023-10-17 DIAGNOSIS — E11.621 TYPE 2 DIABETES MELLITUS WITH FOOT ULCER, UNSPECIFIED WHETHER LONG TERM INSULIN USE: ICD-10-CM

## 2023-10-17 DIAGNOSIS — L97.512 RIGHT FOOT ULCER, WITH FAT LAYER EXPOSED: Primary | ICD-10-CM

## 2023-10-17 LAB
ALBUMIN SERPL-MCNC: 4.3 G/DL (ref 3.5–5.2)
ALBUMIN/GLOB SERPL: 1.2 G/DL
ALP SERPL-CCNC: 98 U/L (ref 39–117)
ALT SERPL W P-5'-P-CCNC: 18 U/L (ref 1–41)
ANION GAP SERPL CALCULATED.3IONS-SCNC: 13 MMOL/L (ref 5–15)
AST SERPL-CCNC: 20 U/L (ref 1–40)
BASOPHILS # BLD AUTO: 0.06 10*3/MM3 (ref 0–0.2)
BASOPHILS NFR BLD AUTO: 0.7 % (ref 0–1.5)
BILIRUB SERPL-MCNC: <0.2 MG/DL (ref 0–1.2)
BUN SERPL-MCNC: 12 MG/DL (ref 6–20)
BUN/CREAT SERPL: 18.5 (ref 7–25)
CALCIUM SPEC-SCNC: 9.5 MG/DL (ref 8.6–10.5)
CHLORIDE SERPL-SCNC: 102 MMOL/L (ref 98–107)
CO2 SERPL-SCNC: 20 MMOL/L (ref 22–29)
CREAT SERPL-MCNC: 0.65 MG/DL (ref 0.76–1.27)
DEPRECATED RDW RBC AUTO: 43.1 FL (ref 37–54)
EGFRCR SERPLBLD CKD-EPI 2021: 108.5 ML/MIN/1.73
EOSINOPHIL # BLD AUTO: 0.31 10*3/MM3 (ref 0–0.4)
EOSINOPHIL NFR BLD AUTO: 3.8 % (ref 0.3–6.2)
ERYTHROCYTE [DISTWIDTH] IN BLOOD BY AUTOMATED COUNT: 13 % (ref 12.3–15.4)
GLOBULIN UR ELPH-MCNC: 3.5 GM/DL
GLUCOSE SERPL-MCNC: 112 MG/DL (ref 65–99)
HBA1C MFR BLD: 5.5 % (ref 4.8–5.6)
HCT VFR BLD AUTO: 48.3 % (ref 37.5–51)
HGB BLD-MCNC: 16.7 G/DL (ref 13–17.7)
IMM GRANULOCYTES # BLD AUTO: 0.04 10*3/MM3 (ref 0–0.05)
IMM GRANULOCYTES NFR BLD AUTO: 0.5 % (ref 0–0.5)
LYMPHOCYTES # BLD AUTO: 1.5 10*3/MM3 (ref 0.7–3.1)
LYMPHOCYTES NFR BLD AUTO: 18.4 % (ref 19.6–45.3)
MCH RBC QN AUTO: 31.7 PG (ref 26.6–33)
MCHC RBC AUTO-ENTMCNC: 34.6 G/DL (ref 31.5–35.7)
MCV RBC AUTO: 91.7 FL (ref 79–97)
MONOCYTES # BLD AUTO: 0.84 10*3/MM3 (ref 0.1–0.9)
MONOCYTES NFR BLD AUTO: 10.3 % (ref 5–12)
NEUTROPHILS NFR BLD AUTO: 5.39 10*3/MM3 (ref 1.7–7)
NEUTROPHILS NFR BLD AUTO: 66.3 % (ref 42.7–76)
NRBC BLD AUTO-RTO: 0 /100 WBC (ref 0–0.2)
PLATELET # BLD AUTO: 295 10*3/MM3 (ref 140–450)
PMV BLD AUTO: 9.3 FL (ref 6–12)
POTASSIUM SERPL-SCNC: 4.6 MMOL/L (ref 3.5–5.2)
PROT SERPL-MCNC: 7.8 G/DL (ref 6–8.5)
RBC # BLD AUTO: 5.27 10*6/MM3 (ref 4.14–5.8)
SODIUM SERPL-SCNC: 135 MMOL/L (ref 136–145)
WBC NRBC COR # BLD: 8.14 10*3/MM3 (ref 3.4–10.8)

## 2023-10-17 PROCEDURE — 36415 COLL VENOUS BLD VENIPUNCTURE: CPT

## 2023-10-17 PROCEDURE — 73630 X-RAY EXAM OF FOOT: CPT

## 2023-10-17 PROCEDURE — 83036 HEMOGLOBIN GLYCOSYLATED A1C: CPT

## 2023-10-17 PROCEDURE — 85025 COMPLETE CBC W/AUTO DIFF WBC: CPT

## 2023-10-17 PROCEDURE — 80053 COMPREHEN METABOLIC PANEL: CPT

## 2023-10-18 DIAGNOSIS — L97.509 TYPE 2 DIABETES MELLITUS WITH FOOT ULCER, UNSPECIFIED WHETHER LONG TERM INSULIN USE: ICD-10-CM

## 2023-10-18 DIAGNOSIS — L97.512 RIGHT FOOT ULCER, WITH FAT LAYER EXPOSED: Primary | ICD-10-CM

## 2023-10-18 DIAGNOSIS — E11.621 TYPE 2 DIABETES MELLITUS WITH FOOT ULCER, UNSPECIFIED WHETHER LONG TERM INSULIN USE: ICD-10-CM

## 2023-10-31 DIAGNOSIS — M86.9 OSTEOMYELITIS OF FIFTH TOE OF RIGHT FOOT: Primary | ICD-10-CM

## 2023-10-31 PROCEDURE — 88305 TISSUE EXAM BY PATHOLOGIST: CPT | Performed by: STUDENT IN AN ORGANIZED HEALTH CARE EDUCATION/TRAINING PROGRAM

## 2023-10-31 PROCEDURE — 87176 TISSUE HOMOGENIZATION CULTR: CPT | Performed by: STUDENT IN AN ORGANIZED HEALTH CARE EDUCATION/TRAINING PROGRAM

## 2023-10-31 PROCEDURE — 88311 DECALCIFY TISSUE: CPT

## 2023-10-31 PROCEDURE — 88305 TISSUE EXAM BY PATHOLOGIST: CPT

## 2023-10-31 PROCEDURE — 87070 CULTURE OTHR SPECIMN AEROBIC: CPT | Performed by: STUDENT IN AN ORGANIZED HEALTH CARE EDUCATION/TRAINING PROGRAM

## 2023-10-31 PROCEDURE — 87205 SMEAR GRAM STAIN: CPT | Performed by: STUDENT IN AN ORGANIZED HEALTH CARE EDUCATION/TRAINING PROGRAM

## 2023-10-31 PROCEDURE — 88311 DECALCIFY TISSUE: CPT | Performed by: STUDENT IN AN ORGANIZED HEALTH CARE EDUCATION/TRAINING PROGRAM

## 2023-11-01 ENCOUNTER — TRANSCRIBE ORDERS (OUTPATIENT)
Dept: ADMINISTRATIVE | Facility: HOSPITAL | Age: 58
End: 2023-11-01
Payer: MEDICARE

## 2023-11-01 DIAGNOSIS — E11.621 TYPE 2 DIABETES MELLITUS WITH FOOT ULCER, WITHOUT LONG-TERM CURRENT USE OF INSULIN: ICD-10-CM

## 2023-11-01 DIAGNOSIS — L97.512 RIGHT FOOT ULCER, WITH FAT LAYER EXPOSED: Primary | ICD-10-CM

## 2023-11-01 DIAGNOSIS — L97.509 TYPE 2 DIABETES MELLITUS WITH FOOT ULCER, WITHOUT LONG-TERM CURRENT USE OF INSULIN: ICD-10-CM

## 2023-11-02 LAB
LAB AP CASE REPORT: NORMAL
LAB AP DIAGNOSIS COMMENT: NORMAL
PATH REPORT.FINAL DX SPEC: NORMAL
PATH REPORT.GROSS SPEC: NORMAL

## 2023-11-04 LAB
BACTERIA SPEC AEROBE CULT: NORMAL
GRAM STN SPEC: NORMAL
GRAM STN SPEC: NORMAL

## 2023-11-06 ENCOUNTER — OFFICE VISIT (OUTPATIENT)
Dept: ORTHOPEDIC SURGERY | Facility: CLINIC | Age: 58
End: 2023-11-06
Payer: MEDICARE

## 2023-11-06 VITALS — BODY MASS INDEX: 41.3 KG/M2 | WEIGHT: 295 LBS | HEIGHT: 71 IN

## 2023-11-06 DIAGNOSIS — M17.12 PRIMARY OSTEOARTHRITIS OF LEFT KNEE: Primary | ICD-10-CM

## 2023-11-06 PROCEDURE — 1160F RVW MEDS BY RX/DR IN RCRD: CPT | Performed by: ORTHOPAEDIC SURGERY

## 2023-11-06 PROCEDURE — 1159F MED LIST DOCD IN RCRD: CPT | Performed by: ORTHOPAEDIC SURGERY

## 2023-11-06 PROCEDURE — 20610 DRAIN/INJ JOINT/BURSA W/O US: CPT | Performed by: ORTHOPAEDIC SURGERY

## 2023-11-06 RX ORDER — TRIAMCINOLONE ACETONIDE 40 MG/ML
80 INJECTION, SUSPENSION INTRA-ARTICULAR; INTRAMUSCULAR
Status: COMPLETED | OUTPATIENT
Start: 2023-11-06 | End: 2023-11-06

## 2023-11-06 RX ORDER — LIDOCAINE HYDROCHLORIDE 10 MG/ML
8 INJECTION, SOLUTION EPIDURAL; INFILTRATION; INTRACAUDAL; PERINEURAL
Status: COMPLETED | OUTPATIENT
Start: 2023-11-06 | End: 2023-11-06

## 2023-11-06 RX ADMIN — TRIAMCINOLONE ACETONIDE 80 MG: 40 INJECTION, SUSPENSION INTRA-ARTICULAR; INTRAMUSCULAR at 13:44

## 2023-11-06 RX ADMIN — LIDOCAINE HYDROCHLORIDE 8 ML: 10 INJECTION, SOLUTION EPIDURAL; INFILTRATION; INTRACAUDAL; PERINEURAL at 13:44

## 2023-11-06 NOTE — PROGRESS NOTES
Procedure   - Large Joint Arthrocentesis: L knee on 11/6/2023 1:44 PM  Indications: pain  Details: 22 G needle, anterolateral approach  Medications: 80 mg triamcinolone acetonide 40 MG/ML; 8 mL lidocaine PF 1% 1 %  Outcome: tolerated well, no immediate complications  Procedure, treatment alternatives, risks and benefits explained, specific risks discussed. Consent was given by the patient. Immediately prior to procedure a time out was called to verify the correct patient, procedure, equipment, support staff and site/side marked as required. Patient was prepped and draped in the usual sterile fashion.          Cc: Left knee DJD    Patient presents to clinic today for left knee intra-articular cortisone injection(s). I explained details of injections as well as risks, benefits and alternatives with the patient today, had all questions answered, wished to proceed with injections. Patient was instructed to watch for signs or symptoms of infection including redness, swelling, warmth to the touch, or significant increased pain and to contact our office immediately if any of these issues were noted.

## 2023-11-07 DIAGNOSIS — L97.512 RIGHT FOOT ULCER, WITH FAT LAYER EXPOSED: Primary | ICD-10-CM

## 2023-11-10 ENCOUNTER — HOSPITAL ENCOUNTER (OUTPATIENT)
Dept: ULTRASOUND IMAGING | Facility: HOSPITAL | Age: 58
Discharge: HOME | End: 2023-11-10
Payer: MEDICARE

## 2023-11-10 DIAGNOSIS — E11.621 TYPE 2 DIABETES MELLITUS WITH FOOT ULCER, WITHOUT LONG-TERM CURRENT USE OF INSULIN: ICD-10-CM

## 2023-11-10 DIAGNOSIS — L97.509 TYPE 2 DIABETES MELLITUS WITH FOOT ULCER, WITHOUT LONG-TERM CURRENT USE OF INSULIN: ICD-10-CM

## 2023-11-10 DIAGNOSIS — L97.512 RIGHT FOOT ULCER, WITH FAT LAYER EXPOSED: ICD-10-CM

## 2023-11-10 PROCEDURE — 93923 UPR/LXTR ART STDY 3+ LVLS: CPT

## 2023-11-30 ENCOUNTER — PREP FOR SURGERY (OUTPATIENT)
Dept: OTHER | Facility: HOSPITAL | Age: 58
End: 2023-11-30
Payer: MEDICARE

## 2023-11-30 DIAGNOSIS — L97.516 DIABETIC ULCER OF TOE OF RIGHT FOOT ASSOCIATED WITH DIABETES MELLITUS DUE TO UNDERLYING CONDITION, WITH BONE INVOLVEMENT WITHOUT EVIDENCE OF NECROSIS: Primary | ICD-10-CM

## 2023-11-30 DIAGNOSIS — E08.621 DIABETIC ULCER OF TOE OF RIGHT FOOT ASSOCIATED WITH DIABETES MELLITUS DUE TO UNDERLYING CONDITION, WITH BONE INVOLVEMENT WITHOUT EVIDENCE OF NECROSIS: Primary | ICD-10-CM

## 2023-11-30 PROBLEM — E11.621: Status: ACTIVE | Noted: 2023-11-30

## 2023-11-30 RX ORDER — ACETAMINOPHEN 500 MG
1000 TABLET ORAL ONCE
OUTPATIENT
Start: 2023-11-30 | End: 2023-11-30

## 2023-11-30 RX ORDER — CELECOXIB 100 MG/1
200 CAPSULE ORAL ONCE
OUTPATIENT
Start: 2023-11-30 | End: 2023-11-30

## 2023-12-07 ENCOUNTER — PRE-ADMISSION TESTING (OUTPATIENT)
Dept: PREADMISSION TESTING | Facility: HOSPITAL | Age: 58
End: 2023-12-07
Payer: MEDICARE

## 2023-12-07 VITALS
WEIGHT: 282.1 LBS | HEIGHT: 71 IN | HEART RATE: 72 BPM | OXYGEN SATURATION: 95 % | SYSTOLIC BLOOD PRESSURE: 116 MMHG | BODY MASS INDEX: 39.49 KG/M2 | RESPIRATION RATE: 18 BRPM | DIASTOLIC BLOOD PRESSURE: 82 MMHG

## 2023-12-07 LAB
ANION GAP SERPL CALCULATED.3IONS-SCNC: 14.4 MMOL/L (ref 5–15)
BUN SERPL-MCNC: 13 MG/DL (ref 8–23)
BUN/CREAT SERPL: 18.8 (ref 7–25)
CALCIUM SPEC-SCNC: 9.2 MG/DL (ref 8.6–10.5)
CHLORIDE SERPL-SCNC: 94 MMOL/L (ref 98–107)
CO2 SERPL-SCNC: 20.6 MMOL/L (ref 22–29)
CREAT SERPL-MCNC: 0.69 MG/DL (ref 0.76–1.27)
DEPRECATED RDW RBC AUTO: 40.9 FL (ref 37–54)
EGFRCR SERPLBLD CKD-EPI 2021: 105.9 ML/MIN/1.73
ERYTHROCYTE [DISTWIDTH] IN BLOOD BY AUTOMATED COUNT: 11.8 % (ref 12.3–15.4)
GLUCOSE SERPL-MCNC: 93 MG/DL (ref 65–99)
HCT VFR BLD AUTO: 44.6 % (ref 37.5–51)
HGB BLD-MCNC: 14.9 G/DL (ref 13–17.7)
MCH RBC QN AUTO: 31.2 PG (ref 26.6–33)
MCHC RBC AUTO-ENTMCNC: 33.4 G/DL (ref 31.5–35.7)
MCV RBC AUTO: 93.5 FL (ref 79–97)
PLATELET # BLD AUTO: 248 10*3/MM3 (ref 140–450)
PMV BLD AUTO: 8.5 FL (ref 6–12)
POTASSIUM SERPL-SCNC: 4.7 MMOL/L (ref 3.5–5.2)
QT INTERVAL: 403 MS
QTC INTERVAL: 447 MS
RBC # BLD AUTO: 4.77 10*6/MM3 (ref 4.14–5.8)
SODIUM SERPL-SCNC: 129 MMOL/L (ref 136–145)
WBC NRBC COR # BLD AUTO: 6.61 10*3/MM3 (ref 3.4–10.8)

## 2023-12-07 PROCEDURE — 36415 COLL VENOUS BLD VENIPUNCTURE: CPT

## 2023-12-07 PROCEDURE — 80048 BASIC METABOLIC PNL TOTAL CA: CPT | Performed by: STUDENT IN AN ORGANIZED HEALTH CARE EDUCATION/TRAINING PROGRAM

## 2023-12-07 PROCEDURE — 93005 ELECTROCARDIOGRAM TRACING: CPT

## 2023-12-07 PROCEDURE — 99212 OFFICE O/P EST SF 10 MIN: CPT | Performed by: INTERNAL MEDICINE

## 2023-12-07 PROCEDURE — 85027 COMPLETE CBC AUTOMATED: CPT | Performed by: STUDENT IN AN ORGANIZED HEALTH CARE EDUCATION/TRAINING PROGRAM

## 2023-12-07 PROCEDURE — 99202 OFFICE O/P NEW SF 15 MIN: CPT | Performed by: INTERNAL MEDICINE

## 2023-12-07 NOTE — H&P
[facility] HOSPITALIST   HISTORY AND PHYSICAL    Killian Scales MD    Subjective       CHIEF COMPLAINT:     Right foot pain    HISTORY OF PRESENT ILLNESS:    58-year-old male has been having issues with ulcer of toe of right foot and pain associated with that.  Been trying to manage conservatively outpatient but was not improving and is scheduled to undergo surgery.  Patient has been normal state of health.  He denied any chest pain shortness of breath nausea vomiting or diarrhea recently.      REVIEW OF SYSTEMS:  Please see the above history of present illness for pertinent positives and negatives.      Personal History       Past Medical History:   Diagnosis Date    Amputated toe of left foot     all toes    Amputated toe of right foot     all toes    Anesthesia complication     DIFFICULT TO PUT TO SLEEP    Arthritis     Bipolar disorder     Cellulitis of lower extremity     RIGHT LOWER LEG    COPD (chronic obstructive pulmonary disease)     Coronary artery disease 2009    HEART ATTACK    Diabetes mellitus     Disease of thyroid gland     nodule on thyroid    DVT (deep venous thrombosis) 2005    right leg    Fracture of right foot     Gastric ulcer     GERD (gastroesophageal reflux disease)     Hyperlipidemia     Hypertension     MRSA infection 2003    history of left leg after surgery    Neuropathy     hands & feet    Pseudogout     RLS (restless legs syndrome)     Septic shock 07/2017    H/O    Sleep apnea     no machine, CANT TOLERATE    Stroke     x2, no residual    Toxic metabolic encephalopathy 07/2017    H/O    Tremors of nervous system      Past Surgical History:   Procedure Laterality Date    AMPUTATION DIGIT Left 3/16/2018    Procedure: AMPUTATION LEFT FOURTH TOE;  Surgeon: Anish Farah DPM;  Location: [facility];  Service: Podiatry    AMPUTATION DIGIT Right 10/13/2020    Procedure: AMPUTATION OF RIGHT FIFTH TOE AND ALL ASSOCIATED PROCEDURES;  Surgeon: Anish Farah DPM;   Location: [facility] OR;  Service: Podiatry;  Laterality: Right;  AMPUTATION OF RIGHT FIFTH TOE     AMPUTATION DIGIT Right 1/19/2021    Procedure: AMPUTATION RIGHT FOURTH TOE;  Surgeon: Anish Farah DPM;  Location: [facility] OR;  Service: Podiatry;  Laterality: Right;    AMPUTATION DIGIT Right 7/30/2021    Procedure: AMPUTATION RIGHT THIRD TOE;  Surgeon: Anish Farah DPM;  Location: [facility] OR;  Service: Podiatry;  Laterality: Right;  RIGHT 3rd toe amputation     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    COLONOSCOPY N/A 7/16/2018    Procedure: COLONOSCOPY and polypectomy;  Surgeon: Kaylie Mcfarlane MD;  Location: [facility] OR;  Service: Gastroenterology    ENDOSCOPY N/A 3/16/2018    Procedure: ESOPHAGOGASTRODUODENOSCOPY with biopsies;  Surgeon: Kaylie Mcfarlane MD;  Location: [facility] OR;  Service: Gastroenterology    FINGER TENDON REPAIR Right     little finger    HARDWARE REMOVAL Right 8/18/2017    Procedure: RIGHT EXTERNAL FIXATOR REMOVAL;  Surgeon: Anish Farah DPM;  Location: [facility] OR;  Service:     INCISION AND DRAINAGE LEG Left 6/13/2018    Procedure: Left 2nd, 3rd toe laceration irrigation, debridement and multi layer wound closure.;  Surgeon: Anish Farah DPM;  Location: [facility] OR;  Service: Podiatry    JOINT REPLACEMENT      KNEE SURGERY      scope left 6 times, right once    LEG DEBRIDEMENT Right 10/16/2020    Procedure: DEBRIDEMENT RIGHT  FOOT SURGICAL WOUND;  Surgeon: Anish Farah DPM;  Location: [facility] OR;  Service: Podiatry;  Laterality: Right;  DEBRIDEMENT RIGHT FOOT SURGICAL WOUND    NERVE TRANSFER Right     elbow    ORIF FOOT FRACTURE Right 7/29/2017    Procedure: open reduction with percutaneous pinning of midfoot dislocation fracture;  Surgeon: Anish Farah DPM;  Location: [facility] OR;  Service:     ORIF FOOT FRACTURE Left 3/5/2018    Procedure: OPEN REDUCTION WITH IRRIGATION AND WOUND CLOSURE LEFT FOURTH TOE;  Surgeon: Anish Farah DPM;  Location: [facility] OR;  Service:      TENDON LENGTHENING/SHORTENING Right     PSOAS    TOE FUSION Right 8/18/2017    Procedure: RIGHT  MEDIAL COLUMN ARTHRODESIS, RIGHT TARAL METATARSAL ARTHRODESIS;  Surgeon: Anish Farah DPM;  Location: [facility] OR;  Service:     TOTAL HIP ARTHROPLASTY Bilateral     TOTAL SHOULDER ARTHROPLASTY Right      Family History   Problem Relation Age of Onset    Arthritis Mother     Cancer Mother     Hyperlipidemia Mother     Colon polyps Mother     Arthritis Father     Cancer Father     Depression Father     Hypertension Father     Mental illness Father     Cancer Sister     Arthritis Sister     Hyperlipidemia Sister     Cancer Paternal Aunt     Hypertension Daughter     Depression Son     Hyperlipidemia Paternal Grandmother     Hearing loss Paternal Grandfather     Hyperlipidemia Paternal Grandfather     Hypertension Paternal Grandfather     Colon cancer Maternal Grandmother     Malig Hyperthermia Neg Hx      Social History     Tobacco Use    Smoking status: Never    Smokeless tobacco: Never   Vaping Use    Vaping Use: Never used   Substance Use Topics    Alcohol use: Yes     Comment: RARE    Drug use: No     No medications prior to admission.     Allergies:  Lisinopril    Immunization History   Administered Date(s) Administered    COVID-19 ([email or web address]) 05/05/2021    COVID-19 ([employer]) 05/05/2021    COVID-19 ([employer]) 6-11 YRS Monovalent 12/02/2021, 06/20/2022    COVID-19 ([employer]) BIVALENT 12+YRS 11/21/2022    COVID-19 ([employer]) Monovalent Original Booster 12/02/2021, 06/20/2022    Flu Vaccine Split Quad 10/07/2019    Fluzone (or Fluarix & Flulaval for VFC) >6mos 10/07/2019, 10/14/2020    Fluzone High Dose =>65 Years ([employer] ONLY) 09/01/2015    Fluzone Quad >6mos (Multi-dose) 10/23/2014, 10/13/2015, 10/10/2016, 10/20/2021    Influenza Injectable Mdck Pf Quad 10/03/2018, 10/11/2022, 11/16/2023    Influenza Quad Vaccine (Inpatient) 01/20/2014, 10/23/2014, 10/13/2015, 10/10/2016    Influenza Seasonal Injectable 10/13/2012     PPD Test 08/02/2017, 10/21/2020    Pneumococcal, Unspecified 09/01/2015, 10/13/2015    Tdap 07/22/2016       Objective       Objective     Vital Signs  Heart Rate:  [72] 72  Resp:  [18] 18  BP: (116)/(82) 116/82         Physical Exam:  Physical Exam  Vitals reviewed.   Cardiovascular:      Rate and Rhythm: Normal rate.   Pulmonary:      Effort: No respiratory distress.   Abdominal:      General: There is no distension.   Skin:     General: Skin is warm.   Neurological:      Mental Status: He is alert and oriented to person, place, and time.   Psychiatric:         Behavior: Behavior normal.         Emotional Behavior:           Depression  as noted                Anxiety  as noted     Debilities:     Agitation  as noted     Results Review:   I have reviewed these results below from the time of this admission to 12/7/2023 14:53 EST :  [x]  Laboratory  []  Microbiology  []  Radiology  [x]  EKG/Telemetry   []  Cardiology/Vascular   []  Pathology  [x]  Old records  []  Other:  Most notable findings include: Reviewed old records and his sodium appears to be 130-135 chronically        Assessment / Plan     Assessment & Plan     Active Hospital Problems:  Active Hospital Problems    Diagnosis     **Diabetic ulcer of right foot with bone involvement without evidence of necrosis      Plan:     Diabetic ulcer of right foot with bone involvement without necrosis  -Scheduled to undergo excision right fifth metatarsal head per podiatry    Type 2 diabetes  -Chronic continue home meds    HTN, CAD,  -Chronic no chest pain  -Continue home meds    COPD  -Chronic no exacerbation    Bipolar disorder  -Chronic mood appears stable    I discussed the patient's findings and my recommendations with patient .     Electronically signed by Freddie Sanchez DO, 12/07/23, 14:53 EST.    Note disclaimer: At [facility], we believe that sharing information builds trust and better relationships. You are receiving this note because you recently  visited [facility]. It is possible you will see health information before a provider has talked with you about it. This kind of information can be easy to misunderstand. To help you fully understand what it means for your health, we urge you to discuss this note with your provider.

## 2023-12-07 NOTE — PAT
Pt here for PAT visit.  Pre-op tests completed, chg soap given, and instructions reviewed.  Instructed clears until 2 hrs prior to arrival time, voiced understanding. Dr. Sanchez assessed patient during PAT and completed his H&P.

## 2023-12-07 NOTE — DISCHARGE INSTRUCTIONS
PRE-ADMISSION TESTING INSTRUCTIONS FOR ADULTS    Take these medications the morning of surgery with a small sip of water: Primidone, clonidine, propranolol, amlodipine, omeprazole. Can take ativan if needed and neurontin if you receive it.    *hold celebrex for 1 week prior to surgery*      Do not take any insulin or diabetes medications the morning of surgery.      No aspirin, advil, aleve, ibuprofen, naproxen, diet pills, decongestants, or herbal/vitamins for a week prior to surgery.       Tylenol/Acetaminophen is okay to take if needed.    General Instructions:    DO NOT EAT SOLID FOOD AFTER MIDNIGHT THE NIGHT BEFORE SURGERY. No gum, mints, or hard candy after midnight the night before surgery.  You may drink clear liquids the day of surgery up until 2 hours before your arrival time.  Clear liquids are liquids you can see through. Nothing RED in color.    Plain water    Sports drinks      Gelatin (Jell-O)  Fruit juices without pulp such as white grape juice and apple juice  Popsicles that contain no fruit or yogurt  Tea or coffee (no cream or milk added)    It is beneficial for you to have a clear drink that contains carbohydrates 2 hours before your arrival time.  We suggest a 20 ounce bottle of Gatorade or Powerade for non-diabetic patients or a 20 ounce bottle of Gatorade Zero or Powerade Zero for diabetic patients.     Patients who avoid smoking, chewing tobacco and alcohol for 4 weeks prior to surgery have a reduced risk of post-operative complications.  If at all possible, quit smoking as many days before surgery as you can.    Do not smoke, use chewing tobacco or drink alcohol the day of surgery    Bring your C-PAP/ BI-PAP machine if you use one.  Wear clean comfortable clothes.  Do not wear contact lenses, lotion, deodorant, or make-up.  Bring a case for your glasses if applicable. You may brush your teeth the morning of surgery.  You may wear dentures/partials, do not put adhesive/glue on them.  Leave  all other jewelry and valuables at home.      Preventing a Surgical Site Infection:    Shower the night before and on the morning of surgery using the chlorhexidine soap you were given.  Use a clean washcloth with the soap.  Place clean sheets on your bed after showering the night before surgery. Do not use the CHG soap on your hair, face, or private areas. Wash your body gently for five (5) minutes. Do not scrub your skin.  Dry with a clean towel and dress in clean clothing.  Do not shave the surgical area for 10 days-2 weeks prior to surgery  because the razor can irritate skin and make it easier to develop an infection.  Make sure you, your family, and all healthcare providers clean their hands with soap and water or an alcohol based hand  before caring for you or your wound.      Day of surgery:    Your surgeon’s office will advise you of your arrival time for the day of surgery.    Upon arrival, a Pre-op nurse and Anesthesia provider will review your health history, obtain vital signs, and answer questions you may have. The anesthesia provider will also discuss the type of anesthesia that will be needed for your procedure, which may include general anesthesia. The only belongings needed at this time will be your home medications and if applicable your C-PAP/BI-PAP machine.  If you are staying overnight your family can leave the rest of your belongings in the car and bring them to your room later.  A Pre-op nurse will start an IV and you may receive medication in preparation for surgery, including something to help you relax.  Your family will be able to see you in the Pre-op area.  While you are in surgery your family should notify the waiting room  if they leave the waiting room area and provide a contact phone number.    IF you have any questions, you can call the Pre-Admission Department at [phone] or your surgeon's office.  Notify your surgeon if  you become sick, have a fever,  productive cough, or cannot be here the day of surgery    Please be aware that surgery does come with discomfort.  We want to make every effort to control your discomfort so please discuss any uncontrolled symptoms with your nurse.   Your doctor will most likely have prescribed pain medications.      If you are going home after surgery, you will receive individualized written care instructions before being discharged.  A responsible adult (over the age of 18) must drive you to and from the hospital on the day of your surgery and stay with you for 24 hours after anesthesia.    If you are staying overnight following surgery, you will be transported to your hospital room following the recovery period.  [facility] has all private rooms.    You may receive a survey regarding the care you received. Your feedback is very important and will be used to collect the necessary data to help us to continue to provide excellent care.     Deductibles and co-payments are collected on the day of service. Please be prepared to pay the required co-pay, deductible or deposit on the day of service as defined by your plan.

## 2023-12-13 ENCOUNTER — ANESTHESIA EVENT (OUTPATIENT)
Dept: PERIOP | Facility: HOSPITAL | Age: 58
End: 2023-12-13
Payer: MEDICARE

## 2023-12-14 ENCOUNTER — HOSPITAL ENCOUNTER (OUTPATIENT)
Facility: HOSPITAL | Age: 58
Discharge: HOME | End: 2023-12-14
Attending: STUDENT IN AN ORGANIZED HEALTH CARE EDUCATION/TRAINING PROGRAM | Admitting: OBSTETRICS & GYNECOLOGY
Payer: MEDICARE

## 2023-12-14 ENCOUNTER — APPOINTMENT (OUTPATIENT)
Dept: GENERAL RADIOLOGY | Facility: HOSPITAL | Age: 58
End: 2023-12-14
Payer: MEDICARE

## 2023-12-14 ENCOUNTER — ANESTHESIA (OUTPATIENT)
Dept: PERIOP | Facility: HOSPITAL | Age: 58
End: 2023-12-14
Payer: MEDICARE

## 2023-12-14 VITALS
WEIGHT: 277.6 LBS | HEIGHT: 71 IN | SYSTOLIC BLOOD PRESSURE: 125 MMHG | TEMPERATURE: 97.5 F | DIASTOLIC BLOOD PRESSURE: 87 MMHG | BODY MASS INDEX: 38.86 KG/M2 | HEART RATE: 72 BPM | OXYGEN SATURATION: 94 % | RESPIRATION RATE: 16 BRPM

## 2023-12-14 DIAGNOSIS — L97.416 DIABETIC ULCER OF RIGHT MIDFOOT ASSOCIATED WITH TYPE 2 DIABETES MELLITUS, WITH BONE INVOLVEMENT WITHOUT EVIDENCE OF NECROSIS: Primary | ICD-10-CM

## 2023-12-14 DIAGNOSIS — E11.621 DIABETIC ULCER OF RIGHT MIDFOOT ASSOCIATED WITH TYPE 2 DIABETES MELLITUS, WITH BONE INVOLVEMENT WITHOUT EVIDENCE OF NECROSIS: Primary | ICD-10-CM

## 2023-12-14 DIAGNOSIS — L97.516 DIABETIC ULCER OF TOE OF RIGHT FOOT ASSOCIATED WITH DIABETES MELLITUS DUE TO UNDERLYING CONDITION, WITH BONE INVOLVEMENT WITHOUT EVIDENCE OF NECROSIS: ICD-10-CM

## 2023-12-14 DIAGNOSIS — E08.621 DIABETIC ULCER OF TOE OF RIGHT FOOT ASSOCIATED WITH DIABETES MELLITUS DUE TO UNDERLYING CONDITION, WITH BONE INVOLVEMENT WITHOUT EVIDENCE OF NECROSIS: ICD-10-CM

## 2023-12-14 LAB — GLUCOSE BLDC GLUCOMTR-MCNC: 84 MG/DL (ref 70–130)

## 2023-12-14 PROCEDURE — 87186 SC STD MICRODIL/AGAR DIL: CPT | Performed by: STUDENT IN AN ORGANIZED HEALTH CARE EDUCATION/TRAINING PROGRAM

## 2023-12-14 PROCEDURE — 25010000002 CEFAZOLIN 3 G RECONSTITUTED SOLUTION 1 EACH VIAL: Performed by: STUDENT IN AN ORGANIZED HEALTH CARE EDUCATION/TRAINING PROGRAM

## 2023-12-14 PROCEDURE — 25010000002 DEXAMETHASONE PER 1 MG: Performed by: NURSE ANESTHETIST, CERTIFIED REGISTERED

## 2023-12-14 PROCEDURE — 87147 CULTURE TYPE IMMUNOLOGIC: CPT | Performed by: STUDENT IN AN ORGANIZED HEALTH CARE EDUCATION/TRAINING PROGRAM

## 2023-12-14 PROCEDURE — 25010000002 PROPOFOL 200 MG/20ML EMULSION: Performed by: NURSE ANESTHETIST, CERTIFIED REGISTERED

## 2023-12-14 PROCEDURE — 88311 DECALCIFY TISSUE: CPT | Performed by: STUDENT IN AN ORGANIZED HEALTH CARE EDUCATION/TRAINING PROGRAM

## 2023-12-14 PROCEDURE — 25010000002 MIDAZOLAM PER 1MG: Performed by: NURSE ANESTHETIST, CERTIFIED REGISTERED

## 2023-12-14 PROCEDURE — 87176 TISSUE HOMOGENIZATION CULTR: CPT | Performed by: STUDENT IN AN ORGANIZED HEALTH CARE EDUCATION/TRAINING PROGRAM

## 2023-12-14 PROCEDURE — 25010000002 VANCOMYCIN 1 G RECONSTITUTED SOLUTION: Performed by: STUDENT IN AN ORGANIZED HEALTH CARE EDUCATION/TRAINING PROGRAM

## 2023-12-14 PROCEDURE — 73630 X-RAY EXAM OF FOOT: CPT

## 2023-12-14 PROCEDURE — 87205 SMEAR GRAM STAIN: CPT | Performed by: STUDENT IN AN ORGANIZED HEALTH CARE EDUCATION/TRAINING PROGRAM

## 2023-12-14 PROCEDURE — 25810000003 LACTATED RINGERS PER 1000 ML: Performed by: NURSE ANESTHETIST, CERTIFIED REGISTERED

## 2023-12-14 PROCEDURE — 82948 REAGENT STRIP/BLOOD GLUCOSE: CPT

## 2023-12-14 PROCEDURE — 73620 X-RAY EXAM OF FOOT: CPT

## 2023-12-14 PROCEDURE — 87077 CULTURE AEROBIC IDENTIFY: CPT | Performed by: STUDENT IN AN ORGANIZED HEALTH CARE EDUCATION/TRAINING PROGRAM

## 2023-12-14 PROCEDURE — C1713 ANCHOR/SCREW BN/BN,TIS/BN: HCPCS | Performed by: STUDENT IN AN ORGANIZED HEALTH CARE EDUCATION/TRAINING PROGRAM

## 2023-12-14 PROCEDURE — 76000 FLUOROSCOPY <1 HR PHYS/QHP: CPT

## 2023-12-14 PROCEDURE — 25010000002 LIDOCAINE 1 % SOLUTION: Performed by: STUDENT IN AN ORGANIZED HEALTH CARE EDUCATION/TRAINING PROGRAM

## 2023-12-14 PROCEDURE — 63710000001 ACETAMINOPHEN EXTRA STRENGTH 500 MG TABLET: Performed by: STUDENT IN AN ORGANIZED HEALTH CARE EDUCATION/TRAINING PROGRAM

## 2023-12-14 PROCEDURE — 25010000002 ONDANSETRON PER 1 MG: Performed by: NURSE ANESTHETIST, CERTIFIED REGISTERED

## 2023-12-14 PROCEDURE — 63710000001 CELECOXIB 100 MG CAPSULE: Performed by: STUDENT IN AN ORGANIZED HEALTH CARE EDUCATION/TRAINING PROGRAM

## 2023-12-14 PROCEDURE — A9270 NON-COVERED ITEM OR SERVICE: HCPCS | Performed by: STUDENT IN AN ORGANIZED HEALTH CARE EDUCATION/TRAINING PROGRAM

## 2023-12-14 PROCEDURE — 88305 TISSUE EXAM BY PATHOLOGIST: CPT | Performed by: STUDENT IN AN ORGANIZED HEALTH CARE EDUCATION/TRAINING PROGRAM

## 2023-12-14 PROCEDURE — 87070 CULTURE OTHR SPECIMN AEROBIC: CPT | Performed by: STUDENT IN AN ORGANIZED HEALTH CARE EDUCATION/TRAINING PROGRAM

## 2023-12-14 DEVICE — CMT BONE PALACOS/MVG W/GENT MD/VISC 40GM: Type: IMPLANTABLE DEVICE | Site: FOOT | Status: FUNCTIONAL

## 2023-12-14 RX ORDER — LIDOCAINE HYDROCHLORIDE 20 MG/ML
INJECTION, SOLUTION INFILTRATION; PERINEURAL AS NEEDED
Status: DISCONTINUED | OUTPATIENT
Start: 2023-12-14 | End: 2023-12-14 | Stop reason: SURG

## 2023-12-14 RX ORDER — SODIUM CHLORIDE, SODIUM LACTATE, POTASSIUM CHLORIDE, CALCIUM CHLORIDE 600; 310; 30; 20 MG/100ML; MG/100ML; MG/100ML; MG/100ML
100 INJECTION, SOLUTION INTRAVENOUS CONTINUOUS
Status: DISCONTINUED | OUTPATIENT
Start: 2023-12-14 | End: 2023-12-14 | Stop reason: HOSPADM

## 2023-12-14 RX ORDER — VANCOMYCIN HYDROCHLORIDE 1 G/20ML
INJECTION, POWDER, LYOPHILIZED, FOR SOLUTION INTRAVENOUS AS NEEDED
Status: DISCONTINUED | OUTPATIENT
Start: 2023-12-14 | End: 2023-12-14 | Stop reason: HOSPADM

## 2023-12-14 RX ORDER — HYDROCODONE BITARTRATE AND ACETAMINOPHEN 5; 325 MG/1; MG/1
1-2 TABLET ORAL EVERY 6 HOURS
Qty: 40 TABLET | Refills: 0 | Status: SHIPPED | OUTPATIENT
Start: 2023-12-14

## 2023-12-14 RX ORDER — LIDOCAINE HYDROCHLORIDE 10 MG/ML
INJECTION, SOLUTION INFILTRATION; PERINEURAL AS NEEDED
Status: DISCONTINUED | OUTPATIENT
Start: 2023-12-14 | End: 2023-12-14 | Stop reason: HOSPADM

## 2023-12-14 RX ORDER — CEFAZOLIN SODIUM 2 G/50ML
2000 SOLUTION INTRAVENOUS ONCE
Status: DISCONTINUED | OUTPATIENT
Start: 2023-12-14 | End: 2023-12-14

## 2023-12-14 RX ORDER — OXYCODONE HYDROCHLORIDE AND ACETAMINOPHEN 5; 325 MG/1; MG/1
1 TABLET ORAL ONCE AS NEEDED
Status: DISCONTINUED | OUTPATIENT
Start: 2023-12-14 | End: 2023-12-14 | Stop reason: HOSPADM

## 2023-12-14 RX ORDER — DEXAMETHASONE SODIUM PHOSPHATE 4 MG/ML
4 INJECTION, SOLUTION INTRA-ARTICULAR; INTRALESIONAL; INTRAMUSCULAR; INTRAVENOUS; SOFT TISSUE ONCE AS NEEDED
Status: COMPLETED | OUTPATIENT
Start: 2023-12-14 | End: 2023-12-14

## 2023-12-14 RX ORDER — NALOXONE HYDROCHLORIDE 4 MG/.1ML
SPRAY NASAL
Qty: 56 EACH | Refills: 0 | Status: SHIPPED | OUTPATIENT
Start: 2023-12-14

## 2023-12-14 RX ORDER — MIDAZOLAM HYDROCHLORIDE 2 MG/2ML
1 INJECTION, SOLUTION INTRAMUSCULAR; INTRAVENOUS
Status: DISCONTINUED | OUTPATIENT
Start: 2023-12-14 | End: 2023-12-14 | Stop reason: HOSPADM

## 2023-12-14 RX ORDER — SODIUM CHLORIDE 9 MG/ML
40 INJECTION, SOLUTION INTRAVENOUS AS NEEDED
Status: DISCONTINUED | OUTPATIENT
Start: 2023-12-14 | End: 2023-12-14 | Stop reason: HOSPADM

## 2023-12-14 RX ORDER — ONDANSETRON 2 MG/ML
4 INJECTION INTRAMUSCULAR; INTRAVENOUS ONCE AS NEEDED
Status: DISCONTINUED | OUTPATIENT
Start: 2023-12-14 | End: 2023-12-14 | Stop reason: HOSPADM

## 2023-12-14 RX ORDER — DEXMEDETOMIDINE HYDROCHLORIDE 100 UG/ML
INJECTION, SOLUTION INTRAVENOUS AS NEEDED
Status: DISCONTINUED | OUTPATIENT
Start: 2023-12-14 | End: 2023-12-14 | Stop reason: SURG

## 2023-12-14 RX ORDER — SODIUM CHLORIDE 0.9 % (FLUSH) 0.9 %
10 SYRINGE (ML) INJECTION EVERY 12 HOURS SCHEDULED
Status: DISCONTINUED | OUTPATIENT
Start: 2023-12-14 | End: 2023-12-14 | Stop reason: HOSPADM

## 2023-12-14 RX ORDER — SODIUM CHLORIDE, SODIUM LACTATE, POTASSIUM CHLORIDE, CALCIUM CHLORIDE 600; 310; 30; 20 MG/100ML; MG/100ML; MG/100ML; MG/100ML
9 INJECTION, SOLUTION INTRAVENOUS CONTINUOUS PRN
Status: DISCONTINUED | OUTPATIENT
Start: 2023-12-14 | End: 2023-12-14 | Stop reason: HOSPADM

## 2023-12-14 RX ORDER — PROPOFOL 10 MG/ML
INJECTION, EMULSION INTRAVENOUS AS NEEDED
Status: DISCONTINUED | OUTPATIENT
Start: 2023-12-14 | End: 2023-12-14 | Stop reason: SURG

## 2023-12-14 RX ORDER — CELECOXIB 100 MG/1
200 CAPSULE ORAL ONCE
Status: COMPLETED | OUTPATIENT
Start: 2023-12-14 | End: 2023-12-14

## 2023-12-14 RX ORDER — SODIUM CHLORIDE 0.9 % (FLUSH) 0.9 %
10 SYRINGE (ML) INJECTION AS NEEDED
Status: DISCONTINUED | OUTPATIENT
Start: 2023-12-14 | End: 2023-12-14 | Stop reason: HOSPADM

## 2023-12-14 RX ORDER — MAGNESIUM HYDROXIDE 1200 MG/15ML
LIQUID ORAL AS NEEDED
Status: DISCONTINUED | OUTPATIENT
Start: 2023-12-14 | End: 2023-12-14 | Stop reason: HOSPADM

## 2023-12-14 RX ORDER — OXYCODONE AND ACETAMINOPHEN 7.5; 325 MG/1; MG/1
1 TABLET ORAL ONCE AS NEEDED
Status: DISCONTINUED | OUTPATIENT
Start: 2023-12-14 | End: 2023-12-14 | Stop reason: HOSPADM

## 2023-12-14 RX ORDER — ACETAMINOPHEN 500 MG
1000 TABLET ORAL ONCE
Status: COMPLETED | OUTPATIENT
Start: 2023-12-14 | End: 2023-12-14

## 2023-12-14 RX ORDER — ONDANSETRON 2 MG/ML
4 INJECTION INTRAMUSCULAR; INTRAVENOUS ONCE AS NEEDED
Status: COMPLETED | OUTPATIENT
Start: 2023-12-14 | End: 2023-12-14

## 2023-12-14 RX ORDER — KETAMINE HYDROCHLORIDE 10 MG/ML
INJECTION INTRAMUSCULAR; INTRAVENOUS AS NEEDED
Status: DISCONTINUED | OUTPATIENT
Start: 2023-12-14 | End: 2023-12-14 | Stop reason: SURG

## 2023-12-14 RX ORDER — FAMOTIDINE 10 MG/ML
20 INJECTION, SOLUTION INTRAVENOUS
Status: COMPLETED | OUTPATIENT
Start: 2023-12-14 | End: 2023-12-14

## 2023-12-14 RX ADMIN — ACETAMINOPHEN 1000 MG: 500 TABLET ORAL at 07:29

## 2023-12-14 RX ADMIN — SODIUM CHLORIDE 3 G: 9 INJECTION, SOLUTION INTRAVENOUS at 08:18

## 2023-12-14 RX ADMIN — KETAMINE HYDROCHLORIDE 10 MG: 10 INJECTION INTRAMUSCULAR; INTRAVENOUS at 08:38

## 2023-12-14 RX ADMIN — DEXMEDETOMIDINE 4 MCG: 100 INJECTION, SOLUTION, CONCENTRATE INTRAVENOUS at 08:38

## 2023-12-14 RX ADMIN — FAMOTIDINE 20 MG: 10 INJECTION, SOLUTION INTRAVENOUS at 07:29

## 2023-12-14 RX ADMIN — KETAMINE HYDROCHLORIDE 30 MG: 10 INJECTION INTRAMUSCULAR; INTRAVENOUS at 08:13

## 2023-12-14 RX ADMIN — PROPOFOL INJECTABLE EMULSION 125 MCG/KG/MIN: 10 INJECTION, EMULSION INTRAVENOUS at 08:15

## 2023-12-14 RX ADMIN — DEXAMETHASONE SODIUM PHOSPHATE 4 MG: 4 INJECTION, SOLUTION INTRAMUSCULAR; INTRAVENOUS at 07:29

## 2023-12-14 RX ADMIN — PROPOFOL INJECTABLE EMULSION 70 MG: 10 INJECTION, EMULSION INTRAVENOUS at 08:13

## 2023-12-14 RX ADMIN — SODIUM CHLORIDE, POTASSIUM CHLORIDE, SODIUM LACTATE AND CALCIUM CHLORIDE 9 ML/HR: 600; 310; 30; 20 INJECTION, SOLUTION INTRAVENOUS at 07:30

## 2023-12-14 RX ADMIN — LIDOCAINE HYDROCHLORIDE 20 MG: 20 INJECTION, SOLUTION INFILTRATION; PERINEURAL at 08:13

## 2023-12-14 RX ADMIN — ONDANSETRON 4 MG: 2 INJECTION INTRAMUSCULAR; INTRAVENOUS at 07:29

## 2023-12-14 RX ADMIN — DEXMEDETOMIDINE 6 MCG: 100 INJECTION, SOLUTION, CONCENTRATE INTRAVENOUS at 08:11

## 2023-12-14 RX ADMIN — MIDAZOLAM HYDROCHLORIDE 1 MG: 1 INJECTION, SOLUTION INTRAMUSCULAR; INTRAVENOUS at 08:03

## 2023-12-14 RX ADMIN — CELECOXIB 200 MG: 100 CAPSULE ORAL at 07:29

## 2023-12-14 NOTE — OP NOTE
BONE EXCISION LOWER EXTERMITY  Procedure Report    Patient Name:  Eliseo Price  YOB: 1965    Date of Surgery:  12/14/2023     Indications: Nonhealing ulcer of the right foot with positive probe to bone    Pre-op Diagnosis:   Diabetic ulcer of toe of right foot associated with diabetes mellitus due to underlying condition, with bone involvement without evidence of necrosis [E08.621, L97.516]       Post-Op Diagnosis Codes:     * Diabetic ulcer of toe of right foot associated with diabetes mellitus due to underlying condition, with bone involvement without evidence of necrosis [E08.621, L97.516]    Procedure/CPT® Codes:  10039-XQ    Procedure(s):  BONE EXCISION LOWER EXTERMITY    Staff:  Surgeon(s):  Juan White DPM         Anesthesia: Choice    Estimated Blood Loss: minimal    Implants:    Implant Name Type Inv. Item Serial No.  Lot No. LRB No. Used Action   [facility] BONE PALACOS/MVG W/GENT MD/VISC 40GM - [id number] Implant [facility] BONE PALACOS/MVG [place]CEPCI[place] TEE/VISC 40GM  [facility] [id number] Right 1 Implanted       Specimen:          Specimens       ID Source Type Tests Collected By Collected At Frozen?    1 Foot, Right Tissue TISSUE / BONE CULTURE   Juan White DPM 12/14/23 0913     Description: FIFTH METATARSAL PROXIMAL MARGIN    This specimen was not marked as sent.    A Foot, Right Tissue TISSUE PATHOLOGY EXAM   Juan White DPM 12/14/23 0910     Description: FIFTH METATARSAL HEAD    This specimen was not marked as sent.                Findings: Ulcer to lateral right foot fifth metatarsal head measuring 1.6 cm x 1.4 cm by probes to bone.  Soft, devitalized bone to the fifth metatarsal head and at resection margins on metatarsal shaft.    Complications: None    Description of Procedure:   The patient was seen in the preoperative holding area.  IV access was initiated and cefazolin 3 gms was started.  The right was marked as the correct operative site.  The  procedure was discussed in detail with the patient and all questions were answered to the patient's satisfaction.      The patient was brought back to the operating room and placed on the OR table in the supine position.  20 cc of 1% lidocaine plain was used in a reverse Cosme block fashion to the fifth ray.  Anesthesia was initiated and a well-padded ankle tourniquet was applied to the left ankle.  The foot was cleaned, prepped, draped in the usual sterile manner.  A timeout was performed to ensure correct patient, site, procedure.    A 4 cm long incision was drawn over the right fifth metatarsal head.  An Esmarch bandage used to exsanguinate the foot and the tourniquet inflated 250 mmHg.  Incision was carried down to bone and the fifth metatarsal head was exposed.  The fifth metatarsal head was freed from its soft tissue attachments and removed in toto with a sagittal saw.  The bone was noted to be soft.  The fifth metatarsal head was sent to pathology.    A 3 mm portion of the remaining proximal fifth metatarsal was resected and sent to microbiology for culture and sensitivity.  This bone was also noted to be soft.  The area was flushed with copious amounts of sterile saline and packed with antibiotic bone cement with gentamicin and vancomycin.    Hemostasis was achieved electrocautery.  Deep tissue was closed with 4-0 antimicrobial Monocryl.  Skin was closed with 3-0 nylon.  20 cc of 0.25% Marcaine plain was given in reverse Cosme block fashion for postoperative anesthesia.  The surgical site was dressed with Betadine soaked Adaptic, gauze, ABD, Kerlix, Ace bandage.  A postop shoe was applied.      The patient tolerated the procedure and anesthesia well and was transferred to the PACU with vital signs stable and neurovascular status intact.  Patient was placed in the care of the PACU nurses and discharged home when stable per anesthesia.  Appropriate pain medication has been prescribed and patient has follow-up  scheduled with me in the office.          Juan White DPM     Date: 12/14/2023  Time: 09:36 EST

## 2023-12-14 NOTE — ANESTHESIA PREPROCEDURE EVALUATION
Anesthesia Evaluation     Patient summary reviewed and Nursing notes reviewed   no history of anesthetic complications:   NPO Solid Status: > 8 hours  NPO Liquid Status: > 8 hours           Airway   Mallampati: III  TM distance: <3 FB  Neck ROM: full  Possible difficult intubation  Dental - normal exam     Pulmonary - normal exam    breath sounds clear to auscultation  (+) COPD,sleep apnea on CPAP  (-) shortness of breath, not a smoker  Cardiovascular - normal exam  Exercise tolerance: good (4-7 METS)    ECG reviewed  Patient on routine beta blocker and Beta blocker given within 24 hours of surgery  Rhythm: regular  Rate: normal    (+) hypertension well controlled, CAD, PVD, DVT resolved, hyperlipidemia  (-) valvular problems/murmurs      Neuro/Psych  (+) CVA, tremors, numbness, psychiatric history Anxiety and Depression  (-) headaches  GI/Hepatic/Renal/Endo    (+) obesity, morbid obesity, GERD well controlled, PUD, GI bleeding , renal disease-, diabetes mellitus type 2 well controlled, thyroid problem   (-) liver disease    ROS Comment: Hx of GI bleed. Resolved.     Musculoskeletal     (-) chronic pain  Abdominal   (+) obese    Abdomen: soft.   Substance History   (-) alcohol use, drug use     OB/GYN          Other   arthritis,     (-) history of cancer                Anesthesia Plan    ASA 3     general and MAC     intravenous induction     Anesthetic plan, risks, benefits, and alternatives have been provided, discussed and informed consent has been obtained with: patient.    Use of blood products discussed with patient  Consented to blood products.    Plan discussed with CRNA and resident.      CODE STATUS:

## 2023-12-14 NOTE — INTERVAL H&P NOTE
H&P reviewed. The patient was examined and there are no changes to the H&P.    Vitals:    12/14/23 0649   BP: 107/78   Pulse: 71   Resp: 16   Temp: 98.1 °F (36.7 °C)   SpO2: 92%

## 2023-12-14 NOTE — ANESTHESIA POSTPROCEDURE EVALUATION
Patient: Eliseo Price    Procedure Summary       Date: 12/14/23 Room / Location: [facility]  /  [facility] OR    Anesthesia Start: 0805 Anesthesia Stop: 0934    Procedure: BONE EXCISION LOWER EXTERMITY (Right: Foot) Diagnosis:       Diabetic ulcer of toe of right foot associated with diabetes mellitus due to underlying condition, with bone involvement without evidence of necrosis      (Diabetic ulcer of toe of right foot associated with diabetes mellitus due to underlying condition, with bone involvement without evidence of necrosis [E08.621, L97.516])    Surgeons: Juan White DPM Provider: Shante Mayers CRNA    Anesthesia Type: general, MAC ASA Status: 3            Anesthesia Type: general, MAC    Vitals  Vitals Value Taken Time   /83 12/14/23 1005   Temp 97.5 °F (36.4 °C) 12/14/23 0935   Pulse 72 12/14/23 1005   Resp 14 12/14/23 1005   SpO2 92 % 12/14/23 1005           Post Anesthesia Care and Evaluation    Patient location during evaluation: bedside  Patient participation: complete - patient participated  Level of consciousness: awake and alert  Pain score: 2  Pain management: adequate    Airway patency: patent  Anesthetic complications: No anesthetic complications  PONV Status: none  Cardiovascular status: acceptable  Respiratory status: acceptable  Hydration status: acceptable  No anesthesia care post op

## 2023-12-17 LAB
BACTERIA SPEC AEROBE CULT: ABNORMAL
BACTERIA SPEC AEROBE CULT: ABNORMAL
GRAM STN SPEC: ABNORMAL
GRAM STN SPEC: ABNORMAL

## 2023-12-18 LAB
LAB AP CASE REPORT: NORMAL
PATH REPORT.FINAL DX SPEC: NORMAL
PATH REPORT.GROSS SPEC: NORMAL

## 2024-01-18 DIAGNOSIS — M86.9 OSTEOMYELITIS OF FIFTH TOE OF RIGHT FOOT: ICD-10-CM

## 2024-01-18 DIAGNOSIS — L97.512 RIGHT FOOT ULCER, WITH FAT LAYER EXPOSED: Primary | ICD-10-CM

## 2024-01-25 ENCOUNTER — HOSPITAL ENCOUNTER (OUTPATIENT)
Dept: GENERAL RADIOLOGY | Facility: HOSPITAL | Age: 59
Discharge: HOME | End: 2024-01-25
Admitting: STUDENT IN AN ORGANIZED HEALTH CARE EDUCATION/TRAINING PROGRAM
Payer: MEDICARE

## 2024-01-25 DIAGNOSIS — M86.9 OSTEOMYELITIS OF FIFTH TOE OF RIGHT FOOT: ICD-10-CM

## 2024-01-25 DIAGNOSIS — L97.512 RIGHT FOOT ULCER, WITH FAT LAYER EXPOSED: ICD-10-CM

## 2024-01-25 PROCEDURE — 73630 X-RAY EXAM OF FOOT: CPT

## 2024-03-11 ENCOUNTER — OFFICE VISIT (OUTPATIENT)
Dept: ORTHOPEDIC SURGERY | Facility: CLINIC | Age: 59
End: 2024-03-11
Payer: MEDICARE

## 2024-03-11 VITALS — BODY MASS INDEX: 38.78 KG/M2 | HEIGHT: 71 IN | WEIGHT: 277 LBS

## 2024-03-11 DIAGNOSIS — M17.12 PRIMARY OSTEOARTHRITIS OF LEFT KNEE: Primary | ICD-10-CM

## 2024-03-11 PROCEDURE — 99213 OFFICE O/P EST LOW 20 MIN: CPT | Performed by: ORTHOPAEDIC SURGERY

## 2024-03-11 PROCEDURE — 73562 X-RAY EXAM OF KNEE 3: CPT | Performed by: ORTHOPAEDIC SURGERY

## 2024-03-11 PROCEDURE — 1159F MED LIST DOCD IN RCRD: CPT | Performed by: ORTHOPAEDIC SURGERY

## 2024-03-11 PROCEDURE — 1160F RVW MEDS BY RX/DR IN RCRD: CPT | Performed by: ORTHOPAEDIC SURGERY

## 2024-03-11 PROCEDURE — 20610 DRAIN/INJ JOINT/BURSA W/O US: CPT | Performed by: ORTHOPAEDIC SURGERY

## 2024-03-11 RX ORDER — TRIAMCINOLONE ACETONIDE 40 MG/ML
80 INJECTION, SUSPENSION INTRA-ARTICULAR; INTRAMUSCULAR
Status: COMPLETED | OUTPATIENT
Start: 2024-03-11 | End: 2024-03-11

## 2024-03-11 RX ORDER — LIDOCAINE HYDROCHLORIDE 10 MG/ML
8 INJECTION, SOLUTION EPIDURAL; INFILTRATION; INTRACAUDAL; PERINEURAL
Status: COMPLETED | OUTPATIENT
Start: 2024-03-11 | End: 2024-03-11

## 2024-03-11 RX ADMIN — LIDOCAINE HYDROCHLORIDE 8 ML: 10 INJECTION, SOLUTION EPIDURAL; INFILTRATION; INTRACAUDAL; PERINEURAL at 13:21

## 2024-03-11 RX ADMIN — TRIAMCINOLONE ACETONIDE 80 MG: 40 INJECTION, SUSPENSION INTRA-ARTICULAR; INTRAMUSCULAR at 13:21

## 2024-03-11 NOTE — PROGRESS NOTES
Subjective:     Patient ID: Eliseo Price is a 58 y.o. male.    Chief Complaint:  Follow-up left knee pain, DJD  Last injection-11/6/2023  History of Present Illness  Eliseo Price returns to clinic today for for follow-up evaluation regarding his left knee.    The patient denies significant recurrence of pain to his left knee, particularly over the lateral joint line over the last 4 weeks, moderate intensity, aching in nature. He does note some anterior knee pain as well, particularly with deep flexion activities, stair climbing, and continuous upright activities. He denies any hip or groin pain. Mild radiation of pain down the anterior aspect of the leg, but not below the ankle, but denies any associated numbness or tingling. Minimal improvement with activity modification, rest, and home exercise program for strengthening.     Social History     Occupational History    Not on file   Tobacco Use    Smoking status: Never    Smokeless tobacco: Never   Vaping Use    Vaping status: Never Used   Substance and Sexual Activity    Alcohol use: Yes     Comment: RARE    Drug use: No    Sexual activity: Defer      Past Medical History:   Diagnosis Date    Amputated toe of left foot     all toes    Amputated toe of right foot     all toes    Anesthesia complication     DIFFICULT TO PUT TO SLEEP    Arthritis     Bipolar disorder     Cellulitis of lower extremity     RIGHT LOWER LEG    COPD (chronic obstructive pulmonary disease)     Coronary artery disease 2009    HEART ATTACK    Diabetes mellitus     Disease of thyroid gland     nodule on thyroid    DVT (deep venous thrombosis) 2005    right leg    Fracture of right foot     Gastric ulcer     GERD (gastroesophageal reflux disease)     Hyperlipidemia     Hypertension     MRSA infection 2003    history of left leg after surgery    Neuropathy     hands & feet    Pseudogout     RLS (restless legs syndrome)     Septic shock 07/2017    H/O    Sleep apnea     no machine, CANT  TOLERATE    Stroke     x2, no residual    Toxic metabolic encephalopathy 07/2017    H/O    Tremors of nervous system      Past Surgical History:   Procedure Laterality Date    AMPUTATION DIGIT Left 3/16/2018    Procedure: AMPUTATION LEFT FOURTH TOE;  Surgeon: Anish Farah DPM;  Location: [facility] OR;  Service: Podiatry    AMPUTATION DIGIT Right 10/13/2020    Procedure: AMPUTATION OF RIGHT FIFTH TOE AND ALL ASSOCIATED PROCEDURES;  Surgeon: Anish Farah DPM;  Location: [facility] OR;  Service: Podiatry;  Laterality: Right;  AMPUTATION OF RIGHT FIFTH TOE     AMPUTATION DIGIT Right 1/19/2021    Procedure: AMPUTATION RIGHT FOURTH TOE;  Surgeon: Anish Farah DPM;  Location: [facility] OR;  Service: Podiatry;  Laterality: Right;    AMPUTATION DIGIT Right 7/30/2021    Procedure: AMPUTATION RIGHT THIRD TOE;  Surgeon: Anish Farah DPM;  Location: [facility] OR;  Service: Podiatry;  Laterality: Right;  RIGHT 3rd toe amputation     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    BONE EXCISION LEG Right 12/14/2023    Procedure: BONE EXCISION LOWER EXTERMITY;  Surgeon: Juan White DPM;  Location: [facility] OR;  Service: Podiatry;  Laterality: Right;    COLONOSCOPY N/A 7/16/2018    Procedure: COLONOSCOPY and polypectomy;  Surgeon: Kaylie Mcfarlane MD;  Location: [facility] OR;  Service: Gastroenterology    ENDOSCOPY N/A 3/16/2018    Procedure: ESOPHAGOGASTRODUODENOSCOPY with biopsies;  Surgeon: Kaylie Mcfarlane MD;  Location: [facility] OR;  Service: Gastroenterology    FINGER TENDON REPAIR Right     little finger    HARDWARE REMOVAL Right 8/18/2017    Procedure: RIGHT EXTERNAL FIXATOR REMOVAL;  Surgeon: Anish Farah DPM;  Location: [facility] OR;  Service:     INCISION AND DRAINAGE LEG Left 6/13/2018    Procedure: Left 2nd, 3rd toe laceration irrigation, debridement and multi layer wound closure.;  Surgeon: Anish Farah DPM;  Location: [facility] OR;  Service: Podiatry    JOINT REPLACEMENT      KNEE SURGERY      scope left 6  "times, right once    LEG DEBRIDEMENT Right 10/16/2020    Procedure: DEBRIDEMENT RIGHT  FOOT SURGICAL WOUND;  Surgeon: Anish Farah DPM;  Location:  [facility] OR;  Service: Podiatry;  Laterality: Right;  DEBRIDEMENT RIGHT FOOT SURGICAL WOUND    NERVE TRANSFER Right     elbow    ORIF FOOT FRACTURE Right 7/29/2017    Procedure: open reduction with percutaneous pinning of midfoot dislocation fracture;  Surgeon: Anish Farah DPM;  Location:  [facility] OR;  Service:     ORIF FOOT FRACTURE Left 3/5/2018    Procedure: OPEN REDUCTION WITH IRRIGATION AND WOUND CLOSURE LEFT FOURTH TOE;  Surgeon: Anish Farah DPM;  Location:  [facility] OR;  Service:     TENDON LENGTHENING/SHORTENING Right     PSOAS    TOE FUSION Right 8/18/2017    Procedure: RIGHT  MEDIAL COLUMN ARTHRODESIS, RIGHT TARAL METATARSAL ARTHRODESIS;  Surgeon: Anish Farah DPM;  Location:  [facility] OR;  Service:     TOTAL HIP ARTHROPLASTY Bilateral     TOTAL SHOULDER ARTHROPLASTY Right        Family History   Problem Relation Age of Onset    Arthritis Mother     Cancer Mother     Hyperlipidemia Mother     Colon polyps Mother     Arthritis Father     Cancer Father     Depression Father     Hypertension Father     Mental illness Father     Cancer Sister     Arthritis Sister     Hyperlipidemia Sister     Cancer Paternal Aunt     Hypertension Daughter     Depression Son     Hyperlipidemia Paternal Grandmother     Hearing loss Paternal Grandfather     Hyperlipidemia Paternal Grandfather     Hypertension Paternal Grandfather     Colon cancer Maternal Grandmother     Malig Hyperthermia Neg Hx          Review of Systems        Objective:  Vitals:    03/11/24 1317   Weight: 126 kg (277 lb)   Height: 180.3 cm (71\")         03/11/24  1317   Weight: 126 kg (277 lb)     Body mass index is 38.63 kg/m².  General: No acute distress.  Resp: normal respiratory effort  Skin: no rashes or wounds; normal turgor  Psych: mood and affect appropriate; recent and remote memory intact        " Ortho Exam     Left knee  Active range of motion 0 to 120 degrees.  4+/5 strength on flexionand  extension.   Moderate effusion.   Maximal tenderness to palpation, lateral joint line.   Positive Yared's exam with pain in the lateral joint line, no click.   Stable to varus and valgus stress at 0 and 30 degrees.   Good endpoint and posterior drawer at 90 degrees.   Positive active patellar compression test.    Imaging:  Left Knee X-Ray  Indication: Pain    AP, Lateral, and Zavalla views    Findings:  No fracture  No bony lesion  Normal soft tissues  Moderate tricompartmental osteoarthritis most significant in the medial and patellofemoral compartments, mild reactive osteophyte formation noted, no evidence of bone-on-bone articulation.  Compared to prior office x-rays    Assessment:        1. Primary osteoarthritis of left knee           Plan:      Large Joint Arthrocentesis: L knee  Date/Time: 3/11/2024 1:21 PM  Consent given by: patient  Site marked: site marked  Timeout: Immediately prior to procedure a time out was called to verify the correct patient, procedure, equipment, support staff and site/side marked as required   Supporting Documentation  Indications: pain   Procedure Details  Location: knee - L knee  Preparation: Patient was prepped and draped in the usual sterile fashion  Needle size: 22 G  Approach: anterolateral  Medications administered: 8 mL lidocaine PF 1% 1 %; 80 mg triamcinolone acetonide 40 MG/ML  Patient tolerance: patient tolerated the procedure well with no immediate complications        Left knee pain  Discussed treatment options at length with patient at today's visit.   The patient had been doing reasonably well following previous injection and has had significant recurrence of pain into his left knee. Given his significant recurrence of pain, we discussed options including but not limited to observation, physical therapy, medication, intra-articular injection, and total knee  arthroplasty. He would like to hold off on surgical treatment at this time and given good success with the injection, we would like to proceed with that today.   Patient would like to proceed with cortisone injection today to the left knee. Recommended limited use of affected extremity for the next 24 hours to only essential activites other than work on general active and passive motion. Recommended supplementing with ice and soft tissue massage. Discussed with patient that they should see results in 5-7 days, if no improvement in 5-6 weeks I have asked them to call the office to review other options. Patient should call office immediately if they notice redness, warmth, fevers, chills, or residual numbness or tingling for greater than 6 hours after injection.   Continue with hip, core, and quad strengthening exercises at home as well as weight loss with low impact activities and dieting.   Follow-up in 4 months.  He is in agreement. All questions answered.    Eliseo Price was in agreement with plan and had all questions answered.     Orders:  Orders Placed This Encounter   Procedures    Large Joint Arthrocentesis: L knee    XR Knee 3+ View With [place] Left       Medications:  No orders of the defined types were placed in this encounter.      Followup:  Return in about 4 months (around 7/11/2024).    Diagnoses and all orders for this visit:    1. Primary osteoarthritis of left knee (Primary)  -     XR Knee 3+ View With [place] Left  -     Large Joint Arthrocentesis: L knee          Dictated utilizing Dragon dictation     Transcribed from ambient dictation for Valentin Barfield MD by Martha Becerril.  03/11/24   16:57 EDT    Patient or patient representative verbalized consent to the visit recording.  I have personally performed the services described in this document as transcribed by the above individual, and it is both accurate and complete.

## 2024-03-12 ENCOUNTER — APPOINTMENT (OUTPATIENT)
Dept: GENERAL RADIOLOGY | Facility: HOSPITAL | Age: 59
End: 2024-03-12
Payer: MEDICARE

## 2024-03-12 ENCOUNTER — APPOINTMENT (OUTPATIENT)
Dept: CT IMAGING | Facility: HOSPITAL | Age: 59
End: 2024-03-12
Payer: MEDICARE

## 2024-03-12 ENCOUNTER — HOSPITAL ENCOUNTER (OUTPATIENT)
Facility: HOSPITAL | Age: 59
Setting detail: OBSERVATION
Discharge: HOME | End: 2024-03-13
Attending: EMERGENCY MEDICINE | Admitting: HOSPITALIST
Payer: MEDICARE

## 2024-03-12 DIAGNOSIS — E87.20 LACTIC ACIDOSIS: Primary | ICD-10-CM

## 2024-03-12 LAB
ACETONE BLD QL: ABNORMAL
ALBUMIN SERPL-MCNC: 4.4 G/DL (ref 3.5–5.2)
ALBUMIN/GLOB SERPL: 1 G/DL
ALP SERPL-CCNC: 77 U/L (ref 39–117)
ALT SERPL W P-5'-P-CCNC: 24 U/L (ref 1–41)
AMPHET+METHAMPHET UR QL: NEGATIVE
AMPHETAMINES UR QL: NEGATIVE
ANION GAP SERPL CALCULATED.3IONS-SCNC: 23.8 MMOL/L (ref 5–15)
AST SERPL-CCNC: 28 U/L (ref 1–40)
BARBITURATES UR QL SCN: POSITIVE
BASOPHILS # BLD AUTO: 0 10*3/MM3 (ref 0–0.2)
BASOPHILS NFR BLD AUTO: 0 % (ref 0–1.5)
BENZODIAZ UR QL SCN: POSITIVE
BILIRUB SERPL-MCNC: 0.3 MG/DL (ref 0–1.2)
BILIRUB UR QL STRIP: NEGATIVE
BUN SERPL-MCNC: 15 MG/DL (ref 8–23)
BUN/CREAT SERPL: 18.8 (ref 7–25)
BUPRENORPHINE SERPL-MCNC: NEGATIVE NG/ML
CALCIUM SPEC-SCNC: 9.1 MG/DL (ref 8.6–10.5)
CANNABINOIDS SERPL QL: NEGATIVE
CHLORIDE SERPL-SCNC: 92 MMOL/L (ref 98–107)
CK SERPL-CCNC: 443 U/L (ref 20–200)
CLARITY UR: CLEAR
CO2 SERPL-SCNC: 15.2 MMOL/L (ref 22–29)
COCAINE UR QL: NEGATIVE
COLOR UR: YELLOW
CREAT SERPL-MCNC: 0.8 MG/DL (ref 0.76–1.27)
D-LACTATE SERPL-SCNC: 1.5 MMOL/L (ref 0.5–2)
D-LACTATE SERPL-SCNC: 3.3 MMOL/L (ref 0.5–2)
D-LACTATE SERPL-SCNC: 7.9 MMOL/L (ref 0.5–2)
DEPRECATED RDW RBC AUTO: 41.6 FL (ref 37–54)
EGFRCR SERPLBLD CKD-EPI 2021: 101.3 ML/MIN/1.73
EOSINOPHIL # BLD AUTO: 0 10*3/MM3 (ref 0–0.4)
EOSINOPHIL NFR BLD AUTO: 0 % (ref 0.3–6.2)
ERYTHROCYTE [DISTWIDTH] IN BLOOD BY AUTOMATED COUNT: 12 % (ref 12.3–15.4)
ETHANOL BLD-MCNC: 53 MG/DL (ref 0–10)
ETHANOL UR QL: 0.05 %
GLOBULIN UR ELPH-MCNC: 4.4 GM/DL
GLUCOSE SERPL-MCNC: 73 MG/DL (ref 65–99)
GLUCOSE UR STRIP-MCNC: NEGATIVE MG/DL
HBA1C MFR BLD: 5.2 % (ref 4.8–5.6)
HCT VFR BLD AUTO: 45.8 % (ref 37.5–51)
HGB BLD-MCNC: 15.3 G/DL (ref 13–17.7)
HGB UR QL STRIP.AUTO: NEGATIVE
IMM GRANULOCYTES # BLD AUTO: 0.04 10*3/MM3 (ref 0–0.05)
IMM GRANULOCYTES NFR BLD AUTO: 0.4 % (ref 0–0.5)
KETONES UR QL STRIP: ABNORMAL
LEUKOCYTE ESTERASE UR QL STRIP.AUTO: NEGATIVE
LYMPHOCYTES # BLD AUTO: 1.12 10*3/MM3 (ref 0.7–3.1)
LYMPHOCYTES NFR BLD AUTO: 11.5 % (ref 19.6–45.3)
MCH RBC QN AUTO: 31.3 PG (ref 26.6–33)
MCHC RBC AUTO-ENTMCNC: 33.4 G/DL (ref 31.5–35.7)
MCV RBC AUTO: 93.7 FL (ref 79–97)
METHADONE UR QL SCN: NEGATIVE
MONOCYTES # BLD AUTO: 0.99 10*3/MM3 (ref 0.1–0.9)
MONOCYTES NFR BLD AUTO: 10.1 % (ref 5–12)
NEUTROPHILS NFR BLD AUTO: 7.62 10*3/MM3 (ref 1.7–7)
NEUTROPHILS NFR BLD AUTO: 78 % (ref 42.7–76)
NITRITE UR QL STRIP: NEGATIVE
OPIATES UR QL: NEGATIVE
OXYCODONE UR QL SCN: NEGATIVE
PCP UR QL SCN: NEGATIVE
PH UR STRIP.AUTO: 5.5 [PH] (ref 4.5–8)
PLATELET # BLD AUTO: 246 10*3/MM3 (ref 140–450)
PMV BLD AUTO: 8.3 FL (ref 6–12)
POTASSIUM SERPL-SCNC: 4.7 MMOL/L (ref 3.5–5.2)
PROCALCITONIN SERPL-MCNC: <0.02 NG/ML (ref 0–0.25)
PROT SERPL-MCNC: 8.8 G/DL (ref 6–8.5)
PROT UR QL STRIP: NEGATIVE
RBC # BLD AUTO: 4.89 10*6/MM3 (ref 4.14–5.8)
SODIUM SERPL-SCNC: 131 MMOL/L (ref 136–145)
SP GR UR STRIP: 1.02 (ref 1–1.03)
TRICYCLICS UR QL SCN: NEGATIVE
TSH SERPL DL<=0.05 MIU/L-ACNC: 0.34 UIU/ML (ref 0.27–4.2)
UROBILINOGEN UR QL STRIP: ABNORMAL
WBC NRBC COR # BLD AUTO: 9.77 10*3/MM3 (ref 3.4–10.8)

## 2024-03-12 PROCEDURE — 99223 1ST HOSP IP/OBS HIGH 75: CPT | Performed by: NURSE PRACTITIONER

## 2024-03-12 PROCEDURE — 93010 ELECTROCARDIOGRAM REPORT: CPT | Performed by: INTERNAL MEDICINE

## 2024-03-12 PROCEDURE — 96361 HYDRATE IV INFUSION ADD-ON: CPT

## 2024-03-12 PROCEDURE — 36415 COLL VENOUS BLD VENIPUNCTURE: CPT

## 2024-03-12 PROCEDURE — 71045 X-RAY EXAM CHEST 1 VIEW: CPT

## 2024-03-12 PROCEDURE — 82803 BLOOD GASES ANY COMBINATION: CPT

## 2024-03-12 PROCEDURE — 72072 X-RAY EXAM THORAC SPINE 3VWS: CPT

## 2024-03-12 PROCEDURE — 87040 BLOOD CULTURE FOR BACTERIA: CPT | Performed by: EMERGENCY MEDICINE

## 2024-03-12 PROCEDURE — 96365 THER/PROPH/DIAG IV INF INIT: CPT

## 2024-03-12 PROCEDURE — 82077 ASSAY SPEC XCP UR&BREATH IA: CPT | Performed by: EMERGENCY MEDICINE

## 2024-03-12 PROCEDURE — 93005 ELECTROCARDIOGRAM TRACING: CPT | Performed by: NURSE PRACTITIONER

## 2024-03-12 PROCEDURE — 85025 COMPLETE CBC W/AUTO DIFF WBC: CPT | Performed by: EMERGENCY MEDICINE

## 2024-03-12 PROCEDURE — 36600 WITHDRAWAL OF ARTERIAL BLOOD: CPT

## 2024-03-12 PROCEDURE — G0378 HOSPITAL OBSERVATION PER HR: HCPCS

## 2024-03-12 PROCEDURE — 80306 DRUG TEST PRSMV INSTRMNT: CPT | Performed by: NURSE PRACTITIONER

## 2024-03-12 PROCEDURE — 84443 ASSAY THYROID STIM HORMONE: CPT | Performed by: NURSE PRACTITIONER

## 2024-03-12 PROCEDURE — 82009 KETONE BODYS QUAL: CPT | Performed by: EMERGENCY MEDICINE

## 2024-03-12 PROCEDURE — 25810000003 SODIUM CHLORIDE 0.9 % SOLUTION: Performed by: EMERGENCY MEDICINE

## 2024-03-12 PROCEDURE — 96376 TX/PRO/DX INJ SAME DRUG ADON: CPT

## 2024-03-12 PROCEDURE — 25010000002 THIAMINE HCL 200 MG/2ML SOLUTION: Performed by: NURSE PRACTITIONER

## 2024-03-12 PROCEDURE — 25010000002 THIAMINE HCL 200 MG/2ML SOLUTION 2 ML VIAL: Performed by: EMERGENCY MEDICINE

## 2024-03-12 PROCEDURE — 93005 ELECTROCARDIOGRAM TRACING: CPT | Performed by: HOSPITALIST

## 2024-03-12 PROCEDURE — 80053 COMPREHEN METABOLIC PANEL: CPT | Performed by: EMERGENCY MEDICINE

## 2024-03-12 PROCEDURE — 25810000003 SODIUM CHLORIDE 0.9 % SOLUTION: Performed by: NURSE PRACTITIONER

## 2024-03-12 PROCEDURE — 87147 CULTURE TYPE IMMUNOLOGIC: CPT | Performed by: EMERGENCY MEDICINE

## 2024-03-12 PROCEDURE — 99285 EMERGENCY DEPT VISIT HI MDM: CPT

## 2024-03-12 PROCEDURE — 84145 PROCALCITONIN (PCT): CPT | Performed by: EMERGENCY MEDICINE

## 2024-03-12 PROCEDURE — 70450 CT HEAD/BRAIN W/O DYE: CPT

## 2024-03-12 PROCEDURE — 72125 CT NECK SPINE W/O DYE: CPT

## 2024-03-12 PROCEDURE — 81003 URINALYSIS AUTO W/O SCOPE: CPT | Performed by: EMERGENCY MEDICINE

## 2024-03-12 PROCEDURE — 83605 ASSAY OF LACTIC ACID: CPT | Performed by: EMERGENCY MEDICINE

## 2024-03-12 PROCEDURE — 25010000002 ENOXAPARIN PER 10 MG: Performed by: NURSE PRACTITIONER

## 2024-03-12 PROCEDURE — 83036 HEMOGLOBIN GLYCOSYLATED A1C: CPT | Performed by: NURSE PRACTITIONER

## 2024-03-12 PROCEDURE — 25810000003 DEXTROSE-NACL PER 500 ML: Performed by: EMERGENCY MEDICINE

## 2024-03-12 PROCEDURE — 82550 ASSAY OF CK (CPK): CPT | Performed by: NURSE PRACTITIONER

## 2024-03-12 PROCEDURE — 72170 X-RAY EXAM OF PELVIS: CPT

## 2024-03-12 PROCEDURE — 96372 THER/PROPH/DIAG INJ SC/IM: CPT

## 2024-03-12 PROCEDURE — 96375 TX/PRO/DX INJ NEW DRUG ADDON: CPT

## 2024-03-12 PROCEDURE — 25010000002 MIDAZOLAM PER 1MG: Performed by: NURSE PRACTITIONER

## 2024-03-12 RX ORDER — SODIUM CHLORIDE 0.9 % (FLUSH) 0.9 %
10 SYRINGE (ML) INJECTION AS NEEDED
Status: DISCONTINUED | OUTPATIENT
Start: 2024-03-12 | End: 2024-03-13 | Stop reason: HOSPADM

## 2024-03-12 RX ORDER — MIDAZOLAM HYDROCHLORIDE 2 MG/2ML
2 INJECTION, SOLUTION INTRAMUSCULAR; INTRAVENOUS
Status: DISCONTINUED | OUTPATIENT
Start: 2024-03-12 | End: 2024-03-13 | Stop reason: HOSPADM

## 2024-03-12 RX ORDER — SODIUM CHLORIDE 9 MG/ML
40 INJECTION, SOLUTION INTRAVENOUS AS NEEDED
Status: DISCONTINUED | OUTPATIENT
Start: 2024-03-12 | End: 2024-03-13 | Stop reason: HOSPADM

## 2024-03-12 RX ORDER — AMOXICILLIN 250 MG
2 CAPSULE ORAL 2 TIMES DAILY PRN
Status: DISCONTINUED | OUTPATIENT
Start: 2024-03-12 | End: 2024-03-13 | Stop reason: HOSPADM

## 2024-03-12 RX ORDER — LORAZEPAM 1 MG/1
1 TABLET ORAL
Status: DISCONTINUED | OUTPATIENT
Start: 2024-03-12 | End: 2024-03-13 | Stop reason: HOSPADM

## 2024-03-12 RX ORDER — MIDAZOLAM HYDROCHLORIDE 2 MG/2ML
4 INJECTION, SOLUTION INTRAMUSCULAR; INTRAVENOUS
Status: DISCONTINUED | OUTPATIENT
Start: 2024-03-12 | End: 2024-03-13 | Stop reason: HOSPADM

## 2024-03-12 RX ORDER — SODIUM CHLORIDE 9 MG/ML
100 INJECTION, SOLUTION INTRAVENOUS CONTINUOUS
Status: DISCONTINUED | OUTPATIENT
Start: 2024-03-12 | End: 2024-03-13 | Stop reason: HOSPADM

## 2024-03-12 RX ORDER — LORAZEPAM 1 MG/1
2 TABLET ORAL
Status: DISCONTINUED | OUTPATIENT
Start: 2024-03-12 | End: 2024-03-13 | Stop reason: HOSPADM

## 2024-03-12 RX ORDER — THIAMINE HYDROCHLORIDE 100 MG/ML
200 INJECTION, SOLUTION INTRAMUSCULAR; INTRAVENOUS EVERY 8 HOURS SCHEDULED
Status: DISCONTINUED | OUTPATIENT
Start: 2024-03-12 | End: 2024-03-13 | Stop reason: HOSPADM

## 2024-03-12 RX ORDER — ONDANSETRON 2 MG/ML
4 INJECTION INTRAMUSCULAR; INTRAVENOUS EVERY 6 HOURS PRN
Status: DISCONTINUED | OUTPATIENT
Start: 2024-03-12 | End: 2024-03-13 | Stop reason: HOSPADM

## 2024-03-12 RX ORDER — BISACODYL 10 MG
10 SUPPOSITORY, RECTAL RECTAL DAILY PRN
Status: DISCONTINUED | OUTPATIENT
Start: 2024-03-12 | End: 2024-03-13 | Stop reason: HOSPADM

## 2024-03-12 RX ORDER — POLYETHYLENE GLYCOL 3350 17 G/17G
17 POWDER, FOR SOLUTION ORAL DAILY PRN
Status: DISCONTINUED | OUTPATIENT
Start: 2024-03-12 | End: 2024-03-13 | Stop reason: HOSPADM

## 2024-03-12 RX ORDER — PANTOPRAZOLE SODIUM 40 MG/1
40 TABLET, DELAYED RELEASE ORAL
Status: DISCONTINUED | OUTPATIENT
Start: 2024-03-13 | End: 2024-03-13 | Stop reason: HOSPADM

## 2024-03-12 RX ORDER — FOLIC ACID 1 MG/1
1 TABLET ORAL DAILY
Status: DISCONTINUED | OUTPATIENT
Start: 2024-03-13 | End: 2024-03-13 | Stop reason: HOSPADM

## 2024-03-12 RX ORDER — ACETAMINOPHEN 325 MG/1
650 TABLET ORAL EVERY 4 HOURS PRN
Status: DISCONTINUED | OUTPATIENT
Start: 2024-03-12 | End: 2024-03-13 | Stop reason: HOSPADM

## 2024-03-12 RX ORDER — ENOXAPARIN SODIUM 100 MG/ML
40 INJECTION SUBCUTANEOUS EVERY 24 HOURS
Status: DISCONTINUED | OUTPATIENT
Start: 2024-03-12 | End: 2024-03-13 | Stop reason: HOSPADM

## 2024-03-12 RX ORDER — SODIUM CHLORIDE 0.9 % (FLUSH) 0.9 %
10 SYRINGE (ML) INJECTION EVERY 12 HOURS SCHEDULED
Status: DISCONTINUED | OUTPATIENT
Start: 2024-03-12 | End: 2024-03-13 | Stop reason: HOSPADM

## 2024-03-12 RX ORDER — BISACODYL 5 MG/1
5 TABLET, DELAYED RELEASE ORAL DAILY PRN
Status: DISCONTINUED | OUTPATIENT
Start: 2024-03-12 | End: 2024-03-13 | Stop reason: HOSPADM

## 2024-03-12 RX ORDER — ACETAMINOPHEN 650 MG/1
650 SUPPOSITORY RECTAL EVERY 4 HOURS PRN
Status: DISCONTINUED | OUTPATIENT
Start: 2024-03-12 | End: 2024-03-13 | Stop reason: HOSPADM

## 2024-03-12 RX ORDER — DEXTROSE AND SODIUM CHLORIDE 5; .9 G/100ML; G/100ML
125 INJECTION, SOLUTION INTRAVENOUS CONTINUOUS
Status: DISCONTINUED | OUTPATIENT
Start: 2024-03-12 | End: 2024-03-13 | Stop reason: HOSPADM

## 2024-03-12 RX ORDER — ACETAMINOPHEN 500 MG
1000 TABLET ORAL ONCE
Status: COMPLETED | OUTPATIENT
Start: 2024-03-12 | End: 2024-03-12

## 2024-03-12 RX ORDER — NITROGLYCERIN 0.4 MG/1
0.4 TABLET SUBLINGUAL
Status: DISCONTINUED | OUTPATIENT
Start: 2024-03-12 | End: 2024-03-13 | Stop reason: HOSPADM

## 2024-03-12 RX ORDER — ACETAMINOPHEN 160 MG/5ML
650 SOLUTION ORAL EVERY 4 HOURS PRN
Status: DISCONTINUED | OUTPATIENT
Start: 2024-03-12 | End: 2024-03-13 | Stop reason: HOSPADM

## 2024-03-12 RX ORDER — ONDANSETRON 4 MG/1
4 TABLET, ORALLY DISINTEGRATING ORAL EVERY 6 HOURS PRN
Status: DISCONTINUED | OUTPATIENT
Start: 2024-03-12 | End: 2024-03-13 | Stop reason: HOSPADM

## 2024-03-12 RX ORDER — LOSARTAN POTASSIUM 50 MG/1
100 TABLET ORAL EVERY MORNING
Status: DISCONTINUED | OUTPATIENT
Start: 2024-03-13 | End: 2024-03-12

## 2024-03-12 RX ADMIN — LORAZEPAM 2 MG: 1 TABLET ORAL at 22:17

## 2024-03-12 RX ADMIN — SODIUM CHLORIDE 100 ML/HR: 9 INJECTION, SOLUTION INTRAVENOUS at 23:20

## 2024-03-12 RX ADMIN — THIAMINE HYDROCHLORIDE 100 MG: 100 INJECTION, SOLUTION INTRAMUSCULAR; INTRAVENOUS at 18:16

## 2024-03-12 RX ADMIN — DEXTROSE AND SODIUM CHLORIDE 125 ML/HR: 5; 900 INJECTION, SOLUTION INTRAVENOUS at 18:52

## 2024-03-12 RX ADMIN — MIDAZOLAM HYDROCHLORIDE 2 MG: 1 INJECTION, SOLUTION INTRAMUSCULAR; INTRAVENOUS at 23:24

## 2024-03-12 RX ADMIN — SODIUM CHLORIDE 1000 ML: 9 INJECTION, SOLUTION INTRAVENOUS at 18:28

## 2024-03-12 RX ADMIN — THIAMINE HYDROCHLORIDE 200 MG: 100 INJECTION, SOLUTION INTRAMUSCULAR; INTRAVENOUS at 22:17

## 2024-03-12 RX ADMIN — ACETAMINOPHEN 1000 MG: 500 TABLET ORAL at 16:48

## 2024-03-12 RX ADMIN — ENOXAPARIN SODIUM 40 MG: 40 INJECTION SUBCUTANEOUS at 22:16

## 2024-03-12 RX ADMIN — SODIUM CHLORIDE 100 ML/HR: 9 INJECTION, SOLUTION INTRAVENOUS at 20:31

## 2024-03-12 RX ADMIN — SODIUM CHLORIDE 1000 ML: 9 INJECTION, SOLUTION INTRAVENOUS at 22:15

## 2024-03-12 RX ADMIN — Medication 10 ML: at 20:30
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
## 2024-03-12 NOTE — H&P
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 suspected secondary to dehydration  IV hydrate  Recheck lab in am  Add CIWA protocol  IV thiamine/folic acid  Monitor     Falls with chronic gait instability:  Near syncope:  Check EKG  Check CK  Consult PT/OT  Check orthostatics  Fall precautions    DM2 in obese with peripheral neuropathy: A1c 5.2%  Body mass index is 37.7 kg/m².  TSH is normal  Glucose normal, hold medication for now    PAD, s/p bilateral TMAs:  History of recurrent cellulitis:  History of chronic left foot ischemia:  Follows with Dr. White outpatient, nothing acute currently    Hypertension:  Blood pressure near goal, check orthostatics  Hold home losartan for now    CAD with history of MI:  Nothing acute currently    Chronic essential tremors: nothing acute currently    ADOLPH not on CPAP: Monitor    Bipolar disorder: Stable currently, no acute issues    GERD: Add Protonix, sub for home omeprazole    COPD without exacerbation: No current acute issues    History of RLE DVT: Add DVT prophylaxis    RLS: Add home Restoril as needed    History of stroke: Nothing acute currently    Patient does not know meds, unreliable historianBOY does not match what patient med list notes, multiple years since prescriptions etc  Staff to call pharmacy in am    I discussed the patient's findings and my recommendations with  "patient and staff.     Sarah DE LA FUENTETalib Rico, APRN  03/12/24  21:30 EDT    \"Dictated utilizing Dragon dictation\"    Note disclaimer: At [facility], we believe that sharing information builds trust and better relationships. You are receiving this note because you recently visited [facility]. It is possible you will see health information before a provider has talked with you about it. This kind of information can be easy to misunderstand. To help you fully understand what it means for your health, we urge you to discuss this note with your provider.        "

## 2024-03-13 VITALS
RESPIRATION RATE: 18 BRPM | HEART RATE: 96 BPM | TEMPERATURE: 98.6 F | OXYGEN SATURATION: 96 % | BODY MASS INDEX: 37.52 KG/M2 | WEIGHT: 277 LBS | DIASTOLIC BLOOD PRESSURE: 83 MMHG | SYSTOLIC BLOOD PRESSURE: 148 MMHG | HEIGHT: 72 IN

## 2024-03-13 LAB
ANION GAP SERPL CALCULATED.3IONS-SCNC: 9.5 MMOL/L (ref 5–15)
ARTERIAL PATENCY WRIST A: POSITIVE
ATMOSPHERIC PRESS: 737 MMHG
BASE EXCESS BLDA CALC-SCNC: -6.8 MMOL/L (ref 0–2)
BASOPHILS # BLD AUTO: 0.01 10*3/MM3 (ref 0–0.2)
BASOPHILS NFR BLD AUTO: 0.1 % (ref 0–1.5)
BDY SITE: ABNORMAL
BODY TEMPERATURE: 37
BUN SERPL-MCNC: 17 MG/DL (ref 8–23)
BUN/CREAT SERPL: 28.3 (ref 7–25)
CALCIUM SPEC-SCNC: 8.1 MG/DL (ref 8.6–10.5)
CHLORIDE SERPL-SCNC: 101 MMOL/L (ref 98–107)
CK SERPL-CCNC: 320 U/L (ref 20–200)
CO2 SERPL-SCNC: 22.5 MMOL/L (ref 22–29)
CREAT SERPL-MCNC: 0.6 MG/DL (ref 0.76–1.27)
DEPRECATED RDW RBC AUTO: 41.5 FL (ref 37–54)
EGFRCR SERPLBLD CKD-EPI 2021: 110.5 ML/MIN/1.73
EOSINOPHIL # BLD AUTO: 0 10*3/MM3 (ref 0–0.4)
EOSINOPHIL NFR BLD AUTO: 0 % (ref 0.3–6.2)
ERYTHROCYTE [DISTWIDTH] IN BLOOD BY AUTOMATED COUNT: 12.2 % (ref 12.3–15.4)
GLUCOSE SERPL-MCNC: 93 MG/DL (ref 65–99)
HCO3 BLDA-SCNC: 17.7 MMOL/L (ref 20–26)
HCT VFR BLD AUTO: 41.3 % (ref 37.5–51)
HGB BLD-MCNC: 14.2 G/DL (ref 13–17.7)
HGB BLDA-MCNC: 15.5 G/DL (ref 14–18)
IMM GRANULOCYTES # BLD AUTO: 0.05 10*3/MM3 (ref 0–0.05)
IMM GRANULOCYTES NFR BLD AUTO: 0.6 % (ref 0–0.5)
LYMPHOCYTES # BLD AUTO: 0.95 10*3/MM3 (ref 0.7–3.1)
LYMPHOCYTES NFR BLD AUTO: 12.1 % (ref 19.6–45.3)
Lab: ABNORMAL
MCH RBC QN AUTO: 31.8 PG (ref 26.6–33)
MCHC RBC AUTO-ENTMCNC: 34.4 G/DL (ref 31.5–35.7)
MCV RBC AUTO: 92.6 FL (ref 79–97)
MODALITY: ABNORMAL
MONOCYTES # BLD AUTO: 0.93 10*3/MM3 (ref 0.1–0.9)
MONOCYTES NFR BLD AUTO: 11.8 % (ref 5–12)
NEUTROPHILS NFR BLD AUTO: 5.91 10*3/MM3 (ref 1.7–7)
NEUTROPHILS NFR BLD AUTO: 75.4 % (ref 42.7–76)
NRBC BLD AUTO-RTO: 0 /100 WBC (ref 0–0.2)
PCO2 BLDA: 32.4 MM HG (ref 35–45)
PCO2 TEMP ADJ BLD: 32.4 MM HG (ref 35–45)
PH BLDA: 7.35 PH UNITS (ref 7.35–7.45)
PH, TEMP CORRECTED: 7.35 PH UNITS (ref 7.35–7.45)
PLATELET # BLD AUTO: 215 10*3/MM3 (ref 140–450)
PMV BLD AUTO: 8.7 FL (ref 6–12)
PO2 BLDA: 78.6 MM HG (ref 83–108)
PO2 TEMP ADJ BLD: 78.6 MM HG (ref 83–108)
POTASSIUM SERPL-SCNC: 4.5 MMOL/L (ref 3.5–5.2)
QT INTERVAL: 354 MS
QT INTERVAL: 378 MS
QTC INTERVAL: 450 MS
QTC INTERVAL: 471 MS
RBC # BLD AUTO: 4.46 10*6/MM3 (ref 4.14–5.8)
SAO2 % BLDCOA: 95.5 % (ref 94–99)
SODIUM SERPL-SCNC: 133 MMOL/L (ref 136–145)
VENTILATOR MODE: ABNORMAL
WBC NRBC COR # BLD AUTO: 7.85 10*3/MM3 (ref 3.4–10.8)

## 2024-03-13 PROCEDURE — 25010000002 THIAMINE HCL 200 MG/2ML SOLUTION: Performed by: NURSE PRACTITIONER

## 2024-03-13 PROCEDURE — 97165 OT EVAL LOW COMPLEX 30 MIN: CPT

## 2024-03-13 PROCEDURE — 96361 HYDRATE IV INFUSION ADD-ON: CPT

## 2024-03-13 PROCEDURE — 82550 ASSAY OF CK (CPK): CPT | Performed by: NURSE PRACTITIONER

## 2024-03-13 PROCEDURE — 80048 BASIC METABOLIC PNL TOTAL CA: CPT | Performed by: NURSE PRACTITIONER

## 2024-03-13 PROCEDURE — 97161 PT EVAL LOW COMPLEX 20 MIN: CPT

## 2024-03-13 PROCEDURE — 99238 HOSP IP/OBS DSCHRG MGMT 30/<: CPT | Performed by: HOSPITALIST

## 2024-03-13 PROCEDURE — 25010000002 MIDAZOLAM PER 1MG: Performed by: NURSE PRACTITIONER

## 2024-03-13 PROCEDURE — G0378 HOSPITAL OBSERVATION PER HR: HCPCS

## 2024-03-13 PROCEDURE — 96376 TX/PRO/DX INJ SAME DRUG ADON: CPT

## 2024-03-13 PROCEDURE — 85025 COMPLETE CBC W/AUTO DIFF WBC: CPT | Performed by: NURSE PRACTITIONER

## 2024-03-13 PROCEDURE — 25810000003 SODIUM CHLORIDE 0.9 % SOLUTION: Performed by: NURSE PRACTITIONER

## 2024-03-13 RX ORDER — LOSARTAN POTASSIUM 50 MG/1
100 TABLET ORAL
Status: DISCONTINUED | OUTPATIENT
Start: 2024-03-13 | End: 2024-03-13 | Stop reason: HOSPADM

## 2024-03-13 RX ORDER — IBUPROFEN 400 MG/1
600 TABLET ORAL EVERY 6 HOURS PRN
Status: DISCONTINUED | OUTPATIENT
Start: 2024-03-13 | End: 2024-03-13 | Stop reason: HOSPADM

## 2024-03-13 RX ADMIN — MIDAZOLAM HYDROCHLORIDE 4 MG: 1 INJECTION, SOLUTION INTRAMUSCULAR; INTRAVENOUS at 00:50

## 2024-03-13 RX ADMIN — LOSARTAN POTASSIUM 100 MG: 50 TABLET, FILM COATED ORAL at 05:33

## 2024-03-13 RX ADMIN — LORAZEPAM 2 MG: 1 TABLET ORAL at 02:05

## 2024-03-13 RX ADMIN — FOLIC ACID 1 MG: 1 TABLET ORAL at 08:53

## 2024-03-13 RX ADMIN — METOPROLOL TARTRATE 25 MG: 25 TABLET, FILM COATED ORAL at 05:33

## 2024-03-13 RX ADMIN — PANTOPRAZOLE SODIUM 40 MG: 40 TABLET, DELAYED RELEASE ORAL at 05:33

## 2024-03-13 RX ADMIN — SODIUM CHLORIDE 100 ML/HR: 9 INJECTION, SOLUTION INTRAVENOUS at 08:56

## 2024-03-13 RX ADMIN — IBUPROFEN 600 MG: 400 TABLET, FILM COATED ORAL at 04:36

## 2024-03-13 RX ADMIN — THIAMINE HYDROCHLORIDE 200 MG: 100 INJECTION, SOLUTION INTRAMUSCULAR; INTRAVENOUS at 05:33

## 2024-03-13 NOTE — CASE MANAGEMENT/SOCIAL WORK
Discharge Planning Assessment  EVONNE Epstein     Patient Name: Eliseo Price  MRN: [id number]  Today's Date: 3/13/2024    Admit Date: 3/12/2024    Plan: Home   Discharge Needs Assessment       Row Name 03/13/24 0915       Living Environment    People in Home alone    Current Living Arrangements home    Duration at Residence 17 Y    Potentially Unsafe Housing Conditions none    In the past 12 months has the electric, gas, oil, or water company threatened to shut off services in your home? No    Primary Care Provided by self    Provides Primary Care For no one    Caregiving Concerns pt voiced no care giving concerns at this time.    Family Caregiver if Needed child(mohan), adult;sibling(s)    Family Caregiver Names Ann-Marie, daughter and Bere sister    Quality of Family Relationships unable to assess    Able to Return to Prior Arrangements yes    Living Arrangement Comments Patient states he lives alone in a single story house with three steps and handrail from the front and a ramp from the back to gain entry       Resource/Environmental Concerns    Resource/Environmental Concerns none    Transportation Concerns none       Transportation Needs    In the past 12 months, has lack of transportation kept you from medical appointments or from getting medications? no    In the past 12 months, has lack of transportation kept you from meetings, work, or from getting things needed for daily living? No       Food Insecurity    Within the past 12 months, you worried that your food would run out before you got the money to buy more. Never true    Within the past 12 months, the food you bought just didn't last and you didn't have money to get more. Never true       Transition Planning    Patient/Family Anticipates Transition to home    Patient/Family Anticipated Services at Transition none    Transportation Anticipated family or friend will provide  pt states either his daughter or sister will be able to provide ride home at  discharge       Discharge Needs Assessment    Readmission Within the Last 30 Days no previous admission in last 30 days    Current Outpatient/Agency/Support Group --  none    Equipment Currently Used at Home cane, straight;commode;grab bar;walker, rolling;shower chair;wheelchair    Concerns to be Addressed denies needs/concerns at this time;adjustment to diagnosis/illness;discharge planning    Concerns Comments pt voiced no discharge needs at this time.    Anticipated Changes Related to Illness none    Equipment Needed After Discharge none    Outpatient/Agency/Support Group Needs --  pt declined needing services    Discharge Facility/Level of Care Needs --  pt declined needing services    Provided Post Acute Provider List? Refused    Refused Provider List Comment pt declined    Patient's Choice of Community Agency(s) pt states he has used [facility]  in the past    Current Discharge Risk substance use/abuse    Discharge Coordination/Progress Patient states he plans on returning home at discharge with his daughter and sister to help if needed and voiced no discharge needs at this time.                   Discharge Plan       Row Name 03/13/24 0920       Plan    Plan Home    Patient/Family in Agreement with Plan yes    Plan Comments 0900am, into room and introduced self and role of CM. Discussed discharge disposition with patient at bedside. Patient is currently sitting up in the chair and has worked with PT/OT which they state he is at his baseline. Patient voiced no concerns at this time. Patient confirms the info on his face sheet is correct and he see's Dr. Killian Scales as PCP. He states he normally uses [facility] in [place] and currently has no problem picking up or paying for his med's. He also states he does have a living will and it is on file here. Patient states he lives alone in a single story house with three steps and  handrail from the front and a ramp in the back of the house to gain entry and  normally has no issues entering the home or maneuvering inside. He states he is independent with his ADL's and drives but no longer works and either his daughter Ann-Marie or sister Bere will be able to provide ride home at discharge. He also states she has a straight cane, BSC, grab bars, rolling walker, wheelchair and shower chair at home and does not anticipate needing any other equipment at discharge. Patient states  he has used [facility]  in the past and states he will not need this service and declined the need for any other services at this time. Patient states he plans on returning home at discharge and has family that can help if needed and voiced no discharge needs at this time. He had no other questions or concerns regarding discharge plans at this time. Name and number placed on white board in room. CM will follow.                  Continued Care and Services - Admitted Since 3/12/2024    No active coordination exists for this encounter.       Expected Discharge Date and Time       Expected Discharge Date Expected Discharge Time    Mar 13, 2024            Demographic Summary       Row Name 03/13/24 0915       General Information    Admission Type observation    Arrived From home    Referral Source admission list    Reason for Consult discharge planning    Preferred Language English       Contact Information    Permission Granted to Share Info With                    Functional Status    No documentation.                  Psychosocial    No documentation.                  Abuse/Neglect    No documentation.                  Legal    No documentation.                  Substance Abuse    No documentation.                  Patient Forms    No documentation.                     Shayy Carrillo RN

## 2024-03-13 NOTE — PROGRESS NOTES
"[facility]: Enoxaparin Consult    Eliseo DEBRA OrtizLogan has a pharmacy consult to dose prophylactic enoxaparin per Sarah Gómez's request.     Indication: VTE Prophylaxis  Home Anticoagulation: none     Relevant clinical data and objective history reviewed:  58 y.o. male 182.9 cm (72\") 126 kg (278 lb)   Body mass index is 37.7 kg/m².   Results from last 7 days   Lab Units 03/12/24  1558   PLATELETS 10*3/mm3 246     Estimated Creatinine Clearance: 134.7 mL/min (by C-G formula based on SCr of 0.8 mg/dL).    Assessment/Plan    Will start patient on 40mg subcutaneous every 24 hours, adjusted for renal function. Consult order will be discontinued but pharmacy will continue to follow.     Sabina Scanlon, PharmD  Clinical Pharmacist    "

## 2024-03-13 NOTE — CASE MANAGEMENT/SOCIAL WORK
Case Management Discharge Note      Final Note: Discharged home.    Provided Post Acute Provider List?: Refused  Refused Provider List Comment: pt declined    Selected Continued Care - Discharged on 3/13/2024 Admission date: 3/12/2024 - Discharge disposition: Home or Self Care      Destination    No services have been selected for the patient.                Durable Medical Equipment    No services have been selected for the patient.                Dialysis/Infusion    No services have been selected for the patient.                Home Medical Care    No services have been selected for the patient.                Therapy    No services have been selected for the patient.                Community Resources    No services have been selected for the patient.                Community & DME    No services have been selected for the patient.                         Final Discharge Disposition Code: 01 - home or self-care

## 2024-03-13 NOTE — PROGRESS NOTES
BP elevated this am, CIWA continues, add home dose losartan 100 mg daily and metoprolol 25 mg twice daily and monitor for effect. No orthostasis.
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
## 2024-03-13 NOTE — PLAN OF CARE
Goal Outcome Evaluation:  Plan of Care Reviewed With: patient        Progress: improving  Outcome Evaluation: Rested well during the night. PRN Ibuprophen given and noted helpful. PRN Ativan and Versed given and noted effective. Remains alert and oriented. No complaints, no shortness of breath or difficulty breathing. VSS.
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
## 2024-03-14 ENCOUNTER — READMISSION MANAGEMENT (OUTPATIENT)
Dept: CALL CENTER | Facility: HOSPITAL | Age: 59
End: 2024-03-14
Payer: MEDICARE

## 2024-03-14 LAB
BACTERIA SPEC AEROBE CULT: ABNORMAL
GRAM STN SPEC: ABNORMAL
ISOLATED FROM: ABNORMAL

## 2024-03-14 NOTE — OUTREACH NOTE
Prep Survey      Flowsheet Row Responses   Gnosticist facility patient discharged from? [facility]   Is LACE score < 7 ? Yes   Eligibility Readm Mgmt   Discharge diagnosis Lactic acidosis   Does the patient have one of the following disease processes/diagnoses(primary or secondary)? Other   Does the patient have Home health ordered? No   Is there a DME ordered? No   Medication alerts for this patient see avs   Prep survey completed? Yes            Ly MONIQUE - Registered Nurse

## 2024-03-17 LAB — BACTERIA SPEC AEROBE CULT: NORMAL

## 2024-03-19 ENCOUNTER — READMISSION MANAGEMENT (OUTPATIENT)
Dept: CALL CENTER | Facility: HOSPITAL | Age: 59
End: 2024-03-19
Payer: MEDICARE

## 2024-03-19 NOTE — OUTREACH NOTE
[facility] < 7 Survey      Flowsheet Row Responses   [facility]op[facility] facility patient discharged from? [facility]   Does the patient have one of the following disease processes/diagnoses(primary or secondary)? Other   [facility] <7 Attempt successful? No   Unsuccessful attempts Attempt 1            Tash RICHARDSON - Registered Nurse

## 2024-03-19 NOTE — PROGRESS NOTES
Stroke Progress Note       Chief Complaint: Left more than right lower extremity weakness    Subjective    Subjective     Subjective:  No acute issues overnight.  Patient is feeling better this morning.  Denies any new symptoms.    Review of Systems   Constitutional: Negative.    HENT: Negative.    Eyes: Negative.    Respiratory: Negative.    Cardiovascular: Negative.    Gastrointestinal: Negative.    Endocrine: Negative.    Genitourinary: Negative.    Musculoskeletal: Positive for back pain , neck and knee pain.   Skin: Negative.    Allergic/Immunologic: Negative.    Psychiatric/Behavioral: Negative.          Objective    Objective      Temp:  [97.7 °F (36.5 °C)-100 °F (37.8 °C)] 97.9 °F (36.6 °C)  Heart Rate:  [] 89  Resp:  [18-20] 18  BP: (126-141)/(83-94) 131/84    Neurological Exam  Mental Status  Awake, alert and oriented to person, place and time. Speech is normal. Language is fluent with no aphasia.     Cranial Nerves  CN II: Visual fields full to confrontation.  CN III, IV, VI: Extraocular movements intact bilaterally.  CN V: Facial sensation is normal.  CN VII: Full and symmetric facial movement.  CN VIII: Equal hearing bilaterally.  CN IX, X: Palate elevates symmetrically  CN XI: Shoulder shrug strength is normal.  CN XII: Tongue midline without atrophy or fasciculations.     Motor  Normal muscle bulk throughout. No fasciculations present.  Both upper extremity seems equal, 5/5  Bilateral lower extremity is 3/5, unable to maintain against gravity more than few seconds.     Sensory  Sensation: Decreased to light touch in stocking distribution.      Reflexes  Patient did have bilateral knee reflexes present, although it was diminished 1/4  Other reflexes were not checked  Reflexes checked with the help of the ER team..     Coordination     No obvious dysmetria.     Gait     Not assessed.       Physical Exam  Vitals and nursing note reviewed.   Constitutional:       Appearance: Normal appearance. He is  obese.   HENT:      Head: Normocephalic and atraumatic.      Mouth/Throat:      Mouth: Mucous membranes are moist.      Pharynx: Oropharynx is clear.   Cardiovascular:      Rate and Rhythm: Normal rate and regular rhythm.   Pulmonary:      Effort: Pulmonary effort is normal. No respiratory distress.   Musculoskeletal:      Cervical back: Normal range of motion and neck supple.   Neurological:      Mental Status: He is alert.   Psychiatric:         Mood and Affect: Mood normal.         Behavior: Behavior normal.         Results Review:    I reviewed the patient's new clinical results.  MRI brain shows no acute changes, no hemorrhage mild white matter disease  MRI of the T-spine shows no spinal cord disease, minimal degenerative changes.  Cervical spine is also seen, which looks okay.  Reviewed his labs.              Assessment/Plan     Assessment/Plan:  56-year-old right-handed white male with known diagnosis of hypertension, diabetes, hyperlipidemia, stroke with no residuals, coronary artery disease, peripheral arterial disease status post all the toes amputation, not on any antithrombotics, woke up yesterday morning with left more than right lower extremity weakness.      1. Bilateral lower extremity weakness.  His MRI brain and MRI of the T-spine (which also shows C-spine partially) looks okay with no significant disease.  Patient symptoms have slowly improved, and there were reflexes present.  Patient is complaining of left knee pain, which is on and off, slightly worse today.  Likely musculoskeletal cause for his weakness, rather than neurological cause.  Recommend physical and occupational therapy evaluation, and getting him out of bed.  If his symptoms do not improve or worsens, will consider further work-up.  Continue him on aspirin 325 mg and Lipitor 80 mg daily for secondary stroke prevention.  2. Essential hypertension.  Normal blood pressure goals for him.  3. Diabetes mellitus type 2 well controlled with  peripheral arterial disease.  Maintain normoglycemia.  4. Mixed hyperlipidemia.  Continue full dose of statins.    5. Increase activity as tolerated.      Case was discussed with patient, nursing and the primary team.  We will sign off for now, please call us with questions.  Thank you for the consult.          Dionte Petit MD  07/20/22  10:41 EDT    This was an audio and video enabled telemedicine encounter.     Initial (On Arrival)

## 2024-03-26 ENCOUNTER — READMISSION MANAGEMENT (OUTPATIENT)
Dept: CALL CENTER | Facility: HOSPITAL | Age: 59
End: 2024-03-26
Payer: MEDICARE

## 2024-03-26 NOTE — OUTREACH NOTE
[facility] < 7 Survey      Flowsheet Row Responses   Jew facility patient discharged from? [facility]   Does the patient have one of the following disease processes/diagnoses(primary or secondary)? Other   [facility] <7 Attempt successful? No   Unsuccessful attempts Attempt 2            Tasneem ANDERSON - Licensed Nurse

## 2024-03-29 ENCOUNTER — READMISSION MANAGEMENT (OUTPATIENT)
Dept: CALL CENTER | Facility: HOSPITAL | Age: 59
End: 2024-03-29
Payer: MEDICARE

## 2024-03-29 NOTE — OUTREACH NOTE
[facility] < 7 Survey      Flowsheet Row Responses   [facility]oroastrianism facility patient discharged from? [facility]   Does the patient have one of the following disease processes/diagnoses(primary or secondary)? Other   [facility] <7 Attempt successful? No   Unsuccessful attempts Attempt 3            Dorys ANDERSON - Registered Nurse

## 2024-07-15 ENCOUNTER — OFFICE VISIT (OUTPATIENT)
Dept: ORTHOPEDIC SURGERY | Facility: CLINIC | Age: 59
End: 2024-07-15
Payer: MEDICARE

## 2024-07-15 VITALS — WEIGHT: 277 LBS | BODY MASS INDEX: 37.52 KG/M2 | HEIGHT: 72 IN

## 2024-07-15 DIAGNOSIS — M17.12 PRIMARY OSTEOARTHRITIS OF LEFT KNEE: Primary | ICD-10-CM

## 2024-07-15 PROCEDURE — 20610 DRAIN/INJ JOINT/BURSA W/O US: CPT | Performed by: ORTHOPAEDIC SURGERY

## 2024-07-15 PROCEDURE — 1159F MED LIST DOCD IN RCRD: CPT | Performed by: ORTHOPAEDIC SURGERY

## 2024-07-15 PROCEDURE — 99213 OFFICE O/P EST LOW 20 MIN: CPT | Performed by: ORTHOPAEDIC SURGERY

## 2024-07-15 PROCEDURE — 1160F RVW MEDS BY RX/DR IN RCRD: CPT | Performed by: ORTHOPAEDIC SURGERY

## 2024-07-15 RX ORDER — LIDOCAINE HYDROCHLORIDE 10 MG/ML
8 INJECTION, SOLUTION EPIDURAL; INFILTRATION; INTRACAUDAL; PERINEURAL
Status: COMPLETED | OUTPATIENT
Start: 2024-07-15 | End: 2024-07-15

## 2024-07-15 RX ORDER — CELECOXIB 200 MG/1
CAPSULE ORAL
COMMUNITY
Start: 2024-07-05

## 2024-07-15 RX ORDER — TRIAMCINOLONE ACETONIDE 40 MG/ML
80 INJECTION, SUSPENSION INTRA-ARTICULAR; INTRAMUSCULAR
Status: COMPLETED | OUTPATIENT
Start: 2024-07-15 | End: 2024-07-15

## 2024-07-15 RX ORDER — CLONAZEPAM 0.5 MG/1
TABLET ORAL
COMMUNITY

## 2024-07-15 RX ADMIN — TRIAMCINOLONE ACETONIDE 80 MG: 40 INJECTION, SUSPENSION INTRA-ARTICULAR; INTRAMUSCULAR at 13:43

## 2024-07-15 RX ADMIN — LIDOCAINE HYDROCHLORIDE 8 ML: 10 INJECTION, SOLUTION EPIDURAL; INFILTRATION; INTRACAUDAL; PERINEURAL at 13:43

## 2024-07-15 NOTE — PROGRESS NOTES
Subjective:     Patient ID: Eliseo Price is a 58 y.o. male.    Chief Complaint:  Follow-up left knee pain, DJD  Last injection 3/11/2024    History of Present Illness  History of Present Illness  The patient returns to clinic today for follow-up evaluation in regards to his left knee pain.    The patient reports that his previous injection provided significant relief, however, it only provided relief for approximately 2 to 2.5 months. Over the past 4 to 6 weeks, he has experienced an escalation in pain level, which he describes as moderate to severe in intensity and aching. The pain is primarily localized to the medial and anterior aspects of his left knee. The pain intensifies with prolonged walking, weightbearing, deep flexion activities, and rotational activities, particularly when rising from a seated position and on uneven surfaces. He also reports moderate swelling that fluctuates. He denies any dudley buckling, locking, or catching. He also denies any hip or groin pain.     Social History     Occupational History    Not on file   Tobacco Use    Smoking status: Never     Passive exposure: Never    Smokeless tobacco: Never   Vaping Use    Vaping status: Never Used   Substance and Sexual Activity    Alcohol use: Yes     Comment: RARE, states had half pint bourbon 3/12/24    Drug use: No    Sexual activity: Defer      Past Medical History:   Diagnosis Date    Amputated toe of left foot     all toes    Amputated toe of right foot     all toes    Anesthesia complication     DIFFICULT TO PUT TO SLEEP    Arthritis     Bipolar disorder     Cellulitis of lower extremity     RIGHT LOWER LEG    COPD (chronic obstructive pulmonary disease)     Coronary artery disease 2009    HEART ATTACK    Diabetes mellitus     Disease of thyroid gland     nodule on thyroid    DVT (deep venous thrombosis) 2005    right leg    Fracture of right foot     Gastric ulcer     GERD (gastroesophageal reflux disease)     Hyperlipidemia      Hypertension     MRSA infection 2003    history of left leg after surgery    Neuropathy     hands & feet    Pseudogout     RLS (restless legs syndrome)     Septic shock 07/2017    H/O    Sleep apnea     no machine, CANT TOLERATE    Stroke     x2, no residual    Toxic metabolic encephalopathy 07/2017    H/O    Tremors of nervous system      Past Surgical History:   Procedure Laterality Date    AMPUTATION DIGIT Left 3/16/2018    Procedure: AMPUTATION LEFT FOURTH TOE;  Surgeon: Anish Farah DPM;  Location:  [facility] OR;  Service: Podiatry    AMPUTATION DIGIT Right 10/13/2020    Procedure: AMPUTATION OF RIGHT FIFTH TOE AND ALL ASSOCIATED PROCEDURES;  Surgeon: Anish Farah DPM;  Location: [facility] OR;  Service: Podiatry;  Laterality: Right;  AMPUTATION OF RIGHT FIFTH TOE     AMPUTATION DIGIT Right 1/19/2021    Procedure: AMPUTATION RIGHT FOURTH TOE;  Surgeon: Anish Farah DPM;  Location: [facility] OR;  Service: Podiatry;  Laterality: Right;    AMPUTATION DIGIT Right 7/30/2021    Procedure: AMPUTATION RIGHT THIRD TOE;  Surgeon: Anish Farah DPM;  Location: [facility] OR;  Service: Podiatry;  Laterality: Right;  RIGHT 3rd toe amputation     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    BONE EXCISION LEG Right 12/14/2023    Procedure: BONE EXCISION LOWER EXTERMITY;  Surgeon: Juan White DPM;  Location: [facility] OR;  Service: Podiatry;  Laterality: Right;    COLONOSCOPY N/A 7/16/2018    Procedure: COLONOSCOPY and polypectomy;  Surgeon: Kaylie Mcfarlane MD;  Location: [facility] OR;  Service: Gastroenterology    ENDOSCOPY N/A 3/16/2018    Procedure: ESOPHAGOGASTRODUODENOSCOPY with biopsies;  Surgeon: Kaylie Mcfarlane MD;  Location: [facility] OR;  Service: Gastroenterology    FINGER TENDON REPAIR Right     little finger    HARDWARE REMOVAL Right 8/18/2017    Procedure: RIGHT EXTERNAL FIXATOR REMOVAL;  Surgeon: Anish Farah DPM;  Location: [facility] OR;  Service:     INCISION AND DRAINAGE LEG Left 6/13/2018     "Procedure: Left 2nd, 3rd toe laceration irrigation, debridement and multi layer wound closure.;  Surgeon: Anish Farah DPM;  Location:  [facility] OR;  Service: Podiatry    JOINT REPLACEMENT      KNEE SURGERY      scope left 6 times, right once    LEG DEBRIDEMENT Right 10/16/2020    Procedure: DEBRIDEMENT RIGHT  FOOT SURGICAL WOUND;  Surgeon: Anish Farah DPM;  Location:  [facility] OR;  Service: Podiatry;  Laterality: Right;  DEBRIDEMENT RIGHT FOOT SURGICAL WOUND    NERVE TRANSFER Right     elbow    ORIF FOOT FRACTURE Right 7/29/2017    Procedure: open reduction with percutaneous pinning of midfoot dislocation fracture;  Surgeon: Anish Farah DPM;  Location:  [facility] OR;  Service:     ORIF FOOT FRACTURE Left 3/5/2018    Procedure: OPEN REDUCTION WITH IRRIGATION AND WOUND CLOSURE LEFT FOURTH TOE;  Surgeon: Anish Farah DPM;  Location:  [facility] OR;  Service:     TENDON LENGTHENING/SHORTENING Right     PSOAS    TOE FUSION Right 8/18/2017    Procedure: RIGHT  MEDIAL COLUMN ARTHRODESIS, RIGHT TARAL METATARSAL ARTHRODESIS;  Surgeon: Anish Farah DPM;  Location:  [facility] OR;  Service:     TOTAL HIP ARTHROPLASTY Bilateral     TOTAL SHOULDER ARTHROPLASTY Right        Family History   Problem Relation Age of Onset    Arthritis Mother     Cancer Mother     Hyperlipidemia Mother     Colon polyps Mother     Arthritis Father     Cancer Father     Depression Father     Hypertension Father     Mental illness Father     Cancer Sister     Arthritis Sister     Hyperlipidemia Sister     Cancer Paternal Aunt     Hypertension Daughter     Depression Son     Hyperlipidemia Paternal Grandmother     Hearing loss Paternal Grandfather     Hyperlipidemia Paternal Grandfather     Hypertension Paternal Grandfather     Colon cancer Maternal Grandmother     Malig Hyperthermia Neg Hx          Review of Systems        Objective:  Vitals:    07/15/24 1333   Weight: 126 kg (277 lb)   Height: 182.9 cm (72\")         07/15/24  1333   Weight: 126 kg " (277 lb)     Body mass index is 37.57 kg/m².  General: No acute distress.  Resp: normal respiratory effort  Skin: no rashes or wounds; normal turgor  Psych: mood and affect appropriate; recent and remote memory intact          Physical Exam  Left knee-active range of motion is 5 to 120 degrees. Strength is 4 out of 5 on flexion and extension. Moderate effusion is present. Maximum tenderness is felt in the medial joint line as well as medial and lateral patellar facet. Positive active patellar compression test. Positive Yared's with pain along the medial joint line, no click. Stable to varus and valgus stress at 5 and 30 degrees.         Imaging:  None today  Assessment:        1. Primary osteoarthritis of left knee           Plan:  Large Joint Arthrocentesis: L knee  Date/Time: 7/15/2024 1:43 PM  Consent given by: patient  Site marked: site marked  Timeout: Immediately prior to procedure a time out was called to verify the correct patient, procedure, equipment, support staff and site/side marked as required   Supporting Documentation  Indications: pain   Procedure Details  Location: knee - L knee  Preparation: Patient was prepped and draped in the usual sterile fashion  Needle size: 22 G  Approach: anterolateral  Medications administered: 80 mg triamcinolone acetonide 40 MG/ML; 8 mL lidocaine PF 1% 1 %  Patient tolerance: patient tolerated the procedure well with no immediate complications                Assessment & Plan  1. Left knee pain.  A comprehensive discussion was held with the patient regarding potential treatment options. The possibility of total knee arthroplasty was discussed, however, he expressed a preference to postpone surgery at this time. Given his previous success with injections, he has chosen to proceed with an injection today. The plan is to proceed with an injection into the left knee, focusing on hip, core, and quad strengthening exercises for stabilizing support. All his queries were  addressed.    Patient would like to proceed with cortisone injection today to the Left knee. Recommended limited use of affected extremity for the next 24 hours to only essential activites other than work on general active and passive motion. Recommended supplementing with ice and soft tissue massage. Discussed with patient that they should see results in 5-7 days, if no improvement in 5-6 weeks I have asked them to call the office to review other options. Patient should call office immediately if they notice redness, warmth, fevers, chills, or residual numbness or tingling for greater than 6 hours after injection.     Follow-up  A follow-up appointment is scheduled for 3 months from now, or sooner if necessary.    Eliseo Price was in agreement with plan and had all questions answered.     Orders:  Orders Placed This Encounter   Procedures    Large Joint Arthrocentesis: L knee       Medications:  No orders of the defined types were placed in this encounter.      Followup:  No follow-ups on file.    Diagnoses and all orders for this visit:    1. Primary osteoarthritis of left knee (Primary)    Other orders  -     Large Joint Arthrocentesis: L knee          Dictated utilizing Dragon dictation     Patient or patient representative verbalized consent for the use of Ambient Listening during the visit with  Valentin Barfield MD for chart documentation. 7/15/2024  13:52 EDT

## 2024-08-27 ENCOUNTER — PREP FOR SURGERY (OUTPATIENT)
Dept: OTHER | Facility: HOSPITAL | Age: 59
End: 2024-08-27
Payer: MEDICARE

## 2024-08-27 DIAGNOSIS — T84.9XXD: Primary | ICD-10-CM

## 2024-08-27 DIAGNOSIS — L97.422 ULCER OF HEEL AND MIDFOOT, LEFT, WITH FAT LAYER EXPOSED: ICD-10-CM

## 2024-08-27 RX ORDER — ACETAMINOPHEN 500 MG
1000 TABLET ORAL ONCE
OUTPATIENT
Start: 2024-08-27 | End: 2024-08-27

## 2024-08-27 RX ORDER — CELECOXIB 100 MG/1
200 CAPSULE ORAL ONCE
OUTPATIENT
Start: 2024-08-27 | End: 2024-08-27

## 2024-08-27 RX ORDER — GABAPENTIN 300 MG/1
600 CAPSULE ORAL ONCE
OUTPATIENT
Start: 2024-08-27 | End: 2024-08-27

## 2024-08-29 PROBLEM — L97.422: Status: ACTIVE | Noted: 2024-08-27

## 2024-09-04 NOTE — ED NOTES
"ABRASIONS TO BILATERAL KNEES, FAMILY STATES HE SUSTAINED THOSE IN THE FALLS. FAMILY STATES PT \"CONSTANTLY HAS INFECTIONS\" SO HE IS CONFUSED AT BASELINE   " DAILY PROGRESS NOTE/ POST-OP CHECK    ADMISSION DATE:  9/3/2024  DATE:  9/4/2024  CURRENT HOSPITAL DAY:  Hospital Day: 2       ACTIVE PROBLEMS:    Active Hospital Problems    Diagnosis     Acute appendicitis with localized peritonitis without gangrene, unspecified whether abscess present, unspecified whether perforation present        SUMMARY STATEMENT:    Richelle is a 13 year old female patient admitted with Acute appendicitis with localized peritonitis without gangrene, unspecified whether abscess present, unspecified whether perforation present [K35.30]     INTERVAL HISTORY:    No acute events over night. Patient in pain, currently at a 6/10 with tylenol. No nausea or vomiting. Did have a few episodes of diarrhea. 1 fever overnight at 0233 of 100.4. Other vital signs stable. NPO for surgery today.    Live video/phone  service used? No    MEDICATIONS:    Current Facility-Administered Medications   Medication Dose Route Frequency Provider Last Rate Last Admin    dextrose 5 % / sodium chloride 0.9% infusion   Intravenous Continuous Rey Dnog PA-C 100 mL/hr at 09/04/24 0536 New Bag at 09/04/24 0536    ACETAMINOPHEN 160 MG/5ML PO SUSP Pyxis Override             lidocaine (ANECREAM/LMX) 4 % cream 1 application.  1 application. Topical PRN Julio Cesar, Sil, DO        sodium chloride 0.9 % injection 0.6-4.6 mL  0.6-4.6 mL Intravenous PRN Julio Cesar, Sil, DO        sodium chloride 0.9 % flush bag 25 mL  25 mL Intravenous PRN Julio Cesar, Sil, DO        sodium chloride 0.9 % injection 0.5-10 mL  0.5-10 mL Intravenous PRN Julio Cesar, Sil, DO        ondansetron (ZOFRAN ODT) disintegrating tablet 4 mg  4 mg Oral Q8H PRN Julio Cesar, Sil, DO        acetaminophen (TYLENOL) tablet 650 mg  650 mg Oral Q6H Rey Dong PA-C        ketorolac (TORADOL) injection 15 mg  15 mg Intravenous 4 times per day Rey Dong PA-C   15 mg at 09/04/24 1205    cefTRIAXone (ROCEPHIN) syringe 2,000 mg  2,000 mg Intravenous Daily  Cuco Milton,         metroNIDAZOLE (FLAGYL) IVPB 1,500 mg  1,500 mg Intravenous Q24H Cuco Milton, DO              OBJECTIVE:      Vitals:  Vital Last Value 24 Hour Range   Temperature 98.1 °F (36.7 °C) (09/04/24 1221) Temp  Min: 97.3 °F (36.3 °C)  Max: 100.4 °F (38 °C)   Pulse 84 (09/04/24 1300) Pulse  Min: 62  Max: 156   Respiratory 18 (09/04/24 1300) Resp  Min: 16  Max: 20   Non-Invasive  Blood Pressure 96/81 (09/04/24 1136) BP  Min: 96/81  Max: 139/92   Pulse Oximetry 100 % (09/04/24 1236) SpO2  Min: 98 %  Max: 100 %   Arterial   Blood Pressure   No data recorded      INTAKE/OUTPUT:      Intake/Output Summary (Last 24 hours) at 9/4/2024 1341  Last data filed at 9/4/2024 1300  Gross per 24 hour   Intake 2438.33 ml   Output 0 ml   Net 2438.33 ml         PHYSICAL EXAM:    Physical Exam  Constitutional:       General: She is not in acute distress.     Appearance: Normal appearance.   HENT:      Head: Normocephalic and atraumatic.      Right Ear: External ear normal.      Left Ear: External ear normal.      Nose: No congestion or rhinorrhea.      Mouth/Throat:      Mouth: Mucous membranes are moist.      Neck: Normal range of motion.   Eyes:      Extraocular Movements: Extraocular movements intact.   Cardiovascular:      Rate and Rhythm: Normal rate and regular rhythm.      Pulses: Normal pulses.      Heart sounds: Normal heart sounds. No murmur heard.     No friction rub. No gallop.   Pulmonary:      Effort: Pulmonary effort is normal. No respiratory distress.      Breath sounds: Normal breath sounds. No wheezing, rhonchi or rales.   Abdominal:      General: Abdomen is flat. Bowel sounds are normal. There is no distension.      Palpations: Abdomen is soft.      Tenderness: There is abdominal tenderness in the suprapubic area. There is no guarding or rebound. Negative signs include Rovsing's sign and McBurney's sign.   Musculoskeletal:      Right lower leg: No edema.      Left lower leg: No edema.    Skin:     General: Skin is warm and dry.      Capillary Refill: Capillary refill takes less than 2 seconds.   Neurological:      General: No focal deficit present.      Mental Status: She is alert and oriented to person, place, and time.   Psychiatric:         Behavior: Behavior normal.      Comments: Restricted affect        LABORATORY DATA:    Admission on 09/03/2024   Component Date Value Ref Range Status    GLUCOSE, BEDSIDE - POINT OF CARE 09/03/2024 122 (H)  70 - 99 mg/dL Final    Pregnancy, Urine 09/03/2024 Negative  Negative Final    Sodium 09/03/2024 134 (L)  135 - 145 mmol/L Final    Potassium 09/03/2024 3.7  3.4 - 5.1 mmol/L Final    Chloride 09/03/2024 103  97 - 110 mmol/L Final    Carbon Dioxide 09/03/2024 21  21 - 32 mmol/L Final    Anion Gap 09/03/2024 14  7 - 19 mmol/L Final    Glucose 09/03/2024 112 (H)  70 - 99 mg/dL Final    BUN 09/03/2024 9  6 - 20 mg/dL Final    Creatinine 09/03/2024 0.62  0.39 - 0.90 mg/dL Final    Glomerular Filtration Rate 09/03/2024    Final    GFR not calculated for age less than 18 years    BUN/Cr 09/03/2024 15  7 - 25 Final    Calcium 09/03/2024 9.9  8.0 - 11.0 mg/dL Final    Bilirubin, Total 09/03/2024 0.8  0.2 - 1.0 mg/dL Final    GOT/AST 09/03/2024 13  10 - 45 Units/L Final    GPT/ALT 09/03/2024 14  6 - 35 Units/L Final    Alkaline Phosphatase 09/03/2024 93  60 - 195 Units/L Final    Albumin 09/03/2024 4.0  3.6 - 5.1 g/dL Final    Protein, Total 09/03/2024 8.0  6.0 - 8.3 g/dL Final    Globulin 09/03/2024 4.0  2.0 - 4.0 g/dL Final    A/G Ratio 09/03/2024 1.0  1.0 - 2.4 Final    Lipase 09/03/2024 17  15 - 77 Units/L Final    COLOR, URINALYSIS 09/03/2024 Yellow   Final    APPEARANCE, URINALYSIS 09/03/2024 Cloudy   Final    GLUCOSE, URINALYSIS 09/03/2024 Trace (A)  Negative mg/dL Final    BILIRUBIN, URINALYSIS 09/03/2024 Negative  Negative Final    KETONES, URINALYSIS 09/03/2024 >80 (AA)  Negative mg/dL Final    SPECIFIC GRAVITY, URINALYSIS 09/03/2024 >1.030 (H)  1.005 -  1.030 Final    Measured by refractometry  Specific Gravity results may be affected by elevated protein, glucose, or contrast media.    OCCULT BLOOD, URINALYSIS 09/03/2024 Trace (A)  Negative Final    PH, URINALYSIS 09/03/2024 6.0  5.0 - 7.0 Final    PROTEIN, URINALYSIS 09/03/2024 100 (A)  Negative mg/dL Final    UROBILINOGEN, URINALYSIS 09/03/2024 0.2  0.2, 1.0 mg/dL Final    NITRITE, URINALYSIS 09/03/2024 Negative  Negative Final    LEUKOCYTE ESTERASE, URINALYSIS 09/03/2024 Negative  Negative Final    SQUAMOUS EPITHELIAL, URINALYSIS 09/03/2024 6 to 10 (A)  None Seen, 1 to 5 /hpf Final    ERYTHROCYTES, URINALYSIS 09/03/2024 3 to 5 (A)  None Seen, 1 to 2 /hpf Final    LEUKOCYTES, URINALYSIS 09/03/2024 1 to 5  None Seen, 1 to 5 /hpf Final    BACTERIA, URINALYSIS 09/03/2024 Few (A)  None Seen /hpf Final    HYALINE CASTS, URINALYSIS 09/03/2024 None Seen  None Seen, 1 to 5 /lpf Final    TRANSITIONAL EPITHELIALS 09/03/2024 1 to 5  1 to 5, None Seen /hpf Final    MUCUS 09/03/2024 Present   Final    C-Reactive Protein 09/03/2024 155.0 (H)  <10.0 mg/L Final    WBC 09/03/2024 15.4 (H)  4.2 - 11.0 K/mcL Final    RBC 09/03/2024 4.80  3.90 - 5.30 mil/mcL Final    HGB 09/03/2024 12.7  12.0 - 15.5 g/dL Final    HCT 09/03/2024 38.7  36.0 - 46.5 % Final    MCV 09/03/2024 80.6  78.0 - 100.0 fl Final    MCH 09/03/2024 26.5  26.0 - 34.0 pg Final    MCHC 09/03/2024 32.8  32.0 - 36.5 g/dL Final    RDW-CV 09/03/2024 13.6  11.0 - 15.0 % Final    RDW-SD 09/03/2024 40.2  35.0 - 47.0 fL Final    PLT 09/03/2024 217  140 - 450 K/mcL Final    NRBC 09/03/2024 0  <=0 /100 WBC Final    Neutrophil, Percent 09/03/2024 82  % Final    Lymphocytes, Percent 09/03/2024 7  % Final    Mono, Percent 09/03/2024 10  % Final    Eosinophils, Percent 09/03/2024 0  % Final    Basophils, Percent 09/03/2024 0  % Final    Immature Granulocytes 09/03/2024 1  % Final    Absolute Neutrophils 09/03/2024 12.8 (H)  1.8 - 8.0 K/mcL Final    Absolute Lymphocytes 09/03/2024  1.1 (L)  1.5 - 6.5 K/mcL Final    Absolute Monocytes 09/03/2024 1.5 (H)  0.0 - 0.8 K/mcL Final    Absolute Eosinophils  09/03/2024 0.0  0.0 - 0.5 K/mcL Final    Absolute Basophils 09/03/2024 0.0  0.0 - 0.2 K/mcL Final    Absolute Immature Granulocytes 09/03/2024 0.1  0.0 - 0.2 K/mcL Final        IMAGING STUDIES:    MRI PELVIS LIMITED APPENDICITIS WO CONTRAST   Final Result   Acute appendicitis. No rupture or abscess evident.      This document is electronically signed by Bessie Gr ([employer] pediatric radiologist), on 09/04/2024 01:55 AM CDT.          XR ABDOMEN 2 VIEWS   Final Result   Multiple air-fluid levels in nondilated bowel. Findings can be related to enteritis or mild adynamic ileus. Bowel obstruction is not suspected.      This document is electronically signed by Bessie Gr ([employer] pediatric radiologist), on 09/04/2024 00:18 AM CDT.          US APPENDIX   Final Result      Possible partial visualization of the appendix. Examination is nondiagnostic. Need for additional imaging depends on level of clinical concern.      This document is electronically signed by Teresa Herrera ([employer] pediatric radiologist), on 09/03/2024 23:06 PM CDT.          US PELVIS NON-OB EXTERNAL TRANSABDOMINAL AND DUPLEX   Final Result      Normal examination.      This document is electronically signed by Teresa Herrera ([employer] pediatric radiologist), on 09/03/2024 22:26 PM CDT.                                       ASSESSMENT:  Principal Problem:    Acute appendicitis with localized peritonitis without gangrene, unspecified whether abscess present, unspecified whether perforation present    Patient is a 13 year old female with hx of anxiety/depression presenting with 2 days duration of suprapubic abdominal pain, NBNB emesis x7 and diarrhea, admitted with acute appendicitis. On exam, patient is well-appearing, VSS, has mild diffuse abdominal tenderness to palpation without rebound or guarding. CBC with leukocytosis, MRI  pelvis/appendicitis w/o contrast shows acute appendicitis, no rupture or abscess evident. Gen surg is following, patient is NPO on mIVF, s/p ctx and flagyl for plans of OR for laparoscopic appendectomy today.     Active Problems:  1.) Acute Appendicitis    PLAN:  Neuro/Pain:  - Tylenol 650mg q8h PRN, morphine for breakthrough pain  - Avoid NSAIDs prior to OR    Resp:  - Stable on room air     CV:  - Monitor HR and BP     FEN/GI:  - Acute appendicitis:  - gen surg on consult, appreciate recs         - NPO on mIVF D5NS @ 100cc/hr    - OR today 9/4         - zofran 4mg q8h PRN    ID:  - s/p ceftriaxone 2g daily, flagyl 1500mg daily started 9/4   - will await surgery recs post procedure    Psych:  - hx of anxiety/depression requiring hospitalization 4 years ago  - continue to monitor     VTE: 2, SCDs ordered  Lines/Drains/Airway:   Peripheral IV 09/03/24 Left Antecubital 22 (Active)       Wound Umbilicus Open Surgical Wound (Active)     Function, utilization, and necessity addressed/discussed on rounds.  Dispo: Remain admitted pending, stable VS, pain control, and adequate PO intake  post surgery    Patient and plan discussed with family, nursing staff, and attending physician.    EDNA Degroot  09/04/24 1:41 PM       ---    Post-Op Check Addendum:    Patient arrived on the floor post laparoscopic appendectomy. Hemodynamically stable with normal vital signs and afebrile. Abdominal incision site C/D/I, TTP to palpation diffusely on abdominal exam. Patient is resting and states that pain is 5/10 at this time. Patient on schedule Tylenol and Toradol. Will continue IV ceftriaxone and flagyl. Advance diet as tolerated. Remain on mIVF. Will continue to monitor overnight, potential discharge tomorrow with PO abx.     EDNA Degroot      Senior Addendum:   Unable to examine patient before procedure. Pt examined s/p laparoscopic appendectomy which surgery determined was complicated due to gangrenous appearance of  appendix. Pt arrived stable and in no apparent distress. Pt has abdominal incision sites which are c/d/I. Pt reporting 5/10 pain at this time. Will treat pain with scheduled telenol and toradol and will continue IV abx coverage with ceftriaxone and flagyl. Pt to ADAT and wean fluids and pt tolerates PO intake. Will continue to monitor I/O's post op.    Pt seen and evaluated with medical student. Assessment and plan discussed and note written in conjunction with medical student as well.     Discussed with attending physician    Cuco Milton, DO  Pediatrics PGY-2  Available via [employer]

## 2024-10-14 ENCOUNTER — OFFICE VISIT (OUTPATIENT)
Dept: ORTHOPEDIC SURGERY | Facility: CLINIC | Age: 59
End: 2024-10-14
Payer: MEDICARE

## 2024-10-14 VITALS — HEIGHT: 72 IN | WEIGHT: 295.2 LBS | BODY MASS INDEX: 39.98 KG/M2

## 2024-10-14 DIAGNOSIS — M17.12 PRIMARY OSTEOARTHRITIS OF LEFT KNEE: Primary | ICD-10-CM

## 2024-10-14 PROCEDURE — 99213 OFFICE O/P EST LOW 20 MIN: CPT | Performed by: ORTHOPAEDIC SURGERY

## 2024-10-14 PROCEDURE — 20610 DRAIN/INJ JOINT/BURSA W/O US: CPT | Performed by: ORTHOPAEDIC SURGERY

## 2024-10-14 RX ORDER — LIDOCAINE HYDROCHLORIDE 10 MG/ML
8 INJECTION, SOLUTION EPIDURAL; INFILTRATION; INTRACAUDAL; PERINEURAL
Status: COMPLETED | OUTPATIENT
Start: 2024-10-14 | End: 2024-10-14

## 2024-10-14 RX ORDER — TRIAMCINOLONE ACETONIDE 40 MG/ML
80 INJECTION, SUSPENSION INTRA-ARTICULAR; INTRAMUSCULAR
Status: COMPLETED | OUTPATIENT
Start: 2024-10-14 | End: 2024-10-14

## 2024-10-14 RX ORDER — TEMAZEPAM 15 MG/1
CAPSULE ORAL
COMMUNITY
Start: 2024-10-03

## 2024-10-14 RX ADMIN — TRIAMCINOLONE ACETONIDE 80 MG: 40 INJECTION, SUSPENSION INTRA-ARTICULAR; INTRAMUSCULAR at 13:23

## 2024-10-14 RX ADMIN — LIDOCAINE HYDROCHLORIDE 8 ML: 10 INJECTION, SOLUTION EPIDURAL; INFILTRATION; INTRACAUDAL; PERINEURAL at 13:23

## 2024-10-14 NOTE — PROGRESS NOTES
Subjective:     Patient ID: Eliseo Price is a 58 y.o. male.    Chief Complaint:  Follow-up left knee pain, DJD  Last injection 7/15/2024    History of Present Illness  History of Present Illness  The patient is here today for a follow-up evaluation concerning his left knee.    He reports that the previous injection was highly effective, but in recent weeks, he has experienced escalating pain, particularly on the inner and front aspects of his left knee. The last injection, administered in 07/2024, provided him with approximately 80 to 90 percent relief from his pain. Since the pain has returned, he describes it as moderate to severe in intensity, with an aching quality and moderate swelling. The pain fluctuates in intensity. He denies experiencing any episodes of buckling, catching, locking, or giving way.     Social History     Occupational History    Not on file   Tobacco Use    Smoking status: Never     Passive exposure: Never    Smokeless tobacco: Never   Vaping Use    Vaping status: Never Used   Substance and Sexual Activity    Alcohol use: Yes     Comment: RARE, states had half pint bourbon 3/12/24    Drug use: No    Sexual activity: Defer      Past Medical History:   Diagnosis Date    Amputated toe of left foot     all toes    Amputated toe of right foot     all toes    Anesthesia complication     DIFFICULT TO PUT TO SLEEP    Arthritis     Bipolar disorder     Cellulitis of lower extremity     RIGHT LOWER LEG    COPD (chronic obstructive pulmonary disease)     Coronary artery disease 2009    HEART ATTACK    Diabetes mellitus     Disease of thyroid gland     nodule on thyroid    DVT (deep venous thrombosis) 2005    right leg    Fracture of right foot     Gastric ulcer     GERD (gastroesophageal reflux disease)     Hyperlipidemia     Hypertension     MRSA infection 2003    history of left leg after surgery    Neuropathy     hands & feet    Pseudogout     RLS (restless legs syndrome)     Septic shock 07/2017     H/O    Sleep apnea     no machine, CANT TOLERATE    Stroke     x2, no residual    Toxic metabolic encephalopathy 07/2017    H/O    Tremors of nervous system      Past Surgical History:   Procedure Laterality Date    AMPUTATION DIGIT Left 3/16/2018    Procedure: AMPUTATION LEFT FOURTH TOE;  Surgeon: Anihs Farah DPM;  Location: [facility] OR;  Service: Podiatry    AMPUTATION DIGIT Right 10/13/2020    Procedure: AMPUTATION OF RIGHT FIFTH TOE AND ALL ASSOCIATED PROCEDURES;  Surgeon: Anish Farah DPM;  Location: [facility] OR;  Service: Podiatry;  Laterality: Right;  AMPUTATION OF RIGHT FIFTH TOE     AMPUTATION DIGIT Right 1/19/2021    Procedure: AMPUTATION RIGHT FOURTH TOE;  Surgeon: Anish Farah DPM;  Location: [facility] OR;  Service: Podiatry;  Laterality: Right;    AMPUTATION DIGIT Right 7/30/2021    Procedure: AMPUTATION RIGHT THIRD TOE;  Surgeon: Anish Farah DPM;  Location: [facility] OR;  Service: Podiatry;  Laterality: Right;  RIGHT 3rd toe amputation     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    BONE EXCISION LEG Right 12/14/2023    Procedure: BONE EXCISION LOWER EXTERMITY;  Surgeon: Juan White DPM;  Location: [facility] OR;  Service: Podiatry;  Laterality: Right;    COLONOSCOPY N/A 7/16/2018    Procedure: COLONOSCOPY and polypectomy;  Surgeon: Kaylie Mcfarlane MD;  Location: [facility] OR;  Service: Gastroenterology    ENDOSCOPY N/A 3/16/2018    Procedure: ESOPHAGOGASTRODUODENOSCOPY with biopsies;  Surgeon: Kaylie Mcfarlane MD;  Location: [facility];  Service: Gastroenterology    FINGER TENDON REPAIR Right     little finger    HARDWARE REMOVAL Right 8/18/2017    Procedure: RIGHT EXTERNAL FIXATOR REMOVAL;  Surgeon: Anish Farah DPM;  Location: [facility];  Service:     INCISION AND DRAINAGE LEG Left 6/13/2018    Procedure: Left 2nd, 3rd toe laceration irrigation, debridement and multi layer wound closure.;  Surgeon: Anish Farah DPM;  Location: [facility];  Service: Podiatry    JOINT REPLACEMENT  "     KNEE SURGERY      scope left 6 times, right once    LEG DEBRIDEMENT Right 10/16/2020    Procedure: DEBRIDEMENT RIGHT  FOOT SURGICAL WOUND;  Surgeon: Anish Farah DPM;  Location:  [facility] OR;  Service: Podiatry;  Laterality: Right;  DEBRIDEMENT RIGHT FOOT SURGICAL WOUND    NERVE TRANSFER Right     elbow    ORIF FOOT FRACTURE Right 7/29/2017    Procedure: open reduction with percutaneous pinning of midfoot dislocation fracture;  Surgeon: Anish Farah DPM;  Location:  [facility] OR;  Service:     ORIF FOOT FRACTURE Left 3/5/2018    Procedure: OPEN REDUCTION WITH IRRIGATION AND WOUND CLOSURE LEFT FOURTH TOE;  Surgeon: Anish Farah DPM;  Location:  [facility] OR;  Service:     TENDON LENGTHENING/SHORTENING Right     PSOAS    TOE FUSION Right 8/18/2017    Procedure: RIGHT  MEDIAL COLUMN ARTHRODESIS, RIGHT TARAL METATARSAL ARTHRODESIS;  Surgeon: Anish Farah DPM;  Location:  [facility] OR;  Service:     TOTAL HIP ARTHROPLASTY Bilateral     TOTAL SHOULDER ARTHROPLASTY Right        Family History   Problem Relation Age of Onset    Arthritis Mother     Cancer Mother     Hyperlipidemia Mother     Colon polyps Mother     Arthritis Father     Cancer Father     Depression Father     Hypertension Father     Mental illness Father     Cancer Sister     Arthritis Sister     Hyperlipidemia Sister     Cancer Paternal Aunt     Hypertension Daughter     Depression Son     Hyperlipidemia Paternal Grandmother     Hearing loss Paternal Grandfather     Hyperlipidemia Paternal Grandfather     Hypertension Paternal Grandfather     Colon cancer Maternal Grandmother     Malig Hyperthermia Neg Hx          Review of Systems        Objective:  Vitals:    10/14/24 1319   Weight: 134 kg (295 lb 3.2 oz)   Height: 182.9 cm (72\")         10/14/24  1319   Weight: 134 kg (295 lb 3.2 oz)     Body mass index is 40.04 kg/m².  General: No acute distress.  Resp: normal respiratory effort  Skin: no rashes or wounds; normal turgor  Psych: mood and affect " appropriate; recent and remote memory intact          Physical Exam  Left knee demonstrates active range of motion from 5 to 120 degrees, strength of 4 out of 5 on flexion and extension, moderate effusion, stability to varus and valgus stress at 5 and 30 degrees, good endpoint and posterior drawer at 90 degrees. Positive active patellar compression test, positive Yared's exam with pain along the medial joint line, no click.         Imaging:  None today  Assessment:        1. Primary osteoarthritis of left knee           Plan:      Large Joint Arthrocentesis: L knee  Date/Time: 10/14/2024 1:23 PM  Consent given by: patient  Site marked: site marked  Timeout: Immediately prior to procedure a time out was called to verify the correct patient, procedure, equipment, support staff and site/side marked as required   Supporting Documentation  Indications: pain   Procedure Details  Location: knee - L knee  Preparation: Patient was prepped and draped in the usual sterile fashion  Needle size: 22 G  Approach: anterolateral  Medications administered: 8 mL lidocaine PF 1% 1 %; 80 mg triamcinolone acetonide 40 MG/ML  Patient tolerance: patient tolerated the procedure well with no immediate complications          Assessment & Plan  1. Left knee pain.  He reports increasing pain in the medial and anterior aspect of his left knee over the last couple of weeks, with moderate to severe intensity and moderate swelling. The pain waxes and wanes. He does not experience buckling, catching, locking, or giving way episodes. Physical examination reveals active range of motion from 5 to 120 degrees, 4 out of 5 strength on flexion and extension, moderate effusion, stability to varus and valgus stress, and a positive active patellar compression test. Yared's exam is positive with pain along the medial joint line but no click.      Treatment options, including total knee arthroplasty, were discussed at length. He prefers to avoid surgical  treatment at this time and is satisfied with the response from previous injections. He will proceed with another left knee injection today. He will continue hip, core, and quad strengthening exercises, as well as weight loss efforts through low-impact activities and dieting.  If symptoms persist or worsen, surgical options may be reconsidered.      Patient would like to proceed with cortisone injection today to the left knee. Recommended limited use of affected extremity for the next 24 hours to only essential activites other than work on general active and passive motion. Recommended supplementing with ice and soft tissue massage. Discussed with patient that they should see results in 5-7 days, if no improvement in 5-6 weeks I have asked them to call the office to review other options. Patient should call office immediately if they notice redness, warmth, fevers, chills, or residual numbness or tingling for greater than 6 hours after injection.     Follow-up  Return in 3 months or sooner if needed.    Eliseo Price was in agreement with plan and had all questions answered.     Orders:  Orders Placed This Encounter   Procedures    Large Joint Arthrocentesis: L knee       Medications:  No orders of the defined types were placed in this encounter.      Followup:  No follow-ups on file.    Diagnoses and all orders for this visit:    1. Primary osteoarthritis of left knee (Primary)    Other orders  -     Cancel: - Large Joint Arthrocentesis  -     Large Joint Arthrocentesis: L knee          Class 2 Severe Obesity (BMI >=35 and <=39.9). Obesity-related health conditions include the following: none. Obesity is improving with lifestyle modifications. BMI is is above average; BMI management plan is completed. We discussed portion control and increasing exercise.       Dictated utilizing Dragon dictation     Patient or patient representative verbalized consent for the use of Ambient Listening during the visit with  Valentin  GAY Barfield MD for chart documentation. 10/14/2024  13:41 EDT

## 2024-10-24 ENCOUNTER — PRE-ADMISSION TESTING (OUTPATIENT)
Dept: PREADMISSION TESTING | Facility: HOSPITAL | Age: 59
End: 2024-10-24
Payer: MEDICARE

## 2024-10-24 VITALS
SYSTOLIC BLOOD PRESSURE: 123 MMHG | WEIGHT: 286.8 LBS | HEART RATE: 73 BPM | BODY MASS INDEX: 40.15 KG/M2 | DIASTOLIC BLOOD PRESSURE: 72 MMHG | HEIGHT: 71 IN | OXYGEN SATURATION: 99 % | RESPIRATION RATE: 18 BRPM

## 2024-10-24 DIAGNOSIS — T84.9XXD: ICD-10-CM

## 2024-10-24 DIAGNOSIS — L97.422 ULCER OF HEEL AND MIDFOOT, LEFT, WITH FAT LAYER EXPOSED: ICD-10-CM

## 2024-10-24 LAB
ALBUMIN SERPL-MCNC: 4 G/DL (ref 3.5–5.2)
ALBUMIN/GLOB SERPL: 1.4 G/DL
ALP SERPL-CCNC: 85 U/L (ref 39–117)
ALT SERPL W P-5'-P-CCNC: 18 U/L (ref 1–41)
ANION GAP SERPL CALCULATED.3IONS-SCNC: 10.2 MMOL/L (ref 5–15)
AST SERPL-CCNC: 21 U/L (ref 1–40)
BASOPHILS # BLD AUTO: 0.05 10*3/MM3 (ref 0–0.2)
BASOPHILS NFR BLD AUTO: 0.6 % (ref 0–1.5)
BILIRUB SERPL-MCNC: 0.4 MG/DL (ref 0–1.2)
BUN SERPL-MCNC: 15 MG/DL (ref 8–23)
BUN/CREAT SERPL: 20.3 (ref 7–25)
CALCIUM SPEC-SCNC: 8.9 MG/DL (ref 8.6–10.5)
CHLORIDE SERPL-SCNC: 98 MMOL/L (ref 98–107)
CO2 SERPL-SCNC: 24.8 MMOL/L (ref 22–29)
CREAT SERPL-MCNC: 0.74 MG/DL (ref 0.76–1.27)
DEPRECATED RDW RBC AUTO: 48.3 FL (ref 37–54)
EGFRCR SERPLBLD CKD-EPI 2021: 103.7 ML/MIN/1.73
EOSINOPHIL # BLD AUTO: 0.26 10*3/MM3 (ref 0–0.4)
EOSINOPHIL NFR BLD AUTO: 3.1 % (ref 0.3–6.2)
ERYTHROCYTE [DISTWIDTH] IN BLOOD BY AUTOMATED COUNT: 13.7 % (ref 12.3–15.4)
GLOBULIN UR ELPH-MCNC: 2.8 GM/DL
GLUCOSE SERPL-MCNC: 109 MG/DL (ref 65–99)
HBA1C MFR BLD: 5.51 % (ref 4.8–5.6)
HCT VFR BLD AUTO: 47.6 % (ref 37.5–51)
HGB BLD-MCNC: 15.4 G/DL (ref 13–17.7)
IMM GRANULOCYTES # BLD AUTO: 0.06 10*3/MM3 (ref 0–0.05)
IMM GRANULOCYTES NFR BLD AUTO: 0.7 % (ref 0–0.5)
LYMPHOCYTES # BLD AUTO: 1.3 10*3/MM3 (ref 0.7–3.1)
LYMPHOCYTES NFR BLD AUTO: 15.3 % (ref 19.6–45.3)
MCH RBC QN AUTO: 31 PG (ref 26.6–33)
MCHC RBC AUTO-ENTMCNC: 32.4 G/DL (ref 31.5–35.7)
MCV RBC AUTO: 95.8 FL (ref 79–97)
MONOCYTES # BLD AUTO: 0.86 10*3/MM3 (ref 0.1–0.9)
MONOCYTES NFR BLD AUTO: 10.1 % (ref 5–12)
NEUTROPHILS NFR BLD AUTO: 5.97 10*3/MM3 (ref 1.7–7)
NEUTROPHILS NFR BLD AUTO: 70.2 % (ref 42.7–76)
NRBC BLD AUTO-RTO: 0 /100 WBC (ref 0–0.2)
PLATELET # BLD AUTO: 167 10*3/MM3 (ref 140–450)
PMV BLD AUTO: 8.7 FL (ref 6–12)
POTASSIUM SERPL-SCNC: 4.7 MMOL/L (ref 3.5–5.2)
PROT SERPL-MCNC: 6.8 G/DL (ref 6–8.5)
QT INTERVAL: 408 MS
QTC INTERVAL: 444 MS
RBC # BLD AUTO: 4.97 10*6/MM3 (ref 4.14–5.8)
SODIUM SERPL-SCNC: 133 MMOL/L (ref 136–145)
WBC NRBC COR # BLD AUTO: 8.5 10*3/MM3 (ref 3.4–10.8)

## 2024-10-24 PROCEDURE — 93005 ELECTROCARDIOGRAM TRACING: CPT

## 2024-10-24 PROCEDURE — 80053 COMPREHEN METABOLIC PANEL: CPT | Performed by: STUDENT IN AN ORGANIZED HEALTH CARE EDUCATION/TRAINING PROGRAM

## 2024-10-24 PROCEDURE — 36415 COLL VENOUS BLD VENIPUNCTURE: CPT

## 2024-10-24 PROCEDURE — 85025 COMPLETE CBC W/AUTO DIFF WBC: CPT | Performed by: STUDENT IN AN ORGANIZED HEALTH CARE EDUCATION/TRAINING PROGRAM

## 2024-10-24 PROCEDURE — 83036 HEMOGLOBIN GLYCOSYLATED A1C: CPT | Performed by: STUDENT IN AN ORGANIZED HEALTH CARE EDUCATION/TRAINING PROGRAM

## 2024-10-24 PROCEDURE — 93010 ELECTROCARDIOGRAM REPORT: CPT | Performed by: INTERNAL MEDICINE

## 2024-10-24 NOTE — DISCHARGE INSTRUCTIONS
PRE-ADMISSION TESTING INSTRUCTIONS FOR ADULTS    Take these medications the morning of surgery with a small sip of water: clonazepam, gabapentin, primidone, fluoxetine, clonidine, propranolol, omeprazole  *hold celebrex for 1 week prior to surgery*    Do not take any insulin or diabetes medications the morning of surgery.    No aspirin, advil, aleve, ibuprofen, naproxen, diet pills, decongestants, or herbal/vitamins for a week prior to surgery.       Tylenol/Acetaminophen is okay to take if needed.    General Instructions:    DO NOT EAT SOLID FOOD AFTER MIDNIGHT THE NIGHT BEFORE SURGERY. No gum, mints, or hard candy after midnight the night before surgery.  You may drink clear liquids the day of surgery up until 2 hours before your arrival time.  Clear liquids are liquids you can see through. Nothing RED in color.    Plain water    Sports drinks      Gelatin (Jell-O)  Fruit juices without pulp such as white grape juice and apple juice  Popsicles that contain no fruit or yogurt  Tea or coffee (no cream or milk added)    It is beneficial for you to have a clear drink that contains carbohydrates 2 hours before your arrival time.  We suggest a 20 ounce bottle of Gatorade or Powerade for non-diabetic patients or a 20 ounce bottle of Gatorade Zero or Powerade Zero for diabetic patients.     Patients who avoid smoking, chewing tobacco and alcohol for 4 weeks prior to surgery have a reduced risk of post-operative complications.  If at all possible, quit smoking as many days before surgery as you can.    Do not smoke, use chewing tobacco or drink alcohol the day of surgery    Bring your C-PAP/ BI-PAP machine if you use one.  Wear clean comfortable clothes.  Do not wear contact lenses, lotion, deodorant, or make-up.  Bring a case for your glasses if applicable. You may brush your teeth the morning of surgery.  You may wear dentures/partials, do not put adhesive/glue on them.  Leave all other jewelry and valuables at  home.      Preventing a Surgical Site Infection:    Shower the night before and on the morning of surgery using the chlorhexidine soap you were given.  Use a clean washcloth with the soap.  Place clean sheets on your bed after showering the night before surgery. Do not use the CHG soap on your hair, face, or private areas. Wash your body gently for five (5) minutes. Do not scrub your skin.  Dry with a clean towel and dress in clean clothing.  Do not shave the surgical area for 10 days-2 weeks prior to surgery  because the razor can irritate skin and make it easier to develop an infection.  Make sure you, your family, and all healthcare providers clean their hands with soap and water or an alcohol based hand  before caring for you or your wound.      Day of surgery:    Your surgeon’s office will advise you of your arrival time for the day of surgery.    Upon arrival, a Pre-op nurse and Anesthesia provider will review your health history, obtain vital signs, and answer questions you may have. The anesthesia provider will also discuss the type of anesthesia that will be needed for your procedure, which may include general anesthesia. The only belongings needed at this time will be your home medications and if applicable your C-PAP/BI-PAP machine.  If you are staying overnight your family can leave the rest of your belongings in the car and bring them to your room later.  A Pre-op nurse will start an IV and you may receive medication in preparation for surgery, including something to help you relax.  Your family will be able to see you in the Pre-op area.  While you are in surgery your family should notify the waiting room  if they leave the waiting room area and provide a contact phone number.    IF you have any questions, you can call the Pre-Admission Department at [phone] or your surgeon's office.  Notify your surgeon if  you become sick, have a fever, productive cough, or cannot be here  the day of surgery    Please be aware that surgery does come with discomfort.  We want to make every effort to control your discomfort so please discuss any uncontrolled symptoms with your nurse.   Your doctor will most likely have prescribed pain medications.      If you are going home after surgery, you will receive individualized written care instructions before being discharged.  A responsible adult (over the age of 18) must drive you to and from the hospital on the day of your surgery and stay with you for 24 hours after anesthesia.    If you are staying overnight following surgery, you will be transported to your hospital room following the recovery period.  [facility] has all private rooms.    You may receive a survey regarding the care you received. Your feedback is very important and will be used to collect the necessary data to help us to continue to provide excellent care.     Deductibles and co-payments are collected on the day of service. Please be prepared to pay the required co-pay, deductible or deposit on the day of service as defined by your plan.

## 2024-10-31 ENCOUNTER — ANESTHESIA EVENT (OUTPATIENT)
Dept: PERIOP | Facility: HOSPITAL | Age: 59
End: 2024-10-31
Payer: MEDICARE

## 2024-11-01 ENCOUNTER — APPOINTMENT (OUTPATIENT)
Dept: GENERAL RADIOLOGY | Facility: HOSPITAL | Age: 59
End: 2024-11-01
Payer: MEDICARE

## 2024-11-01 ENCOUNTER — APPOINTMENT (OUTPATIENT)
Dept: ULTRASOUND IMAGING | Facility: HOSPITAL | Age: 59
End: 2024-11-01
Payer: MEDICARE

## 2024-11-01 ENCOUNTER — HOSPITAL ENCOUNTER (OUTPATIENT)
Facility: HOSPITAL | Age: 59
Discharge: HOME | End: 2024-11-01
Attending: STUDENT IN AN ORGANIZED HEALTH CARE EDUCATION/TRAINING PROGRAM | Admitting: STUDENT IN AN ORGANIZED HEALTH CARE EDUCATION/TRAINING PROGRAM
Payer: MEDICARE

## 2024-11-01 ENCOUNTER — ANESTHESIA (OUTPATIENT)
Dept: PERIOP | Facility: HOSPITAL | Age: 59
End: 2024-11-01
Payer: MEDICARE

## 2024-11-01 VITALS
HEART RATE: 70 BPM | TEMPERATURE: 98 F | OXYGEN SATURATION: 96 % | RESPIRATION RATE: 16 BRPM | WEIGHT: 286 LBS | DIASTOLIC BLOOD PRESSURE: 71 MMHG | SYSTOLIC BLOOD PRESSURE: 116 MMHG | BODY MASS INDEX: 39.89 KG/M2

## 2024-11-01 DIAGNOSIS — T84.9XXD: Primary | ICD-10-CM

## 2024-11-01 DIAGNOSIS — L97.422 ULCER OF HEEL AND MIDFOOT, LEFT, WITH FAT LAYER EXPOSED: ICD-10-CM

## 2024-11-01 LAB — GLUCOSE BLDC GLUCOMTR-MCNC: 102 MG/DL (ref 70–130)

## 2024-11-01 PROCEDURE — 25810000003 LACTATED RINGERS PER 1000 ML

## 2024-11-01 PROCEDURE — 25010000002 MIDAZOLAM PER 1MG

## 2024-11-01 PROCEDURE — 82948 REAGENT STRIP/BLOOD GLUCOSE: CPT

## 2024-11-01 PROCEDURE — A9270 NON-COVERED ITEM OR SERVICE: HCPCS | Performed by: STUDENT IN AN ORGANIZED HEALTH CARE EDUCATION/TRAINING PROGRAM

## 2024-11-01 PROCEDURE — 25010000002 LIDOCAINE 1 % SOLUTION: Performed by: STUDENT IN AN ORGANIZED HEALTH CARE EDUCATION/TRAINING PROGRAM

## 2024-11-01 PROCEDURE — 63710000001 ACETAMINOPHEN EXTRA STRENGTH 500 MG TABLET: Performed by: STUDENT IN AN ORGANIZED HEALTH CARE EDUCATION/TRAINING PROGRAM

## 2024-11-01 PROCEDURE — 63710000001 CELECOXIB 100 MG CAPSULE: Performed by: STUDENT IN AN ORGANIZED HEALTH CARE EDUCATION/TRAINING PROGRAM

## 2024-11-01 PROCEDURE — 25010000002 ONDANSETRON PER 1 MG

## 2024-11-01 PROCEDURE — 25010000002 DEXAMETHASONE PER 1 MG

## 2024-11-01 PROCEDURE — 73630 X-RAY EXAM OF FOOT: CPT

## 2024-11-01 PROCEDURE — 25010000002 PROPOFOL 10 MG/ML EMULSION: Performed by: NURSE ANESTHETIST, CERTIFIED REGISTERED

## 2024-11-01 PROCEDURE — 25010000002 CEFAZOLIN PER 500 MG: Performed by: STUDENT IN AN ORGANIZED HEALTH CARE EDUCATION/TRAINING PROGRAM

## 2024-11-01 PROCEDURE — 76000 FLUOROSCOPY <1 HR PHYS/QHP: CPT

## 2024-11-01 PROCEDURE — 25010000002 BUPIVACAINE 0.25 % SOLUTION: Performed by: STUDENT IN AN ORGANIZED HEALTH CARE EDUCATION/TRAINING PROGRAM

## 2024-11-01 PROCEDURE — 73620 X-RAY EXAM OF FOOT: CPT

## 2024-11-01 RX ORDER — FAMOTIDINE 10 MG/ML
20 INJECTION, SOLUTION INTRAVENOUS
Status: COMPLETED | OUTPATIENT
Start: 2024-11-01 | End: 2024-11-01

## 2024-11-01 RX ORDER — BUPIVACAINE HYDROCHLORIDE 2.5 MG/ML
INJECTION, SOLUTION INFILTRATION; PERINEURAL AS NEEDED
Status: DISCONTINUED | OUTPATIENT
Start: 2024-11-01 | End: 2024-11-01 | Stop reason: HOSPADM

## 2024-11-01 RX ORDER — GABAPENTIN 300 MG/1
600 CAPSULE ORAL 2 TIMES DAILY
Qty: 12 CAPSULE | Refills: 0 | Status: SHIPPED | OUTPATIENT
Start: 2024-11-01 | End: 2024-11-04

## 2024-11-01 RX ORDER — SODIUM CHLORIDE 0.9 % (FLUSH) 0.9 %
10 SYRINGE (ML) INJECTION AS NEEDED
Status: DISCONTINUED | OUTPATIENT
Start: 2024-11-01 | End: 2024-11-01 | Stop reason: HOSPADM

## 2024-11-01 RX ORDER — SODIUM CHLORIDE 0.9 % (FLUSH) 0.9 %
10 SYRINGE (ML) INJECTION EVERY 12 HOURS SCHEDULED
Status: DISCONTINUED | OUTPATIENT
Start: 2024-11-01 | End: 2024-11-01 | Stop reason: HOSPADM

## 2024-11-01 RX ORDER — LIDOCAINE HYDROCHLORIDE 10 MG/ML
INJECTION, SOLUTION INFILTRATION; PERINEURAL AS NEEDED
Status: DISCONTINUED | OUTPATIENT
Start: 2024-11-01 | End: 2024-11-01 | Stop reason: HOSPADM

## 2024-11-01 RX ORDER — LIDOCAINE HYDROCHLORIDE 10 MG/ML
0.5 INJECTION, SOLUTION INFILTRATION; PERINEURAL ONCE AS NEEDED
Status: DISCONTINUED | OUTPATIENT
Start: 2024-11-01 | End: 2024-11-01 | Stop reason: HOSPADM

## 2024-11-01 RX ORDER — GABAPENTIN 300 MG/1
600 CAPSULE ORAL ONCE
Status: DISCONTINUED | OUTPATIENT
Start: 2024-11-01 | End: 2024-11-01 | Stop reason: HOSPADM

## 2024-11-01 RX ORDER — CELECOXIB 100 MG/1
200 CAPSULE ORAL ONCE
Status: COMPLETED | OUTPATIENT
Start: 2024-11-01 | End: 2024-11-01

## 2024-11-01 RX ORDER — ONDANSETRON 2 MG/ML
4 INJECTION INTRAMUSCULAR; INTRAVENOUS ONCE AS NEEDED
Status: DISCONTINUED | OUTPATIENT
Start: 2024-11-01 | End: 2024-11-01 | Stop reason: HOSPADM

## 2024-11-01 RX ORDER — ACETAMINOPHEN 500 MG
1000 TABLET ORAL ONCE
Status: COMPLETED | OUTPATIENT
Start: 2024-11-01 | End: 2024-11-01

## 2024-11-01 RX ORDER — MAGNESIUM HYDROXIDE 1200 MG/15ML
LIQUID ORAL AS NEEDED
Status: DISCONTINUED | OUTPATIENT
Start: 2024-11-01 | End: 2024-11-01 | Stop reason: HOSPADM

## 2024-11-01 RX ORDER — SODIUM CHLORIDE, SODIUM LACTATE, POTASSIUM CHLORIDE, CALCIUM CHLORIDE 600; 310; 30; 20 MG/100ML; MG/100ML; MG/100ML; MG/100ML
100 INJECTION, SOLUTION INTRAVENOUS ONCE
Status: DISCONTINUED | OUTPATIENT
Start: 2024-11-01 | End: 2024-11-01 | Stop reason: HOSPADM

## 2024-11-01 RX ORDER — MIDAZOLAM HYDROCHLORIDE 2 MG/2ML
1 INJECTION, SOLUTION INTRAMUSCULAR; INTRAVENOUS
Status: DISCONTINUED | OUTPATIENT
Start: 2024-11-01 | End: 2024-11-01 | Stop reason: HOSPADM

## 2024-11-01 RX ORDER — SODIUM CHLORIDE 9 MG/ML
40 INJECTION, SOLUTION INTRAVENOUS AS NEEDED
Status: DISCONTINUED | OUTPATIENT
Start: 2024-11-01 | End: 2024-11-01 | Stop reason: HOSPADM

## 2024-11-01 RX ORDER — HYDROCODONE BITARTRATE AND ACETAMINOPHEN 7.5; 325 MG/1; MG/1
1 TABLET ORAL ONCE AS NEEDED
Status: DISCONTINUED | OUTPATIENT
Start: 2024-11-01 | End: 2024-11-01 | Stop reason: HOSPADM

## 2024-11-01 RX ORDER — PROPOFOL 10 MG/ML
VIAL (ML) INTRAVENOUS AS NEEDED
Status: DISCONTINUED | OUTPATIENT
Start: 2024-11-01 | End: 2024-11-01 | Stop reason: SURG

## 2024-11-01 RX ORDER — CELECOXIB 200 MG/1
200 CAPSULE ORAL 2 TIMES DAILY
Qty: 6 CAPSULE | Refills: 0 | Status: SHIPPED | OUTPATIENT
Start: 2024-11-01

## 2024-11-01 RX ORDER — FENTANYL CITRATE 50 UG/ML
25 INJECTION, SOLUTION INTRAMUSCULAR; INTRAVENOUS
Status: DISCONTINUED | OUTPATIENT
Start: 2024-11-01 | End: 2024-11-01 | Stop reason: HOSPADM

## 2024-11-01 RX ORDER — SODIUM CHLORIDE, SODIUM LACTATE, POTASSIUM CHLORIDE, CALCIUM CHLORIDE 600; 310; 30; 20 MG/100ML; MG/100ML; MG/100ML; MG/100ML
9 INJECTION, SOLUTION INTRAVENOUS CONTINUOUS
Status: DISCONTINUED | OUTPATIENT
Start: 2024-11-01 | End: 2024-11-01 | Stop reason: HOSPADM

## 2024-11-01 RX ORDER — ONDANSETRON 2 MG/ML
4 INJECTION INTRAMUSCULAR; INTRAVENOUS ONCE AS NEEDED
Status: COMPLETED | OUTPATIENT
Start: 2024-11-01 | End: 2024-11-01

## 2024-11-01 RX ORDER — DEXAMETHASONE SODIUM PHOSPHATE 10 MG/ML
8 INJECTION INTRAMUSCULAR; INTRAVENOUS ONCE AS NEEDED
Status: COMPLETED | OUTPATIENT
Start: 2024-11-01 | End: 2024-11-01

## 2024-11-01 RX ORDER — HYDROCODONE BITARTRATE AND ACETAMINOPHEN 5; 325 MG/1; MG/1
1-2 TABLET ORAL EVERY 6 HOURS PRN
Qty: 40 TABLET | Refills: 0 | Status: SHIPPED | OUTPATIENT
Start: 2024-11-01

## 2024-11-01 RX ADMIN — FAMOTIDINE 20 MG: 10 INJECTION, SOLUTION INTRAVENOUS at 08:24

## 2024-11-01 RX ADMIN — DEXAMETHASONE SODIUM PHOSPHATE 8 MG: 10 INJECTION INTRAMUSCULAR; INTRAVENOUS at 08:24

## 2024-11-01 RX ADMIN — MIDAZOLAM HYDROCHLORIDE 1 MG: 1 INJECTION, SOLUTION INTRAMUSCULAR; INTRAVENOUS at 10:51

## 2024-11-01 RX ADMIN — ACETAMINOPHEN 1000 MG: 500 TABLET ORAL at 08:18

## 2024-11-01 RX ADMIN — PROPOFOL 50 MCG/KG/MIN: 10 INJECTION, EMULSION INTRAVENOUS at 11:03

## 2024-11-01 RX ADMIN — CELECOXIB 200 MG: 100 CAPSULE ORAL at 08:18

## 2024-11-01 RX ADMIN — CEFAZOLIN 2000 MG: 2 INJECTION, POWDER, FOR SOLUTION INTRAVENOUS at 10:59

## 2024-11-01 RX ADMIN — SODIUM CHLORIDE, POTASSIUM CHLORIDE, SODIUM LACTATE AND CALCIUM CHLORIDE 9 ML/HR: 600; 310; 30; 20 INJECTION, SOLUTION INTRAVENOUS at 08:24

## 2024-11-01 RX ADMIN — ONDANSETRON 4 MG: 2 INJECTION INTRAMUSCULAR; INTRAVENOUS at 08:19

## 2024-11-01 RX ADMIN — PROPOFOL 80 MG: 10 INJECTION, EMULSION INTRAVENOUS at 11:00

## 2024-11-01 NOTE — H&P
[facility]   PREOPERATIVE HISTORY AND PHYSICAL    Patient Name:Eliseo Price  : 1965  MRN: [id number]  Primary Care Physician: Killian Scales MD  Date of admission: 2024    Subjective   Subjective     Chief Complaint: preoperative evaluation    History of Present Illness  The hardware in his right foot has become loose and painful and caused him recurrent ulceration.  He presents for removal of the hardware today    Eliseo Price is a 58 y.o. male who presents for preoperative evaluation. He is scheduled for HARDWARE REMOVAL (Right)    Review of Systems   Constitutional: Negative.    HENT: Negative.     Eyes: Negative.    Respiratory: Negative.     Cardiovascular: Negative.    Gastrointestinal: Negative.    Endocrine: Negative.    Genitourinary: Negative.    Musculoskeletal:  Positive for gait problem (Due to right foot pain).   Skin:  Positive for wound (Previously noted on right foot, currently healed).   Allergic/Immunologic: Negative.    Hematological: Negative.    Psychiatric/Behavioral: Negative.          Personal History     Past Medical History:   Diagnosis Date    Amputated toe of left foot     all toes    Amputated toe of right foot     all toes    Anesthesia complication     DIFFICULT TO PUT TO SLEEP    Arthritis     Bipolar disorder     Cellulitis of lower extremity     RIGHT LOWER LEG    COPD (chronic obstructive pulmonary disease)     Coronary artery disease 2009    HEART ATTACK    Diabetes mellitus     Disease of thyroid gland     nodule on thyroid    DVT (deep venous thrombosis)     right leg    Fracture of right foot     Gastric ulcer     GERD (gastroesophageal reflux disease)     Hyperlipidemia     Hypertension     MRSA infection 2003    history of left leg after surgery    Neuropathy     hands & feet    Pseudogout     RLS (restless legs syndrome)     Septic shock 2017    H/O    Sleep apnea     no machine, CANT TOLERATE    Stroke     x2, no residual    Toxic metabolic  encephalopathy 07/2017    H/O    Tremors of nervous system        Past Surgical History:   Procedure Laterality Date    AMPUTATION DIGIT Left 3/16/2018    Procedure: AMPUTATION LEFT FOURTH TOE;  Surgeon: Anish Farah DPM;  Location: [facility] OR;  Service: Podiatry    AMPUTATION DIGIT Right 10/13/2020    Procedure: AMPUTATION OF RIGHT FIFTH TOE AND ALL ASSOCIATED PROCEDURES;  Surgeon: Anish Farah DPM;  Location: [facility] OR;  Service: Podiatry;  Laterality: Right;  AMPUTATION OF RIGHT FIFTH TOE     AMPUTATION DIGIT Right 1/19/2021    Procedure: AMPUTATION RIGHT FOURTH TOE;  Surgeon: Anish Farah DPM;  Location: [facility] OR;  Service: Podiatry;  Laterality: Right;    AMPUTATION DIGIT Right 7/30/2021    Procedure: AMPUTATION RIGHT THIRD TOE;  Surgeon: Anish Farah DPM;  Location: [facility] OR;  Service: Podiatry;  Laterality: Right;  RIGHT 3rd toe amputation     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    BONE EXCISION LEG Right 12/14/2023    Procedure: BONE EXCISION LOWER EXTERMITY;  Surgeon: Juan White DPM;  Location: [facility] OR;  Service: Podiatry;  Laterality: Right;    COLONOSCOPY N/A 7/16/2018    Procedure: COLONOSCOPY and polypectomy;  Surgeon: Kaylie Mcfarlane MD;  Location: [facility] OR;  Service: Gastroenterology    ENDOSCOPY N/A 3/16/2018    Procedure: ESOPHAGOGASTRODUODENOSCOPY with biopsies;  Surgeon: Kaylie Mcfarlane MD;  Location: [facility] OR;  Service: Gastroenterology    FINGER TENDON REPAIR Right     little finger    HARDWARE REMOVAL Right 8/18/2017    Procedure: RIGHT EXTERNAL FIXATOR REMOVAL;  Surgeon: Anish Farah DPM;  Location: [facility] OR;  Service:     INCISION AND DRAINAGE LEG Left 6/13/2018    Procedure: Left 2nd, 3rd toe laceration irrigation, debridement and multi layer wound closure.;  Surgeon: Anish Farah DPM;  Location: [facility] OR;  Service: Podiatry    JOINT REPLACEMENT      KNEE SURGERY      scope left 6 times, right once    LEG DEBRIDEMENT Right 10/16/2020     Procedure: DEBRIDEMENT RIGHT  FOOT SURGICAL WOUND;  Surgeon: Anish Farah DPM;  Location:  [facility] OR;  Service: Podiatry;  Laterality: Right;  DEBRIDEMENT RIGHT FOOT SURGICAL WOUND    NERVE TRANSFER Right     elbow    ORIF FOOT FRACTURE Right 7/29/2017    Procedure: open reduction with percutaneous pinning of midfoot dislocation fracture;  Surgeon: Anish Farah DPM;  Location:  [facility] OR;  Service:     ORIF FOOT FRACTURE Left 3/5/2018    Procedure: OPEN REDUCTION WITH IRRIGATION AND WOUND CLOSURE LEFT FOURTH TOE;  Surgeon: Anish Farah DPM;  Location:  [facility] OR;  Service:     TENDON LENGTHENING/SHORTENING Right     PSOAS    TOE FUSION Right 8/18/2017    Procedure: RIGHT  MEDIAL COLUMN ARTHRODESIS, RIGHT TARAL METATARSAL ARTHRODESIS;  Surgeon: Anish Farah DPM;  Location:  [facility] OR;  Service:     TOTAL HIP ARTHROPLASTY Bilateral     TOTAL SHOULDER ARTHROPLASTY Right        Family History: His family history includes Arthritis in his father, mother, and sister; Cancer in his father, mother, paternal aunt, and sister; Colon cancer in his maternal grandmother; Colon polyps in his mother; Depression in his father and son; Hearing loss in his paternal grandfather; Hyperlipidemia in his mother, paternal grandfather, paternal grandmother, and sister; Hypertension in his daughter, father, and paternal grandfather; Mental illness in his father.     Social History: He  reports that he has never smoked. He has never been exposed to tobacco smoke. He has never used smokeless tobacco. He reports current alcohol use. He reports that he does not use drugs.    Home Medications:  Aquaphor Advanced Therapy, FLUoxetine, QUEtiapine XR, SITagliptin-metFORMIN HCl ER, TRUEplus Lancets 33G, celecoxib, cloNIDine, clonazePAM, colchicine, doxepin, gabapentin, glucose blood, losartan, naloxone, omeprazole, primidone, propranolol, and temazepam    Allergies:  He is allergic to lisinopril.    Objective    Objective     Vitals:     Temp:  [98.5 °F (36.9 °C)] 98.5 °F (36.9 °C)  Heart Rate:  [73] 73  Resp:  [16] 16  BP: (108)/(68) 108/68    Physical Exam  Constitutional:       General: He is not in acute distress.     Appearance: Normal appearance.   HENT:      Head: Normocephalic and atraumatic.      Right Ear: External ear normal.      Left Ear: External ear normal.      Nose: Nose normal.      Mouth/Throat:      Mouth: Mucous membranes are moist.      Pharynx: Oropharynx is clear.   Eyes:      General: No scleral icterus.        Right eye: No discharge.         Left eye: No discharge.      Extraocular Movements: Extraocular movements intact.   Cardiovascular:      Rate and Rhythm: Normal rate and regular rhythm.      Pulses: Normal pulses.   Pulmonary:      Effort: Pulmonary effort is normal.      Breath sounds: Normal breath sounds.   Abdominal:      General: Abdomen is flat.      Palpations: Abdomen is soft.      Tenderness: There is no abdominal tenderness.   Musculoskeletal:         General: Deformity (Status post amputation of all toes on both feet) present.      Right lower leg: Edema (Noted over midfoot medially, area of prominent hardware) present.   Skin:     Capillary Refill: Capillary refill takes less than 2 seconds.      Findings: Lesion (Healed wound over navicular tuberosity of right foot) present.   Neurological:      Mental Status: He is alert and oriented to person, place, and time.   Psychiatric:         Mood and Affect: Mood normal.         Behavior: Behavior normal.         Thought Content: Thought content normal.         Judgment: Judgment normal.         Assessment & Plan   Assessment / Plan     Brief Patient Summary:  Eliseo Price is a 58 y.o. male who presents for preoperative evaluation.    Pre-Op Diagnosis Codes:      * Complication of internal fixation device of bone of right lower leg, subsequent encounter [T84.9XXD]     * Ulcer of heel and midfoot, left, with fat layer exposed [L97.422]    Active Hospital  Problems:  Active Hospital Problems    Diagnosis     **Ulcer of heel and midfoot, left, with fat layer exposed     Complication of internal fixation device of bone of right lower leg, subsequent encounter      Plan:   Procedure(s):  HARDWARE REMOVAL    The risks, benefits, and alternatives of the procedure including but not limited to pain, numbness, unsightly cosmetic result, need for further surgery, difficulty with shoe gear, delayed wound healing and risks of the anesthesia were discussed in detail with the patient and questions were answered. No guarantees were made or implied. Informed consent was obtained.    Juan White DPM

## 2024-11-01 NOTE — ANESTHESIA PREPROCEDURE EVALUATION
Anesthesia Evaluation     Patient summary reviewed and Nursing notes reviewed   no history of anesthetic complications:   NPO Solid Status: > 8 hours  NPO Liquid Status: > 8 hours           Airway   Mallampati: III  TM distance: <3 FB  Neck ROM: full  Possible difficult intubation  Dental - normal exam     Pulmonary - normal exam    breath sounds clear to auscultation  (+) COPD,sleep apnea on CPAP  (-) shortness of breath, not a smoker  Cardiovascular - normal exam  Exercise tolerance: good (4-7 METS)    ECG reviewed  Patient on routine beta blocker and Beta blocker given within 24 hours of surgery  Rhythm: regular  Rate: normal    (+) hypertension well controlled, CAD, PVD, DVT resolved, hyperlipidemia  (-) valvular problems/murmurs    ROS comment: EKG 10/24/24:  - NORMAL ECG -  Sinus rhythm  No change from prior tracing    Neuro/Psych  (+) CVA, tremors, numbness, psychiatric history Anxiety and Depression  (-) headaches  GI/Hepatic/Renal/Endo    (+) morbid obesity, GERD well controlled, PUD, GI bleeding resolved, diabetes mellitus type 2 well controlled, thyroid problem   (-) liver disease, no renal disease (Hx of ASHLEY)    ROS Comment: Hx of GI bleed. Resolved.     Musculoskeletal     (-) chronic pain  Abdominal   (+) obese    Abdomen: soft.   Substance History   (-) alcohol use, drug use     OB/GYN          Other   arthritis,     (-) history of cancer                Anesthesia Plan    ASA 3     MAC     (Ankle block per Dr. White)  intravenous induction     Anesthetic plan, risks, benefits, and alternatives have been provided, discussed and informed consent has been obtained with: patient.  Pre-procedure education provided  Use of blood products discussed with patient  Consented to blood products.    Plan discussed with CRNA.      CODE STATUS:

## 2024-11-01 NOTE — ANESTHESIA POSTPROCEDURE EVALUATION
Patient: Eliseo Price    Procedure Summary       Date: 11/01/24 Room / Location: formerly [facility] OR 4 /  [facility] OR    Anesthesia Start: 1055 Anesthesia Stop: 1214    Procedure: HARDWARE REMOVAL (Right) Diagnosis:       Complication of internal fixation device of bone of right lower leg, subsequent encounter      Ulcer of heel and midfoot, left, with fat layer exposed      (Complication of internal fixation device of bone of right lower leg, subsequent encounter [T84.9XXD])      (Ulcer of heel and midfoot, left, with fat layer exposed [L97.422])    Surgeons: Juan White DPM Provider: Patricia Potter CRNA    Anesthesia Type: MAC ASA Status: 3            Anesthesia Type: MAC    Vitals  Vitals Value Taken Time   /71 11/01/24 1250   Temp 98 °F (36.7 °C) 11/01/24 1211   Pulse 71 11/01/24 1251   Resp 16 11/01/24 1250   SpO2 96 % 11/01/24 1250   Vitals shown include unfiled device data.        Post Anesthesia Care and Evaluation    Patient location during evaluation: bedside  Patient participation: complete - patient participated  Level of consciousness: awake and alert  Pain score: 0  Pain management: adequate    Airway patency: patent  Anesthetic complications: No anesthetic complications  PONV Status: none  Cardiovascular status: acceptable  Respiratory status: acceptable  Hydration status: acceptable

## 2024-11-01 NOTE — OP NOTE
HARDWARE REMOVAL  Procedure Report    Patient Name:  Eliseo Price  YOB: 1965    Date of Surgery:  11/1/2024     Indications: Painful hardware with ulceration of right medial midfoot    Pre-op Diagnosis:   Complication of internal fixation device of bone of right lower leg, subsequent encounter [T84.9XXD]  Ulcer of heel and midfoot, left, with fat layer exposed [L97.422]       Post-Op Diagnosis Codes:     * Complication of internal fixation device of bone of right lower leg, subsequent encounter [T84.9XXD]     * Ulcer of heel and midfoot, left, with fat layer exposed [L97.422]    Procedure/CPT® Codes:      Procedure(s):  HARDWARE REMOVAL    Staff:  Surgeon(s):  Juan White DPM         Anesthesia: Choice    Estimated Blood Loss: 50cc    Implants:    Implant Name Type Inv. Item Serial No.  Lot No. LRB No. Used Action   SCRW BONE LK T10 FT 3.5X18MM - [id number] Implant SCRW LK BONE VARIAX T10 3.5X18MM  [employer]YA  Right 1 Explanted   SCRW BONE LK T10 FT 3.5X20MM - [id number] Implant SCRW LK BONE VARIAX T10 3.5X20MM  [employer] SU[employer]IYA  Right 2 Explanted   SCRW BONE T10 FT 3.5X20MM - [id number] Implant SCRW BONE VARIAX T10 3.5X20MM  [employer] SUPRIYA  Right 1 Explanted   SCRW BONE T10 FT 3.5X22MM - [id number] Implant SCRW BONE VARIAX T10 3.5X22MM  [employer]IRE[employer] SUPRIYA  Right 1 Explanted   PLT PA VARIAX MEDL CLMN FUSN LNG 3.5X70MM RT - [id number] Implant PLT PA VARIAX MEDL CLMN FUSN LNG 3.5X70MM RT  [employer] SUPRIYA  Right 1 Explanted   SCRW BONE T10 FT 3.5X42MM - [id number] Implant SCRW BONE VARIAX T10 3.5X42MM  [employer]IRE[employer] SUPRIYA  Right 1 Explanted   SCRW BONE T10 FT LK 3.5X26MM - [id number] Implant SCRW LK BONE VARIAX T10 3.5X26MM  [employer] SUPRIYA  Right 1 Explanted   SCRW BONE T10 FT LK 3.5X24MM - [id number] Implant SCRW LK BONE VARIAX T10 3.5X24MM  RAMIRE[employer] SUPRIYA  Right 1 Explanted       Specimen:          None        Findings: Medial midfoot plate with 8 screws intact, some loosening of hardware and there was  approximately half a centimeter of backing out of the most proximal dorsal screw  Complications: None    Description of Procedure:     The patient was seen in the preoperative holding area.  IV access was initiated and cefazolin 2  gms was started.  The right foot was marked as the correct operative site.  The procedure was discussed in detail with the patient and all questions were answered to the patient's satisfaction.      The patient was brought back to the operating room and left on the surgical stretcher in the supine position.  Anesthesia was initiated and 20 cc of 1% lidocaine plain were given in the right foot as a preoperative block..  The foot was cleaned, prepped, draped in the usual sterile manner.  A timeout was performed to ensure correct patient, site, procedure.    Attention was directed to the right medial midfoot.  Surgical scar was noted and fluoroscopy was used to confirm the location of the plate.  #10 blade was used to make an incision down to the plate and sharp dissection was used to remove soft tissue from the plate.  Hemostasis was achieved with electrocautery and pressure.  The screws securing the plate were removed with a screwdriver.  The most proximal and distal screw was noted to be prominent about a half a centimeter off of the plate due to backing out.  The plate was removed easily once all the screws had been removed.  There is no purulence and all the bone appeared to be hard and viable.  Fluoroscopy was used to confirm complete removal of the midfoot plate and screws.      The area was flushed with copious amounts of sterile saline.  Deep tissue was closed with 4-0 Vicryl and skin was closed with 2-0 nylon.  The surgical site was cleaned and dried.  20 cc of 0.5% Marcaine were given in the right foot as a postoperative block.  The foot was cleaned and dried and the surgical site was dressed with Adaptic, 4 x 4 gauze, Kerlix, Ace bandage.  A postop shoe was applied.      The  patient tolerated the procedure and anesthesia well and was transferred to the PACU with vital signs stable and neurovascular status intact.  Patient was placed in the care of the PACU nurses and discharged home when stable per anesthesia.  Appropriate pain medication has been prescribed and patient has follow-up scheduled with me in the office.            Juan White DPM     Date: 11/1/2024  Time: 12:15 EDT

## 2025-02-03 ENCOUNTER — OFFICE VISIT (OUTPATIENT)
Dept: ORTHOPEDIC SURGERY | Facility: CLINIC | Age: 60
End: 2025-02-03
Payer: MEDICARE

## 2025-02-03 VITALS — HEIGHT: 71 IN | WEIGHT: 286 LBS | BODY MASS INDEX: 40.04 KG/M2

## 2025-02-03 DIAGNOSIS — M17.12 PRIMARY OSTEOARTHRITIS OF LEFT KNEE: Primary | ICD-10-CM

## 2025-02-03 RX ADMIN — LIDOCAINE HYDROCHLORIDE 52 ML: 10 INJECTION, SOLUTION EPIDURAL; INFILTRATION; INTRACAUDAL; PERINEURAL at 14:20

## 2025-02-03 RX ADMIN — TRIAMCINOLONE ACETONIDE 80 MG: 40 INJECTION, SUSPENSION INTRA-ARTICULAR; INTRAMUSCULAR at 14:20

## 2025-02-03 NOTE — PROGRESS NOTES
Subjective:     Patient ID: Eliseo Price is a 59 y.o. male.    Chief Complaint:  Follow-up left knee pain, DJD  Last injection 10/14/2024  History of Present Illness  History of Present Illness  The patient returns to the clinic today for a follow-up evaluation regarding his left knee.    He reports that his last injection was highly effective. However, over the past 4 to 6 weeks, he has experienced a significant increase in pain over the anterior and lateral aspects of his left knee. The pain is rated as 7 to 8 out of 10, described as aching, and is exacerbated by prolonged walking, weightbearing, deep flexion activities, and rising from a seated position. There is minimal improvement with rest, anti-inflammatory medications, soft tissue massage, and ice. He also notes moderate swelling that fluctuates in intensity. Currently, he does not experience any hip or groin pain. Exacerbated by prolonged walking, weightbearing, deep flexion activities, and rising from a seated position. There is minimal improvement with rest, anti-inflammatory medications, soft tissue massage, and ice.     Social History     Occupational History    Not on file   Tobacco Use    Smoking status: Never     Passive exposure: Never    Smokeless tobacco: Never   Vaping Use    Vaping status: Never Used   Substance and Sexual Activity    Alcohol use: Yes     Comment: RARE, states had half pint bourbon 3/12/24    Drug use: No    Sexual activity: Defer      Past Medical History:   Diagnosis Date    Amputated toe of left foot     all toes    Amputated toe of right foot     all toes    Anesthesia complication     DIFFICULT TO PUT TO SLEEP    Arthritis     Bipolar disorder     Cellulitis of lower extremity     RIGHT LOWER LEG    COPD (chronic obstructive pulmonary disease)     Coronary artery disease 2009    HEART ATTACK    Diabetes mellitus     Disease of thyroid gland     nodule on thyroid    DVT (deep venous thrombosis) 2005    right leg    Fracture  of right foot     Gastric ulcer     GERD (gastroesophageal reflux disease)     Hyperlipidemia     Hypertension     MRSA infection 2003    history of left leg after surgery    Neuropathy     hands & feet    Pseudogout     RLS (restless legs syndrome)     Septic shock 07/2017    H/O    Sleep apnea     no machine, CANT TOLERATE    Stroke     x2, no residual    Toxic metabolic encephalopathy 07/2017    H/O    Tremors of nervous system      Past Surgical History:   Procedure Laterality Date    AMPUTATION DIGIT Left 3/16/2018    Procedure: AMPUTATION LEFT FOURTH TOE;  Surgeon: Anish Farah DPM;  Location: [facility] OR;  Service: Podiatry    AMPUTATION DIGIT Right 10/13/2020    Procedure: AMPUTATION OF RIGHT FIFTH TOE AND ALL ASSOCIATED PROCEDURES;  Surgeon: Anish Farah DPM;  Location: [facility] OR;  Service: Podiatry;  Laterality: Right;  AMPUTATION OF RIGHT FIFTH TOE     AMPUTATION DIGIT Right 1/19/2021    Procedure: AMPUTATION RIGHT FOURTH TOE;  Surgeon: Anish Farah DPM;  Location: [facility] OR;  Service: Podiatry;  Laterality: Right;    AMPUTATION DIGIT Right 7/30/2021    Procedure: AMPUTATION RIGHT THIRD TOE;  Surgeon: Anish Farah DPM;  Location: [facility] OR;  Service: Podiatry;  Laterality: Right;  RIGHT 3rd toe amputation     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    BONE EXCISION LEG Right 12/14/2023    Procedure: BONE EXCISION LOWER EXTERMITY;  Surgeon: Juan White DPM;  Location: [facility] OR;  Service: Podiatry;  Laterality: Right;    COLONOSCOPY N/A 7/16/2018    Procedure: COLONOSCOPY and polypectomy;  Surgeon: Kaylie Mcfarlane MD;  Location: [facility] OR;  Service: Gastroenterology    ENDOSCOPY N/A 3/16/2018    Procedure: ESOPHAGOGASTRODUODENOSCOPY with biopsies;  Surgeon: Kaylie Mcfarlane MD;  Location: [facility] OR;  Service: Gastroenterology    FINGER TENDON REPAIR Right     little finger    HARDWARE REMOVAL Right 8/18/2017    Procedure: RIGHT EXTERNAL FIXATOR REMOVAL;  Surgeon: Anish DE LA FUENTE  ARUTRO Farah;  Location:  [facility] OR;  Service:     HARDWARE REMOVAL Right 11/1/2024    Procedure: HARDWARE REMOVAL;  Surgeon: Juan White DPM;  Location:  [facility] OR;  Service: Podiatry;  Laterality: Right;    INCISION AND DRAINAGE LEG Left 6/13/2018    Procedure: Left 2nd, 3rd toe laceration irrigation, debridement and multi layer wound closure.;  Surgeon: Anish Farah DPM;  Location:  [facility] OR;  Service: Podiatry    JOINT REPLACEMENT      KNEE SURGERY      scope left 6 times, right once    LEG DEBRIDEMENT Right 10/16/2020    Procedure: DEBRIDEMENT RIGHT  FOOT SURGICAL WOUND;  Surgeon: Anish Farah DPM;  Location:  [facility] OR;  Service: Podiatry;  Laterality: Right;  DEBRIDEMENT RIGHT FOOT SURGICAL WOUND    NERVE TRANSFER Right     elbow    ORIF FOOT FRACTURE Right 7/29/2017    Procedure: open reduction with percutaneous pinning of midfoot dislocation fracture;  Surgeon: Anish Farah DPM;  Location:  [facility] OR;  Service:     ORIF FOOT FRACTURE Left 3/5/2018    Procedure: OPEN REDUCTION WITH IRRIGATION AND WOUND CLOSURE LEFT FOURTH TOE;  Surgeon: Anish Farah DPM;  Location:  [facility] OR;  Service:     TENDON LENGTHENING/SHORTENING Right     PSOAS    TOE FUSION Right 8/18/2017    Procedure: RIGHT  MEDIAL COLUMN ARTHRODESIS, RIGHT TARAL METATARSAL ARTHRODESIS;  Surgeon: Anish Farah DPM;  Location:  [facility] OR;  Service:     TOTAL HIP ARTHROPLASTY Bilateral     TOTAL SHOULDER ARTHROPLASTY Right        Family History   Problem Relation Age of Onset    Arthritis Mother     Cancer Mother     Hyperlipidemia Mother     Colon polyps Mother     Arthritis Father     Cancer Father     Depression Father     Hypertension Father     Mental illness Father     Cancer Sister     Arthritis Sister     Hyperlipidemia Sister     Cancer Paternal Aunt     Hypertension Daughter     Depression Son     Hyperlipidemia Paternal Grandmother     Hearing loss Paternal Grandfather     Hyperlipidemia Paternal Grandfather      "Hypertension Paternal Grandfather     Colon cancer Maternal Grandmother     Malig Hyperthermia Neg Hx          Review of Systems        Objective:  Vitals:    02/03/25 1414   Weight: 130 kg (286 lb)   Height: 180.3 cm (71\")         02/03/25  1414   Weight: 130 kg (286 lb)     Body mass index is 39.89 kg/m².  General: No acute distress.  Resp: normal respiratory effort  Skin: no rashes or wounds; normal turgor  Psych: mood and affect appropriate; recent and remote memory intact          Physical Exam  The left knee has an active range of motion from 5 to 120 degrees, with 4 out of 5 strength on flexion and extension. There is maximal tenderness to palpation along the medial joint line as well as the medial and lateral patellar facet. A positive active patellar compression test is observed. The knee is stable to varus and valgus stress at 5 and 30 degrees.         Imaging:  None today  Assessment:        1. Primary osteoarthritis of left knee           Plan:  Large Joint Arthrocentesis  Date/Time: 2/3/2025 2:20 PM  Consent given by: patient  Site marked: site marked  Timeout: Immediately prior to procedure a time out was called to verify the correct patient, procedure, equipment, support staff and site/side marked as required   Supporting Documentation  Indications: pain   Procedure Details  Location: knee -   Needle size: 22 G  Approach: anterolateral  Medications administered: 80 mg triamcinolone acetonide 40 MG/ML; 52 mL lidocaine PF 1% 1 %  Patient tolerance: patient tolerated the procedure well with no immediate complications                Assessment & Plan  1. Left knee pain.  He reports significant increase in pain over the anterior and lateral aspect of his left knee over the last 4 to 6 weeks, rated 7-8 out of 10, aching in nature, worse with prolonged walking, weightbearing, deep flexion activities, and getting up from a seated position. Minimal improvement with rest, anti-inflammatory medications, soft " tissue massage, and ice. Physical examination reveals active range of motion 5 to 120 degrees, 4 out of 5 strength on flexion and extension, maximal tenderness to palpation medial joint line as well as medial and lateral patellar facet, positive active patellar compression test, and stability to varus and valgus stress at 5 and 30 degrees.  Treatment options were discussed at length, including total knee arthroplasty, which he prefers to defer at this time. Given the significant recurrence of pain, he will proceed with an intra-articular injection of the left knee. He is advised to continue with home exercises focusing on hip, core, and quadriceps strengthening.    Patient would like to proceed with cortisone injection today to the left knee. Recommended limited use of affected extremity for the next 24 hours to only essential activites other than work on general active and passive motion. Recommended supplementing with ice and soft tissue massage. Discussed with patient that they should see results in 5-7 days, if no improvement in 5-6 weeks I have asked them to call the office to review other options. Patient should call office immediately if they notice redness, warmth, fevers, chills, or residual numbness or tingling for greater than 6 hours after injection.       Follow-up  The patient will follow up in 3 months or sooner if needed.    Eliseo Price was in agreement with plan and had all questions answered.     Orders:  Orders Placed This Encounter   Procedures    Large Joint Arthrocentesis       Medications:  No orders of the defined types were placed in this encounter.      Followup:  Return in about 3 months (around 5/3/2025).    Diagnoses and all orders for this visit:    1. Primary osteoarthritis of left knee (Primary)  -     Large Joint Arthrocentesis                  Dictated utilizing Dragon dictation     Patient or patient representative verbalized consent for the use of Ambient Listening during the  visit with  Valentin Barfield MD for chart documentation. 2/4/2025  14:35 EST

## 2025-02-04 RX ORDER — TRIAMCINOLONE ACETONIDE 40 MG/ML
80 INJECTION, SUSPENSION INTRA-ARTICULAR; INTRAMUSCULAR
Status: COMPLETED | OUTPATIENT
Start: 2025-02-03 | End: 2025-02-03

## 2025-02-04 RX ORDER — LIDOCAINE HYDROCHLORIDE 10 MG/ML
52 INJECTION, SOLUTION EPIDURAL; INFILTRATION; INTRACAUDAL; PERINEURAL
Status: COMPLETED | OUTPATIENT
Start: 2025-02-03 | End: 2025-02-03

## 2025-05-05 ENCOUNTER — OFFICE VISIT (OUTPATIENT)
Dept: ORTHOPEDIC SURGERY | Facility: CLINIC | Age: 60
End: 2025-05-05
Payer: MEDICARE

## 2025-05-05 VITALS — BODY MASS INDEX: 40.04 KG/M2 | WEIGHT: 286 LBS | HEIGHT: 71 IN

## 2025-05-05 DIAGNOSIS — M17.12 PRIMARY OSTEOARTHRITIS OF LEFT KNEE: Primary | ICD-10-CM

## 2025-05-05 RX ORDER — TRIAMCINOLONE ACETONIDE 40 MG/ML
80 INJECTION, SUSPENSION INTRA-ARTICULAR; INTRAMUSCULAR
Status: COMPLETED | OUTPATIENT
Start: 2025-05-05 | End: 2025-05-05

## 2025-05-05 RX ORDER — LIDOCAINE HYDROCHLORIDE 10 MG/ML
8 INJECTION, SOLUTION EPIDURAL; INFILTRATION; INTRACAUDAL; PERINEURAL
Status: COMPLETED | OUTPATIENT
Start: 2025-05-05 | End: 2025-05-05

## 2025-05-05 RX ADMIN — TRIAMCINOLONE ACETONIDE 80 MG: 40 INJECTION, SUSPENSION INTRA-ARTICULAR; INTRAMUSCULAR at 13:16

## 2025-05-05 RX ADMIN — LIDOCAINE HYDROCHLORIDE 8 ML: 10 INJECTION, SOLUTION EPIDURAL; INFILTRATION; INTRACAUDAL; PERINEURAL at 13:16

## 2025-05-05 NOTE — PROGRESS NOTES
Subjective:     Patient ID: Eliseo Price is a 59 y.o. male.    Chief Complaint:  Follow-up left knee pain, DJD  History of Present Illness  History of Present Illness  Eliseo returns to clinic today follow-up evaluation regards to his left knee, his last injection back in February worked very well with greater than 90% relief of this pain for approximately 2-1/2 months.  Over the last 2 weeks she has had moderate increasing pain particular of the medial aspect and anterior aspect of his left knee, aching in nature, moderate pain radiating over to the lateral aspect of the knee.  Denies any dudley locking or catching, moderate swelling does wax and wane.  Rates current level of pain as 6-8 out of 10 and aching in nature.  Denies hip or groin pain.  Denies radiation of pain from the knee itself.     Social History     Occupational History    Not on file   Tobacco Use    Smoking status: Never     Passive exposure: Never    Smokeless tobacco: Never   Vaping Use    Vaping status: Never Used   Substance and Sexual Activity    Alcohol use: Yes     Comment: RARE, states had half pint bourbon 3/12/24    Drug use: No    Sexual activity: Defer      Past Medical History:   Diagnosis Date    Amputated toe of left foot     all toes    Amputated toe of right foot     all toes    Anesthesia complication     DIFFICULT TO PUT TO SLEEP    Arthritis     Bipolar disorder     Cellulitis of lower extremity     RIGHT LOWER LEG    COPD (chronic obstructive pulmonary disease)     Coronary artery disease 2009    HEART ATTACK    Diabetes mellitus     Disease of thyroid gland     nodule on thyroid    DVT (deep venous thrombosis) 2005    right leg    Fracture of right foot     Gastric ulcer     GERD (gastroesophageal reflux disease)     Hyperlipidemia     Hypertension     MRSA infection 2003    history of left leg after surgery    Neuropathy     hands & feet    Pseudogout     RLS (restless legs syndrome)     Septic shock 07/2017    H/O    Sleep  apnea     no machine, CANT TOLERATE    Stroke     x2, no residual    Toxic metabolic encephalopathy 07/2017    H/O    Tremors of nervous system      Past Surgical History:   Procedure Laterality Date    AMPUTATION DIGIT Left 3/16/2018    Procedure: AMPUTATION LEFT FOURTH TOE;  Surgeon: Anish Farah DPM;  Location: [facility] OR;  Service: Podiatry    AMPUTATION DIGIT Right 10/13/2020    Procedure: AMPUTATION OF RIGHT FIFTH TOE AND ALL ASSOCIATED PROCEDURES;  Surgeon: Anish Farah DPM;  Location: [facility] OR;  Service: Podiatry;  Laterality: Right;  AMPUTATION OF RIGHT FIFTH TOE     AMPUTATION DIGIT Right 1/19/2021    Procedure: AMPUTATION RIGHT FOURTH TOE;  Surgeon: Anish Farah DPM;  Location: [facility];  Service: Podiatry;  Laterality: Right;    AMPUTATION DIGIT Right 7/30/2021    Procedure: AMPUTATION RIGHT THIRD TOE;  Surgeon: Anish Farah DPM;  Location: [facility];  Service: Podiatry;  Laterality: Right;  RIGHT 3rd toe amputation     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    BONE EXCISION LEG Right 12/14/2023    Procedure: BONE EXCISION LOWER EXTERMITY;  Surgeon: Juan White DPM;  Location: [facility];  Service: Podiatry;  Laterality: Right;    COLONOSCOPY N/A 7/16/2018    Procedure: COLONOSCOPY and polypectomy;  Surgeon: Kaylie Mcfarlane MD;  Location: [facility];  Service: Gastroenterology    ENDOSCOPY N/A 3/16/2018    Procedure: ESOPHAGOGASTRODUODENOSCOPY with biopsies;  Surgeon: Kaylie Mcfarlane MD;  Location: [facility];  Service: Gastroenterology    FINGER TENDON REPAIR Right     little finger    HARDWARE REMOVAL Right 8/18/2017    Procedure: RIGHT EXTERNAL FIXATOR REMOVAL;  Surgeon: Anish Farah DPM;  Location: [facility];  Service:     HARDWARE REMOVAL Right 11/1/2024    Procedure: HARDWARE REMOVAL;  Surgeon: Juan White DPM;  Location: [facility];  Service: Podiatry;  Laterality: Right;    INCISION AND DRAINAGE LEG Left 6/13/2018    Procedure: Left 2nd, 3rd toe  "laceration irrigation, debridement and multi layer wound closure.;  Surgeon: Anish Farah DPM;  Location:  [facility] OR;  Service: Podiatry    JOINT REPLACEMENT      KNEE SURGERY      scope left 6 times, right once    LEG DEBRIDEMENT Right 10/16/2020    Procedure: DEBRIDEMENT RIGHT  FOOT SURGICAL WOUND;  Surgeon: Anish Farah DPM;  Location:  [facility] OR;  Service: Podiatry;  Laterality: Right;  DEBRIDEMENT RIGHT FOOT SURGICAL WOUND    NERVE TRANSFER Right     elbow    ORIF FOOT FRACTURE Right 7/29/2017    Procedure: open reduction with percutaneous pinning of midfoot dislocation fracture;  Surgeon: Anish Farah DPM;  Location:  [facility];  Service:     ORIF FOOT FRACTURE Left 3/5/2018    Procedure: OPEN REDUCTION WITH IRRIGATION AND WOUND CLOSURE LEFT FOURTH TOE;  Surgeon: Anish Farah DPM;  Location:  [facility];  Service:     TENDON LENGTHENING/SHORTENING Right     PSOAS    TOE FUSION Right 8/18/2017    Procedure: RIGHT  MEDIAL COLUMN ARTHRODESIS, RIGHT TARAL METATARSAL ARTHRODESIS;  Surgeon: Anish Farah DPM;  Location:  [facility];  Service:     TOTAL HIP ARTHROPLASTY Bilateral     TOTAL SHOULDER ARTHROPLASTY Right        Family History   Problem Relation Age of Onset    Arthritis Mother     Cancer Mother     Hyperlipidemia Mother     Colon polyps Mother     Arthritis Father     Cancer Father     Depression Father     Hypertension Father     Mental illness Father     Cancer Sister     Arthritis Sister     Hyperlipidemia Sister     Cancer Paternal Aunt     Hypertension Daughter     Depression Son     Hyperlipidemia Paternal Grandmother     Hearing loss Paternal Grandfather     Hyperlipidemia Paternal Grandfather     Hypertension Paternal Grandfather     Colon cancer Maternal Grandmother     Malig Hyperthermia Neg Hx          Review of Systems        Objective:  Vitals:    05/05/25 1312   Weight: 130 kg (286 lb)   Height: 180.3 cm (71\")         05/05/25  1312   Weight: 130 kg (286 lb)     Body mass index " is 39.89 kg/m².  Physical Exam    Vital signs reviewed.   General: No acute distress, alert and oriented  Eyes: conjunctiva clear; pupils equally round and reactive  ENT: external ears and nose atraumatic; oropharynx clear  CV: no peripheral edema  Resp: normal respiratory effort  Skin: no rashes or wounds; normal turgor  Psych: mood and affect appropriate; recent and remote memory intact          Physical Exam         Left knee-active range of motion 5 to 125 degrees, 4+ out of 5 strength on flexion and extension, maximal tenderness palpation medial joint line as well as medial lateral patellar facet, positive active patella compression test, moderate crepitus on range of motion.  Moderate effusion.  Stable to varus and valgus stress 5 to 30 degrees.    Imaging:  None today  Assessment:        1. Primary osteoarthritis of left knee           Plan:  Large Joint Arthrocentesis: L knee  Date/Time: 5/5/2025 1:16 PM  Consent given by: patient  Site marked: site marked  Timeout: Immediately prior to procedure a time out was called to verify the correct patient, procedure, equipment, support staff and site/side marked as required   Supporting Documentation  Indications: pain   Procedure Details  Location: knee - L knee  Preparation: Patient was prepped and draped in the usual sterile fashion  Needle size: 22 G  Approach: anterolateral  Medications administered: 80 mg triamcinolone acetonide 40 MG/ML; 8 mL lidocaine PF 1% 1 %  Patient tolerance: patient tolerated the procedure well with no immediate complications                Assessment & Plan  Discussed treatment options with patient-at this point in time elected proceed with repeat injection given prior success.  We did discuss option for total knee arthroplasty but he wants to hold off on that at this time.  Will reevaluate in 3 months and see how he is doing at that point in time.  Continue with home exercises as well as efforts at weight loss with low impact  exercise and dieting    Patient would like to proceed with cortisone injection today to the left. Recommended limited use of affected extremity for the next 24 hours to only essential activites other than work on general active and passive motion. Recommended supplementing with ice and soft tissue massage. Discussed with patient that they should see results in 5-7 days, if no improvement in 5-6 weeks I have asked them to call the office to review other options. Patient should call office immediately if they notice redness, warmth, fevers, chills, or residual numbness or tingling for greater than 6 hours after injection.       Eliseo Price  was in agreement with plan and had all questions answered.     Orders:  Orders Placed This Encounter   Procedures    Large Joint Arthrocentesis: L knee       Medications:  No orders of the defined types were placed in this encounter.      Followup:  No follow-ups on file.    Diagnoses and all orders for this visit:    1. Primary osteoarthritis of left knee (Primary)    Other orders  -     Large Joint Arthrocentesis: L knee                  Dictated utilizing Dragon dictation     Patient or patient representative verbalized consent for the use of Ambient Listening during the visit with  Valentin Barfield MD for chart documentation. 5/5/2025  13:51 EDT

## 2025-07-30 ENCOUNTER — OFFICE VISIT (OUTPATIENT)
Dept: ORTHOPEDIC SURGERY | Facility: CLINIC | Age: 60
End: 2025-07-30
Payer: MEDICARE

## 2025-07-30 VITALS — BODY MASS INDEX: 42.87 KG/M2 | WEIGHT: 306.2 LBS | HEIGHT: 71 IN

## 2025-07-30 DIAGNOSIS — M17.12 PRIMARY OSTEOARTHRITIS OF LEFT KNEE: Primary | ICD-10-CM

## 2025-07-30 RX ORDER — TRIAMCINOLONE ACETONIDE 40 MG/ML
80 INJECTION, SUSPENSION INTRA-ARTICULAR; INTRAMUSCULAR
Status: COMPLETED | OUTPATIENT
Start: 2025-07-30 | End: 2025-07-30

## 2025-07-30 RX ORDER — LIDOCAINE HYDROCHLORIDE 10 MG/ML
8 INJECTION, SOLUTION EPIDURAL; INFILTRATION; INTRACAUDAL; PERINEURAL
Status: COMPLETED | OUTPATIENT
Start: 2025-07-30 | End: 2025-07-30

## 2025-07-30 RX ADMIN — LIDOCAINE HYDROCHLORIDE 8 ML: 10 INJECTION, SOLUTION EPIDURAL; INFILTRATION; INTRACAUDAL; PERINEURAL at 13:56

## 2025-07-30 RX ADMIN — TRIAMCINOLONE ACETONIDE 80 MG: 40 INJECTION, SUSPENSION INTRA-ARTICULAR; INTRAMUSCULAR at 13:56

## 2025-07-30 NOTE — PROGRESS NOTES
Subjective:     Patient ID: Eliseo Price is a 59 y.o. male.    Chief Complaint:  Follow-up left knee pain, DJD  History of Present Illness  Eliseo returns to clinic today follow-up evaluation regards to his left knee overall states that his last injection worked well but over the last 3 to 4 weeks has had significant increasing pain particular the anterior lateral aspect of his left knee and occasionally had buckling locking episodes to the knee itself.  Denies any new numbness or tingling.  He does have an abrasion over his anterior knee from recent fall.  Denies hip or groin pain.  Denies any new numbness or tingling.    Social History     Occupational History    Not on file   Tobacco Use    Smoking status: Never     Passive exposure: Never    Smokeless tobacco: Never   Vaping Use    Vaping status: Never Used   Substance and Sexual Activity    Alcohol use: Yes     Comment: RARE, states had half pint bourbon 3/12/24    Drug use: No    Sexual activity: Defer      Past Medical History:   Diagnosis Date    Amputated toe of left foot     all toes    Amputated toe of right foot     all toes    Anesthesia complication     DIFFICULT TO PUT TO SLEEP    Arthritis     Bipolar disorder     Cellulitis of lower extremity     RIGHT LOWER LEG    COPD (chronic obstructive pulmonary disease)     Coronary artery disease 2009    HEART ATTACK    Diabetes mellitus     Disease of thyroid gland     nodule on thyroid    DVT (deep venous thrombosis) 2005    right leg    Fracture of right foot     Gastric ulcer     GERD (gastroesophageal reflux disease)     Hyperlipidemia     Hypertension     MRSA infection 2003    history of left leg after surgery    Neuropathy     hands & feet    Pseudogout     RLS (restless legs syndrome)     Septic shock 07/2017    H/O    Sleep apnea     no machine, CANT TOLERATE    Stroke     x2, no residual    Toxic metabolic encephalopathy 07/2017    H/O    Tremors of nervous system      Past Surgical History:    Procedure Laterality Date    AMPUTATION DIGIT Left 3/16/2018    Procedure: AMPUTATION LEFT FOURTH TOE;  Surgeon: Anish Farah DPM;  Location:  [facility];  Service: Podiatry    AMPUTATION DIGIT Right 10/13/2020    Procedure: AMPUTATION OF RIGHT FIFTH TOE AND ALL ASSOCIATED PROCEDURES;  Surgeon: Anish Farah DPM;  Location:  [facility];  Service: Podiatry;  Laterality: Right;  AMPUTATION OF RIGHT FIFTH TOE     AMPUTATION DIGIT Right 1/19/2021    Procedure: AMPUTATION RIGHT FOURTH TOE;  Surgeon: Anish Farah DPM;  Location:  [facility];  Service: Podiatry;  Laterality: Right;    AMPUTATION DIGIT Right 7/30/2021    Procedure: AMPUTATION RIGHT THIRD TOE;  Surgeon: Anish Farah DPM;  Location:  [facility];  Service: Podiatry;  Laterality: Right;  RIGHT 3rd toe amputation     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    BONE EXCISION LEG Right 12/14/2023    Procedure: BONE EXCISION LOWER EXTERMITY;  Surgeon: Juan White DPM;  Location: [facility] OR;  Service: Podiatry;  Laterality: Right;    COLONOSCOPY N/A 7/16/2018    Procedure: COLONOSCOPY and polypectomy;  Surgeon: Kaylie Mcfarlane MD;  Location: [facility] OR;  Service: Gastroenterology    ENDOSCOPY N/A 3/16/2018    Procedure: ESOPHAGOGASTRODUODENOSCOPY with biopsies;  Surgeon: Kaylie Mcfarlane MD;  Location: [facility] OR;  Service: Gastroenterology    FINGER TENDON REPAIR Right     little finger    HARDWARE REMOVAL Right 8/18/2017    Procedure: RIGHT EXTERNAL FIXATOR REMOVAL;  Surgeon: Anish Farah DPM;  Location: [facility] OR;  Service:     HARDWARE REMOVAL Right 11/1/2024    Procedure: HARDWARE REMOVAL;  Surgeon: Juan White DPM;  Location: [facility] OR;  Service: Podiatry;  Laterality: Right;    INCISION AND DRAINAGE LEG Left 6/13/2018    Procedure: Left 2nd, 3rd toe laceration irrigation, debridement and multi layer wound closure.;  Surgeon: Anish Farah DPM;  Location: [facility] OR;  Service: Podiatry    JOINT REPLACEMENT      KNEE SURGERY  "     scope left 6 times, right once    LEG DEBRIDEMENT Right 10/16/2020    Procedure: DEBRIDEMENT RIGHT  FOOT SURGICAL WOUND;  Surgeon: Anish Farah DPM;  Location:  [facility];  Service: Podiatry;  Laterality: Right;  DEBRIDEMENT RIGHT FOOT SURGICAL WOUND    NERVE TRANSFER Right     elbow    ORIF FOOT FRACTURE Right 7/29/2017    Procedure: open reduction with percutaneous pinning of midfoot dislocation fracture;  Surgeon: Anish Farah DPM;  Location:  [facility];  Service:     ORIF FOOT FRACTURE Left 3/5/2018    Procedure: OPEN REDUCTION WITH IRRIGATION AND WOUND CLOSURE LEFT FOURTH TOE;  Surgeon: Anish Farah DPM;  Location:  [facility];  Service:     TENDON LENGTHENING/SHORTENING Right     PSOAS    TOE FUSION Right 8/18/2017    Procedure: RIGHT  MEDIAL COLUMN ARTHRODESIS, RIGHT TARAL METATARSAL ARTHRODESIS;  Surgeon: Anish Farah DPM;  Location:  [facility];  Service:     TOTAL HIP ARTHROPLASTY Bilateral     TOTAL SHOULDER ARTHROPLASTY Right        Family History   Problem Relation Age of Onset    Arthritis Mother     Cancer Mother     Hyperlipidemia Mother     Colon polyps Mother     Arthritis Father     Cancer Father     Depression Father     Hypertension Father     Mental illness Father     Cancer Sister     Arthritis Sister     Hyperlipidemia Sister     Cancer Paternal Aunt     Hypertension Daughter     Depression Son     Hyperlipidemia Paternal Grandmother     Hearing loss Paternal Grandfather     Hyperlipidemia Paternal Grandfather     Hypertension Paternal Grandfather     Colon cancer Maternal Grandmother     Malig Hyperthermia Neg Hx          Review of Systems        Objective:  Vitals:    07/30/25 1259   Weight: (!) 139 kg (306 lb 3.2 oz)   Height: 180.3 cm (70.98\")         07/30/25  1259   Weight: (!) 139 kg (306 lb 3.2 oz)     Body mass index is 42.73 kg/m².    Physical Exam    Vital signs reviewed.   General: No acute distress, alert and oriented  Eyes: conjunctiva clear; pupils equally round " and reactive  ENT: external ears and nose atraumatic; oropharynx clear  CV: no peripheral edema  Resp: normal respiratory effort  Skin: no rashes or wounds; normal turgor  Psych: mood and affect appropriate; recent and remote memory intact       Physical Exam  Left knee-active range of motion 5 to 120 degrees, 4 out of 5 strength on extension 4+ out of 5 strength in flexion.  Maximal tenderness palpation medial and lateral joint line as well as moderate tenderness along medial lateral patellar facet with positive active patella compression test, positive Nuñez exam with pain, no click.        Imaging:  Left Knee X-Ray  Indication: Pain    AP, Lateral, and [place] views    Findings:  No fracture  No bony lesion  Normal soft tissues  Tricompartmental joint space narrowing-advanced, near bone-on-bone articulation medial compartment, large reactive osteophyte formation along medial and patellofemoral compartments noted with slight varus alignment    Compared to prior office x-rays    Assessment:        1. Primary osteoarthritis of left knee             Assessment & Plan  Discussed treatment options at length with Eliseo-given his significant recurrence of pain we did discuss option for total knee arthroplasty.  He still aggressively work on trying to lose weight so that he can get a better outcome we does need to proceed with surgical treatment but at this point in time's been happy with response from injection would like to proceed with that today to try to help with his pain.    Can supplement over-the-counter anti-inflammatory medications with usual NSAID precautions given.    Instructions given on home exercise to work on hip core and quad strengthening as well as maintaining mobility through the knee itself    Patient would like to proceed with cortisone injection today to the left knee. Recommended limited use of affected extremity for the next 24 hours to only essential activites other than work on general active  and passive motion. Recommended supplementing with ice and soft tissue massage. Discussed with patient that they should see results in 5-7 days, if no improvement in 5-6 weeks I have asked them to call the office to review other options. Patient should call office immediately if they notice redness, warmth, fevers, chills, or residual numbness or tingling for greater than 6 hours after injection.         - Large Joint Arthrocentesis: L knee on 7/30/2025 1:56 PM  Indications: pain  Details: 22 G needle, anterolateral approach  Medications: 80 mg triamcinolone acetonide 40 MG/ML; 8 mL lidocaine PF 1% 1 %  Outcome: tolerated well, no immediate complications  Procedure, treatment alternatives, risks and benefits explained, specific risks discussed. Consent was given by the patient. Immediately prior to procedure a time out was called to verify the correct patient, procedure, equipment, support staff and site/side marked as required. Patient was prepped and draped in the usual sterile fashion.       Eliseo Price was in agreement with plan and had all questions answered.     Orders:  Orders Placed This Encounter   Procedures    XR Knee 3+ View With [place] Left       Medications:  No orders of the defined types were placed in this encounter.      Followup:  No follow-ups on file.    Diagnoses and all orders for this visit:    1. Primary osteoarthritis of left knee (Primary)  -     XR Knee 3+ View With [place] Left                   Dictated utilizing Dragon dictation     Patient or patient representative verbalized consent for the use of Ambient Listening during the visit with  Valentin Barfield MD for chart documentation. 7/30/2025  13:45 EDT

## (undated) DEVICE — INTENDED FOR TISSUE SEPARATION, AND OTHER PROCEDURES THAT REQUIRE A SHARP SURGICAL BLADE TO PUNCTURE OR CUT.: Brand: BARD-PARKER ® STAINLESS STEEL BLADES

## (undated) DEVICE — MASK,FACE,FLUID RESIST,SHLD,EARLOOP: Brand: MEDLINE

## (undated) DEVICE — BANDAGE ROLL,100% COTTON, 6 PLY, LARGE: Brand: KERLIX

## (undated) DEVICE — PRECISION THIN (9.0 X 0.38 X 25.0MM)

## (undated) DEVICE — GAUZE,SPONGE,FLUFF,6"X6.75",STRL,5/TRAY: Brand: MEDLINE

## (undated) DEVICE — DRSNG PAD ABD 8X10IN STRL

## (undated) DEVICE — DRSNG GZ CURAD XEROFORM NONADHR OVERWRAP 5X9IN

## (undated) DEVICE — BANDAGE,GAUZE,BULKEE II,4.5"X4.1YD,STRL: Brand: MEDLINE

## (undated) DEVICE — SUT VIC 3/0 PS2 27IN J427H

## (undated) DEVICE — GLV SURG SENSICARE PI PF LF 7 GRN STRL

## (undated) DEVICE — Device

## (undated) DEVICE — DRILL BIT, AO, SCALED: Brand: VARIAX

## (undated) DEVICE — SPNG GZ WOVN 4X4IN 12PLY 10/BX STRL

## (undated) DEVICE — SUT VIC 4/0 RB1 27IN J214H

## (undated) DEVICE — DISPOSABLE TOURNIQUET CUFF SINGLE BLADDER, SINGLE PORT AND LUER LOCK CONNECTOR: Brand: COLOR CUFF

## (undated) DEVICE — UNDERCAST PADDING: Brand: DEROYAL

## (undated) DEVICE — LINER SURG CANSTR SXN S/RIGD 1500CC

## (undated) DEVICE — GLV SURG SENSICARE PI MIC PF SZ7.5 LF STRL

## (undated) DEVICE — COMPRESSION , DISTRACTION TUBE COMPACT: Brand: HOFFMANN

## (undated) DEVICE — NDL HYPO PRECISIONGLIDE REG 25G 1 1/2

## (undated) DEVICE — SELF-DRILLING HALF PIN
Type: IMPLANTABLE DEVICE | Site: FOOT | Status: NON-FUNCTIONAL
Brand: APEX
Removed: 2017-07-29

## (undated) DEVICE — SUT PROLN 3/0 PS2 18IN 8687H

## (undated) DEVICE — BW-412T DISP COMBO CLEANING BRUSH: Brand: SINGLE USE COMBINATION CLEANING BRUSH

## (undated) DEVICE — SYR LL TP 10ML STRL

## (undated) DEVICE — TRAP FLD MINIVAC MEGADYNE 100ML

## (undated) DEVICE — SKIN PREP TRAY 4 COMPARTM TRAY: Brand: MEDLINE INDUSTRIES, INC.

## (undated) DEVICE — BANDAGE,GAUZE,BULKEE II,2.25"X3YD,STRL: Brand: MEDLINE

## (undated) DEVICE — SOL HYDROGEN PEROX 3PCT 16OZ

## (undated) DEVICE — KT DRSNG VAC SIMPLACE MD 10PK

## (undated) DEVICE — Device: Brand: LIGHT GUARD™ FLEXIBLE LIGHT HANDLE COVER (1 EACH/PKG)

## (undated) DEVICE — SYR LL 3CC

## (undated) DEVICE — GLV SURG SENSICARE MICRO PF LF 8 STRL

## (undated) DEVICE — PENCL E/S ULTRAVAC TELESCP NOSE HOLSTR 10FT

## (undated) DEVICE — FRCP BX RADJAW4 NDL 2.8 240CM LG OG BX40

## (undated) DEVICE — SUCTION CANISTER, 3000CC,SAFELINER: Brand: DEROYAL

## (undated) DEVICE — PK BASIC ORTHO 90

## (undated) DEVICE — SUT ETHLN 2/0 FS 18IN 664H

## (undated) DEVICE — PREP SOL POVIDONE/IODINE BT 4OZ

## (undated) DEVICE — TRY SKINPREP DRYPREP

## (undated) DEVICE — 1010 S-DRAPE TOWEL DRAPE 10/BX: Brand: STERI-DRAPE™

## (undated) DEVICE — OCCLUSIVE GAUZE STRIP,3% BISMUTH TRIBROMOPHENATE IN PETROLATUM BLEND: Brand: XEROFORM

## (undated) DEVICE — CVR HNDL LT SURG ACCSSRY BLU STRL

## (undated) DEVICE — HYBRID TUBING/CAP SET FOR OLYMPUS® SCOPES: Brand: ERBE

## (undated) DEVICE — CVR C/ARM MINI

## (undated) DEVICE — GLV SURG SENSICARE PI MIC PF SZ8 LF STRL

## (undated) DEVICE — SUCTION CANISTER, 1000CC,SAFELINER: Brand: DEROYAL

## (undated) DEVICE — MASK,FACE,FLUID RES,SHLD,FOGFREE,TIES: Brand: MEDLINE

## (undated) DEVICE — PATIENT RETURN ELECTRODE, SINGLE-USE, CONTACT QUALITY MONITORING, ADULT, WITH 9FT CORD, FOR PATIENTS WEIGING OVER 33LBS. (15KG): Brand: MEGADYNE

## (undated) DEVICE — NDL HYPO PRECISIONGLIDE/REG 18G 1IN PNK

## (undated) DEVICE — BNDG ELAS ELITE V/CLOSE 4IN 5YD LF STRL

## (undated) DEVICE — ENDOSCOPY PORT CONNECTOR FOR OLYMPUS® SCOPES: Brand: ERBE

## (undated) DEVICE — BNDG ESMARK 4IN 9FT LF STRL BLU

## (undated) DEVICE — MED-SURG™ POST-OP SHOE: Brand: DEROYAL

## (undated) DEVICE — DRSNG GZ PETROLTM XEROFORM CURAD 1X8IN STRL

## (undated) DEVICE — SUT VIC 3/0 SH 27IN J416H

## (undated) DEVICE — JACKT LAB KNIT COLR LG BLU

## (undated) DEVICE — NDL HYPO PRECISIONGLIDE/REG 18G 11/2 PNK

## (undated) DEVICE — VIAL FORMALIN CAP 10P 40ML

## (undated) DEVICE — DRP Z/FRICTION 10X16IN

## (undated) DEVICE — SUT PROLN 4/0 P3 18IN 8699G

## (undated) DEVICE — DRILL BIT, AO DIA2.6MM X 135MM, SCALED: Brand: VARIAX

## (undated) DEVICE — GLV SURG SENSICARE MICRO PF LF 6 STRL

## (undated) DEVICE — 5MM WRENCH,3-4MM PIN INSERTER COMPACT - 5MM: Brand: HOFFMANN

## (undated) DEVICE — ENDO. PORT CONNECTOR W/VALVE FOR OLYMPUS® SCOPES: Brand: ERBE

## (undated) DEVICE — HOLDING PIN
Type: IMPLANTABLE DEVICE | Site: FOOT | Status: NON-FUNCTIONAL
Brand: ANCHORAGE
Removed: 2017-08-18

## (undated) DEVICE — SOL IRR H2O BTL 1000ML STRL

## (undated) DEVICE — SOL ANTISEP SCRB PVPI 7.5PCT 4OZ

## (undated) DEVICE — SUT MNCRYL PLS ANTIB UD 4/0 PS2 18IN

## (undated) DEVICE — TBG PENCL TELESCP MEGADYNE SMOKE EVAC 10FT

## (undated) DEVICE — BNDG ELAS MATRX V/CLS 4IN 5YD LF

## (undated) DEVICE — TOWEL,OR,DSP,ST,BLUE,STD,4/PK,20PK/CS: Brand: MEDLINE

## (undated) DEVICE — GLOVE,SURG,SENSICARE,ALOE,LF,PF,7: Brand: MEDLINE

## (undated) DEVICE — LOCKING SCREW
Type: IMPLANTABLE DEVICE | Site: FOOT | Status: NON-FUNCTIONAL
Brand: VARIAX
Removed: 2017-08-18

## (undated) DEVICE — BONE SCREW, FULLY THREADED, T8
Type: IMPLANTABLE DEVICE | Site: FOOT | Status: NON-FUNCTIONAL
Brand: VARIAX
Removed: 2017-08-18

## (undated) DEVICE — Device: Brand: DEFENDO AIR/WATER/SUCTION AND BIOPSY VALVE

## (undated) DEVICE — SUT PROLN 2/0 SH 36IN 8523H

## (undated) DEVICE — BNDG,ELSTC,MATRIX,STRL,4"X5YD,LF,HOOK&LP: Brand: MEDLINE

## (undated) DEVICE — UNTHREADED GUIDE WIRE: Brand: FIXOS

## (undated) DEVICE — GOWN ISOL W/THUMB UNIV BLU BX/15

## (undated) DEVICE — CONTAINER,SPECIMEN,OR STERILE,4OZ: Brand: MEDLINE

## (undated) DEVICE — BNDG ELAS ELITE V/CLOSE 6IN 5YD LF STRL

## (undated) DEVICE — DRSNG ADAPTIC 3X8

## (undated) DEVICE — KT CANSTR VAC WND W/ISOLYSER SENSATRAC 500CC 10CS

## (undated) DEVICE — GLV SURG SENSICARE W/ALOE PF LF 7 STRL

## (undated) DEVICE — CULT AER/ANAEROB FASTIDIOUS BACT

## (undated) DEVICE — BANDAGE,ELASTIC,ESMARK,STERILE,4"X9',LF: Brand: MEDLINE

## (undated) DEVICE — INSERT MTP REAMER: Brand: ANCHORAGE

## (undated) DEVICE — FRAZIER SUCTION INSTRUMENT 10 FR W/CONTROL VENT & OBTURATOR: Brand: FRAZIER

## (undated) DEVICE — SPLNT 1 STEP 4INX30IN

## (undated) DEVICE — SUT PDS 2 MONO 3/0 VIL 27IN Z305H

## (undated) DEVICE — ARGYLE FRAZIER SURGICAL SUCTION INSTRUMENT 10 FR/CH (3.3 MM): Brand: ARGYLE

## (undated) DEVICE — PIN CLAMP COMPACT  3-4MM: Brand: HOFFMANN

## (undated) DEVICE — SOL PVPI ANTISEPT SCRB 4OZ

## (undated) DEVICE — DISPOSABLE TOURNIQUET CUFF SINGLE BLADDER, SINGLE PORT AND QUICK CONNECT CONNECTOR: Brand: COLOR CUFF

## (undated) DEVICE — SUT VIC PLSTC UD PS2 4/0 27IN J426

## (undated) DEVICE — TRANSPOSAL ULTRAFLEX DUO/QUAD ULTRA CART MANIFOLD

## (undated) DEVICE — SOL HYDROGEN PEROX 3PCT 8OZ

## (undated) DEVICE — MEDI-VAC NON-CONDUCTIVE SUCTION TUBING: Brand: CARDINAL HEALTH

## (undated) DEVICE — THE BITE BLOCK MAXI, LATEX FREE STRAP IS USED TO PROTECT THE ENDOSCOPE INSERTION TUBE FROM BEING BITTEN BY THE PATIENT.

## (undated) DEVICE — SUT ETHLN 3/0 PS2 18 IN 1669H

## (undated) DEVICE — SYR LUER SLPTP 50ML

## (undated) DEVICE — SUT VIC 2/0 SH 27IN

## (undated) DEVICE — STEINMANN PIN, SMOOTH
Type: IMPLANTABLE DEVICE | Status: NON-FUNCTIONAL
Brand: VARIAX
Removed: 2017-08-18